# Patient Record
Sex: FEMALE | Race: WHITE | NOT HISPANIC OR LATINO | Employment: PART TIME
[De-identification: names, ages, dates, MRNs, and addresses within clinical notes are randomized per-mention and may not be internally consistent; named-entity substitution may affect disease eponyms.]

---

## 2016-10-21 LAB — PAP SMEAR - HIM PATIENT REPORTED: NORMAL

## 2017-01-30 ENCOUNTER — RECORDS - HEALTHEAST (OUTPATIENT)
Dept: ADMINISTRATIVE | Facility: OTHER | Age: 52
End: 2017-01-30

## 2017-06-02 ENCOUNTER — RECORDS - HEALTHEAST (OUTPATIENT)
Dept: ADMINISTRATIVE | Facility: OTHER | Age: 52
End: 2017-06-02

## 2017-06-07 ENCOUNTER — RECORDS - HEALTHEAST (OUTPATIENT)
Dept: ADMINISTRATIVE | Facility: OTHER | Age: 52
End: 2017-06-07

## 2017-06-08 ENCOUNTER — RECORDS - HEALTHEAST (OUTPATIENT)
Dept: ADMINISTRATIVE | Facility: OTHER | Age: 52
End: 2017-06-08

## 2017-06-16 ENCOUNTER — RECORDS - HEALTHEAST (OUTPATIENT)
Dept: ADMINISTRATIVE | Facility: OTHER | Age: 52
End: 2017-06-16

## 2017-06-21 ENCOUNTER — RECORDS - HEALTHEAST (OUTPATIENT)
Dept: ADMINISTRATIVE | Facility: OTHER | Age: 52
End: 2017-06-21

## 2017-07-12 ENCOUNTER — RECORDS - HEALTHEAST (OUTPATIENT)
Dept: ADMINISTRATIVE | Facility: OTHER | Age: 52
End: 2017-07-12

## 2017-07-28 ENCOUNTER — RECORDS - HEALTHEAST (OUTPATIENT)
Dept: ADMINISTRATIVE | Facility: OTHER | Age: 52
End: 2017-07-28

## 2017-08-08 ENCOUNTER — RECORDS - HEALTHEAST (OUTPATIENT)
Dept: ADMINISTRATIVE | Facility: OTHER | Age: 52
End: 2017-08-08

## 2017-11-17 ENCOUNTER — RECORDS - HEALTHEAST (OUTPATIENT)
Dept: ADMINISTRATIVE | Facility: OTHER | Age: 52
End: 2017-11-17

## 2017-11-22 ENCOUNTER — RECORDS - HEALTHEAST (OUTPATIENT)
Dept: ADMINISTRATIVE | Facility: OTHER | Age: 52
End: 2017-11-22

## 2017-12-01 ENCOUNTER — RECORDS - HEALTHEAST (OUTPATIENT)
Dept: ADMINISTRATIVE | Facility: OTHER | Age: 52
End: 2017-12-01

## 2017-12-13 ENCOUNTER — RECORDS - HEALTHEAST (OUTPATIENT)
Dept: ADMINISTRATIVE | Facility: OTHER | Age: 52
End: 2017-12-13

## 2017-12-19 ENCOUNTER — RECORDS - HEALTHEAST (OUTPATIENT)
Dept: ADMINISTRATIVE | Facility: OTHER | Age: 52
End: 2017-12-19

## 2017-12-28 ENCOUNTER — RECORDS - HEALTHEAST (OUTPATIENT)
Dept: ADMINISTRATIVE | Facility: OTHER | Age: 52
End: 2017-12-28

## 2018-01-17 ENCOUNTER — RECORDS - HEALTHEAST (OUTPATIENT)
Dept: ADMINISTRATIVE | Facility: OTHER | Age: 53
End: 2018-01-17

## 2018-02-14 ENCOUNTER — RECORDS - HEALTHEAST (OUTPATIENT)
Dept: ADMINISTRATIVE | Facility: OTHER | Age: 53
End: 2018-02-14

## 2018-04-11 ENCOUNTER — OFFICE VISIT - HEALTHEAST (OUTPATIENT)
Dept: FAMILY MEDICINE | Facility: CLINIC | Age: 53
End: 2018-04-11

## 2018-04-11 ENCOUNTER — COMMUNICATION - HEALTHEAST (OUTPATIENT)
Dept: ANTICOAGULATION | Facility: CLINIC | Age: 53
End: 2018-04-11

## 2018-04-11 ENCOUNTER — COMMUNICATION - HEALTHEAST (OUTPATIENT)
Dept: FAMILY MEDICINE | Facility: CLINIC | Age: 53
End: 2018-04-11

## 2018-04-11 DIAGNOSIS — Z12.31 SCREENING MAMMOGRAM, ENCOUNTER FOR: ICD-10-CM

## 2018-04-11 DIAGNOSIS — I73.9 PAD (PERIPHERAL ARTERY DISEASE) (H): ICD-10-CM

## 2018-04-11 DIAGNOSIS — E78.5 DYSLIPIDEMIA: ICD-10-CM

## 2018-04-11 DIAGNOSIS — R63.4 WEIGHT LOSS: ICD-10-CM

## 2018-04-11 DIAGNOSIS — F17.200 TOBACCO USE DISORDER: ICD-10-CM

## 2018-04-11 DIAGNOSIS — R59.0 LEFT CERVICAL LYMPHADENOPATHY: ICD-10-CM

## 2018-04-11 DIAGNOSIS — I77.9 BILATERAL CAROTID ARTERY DISEASE (H): ICD-10-CM

## 2018-04-11 DIAGNOSIS — Z79.01 ANTICOAGULATION MONITORING, INR RANGE 2-3: ICD-10-CM

## 2018-04-11 DIAGNOSIS — I10 HTN (HYPERTENSION): ICD-10-CM

## 2018-04-11 LAB
ALBUMIN SERPL-MCNC: 3.8 G/DL (ref 3.5–5)
ALP SERPL-CCNC: 113 U/L (ref 45–120)
ALT SERPL W P-5'-P-CCNC: 17 U/L (ref 0–45)
ANION GAP SERPL CALCULATED.3IONS-SCNC: 7 MMOL/L (ref 5–18)
AST SERPL W P-5'-P-CCNC: 18 U/L (ref 0–40)
BASOPHILS # BLD AUTO: 0.1 THOU/UL (ref 0–0.2)
BASOPHILS NFR BLD AUTO: 1 % (ref 0–2)
BILIRUB SERPL-MCNC: 0.3 MG/DL (ref 0–1)
BUN SERPL-MCNC: 11 MG/DL (ref 8–22)
CALCIUM SERPL-MCNC: 9.3 MG/DL (ref 8.5–10.5)
CHLORIDE BLD-SCNC: 109 MMOL/L (ref 98–107)
CO2 SERPL-SCNC: 24 MMOL/L (ref 22–31)
CREAT SERPL-MCNC: 0.74 MG/DL (ref 0.6–1.1)
EOSINOPHIL # BLD AUTO: 0.3 THOU/UL (ref 0–0.4)
EOSINOPHIL NFR BLD AUTO: 3 % (ref 0–6)
ERYTHROCYTE [DISTWIDTH] IN BLOOD BY AUTOMATED COUNT: 12.9 % (ref 11–14.5)
GFR SERPL CREATININE-BSD FRML MDRD: >60 ML/MIN/1.73M2
GLUCOSE BLD-MCNC: 88 MG/DL (ref 70–125)
HCT VFR BLD AUTO: 34.5 % (ref 35–47)
HGB BLD-MCNC: 11.9 G/DL (ref 12–16)
INR PPP: 1.8 (ref 0.9–1.1)
LYMPHOCYTES # BLD AUTO: 5.1 THOU/UL (ref 0.8–4.4)
LYMPHOCYTES NFR BLD AUTO: 46 % (ref 20–40)
MCH RBC QN AUTO: 32 PG (ref 27–34)
MCHC RBC AUTO-ENTMCNC: 34.4 G/DL (ref 32–36)
MCV RBC AUTO: 93 FL (ref 80–100)
MONOCYTES # BLD AUTO: 0.9 THOU/UL (ref 0–0.9)
MONOCYTES NFR BLD AUTO: 8 % (ref 2–10)
NEUTROPHILS # BLD AUTO: 4.7 THOU/UL (ref 2–7.7)
NEUTROPHILS NFR BLD AUTO: 42 % (ref 50–70)
PLATELET # BLD AUTO: 228 THOU/UL (ref 140–440)
PMV BLD AUTO: 7.4 FL (ref 7–10)
POTASSIUM BLD-SCNC: 4.1 MMOL/L (ref 3.5–5)
PROT SERPL-MCNC: 6.9 G/DL (ref 6–8)
RBC # BLD AUTO: 3.71 MILL/UL (ref 3.8–5.4)
SODIUM SERPL-SCNC: 140 MMOL/L (ref 136–145)
WBC: 11.1 THOU/UL (ref 4–11)

## 2018-04-11 ASSESSMENT — MIFFLIN-ST. JEOR: SCORE: 1193.95

## 2018-04-16 ENCOUNTER — HOSPITAL ENCOUNTER (OUTPATIENT)
Dept: CT IMAGING | Facility: HOSPITAL | Age: 53
Discharge: HOME | End: 2018-04-16
Attending: FAMILY MEDICINE

## 2018-04-16 DIAGNOSIS — R63.4 WEIGHT LOSS: ICD-10-CM

## 2018-04-16 DIAGNOSIS — R59.0 LEFT CERVICAL LYMPHADENOPATHY: ICD-10-CM

## 2018-04-16 DIAGNOSIS — F17.200 TOBACCO USE DISORDER: ICD-10-CM

## 2018-04-17 ENCOUNTER — COMMUNICATION - HEALTHEAST (OUTPATIENT)
Dept: FAMILY MEDICINE | Facility: CLINIC | Age: 53
End: 2018-04-17

## 2018-04-17 DIAGNOSIS — S16.1XXA CERVICAL STRAIN: ICD-10-CM

## 2018-05-02 ENCOUNTER — AMBULATORY - HEALTHEAST (OUTPATIENT)
Dept: LAB | Facility: CLINIC | Age: 53
End: 2018-05-02

## 2018-05-02 ENCOUNTER — COMMUNICATION - HEALTHEAST (OUTPATIENT)
Dept: ANTICOAGULATION | Facility: CLINIC | Age: 53
End: 2018-05-02

## 2018-05-02 DIAGNOSIS — I73.9 PAD (PERIPHERAL ARTERY DISEASE) (H): ICD-10-CM

## 2018-05-02 DIAGNOSIS — Z79.01 ANTICOAGULATION MONITORING, INR RANGE 2-3: ICD-10-CM

## 2018-05-02 LAB — INR PPP: 1.5 (ref 0.9–1.1)

## 2018-05-11 ENCOUNTER — AMBULATORY - HEALTHEAST (OUTPATIENT)
Dept: LAB | Facility: CLINIC | Age: 53
End: 2018-05-11

## 2018-05-11 ENCOUNTER — COMMUNICATION - HEALTHEAST (OUTPATIENT)
Dept: ANTICOAGULATION | Facility: CLINIC | Age: 53
End: 2018-05-11

## 2018-05-11 DIAGNOSIS — I73.9 PAD (PERIPHERAL ARTERY DISEASE) (H): ICD-10-CM

## 2018-05-11 DIAGNOSIS — Z79.01 ANTICOAGULATION MONITORING, INR RANGE 2-3: ICD-10-CM

## 2018-05-11 LAB — INR PPP: 3.4 (ref 0.9–1.1)

## 2018-05-14 ENCOUNTER — COMMUNICATION - HEALTHEAST (OUTPATIENT)
Dept: FAMILY MEDICINE | Facility: CLINIC | Age: 53
End: 2018-05-14

## 2018-05-14 DIAGNOSIS — E78.5 DYSLIPIDEMIA: ICD-10-CM

## 2018-05-18 ENCOUNTER — COMMUNICATION - HEALTHEAST (OUTPATIENT)
Dept: ANTICOAGULATION | Facility: CLINIC | Age: 53
End: 2018-05-18

## 2018-05-18 ENCOUNTER — AMBULATORY - HEALTHEAST (OUTPATIENT)
Dept: LAB | Facility: CLINIC | Age: 53
End: 2018-05-18

## 2018-05-18 ENCOUNTER — COMMUNICATION - HEALTHEAST (OUTPATIENT)
Dept: FAMILY MEDICINE | Facility: CLINIC | Age: 53
End: 2018-05-18

## 2018-05-18 DIAGNOSIS — Z79.01 ANTICOAGULATION MONITORING, INR RANGE 2-3: ICD-10-CM

## 2018-05-18 DIAGNOSIS — I73.9 PAD (PERIPHERAL ARTERY DISEASE) (H): ICD-10-CM

## 2018-05-18 LAB — INR PPP: 4.7 (ref 0.9–1.1)

## 2018-05-30 ENCOUNTER — COMMUNICATION - HEALTHEAST (OUTPATIENT)
Dept: ANTICOAGULATION | Facility: CLINIC | Age: 53
End: 2018-05-30

## 2018-05-30 ENCOUNTER — AMBULATORY - HEALTHEAST (OUTPATIENT)
Dept: LAB | Facility: CLINIC | Age: 53
End: 2018-05-30

## 2018-05-30 DIAGNOSIS — I73.9 PAD (PERIPHERAL ARTERY DISEASE) (H): ICD-10-CM

## 2018-05-30 DIAGNOSIS — Z79.01 ANTICOAGULATION MONITORING, INR RANGE 2-3: ICD-10-CM

## 2018-05-30 LAB — INR PPP: 2 (ref 0.9–1.1)

## 2018-06-13 ENCOUNTER — AMBULATORY - HEALTHEAST (OUTPATIENT)
Dept: LAB | Facility: CLINIC | Age: 53
End: 2018-06-13

## 2018-06-13 ENCOUNTER — COMMUNICATION - HEALTHEAST (OUTPATIENT)
Dept: ANTICOAGULATION | Facility: CLINIC | Age: 53
End: 2018-06-13

## 2018-06-13 DIAGNOSIS — Z79.01 ANTICOAGULATION MONITORING, INR RANGE 2-3: ICD-10-CM

## 2018-06-13 DIAGNOSIS — I73.9 PAD (PERIPHERAL ARTERY DISEASE) (H): ICD-10-CM

## 2018-06-13 LAB — INR PPP: 2.2 (ref 0.9–1.1)

## 2018-06-15 ENCOUNTER — HOSPITAL ENCOUNTER (OUTPATIENT)
Dept: MAMMOGRAPHY | Facility: CLINIC | Age: 53
Discharge: HOME | End: 2018-06-15
Attending: FAMILY MEDICINE

## 2018-06-15 DIAGNOSIS — Z12.31 SCREENING MAMMOGRAM, ENCOUNTER FOR: ICD-10-CM

## 2018-07-17 ENCOUNTER — COMMUNICATION - HEALTHEAST (OUTPATIENT)
Dept: FAMILY MEDICINE | Facility: CLINIC | Age: 53
End: 2018-07-17

## 2018-07-17 DIAGNOSIS — I73.9 PAD (PERIPHERAL ARTERY DISEASE) (H): ICD-10-CM

## 2018-07-17 DIAGNOSIS — Z79.01 ANTICOAGULATION MONITORING, INR RANGE 2-3: ICD-10-CM

## 2018-07-18 ENCOUNTER — COMMUNICATION - HEALTHEAST (OUTPATIENT)
Dept: ANTICOAGULATION | Facility: CLINIC | Age: 53
End: 2018-07-18

## 2018-07-18 ENCOUNTER — AMBULATORY - HEALTHEAST (OUTPATIENT)
Dept: LAB | Facility: CLINIC | Age: 53
End: 2018-07-18

## 2018-07-18 DIAGNOSIS — Z79.01 ANTICOAGULATION MONITORING, INR RANGE 2-3: ICD-10-CM

## 2018-07-18 DIAGNOSIS — I73.9 PAD (PERIPHERAL ARTERY DISEASE) (H): ICD-10-CM

## 2018-07-18 LAB — INR PPP: 1.2 (ref 0.9–1.1)

## 2018-07-25 ENCOUNTER — COMMUNICATION - HEALTHEAST (OUTPATIENT)
Dept: ANTICOAGULATION | Facility: CLINIC | Age: 53
End: 2018-07-25

## 2018-07-25 ENCOUNTER — AMBULATORY - HEALTHEAST (OUTPATIENT)
Dept: LAB | Facility: CLINIC | Age: 53
End: 2018-07-25

## 2018-07-25 DIAGNOSIS — I73.9 PAD (PERIPHERAL ARTERY DISEASE) (H): ICD-10-CM

## 2018-07-25 DIAGNOSIS — Z79.01 ANTICOAGULATION MONITORING, INR RANGE 2-3: ICD-10-CM

## 2018-07-25 LAB — INR PPP: 1.6 (ref 0.9–1.1)

## 2018-08-08 ENCOUNTER — AMBULATORY - HEALTHEAST (OUTPATIENT)
Dept: LAB | Facility: CLINIC | Age: 53
End: 2018-08-08

## 2018-08-08 ENCOUNTER — COMMUNICATION - HEALTHEAST (OUTPATIENT)
Dept: ANTICOAGULATION | Facility: CLINIC | Age: 53
End: 2018-08-08

## 2018-08-08 DIAGNOSIS — Z79.01 ANTICOAGULATION MONITORING, INR RANGE 2-3: ICD-10-CM

## 2018-08-08 DIAGNOSIS — I73.9 PAD (PERIPHERAL ARTERY DISEASE) (H): ICD-10-CM

## 2018-08-08 LAB — INR PPP: 2.3 (ref 0.9–1.1)

## 2018-08-29 ENCOUNTER — COMMUNICATION - HEALTHEAST (OUTPATIENT)
Dept: ANTICOAGULATION | Facility: CLINIC | Age: 53
End: 2018-08-29

## 2018-08-29 ENCOUNTER — AMBULATORY - HEALTHEAST (OUTPATIENT)
Dept: LAB | Facility: CLINIC | Age: 53
End: 2018-08-29

## 2018-08-29 DIAGNOSIS — I73.9 PAD (PERIPHERAL ARTERY DISEASE) (H): ICD-10-CM

## 2018-08-29 DIAGNOSIS — Z79.01 ANTICOAGULATION MONITORING, INR RANGE 2-3: ICD-10-CM

## 2018-08-29 LAB — INR PPP: 2.3 (ref 0.9–1.1)

## 2018-09-22 ENCOUNTER — OFFICE VISIT - HEALTHEAST (OUTPATIENT)
Dept: FAMILY MEDICINE | Facility: CLINIC | Age: 53
End: 2018-09-22

## 2018-09-22 DIAGNOSIS — B07.8 COMMON WART: ICD-10-CM

## 2018-09-22 DIAGNOSIS — S90.859A FOREIGN BODY IN FOOT, INITIAL ENCOUNTER: ICD-10-CM

## 2018-09-24 ENCOUNTER — OFFICE VISIT - HEALTHEAST (OUTPATIENT)
Dept: FAMILY MEDICINE | Facility: CLINIC | Age: 53
End: 2018-09-24

## 2018-09-24 DIAGNOSIS — M79.672 LEFT FOOT PAIN: ICD-10-CM

## 2018-09-26 ENCOUNTER — AMBULATORY - HEALTHEAST (OUTPATIENT)
Dept: LAB | Facility: CLINIC | Age: 53
End: 2018-09-26

## 2018-09-26 ENCOUNTER — COMMUNICATION - HEALTHEAST (OUTPATIENT)
Dept: FAMILY MEDICINE | Facility: CLINIC | Age: 53
End: 2018-09-26

## 2018-09-26 ENCOUNTER — COMMUNICATION - HEALTHEAST (OUTPATIENT)
Dept: ANTICOAGULATION | Facility: CLINIC | Age: 53
End: 2018-09-26

## 2018-09-26 ENCOUNTER — AMBULATORY - HEALTHEAST (OUTPATIENT)
Dept: NURSING | Facility: CLINIC | Age: 53
End: 2018-09-26

## 2018-09-26 DIAGNOSIS — Z79.01 ANTICOAGULATION MONITORING, INR RANGE 2-3: ICD-10-CM

## 2018-09-26 DIAGNOSIS — I73.9 PAD (PERIPHERAL ARTERY DISEASE) (H): ICD-10-CM

## 2018-09-26 LAB — INR PPP: 2.2 (ref 0.9–1.1)

## 2018-10-11 ENCOUNTER — AMBULATORY - HEALTHEAST (OUTPATIENT)
Dept: VASCULAR SURGERY | Facility: CLINIC | Age: 53
End: 2018-10-11

## 2018-10-11 ENCOUNTER — OFFICE VISIT - HEALTHEAST (OUTPATIENT)
Dept: PODIATRY | Facility: CLINIC | Age: 53
End: 2018-10-11

## 2018-10-11 DIAGNOSIS — S90.852A FOREIGN BODY IN LEFT FOOT, INITIAL ENCOUNTER: ICD-10-CM

## 2018-10-12 ENCOUNTER — COMMUNICATION - HEALTHEAST (OUTPATIENT)
Dept: FAMILY MEDICINE | Facility: CLINIC | Age: 53
End: 2018-10-12

## 2018-10-16 ENCOUNTER — COMMUNICATION - HEALTHEAST (OUTPATIENT)
Dept: VASCULAR SURGERY | Facility: CLINIC | Age: 53
End: 2018-10-16

## 2018-10-17 ENCOUNTER — OFFICE VISIT - HEALTHEAST (OUTPATIENT)
Dept: FAMILY MEDICINE | Facility: CLINIC | Age: 53
End: 2018-10-17

## 2018-10-17 ENCOUNTER — COMMUNICATION - HEALTHEAST (OUTPATIENT)
Dept: ANTICOAGULATION | Facility: CLINIC | Age: 53
End: 2018-10-17

## 2018-10-17 DIAGNOSIS — Z79.01 ANTICOAGULATION MONITORING, INR RANGE 2-3: ICD-10-CM

## 2018-10-17 DIAGNOSIS — I10 HTN (HYPERTENSION): ICD-10-CM

## 2018-10-17 DIAGNOSIS — I73.9 PAD (PERIPHERAL ARTERY DISEASE) (H): ICD-10-CM

## 2018-10-17 DIAGNOSIS — S90.852A FOREIGN BODY IN LEFT FOOT, INITIAL ENCOUNTER: ICD-10-CM

## 2018-10-17 DIAGNOSIS — R01.1 NEWLY RECOGNIZED MURMUR: ICD-10-CM

## 2018-10-17 DIAGNOSIS — Z01.818 PRE-OPERATIVE EXAMINATION: ICD-10-CM

## 2018-10-17 LAB
ANION GAP SERPL CALCULATED.3IONS-SCNC: 10 MMOL/L (ref 5–18)
ATRIAL RATE - MUSE: 50 BPM
BUN SERPL-MCNC: 9 MG/DL (ref 8–22)
CALCIUM SERPL-MCNC: 9.1 MG/DL (ref 8.5–10.5)
CHLORIDE BLD-SCNC: 106 MMOL/L (ref 98–107)
CO2 SERPL-SCNC: 24 MMOL/L (ref 22–31)
CREAT SERPL-MCNC: 0.76 MG/DL (ref 0.6–1.1)
DIASTOLIC BLOOD PRESSURE - MUSE: NORMAL MMHG
GFR SERPL CREATININE-BSD FRML MDRD: >60 ML/MIN/1.73M2
GLUCOSE BLD-MCNC: 88 MG/DL (ref 70–125)
INR PPP: 2.1 (ref 0.9–1.1)
INTERPRETATION ECG - MUSE: NORMAL
P AXIS - MUSE: 68 DEGREES
POTASSIUM BLD-SCNC: 3.9 MMOL/L (ref 3.5–5)
PR INTERVAL - MUSE: 128 MS
QRS DURATION - MUSE: 106 MS
QT - MUSE: 470 MS
QTC - MUSE: 428 MS
R AXIS - MUSE: 72 DEGREES
SODIUM SERPL-SCNC: 140 MMOL/L (ref 136–145)
SYSTOLIC BLOOD PRESSURE - MUSE: NORMAL MMHG
T AXIS - MUSE: 3 DEGREES
VENTRICULAR RATE- MUSE: 50 BPM

## 2018-10-17 ASSESSMENT — MIFFLIN-ST. JEOR: SCORE: 1189.41

## 2018-10-18 ENCOUNTER — COMMUNICATION - HEALTHEAST (OUTPATIENT)
Dept: FAMILY MEDICINE | Facility: CLINIC | Age: 53
End: 2018-10-18

## 2018-10-18 LAB
BASOPHILS # BLD AUTO: 0.1 THOU/UL (ref 0–0.2)
BASOPHILS NFR BLD AUTO: 1 % (ref 0–2)
EOSINOPHIL # BLD AUTO: 0.3 THOU/UL (ref 0–0.4)
EOSINOPHIL NFR BLD AUTO: 3 % (ref 0–6)
ERYTHROCYTE [DISTWIDTH] IN BLOOD BY AUTOMATED COUNT: 12.7 % (ref 11–14.5)
HCT VFR BLD AUTO: 33.3 % (ref 35–47)
HGB BLD-MCNC: 11.2 G/DL (ref 12–16)
LYMPHOCYTES # BLD AUTO: 4.3 THOU/UL (ref 0.8–4.4)
LYMPHOCYTES NFR BLD AUTO: 48 % (ref 20–40)
MCH RBC QN AUTO: 30.5 PG (ref 27–34)
MCHC RBC AUTO-ENTMCNC: 33.7 G/DL (ref 32–36)
MCV RBC AUTO: 91 FL (ref 80–100)
MONOCYTES # BLD AUTO: 0.7 THOU/UL (ref 0–0.9)
MONOCYTES NFR BLD AUTO: 8 % (ref 2–10)
NEUTROPHILS # BLD AUTO: 3.6 THOU/UL (ref 2–7.7)
NEUTROPHILS NFR BLD AUTO: 40 % (ref 50–70)
PLATELET # BLD AUTO: 230 THOU/UL (ref 140–440)
PMV BLD AUTO: 7.4 FL (ref 7–10)
RBC # BLD AUTO: 3.67 MILL/UL (ref 3.8–5.4)
WBC: 8.9 THOU/UL (ref 4–11)

## 2018-10-22 ENCOUNTER — ANESTHESIA - HEALTHEAST (OUTPATIENT)
Dept: SURGERY | Facility: HOSPITAL | Age: 53
End: 2018-10-22

## 2018-10-22 ASSESSMENT — MIFFLIN-ST. JEOR: SCORE: 1189.41

## 2018-10-23 ENCOUNTER — COMMUNICATION - HEALTHEAST (OUTPATIENT)
Dept: ANTICOAGULATION | Facility: CLINIC | Age: 53
End: 2018-10-23

## 2018-10-23 ENCOUNTER — SURGERY - HEALTHEAST (OUTPATIENT)
Dept: SURGERY | Facility: HOSPITAL | Age: 53
End: 2018-10-23

## 2018-11-01 ENCOUNTER — COMMUNICATION - HEALTHEAST (OUTPATIENT)
Dept: ANTICOAGULATION | Facility: CLINIC | Age: 53
End: 2018-11-01

## 2018-11-01 ENCOUNTER — OFFICE VISIT - HEALTHEAST (OUTPATIENT)
Dept: PODIATRY | Facility: CLINIC | Age: 53
End: 2018-11-01

## 2018-11-01 ENCOUNTER — AMBULATORY - HEALTHEAST (OUTPATIENT)
Dept: LAB | Facility: CLINIC | Age: 53
End: 2018-11-01

## 2018-11-01 DIAGNOSIS — I73.9 PAD (PERIPHERAL ARTERY DISEASE) (H): ICD-10-CM

## 2018-11-01 DIAGNOSIS — Z79.01 ANTICOAGULATION MONITORING, INR RANGE 2-3: ICD-10-CM

## 2018-11-01 DIAGNOSIS — B07.0 VERRUCA PLANTARIS: ICD-10-CM

## 2018-11-01 LAB — INR PPP: 1.8 (ref 0.9–1.1)

## 2018-11-02 ENCOUNTER — OFFICE VISIT - HEALTHEAST (OUTPATIENT)
Dept: FAMILY MEDICINE | Facility: CLINIC | Age: 53
End: 2018-11-02

## 2018-11-02 ENCOUNTER — CARE COORDINATION (OUTPATIENT)
Dept: CARDIOLOGY | Facility: CLINIC | Age: 53
End: 2018-11-02

## 2018-11-02 DIAGNOSIS — N39.41 URGE INCONTINENCE OF URINE: ICD-10-CM

## 2018-11-02 DIAGNOSIS — D64.9 NORMOCYTIC ANEMIA: ICD-10-CM

## 2018-11-02 LAB
FERRITIN SERPL-MCNC: 91 NG/ML (ref 10–130)
FOLATE SERPL-MCNC: 15.7 NG/ML
IRON SATN MFR SERPL: 17 % (ref 20–50)
IRON SERPL-MCNC: 46 UG/DL (ref 42–175)
LDH SERPL L TO P-CCNC: 220 U/L (ref 125–220)
RETICS # AUTO: 0.07 MILL/UL (ref 0.01–0.11)
TIBC SERPL-MCNC: 266 UG/DL (ref 313–563)
TRANSFERRIN SERPL-MCNC: 213 MG/DL (ref 212–360)
TSH SERPL DL<=0.005 MIU/L-ACNC: 2.48 UIU/ML (ref 0.3–5)
VIT B12 SERPL-MCNC: 396 PG/ML (ref 213–816)

## 2018-11-02 NOTE — PROGRESS NOTES
Patient will receive a new patient packet.  She will fill out and sign the Release of Information and consent for treatment forms.  Once these are returned we will access [facility] records from [facility].  We can establish imaging needs at that time.  Patient states understanding and agrees to call with any further questions or concerns.

## 2018-11-03 ENCOUNTER — HOSPITAL ENCOUNTER (OUTPATIENT)
Dept: ULTRASOUND IMAGING | Facility: HOSPITAL | Age: 53
Discharge: HOME | End: 2018-11-03
Attending: FAMILY MEDICINE

## 2018-11-03 DIAGNOSIS — N39.41 URGE INCONTINENCE OF URINE: ICD-10-CM

## 2018-11-03 LAB
BASOPHILS # BLD AUTO: 0.1 THOU/UL (ref 0–0.2)
BASOPHILS NFR BLD AUTO: 1 % (ref 0–2)
EOSINOPHIL # BLD AUTO: 0.3 THOU/UL (ref 0–0.4)
EOSINOPHIL NFR BLD AUTO: 3 % (ref 0–6)
ERYTHROCYTE [DISTWIDTH] IN BLOOD BY AUTOMATED COUNT: 13.8 % (ref 11–14.5)
HCT VFR BLD AUTO: 35.6 % (ref 35–47)
HGB BLD-MCNC: 11.7 G/DL (ref 12–16)
LYMPHOCYTES # BLD AUTO: 4.6 THOU/UL (ref 0.8–4.4)
LYMPHOCYTES NFR BLD AUTO: 42 % (ref 20–40)
MCH RBC QN AUTO: 31.1 PG (ref 27–34)
MCHC RBC AUTO-ENTMCNC: 32.9 G/DL (ref 32–36)
MCV RBC AUTO: 95 FL (ref 80–100)
MONOCYTES # BLD AUTO: 1.1 THOU/UL (ref 0–0.9)
MONOCYTES NFR BLD AUTO: 9 % (ref 2–10)
NEUTROPHILS # BLD AUTO: 5.1 THOU/UL (ref 2–7.7)
NEUTROPHILS NFR BLD AUTO: 46 % (ref 50–70)
PATH REPORT.MICROSCOPIC SPEC OTHER STN: ABNORMAL
PLATELET # BLD AUTO: 230 THOU/UL (ref 140–440)
PMV BLD AUTO: 10.7 FL (ref 8.5–12.5)
RBC # BLD AUTO: 3.76 MILL/UL (ref 3.8–5.4)
WBC: 11.1 THOU/UL (ref 4–11)

## 2018-11-05 LAB
LAB AP CHARGES (HE HISTORICAL CONVERSION): ABNORMAL
PATH REPORT.COMMENTS IMP SPEC: ABNORMAL
PATH REPORT.COMMENTS IMP SPEC: ABNORMAL
PATH REPORT.FINAL DX SPEC: ABNORMAL
PATH REPORT.MICROSCOPIC SPEC OTHER STN: ABNORMAL
PATH REPORT.RELEVANT HX SPEC: ABNORMAL
RESULT FLAG (HE HISTORICAL CONVERSION): ABNORMAL

## 2018-11-07 ENCOUNTER — COMMUNICATION - HEALTHEAST (OUTPATIENT)
Dept: PODIATRY | Facility: CLINIC | Age: 53
End: 2018-11-07

## 2018-11-09 LAB
HEMOCCULT SP1 STL QL: NEGATIVE
HEMOCCULT SP2 STL QL: NEGATIVE
HEMOCCULT SP3 STL QL: NEGATIVE

## 2018-11-12 ENCOUNTER — OFFICE VISIT - HEALTHEAST (OUTPATIENT)
Dept: PODIATRY | Facility: CLINIC | Age: 53
End: 2018-11-12

## 2018-11-12 ENCOUNTER — COMMUNICATION - HEALTHEAST (OUTPATIENT)
Dept: ANTICOAGULATION | Facility: CLINIC | Age: 53
End: 2018-11-12

## 2018-11-12 ENCOUNTER — AMBULATORY - HEALTHEAST (OUTPATIENT)
Dept: LAB | Facility: CLINIC | Age: 53
End: 2018-11-12

## 2018-11-12 DIAGNOSIS — Z79.01 ANTICOAGULATION MONITORING, INR RANGE 2-3: ICD-10-CM

## 2018-11-12 DIAGNOSIS — I73.9 PAD (PERIPHERAL ARTERY DISEASE) (H): ICD-10-CM

## 2018-11-12 DIAGNOSIS — B07.0 VERRUCA PLANTARIS: ICD-10-CM

## 2018-11-12 LAB — INR PPP: 1.6 (ref 0.9–1.1)

## 2018-11-15 ENCOUNTER — COMMUNICATION - HEALTHEAST (OUTPATIENT)
Dept: ANTICOAGULATION | Facility: CLINIC | Age: 53
End: 2018-11-15

## 2018-11-16 ENCOUNTER — TELEPHONE (OUTPATIENT)
Dept: OTHER | Facility: CLINIC | Age: 53
End: 2018-11-16

## 2018-11-16 ENCOUNTER — AMBULATORY - HEALTHEAST (OUTPATIENT)
Dept: FAMILY MEDICINE | Facility: CLINIC | Age: 53
End: 2018-11-16

## 2018-11-16 DIAGNOSIS — D72.820 ATYPICAL LYMPHOCYTOSIS: ICD-10-CM

## 2018-11-16 NOTE — TELEPHONE ENCOUNTER
November 16, 2018    Patient successfully onboarded    [facility]  Outreach

## 2018-11-20 ENCOUNTER — COMMUNICATION - HEALTHEAST (OUTPATIENT)
Dept: ONCOLOGY | Facility: CLINIC | Age: 53
End: 2018-11-20

## 2018-11-21 ENCOUNTER — COMMUNICATION - HEALTHEAST (OUTPATIENT)
Dept: FAMILY MEDICINE | Facility: CLINIC | Age: 53
End: 2018-11-21

## 2018-11-21 ENCOUNTER — COMMUNICATION - HEALTHEAST (OUTPATIENT)
Dept: ANTICOAGULATION | Facility: CLINIC | Age: 53
End: 2018-11-21

## 2018-11-21 ENCOUNTER — COMMUNICATION - HEALTHEAST (OUTPATIENT)
Dept: ONCOLOGY | Facility: HOSPITAL | Age: 53
End: 2018-11-21

## 2018-11-21 ENCOUNTER — AMBULATORY - HEALTHEAST (OUTPATIENT)
Dept: LAB | Facility: CLINIC | Age: 53
End: 2018-11-21

## 2018-11-21 DIAGNOSIS — Z79.01 ANTICOAGULATION MONITORING, INR RANGE 2-3: ICD-10-CM

## 2018-11-21 DIAGNOSIS — I73.9 PAD (PERIPHERAL ARTERY DISEASE) (H): ICD-10-CM

## 2018-11-21 DIAGNOSIS — D72.820 ATYPICAL LYMPHOCYTOSIS: ICD-10-CM

## 2018-11-21 LAB
BASOPHILS # BLD AUTO: 0.1 THOU/UL (ref 0–0.2)
BASOPHILS NFR BLD AUTO: 1 % (ref 0–2)
EOSINOPHIL # BLD AUTO: 0.4 THOU/UL (ref 0–0.4)
EOSINOPHIL NFR BLD AUTO: 3 % (ref 0–6)
ERYTHROCYTE [DISTWIDTH] IN BLOOD BY AUTOMATED COUNT: 12.2 % (ref 11–14.5)
HCT VFR BLD AUTO: 31.5 % (ref 35–47)
HGB BLD-MCNC: 10.7 G/DL (ref 12–16)
INR PPP: 3.4 (ref 0.9–1.1)
LYMPHOCYTES # BLD AUTO: 3.3 THOU/UL (ref 0.8–4.4)
LYMPHOCYTES NFR BLD AUTO: 28 % (ref 20–40)
MCH RBC QN AUTO: 30.9 PG (ref 27–34)
MCHC RBC AUTO-ENTMCNC: 34.1 G/DL (ref 32–36)
MCV RBC AUTO: 91 FL (ref 80–100)
MONOCYTES # BLD AUTO: 1 THOU/UL (ref 0–0.9)
MONOCYTES NFR BLD AUTO: 9 % (ref 2–10)
NEUTROPHILS # BLD AUTO: 7.1 THOU/UL (ref 2–7.7)
NEUTROPHILS NFR BLD AUTO: 60 % (ref 50–70)
PLATELET # BLD AUTO: 229 THOU/UL (ref 140–440)
PMV BLD AUTO: 7.9 FL (ref 7–10)
RBC # BLD AUTO: 3.47 MILL/UL (ref 3.8–5.4)
WBC: 11.8 THOU/UL (ref 4–11)

## 2018-11-26 LAB
LAB AP CHARGES (HE HISTORICAL CONVERSION): NORMAL
PATH REPORT.COMMENTS IMP SPEC: NORMAL
PATH REPORT.COMMENTS IMP SPEC: NORMAL
PATH REPORT.FINAL DX SPEC: NORMAL
PATH REPORT.MICROSCOPIC SPEC OTHER STN: NORMAL
RESULT FLAG (HE HISTORICAL CONVERSION): NORMAL

## 2018-11-29 PROBLEM — R10.13 ABDOMINAL PAIN, EPIGASTRIC: Status: ACTIVE | Noted: 2017-12-02

## 2018-11-29 PROBLEM — R94.39 ABNORMAL CARDIOVASCULAR STRESS TEST: Status: ACTIVE | Noted: 2017-12-13

## 2018-11-29 PROBLEM — R22.9 MULTIPLE SKIN NODULES: Status: ACTIVE | Noted: 2017-12-02

## 2018-11-29 PROBLEM — R01.1 NEWLY RECOGNIZED MURMUR: Status: ACTIVE | Noted: 2018-10-18

## 2018-11-29 PROBLEM — F41.9 ANXIETY DISORDER: Status: ACTIVE | Noted: 2018-04-11

## 2018-11-29 PROBLEM — I73.9 PAD (PERIPHERAL ARTERY DISEASE) (H): Status: ACTIVE | Noted: 2018-11-29

## 2018-11-29 PROBLEM — R00.2 INTERMITTENT PALPITATIONS: Status: ACTIVE | Noted: 2017-12-02

## 2018-11-29 PROBLEM — K57.30 DIVERTICULOSIS OF LARGE INTESTINE WITHOUT HEMORRHAGE: Status: ACTIVE | Noted: 2017-12-02

## 2018-11-29 PROBLEM — M79.2 THORACIC NEURALGIA: Status: ACTIVE | Noted: 2017-08-08

## 2018-11-29 PROBLEM — E78.41 ELEVATED LIPOPROTEIN(A): Status: ACTIVE | Noted: 2017-12-02

## 2018-11-29 PROBLEM — F17.200 TOBACCO USE DISORDER: Status: ACTIVE | Noted: 2018-11-29

## 2018-11-29 PROBLEM — B07.0 VERRUCA PLANTARIS: Status: ACTIVE | Noted: 2018-11-01

## 2018-11-29 PROBLEM — N39.44 NOCTURNAL ENURESIS: Status: ACTIVE | Noted: 2017-12-02

## 2018-11-29 RX ORDER — CITALOPRAM HYDROBROMIDE 20 MG/1
20 TABLET ORAL
COMMUNITY
Start: 2017-07-14 | End: 2022-02-25

## 2018-11-29 RX ORDER — WARFARIN SODIUM 5 MG/1
5 TABLET ORAL
COMMUNITY
Start: 2018-07-17 | End: 2021-07-22

## 2018-11-29 RX ORDER — TOLTERODINE 2 MG/1
2 CAPSULE, EXTENDED RELEASE ORAL
COMMUNITY
Start: 2018-11-02 | End: 2021-09-02

## 2018-11-29 RX ORDER — ATORVASTATIN CALCIUM 40 MG/1
40 TABLET, FILM COATED ORAL
COMMUNITY
Start: 2017-11-17 | End: 2018-12-04 | Stop reason: DRUGHIGH

## 2018-11-29 RX ORDER — ASPIRIN 81 MG/1
81 TABLET ORAL DAILY
COMMUNITY
Start: 2016-11-04

## 2018-11-29 RX ORDER — AMLODIPINE BESYLATE 5 MG/1
5 TABLET ORAL
COMMUNITY
Start: 2017-12-02 | End: 2022-04-05

## 2018-11-29 RX ORDER — GABAPENTIN 100 MG/1
100 CAPSULE ORAL
COMMUNITY
Start: 2018-03-31 | End: 2021-07-20

## 2018-11-29 RX ORDER — ALBUTEROL SULFATE 90 UG/1
2 AEROSOL, METERED RESPIRATORY (INHALATION)
COMMUNITY
Start: 2017-08-08 | End: 2021-07-16

## 2018-11-29 RX ORDER — LISINOPRIL 20 MG/1
20 TABLET ORAL
COMMUNITY
Start: 2017-11-17 | End: 2022-04-05

## 2018-11-29 RX ORDER — FOLIC ACID 1 MG/1
1 TABLET ORAL
COMMUNITY
Start: 2018-11-20 | End: 2022-07-07

## 2018-12-02 ASSESSMENT — ENCOUNTER SYMPTOMS
NAIL CHANGES: 1
SLEEP DISTURBANCES DUE TO BREATHING: 0
SMELL DISTURBANCE: 0
BACK PAIN: 1
SHORTNESS OF BREATH: 1
SPUTUM PRODUCTION: 1
MUSCLE WEAKNESS: 1
SWOLLEN GLANDS: 1
STIFFNESS: 0
BLOOD IN STOOL: 0
COUGH DISTURBING SLEEP: 0
WEIGHT GAIN: 0
CONSTIPATION: 0
BOWEL INCONTINENCE: 0
ALTERED TEMPERATURE REGULATION: 1
BLOATING: 1
TINGLING: 1
CHILLS: 1
SORE THROAT: 0
RECTAL PAIN: 0
DIFFICULTY URINATING: 0
NECK MASS: 1
HALLUCINATIONS: 0
POLYDIPSIA: 1
WEIGHT LOSS: 0
VOMITING: 1
POOR WOUND HEALING: 0
MYALGIAS: 1
HEMOPTYSIS: 0
MEMORY LOSS: 0
FATIGUE: 1
DECREASED APPETITE: 1
EXERCISE INTOLERANCE: 1
TREMORS: 0
FEVER: 0
HEADACHES: 1
BRUISES/BLEEDS EASILY: 0
SNORES LOUDLY: 1
DIARRHEA: 0
SEIZURES: 0
HEARTBURN: 1
NUMBNESS: 1
DIZZINESS: 0
DYSPNEA ON EXERTION: 1
ORTHOPNEA: 0
POSTURAL DYSPNEA: 0
LIGHT-HEADEDNESS: 1
COUGH: 1
NECK PAIN: 0
ARTHRALGIAS: 1
SKIN CHANGES: 0
LEG PAIN: 0
HEMATURIA: 0
DYSURIA: 0
WEAKNESS: 1
HYPOTENSION: 0
HYPERTENSION: 1
LOSS OF CONSCIOUSNESS: 0
PALPITATIONS: 0
INCREASED ENERGY: 0
DISTURBANCES IN COORDINATION: 0
SYNCOPE: 0
POLYPHAGIA: 0
SINUS CONGESTION: 1
SINUS PAIN: 0
FLANK PAIN: 0
NAUSEA: 1
ABDOMINAL PAIN: 1
TASTE DISTURBANCE: 0
HOARSE VOICE: 1
NIGHT SWEATS: 1
MUSCLE CRAMPS: 0
TROUBLE SWALLOWING: 0
JAUNDICE: 0
PARALYSIS: 0
JOINT SWELLING: 0
SPEECH CHANGE: 0
WHEEZING: 0

## 2018-12-03 ENCOUNTER — HEALTH MAINTENANCE LETTER (OUTPATIENT)
Age: 53
End: 2018-12-03

## 2018-12-03 ENCOUNTER — PATIENT OUTREACH (OUTPATIENT)
Dept: CARE COORDINATION | Facility: CLINIC | Age: 53
End: 2018-12-03

## 2018-12-03 NOTE — TELEPHONE ENCOUNTER
FUTURE VISIT INFORMATION:    Date: 12/4/18    Time: 1630    Location:  Cardiology  REFERRAL INFORMATION:    Referring provider:      Referring providers clinic:      Reason for visit/diagnosis:  PAD  Requested images from [facility].

## 2018-12-04 ENCOUNTER — OFFICE VISIT (OUTPATIENT)
Dept: CARDIOLOGY | Facility: CLINIC | Age: 53
End: 2018-12-04
Attending: INTERNAL MEDICINE
Payer: COMMERCIAL

## 2018-12-04 ENCOUNTER — RADIANT APPOINTMENT (OUTPATIENT)
Dept: ULTRASOUND IMAGING | Facility: CLINIC | Age: 53
End: 2018-12-04
Attending: INTERNAL MEDICINE
Payer: COMMERCIAL

## 2018-12-04 ENCOUNTER — RECORDS - HEALTHEAST (OUTPATIENT)
Dept: ADMINISTRATIVE | Facility: OTHER | Age: 53
End: 2018-12-04

## 2018-12-04 ENCOUNTER — PRE VISIT (OUTPATIENT)
Dept: CARDIOLOGY | Facility: CLINIC | Age: 53
End: 2018-12-04

## 2018-12-04 VITALS
WEIGHT: 125 LBS | DIASTOLIC BLOOD PRESSURE: 77 MMHG | SYSTOLIC BLOOD PRESSURE: 179 MMHG | OXYGEN SATURATION: 99 % | HEART RATE: 60 BPM

## 2018-12-04 DIAGNOSIS — Z95.828 H/O AORTA-ILIAC-FEMORAL BYPASS: ICD-10-CM

## 2018-12-04 DIAGNOSIS — I73.9 PERIPHERAL ARTERY DISEASE (H): Primary | ICD-10-CM

## 2018-12-04 DIAGNOSIS — I73.9 PERIPHERAL ARTERY DISEASE (H): ICD-10-CM

## 2018-12-04 DIAGNOSIS — Z78.9 STATIN INTOLERANCE: ICD-10-CM

## 2018-12-04 DIAGNOSIS — R01.1 HEART MURMUR: ICD-10-CM

## 2018-12-04 DIAGNOSIS — E78.5 HYPERLIPIDEMIA LDL GOAL <70: ICD-10-CM

## 2018-12-04 PROCEDURE — G0463 HOSPITAL OUTPT CLINIC VISIT: HCPCS | Mod: 25,ZF

## 2018-12-04 PROCEDURE — 99204 OFFICE O/P NEW MOD 45 MIN: CPT | Mod: ZP | Performed by: INTERNAL MEDICINE

## 2018-12-04 RX ORDER — ATORVASTATIN CALCIUM 10 MG/1
10 TABLET, FILM COATED ORAL DAILY
Qty: 30 TABLET | Refills: 1 | Status: SHIPPED | OUTPATIENT
Start: 2018-12-04 | End: 2022-02-25

## 2018-12-04 ASSESSMENT — PAIN SCALES - GENERAL: PAINLEVEL: NO PAIN (0)

## 2018-12-04 NOTE — LETTER
12/4/2018      RE: Nery Whitaker  [id number]  [facility] [id number]       Dear Colleague,    Thank you for the opportunity to participate in the care of your patient, Nery Whitaker, at the [facility] at [facility]. Please see a copy of my visit note below.    CARDIOLOGY CONSULTATION    Referring Provider:  Danny Nieves  Primary Care Provider:   Loraine Rees  Indication for Consultation:  To establish care for management of peripheral artery disease    HPI: Nery Whitaker is a 53 year old female being seen today for evaluation of peripheral artery disease. She was previously followed in the [facility] system but had a change in insurance so is now establishing care at [facility]. History was obtained from the patient and via chart review.     The patient's risk factor profile is: (+) HTN, (-) DM, (+) hypercholesterolemia, (+) 30 pack-year tobacco use, (-) fam Hx premature CAD, but father was diagnosed with PAD in his 30s and had an AAA.    The patient has a small calcified plaque in the left main noted on coronary CTA but no significant stenoses. The patient has a history of PAD with bilateral aortobifemoral bypasses in 2006.    She was followed by Dr. Nieves in Vascular Medicine who outlines a history of bilateral iliac disease treated with iliac stent. These rapidly thrombosed requiring bilateral bypass surgery. She was tested and positive for antiphospholipid syndrome which was confirmed on testing 4 months later. She was placed on lifelong warfarin. Repeat testing was done in 12/2013 for APS and was negative.      Nery denies any lower extremity claudication or atypical pain. She is not very active but does have a long history of dyspnea when climbing a flight of stairs. These symptoms are >1 year old and have been evaluated with a stress test and coronary CTA which was negative. She does have a long smoking history with no PFTs done in the past.     She  currently denies CP, palpitations, orthopnea, PND, LE edema, lightheadedness, vision changes, difficultly swallowing. No fevers or chills. No bleeding issues.     Nery has a history of leg discomfort and constipation with simvastatin and lovastatin. She reports leg and back pain with atorvastatin but these symptoms did not improve when she stopped the medication. Her last LDL was 123 and her LPa was 54.    Today she is most anxious about an upcoming appointment with heme/onc to discuss some lymphadenopathy and weight loss.    She continues to smoke 1/2 ppd and is not open to considering cessation at this time. She was prescribed chantix in the past but is scared of the potential side effects.    PAST MEDICAL HISTORY:  Patient Active Problem List   Diagnosis     Abdominal pain, epigastric     Abnormal cardiovascular stress test     Adenomatous colon polyp     Anticoagulation monitoring, INR range 2-3     Anxiety disorder     Bilateral carotid artery disease (H)     Diverticulosis of large intestine without hemorrhage     Dyslipidemia     Elevated lipoprotein(a)     Foreign body in left foot, subsequent encounter     HTN (hypertension)     Hypercoagulable state (H)     Intermittent palpitations     Migraine     Multiple skin nodules     Newly recognized murmur     Nocturnal enuresis     Normocytic anemia     PAD (peripheral artery disease) (H)     Thoracic neuralgia     Tobacco use disorder     Urge incontinence of urine     Verruca plantaris       CURRENT MEDICATIONS:  Current Outpatient Prescriptions   Medication Sig Dispense Refill     amLODIPine (NORVASC) 5 MG tablet Take 5 mg by mouth       aspirin 81 MG EC tablet Take 81 mg by mouth       citalopram (CELEXA) 20 MG tablet Take 20 mg by mouth       folic acid (FOLVITE) 1 MG tablet Take 1 mg by mouth       gabapentin (NEURONTIN) 100 MG capsule Take 100 mg by mouth       lisinopril (PRINIVIL/ZESTRIL) 20 MG tablet Take 20 mg by mouth       ranitidine (ZANTAC) 150 MG  tablet Take 150 mg by mouth       tolterodine ER (DETROL LA) 2 MG 24 hr capsule Take 2 mg by mouth       warfarin (COUMADIN) 5 MG tablet Take 5 mg by mouth       albuterol (PROAIR HFA/PROVENTIL HFA/VENTOLIN HFA) 108 (90 Base) MCG/ACT inhaler Inhale 2 puffs into the lungs       atorvastatin (LIPITOR) 40 MG tablet Take 40 mg by mouth       varenicline (CHANTIX STARTING MONTH PAK) 0.5 MG X 11 & 1 MG X 42 tablet TAKE ONE 0.5MG TABLET BY MOUTH DAILY FOR 3 DAYS, THEN ONE 0.5MG TABLET TWICE DAILY FOR 4 DAYS, THEN ONE 1MG TABLET TWICE DAILY         PAST SURGICAL HISTORY:  Past Surgical History:   Procedure Laterality Date     BYPASS GRAFT AORTOFEMORAL Bilateral        ALLERGIES  Ezetimibe; Lovastatin; and Simvastatin    FAMILY HX:  Family History   Problem Relation Age of Onset     Peripheral Vascular Disease Father        SOCIAL HX:  Social History     Social History     Marital status:      Spouse name: N/A     Number of children: N/A     Years of education: N/A     Social History Main Topics     Smoking status: Not on file     Smokeless tobacco: Not on file     Alcohol use Not on file     Drug use: Not on file     Sexual activity: Not on file     Other Topics Concern     Not on file     Social History Narrative       ROS:  A 10 point ROS is negative except as noted above.     VITAL SIGNS:  /77 (BP Location: Left arm, Patient Position: Chair, Cuff Size: Adult Regular)  Pulse 60  Wt 56.7 kg (125 lb)  SpO2 99%  There is no height or weight on file to calculate BMI.  Wt Readings from Last 2 Encounters:   12/04/18 56.7 kg (125 lb)       PHYSICAL EXAM  Gen: patient is well appearing; sitting comfortably in chair in NAD  Head: nc/at  Eyes: EOMI, PERRL, Sclerae white, not injected.   ENT: no OP lesions or erythema  Neck: supple, cervical LAD, carotids are +2/2 with bruit on the left  CV: nml s1, s2; 2/6 systolic murmur at R upper sternal border; no JVD  Chest: lungs are clear without wheezing or rhonchi  Abd:  Soft, nontender, nondistended.  Ext: No clubbing, cyanosis, or edema.  The pulses are +2/2 at the radial, brachial, femoral, popliteal, DP, and PT sites bilaterally.  No bruits are noted.  Skin: no erythema or rash on limited exam  Neuro: alert, oriented; normal speech, gait and affect    LABS  No results for input(s): WBC, HGB, HCT, PLT in the last 93600 hours.  No results for input(s): NA, POTASSIUM, CHLORIDE, CO2, GLC, BUN, CR, BEVERLY in the last 50418 hours.  No results for input(s): CHOL, HDL, LDL, TRIG, CHOLHDLRATIO, NHDL in the last 99496 hours.     EKG: not available for review    ECHO    STRESS TEST:    12/13/17 Stress Echo  Final Impressions:   1. Post stress, normal left ventricular size, increased global systolic function with an estimated EF of >75%.   2. Non-diagnostic stress test with 69.4% of age predicted maximum heart rate achieved.   3. Negative stress echo for ischemia.   4. There were ischemic changes by EKG during stress.   5. Poor exercise duration and workload.   6. During stress exam the patient developed no significant symptoms.   7. Despite the described EKG changes, there were no corresponding echcardiographic signs of ischemia.   8. Ischemia at a higher heart rate cannot be excluded.   9. The ST-T wave abnormalities resolved 2 minutes into recovery.    CT SCAN:  12/28/17 Coronary CTA  FINDINGS:   CORONARY ANATOMY:  (Left Dominant)  LEFT MAIN:  Left main has a punctate calcific lesion, nonobstructive.  LEFT ANTERIOR DESCENDING:  No stenosis and no plaque.   FIRST DIAGONAL:  No stenosis and no plaque.   SECOND DIAGONAL:  No stenosis and no plaque.   INTERMEDIATE:  Small vessel.  No stenosis and no plaque.  CIRCUMFLEX:  Large, dominant.  No stenosis and no plaque.   FIRST OBTUSE MARGINAL:  No stenosis and no plaque.   SECOND OBTUSE MARGINAL:  No stenosis and no plaque.  RIGHT CORONARY ARTERY:  Small, nondominant.  No stenosis and no plaque.   AORTA:  Proximal ascending aorta, mid-through  distal descending thoracic   aorta is normal.  PERICARDIUM:  Normal thickness and without an effusion.    CARDIAC CATH: None    ULTRASOUND  11/22/17: Carotid us  1.  Patent bilateral vertebral artery.   2.  Patent bilateral subclavian artery.   3.  Less than 50% stenosis of the left internal carotid artery with some   irregular shadowing plaque at the bifurcation of the proximal internal   carotid artery.    4.  Smooth plaque at bifurcation of the right internal carotid artery with   about 50-69% stenosis.    5.  Internal carotid artery to common carotid artery ratio on the right   side of 1.5 and 1.1 on the left side.      12/4/18 FREIDA and LE arterial US  IMPRESSION:  1. Right superficial femoral artery proximal elevated velocity to 254  cm/s would be associated with 50-70% diameter stenosis by sonographic  velocity criteria, however, there is no corresponding finding by  grayscale or color Doppler imaging to suggest a significant stenosis.  Inferior velocities and waveforms are normal in appearance.     2. Negative left leg duplex arterial ultrasound.     Right leg: Resting FREIDA is 1.16Normal.   Left leg: Resting FREIDA is 1.18Normal.    12/4/18 Aortic/Iiac arterial US  FINDINGS: Aortobifemoral bypass graft filled [id number] in color  Doppler imaging.     SUPRACELIAC AORTA: 66/15 cm/s, triphasic     SUPRARENAL AORTA: 58/0 cm/s, triphasic     INFRARENAL AORTA: 71/0 cm/s, biaphasic     AORTIC ANASTOMOSIS: 88/0 cm/s, biphasic     AORTIC GRAFT: 145/0 cm/s, biphasic     GRAFT RIGHT LIMB, proximal: 122/0 cm/s, biphasic  GRAFT RIGHT LIMB, mid: 162/0 cm/s, biphasic  GRAFT RIGHT LIMB, distal: 90/0 cm/s, biphasic  GRAFT RIGHT LIMB, femoral anastomosis: 134/0 cm/s, triphasic     RIGHT COMMON FEMORAL ARTERY: 96/0 cm/s, triphasic     GRAFT LEFT LIMB, proximal: 83/0 cm/s, triphasic  GRAFT LEFT LIMB, mid: 173/0 cm/s, triphasic  GRAFT LEFT LIMB, distal: 79/0 cm/s, triphasic  GRAFT LEFT LIMB, femoral anastomosis: 115/0 cm/s,  triphasic     LEFT COMMON FEMORAL ARTERY: 93/0 cm/s, triphasic         IMPRESSION: Patent aortobifemoral bypass graft.      12/4/18 Lower Extremity Arterial US  FINDINGS:    RIGHT:         COMMON FEMORAL ARTERY: 132/0 cm/s, triphasic       PROFUNDUS FEMORAL ARTERY: 82/0 cm/s, biphasic         SUPERFICIAL FEMORAL ARTERY, proximal: 254/0 cm/s, triphasic       SUPERFICIAL FEMORAL ARTERY, mid: 107/0 cm/s triphasic       SUPERFICIAL FEMORAL ARTERY, distal: 91/0 cm/s, triphasic         POPLITEAL: 99/0 cm/s, triphasic          POSTERIOR TIBIAL, ankle: 77/0 cm/s, triphasic         ANTERIOR TIBIAL, ankle: 111/0 cm/s, triphasic    LEFT:         COMMON FEMORAL ARTERY: 146/0 cm/s, triphasic       PROFUNDUS FEMORAL ARTERY: 102/0 cm/s, biphasic         SUPERFICIAL FEMORAL ARTERY, proximal: 171/0 cm/s, triphasic       SUPERFICIAL FEMORAL ARTERY, mid: 133/0 cm/s triphasic       SUPERFICIAL FEMORAL ARTERY, distal: 134/0 cm/s, triphasic         POPLITEAL: 127/0 cm/s, triphasic          POSTERIOR TIBIAL, ankle: 87/0 cm/s, triphasic         ANTERIOR TIBIAL, ankle: 111/8 cm/s, triphasic    IMPRESSION:  1. Right superficial femoral artery proximal elevated velocity to 254  cm/s would be associated with 50-70% diameter stenosis by sonographic  velocity criteria, however, there is no corresponding finding by  grayscale or color Doppler imaging to suggest a significant stenosis.  Inferior velocities and waveforms are normal in appearance.    2. Negative left leg duplex arterial ultrasound.    ASSESSMENT AND PLAN:   Ms. Whitaker is a 52 yo female with non-obstructive CAD noted on coronary CTA with calcium in the left main; severe PAD s/p bilateral aortofemoral bypass surgery. She has mild stenosis in her carotid arteries. Her CV risk factors include HTN, HLD and smoking.     She has no leg symptoms at this time although she does not exert herself heavily. We reviewed the results of her aortic and LE ultrasound evaluation today which showed  normal ABIs and patent bypass grafts.     She is not currently on a statin due to concern for side effects but the pain was not temporally related to starting and stopping atorvastatin. Her blood pressure is not well controlled but this may be partially due to her anxiety about meeting with the oncologist.    We discussed the risk of continued smoking and options for assistance in cessation. She is currently in a precontemplative state.    Recommendations:  - restart atorvastatin 10mg daily and uptitrate slowly to 40 or 80mg daily   - refer to Dr Bullock for uncontrolled hyperlipidemia and elevated Lpa with history of statin intolerance.  - restart ASA, unclear why she stopped this; no bleeding issues  - cont ACEI  - urged smoking cessation  - Echo to evaluate murmur  - repeat carotid us in 1-2 years or earlier if symptoms develop  - check daily blood pressures for 1-2 weeks and follow up with PCP for medication titration  - consider PFTs but will defer to PCP for timing    FOLLOW UP:  1 year with Dr. Weaver    ATTENDING NOTE:  Patient has been seen and evaluated by me on 12/04/2018. I have reviewed the cardiology consultation.  Pleave refer to it for additonal details.  I have reviewed today's vital signs, medications, labs, and imaging results.  I have reviewed and edited, as necessary, the history, review of systems, physical examination, and assessment and plan.  I have discussed my assessment and plan with the cardiology fellow.  Nery Whitaker is a 53 year old female with risk factor profile (+) HTN, (-) DM, (+) hypercholesterolemia, (+) 30 pack-year tobacco use, (-) fam Hx premature CAD, family Hx premature PAD [paternal], Hx PAD s/p iliac stents with emergent closure necessitating aortobifemoral bypass surgery, and APL maintained on coumadin, presents to [facility] to establish PAD care.  Patient denies claudication.  US shows occluded stents bilaterally and patent aorto-femoral bypass / limbs.  ABIs at rest 1.16  (RT), 1.18 (LF).  Start ASA 81 mg every day.  Hx statin intolerance to Zocor, Mevacor, and possibly Lipitor.  With Lipitor there was no difference in pain.  Retry Lipitor low dose.  Exercise.      Curly Weaver MD     Cardiovascular Division        CC  Patient Care Team:  Loraine Rees as PCP - General (Family Practice)  FARTUN SILVERMAN      Please do not hesitate to contact me if you have any questions/concerns.     Sincerely,     Curly Weaver MD

## 2018-12-04 NOTE — PROGRESS NOTES
CARDIOLOGY CONSULTATION    Referring Provider:  Danny Nieves  Primary Care Provider:   Loraine Rees  Indication for Consultation:  To establish care for management of peripheral artery disease    HPI: Nery Whitaker is a 53 year old female being seen today for evaluation of peripheral artery disease. She was previously followed in the [facility] system but had a change in insurance so is now establishing care at [facility]. History was obtained from the patient and via chart review.     The patient's risk factor profile is: (+) HTN, (-) DM, (+) hypercholesterolemia, (+) 30 pack-year tobacco use, (-) fam Hx premature CAD, but father was diagnosed with PAD in his 30s and had an AAA.    The patient has a small calcified plaque in the left main noted on coronary CTA but no significant stenoses. The patient has a history of PAD with bilateral aortobifemoral bypasses in 2006.    She was followed by Dr. Nieves in Vascular Medicine who outlines a history of bilateral iliac disease treated with iliac stent. These rapidly thrombosed requiring bilateral bypass surgery. She was tested and positive for antiphospholipid syndrome which was confirmed on testing 4 months later. She was placed on lifelong warfarin. Repeat testing was done in 12/2013 for APS and was negative.      Nery denies any lower extremity claudication or atypical pain. She is not very active but does have a long history of dyspnea when climbing a flight of stairs. These symptoms are >1 year old and have been evaluated with a stress test and coronary CTA which was negative. She does have a long smoking history with no PFTs done in the past.     She currently denies CP, palpitations, orthopnea, PND, LE edema, lightheadedness, vision changes, difficultly swallowing. No fevers or chills. No bleeding issues.     Nery has a history of leg discomfort and constipation with simvastatin and lovastatin. She reports leg and back pain with atorvastatin but these symptoms did  not improve when she stopped the medication. Her last LDL was 123 and her LPa was 54.    Today she is most anxious about an upcoming appointment with heme/onc to discuss some lymphadenopathy and weight loss.    She continues to smoke 1/2 ppd and is not open to considering cessation at this time. She was prescribed chantix in the past but is scared of the potential side effects.    PAST MEDICAL HISTORY:  Patient Active Problem List   Diagnosis     Abdominal pain, epigastric     Abnormal cardiovascular stress test     Adenomatous colon polyp     Anticoagulation monitoring, INR range 2-3     Anxiety disorder     Bilateral carotid artery disease (H)     Diverticulosis of large intestine without hemorrhage     Dyslipidemia     Elevated lipoprotein(a)     Foreign body in left foot, subsequent encounter     HTN (hypertension)     Hypercoagulable state (H)     Intermittent palpitations     Migraine     Multiple skin nodules     Newly recognized murmur     Nocturnal enuresis     Normocytic anemia     PAD (peripheral artery disease) (H)     Thoracic neuralgia     Tobacco use disorder     Urge incontinence of urine     Verruca plantaris       CURRENT MEDICATIONS:  Current Outpatient Prescriptions   Medication Sig Dispense Refill     amLODIPine (NORVASC) 5 MG tablet Take 5 mg by mouth       aspirin 81 MG EC tablet Take 81 mg by mouth       citalopram (CELEXA) 20 MG tablet Take 20 mg by mouth       folic acid (FOLVITE) 1 MG tablet Take 1 mg by mouth       gabapentin (NEURONTIN) 100 MG capsule Take 100 mg by mouth       lisinopril (PRINIVIL/ZESTRIL) 20 MG tablet Take 20 mg by mouth       ranitidine (ZANTAC) 150 MG tablet Take 150 mg by mouth       tolterodine ER (DETROL LA) 2 MG 24 hr capsule Take 2 mg by mouth       warfarin (COUMADIN) 5 MG tablet Take 5 mg by mouth       albuterol (PROAIR HFA/PROVENTIL HFA/VENTOLIN HFA) 108 (90 Base) MCG/ACT inhaler Inhale 2 puffs into the lungs       atorvastatin (LIPITOR) 40 MG tablet Take 40  mg by mouth       varenicline (CHANTIX STARTING MONTH PAK) 0.5 MG X 11 & 1 MG X 42 tablet TAKE ONE 0.5MG TABLET BY MOUTH DAILY FOR 3 DAYS, THEN ONE 0.5MG TABLET TWICE DAILY FOR 4 DAYS, THEN ONE 1MG TABLET TWICE DAILY         PAST SURGICAL HISTORY:  Past Surgical History:   Procedure Laterality Date     BYPASS GRAFT AORTOFEMORAL Bilateral        ALLERGIES  Ezetimibe; Lovastatin; and Simvastatin    FAMILY HX:  Family History   Problem Relation Age of Onset     Peripheral Vascular Disease Father        SOCIAL HX:  Social History     Social History     Marital status:      Spouse name: N/A     Number of children: N/A     Years of education: N/A     Social History Main Topics     Smoking status: Not on file     Smokeless tobacco: Not on file     Alcohol use Not on file     Drug use: Not on file     Sexual activity: Not on file     Other Topics Concern     Not on file     Social History Narrative       ROS:  A 10 point ROS is negative except as noted above.     VITAL SIGNS:  /77 (BP Location: Left arm, Patient Position: Chair, Cuff Size: Adult Regular)  Pulse 60  Wt 56.7 kg (125 lb)  SpO2 99%  There is no height or weight on file to calculate BMI.  Wt Readings from Last 2 Encounters:   12/04/18 56.7 kg (125 lb)       PHYSICAL EXAM  Gen: patient is well appearing; sitting comfortably in chair in NAD  Head: nc/at  Eyes: EOMI, PERRL, Sclerae white, not injected.   ENT: no OP lesions or erythema  Neck: supple, cervical LAD, carotids are +2/2 with bruit on the left  CV: nml s1, s2; 2/6 systolic murmur at R upper sternal border; no JVD  Chest: lungs are clear without wheezing or rhonchi  Abd: Soft, nontender, nondistended.  Ext: No clubbing, cyanosis, or edema.  The pulses are +2/2 at the radial, brachial, femoral, popliteal, DP, and PT sites bilaterally.  No bruits are noted.  Skin: no erythema or rash on limited exam  Neuro: alert, oriented; normal speech, gait and affect    LABS  No results for input(s): WBC,  HGB, HCT, PLT in the last 36395 hours.  No results for input(s): NA, POTASSIUM, CHLORIDE, CO2, GLC, BUN, CR, BEVERLY in the last 87586 hours.  No results for input(s): CHOL, HDL, LDL, TRIG, CHOLHDLRATIO, NHDL in the last 21781 hours.     EKG: not available for review    ECHO    STRESS TEST:    12/13/17 Stress Echo  Final Impressions:   1. Post stress, normal left ventricular size, increased global systolic function with an estimated EF of >75%.   2. Non-diagnostic stress test with 69.4% of age predicted maximum heart rate achieved.   3. Negative stress echo for ischemia.   4. There were ischemic changes by EKG during stress.   5. Poor exercise duration and workload.   6. During stress exam the patient developed no significant symptoms.   7. Despite the described EKG changes, there were no corresponding echcardiographic signs of ischemia.   8. Ischemia at a higher heart rate cannot be excluded.   9. The ST-T wave abnormalities resolved 2 minutes into recovery.    CT SCAN:  12/28/17 Coronary CTA  FINDINGS:   CORONARY ANATOMY:  (Left Dominant)  LEFT MAIN:  Left main has a punctate calcific lesion, nonobstructive.  LEFT ANTERIOR DESCENDING:  No stenosis and no plaque.   FIRST DIAGONAL:  No stenosis and no plaque.   SECOND DIAGONAL:  No stenosis and no plaque.   INTERMEDIATE:  Small vessel.  No stenosis and no plaque.  CIRCUMFLEX:  Large, dominant.  No stenosis and no plaque.   FIRST OBTUSE MARGINAL:  No stenosis and no plaque.   SECOND OBTUSE MARGINAL:  No stenosis and no plaque.  RIGHT CORONARY ARTERY:  Small, nondominant.  No stenosis and no plaque.   AORTA:  Proximal ascending aorta, mid-through distal descending thoracic   aorta is normal.  PERICARDIUM:  Normal thickness and without an effusion.    CARDIAC CATH: None    ULTRASOUND  11/22/17: Carotid us  1.  Patent bilateral vertebral artery.   2.  Patent bilateral subclavian artery.   3.  Less than 50% stenosis of the left internal carotid artery with some   irregular  shadowing plaque at the bifurcation of the proximal internal   carotid artery.    4.  Smooth plaque at bifurcation of the right internal carotid artery with   about 50-69% stenosis.    5.  Internal carotid artery to common carotid artery ratio on the right   side of 1.5 and 1.1 on the left side.      12/4/18 FREIDA and LE arterial US  IMPRESSION:  1. Right superficial femoral artery proximal elevated velocity to 254  cm/s would be associated with 50-70% diameter stenosis by sonographic  velocity criteria, however, there is no corresponding finding by  grayscale or color Doppler imaging to suggest a significant stenosis.  Inferior velocities and waveforms are normal in appearance.     2. Negative left leg duplex arterial ultrasound.     Right leg: Resting FREIDA is 1.16Normal.   Left leg: Resting FREIDA is 1.18Normal.    12/4/18 Aortic/Iiac arterial US  FINDINGS: Aortobifemoral bypass graft filled [id number] in color  Doppler imaging.     SUPRACELIAC AORTA: 66/15 cm/s, triphasic     SUPRARENAL AORTA: 58/0 cm/s, triphasic     INFRARENAL AORTA: 71/0 cm/s, biaphasic     AORTIC ANASTOMOSIS: 88/0 cm/s, biphasic     AORTIC GRAFT: 145/0 cm/s, biphasic     GRAFT RIGHT LIMB, proximal: 122/0 cm/s, biphasic  GRAFT RIGHT LIMB, mid: 162/0 cm/s, biphasic  GRAFT RIGHT LIMB, distal: 90/0 cm/s, biphasic  GRAFT RIGHT LIMB, femoral anastomosis: 134/0 cm/s, triphasic     RIGHT COMMON FEMORAL ARTERY: 96/0 cm/s, triphasic     GRAFT LEFT LIMB, proximal: 83/0 cm/s, triphasic  GRAFT LEFT LIMB, mid: 173/0 cm/s, triphasic  GRAFT LEFT LIMB, distal: 79/0 cm/s, triphasic  GRAFT LEFT LIMB, femoral anastomosis: 115/0 cm/s, triphasic     LEFT COMMON FEMORAL ARTERY: 93/0 cm/s, triphasic         IMPRESSION: Patent aortobifemoral bypass graft.      12/4/18 Lower Extremity Arterial US  FINDINGS:    RIGHT:         COMMON FEMORAL ARTERY: 132/0 cm/s, triphasic       PROFUNDUS FEMORAL ARTERY: 82/0 cm/s, biphasic         SUPERFICIAL FEMORAL ARTERY, proximal: 254/0  cm/s, triphasic       SUPERFICIAL FEMORAL ARTERY, mid: 107/0 cm/s triphasic       SUPERFICIAL FEMORAL ARTERY, distal: 91/0 cm/s, triphasic         POPLITEAL: 99/0 cm/s, triphasic          POSTERIOR TIBIAL, ankle: 77/0 cm/s, triphasic         ANTERIOR TIBIAL, ankle: 111/0 cm/s, triphasic    LEFT:         COMMON FEMORAL ARTERY: 146/0 cm/s, triphasic       PROFUNDUS FEMORAL ARTERY: 102/0 cm/s, biphasic         SUPERFICIAL FEMORAL ARTERY, proximal: 171/0 cm/s, triphasic       SUPERFICIAL FEMORAL ARTERY, mid: 133/0 cm/s triphasic       SUPERFICIAL FEMORAL ARTERY, distal: 134/0 cm/s, triphasic         POPLITEAL: 127/0 cm/s, triphasic          POSTERIOR TIBIAL, ankle: 87/0 cm/s, triphasic         ANTERIOR TIBIAL, ankle: 111/8 cm/s, triphasic    IMPRESSION:  1. Right superficial femoral artery proximal elevated velocity to 254  cm/s would be associated with 50-70% diameter stenosis by sonographic  velocity criteria, however, there is no corresponding finding by  grayscale or color Doppler imaging to suggest a significant stenosis.  Inferior velocities and waveforms are normal in appearance.    2. Negative left leg duplex arterial ultrasound.    ASSESSMENT AND PLAN:   Ms. Whitaker is a 54 yo female with non-obstructive CAD noted on coronary CTA with calcium in the left main; severe PAD s/p bilateral aortofemoral bypass surgery. She has mild stenosis in her carotid arteries. Her CV risk factors include HTN, HLD and smoking.     She has no leg symptoms at this time although she does not exert herself heavily. We reviewed the results of her aortic and LE ultrasound evaluation today which showed normal ABIs and patent bypass grafts.     She is not currently on a statin due to concern for side effects but the pain was not temporally related to starting and stopping atorvastatin. Her blood pressure is not well controlled but this may be partially due to her anxiety about meeting with the oncologist.    We discussed the risk of  continued smoking and options for assistance in cessation. She is currently in a precontemplative state.    Recommendations:  - restart atorvastatin 10mg daily and uptitrate slowly to 40 or 80mg daily   - refer to Dr Bullock for uncontrolled hyperlipidemia and elevated Lpa with history of statin intolerance.  - restart ASA, unclear why she stopped this; no bleeding issues  - cont ACEI  - urged smoking cessation  - Echo to evaluate murmur  - repeat carotid us in 1-2 years or earlier if symptoms develop  - check daily blood pressures for 1-2 weeks and follow up with PCP for medication titration  - consider PFTs but will defer to PCP for timing    FOLLOW UP:  1 year with Dr. Weaver    ATTENDING NOTE:  Patient has been seen and evaluated by me on 12/04/2018. I have reviewed the cardiology consultation.  Pleave refer to it for additonal details.  I have reviewed today's vital signs, medications, labs, and imaging results.  I have reviewed and edited, as necessary, the history, review of systems, physical examination, and assessment and plan.  I have discussed my assessment and plan with the cardiology fellow.  Nery Whitaker is a 53 year old female with risk factor profile (+) HTN, (-) DM, (+) hypercholesterolemia, (+) 30 pack-year tobacco use, (-) fam Hx premature CAD, family Hx premature PAD [paternal], Hx PAD s/p iliac stents with emergent closure necessitating aortobifemoral bypass surgery, and APL maintained on coumadin, presents to [facility] to establish PAD care.  Patient denies claudication.  US shows occluded stents bilaterally and patent aorto-femoral bypass / limbs.  ABIs at rest 1.16 (RT), 1.18 (LF).  Start ASA 81 mg every day.  Hx statin intolerance to Zocor, Mevacor, and possibly Lipitor.  With Lipitor there was no difference in pain.  Retry Lipitor low dose.  Exercise.      Curly Weaver MD     Cardiovascular Division        CC  Patient Care Team:  Loraine Rees as PCP - General (Family  Practice)  FARTUN SILVERMAN

## 2018-12-04 NOTE — MR AVS SNAPSHOT
After Visit Summary   12/4/2018    Nery Whitaker    MRN: [id number]           Patient Information     Date Of Birth          1965        Visit Information        Provider Department      12/4/2018 4:30 PM Curly Weaver MD [facility]        Today's Diagnoses     Peripheral artery disease (H)    -  1    H/O aorta-iliac-femoral bypass        Hyperlipidemia LDL goal <70        Heart murmur        Statin intolerance          Care Instructions    You were seen today in the Cardiovascular Clinic at the [facility].      Cardiology Providers you saw during your visit:   Dr. Weaver    Diagnosis:     (I73.9) Peripheral artery disease (H)  (primary encounter diagnosis)   (Z95.828) H/O aorta-iliac-femoral bypass    Results:  None today    Recommendations:    Atorvastatin 10 mg once per day  Aspirin 81 mg once per day  Echo at your convenience.  Can be done at [facility] if that is easier  Continue to try to achieve smoking cessation  Check blood pressures once daily for 1-2 weeks.  If your blood pressure is greater than 130/90 then follow up with your primary MD.    Follow-up:  1 year with Dr. Weaver                     1 month with Dr. Bullock      For emergencies call 911.    For any scheduling needs, please call [phone].     Thank you for your visit today!     Please call if you have any questions or concerns.  Yuniel Munguia RN              Follow-ups after your visit        Additional Services     Follow-Up with Lipid Clinic           Follow-Up with Vascular Cardiologist       Schedule echo at patient convenience                  Your next 10 appointments already scheduled     Jan 21, 2019  6:00 PM CST   (Arrive by 5:45 PM)   NEW LIPID VISIT with Harry Bullock MD   [facility] ([facility])    [address] [place] [address]  [address] [place] [id number] [phone]              Future tests that were ordered for you today     Open  Future Orders        Priority Expected Expires Ordered    Follow-Up with Lipid Clinic Routine 1/3/2019 3/4/2019 12/4/2018    Follow-Up with Vascular Cardiologist Routine 12/4/2019 3/3/2020 12/4/2018    Echocardiogram Routine 12/4/2018 2/2/2019 12/4/2018            Who to contact     If you have questions or need follow up information about today's clinic visit or your schedule please contact [facility] directly at [phone].  Normal or non-critical lab and imaging results will be communicated to you by [employer], letter or phone within 4 business days after the clinic has received the results. If you do not hear from us within 7 days, please contact the clinic through [employer] or phone. If you have a critical or abnormal lab result, we will notify you by phone as soon as possible.  Submit refill requests through [employer] or call your pharmacy and they will forward the refill request to us. Please allow 3 business days for your refill to be completed.          Additional Information About Your Visit        [employer] Information     [employer] gives you secure access to your electronic health record. If you see a primary care provider, you can also send messages to your care team and make appointments. If you have questions, please call your primary care clinic.  If you do not have a primary care provider, please call [phone] and they will assist you.        Care EveryWhere ID     This is your Care EveryWhere ID. This could be used by other organizations to access your [facility] medical records  [id number]        Your Vitals Were     Pulse Pulse Oximetry                60 99%           Blood Pressure from Last 3 Encounters:   12/04/18 179/77    Weight from Last 3 Encounters:   12/04/18 56.7 kg (125 lb)                 Today's Medication Changes          These changes are accurate as of 12/4/18  6:46 PM.  If you have any questions, ask your nurse or doctor.               These medicines have changed or have  updated prescriptions.        Dose/Directions    atorvastatin 10 MG tablet   Commonly known as:  LIPITOR   This may have changed:    - medication strength  - how much to take  - when to take this   Used for:  Hyperlipidemia LDL goal <70   Changed by:  Curly Weaver MD        Dose:  10 mg   Take 1 tablet (10 mg) by mouth daily   Quantity:  30 tablet   Refills:  1            Where to get your medicines      These medications were sent to [facility] [address] [place] [address] [address]  [address] [facility], [facility] [address]     Phone:  [phone]     atorvastatin 10 MG tablet                Primary Care Provider Office Phone # Fax #    Loraine Rees [phone]       [facility] [address] [facility] [address] [facility] [address]        Equal Access to Services     JULIO CESAR GILLIS : Hadii aj caro hadasho Soomaali, waaxda luqadaha, qaybta kaalmada adeegyada, darlyn jerome haythompson rodriguez . So [facility] [phone].    ATENCIÓN: Si habla español, tiene a mcgee disposición servicios gratuitos de asistencia lingüística. [facility] [phone].    We comply with applicable federal civil rights laws and [place] laws. We do not discriminate on the basis of race, color, national origin, age, disability, sex, sexual orientation, or gender identity.            Thank you!     Thank you for choosing [facility]  for your care. Our goal is always to provide you with excellent care. Hearing back from our patients is one way we can continue to improve our services. Please take a few minutes to complete the written survey that you may receive in the mail after your visit with us. Thank you!             Your Updated Medication List - Protect others around you: Learn how to safely use, store and throw away your medicines at www.disposemymeds.org.          This list is accurate as of 12/4/18  6:46 PM.  Always use your most recent med list.                   Brand Name Dispense  Instructions for use Diagnosis    albuterol 108 (90 Base) MCG/ACT inhaler    PROAIR HFA/PROVENTIL HFA/VENTOLIN HFA     Inhale 2 puffs into the lungs        amLODIPine 5 MG tablet    NORVASC     Take 5 mg by mouth        aspirin 81 MG EC tablet      Take 81 mg by mouth        atorvastatin 10 MG tablet    LIPITOR    30 tablet    Take 1 tablet (10 mg) by mouth daily    Hyperlipidemia LDL goal <70       CHANTIX STARTING MONTH BECKIE 0.5 MG X 11 & 1 MG X 42 tablet   Generic drug:  varenicline      TAKE ONE 0.5MG TABLET BY MOUTH DAILY FOR 3 DAYS, THEN ONE 0.5MG TABLET TWICE DAILY FOR 4 DAYS, THEN ONE 1MG TABLET TWICE DAILY        citalopram 20 MG tablet    celeXA     Take 20 mg by mouth        DETROL LA 2 MG 24 hr capsule   Generic drug:  tolterodine ER      Take 2 mg by mouth        folic acid 1 MG tablet    FOLVITE     Take 1 mg by mouth        gabapentin 100 MG capsule    NEURONTIN     Take 100 mg by mouth        lisinopril 20 MG tablet    PRINIVIL/ZESTRIL     Take 20 mg by mouth        ranitidine 150 MG tablet    ZANTAC     Take 150 mg by mouth        warfarin 5 MG tablet    COUMADIN     Take 5 mg by mouth

## 2018-12-04 NOTE — NURSING NOTE
Chief Complaint   Patient presents with     New Patient      52 y/o to establish care for known PAD. Hx of aorta bifemoral bypass 2006     Medications reviewed and vitals performed.  Rosemarie Ivy CMA

## 2018-12-05 ENCOUNTER — COMMUNICATION - HEALTHEAST (OUTPATIENT)
Dept: FAMILY MEDICINE | Facility: CLINIC | Age: 53
End: 2018-12-05

## 2018-12-05 ENCOUNTER — AMBULATORY - HEALTHEAST (OUTPATIENT)
Dept: INFUSION THERAPY | Facility: HOSPITAL | Age: 53
End: 2018-12-05

## 2018-12-05 ENCOUNTER — OFFICE VISIT - HEALTHEAST (OUTPATIENT)
Dept: ONCOLOGY | Facility: HOSPITAL | Age: 53
End: 2018-12-05

## 2018-12-05 DIAGNOSIS — D64.9 NORMOCYTIC ANEMIA: ICD-10-CM

## 2018-12-05 DIAGNOSIS — D72.820 ATYPICAL LYMPHOCYTOSIS: ICD-10-CM

## 2018-12-05 DIAGNOSIS — R06.09 DOE (DYSPNEA ON EXERTION): ICD-10-CM

## 2018-12-05 LAB
ALBUMIN SERPL-MCNC: 4.2 G/DL (ref 3.5–5)
ALP SERPL-CCNC: 94 U/L (ref 45–120)
ALT SERPL W P-5'-P-CCNC: 9 U/L (ref 0–45)
ANION GAP SERPL CALCULATED.3IONS-SCNC: 8 MMOL/L (ref 5–18)
AST SERPL W P-5'-P-CCNC: 13 U/L (ref 0–40)
BASOPHILS # BLD AUTO: 0.1 THOU/UL (ref 0–0.2)
BASOPHILS NFR BLD AUTO: 1 % (ref 0–2)
BILIRUB SERPL-MCNC: 0.3 MG/DL (ref 0–1)
BUN SERPL-MCNC: 9 MG/DL (ref 8–22)
CALCIUM SERPL-MCNC: 9.6 MG/DL (ref 8.5–10.5)
CHLORIDE BLD-SCNC: 108 MMOL/L (ref 98–107)
CO2 SERPL-SCNC: 26 MMOL/L (ref 22–31)
CREAT SERPL-MCNC: 0.79 MG/DL (ref 0.6–1.1)
EOSINOPHIL # BLD AUTO: 0.2 THOU/UL (ref 0–0.4)
EOSINOPHIL NFR BLD AUTO: 2 % (ref 0–6)
ERYTHROCYTE [DISTWIDTH] IN BLOOD BY AUTOMATED COUNT: 14.3 % (ref 11–14.5)
GFR SERPL CREATININE-BSD FRML MDRD: >60 ML/MIN/1.73M2
GLUCOSE BLD-MCNC: 85 MG/DL (ref 70–125)
HCT VFR BLD AUTO: 36 % (ref 35–47)
HGB BLD-MCNC: 12 G/DL (ref 12–16)
INR PPP: 2.1 (ref 0.9–1.1)
LYMPHOCYTES # BLD AUTO: 3.8 THOU/UL (ref 0.8–4.4)
LYMPHOCYTES NFR BLD AUTO: 37 % (ref 20–40)
MCH RBC QN AUTO: 31.3 PG (ref 27–34)
MCHC RBC AUTO-ENTMCNC: 33.3 G/DL (ref 32–36)
MCV RBC AUTO: 94 FL (ref 80–100)
MONOCYTES # BLD AUTO: 0.9 THOU/UL (ref 0–0.9)
MONOCYTES NFR BLD AUTO: 9 % (ref 2–10)
NEUTROPHILS # BLD AUTO: 5.1 THOU/UL (ref 2–7.7)
NEUTROPHILS NFR BLD AUTO: 50 % (ref 50–70)
PLATELET # BLD AUTO: 289 THOU/UL (ref 140–440)
PMV BLD AUTO: 9.7 FL (ref 8.5–12.5)
POTASSIUM BLD-SCNC: 3.5 MMOL/L (ref 3.5–5)
PROT SERPL-MCNC: 7.5 G/DL (ref 6–8)
RBC # BLD AUTO: 3.83 MILL/UL (ref 3.8–5.4)
SODIUM SERPL-SCNC: 142 MMOL/L (ref 136–145)
T4 FREE SERPL-MCNC: 0.9 NG/DL (ref 0.7–1.8)
WBC: 10.1 THOU/UL (ref 4–11)

## 2018-12-05 ASSESSMENT — MIFFLIN-ST. JEOR: SCORE: 1181.94

## 2018-12-05 NOTE — NURSING NOTE
Home Blood Pressure Monitoring: Patient was instructed regarding the indication, timing, documentation and goals of home blood pressure monitoring.  Patient demonstrated understanding of this information and agreed to call with further questions or concerns.  Cardiac Testing: Patient given instructions regarding  echocardiogram . Discussed purpose, preparation, procedure and when to expect results reported back to the patient. Patient demonstrated understanding of this information and agreed to call with further questions or concerns.  Med Reconcile: Reviewed and verified all current medications with the patient. The updated medication list was printed and given to the patient.  New Medication: atorvastatin 10 mg po every day.  ASA 81 mg po every day.  Patient was educated regarding newly prescribed medication, including discussion of  the indication, administration, side effects, and when to report to MD or RN. Patient demonstrated understanding of this information and agreed to call with further questions or concerns.  Return Appointment: 1 year with Dr. Weaver.  1 month with Dr. Bullock.  Patient given instructions regarding scheduling next clinic visit. Patient demonstrated understanding of this information and agreed to call with further questions or concerns.  Smoking Cessation: Patient instructed regarding options for smoking cessation and given written information regarding smoking cessation assistance programs. Patient demonstrated understanding of this information and agreed to call with further questions or concerns.  Patient stated she understood all health information given and agreed to call with further questions or concerns.

## 2018-12-05 NOTE — PATIENT INSTRUCTIONS
You were seen today in the Cardiovascular Clinic at the [facility].      Cardiology Providers you saw during your visit:   Dr. Weaver    Diagnosis:     (I73.9) Peripheral artery disease (H)  (primary encounter diagnosis)   (Z95.828) H/O aorta-iliac-femoral bypass    Results:  None today    Recommendations:    Atorvastatin 10 mg once per day  Aspirin 81 mg once per day  Echo at your convenience.  Can be done at [facility] if that is easier  Continue to try to achieve smoking cessation  Check blood pressures once daily for 1-2 weeks.  If your blood pressure is greater than 130/90 then follow up with your primary MD.    Follow-up:  1 year with Dr. Weaver                     1 month with Dr. Bullock      For emergencies call [phone].    For any scheduling needs, please call [phone].     Thank you for your visit today!     Please call if you have any questions or concerns.  Yuniel Munguia RN

## 2018-12-06 ENCOUNTER — COMMUNICATION - HEALTHEAST (OUTPATIENT)
Dept: INFUSION THERAPY | Facility: HOSPITAL | Age: 53
End: 2018-12-06

## 2018-12-07 LAB — COPPER SERPL-MCNC: 114 UG/DL (ref 80–155)

## 2018-12-12 ENCOUNTER — COMMUNICATION - HEALTHEAST (OUTPATIENT)
Dept: ANTICOAGULATION | Facility: CLINIC | Age: 53
End: 2018-12-12

## 2018-12-12 DIAGNOSIS — I73.9 PAD (PERIPHERAL ARTERY DISEASE) (H): ICD-10-CM

## 2018-12-12 DIAGNOSIS — Z79.01 ANTICOAGULATION MONITORING, INR RANGE 2-3: ICD-10-CM

## 2018-12-19 ENCOUNTER — COMMUNICATION - HEALTHEAST (OUTPATIENT)
Dept: ANTICOAGULATION | Facility: CLINIC | Age: 53
End: 2018-12-19

## 2018-12-19 ENCOUNTER — COMMUNICATION - HEALTHEAST (OUTPATIENT)
Dept: FAMILY MEDICINE | Facility: CLINIC | Age: 53
End: 2018-12-19

## 2018-12-19 ENCOUNTER — AMBULATORY - HEALTHEAST (OUTPATIENT)
Dept: LAB | Facility: CLINIC | Age: 53
End: 2018-12-19

## 2018-12-19 DIAGNOSIS — I73.9 PAD (PERIPHERAL ARTERY DISEASE) (H): ICD-10-CM

## 2018-12-19 DIAGNOSIS — Z79.01 ANTICOAGULATION MONITORING, INR RANGE 2-3: ICD-10-CM

## 2018-12-19 DIAGNOSIS — R06.02 SHORTNESS OF BREATH: ICD-10-CM

## 2018-12-19 LAB — INR PPP: 2.9 (ref 0.9–1.1)

## 2019-01-03 ENCOUNTER — COMMUNICATION - HEALTHEAST (OUTPATIENT)
Dept: FAMILY MEDICINE | Facility: CLINIC | Age: 54
End: 2019-01-03

## 2019-01-04 ENCOUNTER — HOSPITAL ENCOUNTER (OUTPATIENT)
Dept: RESPIRATORY THERAPY | Facility: HOSPITAL | Age: 54
Discharge: HOME | End: 2019-01-04
Attending: FAMILY MEDICINE

## 2019-01-04 DIAGNOSIS — R06.02 SHORTNESS OF BREATH: ICD-10-CM

## 2019-01-11 ENCOUNTER — COMMUNICATION - HEALTHEAST (OUTPATIENT)
Dept: ANTICOAGULATION | Facility: CLINIC | Age: 54
End: 2019-01-11

## 2019-01-11 ENCOUNTER — AMBULATORY - HEALTHEAST (OUTPATIENT)
Dept: LAB | Facility: CLINIC | Age: 54
End: 2019-01-11

## 2019-01-11 DIAGNOSIS — I73.9 PAD (PERIPHERAL ARTERY DISEASE) (H): ICD-10-CM

## 2019-01-11 DIAGNOSIS — Z79.01 ANTICOAGULATION MONITORING, INR RANGE 2-3: ICD-10-CM

## 2019-01-11 LAB — INR PPP: 1.7 (ref 0.9–1.1)

## 2019-01-12 ENCOUNTER — AMBULATORY - HEALTHEAST (OUTPATIENT)
Dept: FAMILY MEDICINE | Facility: CLINIC | Age: 54
End: 2019-01-12

## 2019-01-14 ENCOUNTER — COMMUNICATION - HEALTHEAST (OUTPATIENT)
Dept: FAMILY MEDICINE | Facility: CLINIC | Age: 54
End: 2019-01-14

## 2019-02-01 ENCOUNTER — COMMUNICATION - HEALTHEAST (OUTPATIENT)
Dept: ANTICOAGULATION | Facility: CLINIC | Age: 54
End: 2019-02-01

## 2019-03-04 ENCOUNTER — COMMUNICATION - HEALTHEAST (OUTPATIENT)
Dept: FAMILY MEDICINE | Facility: CLINIC | Age: 54
End: 2019-03-04

## 2019-03-11 ENCOUNTER — COMMUNICATION - HEALTHEAST (OUTPATIENT)
Dept: ANTICOAGULATION | Facility: CLINIC | Age: 54
End: 2019-03-11

## 2019-03-19 ENCOUNTER — COMMUNICATION - HEALTHEAST (OUTPATIENT)
Dept: FAMILY MEDICINE | Facility: CLINIC | Age: 54
End: 2019-03-19

## 2019-03-26 ENCOUNTER — COMMUNICATION - HEALTHEAST (OUTPATIENT)
Dept: FAMILY MEDICINE | Facility: CLINIC | Age: 54
End: 2019-03-26

## 2019-03-27 ENCOUNTER — COMMUNICATION - HEALTHEAST (OUTPATIENT)
Dept: ANTICOAGULATION | Facility: CLINIC | Age: 54
End: 2019-03-27

## 2019-03-27 ENCOUNTER — AMBULATORY - HEALTHEAST (OUTPATIENT)
Dept: LAB | Facility: CLINIC | Age: 54
End: 2019-03-27

## 2019-03-27 DIAGNOSIS — Z79.01 ANTICOAGULATION MONITORING, INR RANGE 2-3: ICD-10-CM

## 2019-03-27 DIAGNOSIS — I73.9 PAD (PERIPHERAL ARTERY DISEASE) (H): ICD-10-CM

## 2019-03-27 LAB — INR PPP: 2.4 (ref 0.9–1.1)

## 2019-03-28 ENCOUNTER — COMMUNICATION - HEALTHEAST (OUTPATIENT)
Dept: ANTICOAGULATION | Facility: CLINIC | Age: 54
End: 2019-03-28

## 2019-03-28 DIAGNOSIS — Z79.01 ANTICOAGULATION MONITORING, INR RANGE 2-3: ICD-10-CM

## 2019-03-28 DIAGNOSIS — I73.9 PAD (PERIPHERAL ARTERY DISEASE) (H): ICD-10-CM

## 2019-04-24 ENCOUNTER — AMBULATORY - HEALTHEAST (OUTPATIENT)
Dept: LAB | Facility: CLINIC | Age: 54
End: 2019-04-24

## 2019-04-24 ENCOUNTER — COMMUNICATION - HEALTHEAST (OUTPATIENT)
Dept: ANTICOAGULATION | Facility: CLINIC | Age: 54
End: 2019-04-24

## 2019-04-24 DIAGNOSIS — I73.9 PAD (PERIPHERAL ARTERY DISEASE) (H): ICD-10-CM

## 2019-04-24 DIAGNOSIS — Z79.01 ANTICOAGULATION MONITORING, INR RANGE 2-3: ICD-10-CM

## 2019-04-24 LAB — INR PPP: 1.7 (ref 0.9–1.1)

## 2019-05-08 ENCOUNTER — COMMUNICATION - HEALTHEAST (OUTPATIENT)
Dept: ANTICOAGULATION | Facility: CLINIC | Age: 54
End: 2019-05-08

## 2019-05-08 ENCOUNTER — AMBULATORY - HEALTHEAST (OUTPATIENT)
Dept: LAB | Facility: CLINIC | Age: 54
End: 2019-05-08

## 2019-05-08 DIAGNOSIS — Z79.01 ANTICOAGULATION MONITORING, INR RANGE 2-3: ICD-10-CM

## 2019-05-08 DIAGNOSIS — I73.9 PAD (PERIPHERAL ARTERY DISEASE) (H): ICD-10-CM

## 2019-05-08 LAB — INR PPP: 3 (ref 0.9–1.1)

## 2019-05-16 ENCOUNTER — RECORDS - HEALTHEAST (OUTPATIENT)
Dept: HEALTH INFORMATION MANAGEMENT | Facility: CLINIC | Age: 54
End: 2019-05-16

## 2019-05-29 ENCOUNTER — AMBULATORY - HEALTHEAST (OUTPATIENT)
Dept: LAB | Facility: CLINIC | Age: 54
End: 2019-05-29

## 2019-05-29 ENCOUNTER — COMMUNICATION - HEALTHEAST (OUTPATIENT)
Dept: ANTICOAGULATION | Facility: CLINIC | Age: 54
End: 2019-05-29

## 2019-05-29 DIAGNOSIS — I73.9 PAD (PERIPHERAL ARTERY DISEASE) (H): ICD-10-CM

## 2019-05-29 DIAGNOSIS — Z79.01 ANTICOAGULATION MONITORING, INR RANGE 2-3: ICD-10-CM

## 2019-05-29 LAB — INR PPP: 2.9 (ref 0.9–1.1)

## 2019-06-26 ENCOUNTER — AMBULATORY - HEALTHEAST (OUTPATIENT)
Dept: LAB | Facility: CLINIC | Age: 54
End: 2019-06-26

## 2019-06-26 ENCOUNTER — COMMUNICATION - HEALTHEAST (OUTPATIENT)
Dept: ANTICOAGULATION | Facility: CLINIC | Age: 54
End: 2019-06-26

## 2019-06-26 DIAGNOSIS — Z79.01 ANTICOAGULATION MONITORING, INR RANGE 2-3: ICD-10-CM

## 2019-06-26 DIAGNOSIS — I73.9 PAD (PERIPHERAL ARTERY DISEASE) (H): ICD-10-CM

## 2019-06-26 LAB — INR PPP: 1.7 (ref 0.9–1.1)

## 2019-07-12 ENCOUNTER — AMBULATORY - HEALTHEAST (OUTPATIENT)
Dept: LAB | Facility: CLINIC | Age: 54
End: 2019-07-12

## 2019-07-12 ENCOUNTER — COMMUNICATION - HEALTHEAST (OUTPATIENT)
Dept: ANTICOAGULATION | Facility: CLINIC | Age: 54
End: 2019-07-12

## 2019-07-12 DIAGNOSIS — Z79.01 ANTICOAGULATION MONITORING, INR RANGE 2-3: ICD-10-CM

## 2019-07-12 DIAGNOSIS — I73.9 PAD (PERIPHERAL ARTERY DISEASE) (H): ICD-10-CM

## 2019-07-12 LAB — INR PPP: 2.5 (ref 0.9–1.1)

## 2019-07-22 ENCOUNTER — COMMUNICATION - HEALTHEAST (OUTPATIENT)
Dept: FAMILY MEDICINE | Facility: CLINIC | Age: 54
End: 2019-07-22

## 2019-07-22 DIAGNOSIS — I10 HTN (HYPERTENSION): ICD-10-CM

## 2019-07-22 DIAGNOSIS — F41.9 ANXIETY DISORDER: ICD-10-CM

## 2019-07-26 ENCOUNTER — AMBULATORY - HEALTHEAST (OUTPATIENT)
Dept: LAB | Facility: CLINIC | Age: 54
End: 2019-07-26

## 2019-07-26 ENCOUNTER — COMMUNICATION - HEALTHEAST (OUTPATIENT)
Dept: INTERNAL MEDICINE | Facility: CLINIC | Age: 54
End: 2019-07-26

## 2019-07-26 DIAGNOSIS — I73.9 PAD (PERIPHERAL ARTERY DISEASE) (H): ICD-10-CM

## 2019-07-26 DIAGNOSIS — Z79.01 ANTICOAGULATION MONITORING, INR RANGE 2-3: ICD-10-CM

## 2019-07-26 LAB — INR PPP: 2 (ref 0.9–1.1)

## 2019-08-15 DIAGNOSIS — I77.9 BILATERAL CAROTID ARTERY DISEASE (H): Primary | ICD-10-CM

## 2019-08-28 ENCOUNTER — AMBULATORY - HEALTHEAST (OUTPATIENT)
Dept: LAB | Facility: CLINIC | Age: 54
End: 2019-08-28

## 2019-08-28 ENCOUNTER — COMMUNICATION - HEALTHEAST (OUTPATIENT)
Dept: ANTICOAGULATION | Facility: CLINIC | Age: 54
End: 2019-08-28

## 2019-08-28 DIAGNOSIS — Z79.01 ANTICOAGULATION MONITORING, INR RANGE 2-3: ICD-10-CM

## 2019-08-28 DIAGNOSIS — I73.9 PAD (PERIPHERAL ARTERY DISEASE) (H): ICD-10-CM

## 2019-08-28 LAB — INR PPP: 2.1 (ref 0.9–1.1)

## 2019-09-02 ENCOUNTER — COMMUNICATION - HEALTHEAST (OUTPATIENT)
Dept: FAMILY MEDICINE | Facility: CLINIC | Age: 54
End: 2019-09-02

## 2019-09-02 DIAGNOSIS — I73.9 PAD (PERIPHERAL ARTERY DISEASE) (H): ICD-10-CM

## 2019-09-02 DIAGNOSIS — I10 HTN (HYPERTENSION): ICD-10-CM

## 2019-09-02 DIAGNOSIS — Z79.01 ANTICOAGULATION MONITORING, INR RANGE 2-3: ICD-10-CM

## 2019-10-02 ENCOUNTER — COMMUNICATION - HEALTHEAST (OUTPATIENT)
Dept: FAMILY MEDICINE | Facility: CLINIC | Age: 54
End: 2019-10-02

## 2019-10-04 ENCOUNTER — AMBULATORY - HEALTHEAST (OUTPATIENT)
Dept: LAB | Facility: CLINIC | Age: 54
End: 2019-10-04

## 2019-10-04 ENCOUNTER — AMBULATORY - HEALTHEAST (OUTPATIENT)
Dept: NURSING | Facility: CLINIC | Age: 54
End: 2019-10-04

## 2019-10-04 ENCOUNTER — COMMUNICATION - HEALTHEAST (OUTPATIENT)
Dept: ANTICOAGULATION | Facility: CLINIC | Age: 54
End: 2019-10-04

## 2019-10-04 DIAGNOSIS — I73.9 PAD (PERIPHERAL ARTERY DISEASE) (H): ICD-10-CM

## 2019-10-04 DIAGNOSIS — Z79.01 ANTICOAGULATION MONITORING, INR RANGE 2-3: ICD-10-CM

## 2019-10-04 LAB — INR PPP: 3.2 (ref 0.9–1.1)

## 2019-10-16 ENCOUNTER — HOSPITAL ENCOUNTER (OUTPATIENT)
Dept: MAMMOGRAPHY | Facility: CLINIC | Age: 54
Discharge: HOME | End: 2019-10-16
Attending: FAMILY MEDICINE

## 2019-10-16 DIAGNOSIS — Z12.31 VISIT FOR SCREENING MAMMOGRAM: ICD-10-CM

## 2019-10-18 ENCOUNTER — AMBULATORY - HEALTHEAST (OUTPATIENT)
Dept: LAB | Facility: CLINIC | Age: 54
End: 2019-10-18

## 2019-10-18 ENCOUNTER — COMMUNICATION - HEALTHEAST (OUTPATIENT)
Dept: ANTICOAGULATION | Facility: CLINIC | Age: 54
End: 2019-10-18

## 2019-10-18 DIAGNOSIS — Z79.01 ANTICOAGULATION MONITORING, INR RANGE 2-3: ICD-10-CM

## 2019-10-18 DIAGNOSIS — I73.9 PAD (PERIPHERAL ARTERY DISEASE) (H): ICD-10-CM

## 2019-10-18 LAB — INR PPP: 2.9 (ref 0.9–1.1)

## 2019-10-24 ENCOUNTER — COMMUNICATION - HEALTHEAST (OUTPATIENT)
Dept: FAMILY MEDICINE | Facility: CLINIC | Age: 54
End: 2019-10-24

## 2019-11-01 ENCOUNTER — COMMUNICATION - HEALTHEAST (OUTPATIENT)
Dept: ANTICOAGULATION | Facility: CLINIC | Age: 54
End: 2019-11-01

## 2019-11-01 ENCOUNTER — AMBULATORY - HEALTHEAST (OUTPATIENT)
Dept: LAB | Facility: CLINIC | Age: 54
End: 2019-11-01

## 2019-11-01 DIAGNOSIS — I73.9 PAD (PERIPHERAL ARTERY DISEASE) (H): ICD-10-CM

## 2019-11-01 DIAGNOSIS — Z79.01 ANTICOAGULATION MONITORING, INR RANGE 2-3: ICD-10-CM

## 2019-11-01 LAB — INR PPP: 3.2 (ref 0.9–1.1)

## 2019-11-16 ENCOUNTER — COMMUNICATION - HEALTHEAST (OUTPATIENT)
Dept: FAMILY MEDICINE | Facility: CLINIC | Age: 54
End: 2019-11-16

## 2019-11-16 DIAGNOSIS — I10 HTN (HYPERTENSION): ICD-10-CM

## 2019-11-16 DIAGNOSIS — K21.9 GASTROESOPHAGEAL REFLUX DISEASE WITHOUT ESOPHAGITIS: ICD-10-CM

## 2019-11-22 ENCOUNTER — COMMUNICATION - HEALTHEAST (OUTPATIENT)
Dept: ANTICOAGULATION | Facility: CLINIC | Age: 54
End: 2019-11-22

## 2019-11-22 ENCOUNTER — AMBULATORY - HEALTHEAST (OUTPATIENT)
Dept: LAB | Facility: CLINIC | Age: 54
End: 2019-11-22

## 2019-11-22 DIAGNOSIS — I73.9 PAD (PERIPHERAL ARTERY DISEASE) (H): ICD-10-CM

## 2019-11-22 DIAGNOSIS — Z79.01 ANTICOAGULATION MONITORING, INR RANGE 2-3: ICD-10-CM

## 2019-11-22 LAB — INR PPP: 1.8 (ref 0.9–1.1)

## 2019-12-04 ENCOUNTER — COMMUNICATION - HEALTHEAST (OUTPATIENT)
Dept: ANTICOAGULATION | Facility: CLINIC | Age: 54
End: 2019-12-04

## 2019-12-04 ENCOUNTER — AMBULATORY - HEALTHEAST (OUTPATIENT)
Dept: LAB | Facility: CLINIC | Age: 54
End: 2019-12-04

## 2019-12-04 DIAGNOSIS — I73.9 PAD (PERIPHERAL ARTERY DISEASE) (H): ICD-10-CM

## 2019-12-04 DIAGNOSIS — Z79.01 ANTICOAGULATION MONITORING, INR RANGE 2-3: ICD-10-CM

## 2019-12-04 LAB — INR PPP: 3.7 (ref 0.9–1.1)

## 2019-12-08 ENCOUNTER — COMMUNICATION - HEALTHEAST (OUTPATIENT)
Dept: FAMILY MEDICINE | Facility: CLINIC | Age: 54
End: 2019-12-08

## 2019-12-08 DIAGNOSIS — F41.9 ANXIETY DISORDER: ICD-10-CM

## 2019-12-08 DIAGNOSIS — I73.9 PAD (PERIPHERAL ARTERY DISEASE) (H): ICD-10-CM

## 2019-12-08 DIAGNOSIS — I10 HTN (HYPERTENSION): ICD-10-CM

## 2019-12-08 DIAGNOSIS — N39.41 URGE INCONTINENCE OF URINE: ICD-10-CM

## 2019-12-10 ENCOUNTER — COMMUNICATION - HEALTHEAST (OUTPATIENT)
Dept: FAMILY MEDICINE | Facility: CLINIC | Age: 54
End: 2019-12-10

## 2019-12-14 ENCOUNTER — COMMUNICATION - HEALTHEAST (OUTPATIENT)
Dept: FAMILY MEDICINE | Facility: CLINIC | Age: 54
End: 2019-12-14

## 2019-12-14 DIAGNOSIS — I73.9 PAD (PERIPHERAL ARTERY DISEASE) (H): ICD-10-CM

## 2019-12-20 ENCOUNTER — OFFICE VISIT - HEALTHEAST (OUTPATIENT)
Dept: FAMILY MEDICINE | Facility: CLINIC | Age: 54
End: 2019-12-20

## 2019-12-20 ENCOUNTER — COMMUNICATION - HEALTHEAST (OUTPATIENT)
Dept: ANTICOAGULATION | Facility: CLINIC | Age: 54
End: 2019-12-20

## 2019-12-20 DIAGNOSIS — Z00.00 ROUTINE GENERAL MEDICAL EXAMINATION AT A HEALTH CARE FACILITY: ICD-10-CM

## 2019-12-20 DIAGNOSIS — Z79.01 ANTICOAGULATION MONITORING, INR RANGE 2-3: ICD-10-CM

## 2019-12-20 DIAGNOSIS — K21.9 GASTROESOPHAGEAL REFLUX DISEASE WITHOUT ESOPHAGITIS: ICD-10-CM

## 2019-12-20 DIAGNOSIS — I77.9 BILATERAL CAROTID ARTERY DISEASE, UNSPECIFIED TYPE (H): ICD-10-CM

## 2019-12-20 DIAGNOSIS — I73.9 PAD (PERIPHERAL ARTERY DISEASE) (H): ICD-10-CM

## 2019-12-20 DIAGNOSIS — I10 ESSENTIAL HYPERTENSION: ICD-10-CM

## 2019-12-20 DIAGNOSIS — F41.1 GENERALIZED ANXIETY DISORDER: ICD-10-CM

## 2019-12-20 DIAGNOSIS — D12.6 ADENOMATOUS POLYP OF COLON, UNSPECIFIED PART OF COLON: ICD-10-CM

## 2019-12-20 DIAGNOSIS — E78.5 DYSLIPIDEMIA: ICD-10-CM

## 2019-12-20 DIAGNOSIS — F17.200 TOBACCO USE DISORDER: ICD-10-CM

## 2019-12-20 DIAGNOSIS — L03.011 PARONYCHIA OF FINGER OF RIGHT HAND: ICD-10-CM

## 2019-12-20 DIAGNOSIS — N39.41 URGE INCONTINENCE OF URINE: ICD-10-CM

## 2019-12-20 DIAGNOSIS — Z12.4 PAP SMEAR FOR CERVICAL CANCER SCREENING: ICD-10-CM

## 2019-12-20 DIAGNOSIS — D72.820 ATYPICAL LYMPHOCYTOSIS: ICD-10-CM

## 2019-12-20 LAB
ALBUMIN SERPL-MCNC: 4 G/DL (ref 3.5–5)
ALP SERPL-CCNC: 124 U/L (ref 45–120)
ALT SERPL W P-5'-P-CCNC: <9 U/L (ref 0–45)
ANION GAP SERPL CALCULATED.3IONS-SCNC: 9 MMOL/L (ref 5–18)
AST SERPL W P-5'-P-CCNC: 12 U/L (ref 0–40)
BASOPHILS # BLD AUTO: 0.1 THOU/UL (ref 0–0.2)
BASOPHILS NFR BLD AUTO: 1 % (ref 0–2)
BILIRUB SERPL-MCNC: 0.3 MG/DL (ref 0–1)
BUN SERPL-MCNC: 9 MG/DL (ref 8–22)
CALCIUM SERPL-MCNC: 9.2 MG/DL (ref 8.5–10.5)
CHLORIDE BLD-SCNC: 107 MMOL/L (ref 98–107)
CHOLEST SERPL-MCNC: 229 MG/DL
CO2 SERPL-SCNC: 24 MMOL/L (ref 22–31)
CREAT SERPL-MCNC: 0.78 MG/DL (ref 0.6–1.1)
EOSINOPHIL # BLD AUTO: 0.3 THOU/UL (ref 0–0.4)
EOSINOPHIL NFR BLD AUTO: 3 % (ref 0–6)
ERYTHROCYTE [DISTWIDTH] IN BLOOD BY AUTOMATED COUNT: 12.9 % (ref 11–14.5)
FASTING STATUS PATIENT QL REPORTED: YES
GFR SERPL CREATININE-BSD FRML MDRD: >60 ML/MIN/1.73M2
GLUCOSE BLD-MCNC: 83 MG/DL (ref 70–125)
HCT VFR BLD AUTO: 34.8 % (ref 35–47)
HDLC SERPL-MCNC: 37 MG/DL
HGB BLD-MCNC: 11.7 G/DL (ref 12–16)
HIV 1+2 AB+HIV1 P24 AG SERPL QL IA: NEGATIVE
INR PPP: 3.9 (ref 0.9–1.1)
LDLC SERPL CALC-MCNC: 162 MG/DL
LYMPHOCYTES # BLD AUTO: 3.4 THOU/UL (ref 0.8–4.4)
LYMPHOCYTES NFR BLD AUTO: 38 % (ref 20–40)
MCH RBC QN AUTO: 30.4 PG (ref 27–34)
MCHC RBC AUTO-ENTMCNC: 33.6 G/DL (ref 32–36)
MCV RBC AUTO: 90 FL (ref 80–100)
MONOCYTES # BLD AUTO: 0.8 THOU/UL (ref 0–0.9)
MONOCYTES NFR BLD AUTO: 9 % (ref 2–10)
NEUTROPHILS # BLD AUTO: 4.6 THOU/UL (ref 2–7.7)
NEUTROPHILS NFR BLD AUTO: 50 % (ref 50–70)
PLATELET # BLD AUTO: 324 THOU/UL (ref 140–440)
PMV BLD AUTO: 7.4 FL (ref 7–10)
POTASSIUM BLD-SCNC: 4.1 MMOL/L (ref 3.5–5)
PROT SERPL-MCNC: 6.7 G/DL (ref 6–8)
RBC # BLD AUTO: 3.85 MILL/UL (ref 3.8–5.4)
SODIUM SERPL-SCNC: 140 MMOL/L (ref 136–145)
TRIGL SERPL-MCNC: 149 MG/DL
WBC: 9.1 THOU/UL (ref 4–11)

## 2019-12-20 ASSESSMENT — MIFFLIN-ST. JEOR: SCORE: 1214.25

## 2019-12-23 LAB
HPV SOURCE: NORMAL
HUMAN PAPILLOMA VIRUS 16 DNA: NEGATIVE
HUMAN PAPILLOMA VIRUS 18 DNA: NEGATIVE
HUMAN PAPILLOMA VIRUS FINAL DIAGNOSIS: NORMAL
HUMAN PAPILLOMA VIRUS OTHER HR: NEGATIVE
SPECIMEN DESCRIPTION: NORMAL

## 2019-12-24 ENCOUNTER — AMBULATORY - HEALTHEAST (OUTPATIENT)
Dept: FAMILY MEDICINE | Facility: CLINIC | Age: 54
End: 2019-12-24

## 2019-12-24 DIAGNOSIS — E78.5 DYSLIPIDEMIA: ICD-10-CM

## 2019-12-27 ENCOUNTER — COMMUNICATION - HEALTHEAST (OUTPATIENT)
Dept: FAMILY MEDICINE | Facility: CLINIC | Age: 54
End: 2019-12-27

## 2019-12-28 ENCOUNTER — COMMUNICATION - HEALTHEAST (OUTPATIENT)
Dept: FAMILY MEDICINE | Facility: CLINIC | Age: 54
End: 2019-12-28

## 2019-12-28 DIAGNOSIS — Z23 NEED FOR VACCINATION: ICD-10-CM

## 2019-12-31 LAB
BKR LAB AP ABNORMAL BLEEDING: NO
BKR LAB AP BIRTH CONTROL/HORMONES: NORMAL
BKR LAB AP CERVICAL APPEARANCE: NORMAL
BKR LAB AP GYN ADEQUACY: NORMAL
BKR LAB AP GYN INTERPRETATION: NORMAL
BKR LAB AP HPV REFLEX: NORMAL
BKR LAB AP LMP: NORMAL
BKR LAB AP PATIENT STATUS: NORMAL
BKR LAB AP PREVIOUS ABNORMAL: NORMAL
BKR LAB AP PREVIOUS NORMAL: 2016
HIGH RISK?: NO
PATH REPORT.COMMENTS IMP SPEC: NORMAL
RESULT FLAG (HE HISTORICAL CONVERSION): NORMAL

## 2020-01-08 ENCOUNTER — AMBULATORY - HEALTHEAST (OUTPATIENT)
Dept: LAB | Facility: CLINIC | Age: 55
End: 2020-01-08

## 2020-01-08 ENCOUNTER — COMMUNICATION - HEALTHEAST (OUTPATIENT)
Dept: ANTICOAGULATION | Facility: CLINIC | Age: 55
End: 2020-01-08

## 2020-01-08 DIAGNOSIS — I73.9 PAD (PERIPHERAL ARTERY DISEASE) (H): ICD-10-CM

## 2020-01-08 DIAGNOSIS — Z79.01 ANTICOAGULATION MONITORING, INR RANGE 2-3: ICD-10-CM

## 2020-01-08 LAB — INR PPP: 2.4 (ref 0.9–1.1)

## 2020-01-27 ENCOUNTER — COMMUNICATION - HEALTHEAST (OUTPATIENT)
Dept: ANTICOAGULATION | Facility: CLINIC | Age: 55
End: 2020-01-27

## 2020-01-27 DIAGNOSIS — I73.9 PAD (PERIPHERAL ARTERY DISEASE) (H): ICD-10-CM

## 2020-01-31 ENCOUNTER — RECORDS - HEALTHEAST (OUTPATIENT)
Dept: ADMINISTRATIVE | Facility: OTHER | Age: 55
End: 2020-01-31

## 2020-02-05 ENCOUNTER — COMMUNICATION - HEALTHEAST (OUTPATIENT)
Dept: ANTICOAGULATION | Facility: CLINIC | Age: 55
End: 2020-02-05

## 2020-02-12 ENCOUNTER — COMMUNICATION - HEALTHEAST (OUTPATIENT)
Dept: ANTICOAGULATION | Facility: CLINIC | Age: 55
End: 2020-02-12

## 2020-02-12 ENCOUNTER — COMMUNICATION - HEALTHEAST (OUTPATIENT)
Dept: FAMILY MEDICINE | Facility: CLINIC | Age: 55
End: 2020-02-12

## 2020-02-12 ENCOUNTER — AMBULATORY - HEALTHEAST (OUTPATIENT)
Dept: LAB | Facility: CLINIC | Age: 55
End: 2020-02-12

## 2020-02-12 DIAGNOSIS — I73.9 PAD (PERIPHERAL ARTERY DISEASE) (H): ICD-10-CM

## 2020-02-12 DIAGNOSIS — Z79.01 ANTICOAGULATION MONITORING, INR RANGE 2-3: ICD-10-CM

## 2020-02-12 LAB — INR PPP: 2.3 (ref 0.9–1.1)

## 2020-03-01 ENCOUNTER — HEALTH MAINTENANCE LETTER (OUTPATIENT)
Age: 55
End: 2020-03-01

## 2020-03-02 ENCOUNTER — COMMUNICATION - HEALTHEAST (OUTPATIENT)
Dept: FAMILY MEDICINE | Facility: CLINIC | Age: 55
End: 2020-03-02

## 2020-03-02 DIAGNOSIS — Z23 NEED FOR VACCINATION: ICD-10-CM

## 2020-03-08 ENCOUNTER — COMMUNICATION - HEALTHEAST (OUTPATIENT)
Dept: FAMILY MEDICINE | Facility: CLINIC | Age: 55
End: 2020-03-08

## 2020-03-08 DIAGNOSIS — M79.2 THORACIC NEURALGIA: ICD-10-CM

## 2020-03-13 ENCOUNTER — AMBULATORY - HEALTHEAST (OUTPATIENT)
Dept: LAB | Facility: CLINIC | Age: 55
End: 2020-03-13

## 2020-03-13 ENCOUNTER — COMMUNICATION - HEALTHEAST (OUTPATIENT)
Dept: ANTICOAGULATION | Facility: CLINIC | Age: 55
End: 2020-03-13

## 2020-03-13 DIAGNOSIS — Z79.01 ANTICOAGULATION MONITORING, INR RANGE 2-3: ICD-10-CM

## 2020-03-13 DIAGNOSIS — I73.9 PAD (PERIPHERAL ARTERY DISEASE) (H): ICD-10-CM

## 2020-03-13 LAB — INR PPP: 2.1 (ref 0.9–1.1)

## 2020-04-01 ENCOUNTER — AMBULATORY - HEALTHEAST (OUTPATIENT)
Dept: LAB | Facility: CLINIC | Age: 55
End: 2020-04-01

## 2020-04-01 ENCOUNTER — COMMUNICATION - HEALTHEAST (OUTPATIENT)
Dept: ANTICOAGULATION | Facility: CLINIC | Age: 55
End: 2020-04-01

## 2020-04-01 DIAGNOSIS — I73.9 PAD (PERIPHERAL ARTERY DISEASE) (H): ICD-10-CM

## 2020-04-01 DIAGNOSIS — Z79.01 ANTICOAGULATION MONITORING, INR RANGE 2-3: ICD-10-CM

## 2020-04-01 LAB — INR PPP: 2.6 (ref 0.9–1.1)

## 2020-05-06 ENCOUNTER — COMMUNICATION - HEALTHEAST (OUTPATIENT)
Dept: ANTICOAGULATION | Facility: CLINIC | Age: 55
End: 2020-05-06

## 2020-05-06 ENCOUNTER — COMMUNICATION - HEALTHEAST (OUTPATIENT)
Dept: FAMILY MEDICINE | Facility: CLINIC | Age: 55
End: 2020-05-06

## 2020-05-08 ENCOUNTER — AMBULATORY - HEALTHEAST (OUTPATIENT)
Dept: LAB | Facility: CLINIC | Age: 55
End: 2020-05-08

## 2020-05-08 ENCOUNTER — COMMUNICATION - HEALTHEAST (OUTPATIENT)
Dept: ANTICOAGULATION | Facility: CLINIC | Age: 55
End: 2020-05-08

## 2020-05-08 DIAGNOSIS — I73.9 PAD (PERIPHERAL ARTERY DISEASE) (H): ICD-10-CM

## 2020-05-08 DIAGNOSIS — Z79.01 ANTICOAGULATION MONITORING, INR RANGE 2-3: ICD-10-CM

## 2020-05-08 LAB — INR PPP: 1.4 (ref 0.9–1.1)

## 2020-05-15 ENCOUNTER — COMMUNICATION - HEALTHEAST (OUTPATIENT)
Dept: ANTICOAGULATION | Facility: CLINIC | Age: 55
End: 2020-05-15

## 2020-05-15 ENCOUNTER — AMBULATORY - HEALTHEAST (OUTPATIENT)
Dept: LAB | Facility: CLINIC | Age: 55
End: 2020-05-15

## 2020-05-15 DIAGNOSIS — I73.9 PAD (PERIPHERAL ARTERY DISEASE) (H): ICD-10-CM

## 2020-05-15 DIAGNOSIS — Z79.01 ANTICOAGULATION MONITORING, INR RANGE 2-3: ICD-10-CM

## 2020-05-15 LAB — INR PPP: 1.8 (ref 0.9–1.1)

## 2020-05-26 ENCOUNTER — VIRTUAL VISIT (OUTPATIENT)
Dept: FAMILY MEDICINE | Facility: OTHER | Age: 55
End: 2020-05-26

## 2020-05-26 ENCOUNTER — NURSE TRIAGE (OUTPATIENT)
Dept: NURSING | Facility: CLINIC | Age: 55
End: 2020-05-26

## 2020-05-26 ENCOUNTER — AMBULATORY - HEALTHEAST (OUTPATIENT)
Dept: FAMILY MEDICINE | Facility: CLINIC | Age: 55
End: 2020-05-26

## 2020-05-26 ENCOUNTER — AMBULATORY - HEALTHEAST (OUTPATIENT)
Dept: INTERNAL MEDICINE | Facility: CLINIC | Age: 55
End: 2020-05-26

## 2020-05-26 ENCOUNTER — COMMUNICATION - HEALTHEAST (OUTPATIENT)
Dept: FAMILY MEDICINE | Facility: CLINIC | Age: 55
End: 2020-05-26

## 2020-05-26 DIAGNOSIS — Z20.822 SUSPECTED COVID-19 VIRUS INFECTION: ICD-10-CM

## 2020-05-26 NOTE — PROGRESS NOTES
"Date: 2020 06:14:53  Clinician: Reyna Mak  Clinician NPI: [id number]  Patient: marshall neives  Patient : 1965  [address] [id number]  Patient Phone: [phone]  Visit Protocol: URI  Patient Summary:  marshall is a 54 year old ( : 1965 ) female who initiated a Visit for COVID-19 (Coronavirus) evaluation and screening. When asked the question \"Please sign me up to receive news, health information and promotions. \", marshall responded \"No\".    marshall states her symptoms started 1-2 days ago.   Her symptoms consist of a sore throat, a cough, rhinitis, malaise, and myalgia.   Symptom details     Nasal secretions: The color of her mucus is clear.    Cough: marshall coughs a few times an hour and her cough is not more bothersome at night. Phlegm comes into her throat when she coughs. She does not believe her cough is caused by post-nasal drip. The color of the phlegm is yellow.     Sore throat: marshall reports having mild throat pain (1-3 on a 10 point pain scale), does not have exudate on her tonsils, and can swallow liquids. She is not sure if the lymph nodes in her neck are enlarged. A rash has not appeared on the skin since the sore throat started.      marshall denies having nausea, teeth pain, ageusia, diarrhea, facial pain or pressure, chills, wheezing, fever, nasal congestion, vomiting, ear pain, headache, and anosmia. She also denies having recent facial or sinus surgery in the past 60 days and taking antibiotic medication for the symptoms. She is not experiencing dyspnea.   Precipitating events  marshall is not sure if she has been exposed to someone with strep throat. She has not recently been exposed to someone with influenza. marshall has been in close contact with the following high risk individuals: people with asthma, heart disease or diabetes, adults 65 or older, and immunocompromised people.   Pertinent COVID-19 (Coronavirus) information  In the past 14 days, " marshall has not worked in a congregate living setting.   She either works or volunteers as a healthcare worker or a , or works or volunteers in a healthcare facility. 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 She lives with a healthcare worker.   marshall has not had a close contact with a laboratory-confirmed COVID-19 patient within 14 days of symptom onset.   Pertinent medical history  marshall does not get yeast infections when she takes antibiotics.   marshall does not need a return to work/school note.   Weight: 130 lbs   marshall smokes or uses smokeless tobacco.   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 have artery bypass   Weight: 130 lbs    MEDICATIONS: lisinopril oral, amlodipine besylate (bulk), famotidine oral, citalopram oral, Aspir-81 oral, gabapentin oral, folic acid oral, simvastatin oral, Coumadin oral, ALLERGIES: NKDA  Clinician Response:  Dear marshall,      Dear marshall  Your symptoms show that you may have coronavirus (COVID-19). This illness can cause fever, cough and trouble breathing. Many people get a mild case and get better on their own. Some people can get very sick.  What should I do?  We would like to test you for this virus. This will be a curbside test done outside the clinic.  Please call [phone] to schedule your visit. Explain that you were referred by [facility] to have a COVID-19 test. Be ready to share your [facility] visit ID number.  Starting now:  Stay at least 6 feet away from others. (If someone will drive you to your test, stay in the backseat, as far away from the  as you can.)   Don't go to work, school or anywhere else. When it's time for your test, go straight to the testing site. Don't make any stops on the way there or back.   Wash your hands and  "face often. Use soap and water.   Cover your mouth and nose with a mask, tissue or washcloth.   Don't touch anyone. No hugging, kissing or handshakes.  While at home   Stay home and away from others (self-isolate) until:  You've had no fever---and no medicine that reduces fever---for 3 full days (72 hours). And...  Your other symptoms have gotten better. For example, your cough or breathing has improved. And...  At least 10 days have passed since your symptoms started.  During this time:  Stay in your own room (and use your own bathroom), if you can.  Don't go to work, school or anywhere else.  Stay away from others in your home. No hugging, kissing or shaking hands.  Don't let anyone visit.  Cover your mouth and nose with a mask, tissue or washcloth to avoid spreading germs.  Clean \"high touch\" surfaces often (doorknobs, counters, handles, etc.). Use a household cleaning spray or wipes.  Wash your hands and face often. Use soap and water.  How can I take care of myself?  1. Get lots of rest. Drink extra fluids (unless your doctor has told you not to).  2. Take Tylenol (acetaminophen) for fever or pain. If you have liver or kidney problems, ask your family doctor if it's okay to take Tylenol.  Adults can take either:   650 mg (two 325 mg pills) every 4 to 6 hours, or...  1,000 mg (two 500 mg pills) every 8 hours as needed.   Note: Don't take more than 3,000 mg in one day.   Acetaminophen is found in many medicines (both prescribed and over-the-counter medicines). Read all labels to be sure you don't take too much.   For children, check the Tylenol bottle for the right dose. The dose is based on the child's age or weight.  3. If you have other health problems (like cancer, heart failure, an organ transplant or severe kidney disease): Call your specialty clinic if you don't feel better in the next 2 days.  4. Know when to call 911: If your breathing is so bad that it keeps you from doing normal activities, call 911 or " go to the emergency room. Tell them that you've been staying home and may have COVID-19.  5. Sign up for [employer]. We know it's scary to hear that you might have COVID-19. We want to track your symptoms to make sure you're okay over the next 2 weeks. Please look for an email from [employer]---this is a free, online program that we'll use to keep in touch. To sign up, follow the link in the email. Learn more at [email or web address].  6. The following will serve as your written order for this Covid Test ordered by me for the indication of suspected Covid [Z20.828]: The test will be ordered in [employer], our electronic health record after you are scheduled and will show as ordered and authorized by Jamil Nicholson MD   Order: Covid-19 (Coronavirus) PCR for SYMPTOMATIC testing from [facility]  Where can I get more information?  To learn more about COVID-19 and how to care for yourself at home, please visit the CDC website at https://www.cdc.gov/coronavirus/2019-ncov/about/steps-when-sick.html.  For more about your care at [facility], please visit [email or web address]  If you'd like to be part of a COVID-19 clinical trial (research study) at the [facility], go to [email or web address] for details.    Diagnosis: Cough  Diagnosis ICD: R05

## 2020-05-26 NOTE — TELEPHONE ENCOUNTER
Nery had a sore throat that started yesterday and today is coughing up some yellow phelgm.  Fever is present 99.5 am and denies shortness of breath.  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  COVID 19 Nurse Triage Plan/Patient Instructions    Please be aware that novel coronavirus (COVID-19) may be circulating in the community. If you develop symptoms such as fever, cough, or SOB or if you have concerns about the presence of another infection including coronavirus (COVID-19), please contact your health care provider or visit [email or web address].     Disposition/Instructions    Patient to have an [facility] Visit with a provider (Preferred option). Follow System Ambulatory Workflow for COVID 19.     To do this follow these instructions:    1. Go to the website [email or web address]  2. Create an account (you will need your insurance information)  3. Start a new visit  4. Choose your diagnosis (e.g. COVID19)  5. Fill out the information about your symptoms  6. A provider will reach out to you by text, phone call or video visit based on your request    Call Back If: Your symptoms worsen before you are able to complete your [facility] Visit with a provider.    Thank you for limiting contact with others, wearing a simple mask to cover your cough, practice good hand hygiene habits and accessing our virtual services where possible to limit the spread of this virus.    For more information about COVID19 and options for caring for yourself at home, please visit the CDC website at https://www.cdc.gov/coronavirus/2019-ncov/about/steps-when-sick.html  For more options for care at [facility], please visit our website at [email or web address]    For more information, please use the [facility] of Health COVID-19 Website: https://www.health.state.[place]/diseases/coronavirus/index.html  [facility] of Health ([facility]) COVID-19 Hotlines (Interpreters available):      Health questions: Phone Number:  [phone] or [phone] and Hours: 7 a.m. to 7 p.m.    Schools and  questions: Phone Number: [phone] or [phone] and Hours 7 a.m. to 7 p.m.               19    Reason for Disposition    [1] COVID-19 infection diagnosed or suspected AND [2] mild symptoms (fever, cough) AND [3] no trouble breathing or other complications    Additional Information    Negative: SEVERE difficulty breathing (e.g., struggling for each breath, speaks in single words)    Negative: Difficult to awaken or acting confused (e.g., disoriented, slurred speech)    Negative: Bluish (or gray) lips or face now    Negative: Shock suspected (e.g., cold/pale/clammy skin, too weak to stand, low BP, rapid pulse)    Negative: Sounds like a life-threatening emergency to the triager    Negative: SEVERE or constant chest pain (Exception: mild central chest pain, present only when coughing)    Negative: MODERATE difficulty breathing (e.g., speaks in phrases, SOB even at rest, pulse 100-120)    Negative: Patient sounds very sick or weak to the triager    Negative: MILD difficulty breathing (e.g., minimal/no SOB at rest, SOB with walking, pulse <100)    Negative: Chest pain    Negative: Fever > 103 F (39.4 C)    Negative: [1] Fever > 101 F (38.3 C) AND [2] age > 60    Negative: [1] Fever > 100.0 F (37.8 C) AND [2] bedridden (e.g., nursing home patient, CVA, chronic illness, recovering from surgery)    Negative: HIGH RISK patient (e.g., age > 64 years, diabetes, heart or lung disease, weak immune system)    Negative: Fever present > 3 days (72 hours)    Negative: [1] Fever returns after gone for over 24 hours AND [2] symptoms worse or not improved    Negative: [1] Continuous (nonstop) coughing interferes with work or school AND [2] no improvement using cough treatment per protocol    Negative: Cough present > 3 weeks    Protocols used: CORONAVIRUS (COVID-19) DIAGNOSED OR [id number] 4.22.20

## 2020-05-27 ENCOUNTER — COMMUNICATION - HEALTHEAST (OUTPATIENT)
Dept: FAMILY MEDICINE | Facility: CLINIC | Age: 55
End: 2020-05-27

## 2020-05-29 ENCOUNTER — COMMUNICATION - HEALTHEAST (OUTPATIENT)
Dept: INTERNAL MEDICINE | Facility: CLINIC | Age: 55
End: 2020-05-29

## 2020-06-02 ENCOUNTER — COMMUNICATION - HEALTHEAST (OUTPATIENT)
Dept: FAMILY MEDICINE | Facility: CLINIC | Age: 55
End: 2020-06-02

## 2020-06-03 ENCOUNTER — AMBULATORY - HEALTHEAST (OUTPATIENT)
Dept: LAB | Facility: CLINIC | Age: 55
End: 2020-06-03

## 2020-06-03 ENCOUNTER — COMMUNICATION - HEALTHEAST (OUTPATIENT)
Dept: FAMILY MEDICINE | Facility: CLINIC | Age: 55
End: 2020-06-03

## 2020-06-03 DIAGNOSIS — Z79.01 ANTICOAGULATION MONITORING, INR RANGE 2-3: ICD-10-CM

## 2020-06-03 DIAGNOSIS — I73.9 PAD (PERIPHERAL ARTERY DISEASE) (H): ICD-10-CM

## 2020-06-03 LAB — INR PPP: 3.9 (ref 0.9–1.1)

## 2020-06-17 ENCOUNTER — OFFICE VISIT - HEALTHEAST (OUTPATIENT)
Dept: FAMILY MEDICINE | Facility: CLINIC | Age: 55
End: 2020-06-17

## 2020-06-17 ENCOUNTER — AMBULATORY - HEALTHEAST (OUTPATIENT)
Dept: LAB | Facility: CLINIC | Age: 55
End: 2020-06-17

## 2020-06-17 ENCOUNTER — COMMUNICATION - HEALTHEAST (OUTPATIENT)
Dept: SCHEDULING | Facility: CLINIC | Age: 55
End: 2020-06-17

## 2020-06-17 ENCOUNTER — COMMUNICATION - HEALTHEAST (OUTPATIENT)
Dept: ANTICOAGULATION | Facility: CLINIC | Age: 55
End: 2020-06-17

## 2020-06-17 DIAGNOSIS — Z79.01 ANTICOAGULATION MONITORING, INR RANGE 2-3: ICD-10-CM

## 2020-06-17 DIAGNOSIS — I73.9 PAD (PERIPHERAL ARTERY DISEASE) (H): ICD-10-CM

## 2020-06-17 DIAGNOSIS — R22.41 LOCALIZED SWELLING, MASS AND LUMP, LOWER LIMB, RIGHT: ICD-10-CM

## 2020-06-17 LAB — INR PPP: 1.9 (ref 0.9–1.1)

## 2020-06-21 ENCOUNTER — COMMUNICATION - HEALTHEAST (OUTPATIENT)
Dept: FAMILY MEDICINE | Facility: CLINIC | Age: 55
End: 2020-06-21

## 2020-06-21 DIAGNOSIS — M79.2 THORACIC NEURALGIA: ICD-10-CM

## 2020-07-01 ENCOUNTER — AMBULATORY - HEALTHEAST (OUTPATIENT)
Dept: LAB | Facility: CLINIC | Age: 55
End: 2020-07-01

## 2020-07-01 ENCOUNTER — COMMUNICATION - HEALTHEAST (OUTPATIENT)
Dept: ANTICOAGULATION | Facility: CLINIC | Age: 55
End: 2020-07-01

## 2020-07-01 DIAGNOSIS — Z79.01 ANTICOAGULATION MONITORING, INR RANGE 2-3: ICD-10-CM

## 2020-07-01 DIAGNOSIS — I73.9 PAD (PERIPHERAL ARTERY DISEASE) (H): ICD-10-CM

## 2020-07-01 LAB — INR PPP: 2.5 (ref 0.9–1.1)

## 2020-07-15 ENCOUNTER — AMBULATORY - HEALTHEAST (OUTPATIENT)
Dept: LAB | Facility: CLINIC | Age: 55
End: 2020-07-15

## 2020-07-15 ENCOUNTER — COMMUNICATION - HEALTHEAST (OUTPATIENT)
Dept: ANTICOAGULATION | Facility: CLINIC | Age: 55
End: 2020-07-15

## 2020-07-15 DIAGNOSIS — I73.9 PAD (PERIPHERAL ARTERY DISEASE) (H): ICD-10-CM

## 2020-07-15 DIAGNOSIS — Z79.01 ANTICOAGULATION MONITORING, INR RANGE 2-3: ICD-10-CM

## 2020-07-15 LAB — INR PPP: 3.3 (ref 0.9–1.1)

## 2020-07-29 ENCOUNTER — COMMUNICATION - HEALTHEAST (OUTPATIENT)
Dept: ANTICOAGULATION | Facility: CLINIC | Age: 55
End: 2020-07-29

## 2020-07-29 ENCOUNTER — AMBULATORY - HEALTHEAST (OUTPATIENT)
Dept: LAB | Facility: CLINIC | Age: 55
End: 2020-07-29

## 2020-07-29 DIAGNOSIS — I73.9 PAD (PERIPHERAL ARTERY DISEASE) (H): ICD-10-CM

## 2020-07-29 DIAGNOSIS — Z79.01 ANTICOAGULATION MONITORING, INR RANGE 2-3: ICD-10-CM

## 2020-07-29 LAB — INR PPP: 3 (ref 0.9–1.1)

## 2020-07-30 ENCOUNTER — OFFICE VISIT - HEALTHEAST (OUTPATIENT)
Dept: FAMILY MEDICINE | Facility: CLINIC | Age: 55
End: 2020-07-30

## 2020-07-30 DIAGNOSIS — M54.50 ACUTE BILATERAL LOW BACK PAIN WITHOUT SCIATICA: ICD-10-CM

## 2020-07-30 LAB
ALBUMIN UR-MCNC: NEGATIVE MG/DL
APPEARANCE UR: CLEAR
BACTERIA #/AREA URNS HPF: ABNORMAL HPF
BILIRUB UR QL STRIP: NEGATIVE
COLOR UR AUTO: YELLOW
GLUCOSE UR STRIP-MCNC: NEGATIVE MG/DL
HGB UR QL STRIP: ABNORMAL
KETONES UR STRIP-MCNC: NEGATIVE MG/DL
LEUKOCYTE ESTERASE UR QL STRIP: NEGATIVE
NITRATE UR QL: NEGATIVE
PH UR STRIP: 5.5 [PH] (ref 5–8)
RBC #/AREA URNS AUTO: ABNORMAL HPF
SP GR UR STRIP: >=1.03 (ref 1–1.03)
SQUAMOUS #/AREA URNS AUTO: ABNORMAL LPF
UROBILINOGEN UR STRIP-ACNC: ABNORMAL
WBC #/AREA URNS AUTO: ABNORMAL HPF

## 2020-07-30 ASSESSMENT — MIFFLIN-ST. JEOR: SCORE: 1197.81

## 2020-08-12 ENCOUNTER — COMMUNICATION - HEALTHEAST (OUTPATIENT)
Dept: ANTICOAGULATION | Facility: CLINIC | Age: 55
End: 2020-08-12

## 2020-08-12 ENCOUNTER — AMBULATORY - HEALTHEAST (OUTPATIENT)
Dept: LAB | Facility: CLINIC | Age: 55
End: 2020-08-12

## 2020-08-12 DIAGNOSIS — I73.9 PAD (PERIPHERAL ARTERY DISEASE) (H): ICD-10-CM

## 2020-08-12 DIAGNOSIS — Z79.01 ANTICOAGULATION MONITORING, INR RANGE 2-3: ICD-10-CM

## 2020-08-12 LAB — INR PPP: 2.7 (ref 0.9–1.1)

## 2020-09-16 ENCOUNTER — COMMUNICATION - HEALTHEAST (OUTPATIENT)
Dept: ANTICOAGULATION | Facility: CLINIC | Age: 55
End: 2020-09-16

## 2020-09-16 ENCOUNTER — AMBULATORY - HEALTHEAST (OUTPATIENT)
Dept: LAB | Facility: CLINIC | Age: 55
End: 2020-09-16

## 2020-09-16 DIAGNOSIS — I73.9 PAD (PERIPHERAL ARTERY DISEASE) (H): ICD-10-CM

## 2020-09-16 DIAGNOSIS — Z79.01 ANTICOAGULATION MONITORING, INR RANGE 2-3: ICD-10-CM

## 2020-09-16 LAB — INR PPP: 2.9 (ref 0.9–1.1)

## 2020-10-02 ENCOUNTER — COMMUNICATION - HEALTHEAST (OUTPATIENT)
Dept: FAMILY MEDICINE | Facility: CLINIC | Age: 55
End: 2020-10-02

## 2020-10-07 ENCOUNTER — COMMUNICATION - HEALTHEAST (OUTPATIENT)
Dept: FAMILY MEDICINE | Facility: CLINIC | Age: 55
End: 2020-10-07

## 2020-10-20 ENCOUNTER — COMMUNICATION - HEALTHEAST (OUTPATIENT)
Dept: FAMILY MEDICINE | Facility: CLINIC | Age: 55
End: 2020-10-20

## 2020-10-20 DIAGNOSIS — Z79.01 ANTICOAGULATION MONITORING, INR RANGE 2-3: ICD-10-CM

## 2020-10-20 DIAGNOSIS — I73.9 PAD (PERIPHERAL ARTERY DISEASE) (H): ICD-10-CM

## 2020-10-23 ENCOUNTER — AMBULATORY - HEALTHEAST (OUTPATIENT)
Dept: LAB | Facility: CLINIC | Age: 55
End: 2020-10-23

## 2020-10-23 ENCOUNTER — AMBULATORY - HEALTHEAST (OUTPATIENT)
Dept: NURSING | Facility: CLINIC | Age: 55
End: 2020-10-23

## 2020-10-23 ENCOUNTER — COMMUNICATION - HEALTHEAST (OUTPATIENT)
Dept: ANTICOAGULATION | Facility: CLINIC | Age: 55
End: 2020-10-23

## 2020-10-23 DIAGNOSIS — I73.9 PAD (PERIPHERAL ARTERY DISEASE) (H): ICD-10-CM

## 2020-10-23 DIAGNOSIS — Z79.01 ANTICOAGULATION MONITORING, INR RANGE 2-3: ICD-10-CM

## 2020-10-23 LAB — INR PPP: 3.2 (ref 0.9–1.1)

## 2020-11-06 ENCOUNTER — COMMUNICATION - HEALTHEAST (OUTPATIENT)
Dept: ANTICOAGULATION | Facility: CLINIC | Age: 55
End: 2020-11-06

## 2020-11-06 ENCOUNTER — HOSPITAL ENCOUNTER (OUTPATIENT)
Dept: MAMMOGRAPHY | Facility: CLINIC | Age: 55
Discharge: HOME | End: 2020-11-06
Attending: FAMILY MEDICINE

## 2020-11-06 ENCOUNTER — AMBULATORY - HEALTHEAST (OUTPATIENT)
Dept: LAB | Facility: CLINIC | Age: 55
End: 2020-11-06

## 2020-11-06 DIAGNOSIS — I73.9 PAD (PERIPHERAL ARTERY DISEASE) (H): ICD-10-CM

## 2020-11-06 DIAGNOSIS — Z12.31 VISIT FOR SCREENING MAMMOGRAM: ICD-10-CM

## 2020-11-06 DIAGNOSIS — Z79.01 ANTICOAGULATION MONITORING, INR RANGE 2-3: ICD-10-CM

## 2020-11-06 LAB — INR PPP: 3.2 (ref 0.9–1.1)

## 2020-11-20 ENCOUNTER — AMBULATORY - HEALTHEAST (OUTPATIENT)
Dept: LAB | Facility: CLINIC | Age: 55
End: 2020-11-20

## 2020-11-20 ENCOUNTER — COMMUNICATION - HEALTHEAST (OUTPATIENT)
Dept: ANTICOAGULATION | Facility: CLINIC | Age: 55
End: 2020-11-20

## 2020-11-20 DIAGNOSIS — Z79.01 ANTICOAGULATION MONITORING, INR RANGE 2-3: ICD-10-CM

## 2020-11-20 DIAGNOSIS — I73.9 PAD (PERIPHERAL ARTERY DISEASE) (H): ICD-10-CM

## 2020-11-20 LAB — INR PPP: 2.7 (ref 0.9–1.1)

## 2020-12-09 ENCOUNTER — NURSE TRIAGE (OUTPATIENT)
Dept: NURSING | Facility: CLINIC | Age: 55
End: 2020-12-09

## 2020-12-09 NOTE — TELEPHONE ENCOUNTER
6pm last night got really dizzy, went to bed and she sweat all night   Chills this morning    Has not checked to see if she has a fever    Slight cough    No chest pain   No difficulty breathing    Refuses further triage, just wants to be scheduled for a COVID19 test.     Advised patient that it has to be ordered by a provider. Advised [email or web address]. Patient is agreeable.     COVID 19 Nurse Triage Plan/Patient Instructions    Please be aware that novel coronavirus (COVID-19) may be circulating in the community. If you develop symptoms such as fever, cough, or SOB or if you have concerns about the presence of another infection including coronavirus (COVID-19), please contact your health care provider or visit [email or web address].     Disposition/Instructions    Virtual Visit with provider recommended. Reference Visit Selection Guide.    Thank you for taking steps to prevent the spread of this virus.  o Limit your contact with others.  o Wear a simple mask to cover your cough.  o Wash your hands well and often.    Resources    [facility]: About COVID-19: [email or web address]/    CDC: What to Do If You're Sick: www.cdc.gov/coronavirus/2019-ncov/about/steps-when-sick.html    CDC: Ending Home Isolation: www.cdc.gov/coronavirus/2019-ncov/hcp/disposition-in-home-patients.html     CDC: Caring for Someone: www.cdc.gov/coronavirus/2019-ncov/if-you-are-sick/care-for-someone.html     [facility]: Interim Guidance for Hospital Discharge to Home: www.health.[facility].[place]/diseases/coronavirus/hcp/hospdischarge.pdf    [facility] clinical trials (COVID-19 research studies): clinicalaffairs.[facility]/n-clinical-trials     Below are the COVID-19 hotlines at the [facility] of Health ([facility]). Interpreters are available.   o For health questions: Call 103-072-4511 or 1-581.964.1291 (7 a.m. to 7 p.m.)  o For questions about schools and childcare: Call [phone] or 1-785.492.6873 (7 a.m. to 7 p.m.)   o   Reason for  Disposition    [1] COVID-19 infection suspected by caller or triager AND [2] mild symptoms (cough, fever, or others) AND [3] no complications or SOB    Protocols used: CORONAVIRUS (COVID-19) DIAGNOSED OR [id number] 8.4.20    Kassi Vazquez RN on 12/9/2020 at 6:59 AM

## 2020-12-10 ENCOUNTER — AMBULATORY - HEALTHEAST (OUTPATIENT)
Dept: ANTICOAGULATION | Facility: CLINIC | Age: 55
End: 2020-12-10

## 2020-12-10 DIAGNOSIS — Z79.01 ANTICOAGULATION MONITORING, INR RANGE 2-3: ICD-10-CM

## 2020-12-10 DIAGNOSIS — I73.9 PAD (PERIPHERAL ARTERY DISEASE) (H): ICD-10-CM

## 2020-12-14 ENCOUNTER — HEALTH MAINTENANCE LETTER (OUTPATIENT)
Age: 55
End: 2020-12-14

## 2020-12-18 ENCOUNTER — AMBULATORY - HEALTHEAST (OUTPATIENT)
Dept: LAB | Facility: CLINIC | Age: 55
End: 2020-12-18

## 2020-12-18 ENCOUNTER — COMMUNICATION - HEALTHEAST (OUTPATIENT)
Dept: ANTICOAGULATION | Facility: CLINIC | Age: 55
End: 2020-12-18

## 2020-12-18 DIAGNOSIS — Z79.01 ANTICOAGULATION MONITORING, INR RANGE 2-3: ICD-10-CM

## 2020-12-18 DIAGNOSIS — I73.9 PAD (PERIPHERAL ARTERY DISEASE) (H): ICD-10-CM

## 2020-12-18 LAB — INR PPP: 2.4 (ref 0.9–1.1)

## 2020-12-29 ENCOUNTER — COMMUNICATION - HEALTHEAST (OUTPATIENT)
Dept: FAMILY MEDICINE | Facility: CLINIC | Age: 55
End: 2020-12-29

## 2020-12-29 DIAGNOSIS — I73.9 PAD (PERIPHERAL ARTERY DISEASE) (H): ICD-10-CM

## 2020-12-29 DIAGNOSIS — Z79.01 ANTICOAGULATION MONITORING, INR RANGE 2-3: ICD-10-CM

## 2021-01-03 ENCOUNTER — COMMUNICATION - HEALTHEAST (OUTPATIENT)
Dept: FAMILY MEDICINE | Facility: CLINIC | Age: 56
End: 2021-01-03

## 2021-01-03 DIAGNOSIS — F41.1 GENERALIZED ANXIETY DISORDER: ICD-10-CM

## 2021-01-03 DIAGNOSIS — I10 ESSENTIAL HYPERTENSION: ICD-10-CM

## 2021-01-03 DIAGNOSIS — N39.41 URGE INCONTINENCE OF URINE: ICD-10-CM

## 2021-01-03 DIAGNOSIS — I73.9 PAD (PERIPHERAL ARTERY DISEASE) (H): ICD-10-CM

## 2021-01-03 DIAGNOSIS — K21.9 GASTROESOPHAGEAL REFLUX DISEASE WITHOUT ESOPHAGITIS: ICD-10-CM

## 2021-01-22 ENCOUNTER — OFFICE VISIT - HEALTHEAST (OUTPATIENT)
Dept: FAMILY MEDICINE | Facility: CLINIC | Age: 56
End: 2021-01-22

## 2021-01-22 ENCOUNTER — COMMUNICATION - HEALTHEAST (OUTPATIENT)
Dept: ANTICOAGULATION | Facility: CLINIC | Age: 56
End: 2021-01-22

## 2021-01-22 DIAGNOSIS — G43.909 MIGRAINE WITHOUT STATUS MIGRAINOSUS, NOT INTRACTABLE, UNSPECIFIED MIGRAINE TYPE: ICD-10-CM

## 2021-01-22 DIAGNOSIS — I73.9 PAD (PERIPHERAL ARTERY DISEASE) (H): ICD-10-CM

## 2021-01-22 DIAGNOSIS — M54.6 CHRONIC LEFT-SIDED THORACIC BACK PAIN: ICD-10-CM

## 2021-01-22 DIAGNOSIS — N39.41 URGE INCONTINENCE OF URINE: ICD-10-CM

## 2021-01-22 DIAGNOSIS — Z79.01 ANTICOAGULATION MONITORING, INR RANGE 2-3: ICD-10-CM

## 2021-01-22 DIAGNOSIS — K21.9 GASTROESOPHAGEAL REFLUX DISEASE WITHOUT ESOPHAGITIS: ICD-10-CM

## 2021-01-22 DIAGNOSIS — D12.6 ADENOMATOUS POLYP OF COLON, UNSPECIFIED PART OF COLON: ICD-10-CM

## 2021-01-22 DIAGNOSIS — D64.9 NORMOCYTIC ANEMIA: ICD-10-CM

## 2021-01-22 DIAGNOSIS — I77.9 BILATERAL CAROTID ARTERY DISEASE, UNSPECIFIED TYPE (H): ICD-10-CM

## 2021-01-22 DIAGNOSIS — F17.200 TOBACCO USE DISORDER: ICD-10-CM

## 2021-01-22 DIAGNOSIS — E78.5 DYSLIPIDEMIA: ICD-10-CM

## 2021-01-22 DIAGNOSIS — I10 ESSENTIAL HYPERTENSION: ICD-10-CM

## 2021-01-22 DIAGNOSIS — G89.29 CHRONIC LEFT-SIDED THORACIC BACK PAIN: ICD-10-CM

## 2021-01-22 DIAGNOSIS — Z00.00 ROUTINE GENERAL MEDICAL EXAMINATION AT A HEALTH CARE FACILITY: ICD-10-CM

## 2021-01-22 DIAGNOSIS — D72.820 ATYPICAL LYMPHOCYTOSIS: ICD-10-CM

## 2021-01-22 DIAGNOSIS — F41.1 GENERALIZED ANXIETY DISORDER: ICD-10-CM

## 2021-01-22 LAB
ALBUMIN SERPL-MCNC: 4.2 G/DL (ref 3.5–5)
ALP SERPL-CCNC: 121 U/L (ref 45–120)
ALT SERPL W P-5'-P-CCNC: 10 U/L (ref 0–45)
ANION GAP SERPL CALCULATED.3IONS-SCNC: 12 MMOL/L (ref 5–18)
AST SERPL W P-5'-P-CCNC: 15 U/L (ref 0–40)
BASOPHILS # BLD AUTO: 0.1 THOU/UL (ref 0–0.2)
BASOPHILS NFR BLD AUTO: 1 % (ref 0–2)
BILIRUB SERPL-MCNC: 0.6 MG/DL (ref 0–1)
BUN SERPL-MCNC: 9 MG/DL (ref 8–22)
CALCIUM SERPL-MCNC: 8.9 MG/DL (ref 8.5–10.5)
CHLORIDE BLD-SCNC: 108 MMOL/L (ref 98–107)
CHOLEST SERPL-MCNC: 260 MG/DL
CO2 SERPL-SCNC: 21 MMOL/L (ref 22–31)
CREAT SERPL-MCNC: 0.84 MG/DL (ref 0.6–1.1)
EOSINOPHIL # BLD AUTO: 0.2 THOU/UL (ref 0–0.4)
EOSINOPHIL NFR BLD AUTO: 2 % (ref 0–6)
ERYTHROCYTE [DISTWIDTH] IN BLOOD BY AUTOMATED COUNT: 14.2 % (ref 11–14.5)
FASTING STATUS PATIENT QL REPORTED: YES
GFR SERPL CREATININE-BSD FRML MDRD: >60 ML/MIN/1.73M2
GLUCOSE BLD-MCNC: 86 MG/DL (ref 70–125)
HCT VFR BLD AUTO: 36.5 % (ref 35–47)
HDLC SERPL-MCNC: 38 MG/DL
HGB BLD-MCNC: 12 G/DL (ref 12–16)
IMM GRANULOCYTES # BLD: 0 THOU/UL
IMM GRANULOCYTES NFR BLD: 0 %
INR PPP: 2.7 (ref 0.9–1.1)
LDLC SERPL CALC-MCNC: 199 MG/DL
LYMPHOCYTES # BLD AUTO: 2.9 THOU/UL (ref 0.8–4.4)
LYMPHOCYTES NFR BLD AUTO: 30 % (ref 20–40)
MCH RBC QN AUTO: 30.4 PG (ref 27–34)
MCHC RBC AUTO-ENTMCNC: 32.9 G/DL (ref 32–36)
MCV RBC AUTO: 92 FL (ref 80–100)
MONOCYTES # BLD AUTO: 1 THOU/UL (ref 0–0.9)
MONOCYTES NFR BLD AUTO: 10 % (ref 2–10)
NEUTROPHILS # BLD AUTO: 5.5 THOU/UL (ref 2–7.7)
NEUTROPHILS NFR BLD AUTO: 57 % (ref 50–70)
PLATELET # BLD AUTO: 270 THOU/UL (ref 140–440)
PMV BLD AUTO: 10.5 FL (ref 8.5–12.5)
POTASSIUM BLD-SCNC: 4.5 MMOL/L (ref 3.5–5)
PROT SERPL-MCNC: 7 G/DL (ref 6–8)
RBC # BLD AUTO: 3.95 MILL/UL (ref 3.8–5.4)
SODIUM SERPL-SCNC: 141 MMOL/L (ref 136–145)
TRIGL SERPL-MCNC: 114 MG/DL
WBC: 9.7 THOU/UL (ref 4–11)

## 2021-01-22 ASSESSMENT — ANXIETY QUESTIONNAIRES
4. TROUBLE RELAXING: NOT AT ALL
2. NOT BEING ABLE TO STOP OR CONTROL WORRYING: NOT AT ALL
6. BECOMING EASILY ANNOYED OR IRRITABLE: NOT AT ALL
1. FEELING NERVOUS, ANXIOUS, OR ON EDGE: NOT AT ALL
7. FEELING AFRAID AS IF SOMETHING AWFUL MIGHT HAPPEN: NOT AT ALL
3. WORRYING TOO MUCH ABOUT DIFFERENT THINGS: NOT AT ALL
GAD7 TOTAL SCORE: 0
5. BEING SO RESTLESS THAT IT IS HARD TO SIT STILL: NOT AT ALL

## 2021-01-22 ASSESSMENT — MIFFLIN-ST. JEOR: SCORE: 1179.22

## 2021-01-25 LAB — HCV AB SERPL QL IA: NEGATIVE

## 2021-01-26 ENCOUNTER — COMMUNICATION - HEALTHEAST (OUTPATIENT)
Dept: FAMILY MEDICINE | Facility: CLINIC | Age: 56
End: 2021-01-26

## 2021-02-04 ENCOUNTER — HOSPITAL ENCOUNTER (OUTPATIENT)
Dept: MRI IMAGING | Facility: HOSPITAL | Age: 56
Discharge: HOME | End: 2021-02-04
Attending: FAMILY MEDICINE

## 2021-02-04 DIAGNOSIS — M54.6 CHRONIC LEFT-SIDED THORACIC BACK PAIN: ICD-10-CM

## 2021-02-04 DIAGNOSIS — G89.29 CHRONIC LEFT-SIDED THORACIC BACK PAIN: ICD-10-CM

## 2021-02-12 ENCOUNTER — HOSPITAL ENCOUNTER (OUTPATIENT)
Dept: PHYSICAL MEDICINE AND REHAB | Facility: CLINIC | Age: 56
Discharge: HOME | End: 2021-02-12
Attending: FAMILY MEDICINE

## 2021-02-12 DIAGNOSIS — M54.6 THORACIC SPINE PAIN: ICD-10-CM

## 2021-02-12 DIAGNOSIS — I73.9 PAD (PERIPHERAL ARTERY DISEASE) (H): ICD-10-CM

## 2021-02-12 DIAGNOSIS — R32 BLADDER LEAK: ICD-10-CM

## 2021-02-12 DIAGNOSIS — M51.34 DDD (DEGENERATIVE DISC DISEASE), THORACIC: ICD-10-CM

## 2021-02-12 DIAGNOSIS — M79.18 MYOFASCIAL PAIN: ICD-10-CM

## 2021-02-15 ENCOUNTER — AMBULATORY - HEALTHEAST (OUTPATIENT)
Dept: NURSING | Facility: CLINIC | Age: 56
End: 2021-02-15

## 2021-02-17 ENCOUNTER — AMBULATORY - HEALTHEAST (OUTPATIENT)
Dept: LAB | Facility: CLINIC | Age: 56
End: 2021-02-17

## 2021-02-17 ENCOUNTER — COMMUNICATION - HEALTHEAST (OUTPATIENT)
Dept: ANTICOAGULATION | Facility: CLINIC | Age: 56
End: 2021-02-17

## 2021-02-17 DIAGNOSIS — I73.9 PAD (PERIPHERAL ARTERY DISEASE) (H): ICD-10-CM

## 2021-02-17 DIAGNOSIS — Z79.01 ANTICOAGULATION MONITORING, INR RANGE 2-3: ICD-10-CM

## 2021-02-17 LAB — INR PPP: 2.6 (ref 0.9–1.1)

## 2021-03-08 ENCOUNTER — AMBULATORY - HEALTHEAST (OUTPATIENT)
Dept: NURSING | Facility: CLINIC | Age: 56
End: 2021-03-08

## 2021-03-31 ENCOUNTER — COMMUNICATION - HEALTHEAST (OUTPATIENT)
Dept: ANTICOAGULATION | Facility: CLINIC | Age: 56
End: 2021-03-31

## 2021-03-31 ENCOUNTER — AMBULATORY - HEALTHEAST (OUTPATIENT)
Dept: LAB | Facility: CLINIC | Age: 56
End: 2021-03-31

## 2021-03-31 DIAGNOSIS — I73.9 PAD (PERIPHERAL ARTERY DISEASE) (H): ICD-10-CM

## 2021-03-31 DIAGNOSIS — Z79.01 ANTICOAGULATION MONITORING, INR RANGE 2-3: ICD-10-CM

## 2021-03-31 LAB — INR PPP: 1.9 (ref 0.9–1.1)

## 2021-04-06 ENCOUNTER — COMMUNICATION - HEALTHEAST (OUTPATIENT)
Dept: FAMILY MEDICINE | Facility: CLINIC | Age: 56
End: 2021-04-06

## 2021-04-06 DIAGNOSIS — N39.41 URGE INCONTINENCE OF URINE: ICD-10-CM

## 2021-04-06 DIAGNOSIS — K21.9 GASTROESOPHAGEAL REFLUX DISEASE WITHOUT ESOPHAGITIS: ICD-10-CM

## 2021-04-06 DIAGNOSIS — I10 ESSENTIAL HYPERTENSION: ICD-10-CM

## 2021-04-06 DIAGNOSIS — F41.1 GENERALIZED ANXIETY DISORDER: ICD-10-CM

## 2021-04-06 DIAGNOSIS — M79.18 MYOFASCIAL PAIN: ICD-10-CM

## 2021-04-06 DIAGNOSIS — M54.6 THORACIC SPINE PAIN: ICD-10-CM

## 2021-04-07 ENCOUNTER — RECORDS - HEALTHEAST (OUTPATIENT)
Dept: ADMINISTRATIVE | Facility: OTHER | Age: 56
End: 2021-04-07

## 2021-04-08 ENCOUNTER — COMMUNICATION - HEALTHEAST (OUTPATIENT)
Dept: FAMILY MEDICINE | Facility: CLINIC | Age: 56
End: 2021-04-08

## 2021-04-16 ENCOUNTER — AMBULATORY - HEALTHEAST (OUTPATIENT)
Dept: LAB | Facility: CLINIC | Age: 56
End: 2021-04-16

## 2021-04-16 ENCOUNTER — COMMUNICATION - HEALTHEAST (OUTPATIENT)
Dept: ANTICOAGULATION | Facility: CLINIC | Age: 56
End: 2021-04-16

## 2021-04-16 DIAGNOSIS — I73.9 PAD (PERIPHERAL ARTERY DISEASE) (H): ICD-10-CM

## 2021-04-16 DIAGNOSIS — Z79.01 ANTICOAGULATION MONITORING, INR RANGE 2-3: ICD-10-CM

## 2021-04-16 LAB — INR PPP: 2.4 (ref 0.9–1.1)

## 2021-04-17 ENCOUNTER — HEALTH MAINTENANCE LETTER (OUTPATIENT)
Age: 56
End: 2021-04-17

## 2021-05-14 ENCOUNTER — AMBULATORY - HEALTHEAST (OUTPATIENT)
Dept: LAB | Facility: CLINIC | Age: 56
End: 2021-05-14

## 2021-05-14 ENCOUNTER — COMMUNICATION - HEALTHEAST (OUTPATIENT)
Dept: ANTICOAGULATION | Facility: CLINIC | Age: 56
End: 2021-05-14

## 2021-05-14 DIAGNOSIS — I73.9 PAD (PERIPHERAL ARTERY DISEASE) (H): ICD-10-CM

## 2021-05-14 DIAGNOSIS — Z79.01 ANTICOAGULATION MONITORING, INR RANGE 2-3: ICD-10-CM

## 2021-05-14 LAB — INR PPP: 2.8 (ref 0.9–1.1)

## 2021-05-17 ENCOUNTER — COMMUNICATION - HEALTHEAST (OUTPATIENT)
Dept: PHYSICAL MEDICINE AND REHAB | Facility: CLINIC | Age: 56
End: 2021-05-17

## 2021-05-17 DIAGNOSIS — M79.18 MYOFASCIAL PAIN: ICD-10-CM

## 2021-05-25 ENCOUNTER — RECORDS - HEALTHEAST (OUTPATIENT)
Dept: ADMINISTRATIVE | Facility: CLINIC | Age: 56
End: 2021-05-25

## 2021-05-26 ENCOUNTER — RECORDS - HEALTHEAST (OUTPATIENT)
Dept: ADMINISTRATIVE | Facility: CLINIC | Age: 56
End: 2021-05-26

## 2021-05-27 ENCOUNTER — RECORDS - HEALTHEAST (OUTPATIENT)
Dept: ADMINISTRATIVE | Facility: CLINIC | Age: 56
End: 2021-05-27

## 2021-05-27 NOTE — TELEPHONE ENCOUNTER
ANTICOAGULATION  MANAGEMENT    Assessment     Today's INR result of 2.4 is Therapeutic (goal INR of 2.0-3.0)        Less warfarin taken than instructed which may be affecting INR    No new diet changes affecting INR    No new medication/supplements affecting INR    Continues to tolerate warfarin with no reported s/s of bleeding or thromboembolism     Previous INR was Subtherapeutic    Plan:     Spoke with Nery regarding INR result and instructed:     Warfarin Dosing Instructions:  Continue current warfarin dose 5 mg daily   (0 % change)    Instructed patient to follow up no later than: 2-4 weeks    Education provided: target INR goal and significance of current INR result, importance of following up for INR monitoring at instructed interval, importance of taking warfarin as instructed and importance of notifying clinic for changes in medications    Nery verbalizes understanding and agrees to warfarin dosing plan.    Instructed to call the [facility] for any changes, questions or concerns. (#[phone])   ?   Loraine Luis RN    Subjective/Objective:      Nery Whitaker, a 53 y.o. female is on warfarin.     Nery reports:     Home warfarin dose: verbally confirmed home dose with Nery and updated on anticoagulation calendar     Missed doses: No     Medication changes:  No     S/S of bleeding or thromboembolism:  No     New Injury or illness:  No     Changes in diet or alcohol consumption:  No     Upcoming surgery, procedure or cardioversion:  No    Anticoagulation Episode Summary     Current INR goal:   2.0-3.0   TTR:   57.2 % (11.3 mo)   Next INR check:   4/24/2019   INR from last check:   2.40 (3/27/2019)   Weekly max warfarin dose:      Target end date:      INR check location:      Preferred lab:      Send INR reminders to:   ANTICOAGULATION POOL B (MPW,RAMOSG,STW,RVL,OAK,RLN)    Indications    Anticoagulation monitoring  INR range 2-3 [Z79.01]  PAD (peripheral artery disease) (H) [I73.9]           Comments:             Anticoagulation Care Providers     Provider Role Specialty Phone number    Loraine Rees MD Referring Family Medicine [phone]

## 2021-05-27 NOTE — TELEPHONE ENCOUNTER
"Anticoagulation Annual Referral Renewal Review    Nery Whitaker's chart reviewed for annual renewal of referral to anticoagulation monitoring.        Criteria for anticoagulation nurse and/or pharmacist renewal met   Warfarin indication: Peripheral artery disease No, provider assessment if extended anticoagulation required   Current with INR monitoring/compliant Yes Yes   Date of last office visit 11/2/2018 Yes, had office visit within last year   Time in Therapeutic Range (TTR) 62.52 % Yes, TTR > 60%       Nery Whitaker did NOT meet all criteria for anticoagulation management program initiated renewal and requires provider review. Using dot phrase, \".acmrenewalprovider\", please advise if Nery's anticoagulation management referral should be renewed or if patient should be seen in office to review anticoagulation therapy      Loraine Luis RN  3:53 PM      "

## 2021-05-27 NOTE — TELEPHONE ENCOUNTER
Provider Review: Anticoagulation Annual Referral Renewal    ACM Renewal Decision:  Renew ACM warfarin management      INR Range:   Continue management at current INR goal   Anticipated Duration of Therapy (from today):  Long-term anticoagulation      Loraine Rees MD  5:45 PM

## 2021-05-28 ENCOUNTER — RECORDS - HEALTHEAST (OUTPATIENT)
Dept: ADMINISTRATIVE | Facility: CLINIC | Age: 56
End: 2021-05-28

## 2021-05-28 ENCOUNTER — COMMUNICATION - HEALTHEAST (OUTPATIENT)
Dept: FAMILY MEDICINE | Facility: CLINIC | Age: 56
End: 2021-05-28

## 2021-05-28 DIAGNOSIS — M79.2 THORACIC NEURALGIA: ICD-10-CM

## 2021-05-28 DIAGNOSIS — I10 ESSENTIAL HYPERTENSION: ICD-10-CM

## 2021-05-28 ASSESSMENT — ANXIETY QUESTIONNAIRES: GAD7 TOTAL SCORE: 0

## 2021-05-28 NOTE — TELEPHONE ENCOUNTER
ANTICOAGULATION  MANAGEMENT    Assessment     Today's INR result of 1.7 is Subtherapeutic (goal INR of 2.0-3.0)        Warfarin taken as previously instructed    Head cold may be affecting diet and INR    No new medication/supplements affecting INR    Continues to tolerate warfarin with no reported s/s of thromboembolism     Reports occasional but slight nose bleeds, attributes in relation to her head cold    Previous INR was Therapeutic    Plan:     Spoke with Nery regarding INR result and instructed:     Warfarin Dosing Instructions:  Change warfarin dose to 7.5 mg daily on Wed; and 5 mg daily rest of week  (7.1 % change)    Instructed patient to follow up no later than: 2 weeks    Education provided: target INR goal and significance of current INR result, importance of following up for INR monitoring at instructed interval, importance of taking warfarin as instructed, importance of notifying clinic for changes in medications, monitoring for bleeding signs and symptoms and when to seek medical attention/emergency care    Nery verbalizes understanding and agrees to warfarin dosing plan.    Instructed to call the [facility] for any changes, questions or concerns. (#[phone])   ?   Loraine Luis RN    Subjective/Objective:      Nery Whitaker, a 53 y.o. female is on warfarin.     Nery reports:     Home warfarin dose: verbally confirmed home dose with Nery and updated on anticoagulation calendar     Missed doses: No     Medication changes:  No     S/S of bleeding or thromboembolism:  Yes: occasional nosebleed, very slight     New Injury or illness:  Yes: head cold for past 2-3 weeks     Changes in diet or alcohol consumption:  No     Upcoming surgery, procedure or cardioversion:  No    Anticoagulation Episode Summary     Current INR goal:   2.0-3.0   TTR:   57.2 % (1 y)   Next INR check:   5/8/2019   INR from last check:   1.70! (4/24/2019)   Weekly max warfarin dose:      Target end date:      INR check  location:      Preferred lab:      Send INR reminders to:   ANTICOAGULATION POOL B (MPW,HUG,STW,RVL,OAK,RLN)    Indications    Anticoagulation monitoring  INR range 2-3 [Z79.01]  PAD (peripheral artery disease) (H) [I73.9]           Comments:            Anticoagulation Care Providers     Provider Role Specialty Phone number    Loraine Rees MD Referring Family Medicine [phone]

## 2021-05-28 NOTE — TELEPHONE ENCOUNTER
ANTICOAGULATION  MANAGEMENT    Assessment     Today's INR result of 3.0 is Therapeutic (goal INR of 2.0-3.0)        Warfarin taken as previously instructed    No new diet changes affecting INR    No new medication/supplements affecting INR    Continues to tolerate warfarin with no reported s/s of bleeding or thromboembolism     Previous INR was Subtherapeutic    Plan:     Spoke with Nery regarding INR result and instructed:     Warfarin Dosing Instructions:  Continue current warfarin dose 7.5 mg daily on Wed; and 5 mg daily rest of week  (0 % change)    Instructed patient to follow up no later than: 2 weeks    Education provided: target INR goal and significance of current INR result, importance of following up for INR monitoring at instructed interval, importance of taking warfarin as instructed and importance of notifying clinic for changes in medications    Nery verbalizes understanding and agrees to warfarin dosing plan.    Instructed to call the [facility] for any changes, questions or concerns. (#[phone])   ?   Loraine Luis RN    Subjective/Objective:      Nery BISMARK Sanchezgiancarlosang, a 53 y.o. female is on warfarin.     Nery reports:     Home warfarin dose: verbally confirmed home dose with Nery and updated on anticoagulation calendar     Missed doses: No     Medication changes:  No     S/S of bleeding or thromboembolism:  No     New Injury or illness:  No     Changes in diet or alcohol consumption:  No     Upcoming surgery, procedure or cardioversion:  No    Anticoagulation Episode Summary     Current INR goal:   2.0-3.0   TTR:   57.9 % (1 y)   Next INR check:   5/22/2019   INR from last check:   3.00 (5/8/2019)   Weekly max warfarin dose:      Target end date:      INR check location:      Preferred lab:      Send INR reminders to:   ANTICOAGULATION POOL B (MPW,HUG,STW,RVL,OAK,RLN)    Indications    Anticoagulation monitoring  INR range 2-3 [Z79.01]  PAD (peripheral artery disease) (H) [I73.9]            Comments:            Anticoagulation Care Providers     Provider Role Specialty Phone number    Loraine Rees MD Referring Family Medicine [phone]

## 2021-05-29 ENCOUNTER — RECORDS - HEALTHEAST (OUTPATIENT)
Dept: ADMINISTRATIVE | Facility: CLINIC | Age: 56
End: 2021-05-29

## 2021-05-29 NOTE — TELEPHONE ENCOUNTER
ANTICOAGULATION  MANAGEMENT    Assessment     Today's INR result of 2.9 is Therapeutic (goal INR of 2.0-3.0)        Warfarin taken as previously instructed    No new diet changes affecting INR    No new medication/supplements affecting INR    Continues to tolerate warfarin with no reported s/s of bleeding or thromboembolism     Previous INR was Therapeutic    Plan:     Spoke with Nery regarding INR result and instructed:     Warfarin Dosing Instructions:  Continue current warfarin dose 7.5 mg daily on Wed; and 5 mg daily rest of week  (0 % change)    Instructed patient to follow up no later than: 4 weeks    Education provided: target INR goal and significance of current INR result, importance of following up for INR monitoring at instructed interval, importance of taking warfarin as instructed and importance of notifying clinic for changes in medications    Nery verbalizes understanding and agrees to warfarin dosing plan.    Instructed to call the [facility] for any changes, questions or concerns. (#[phone])   ?   Loraine Luis RN    Subjective/Objective:      Nery Whitkaer, a 53 y.o. female is on warfarin.     Nery reports:     Home warfarin dose: verbally confirmed home dose with Nery and updated on anticoagulation calendar     Missed doses: No     Medication changes:  No     S/S of bleeding or thromboembolism:  No     New Injury or illness:  No     Changes in diet or alcohol consumption:  No     Upcoming surgery, procedure or cardioversion:  No    Anticoagulation Episode Summary     Current INR goal:   2.0-3.0   TTR:   60.1 % (1.1 y)   Next INR check:   6/26/2019   INR from last check:   2.90 (5/29/2019)   Weekly max warfarin dose:      Target end date:      INR check location:      Preferred lab:      Send INR reminders to:   BERN[facility] MARTINI    Indications    Anticoagulation monitoring  INR range 2-3 [Z79.01]  PAD (peripheral artery disease) (H) [I73.9]           Comments:             Anticoagulation Care Providers     Provider Role Specialty Phone number    Loraine Rees MD Referring Family Medicine [phone]

## 2021-05-30 NOTE — TELEPHONE ENCOUNTER
ANTICOAGULATION  MANAGEMENT    Assessment     Today's INR result of 2.50 is Therapeutic (goal INR of 2.0-3.0)        Warfarin taken as previously instructed    No new diet changes affecting INR    No new medication/supplements affecting INR    Continues to tolerate warfarin with no reported s/s of bleeding or thromboembolism     Previous INR was Subtherapeutic    Plan:     Spoke with Nery regarding INR result and instructed:     Warfarin Dosing Instructions:  Continue current warfarin dose 7.5 mg daily on FRI; and 5 mg daily rest of week  (0 % change)    Instructed patient to follow up no later than: 2 weeks    Education provided: target INR goal and significance of current INR result and importance of following up for INR monitoring at instructed interval    Nery verbalizes understanding and agrees to warfarin dosing plan.    Instructed to call the [facility] for any changes, questions or concerns. (#[phone])   ?   Marybel Barahona RN    Subjective/Objective:      Nery Whitaker, a 54 y.o. female is on warfarin.     Nery reports:     Home warfarin dose: as updated on anticoagulation calendar per template     Missed doses: No     Medication changes:  No     S/S of bleeding or thromboembolism:  No     New Injury or illness:  No     Changes in diet or alcohol consumption:  No     Upcoming surgery, procedure or cardioversion:  No    Anticoagulation Episode Summary     Current INR goal:   2.0-3.0   TTR:   61.1 % (1.2 y)   Next INR check:   7/26/2019   INR from last check:   2.50 (7/12/2019)   Weekly max warfarin dose:      Target end date:      INR check location:      Preferred lab:      Send INR reminders to:   BERNICE MARTINI    Indications    Anticoagulation monitoring  INR range 2-3 [Z79.01]  PAD (peripheral artery disease) (H) [I73.9]           Comments:            Anticoagulation Care Providers     Provider Role Specialty Phone number    Loraine Rees MD Referring Family Medicine [phone]

## 2021-05-30 NOTE — TELEPHONE ENCOUNTER
ANTICOAGULATION  MANAGEMENT    Assessment     Today's INR result of 1.70 is Subtherapeutic (goal INR of 2.0-3.0)        Warfarin taken differently than instructed, but no impact to total weekly dose    No new diet changes affecting INR    No new medication/supplements affecting INR    Continues to tolerate warfarin with no reported s/s of bleeding or thromboembolism     Previous two INR results have been Therapeutic    Plan:     Spoke with Nery regarding INR result and instructed:     Warfarin Dosing Instructions:  Take a boost dose of 7.5 mg today;  then continue current warfarin dose 7.5 mg daily on FRI; and 5 mg daily rest of week  (0 % change)    Instructed patient to follow up no later than: 1-2 weeks    Education provided: importance of consistent vitamin K intake, impact of vitamin K foods on INR, vitamin K content of foods, target INR goal and significance of current INR result and importance of notifying clinic for changes in medications    Nery verbalizes understanding and agrees to warfarin dosing plan.    Instructed to call the [facility] for any changes, questions or concerns. (#[phone])   ?   Marybel Barahona RN    Subjective/Objective:      Nery BISMARK Whitaker, a 53 y.o. female is on warfarin.     Nery reports:     Home warfarin dose: as updated on anticoagulation calendar per template     Missed doses: No     Medication changes:  No     S/S of bleeding or thromboembolism:  No     New Injury or illness:  No     Changes in diet or alcohol consumption:  No     Upcoming surgery, procedure or cardioversion:  No    Anticoagulation Episode Summary     Current INR goal:   2.0-3.0   TTR:   61.1 % (1.2 y)   Next INR check:   7/10/2019   INR from last check:   1.70! (6/26/2019)   Weekly max warfarin dose:      Target end date:      INR check location:      Preferred lab:      Send INR reminders to:   [facility]    Indications    Anticoagulation monitoring  INR range 2-3 [Z79.01]  PAD (peripheral  artery disease) (H) [I73.9]           Comments:            Anticoagulation Care Providers     Provider Role Specialty Phone number    Loraine Rees MD Referring Family Medicine [phone]

## 2021-05-30 NOTE — TELEPHONE ENCOUNTER
Refill Approved    Rx renewed per Medication Renewal Policy. Medication was last renewed on 4/11/18.    Alicia Tran, [facility] Triage/Med Refill 7/23/2019     Requested Prescriptions   Pending Prescriptions Disp Refills     citalopram (CELEXA) 20 MG tablet [Pharmacy Med Name: CITALOPRAM 20MG     TAB] 90 tablet 3     Sig: TAKE 1 TABLET BY MOUTH ONCE DAILY       [facility] Refill Protocol  Passed - 7/22/2019  6:00 PM        Passed - PCP or prescribing provider visit in last year     Last office visit with prescriber/PCP: 11/2/2018 Loraine Rees MD OR same dept: 11/2/2018 Loraine Rees MD OR same specialty: 11/2/2018 Loraine Rees MD  Last physical: Visit date not found Last MTM visit: Visit date not found   Next visit within 3 mo: Visit date not found  Next physical within 3 mo: Visit date not found  Prescriber OR PCP: Loraine Rees MD  Last diagnosis associated with med order: 1. HTN (hypertension)  - amLODIPine (NORVASC) 5 MG tablet [Pharmacy Med Name: AMLODIPINE 5MG TAB]; TAKE 1 TABLET BY MOUTH ONCE DAILY  Dispense: 90 tablet; Refill: 3    If protocol passes may refill for 12 months if within 3 months of last provider visit (or a total of 15 months).             amLODIPine (NORVASC) 5 MG tablet [Pharmacy Med Name: AMLODIPINE 5MG TAB] 90 tablet 3     Sig: TAKE 1 TABLET BY MOUTH ONCE DAILY       Calcium-Channel Blockers Protocol Passed - 7/22/2019  6:00 PM        Passed - PCP or prescribing provider visit in past 12 months or next 3 months     Last office visit with prescriber/PCP: 11/2/2018 Loraine Rees MD OR same dept: 11/2/2018 Loraine Rees MD OR same specialty: 11/2/2018 Loraine Rees MD  Last physical: Visit date not found Last MTM visit: Visit date not found   Next visit within 3 mo: Visit date not found  Next physical within 3 mo: Visit date not found  Prescriber OR PCP: Loraine Rees MD  Last diagnosis associated with med order: 1. HTN (hypertension)  - amLODIPine  (NORVASC) 5 MG tablet [Pharmacy Med Name: AMLODIPINE 5MG TAB]; TAKE 1 TABLET BY MOUTH ONCE DAILY  Dispense: 90 tablet; Refill: 3    If protocol passes may refill for 12 months if within 3 months of last provider visit (or a total of 15 months).             Passed - Blood pressure filed in past 12 months     BP Readings from Last 1 Encounters:   12/05/18 162/72

## 2021-05-31 NOTE — TELEPHONE ENCOUNTER
ANTICOAGULATION  MANAGEMENT    Assessment     Today's INR result of 2.1 is Therapeutic (goal INR of 2.0-3.0)        Warfarin taken as previously instructed    No new diet changes affecting INR    No new medication/supplements affecting INR    Continues to tolerate warfarin with no reported s/s of bleeding or thromboembolism     Previous INR was Therapeutic    Plan:     Spoke with Nery regarding INR result and instructed:     Warfarin Dosing Instructions:  Continue current warfarin dose 7.5 mg daily on Fridays; and 5 mg daily rest of week  (0 % change)    Instructed patient to follow up no later than: one month.    Education provided: importance of therapeutic range, importance of following up for INR monitoring at instructed interval and importance of taking warfarin as instructed    Nery verbalizes understanding and agrees to warfarin dosing plan.    Instructed to call the [facility] for any changes, questions or concerns. (#[phone])   ?   Marti Sheridan RN    Subjective/Objective:      Nery Whitaker, a 54 y.o. female is on warfarin.     Nery reports:     Home warfarin dose: verbally confirmed home dose with Nery and updated on anticoagulation calendar     Missed doses: No     Medication changes:  No     S/S of bleeding or thromboembolism:  No     New Injury or illness:  No     Changes in diet or alcohol consumption:  No     Upcoming surgery, procedure or cardioversion:  No    Anticoagulation Episode Summary     Current INR goal:   2.0-3.0   TTR:   64.8 % (1.4 y)   Next INR check:   9/25/2019   INR from last check:   2.10 (8/28/2019)   Weekly max warfarin dose:      Target end date:      INR check location:      Preferred lab:      Send INR reminders to:   BERNICE MARTINI    Indications    Anticoagulation monitoring  INR range 2-3 [Z79.01]  PAD (peripheral artery disease) (H) [I73.9]           Comments:            Anticoagulation Care Providers     Provider Role Specialty Phone number     Loraine Rees MD [facility] [phone]

## 2021-05-31 NOTE — TELEPHONE ENCOUNTER
Refill Approved    Rx renewed per Medication Renewal Policy. Medication was last renewed on 4/11/18.    Alicia Tran, [facility] Triage/Med Refill 9/3/2019     Requested Prescriptions   Pending Prescriptions Disp Refills     warfarin (COUMADIN/JANTOVEN) 5 MG tablet [Pharmacy Med Name: WARFARIN 5MG        TAB] 90 tablet 4     Sig: TAKE 1 TABLET BY MOUTH ONCE DAILY       Warfarin Refill Protocol  Failed - 9/2/2019 11:59 AM        Failed -  Route to appropriate pool/provider     Last Anticoagulation Summary:   Anticoagulation Episode Summary     Current INR goal:   2.0-3.0   TTR:   76.0 %   Next INR check:   9/25/2019   INR from last check:   2.10 (8/28/2019)   Weekly max warfarin dose:      Target end date:      INR check location:      Preferred lab:      Send INR reminders to:   BERNICE MARTINI    Indications    Anticoagulation monitoring  INR range 2-3 [Z79.01]  PAD (peripheral artery disease) (H) [I73.9]           Comments:            Anticoagulation Care Providers     Provider Role Specialty Phone number    Loraine Rees MD Referring Family Medicine [phone]                Passed - Provider visit in last year     Last office visit with prescriber/PCP: 11/2/2018 Loraine Rees MD OR same dept: 11/2/2018 Loraine Rees MD OR same specialty: 11/2/2018 Loraine Rees MD  Last physical: Visit date not found Last MTM visit: Visit date not found    Next appt within 3 mo: Visit date not found Next physical within 3 mo: Visit date not found  Prescriber OR PCP: Loraine Rees MD  Last diagnosis associated with med order: 1. Anticoagulation monitoring, INR range 2-3  - warfarin (COUMADIN/JANTOVEN) 5 MG tablet [Pharmacy Med Name: WARFARIN 5MG        TAB]; TAKE 1 TABLET BY MOUTH ONCE DAILY  Dispense: 90 tablet; Refill: 4    2. PAD (peripheral artery disease) (H)  - warfarin (COUMADIN/JANTOVEN) 5 MG tablet [Pharmacy Med Name: WARFARIN 5MG        TAB]; TAKE 1 TABLET BY MOUTH ONCE DAILY  Dispense:  90 tablet; Refill: 4    3. HTN (hypertension)  - ranitidine (ZANTAC) 150 MG tablet [Pharmacy Med Name: RANITIDINE 150MG    TAB]; TAKE 1 TABLET BY MOUTH TWICE DAILY  Dispense: 180 tablet; Refill: 3    If protocol passes may refill for 6 months if within 3 months of last provider visit (or a total of 9 months).          gabapentin (NEURONTIN) 100 MG capsule [Pharmacy Med Name: GABAPENTIN 100MG    CAP] 270 capsule 3     Sig: TAKE 1 CAPSULE BY MOUTH THREE TIMES DAILY       Gabapentin/Levetiracetam/Tiagabine Refill Protocol  Passed - 9/2/2019 11:59 AM        Passed - PCP or prescribing provider visit in past 12 months or next 3 months     Last office visit with prescriber/PCP: 11/2/2018 Loraine Rees MD OR same dept: 11/2/2018 Loraine Rees MD OR same specialty: 11/2/2018 Loraine Rees MD  Last physical: Visit date not found Last MTM visit: Visit date not found   Next visit within 3 mo: Visit date not found  Next physical within 3 mo: Visit date not found  Prescriber OR PCP: Loraine Rees MD  Last diagnosis associated with med order: 1. Anticoagulation monitoring, INR range 2-3  - warfarin (COUMADIN/JANTOVEN) 5 MG tablet [Pharmacy Med Name: WARFARIN 5MG        TAB]; TAKE 1 TABLET BY MOUTH ONCE DAILY  Dispense: 90 tablet; Refill: 4    2. PAD (peripheral artery disease) (H)  - warfarin (COUMADIN/JANTOVEN) 5 MG tablet [Pharmacy Med Name: WARFARIN 5MG        TAB]; TAKE 1 TABLET BY MOUTH ONCE DAILY  Dispense: 90 tablet; Refill: 4    3. HTN (hypertension)  - ranitidine (ZANTAC) 150 MG tablet [Pharmacy Med Name: RANITIDINE 150MG    TAB]; TAKE 1 TABLET BY MOUTH TWICE DAILY  Dispense: 180 tablet; Refill: 3    If protocol passes may refill for 12 months if within 3 months of last provider visit (or a total of 15 months).             ranitidine (ZANTAC) 150 MG tablet [Pharmacy Med Name: RANITIDINE 150MG    TAB] 180 tablet 3     Sig: TAKE 1 TABLET BY MOUTH TWICE DAILY       GI Medications Refill Protocol Passed -  9/2/2019 11:59 AM        Passed - PCP or prescribing provider visit in last 12 or next 3 months.     Last office visit with prescriber/PCP: 11/2/2018 Loraine Rees MD OR same dept: 11/2/2018 Loraine Rees MD OR same specialty: 11/2/2018 Loraine Rees MD  Last physical: Visit date not found Last MTM visit: Visit date not found   Next visit within 3 mo: Visit date not found  Next physical within 3 mo: Visit date not found  Prescriber OR PCP: Loraine Rees MD  Last diagnosis associated with med order: 1. Anticoagulation monitoring, INR range 2-3  - warfarin (COUMADIN/JANTOVEN) 5 MG tablet [Pharmacy Med Name: WARFARIN 5MG        TAB]; TAKE 1 TABLET BY MOUTH ONCE DAILY  Dispense: 90 tablet; Refill: 4    2. PAD (peripheral artery disease) (H)  - warfarin (COUMADIN/JANTOVEN) 5 MG tablet [Pharmacy Med Name: WARFARIN 5MG        TAB]; TAKE 1 TABLET BY MOUTH ONCE DAILY  Dispense: 90 tablet; Refill: 4    3. HTN (hypertension)  - ranitidine (ZANTAC) 150 MG tablet [Pharmacy Med Name: RANITIDINE 150MG    TAB]; TAKE 1 TABLET BY MOUTH TWICE DAILY  Dispense: 180 tablet; Refill: 3    If protocol passes may refill for 12 months if within 3 months of last provider visit (or a total of 15 months).

## 2021-06-01 VITALS — HEIGHT: 66 IN | WEIGHT: 129 LBS | BODY MASS INDEX: 20.73 KG/M2

## 2021-06-02 ENCOUNTER — RECORDS - HEALTHEAST (OUTPATIENT)
Dept: ADMINISTRATIVE | Facility: CLINIC | Age: 56
End: 2021-06-02

## 2021-06-02 ENCOUNTER — COMMUNICATION - HEALTHEAST (OUTPATIENT)
Dept: FAMILY MEDICINE | Facility: CLINIC | Age: 56
End: 2021-06-02

## 2021-06-02 VITALS — WEIGHT: 128 LBS | HEIGHT: 66 IN | BODY MASS INDEX: 20.57 KG/M2

## 2021-06-02 VITALS — BODY MASS INDEX: 20.93 KG/M2 | WEIGHT: 127.7 LBS

## 2021-06-02 VITALS — BODY MASS INDEX: 20.57 KG/M2 | HEIGHT: 66 IN | WEIGHT: 128 LBS

## 2021-06-02 VITALS — WEIGHT: 127 LBS | BODY MASS INDEX: 20.81 KG/M2

## 2021-06-02 VITALS — HEIGHT: 65 IN | BODY MASS INDEX: 21.34 KG/M2 | WEIGHT: 128.1 LBS

## 2021-06-02 DIAGNOSIS — F17.200 TOBACCO USE DISORDER: ICD-10-CM

## 2021-06-02 NOTE — TELEPHONE ENCOUNTER
ANTICOAGULATION  MANAGEMENT    Assessment     Today's INR result of 3.2 is Supratherapeutic (goal INR of 2.0-3.0)        Warfarin taken as previously instructed    Decreased greens/vitamin K intake may be affecting INR    No new medication/supplements affecting INR    Continues to tolerate warfarin with no reported s/s of bleeding or thromboembolism     Previous INR was Therapeutic    Plan:     Spoke with Nery regarding INR result and instructed:     Warfarin Dosing Instructions:  Continue current warfarin dose 7.5 mg daily on Fridays; and 5 mg daily rest of week  (0 % change) She preferred to continue dose and see how it does.     Instructed patient to follow up no later than: 1-2 weeks.    Education provided: importance of consistent vitamin K intake, impact of vitamin K foods on INR, importance of therapeutic range, importance of following up for INR monitoring at instructed interval and importance of taking warfarin as instructed    Nery verbalizes understanding and agrees to warfarin dosing plan.    Instructed to call the [facility] for any changes, questions or concerns. (#[phone])   ?   Marti Sheridan RN    Subjective/Objective:      Nery Whitaker, a 54 y.o. female is on warfarin.     Nery reports:     Home warfarin dose: verbally confirmed home dose with Nery and updated on anticoagulation calendar     Missed doses: No     Medication changes:  No     S/S of bleeding or thromboembolism:  No     New Injury or illness:  No     Changes in diet or alcohol consumption:  Yes: has been eating less greens     Upcoming surgery, procedure or cardioversion:  No    Anticoagulation Episode Summary     Current INR goal:   2.0-3.0   TTR:   72.5 %   Next INR check:   11/15/2019   INR from last check:   3.20! (11/1/2019)   Weekly max warfarin dose:      Target end date:      INR check location:      Preferred lab:      Send INR reminders to:   BERNICE MARTINI    Indications    Anticoagulation  monitoring  INR range 2-3 [Z79.01]  PAD (peripheral artery disease) (H) [I73.9]           Comments:            Anticoagulation Care Providers     Provider Role Specialty Phone number    Loraine Rees MD Referring Family Medicine [phone]

## 2021-06-02 NOTE — TELEPHONE ENCOUNTER
ANTICOAGULATION  MANAGEMENT    Assessment     Today's INR result of 2.90 is Therapeutic (goal INR of 2.0-3.0)        Warfarin taken as previously instructed    No new diet changes affecting INR    No new medication/supplements affecting INR    Continues to tolerate warfarin with no reported s/s of bleeding or thromboembolism     Previous INR was Supratherapeutic    Plan:     Spoke with Nery regarding INR result and instructed:     Warfarin Dosing Instructions:  Continue current warfarin dose 7.5 mg daily on FRI; and 5 mg daily rest of week  (0 % change)    Instructed patient to follow up no later than: 2 weeks    Education provided: importance of therapeutic range and target INR goal and significance of current INR result    Nery verbalizes understanding and agrees to warfarin dosing plan.    Instructed to call the [facility] for any changes, questions or concerns. (#[phone])   ?   Marybel Barahona RN    Subjective/Objective:      Nery Whitaker, a 54 y.o. female is on warfarin.     Nery reports:     Home warfarin dose: as updated on anticoagulation calendar per template     Missed doses: No     Medication changes:  No     S/S of bleeding or thromboembolism:  No     New Injury or illness:  No     Changes in diet or alcohol consumption:  No     Upcoming surgery, procedure or cardioversion:  No    Anticoagulation Episode Summary     Current INR goal:   2.0-3.0   TTR:   71.0 %   Next INR check:   11/1/2019   INR from last check:   2.90 (10/18/2019)   Weekly max warfarin dose:      Target end date:      INR check location:      Preferred lab:      Send INR reminders to:   BERNICE MARTINI    Indications    Anticoagulation monitoring  INR range 2-3 [Z79.01]  PAD (peripheral artery disease) (H) [I73.9]           Comments:            Anticoagulation Care Providers     Provider Role Specialty Phone number    Loraine Rees MD Referring Family Medicine [phone]

## 2021-06-04 VITALS
RESPIRATION RATE: 14 BRPM | WEIGHT: 135.4 LBS | TEMPERATURE: 98.5 F | OXYGEN SATURATION: 98 % | HEART RATE: 70 BPM | DIASTOLIC BLOOD PRESSURE: 68 MMHG | SYSTOLIC BLOOD PRESSURE: 152 MMHG | BODY MASS INDEX: 22.02 KG/M2

## 2021-06-04 VITALS
HEART RATE: 68 BPM | RESPIRATION RATE: 18 BRPM | WEIGHT: 131.6 LBS | SYSTOLIC BLOOD PRESSURE: 168 MMHG | OXYGEN SATURATION: 98 % | BODY MASS INDEX: 21.92 KG/M2 | TEMPERATURE: 98.3 F | HEIGHT: 65 IN | DIASTOLIC BLOOD PRESSURE: 75 MMHG

## 2021-06-04 VITALS
HEART RATE: 59 BPM | BODY MASS INDEX: 21.31 KG/M2 | OXYGEN SATURATION: 98 % | DIASTOLIC BLOOD PRESSURE: 84 MMHG | SYSTOLIC BLOOD PRESSURE: 134 MMHG | WEIGHT: 132.6 LBS | HEIGHT: 66 IN

## 2021-06-04 NOTE — TELEPHONE ENCOUNTER
ANTICOAGULATION  MANAGEMENT    Assessment     Today's INR result of 3.9 is Supratherapeutic (goal INR of 2.0-3.0)        Warfarin taken as previously instructed    No new diet changes affecting INR    No new medication/supplements affecting INR    Continues to tolerate warfarin with no reported s/s of bleeding or thromboembolism     Previous INR was Supratherapeutic    Plan:     Left a detailed message for Nery regarding INR result and instructed:     Warfarin Dosing Instructions:  hold today then change warfarin dose to 2.5 mg daily on wed; and 5 mg daily rest of week  (13 % change)    Instructed patient to follow up no later than: 1-2 weeks        Instructed to call the [facility] for any changes, questions or concerns. (#[phone])   ?   Celina Hoang RN    Subjective/Objective:      Nery Whitaker, a 54 y.o. female is on warfarin.     Nery reports:     Home warfarin dose: as updated on anticoagulation calendar per template     Missed doses: No     Medication changes:  No     S/S of bleeding or thromboembolism:  No     New Injury or illness:  No     Changes in diet or alcohol consumption:  No     Upcoming surgery, procedure or cardioversion:  No    Anticoagulation Episode Summary     Current INR goal:   2.0-3.0   TTR:   67.4 % (1 y)   Next INR check:   1/3/2020   INR from last check:   3.90! (12/20/2019)   Weekly max warfarin dose:      Target end date:      INR check location:      Preferred lab:      Send INR reminders to:   BERNICE MARTINI    Indications    Anticoagulation monitoring  INR range 2-3 [Z79.01]  PAD (peripheral artery disease) (H) [I73.9]           Comments:            Anticoagulation Care Providers     Provider Role Specialty Phone number    Loraine Rees MD Referring Family Medicine [phone]

## 2021-06-04 NOTE — TELEPHONE ENCOUNTER
Patient is due for a physical/office visit. Refills sent for one month. Please contact her to schedule.

## 2021-06-04 NOTE — TELEPHONE ENCOUNTER
RN cannot approve Refill Request    RN can NOT refill this medication PCP messaged that patient is overdue for Labs, Office Visit and Blood Pressure check. . Last office visit: 11/2/2018 Loraine Rees MD Last Physical: Visit date not found Last MTM visit: Visit date not found Last visit same specialty: 11/2/2018 Loraine Rees MD.  Next visit within 3 mo: Visit date not found  Next physical within 3 mo: Visit date not found      Liliam KUO Chavez, [facility] Triage/Med Refill 12/8/2019    Requested Prescriptions   Pending Prescriptions Disp Refills     tolterodine (DETROL LA) 2 MG ER capsule [Pharmacy Med Name: TOLTERODINE ER 2MG CAP] 90 capsule 3     Sig: TAKE 1 CAPSULE BY MOUTH ONCE DAILY       Urinary Incontinence Medications Refill Protocol Failed - 12/8/2019 10:20 AM        Failed - PCP or prescribing provider visit in past 12 months       Last office visit with prescriber/PCP: 11/2/2018 Loraine Rees MD OR same dept: Visit date not found OR same specialty: 11/2/2018 Loraine Rees MD  Last physical: Visit date not found Last MTM visit: Visit date not found   Next visit within 3 mo: Visit date not found  Next physical within 3 mo: Visit date not found  Prescriber OR PCP: Loraine Rees MD  Last diagnosis associated with med order: 1. Urge incontinence of urine  - tolterodine (DETROL LA) 2 MG ER capsule [Pharmacy Med Name: TOLTERODINE ER 2MG CAP]; TAKE 1 CAPSULE BY MOUTH ONCE DAILY  Dispense: 90 capsule; Refill: 3    2. PAD (peripheral artery disease) (H)  - gabapentin (NEURONTIN) 100 MG capsule [Pharmacy Med Name: GABAPENTIN 100MG    CAP]; TAKE 1 CAPSULE BY MOUTH THREE TIMES DAILY  Dispense: 270 capsule; Refill: 0    3. HTN (hypertension)  - amLODIPine (NORVASC) 5 MG tablet [Pharmacy Med Name: AMLODIPINE 5MG TAB]; TAKE 1 TABLET BY MOUTH ONCE DAILY  Dispense: 90 tablet; Refill: 0  - lisinopril (PRINIVIL,ZESTRIL) 20 MG tablet [Pharmacy Med Name: LISINOPRIL 20MG     TAB]; TAKE 1 TABLET BY  MOUTH ONCE DAILY  Dispense: 90 tablet; Refill: 3    4. Anxiety disorder  - citalopram (CELEXA) 20 MG tablet [Pharmacy Med Name: CITALOPRAM 20MG     TAB]; TAKE 1 TABLET BY MOUTH ONCE DAILY  Dispense: 90 tablet; Refill: 0    If protocol passes may refill for 12 months if within 3 months of last provider visit (or a total of 15 months).             gabapentin (NEURONTIN) 100 MG capsule [Pharmacy Med Name: GABAPENTIN 100MG    CAP] 270 capsule 0     Sig: TAKE 1 CAPSULE BY MOUTH THREE TIMES DAILY       Gabapentin/Levetiracetam/Tiagabine Refill Protocol  Failed - 12/8/2019 10:20 AM        Failed - PCP or prescribing provider visit in past 12 months or next 3 months     Last office visit with prescriber/PCP: 11/2/2018 Loraine Rees MD OR same dept: Visit date not found OR same specialty: 11/2/2018 Loraine Rees MD  Last physical: Visit date not found Last MTM visit: Visit date not found   Next visit within 3 mo: Visit date not found  Next physical within 3 mo: Visit date not found  Prescriber OR PCP: Loraine Rees MD  Last diagnosis associated with med order: 1. Urge incontinence of urine  - tolterodine (DETROL LA) 2 MG ER capsule [Pharmacy Med Name: TOLTERODINE ER 2MG CAP]; TAKE 1 CAPSULE BY MOUTH ONCE DAILY  Dispense: 90 capsule; Refill: 3    2. PAD (peripheral artery disease) (H)  - gabapentin (NEURONTIN) 100 MG capsule [Pharmacy Med Name: GABAPENTIN 100MG    CAP]; TAKE 1 CAPSULE BY MOUTH THREE TIMES DAILY  Dispense: 270 capsule; Refill: 0    3. HTN (hypertension)  - amLODIPine (NORVASC) 5 MG tablet [Pharmacy Med Name: AMLODIPINE 5MG TAB]; TAKE 1 TABLET BY MOUTH ONCE DAILY  Dispense: 90 tablet; Refill: 0  - lisinopril (PRINIVIL,ZESTRIL) 20 MG tablet [Pharmacy Med Name: LISINOPRIL 20MG     TAB]; TAKE 1 TABLET BY MOUTH ONCE DAILY  Dispense: 90 tablet; Refill: 3    4. Anxiety disorder  - citalopram (CELEXA) 20 MG tablet [Pharmacy Med Name: CITALOPRAM 20MG     TAB]; TAKE 1 TABLET BY MOUTH ONCE DAILY  Dispense: 90  tablet; Refill: 0    If protocol passes may refill for 12 months if within 3 months of last provider visit (or a total of 15 months).             amLODIPine (NORVASC) 5 MG tablet [Pharmacy Med Name: AMLODIPINE 5MG TAB] 90 tablet 0     Sig: TAKE 1 TABLET BY MOUTH ONCE DAILY       Calcium-Channel Blockers Protocol Failed - 12/8/2019 10:20 AM        Failed - PCP or prescribing provider visit in past 12 months or next 3 months     Last office visit with prescriber/PCP: 11/2/2018 Loraine Rees MD OR same dept: Visit date not found OR same specialty: 11/2/2018 Loraine Rees MD  Last physical: Visit date not found Last MTM visit: Visit date not found   Next visit within 3 mo: Visit date not found  Next physical within 3 mo: Visit date not found  Prescriber OR PCP: Loraine Rees MD  Last diagnosis associated with med order: 1. Urge incontinence of urine  - tolterodine (DETROL LA) 2 MG ER capsule [Pharmacy Med Name: TOLTERODINE ER 2MG CAP]; TAKE 1 CAPSULE BY MOUTH ONCE DAILY  Dispense: 90 capsule; Refill: 3    2. PAD (peripheral artery disease) (H)  - gabapentin (NEURONTIN) 100 MG capsule [Pharmacy Med Name: GABAPENTIN 100MG    CAP]; TAKE 1 CAPSULE BY MOUTH THREE TIMES DAILY  Dispense: 270 capsule; Refill: 0    3. HTN (hypertension)  - amLODIPine (NORVASC) 5 MG tablet [Pharmacy Med Name: AMLODIPINE 5MG TAB]; TAKE 1 TABLET BY MOUTH ONCE DAILY  Dispense: 90 tablet; Refill: 0  - lisinopril (PRINIVIL,ZESTRIL) 20 MG tablet [Pharmacy Med Name: LISINOPRIL 20MG     TAB]; TAKE 1 TABLET BY MOUTH ONCE DAILY  Dispense: 90 tablet; Refill: 3    4. Anxiety disorder  - citalopram (CELEXA) 20 MG tablet [Pharmacy Med Name: CITALOPRAM 20MG     TAB]; TAKE 1 TABLET BY MOUTH ONCE DAILY  Dispense: 90 tablet; Refill: 0    If protocol passes may refill for 12 months if within 3 months of last provider visit (or a total of 15 months).             Failed - Blood pressure filed in past 12 months     BP Readings from Last 1 Encounters:    12/05/18 162/72             citalopram (CELEXA) 20 MG tablet [Pharmacy Med Name: CITALOPRAM 20MG     TAB] 90 tablet 0     Sig: TAKE 1 TABLET BY MOUTH ONCE DAILY       [facility] Refill Protocol  Failed - 12/8/2019 10:20 AM        Failed - PCP or prescribing provider visit in last year     Last office visit with prescriber/PCP: 11/2/2018 Loraine Rees MD OR same dept: Visit date not found OR same specialty: 11/2/2018 Loraine Rees MD  Last physical: Visit date not found Last MTM visit: Visit date not found   Next visit within 3 mo: Visit date not found  Next physical within 3 mo: Visit date not found  Prescriber OR PCP: Loraine Rees MD  Last diagnosis associated with med order: 1. Urge incontinence of urine  - tolterodine (DETROL LA) 2 MG ER capsule [Pharmacy Med Name: TOLTERODINE ER 2MG CAP]; TAKE 1 CAPSULE BY MOUTH ONCE DAILY  Dispense: 90 capsule; Refill: 3    2. PAD (peripheral artery disease) (H)  - gabapentin (NEURONTIN) 100 MG capsule [Pharmacy Med Name: GABAPENTIN 100MG    CAP]; TAKE 1 CAPSULE BY MOUTH THREE TIMES DAILY  Dispense: 270 capsule; Refill: 0    3. HTN (hypertension)  - amLODIPine (NORVASC) 5 MG tablet [Pharmacy Med Name: AMLODIPINE 5MG TAB]; TAKE 1 TABLET BY MOUTH ONCE DAILY  Dispense: 90 tablet; Refill: 0  - lisinopril (PRINIVIL,ZESTRIL) 20 MG tablet [Pharmacy Med Name: LISINOPRIL 20MG     TAB]; TAKE 1 TABLET BY MOUTH ONCE DAILY  Dispense: 90 tablet; Refill: 3    4. Anxiety disorder  - citalopram (CELEXA) 20 MG tablet [Pharmacy Med Name: CITALOPRAM 20MG     TAB]; TAKE 1 TABLET BY MOUTH ONCE DAILY  Dispense: 90 tablet; Refill: 0    If protocol passes may refill for 12 months if within 3 months of last provider visit (or a total of 15 months).             lisinopril (PRINIVIL,ZESTRIL) 20 MG tablet [Pharmacy Med Name: LISINOPRIL 20MG     TAB] 90 tablet 3     Sig: TAKE 1 TABLET BY MOUTH ONCE DAILY       Ace Inhibitors Refill Protocol Failed - 12/8/2019 10:20 AM        Failed - PCP or  prescribing provider visit in past 12 months       Last office visit with prescriber/PCP: 11/2/2018 Loraine Rees MD OR same dept: Visit date not found OR same specialty: 11/2/2018 Loraine Rees MD  Last physical: Visit date not found Last MTM visit: Visit date not found   Next visit within 3 mo: Visit date not found  Next physical within 3 mo: Visit date not found  Prescriber OR PCP: Loraine Rees MD  Last diagnosis associated with med order: 1. Urge incontinence of urine  - tolterodine (DETROL LA) 2 MG ER capsule [Pharmacy Med Name: TOLTERODINE ER 2MG CAP]; TAKE 1 CAPSULE BY MOUTH ONCE DAILY  Dispense: 90 capsule; Refill: 3    2. PAD (peripheral artery disease) (H)  - gabapentin (NEURONTIN) 100 MG capsule [Pharmacy Med Name: GABAPENTIN 100MG    CAP]; TAKE 1 CAPSULE BY MOUTH THREE TIMES DAILY  Dispense: 270 capsule; Refill: 0    3. HTN (hypertension)  - amLODIPine (NORVASC) 5 MG tablet [Pharmacy Med Name: AMLODIPINE 5MG TAB]; TAKE 1 TABLET BY MOUTH ONCE DAILY  Dispense: 90 tablet; Refill: 0  - lisinopril (PRINIVIL,ZESTRIL) 20 MG tablet [Pharmacy Med Name: LISINOPRIL 20MG     TAB]; TAKE 1 TABLET BY MOUTH ONCE DAILY  Dispense: 90 tablet; Refill: 3    4. Anxiety disorder  - citalopram (CELEXA) 20 MG tablet [Pharmacy Med Name: CITALOPRAM 20MG     TAB]; TAKE 1 TABLET BY MOUTH ONCE DAILY  Dispense: 90 tablet; Refill: 0    If protocol passes may refill for 12 months if within 3 months of last provider visit (or a total of 15 months).             Failed - Serum Potassium in past 12 months     No results found for: LN-POTASSIUM          Failed - Blood pressure filed in past 12 months     BP Readings from Last 1 Encounters:   12/05/18 162/72             Failed - Serum Creatinine in past 12 months     Creatinine   Date Value Ref Range Status   12/05/2018 0.79 0.60 - 1.10 mg/dL Final

## 2021-06-04 NOTE — TELEPHONE ENCOUNTER
ANTICOAGULATION  MANAGEMENT    Assessment     Today's INR result of 3.7 is Supratherapeutic (goal INR of 2.0-3.0)        Warfarin taken as previously instructed    No new diet changes affecting INR    No new medication/supplements affecting INR    Continues to tolerate warfarin with no reported s/s of bleeding or thromboembolism     Previous INR was Subtherapeutic    Plan:     Left a detailed message for Nery regarding INR result and instructed:     Warfarin Dosing Instructions:  Take 2.5 mg today then continue current warfarin dose 7.5 mg daily on Fridays; and 5 mg daily rest of week  (0 % change)    Instructed patient to follow up no later than: 1-2 weeks.    Education provided:     Instructed to call the [facility] for any changes, questions or concerns. (#[phone])   ?   Marti Sheridan RN    Subjective/Objective:      Nery Whitaker, a 54 y.o. female is on warfarin.     Nery reports:     Home warfarin dose: as updated on anticoagulation calendar per template     Missed doses: No     Medication changes:  No     S/S of bleeding or thromboembolism:  No     New Injury or illness:  No     Changes in diet or alcohol consumption:  No     Upcoming surgery, procedure or cardioversion:  No    Anticoagulation Episode Summary     Current INR goal:   2.0-3.0   TTR:   71.8 % (1 y)   Next INR check:   12/18/2019   INR from last check:   3.70! (12/4/2019)   Weekly max warfarin dose:      Target end date:      INR check location:      Preferred lab:      Send INR reminders to:   BERNICE MARTINI    Indications    Anticoagulation monitoring  INR range 2-3 [Z79.01]  PAD (peripheral artery disease) (H) [I73.9]           Comments:            Anticoagulation Care Providers     Provider Role Specialty Phone number    Loraine Rees MD Referring Family Medicine [phone]

## 2021-06-04 NOTE — PATIENT INSTRUCTIONS - HE
Keflex 250 mg three times daily for 10 days.  Pravastatin 5 mg daily.  We will notify you of lab results.  You will receive calls to schedule with vascular and for lung cancer screening.  I recommend smoking cessation.

## 2021-06-04 NOTE — PROGRESS NOTES
FEMALE PREVENTATIVE EXAM    Assessment and Plan:     Problem List Items Addressed This Visit     Adenomatous colon polyp     Colonoscopy due 2022.         Anticoagulation monitoring, INR range 2-3    Relevant Medications    warfarin ANTICOAGULANT (COUMADIN/JANTOVEN) 5 MG tablet    Atypical lymphocytosis     Patient previously recommended CBC every 6 months with Heme Onc follow up if worsening. CBC with diff today.         Relevant Orders    HM1(CBC and Differential) (Completed)    HM1 (CBC with Diff) (Completed)    Bilateral carotid artery disease (H)     Continue to follow with vascular. Patient would like to establish in [place], order placed.         Relevant Orders    Ambulatory referral to Vascular Medicine    Dyslipidemia     CMP and lipid panel today. Patient has not been tolerant to multiple statins. Trial of pravastatin 5 mg daily.         Relevant Medications    pravastatin (PRAVACHOL) 10 MG tablet    Other Relevant Orders    Lipid Profile    Comprehensive Metabolic Panel    Essential hypertension     Well controlled. Continue amlodipine 5 mg daily and lisinopril 20 mg daily. CMP today.         Relevant Medications    amLODIPine (NORVASC) 5 MG tablet    lisinopril (PRINIVIL,ZESTRIL) 20 MG tablet    Generalized anxiety disorder     Well controlled on citalopram.         Relevant Medications    citalopram (CELEXA) 20 MG tablet    PAD (peripheral artery disease) (H)     Continue to follow with vascular. Patient would like to establish in [place], order placed. Smoking cessation recommended.         Relevant Medications    aspirin 81 MG EC tablet    gabapentin (NEURONTIN) 100 MG capsule    warfarin ANTICOAGULANT (COUMADIN/JANTOVEN) 5 MG tablet    Other Relevant Orders    Ambulatory referral to Vascular Medicine    Tobacco use disorder     Emphasized the importance of tobacco cessation given her vascular disease. She is not interested in quitting at this time.         Relevant Orders    Ambulatory Referral  for Lung Cancer Screening    Urge incontinence of urine    Relevant Medications    tolterodine (DETROL LA) 2 MG ER capsule      Other Visit Diagnoses     Routine general medical examination at a health care facility    -  Primary    Relevant Orders    HIV Antigen/Antibody Screening [facility]    Paronychia of finger of right hand        Mild cellulitis present. Soak in warm soapy water twice daily. Keflex 250 mg tid for 10 days.    Relevant Medications    cephalexin (KEFLEX) 500 MG capsule    Gastroesophageal reflux disease without esophagitis        Relevant Medications    famotidine (PEPCID) 20 MG tablet    Pap smear for cervical cancer screening        Relevant Orders    Gynecologic Cytology (PAP Smear)        Patient Instructions   Keflex 250 mg three times daily for 10 days.  Pravastatin 5 mg daily.  We will notify you of lab results.  You will receive calls to schedule with vascular and for lung cancer screening.  I recommend smoking cessation.     Subjective:   HPI: Nery Whitaker is an 54 y.o. female here for a preventative health visit. The patient usually follows with a vascular specialist at the [facility] but would like to be seen somewhere closer.    She is due for follow up on her anxiety which is well controlled on Citalopram.    She continues to smoke and is not interested in discussing cessation.    She is complaining of a recent splinter on her right thumb near the thumb bed. She removed the splinter but noticed purulent drainage yesterday.    She is checking her blood pressure outside the clinic and reports it is well controlled.    Do you feel you are safe where you are living?: Yes (12/20/2019  9:23 AM)  Do you feel you are safe in your relationship(s)?: Yes (12/20/2019  9:23 AM)     Patient Active Problem List   Diagnosis     Anticoagulation monitoring, INR range 2-3     Generalized anxiety disorder     Essential hypertension     Migraine     Bilateral carotid artery disease (H)     Dyslipidemia      PAD (peripheral artery disease) (H)     Thoracic neuralgia     Tobacco use disorder     Foreign body in left foot, subsequent encounter     Newly recognized murmur     Normocytic anemia     Verruca plantaris     Adenomatous colon polyp     Urge incontinence of urine     Atypical lymphocytosis      Past Medical History:   Diagnosis Date     Anemia      Anxiety      Bilateral carotid artery disease (H)      Coronary artery disease      Dyslipidemia      Foreign body in left foot      Heart murmur      Hypertension      Migraines      PAD (peripheral artery disease) (H)       Past Surgical History:   Procedure Laterality Date     APPENDECTOMY       ARTERIAL BYPASS SURGERY  2006     BREAST BIOPSY Left     benign     CHOLECYSTECTOMY       FEMORAL ARTERY STENT  2006     FOOT MASS EXCISION Left 2018    Soft tissue mass - benign     TONSILLECTOMY       TYMPANOSTOMY TUBE PLACEMENT        Family History   Problem Relation Age of Onset     Breast cancer Mother      Other Father         vasular problems      No Medical Problems Sister      No Medical Problems Brother      No Medical Problems Son      No Medical Problems Maternal Grandmother      Heart attack Maternal Grandfather      Cancer Paternal Grandmother         lung      Cancer Paternal Grandfather         kidney      Colon cancer Neg Hx      Prostate cancer Neg Hx       Social History     Tobacco Use     Smoking status: Current Every Day Smoker     Packs/day: 0.75     Years: 34.00     Pack years: 25.50     Smokeless tobacco: Never Used     Tobacco comment: 15 cigs daily   Substance Use Topics     Alcohol use: No      Review of Systems   Constitutional: Negative for activity change, appetite change, fever and unexpected weight change.   HENT: Negative for congestion, hearing loss and sore throat.    Eyes: Negative for discharge and visual disturbance.   Respiratory: Negative for cough, shortness of breath and wheezing.    Cardiovascular: Negative for chest pain,  "palpitations and leg swelling.   Gastrointestinal: Negative for abdominal pain, blood in stool, constipation, diarrhea and vomiting.   Endocrine: Negative for polydipsia and polyuria.   Genitourinary: Negative for decreased urine volume, difficulty urinating, dysuria, frequency, hematuria and urgency.   Musculoskeletal: Negative for arthralgias, gait problem and myalgias.   Skin: Negative for rash.   Allergic/Immunologic: Negative for immunocompromised state.   Neurological: Negative for weakness.   Hematological: Does not bruise/bleed easily.   Psychiatric/Behavioral: Negative for dysphoric mood and sleep disturbance. The patient is not nervous/anxious.        Objective:   Vital Signs:   /84 (Patient Site: Left Arm, Patient Position: Sitting, Cuff Size: Adult Regular)   Pulse (!) 59   Ht 5' 5.75\" (1.67 m)   Wt 132 lb 9.6 oz (60.1 kg)   SpO2 98% Comment: RA  Breastfeeding No   BMI 21.57 kg/m       PHYSICAL EXAM  Physical Exam  Constitutional:       General: She is not in acute distress.     Appearance: She is well-developed.   HENT:      Right Ear: Tympanic membrane and ear canal normal.      Left Ear: Tympanic membrane and ear canal normal.   Eyes:      Conjunctiva/sclera: Conjunctivae normal.      Pupils: Pupils are equal, round, and reactive to light.   Neck:      Musculoskeletal: Normal range of motion and neck supple.      Thyroid: No thyromegaly.   Cardiovascular:      Rate and Rhythm: Normal rate and regular rhythm.      Pulses:           Dorsalis pedis pulses are 1+ on the right side and 1+ on the left side.      Heart sounds: No murmur.   Pulmonary:      Effort: Pulmonary effort is normal. No respiratory distress.      Breath sounds: Normal breath sounds. No wheezing or rhonchi.   Chest:      Breasts:         Right: No inverted nipple, mass or skin change.         Left: No inverted nipple, mass or skin change.   Abdominal:      General: Bowel sounds are normal. There is no distension.      " Palpations: Abdomen is soft. There is no mass.      Tenderness: There is no abdominal tenderness.      Hernia: There is no hernia in the ventral area.   Genitourinary:     Vagina: Normal.      Cervix: No discharge or friability.      Adnexa:         Right: No mass.          Left: No mass.     Musculoskeletal:      Right lower leg: No edema.      Left lower leg: No edema.   Lymphadenopathy:      Cervical: No cervical adenopathy.   Skin:     General: Skin is warm.      Findings: No rash.      Comments: Right thumb: purulence to the side of the nail. Mild erythema and swelling.   Neurological:      Mental Status: She is alert and oriented to person, place, and time.      Cranial Nerves: No cranial nerve deficit.      Gait: Gait normal.      Deep Tendon Reflexes:      Reflex Scores:       Patellar reflexes are 2+ on the right side and 2+ on the left side.  Psychiatric:         Behavior: Behavior normal.         Thought Content: Thought content normal.         Judgment: Judgment normal.

## 2021-06-05 VITALS
HEART RATE: 60 BPM | DIASTOLIC BLOOD PRESSURE: 60 MMHG | HEIGHT: 65 IN | RESPIRATION RATE: 16 BRPM | SYSTOLIC BLOOD PRESSURE: 136 MMHG | BODY MASS INDEX: 21.24 KG/M2 | TEMPERATURE: 98.2 F | WEIGHT: 127.5 LBS

## 2021-06-05 NOTE — TELEPHONE ENCOUNTER
Anticoagulation Annual Referral Renewal Review    Nery Whitaker's chart reviewed for annual renewal of referral to anticoagulation monitoring.        Criteria for anticoagulation nurse and/or pharmacist renewal met   Warfarin indication: peripheral artery disease provider documention for long term anticoagulation use in encounter on 3/28/19   Current with INR monitoring/compliant Yes Yes   Date of last office visit 12/20/19 Yes, had office visit within last year   Time in Therapeutic Range (TTR) 64.9 % Yes, TTR > 60%       Nery Whitaker met all criteria for anticoagulation management program initiated renewal.  New INR standing orders and anticoagulation referral renewal placed.      Clare Adams, RN  4:04 PM

## 2021-06-05 NOTE — TELEPHONE ENCOUNTER
ANTICOAGULATION  MANAGEMENT PROGRAM    Nery Whitaker is overdue for INR check.     Left message to call and schedule INR appointment as soon as possible.      Mickie Paredes RN

## 2021-06-05 NOTE — TELEPHONE ENCOUNTER
ANTICOAGULATION  MANAGEMENT    Assessment     Today's INR result of 2.4 is Therapeutic (goal INR of 2.0-3.0)        Warfarin taken as previously instructed    No new diet changes affecting INR    No new medication/supplements affecting INR    Continues to tolerate warfarin with no reported s/s of bleeding or thromboembolism     Previous INR was Supratherapeutic    Plan:     Spoke with Nery regarding INR result and instructed:     Warfarin Dosing Instructions:  Continue current warfarin dose 2.5 mg daily on Wednesdays; and 5 mg daily rest of week  (0 % change)    Instructed patient to follow up no later than: 2-3 weeks.    Education provided: importance of therapeutic range, importance of following up for INR monitoring at instructed interval and importance of taking warfarin as instructed    Nery verbalizes understanding and agrees to warfarin dosing plan.    Instructed to call the [facility] for any changes, questions or concerns. (#[phone])   ?   Marti Sheridan RN    Subjective/Objective:      Nery SOFIA Lagiancarlosang, a 54 y.o. female is on warfarin.     Nery reports:     Home warfarin dose: verbally confirmed home dose with Nery and updated on anticoagulation calendar     Missed doses: No     Medication changes:  No     S/S of bleeding or thromboembolism:  No     New Injury or illness:  No     Changes in diet or alcohol consumption:  No     Upcoming surgery, procedure or cardioversion:  No    Anticoagulation Episode Summary     Current INR goal:   2.0-3.0   TTR:   64.9 % (1 y)   Next INR check:   1/29/2020   INR from last check:   2.40 (1/8/2020)   Weekly max warfarin dose:      Target end date:      INR check location:      Preferred lab:      Send INR reminders to:   BERNICE MARTINI    Indications    Anticoagulation monitoring  INR range 2-3 [Z79.01]  PAD (peripheral artery disease) (H) [I73.9]           Comments:            Anticoagulation Care Providers     Provider Role Specialty Phone number     Loraine Rees MD [facility] [phone]

## 2021-06-06 NOTE — TELEPHONE ENCOUNTER
Can you order 2 more Hep B's to give on a future date.    Thanks   Chloe Meredith, Clinic Assistant

## 2021-06-06 NOTE — TELEPHONE ENCOUNTER
RN cannot approve Refill Request    RN can NOT refill this medication med is not covered by policy/route to provider. Last office visit: 11/2/2018 Loraine Rees MD Last Physical: 12/20/2019 Last MTM visit: Visit date not found Last visit same specialty: 11/2/2018 Loraine Rees MD.  Next visit within 3 mo: Visit date not found  Next physical within 3 mo: Visit date not found      Liliam Byrne, [facility] Triage/Med Refill 3/8/2020    Requested Prescriptions   Pending Prescriptions Disp Refills     folic acid (FOLVITE) 1 MG tablet [Pharmacy Med Name: Folic Acid 1 MG Oral Tablet] 30 tablet 0     Sig: TAKE 1 TABLET BY MOUTH ONCE DAILY IN THE EVENING       There is no refill protocol information for this order

## 2021-06-06 NOTE — TELEPHONE ENCOUNTER
Patient will be starting Repatha, no potential interactions between Repatha and warfarin per micromedex. Plan will stay the same, follow up in 4 weeks for INR recheck, patient understood, appointment scheduled.

## 2021-06-06 NOTE — TELEPHONE ENCOUNTER
Who is calling:  Patient  Reason for Call:  Patient called stating she will be starting Repatha 140mg, up to 2 doses per 28 days. She is to receive the medication on 2/14/2020 & will be starting it this weekend or early next week.  Date of last appointment with primary care: 12/20/19  Okay to leave a detailed message: Yes

## 2021-06-06 NOTE — TELEPHONE ENCOUNTER
ANTICOAGULATION  MANAGEMENT    Assessment     Today's INR result of 2.3 is Therapeutic (goal INR of 2.0-3.0)        More warfarin taken than instructed which may be affecting INR    No new diet changes affecting INR    No new medication/supplements affecting INR    Continues to tolerate warfarin with no reported s/s of bleeding or thromboembolism     Previous INR was Therapeutic    Plan:     Spoke with Nery regarding INR result and instructed:     Warfarin Dosing Instructions:  Continue current warfarin dose 7.5 mg daily on Wednesdays; and 5 mg daily rest of week  (0 % change) per patient report (was to be taking 2.5 mg Wednesdays)     Instructed patient to follow up no later than: 4 weeks or sooner based on new medication prescribed by cardiology at [facility]     Education provided: importance of therapeutic range, target INR goal and significance of current INR result and importance of taking warfarin as instructed, sent message via aBI[employer] with instructions     Nery verbalizes understanding and agrees to warfarin dosing plan.    Instructed to call the [facility] for any changes, questions or concerns. (#[phone])   ?   Mickie Paredes RN    Subjective/Objective:      Nery Whitaker, a 54 y.o. female is on warfarin.     Nery reports:     Home warfarin dose: verbally confirmed home dose with Nery  and updated on anticoagulation calendar     Missed doses: No, took 7.5 mg instead of 2.5 mg      Medication changes:  No: new cholesterol medication per cardiology (injectable) she will  today      S/S of bleeding or thromboembolism:  No     New Injury or illness:  No     Changes in diet or alcohol consumption:  No     Upcoming surgery, procedure or cardioversion:  No    Anticoagulation Episode Summary     Current INR goal:   2.0-3.0   TTR:   74.5 % (1 y)   Next INR check:   3/11/2020   INR from last check:   2.30 (2/12/2020)   Weekly max warfarin dose:      Target end date:      INR check location:       Preferred lab:      Send INR reminders to:   BERNICE MARTINI    Indications    Anticoagulation monitoring  INR range 2-3 [Z79.01]  PAD (peripheral artery disease) (H) [I73.9]           Comments:            Anticoagulation Care Providers     Provider Role Specialty Phone number    Loraine Rees MD Referring Family Medicine [phone]

## 2021-06-08 NOTE — TELEPHONE ENCOUNTER
Who is calling:  Nery  Reason for Call:  Patient called requesting to speak with Mickie  Date of last appointment with primary care: 12/20/19  Okay to leave a detailed message: Yes

## 2021-06-08 NOTE — TELEPHONE ENCOUNTER
Spoke with patient, plan is to await for results. Amoxicillin has a milder interaction with warfarin, denies any bleeding or bruising at this time. Patient instructed to call/follow up with [facility] with results to develop an ongoing plan. No further questions or concerns at this time.     Mickie Paredes RN

## 2021-06-08 NOTE — TELEPHONE ENCOUNTER
ANTICOAGULATION  MANAGEMENT    Assessment     Today's INR result of 1.9 is Subtherapeutic (goal INR of 2.0-3.0)        Warfarin taken as previously instructed    No new diet changes affecting INR    No new medication/supplements affecting INR    Continues to tolerate warfarin with no reported s/s of bleeding or thromboembolism     Previous INR was Supratherapeutic     Plan:     Spoke with Nery regarding INR result and instructed:     Warfarin Dosing Instructions:  Take one time booster dose 10 mg tonight,  then continue current warfarin dose 7.5 mg daily on Wednesday; and 5 mg daily rest of week  (0 % change)    Suggesting full hold may have caused low INR today, previous INR's vary continuously     Instructed patient to follow up no later than: 2 weeks     Education provided: importance of taking warfarin as instructed    Nery verbalizes understanding and agrees to warfarin dosing plan.    Instructed to call the [facility] for any changes, questions or concerns. (#[phone])   ?   Mickie Paredes RN    Subjective/Objective:      Nery Whitaker, a 54 y.o. female is on warfarin.     Nery reports:     Home warfarin dose: verbally confirmed home dose with Nery  and updated on anticoagulation calendar     Missed doses: No     Medication changes:  No     S/S of bleeding or thromboembolism:  No     New Injury or illness:  No     Changes in diet or alcohol consumption:  No     Upcoming surgery, procedure or cardioversion:  No    Anticoagulation Episode Summary     Current INR goal:   2.0-3.0   TTR:   67.2 % (1 y)   Next INR check:   6/10/2020   INR from last check:   1.90! (6/17/2020)   Weekly max warfarin dose:      Target end date:      INR check location:      Preferred lab:      Send INR reminders to:   BERNICE MARTINI    Indications    Anticoagulation monitoring  INR range 2-3 [Z79.01]  PAD (peripheral artery disease) (H) [I73.9]           Comments:            Anticoagulation Care Providers     Provider  Role Specialty Phone number    Loraine Rees MD Referring Family Medicine [phone]

## 2021-06-08 NOTE — TELEPHONE ENCOUNTER
Lump on inside  right leg above ankle.  On blood thinners and no bruise there.    Not painful. Not red.    She prefers to do a video visit on her phone instead of come in to clinic.    Transferred to scheduling for an appointment.    Unique Mcarthur RN [facility] Triage Nurse Advisor        Reason for Disposition    [1] Very swollen joint AND [2] no fever    Additional Information    Negative: Chest pain    Negative: Followed an ankle injury    Negative: Ankle pain is main symptom    Negative: Swelling of both ankles (i.e., pedal edema)    Negative: Swelling of calf or leg    Negative: Difficulty breathing    Negative: Entire foot is cool or blue in comparison to other side    Negative: Fever    Negative: Patient sounds very sick or weak to the triager    Negative: [1] SEVERE pain (e.g., excruciating, unable to walk) AND [2] not improved after 2 hours of pain medicine    Negative: [1] Can't move swollen joint at all AND [2] no fever    Negative: [1] Redness AND [2] painful when touched AND [3] no fever    Negative: [1] Red area or streak [2] large (> 2 in. or 5 cm)    Negative: [1] Thigh or calf pain AND [2] only 1 side AND [3] present > 1 hour    Negative: [1] Thigh, calf, or ankle swelling AND [2] only 1 side    Negative: [1] Thigh, calf, or ankle swelling AND [2] bilateral AND [3] 1 side is more swollen    Protocols used: ANKLE SWELLING-A-AH

## 2021-06-08 NOTE — TELEPHONE ENCOUNTER
Who is calling:  Patient  Reason for Call:  The patient is returning a call to [facility] regarding INR.    Okay to leave a detailed message: Yes

## 2021-06-08 NOTE — TELEPHONE ENCOUNTER
FYI - Status Update  Who is Calling: Patient  Update: Patient called in and canceled INR scheduled for this week - developed COVID-symptoms, (fever, cough, sore throat x 2 days); was tested yesterday, but does not yet have results.    Also - requests a call back from [facility] as she was started on Amoxicillin for an abcess tooth, and wants to discuss if that will affect dosing.    Okay to leave a detailed message?:  Yes

## 2021-06-08 NOTE — TELEPHONE ENCOUNTER
Spoke with patient, reports no fever today.  Patient is having tooth pain, facial swelling and was prescribed amoxicillin, 1st dose today for 10 days. COVID testing still pending. She cannot recall if amoxicillin has caused a rise in her INR before, no active bleeding at this time. Informed to continue same warfarin dosing,writer will follow up 5/28/2020 with a safe dosing plan for patient.     Mickie Paredes RN

## 2021-06-08 NOTE — TELEPHONE ENCOUNTER
Patient COVID10 testing is negative. She is scheduled for INR recheck today. Patient reports she is having one tooth extraction, INR needs to be less than 4 in order for procedure to be preformed. INR results needs to be faxed to [phone]; [facility] in [place]. Amoxicillin to be complete 6/5/2020.     Mickie Paredes RN

## 2021-06-08 NOTE — TELEPHONE ENCOUNTER
ANTICOAGULATION  MANAGEMENT PROGRAM    Nery Whitaker is overdue for INR check.     Spoke with Nery  and scheduled INR appointment on 5/8/2020.      Mickie Paredes RN

## 2021-06-08 NOTE — TELEPHONE ENCOUNTER
"ANTICOAGULATION  MANAGEMENT    Assessment     Today's INR result of 1.4 is Subtherapeutic (goal INR of 2.0-3.0)        Missed dose(s) may be affecting INR    Increased greens/vitamin K intake may be affecting INR - does not plan to be ongoing     No new medication/supplements affecting INR    Continues to tolerate warfarin with no reported s/s of bleeding or thromboembolism     Previous INR was Therapeutic    Plan:     Spoke with Nery regarding INR result and instructed:     Warfarin Dosing Instructions:  Take 10 mg tonight,  then continue current warfarin dose 7.5 mg daily on Wednesday; and 5 mg daily rest of week  (0 % change)    Instructed patient to follow up no later than: 5-7 days (5/13/2020)    Education provided: importance of consistent vitamin K intake, importance of therapeutic range, importance of following up for INR monitoring at instructed interval, importance of taking warfarin as instructed and monitoring for clotting signs and symptoms    Nery verbalizes understanding and agrees to warfarin dosing plan.    Instructed to call the [facility] for any changes, questions or concerns. (#[phone])   ?   Mickie Paredes RN    Subjective/Objective:      Nery Whitaker, a 54 y.o. female is on warfarin.     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 (5/8/2020)   Weekly " max warfarin dose:      Target end date:      INR check location:      Preferred lab:      Send INR reminders to:   BERNICE MARTINI    Indications    Anticoagulation monitoring  INR range 2-3 [Z79.01]  PAD (peripheral artery disease) (H) [I73.9]           Comments:            Anticoagulation Care Providers     Provider Role Specialty Phone number    Loraine Rees MD Referring Family Medicine [phone]

## 2021-06-08 NOTE — PROGRESS NOTES
"Assessment:     1. Localized swelling, mass and lump, left lower limb            Plan:     Unclear as to the etiology of the lump.  Possibly a lipoma.  She does have some appearance of atrophy just distal to the lump however so it is unclear whether it truly represents a mass under the skin versus some atrophy that is exacerbating the appearance of the tissue above it.  There is no evidence of infection however and is otherwise not tender and does not appear to be concerning or otherwise bothering her.  And that she keep an eye on it and to follow-up if developing an enlarging lump, tenderness, redness, difficulty walking, or any other concerning symptoms.  Patient is agreeable with this plan.    Subjective:       54 y.o. female presents for evaluation of a localized lump/soft tissue swelling on her right medial lower leg.  She just noticed it this morning.  It is not at all tender and there is no overlying redness at all.  The rest of her leg is not swollen.  Just distal to the lump she feels like her leg is \"skinnier\".  There is no itchiness or discomfort.  She denies any trauma to her leg.  It does not bother her when she walks.  She does not know how long it is been there for.    Patient Active Problem List   Diagnosis     Anticoagulation monitoring, INR range 2-3     Generalized anxiety disorder     Essential hypertension     Migraine     Bilateral carotid artery disease (H)     Dyslipidemia     PAD (peripheral artery disease) (H)     Thoracic neuralgia     Tobacco use disorder     Foreign body in left foot, subsequent encounter     Newly recognized murmur     Normocytic anemia     Verruca plantaris     Adenomatous colon polyp     Urge incontinence of urine     Atypical lymphocytosis       Past Medical History:   Diagnosis Date     Anemia      Anxiety      Bilateral carotid artery disease (H)      Coronary artery disease      Dyslipidemia      Foreign body in left foot      Heart murmur      Hypertension      " Migraines      PAD (peripheral artery disease) (H)        Past Surgical History:   Procedure Laterality Date     APPENDECTOMY       ARTERIAL BYPASS SURGERY  2006     BREAST BIOPSY Left     benign     CHOLECYSTECTOMY       FEMORAL ARTERY STENT  2006     FOOT MASS EXCISION Left 2018    Soft tissue mass - benign     TONSILLECTOMY       TYMPANOSTOMY TUBE PLACEMENT         Current Outpatient Medications on File Prior to Visit   Medication Sig Dispense Refill     amLODIPine (NORVASC) 5 MG tablet Take 1 tablet (5 mg total) by mouth daily. 90 tablet 3     aspirin 81 MG EC tablet Take 1 tablet (81 mg total) by mouth daily. 90 tablet 3     citalopram (CELEXA) 20 MG tablet Take 1 tablet (20 mg total) by mouth daily. 90 tablet 3     famotidine (PEPCID) 20 MG tablet Take 1 tablet (20 mg total) by mouth 2 (two) times a day. 180 tablet 3     folic acid (FOLVITE) 1 MG tablet TAKE 1 TABLET BY MOUTH ONCE DAILY IN THE EVENING 30 tablet 0     gabapentin (NEURONTIN) 100 MG capsule Take 100 mg by mouth 3 (three) times a day. 270 capsule 3     lisinopril (PRINIVIL,ZESTRIL) 20 MG tablet Take 1 tablet (20 mg total) by mouth daily. 90 tablet 3     tolterodine (DETROL LA) 2 MG ER capsule Take 1 capsule (2 mg total) by mouth daily. 90 capsule 3     warfarin ANTICOAGULANT (COUMADIN/JANTOVEN) 5 MG tablet Take 1 1/2 tablets (7.5 mg) on Fridays and 1 tablet (5 mg) all other days. Adjust dose per INR results. 100 tablet 3     [DISCONTINUED] pravastatin (PRAVACHOL) 10 MG tablet Take 0.5 tablets (5 mg total) by mouth at bedtime. 45 tablet 3     No current facility-administered medications on file prior to visit.        Allergies   Allergen Reactions     Ezetimibe Unknown     constipation     Lovastatin Unknown     constipation     Simvastatin Other (See Comments)     Intolerance, but tolerated with re-challenge in Feb to April 2009       Family History   Problem Relation Age of Onset     Breast cancer Mother      Other Father         vasular problems       No Medical Problems Sister      No Medical Problems Brother      No Medical Problems Son      No Medical Problems Maternal Grandmother      Heart attack Maternal Grandfather      Cancer Paternal Grandmother         lung      Cancer Paternal Grandfather         kidney      Colon cancer Neg Hx      Prostate cancer Neg Hx        Social History     Socioeconomic History     Marital status:      Spouse name: None     Number of children: 1     Years of education: None     Highest education level: None   Occupational History     None   Social Needs     Financial resource strain: None     Food insecurity     Worry: None     Inability: None     Transportation needs     Medical: None     Non-medical: None   Tobacco Use     Smoking status: Current Every Day Smoker     Packs/day: 0.75     Years: 34.00     Pack years: 25.50     Smokeless tobacco: Never Used     Tobacco comment: 15 cigs daily   Substance and Sexual Activity     Alcohol use: No     Drug use: No     Sexual activity: Yes     Partners: Male     Birth control/protection: Post-menopausal   Lifestyle     Physical activity     Days per week: None     Minutes per session: None     Stress: None   Relationships     Social connections     Talks on phone: None     Gets together: None     Attends Holiness service: None     Active member of club or organization: None     Attends meetings of clubs or organizations: None     Relationship status: None     Intimate partner violence     Fear of current or ex partner: None     Emotionally abused: None     Physically abused: None     Forced sexual activity: None   Other Topics Concern     None   Social History Narrative     None         Review of Systems  A 12 point comprehensive review of systems was negative except as noted.      Objective:     Vitals:    06/17/20 1440   BP: 152/68   Pulse: 70   Resp: 14   Temp: 98.5  F (36.9  C)   SpO2: 98%      General appearance: alert, appears stated age and  cooperative  Extremities: Area of concern on her right lower leg noted.  She has an area of fleshy, soft tissue swelling approximately 5 cm  x 5 cm on her right medial lower leg with an area distal to this that appears somewhat atrophic compared to the left leg.  The area of swelling is not at all tender, erythematous, and there is no overlying skin changes.  The rest of her leg is not at all tender or swollen and Homans sign is negative.  2+ dorsalis pedis and posterior tibial pulses.     Recent Results (from the past 24 hour(s))   INR   Result Value Ref Range    INR 1.90 (H) 0.90 - 1.10           This note has been dictated using voice recognition software. Any grammatical or context distortions are unintentional and inherent to the software

## 2021-06-08 NOTE — TELEPHONE ENCOUNTER
ANTICOAGULATION  MANAGEMENT    Assessment     Today's INR result of 3.9 is Supratherapeutic (goal INR of 2.0-3.0)        Warfarin taken as previously instructed    No new diet changes affecting INR    Interaction between amoxicillin  and warfarin may be affecting INR    Continues to tolerate warfarin with no reported s/s of bleeding or thromboembolism     Previous INR was Subtherapeutic     Patient is having a tooth extraction 6/5/2020, [facility], she reports INR must be under 3.9, her facial swelling has gotten better 6/5/2020 completes her antibiotic therapy - faxed to [phone]    Plan:     Spoke with Nery regarding INR result and instructed:     Warfarin Dosing Instructions:  Hold tonight,  then continue current warfarin dose 7.5 mg daily on Wednesday; and 5 mg daily rest of week  (0 % change)    Recommended a serving of greens/vitamin K to lower INR prior to procedure     Instructed patient to follow up no later than: 6/10/2020 to ensure INR is normalizing     Education provided: importance of therapeutic range, importance of following up for INR monitoring at instructed interval, importance of taking warfarin as instructed and monitoring for bleeding signs and symptoms    Nery verbalizes understanding and agrees to warfarin dosing plan.    Instructed to call the [facility] for any changes, questions or concerns. (#[phone])   ?   Mickie Paredes RN    Subjective/Objective:      Nery Sanchezgiancarlosang, a 54 y.o. female is on warfarin.     Nery reports:     Home warfarin dose: verbally confirmed home dose with Nery  and updated on anticoagulation calendar     Missed doses: No      Medication changes:  Yes: continues amoxicillin to be complete 6/5/2020     S/S of bleeding or thromboembolism:  No     New Injury or illness:  No, ongoing tooth pain, tooth extraction to remove this tooth      Changes in diet or alcohol consumption:  No     Upcoming surgery, procedure or cardioversion:  Yes, tooth  extraction 6/5/2020    Anticoagulation Episode Summary     Current INR goal:   2.0-3.0   TTR:   69.1 % (1 y)   Next INR check:   5/29/2020   INR from last check:   3.90! (6/3/2020)   Weekly max warfarin dose:      Target end date:      INR check location:      Preferred lab:      Send INR reminders to:   BERNICE MARTINI    Indications    Anticoagulation monitoring  INR range 2-3 [Z79.01]  PAD (peripheral artery disease) (H) [I73.9]           Comments:            Anticoagulation Care Providers     Provider Role Specialty Phone number    Loraine Rees MD Referring Family Medicine [phone]

## 2021-06-08 NOTE — TELEPHONE ENCOUNTER
ANTICOAGULATION  MANAGEMENT    Assessment     Today's INR result of 1.8 is Subtherapeutic (goal INR of 2.0-3.0)        Warfarin taken as previously instructed    Increased greens/vitamin K intake may be affecting INR - reports having 3 servings within the past week, normally 0-1 servings of vitamin K, will not plan to be ongoing     No new medication/supplements affecting INR    Continues to tolerate warfarin with no reported s/s of bleeding or thromboembolism     Previous INR was Subtherapeutic    Plan:     Spoke with Nery regarding INR result and instructed:     Warfarin Dosing Instructions:  Take 7.5 mg tonight only,  then continue current warfarin dose 7.5mg daily on Wednesdays; and 5 mg daily rest of week  (0 % change)    Instructed patient to follow up no later than: 2 weeks     Education provided: importance of consistent vitamin K intake, importance of therapeutic range, importance of following up for INR monitoring at instructed interval and importance of taking warfarin as instructed    Nery verbalizes understanding and agrees to warfarin dosing plan.    Instructed to call the [facility] for any changes, questions or concerns. (#[phone])   ?   Mickie Paredes RN    Subjective/Objective:      Nery Whitaker, a 54 y.o. female is on warfarin.     Nery reports:     Home warfarin dose: verbally confirmed home dose with Nery  and updated on anticoagulation calendar     Missed doses: No     Medication changes:  No     S/S of bleeding or thromboembolism:  No     New Injury or illness:  No     Changes in diet or alcohol consumption:  Yes: increase greens, does not plan for ongoing just while at home     Upcoming surgery, procedure or cardioversion:  No    Anticoagulation Episode Summary     Current INR goal:   2.0-3.0   TTR:   71.8 % (1 y)   Next INR check:   5/29/2020   INR from last check:   1.80! (5/15/2020)   Weekly max warfarin dose:      Target end date:      INR check location:      Preferred lab:       Send INR reminders to:   BERNICE MARTINI    Indications    Anticoagulation monitoring  INR range 2-3 [Z79.01]  PAD (peripheral artery disease) (H) [I73.9]           Comments:            Anticoagulation Care Providers     Provider Role Specialty Phone number    Loraine Rees MD Referring Family Medicine [phone]

## 2021-06-09 NOTE — TELEPHONE ENCOUNTER
ANTICOAGULATION  MANAGEMENT    Assessment     Today's INR result of 3.3 is Supratherapeutic (goal INR of 2.0-3.0)        Warfarin taken as previously instructed    No new diet changes affecting INR    No new medication/supplements affecting INR    Continues to tolerate warfarin with no reported s/s of bleeding or thromboembolism     Previous INR was Therapeutic    Plan:     Spoke with Nery regarding INR result and instructed:     Warfarin Dosing Instructions:  Continue current warfarin dose 7.5 mg daily on Wednesdays; and 5 mg daily rest of week  (0 % change)    Recent INRs various, will continue current dose, if remains out of range consider dose change     Instructed patient to follow up no later than: 2 weeks     Education provided: importance of therapeutic range, importance of following up for INR monitoring at instructed interval and importance of taking warfarin as instructed    Nery verbalizes understanding and agrees to warfarin dosing plan.    Instructed to call the [facility] for any changes, questions or concerns. (#[phone])   ?   Mickie Paredes RN    Subjective/Objective:      Nery Whitaker, a 55 y.o. female is on warfarin.     Nery reports:     Home warfarin dose: verbally confirmed home dose with Nery  and updated on anticoagulation calendar     Missed doses: No     Medication changes:  No     S/S of bleeding or thromboembolism:  No     New Injury or illness:  No     Changes in diet or alcohol consumption:  No     Upcoming surgery, procedure or cardioversion:  No    Anticoagulation Episode Summary     Current INR goal:   2.0-3.0   TTR:   68.7 % (1 y)   Next INR check:   7/29/2020   INR from last check:   3.30! (7/15/2020)   Weekly max warfarin dose:      Target end date:      INR check location:      Preferred lab:      Send INR reminders to:   BERNICE MARTINI    Indications    Anticoagulation monitoring  INR range 2-3 [Z79.01]  PAD (peripheral artery disease) (H) [I73.9]            Comments:            Anticoagulation Care Providers     Provider Role Specialty Phone number    Loraine Rees MD Referring Family Medicine [phone]

## 2021-06-09 NOTE — TELEPHONE ENCOUNTER
ANTICOAGULATION  MANAGEMENT    Assessment     Today's INR result of 2.5 is Therapeutic (goal INR of 2.0-3.0)        Warfarin taken as previously instructed    No new diet changes affecting INR    No new medication/supplements affecting INR    Continues to tolerate warfarin with no reported s/s of bleeding or thromboembolism     Previous INR was Subtherapeutic    Plan:     Spoke with Nery regarding INR result and instructed:     Warfarin Dosing Instructions:  Continue current warfarin dose 7.5 mg daily on Wednedays; and 5 mg daily rest of week  (0 % change)    Instructed patient to follow up no later than: 2 weeks     Education provided: importance of therapeutic range, importance of following up for INR monitoring at instructed interval and importance of taking warfarin as instructed    Nery verbalizes understanding and agrees to warfarin dosing plan.    Instructed to call the [facility] for any changes, questions or concerns. (#[phone])   ?   Mickie Paredes RN    Subjective/Objective:      Nery Whitaker, a 55 y.o. female is on warfarin.     Nery reports:     Home warfarin dose: verbally confirmed home dose with Nery  and updated on anticoagulation calendar     Missed doses: No     Medication changes:  No     S/S of bleeding or thromboembolism:  No     New Injury or illness:  No     Changes in diet or alcohol consumption:  No     Upcoming surgery, procedure or cardioversion:  No    Anticoagulation Episode Summary     Current INR goal:   2.0-3.0   TTR:   70.0 % (1 y)   Next INR check:   7/15/2020   INR from last check:   2.50 (7/1/2020)   Weekly max warfarin dose:      Target end date:      INR check location:      Preferred lab:      Send INR reminders to:   BERNICE MARTINI    Indications    Anticoagulation monitoring  INR range 2-3 [Z79.01]  PAD (peripheral artery disease) (H) [I73.9]           Comments:            Anticoagulation Care Providers     Provider Role Specialty Phone number    Negrita  Loraine SOFIA MD [facility] [phone]

## 2021-06-10 NOTE — PROGRESS NOTES
URGENT CARE VISIT:    SUBJECTIVE:   Nery Whitaker is a 55 y.o. female who presents for low back pain for 3 days. Pain is constant and sharp. Worse with movement. Better wit nothing. Tried Tylenol with no relief. Denies radiation of pain or n/t in legs. Also having lower abdominal pressure for 3 days but no urinary symptoms or nausea/vomiting. No known injury. Hx of intermittent back pain    PMH:   Past Medical History:   Diagnosis Date     Anemia      Anxiety      Bilateral carotid artery disease (H)      Coronary artery disease      Dyslipidemia      Foreign body in left foot      Heart murmur      Hypertension      Migraines      PAD (peripheral artery disease) (H)      Allergies: Ezetimibe; Lovastatin; and Simvastatin  Medications:   Current Outpatient Medications   Medication Sig Dispense Refill     amLODIPine (NORVASC) 5 MG tablet Take 1 tablet (5 mg total) by mouth daily. 90 tablet 3     aspirin 81 MG EC tablet Take 1 tablet (81 mg total) by mouth daily. 90 tablet 3     citalopram (CELEXA) 20 MG tablet Take 1 tablet (20 mg total) by mouth daily. 90 tablet 3     famotidine (PEPCID) 20 MG tablet Take 1 tablet (20 mg total) by mouth 2 (two) times a day. 180 tablet 3     folic acid (FOLVITE) 1 MG tablet TAKE 1 TABLET BY MOUTH ONCE DAILY IN THE EVENING 90 tablet 3     gabapentin (NEURONTIN) 100 MG capsule Take 100 mg by mouth 3 (three) times a day. 270 capsule 3     lisinopril (PRINIVIL,ZESTRIL) 20 MG tablet Take 1 tablet (20 mg total) by mouth daily. 90 tablet 3     tolterodine (DETROL LA) 2 MG ER capsule Take 1 capsule (2 mg total) by mouth daily. 90 capsule 3     warfarin ANTICOAGULANT (COUMADIN/JANTOVEN) 5 MG tablet Take 1 1/2 tablets (7.5 mg) on Fridays and 1 tablet (5 mg) all other days. Adjust dose per INR results. 100 tablet 3     cyclobenzaprine (FLEXERIL) 5 MG tablet Take 1 tablet (5 mg total) by mouth 3 (three) times a day as needed for muscle spasms. 21 tablet 0     No current facility-administered  "medications for this visit.      Social History:   Social History     Tobacco Use     Smoking status: Current Every Day Smoker     Packs/day: 0.75     Years: 34.00     Pack years: 25.50     Smokeless tobacco: Never Used     Tobacco comment: 15 cigs daily   Substance Use Topics     Alcohol use: No       ROS: ROS otherwise found to be negative except as noted above.     OBJECTIVE:  /75 (Patient Site: Right Arm, Patient Position: Sitting, Cuff Size: Adult Regular)   Pulse 68   Temp 98.3  F (36.8  C) (Oral)   Resp 18   Ht 5' 5\" (1.651 m)   Wt 131 lb 9.6 oz (59.7 kg)   SpO2 98%   BMI 21.90 kg/m    General: WDWN in NAD  Cardiac: RRR without murmurs, rubs, or gallops.  Respiratory: LCTAB without adventitious sounds. Non-labored breathing.  Abdominal: Active bowel sounds in all four quadrants. Soft, non-tender.   Extremities: No peripheral edema or tenderness, peripheral pulses normal  Musculoskeletal: FROM back. Pain while going from laying to sitting. Mild/moderate TTP over lower thoracic spine. No CVA tenderness. Normal heel and tip toe walk.  Neuro: Normal strength and tone, sensory exam grossly normal,  normal speech and mentation  Integumentary: No suspicious rashes or lesions.     Lab:  Results for orders placed or performed in visit on 07/30/20   Urinalysis-UC if Indicated   Result Value Ref Range    Color, UA Yellow Colorless, Yellow, Straw, Light Yellow    Clarity, UA Clear Clear    Glucose, UA Negative Negative    Bilirubin, UA Negative Negative    Ketones, UA Negative Negative    Specific Gravity, UA >=1.030 1.005 - 1.030    Blood, UA Small (!) Negative    pH, UA 5.5 5.0 - 8.0    Protein, UA Negative Negative mg/dL    Urobilinogen, UA 0.2 E.U./dL 0.2 E.U./dL, 1.0 E.U./dL    Nitrite, UA Negative Negative    Leukocytes, UA Negative Negative    Bacteria, UA None Seen None Seen hpf    RBC, UA 3-5 (!) None Seen, 0-2 hpf    WBC, UA None Seen None Seen, 0-5 hpf    Squam Epithel, UA 0-5 None Seen, 0-5 lpf "     ASSESSMENT:   1. Acute bilateral low back pain without sciatica  Urinalysis-UC if Indicated    cyclobenzaprine (FLEXERIL) 5 MG tablet        PLAN:  Patient was educated on the natural course of injury.  UA is not indicative of kidney infection. 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 Conservative measures discussed including rest, ice, compression, elevation, and over-the-counter analgesics (Tylenol) as needed. Use medication as prescribed. May cause drowsiness. See your primary care provider if symptoms worsen or do not improve in 7 days. Seek emergency care if you develop severe pain/swelling, inability to move extremity, skin paleness, or weakness. Patient verbalized understanding and is agreeable to plan. The patient was discharged ambulatory and in stable condition.    Grace Ojedaro ....................  7/30/2020   5:26 PM

## 2021-06-10 NOTE — TELEPHONE ENCOUNTER
ANTICOAGULATION  MANAGEMENT    Assessment     Today's INR result of 3.0 is Therapeutic (goal INR of 2.0-3.0)        Warfarin taken as previously instructed    No new diet changes affecting INR    No new medication/supplements affecting INR    Continues to tolerate warfarin with no reported s/s of bleeding or thromboembolism     Previous INR was Supratherapeutic    Plan:     Spoke with Nery regarding INR result and instructed:     Warfarin Dosing Instructions:  Continue current warfarin dose 7.5 mg daily on Wednesdays; and 5 mg daily rest of week  (0 % change)    Instructed patient to follow up no later than: 2 weeks     Education provided: importance of therapeutic range, importance of following up for INR monitoring at instructed interval, importance of taking warfarin as instructed and when to seek medical attention/emergency care    Nery  verbalizes understanding and agrees to warfarin dosing plan.    Instructed to call the [facility] for any changes, questions or concerns. (#[phone])   ?   Mickie Paredes RN    Subjective/Objective:      Nery Whitaker, a 55 y.o. female is on warfarin.     Nery reports:     Home warfarin dose: verbally confirmed home dose with Nery  and updated on anticoagulation calendar     Missed doses: No     Medication changes:  No     S/S of bleeding or thromboembolism:  No     New Injury or illness:  No     Changes in diet or alcohol consumption:  No     Upcoming surgery, procedure or cardioversion:  No    Anticoagulation Episode Summary     Current INR goal:   2.0-3.0   TTR:   64.8 % (1 y)   Next INR check:   8/12/2020   INR from last check:   3.00 (7/29/2020)   Weekly max warfarin dose:      Target end date:      INR check location:      Preferred lab:      Send INR reminders to:   BERNICE MARTINI    Indications    Anticoagulation monitoring  INR range 2-3 [Z79.01]  PAD (peripheral artery disease) (H) [I73.9]           Comments:            Anticoagulation Care Providers      Provider Role Specialty Phone number    Loraine Rees MD Referring Family Medicine [phone]

## 2021-06-10 NOTE — TELEPHONE ENCOUNTER
ANTICOAGULATION  MANAGEMENT    Assessment     Today's INR result of 2.7 is Therapeutic (goal INR of 2.0-3.0)        Warfarin taken as previously instructed    No new diet changes affecting INR    No new medication/supplements affecting INR    Continues to tolerate warfarin with no reported s/s of bleeding or thromboembolism     Previous INR was Therapeutic    Plan:     Spoke on phone with Nery regarding INR result and instructed:     Warfarin Dosing Instructions:  Continue current warfarin dose 7.5 mg daily on Wednesdays; and 5 mg daily rest of week  (0 % change)    Instructed patient to follow up no later than: 4 weeks     Education provided: importance of therapeutic range, importance of following up for INR monitoring at instructed interval and importance of taking warfarin as instructed    Nery verbalizes understanding and agrees to warfarin dosing plan.    Instructed to call the [facility] for any changes, questions or concerns. (#[phone])   ?   Mickie Paredes RN    Subjective/Objective:      Nery BISMARK Lagiancarlosang, a 55 y.o. female is on warfarin.     Nery reports:     Home warfarin dose: verbally confirmed home dose with Nery and updated on anticoagulation calendar     Missed doses: No     Medication changes:  No     S/S of bleeding or thromboembolism:  No     New Injury or illness:  No     Changes in diet or alcohol consumption:  No     Upcoming surgery, procedure or cardioversion:  No    Anticoagulation Episode Summary     Current INR goal:   2.0-3.0   TTR:   64.8 % (1 y)   Next INR check:   9/9/2020   INR from last check:   2.70 (8/12/2020)   Weekly max warfarin dose:      Target end date:      INR check location:      Preferred lab:      Send INR reminders to:   BERNICE MARTINI    Indications    Anticoagulation monitoring  INR range 2-3 [Z79.01]  PAD (peripheral artery disease) (H) [I73.9]           Comments:            Anticoagulation Care Providers     Provider Role Specialty Phone number     Loraine Rees MD [facility] [phone]

## 2021-06-11 NOTE — TELEPHONE ENCOUNTER
ANTICOAGULATION  MANAGEMENT    Assessment     Today's INR result of 2.9 is Therapeutic (goal INR of 2.0-3.0)        Warfarin taken as previously instructed    No new diet changes affecting INR    No new medication/supplements affecting INR    Continues to tolerate warfarin with no reported s/s of bleeding or thromboembolism     Previous INR was Therapeutic     [employer] message sent     Plan:     Spoke on phone with Nery regarding INR result and instructed:     Warfarin Dosing Instructions:  Continue current warfarin dose 7.5 mg daily on Wednesdays; and 5 mg daily rest of week  (0 % change)    Instructed patient to follow up no later than: 4 weeks     Education provided: importance of therapeutic range, importance of following up for INR monitoring at instructed interval, importance of taking warfarin as instructed, importance of notifying clinic for changes in medications, when to seek medical attention/emergency care and importance of notifying clinic for diarrhea, nausea/vomiting, reduced intake and/or illness    Nery  verbalizes understanding and agrees to warfarin dosing plan.    Instructed to call the [facility] for any changes, questions or concerns. (#[phone])   ?   Mickie Paredes RN    Subjective/Objective:      Nery Whitaker, a 55 y.o. female is on warfarin.     Nery reports:     Home warfarin dose: verbally confirmed home dose with Nery  and updated on anticoagulation calendar     Missed doses: No     Medication changes:  No     S/S of bleeding or thromboembolism:  No     New Injury or illness:  No     Changes in diet or alcohol consumption:  No     Upcoming surgery, procedure or cardioversion:  No    Anticoagulation Episode Summary     Current INR goal:   2.0-3.0   TTR:   64.8 % (1 y)   Next INR check:   10/14/2020   INR from last check:   2.90 (9/16/2020)   Weekly max warfarin dose:      Target end date:      INR check location:      Preferred lab:      Send INR reminders to:   BERNICE  VINI    Indications    Anticoagulation monitoring  INR range 2-3 [Z79.01]  PAD (peripheral artery disease) (H) [I73.9]           Comments:            Anticoagulation Care Providers     Provider Role Specialty Phone number    Loraine Rees MD Referring [facility] Medicine [phone]

## 2021-06-12 NOTE — TELEPHONE ENCOUNTER
RN cannot approve Refill Request    RN can NOT refill this medication med is not covered by policy/route to provider. Last office visit: 11/2/2018 Loraine Rees MD Last Physical: 12/20/2019 Last MTM visit: Visit date not found Last visit same specialty: 11/2/2018 Loraine Rees MD.  Next visit within 3 mo: Visit date not found  Next physical within 3 mo: Visit date not found      Shilpa Cash, [facility] Triage/Med Refill 10/20/2020    Requested Prescriptions   Pending Prescriptions Disp Refills     warfarin ANTICOAGULANT (COUMADIN/JANTOVEN) 5 MG tablet [Pharmacy Med Name: Warfarin Sodium 5 MG Oral Tablet] 8 tablet 0     Sig: TAKE 1 & 1/2 (ONE & ONE-HALF) TABLETS BY MOUTH ONCE DAILY EVERY FRIDAY AND 1  ONCE DAILY ALL  OTHER  DAYS  ADJUST  DOSE  PER  INR  RESULTS       Warfarin Refill Protocol  Failed - 10/20/2020  3:49 PM        Failed -  Route to appropriate pool/provider     Last Anticoagulation Summary:   Anticoagulation Episode Summary     Current INR goal:  2.0-3.0   TTR:  66.1 % (11 mo)   Next INR check:  10/14/2020   INR from last check:  2.90 (9/16/2020)   Weekly max warfarin dose:     Target end date:     INR check location:     Preferred lab:     Send INR reminders to:  BERNICE MARTINI    Indications    Anticoagulation monitoring  INR range 2-3 [Z79.01]  PAD (peripheral artery disease) (H) [I73.9]           Comments:           Anticoagulation Care Providers     Provider Role Specialty Phone number    Loraine Rees MD Referring Family Medicine [phone]                Passed - Provider visit in last year     Last office visit with prescriber/PCP: 11/2/2018 Loraine Rees MD OR same dept: Visit date not found OR same specialty: 11/2/2018 Loraine Rees MD  Last physical: 12/20/2019 Last MTM visit: Visit date not found    Next appt within 3 mo: Visit date not found Next physical within 3 mo: Visit date not found  Prescriber OR PCP: Loraine Rees MD  Last diagnosis  associated with med order: 1. Anticoagulation monitoring, INR range 2-3  - warfarin ANTICOAGULANT (COUMADIN/JANTOVEN) 5 MG tablet [Pharmacy Med Name: Warfarin Sodium 5 MG Oral Tablet]; TAKE 1 & 1/2 (ONE & ONE-HALF) TABLETS BY MOUTH ONCE DAILY EVERY FRIDAY AND 1  ONCE DAILY ALL  OTHER  DAYS  ADJUST  DOSE  PER  INR  RESULTS  Dispense: 8 tablet; Refill: 0    2. PAD (peripheral artery disease) (H)  - warfarin ANTICOAGULANT (COUMADIN/JANTOVEN) 5 MG tablet [Pharmacy Med Name: Warfarin Sodium 5 MG Oral Tablet]; TAKE 1 & 1/2 (ONE & ONE-HALF) TABLETS BY MOUTH ONCE DAILY EVERY FRIDAY AND 1  ONCE DAILY ALL  OTHER  DAYS  ADJUST  DOSE  PER  INR  RESULTS  Dispense: 8 tablet; Refill: 0    If protocol passes may refill for 6 months if within 3 months of last provider visit (or a total of 9 months).

## 2021-06-13 NOTE — TELEPHONE ENCOUNTER
ANTICOAGULATION  MANAGEMENT    Assessment     Today's INR result of 2.7 is Therapeutic (goal INR of 2.0-3.0)        Warfarin taken as previously instructed    No new diet changes affecting INR    No new medication/supplements affecting INR    Continues to tolerate warfarin with no reported s/s of bleeding or thromboembolism     Previous INR was Supratherapeutic    Plan:     Left detailed message for Nery regarding INR result and instructed:     Warfarin Dosing Instructions:  Continue current warfarin dose 7.5 mg daily on Wednesdays; and 5 mg daily rest of week  (0 % change)    Instructed patient to follow up no later than: 2-3 weeks.    Education provided:     Instructed to call the [facility] for any changes, questions or concerns. (#[phone])   ?   Marti Sheridan RN    Subjective/Objective:      Nery Whitaker, a 55 y.o. female is on warfarin.     Nery reports:     Home warfarin dose: as updated on anticoagulation calendar per template     Missed doses: No     Medication changes:  No     S/S of bleeding or thromboembolism:  No     New Injury or illness:  No     Changes in diet or alcohol consumption:  No     Upcoming surgery, procedure or cardioversion:  No    Anticoagulation Episode Summary     Current INR goal:  2.0-3.0   TTR:  60.9 % (1 y)   Next INR check:  12/11/2020   INR from last check:  2.70 (11/20/2020)   Weekly max warfarin dose:     Target end date:     INR check location:     Preferred lab:     Send INR reminders to:  BERNICE MARTINI    Indications    Anticoagulation monitoring  INR range 2-3 [Z79.01]  PAD (peripheral artery disease) (H) [I73.9]           Comments:           Anticoagulation Care Providers     Provider Role Specialty Phone number    Loraine Rees MD Referring Family Medicine [phone]

## 2021-06-13 NOTE — TELEPHONE ENCOUNTER
ANTICOAGULATION  MANAGEMENT    Assessment     Today's INR result of 2.4 is Therapeutic (goal INR of 2.0-3.0)        Warfarin taken as previously instructed    No new diet changes affecting INR    No new medication/supplements affecting INR    Continues to tolerate warfarin with no reported s/s of bleeding or thromboembolism     Previous INR was Therapeutic    Plan:     Left detailed message for Nery regarding INR result and instructed:     Warfarin Dosing Instructions:  Continue current warfarin dose 7.5 mg daily on Wednesdays; and 5 mg daily rest of week  (0 % change)    Instructed patient to follow up no later than: 4 weeks.    Education provided:     Instructed to call the [facility] for any changes, questions or concerns. (#[phone])   ?   Marti Sheridan RN    Subjective/Objective:      Nery Whitaker, a 55 y.o. female is on warfarin.     Nery reports:     Home warfarin dose: as updated on anticoagulation calendar per template     Missed doses: No     Medication changes:  No     S/S of bleeding or thromboembolism:  No     New Injury or illness:  No     Changes in diet or alcohol consumption:  No     Upcoming surgery, procedure or cardioversion:  No    Anticoagulation Episode Summary     Current INR goal:  2.0-3.0   TTR:  66.8 % (1 y)   Next INR check:  1/15/2021   INR from last check:  2.40 (12/18/2020)   Weekly max warfarin dose:     Target end date:     INR check location:     Preferred lab:     Send INR reminders to:  BERNICE MARTINI    Indications    Anticoagulation monitoring  INR range 2-3 [Z79.01]  PAD (peripheral artery disease) (H) [I73.9]           Comments:           Anticoagulation Care Providers     Provider Role Specialty Phone number    Loraine Rees MD Referring Family Medicine [phone]

## 2021-06-14 NOTE — PROGRESS NOTES
FEMALE PREVENTATIVE EXAM    Assessment and Plan:     Problem List Items Addressed This Visit     Anticoagulation monitoring, INR range 2-3    Generalized anxiety disorder     Well controlled. Continue citalopram 20 mg daily.         Relevant Medications    citalopram (CELEXA) 20 MG tablet    Essential hypertension     Well controlled. Continue amlodipine 5 mg daily, lisinopril 20 mg daily. Check CMP today.         Relevant Medications    amLODIPine (NORVASC) 5 MG tablet    lisinopriL (PRINIVIL,ZESTRIL) 20 MG tablet    Migraine    Relevant Medications    amLODIPine (NORVASC) 5 MG tablet    gabapentin (NEURONTIN) 100 MG capsule    Bilateral carotid artery disease (H)     Patient will continue to follow with vascular specialist.         Dyslipidemia     The patient has not tolerated statins in the past. Her vascular specialist has recommended Repatha; she will consider and follow up with them.         Relevant Orders    Lipid [place] FASTING (Completed)    Comprehensive Metabolic Panel (Completed)    PAD (peripheral artery disease) (H)     Anticoagulated. Continued follow up with vascular specialist.         Relevant Medications    gabapentin (NEURONTIN) 100 MG capsule    Tobacco use disorder     Cessation encouraged. Patient not interested.         Normocytic anemia    Relevant Orders    HM1(CBC and Differential) (Completed)    Adenomatous colon polyp     Colonoscopy up to date.         Urge incontinence of urine     Detrol LA has been helpful but dries out her mouth. We will try a lower, short acting dose.         Relevant Medications    tolterodine (DETROL) 1 MG tablet    Atypical lymphocytosis     CBC today. Previous hematology work up unremarkable. Patient to repeat CBC every 6 months and return to Heme Onc if worsening anemia or lymphocytosis.           Other Visit Diagnoses     Routine general medical examination at a health care facility    -  Primary    Relevant Orders    Hepatitis C Antibody (Anti-HCV)     Gastroesophageal reflux disease without esophagitis        Relevant Medications    famotidine (PEPCID) 20 MG tablet    Chronic left-sided thoracic back pain        Relevant Orders    Ambulatory referral to Spine Care    MR Thoracic Spine Without Contrast        Patient Instructions   1. Change Detrol to 1 mg twice daily. OK to skip doses at your discretion.  2. OK to decrease gabapentin slowly if not helping with back pain. Resume if symptoms worsen.  3. You will get calls to schedule MRI and spine specialist.  4. We will notify you of lab results.         Subjective:   HPI: Nery Whitaker is an 55 y.o. female here for a preventative health visit. She is also complaining continued back and rib pain. This has been ongoing for years. I recommended physical therapy but she is refusing that. She reports that the pain is in the lower thoracic spine and is wraps around to the front. She is requesting MRI. She does have some urine leakage but it is not progressive. Detrol is helpful but is giving her dry mouth.    Do you feel you are safe where you are living?: Yes (1/22/2021  8:13 AM)  Do you feel you are safe in your relationship(s)?: Yes (1/22/2021  8:13 AM)     Patient Active Problem List   Diagnosis     Anticoagulation monitoring, INR range 2-3     Generalized anxiety disorder     Essential hypertension     Migraine     Bilateral carotid artery disease (H)     Dyslipidemia     PAD (peripheral artery disease) (H)     Thoracic neuralgia     Tobacco use disorder     Newly recognized murmur     Normocytic anemia     Verruca plantaris     Adenomatous colon polyp     Urge incontinence of urine     Atypical lymphocytosis      Past Medical History:   Diagnosis Date     Anemia      Anxiety      Bilateral carotid artery disease (H)      Coronary artery disease      Dyslipidemia      Foreign body in left foot      Heart murmur      Hypertension      Migraines      PAD (peripheral artery disease) (H)       Past Surgical History:  "  Procedure Laterality Date     APPENDECTOMY       ARTERIAL BYPASS SURGERY  2006     BREAST BIOPSY Left     benign     CHOLECYSTECTOMY       FEMORAL ARTERY STENT  2006     FOOT MASS EXCISION Left 2018    Soft tissue mass - benign     TONSILLECTOMY       TYMPANOSTOMY TUBE PLACEMENT        Family History   Problem Relation Age of Onset     Breast cancer Mother      Other Father         vasular problems      No Medical Problems Sister      No Medical Problems Brother      No Medical Problems Son      No Medical Problems Maternal Grandmother      Heart attack Maternal Grandfather      Cancer Paternal Grandmother         lung      Cancer Paternal Grandfather         kidney      Colon cancer Neg Hx      Prostate cancer Neg Hx       Social History     Tobacco Use     Smoking status: Current Every Day Smoker     Packs/day: 0.75     Years: 34.00     Pack years: 25.50     Smokeless tobacco: Never Used     Tobacco comment: 15 cigs daily   Substance Use Topics     Alcohol use: No      Review of System: Relevant items noted in HPI. ROS otherwise negative.     Objective:   Vital Signs:   /60 (Patient Site: Left Arm, Patient Position: Sitting, Cuff Size: Adult Regular)   Pulse 60   Temp 98.2  F (36.8  C) (Oral)   Resp 16   Ht 5' 5\" (1.651 m)   Wt 127 lb 8 oz (57.8 kg)   BMI 21.22 kg/m       PHYSICAL EXAM  Physical Exam  Constitutional:       General: She is not in acute distress.  HENT:      Right Ear: Tympanic membrane and ear canal normal.      Left Ear: Tympanic membrane and ear canal normal.   Eyes:      Conjunctiva/sclera: Conjunctivae normal.      Pupils: Pupils are equal, round, and reactive to light.   Neck:      Musculoskeletal: Normal range of motion and neck supple.      Thyroid: No thyromegaly.      Vascular: No carotid bruit.   Cardiovascular:      Rate and Rhythm: Normal rate and regular rhythm.      Heart sounds: Normal heart sounds. No murmur.   Pulmonary:      Effort: Pulmonary effort is normal.      " Breath sounds: Normal breath sounds. No wheezing or rales.   Chest:      Breasts:         Right: No inverted nipple, mass or skin change.         Left: No inverted nipple, mass or skin change.   Abdominal:      General: Bowel sounds are normal. There is no distension.      Palpations: Abdomen is soft. There is no mass.      Tenderness: There is no abdominal tenderness. There is no guarding or rebound.      Hernia: There is no hernia in the ventral area.   Musculoskeletal:         General: No deformity.      Cervical back: She exhibits normal range of motion, no tenderness and no bony tenderness.      Thoracic back: She exhibits no tenderness and no bony tenderness.      Lumbar back: She exhibits no tenderness and no bony tenderness.   Lymphadenopathy:      Cervical: No cervical adenopathy.   Skin:     Findings: No rash.   Neurological:      Mental Status: She is alert and oriented to person, place, and time.      Cranial Nerves: No cranial nerve deficit.      Motor: No tremor or abnormal muscle tone.      Gait: Gait normal.      Deep Tendon Reflexes:      Reflex Scores:       Patellar reflexes are 2+ on the right side and 2+ on the left side.  Psychiatric:         Behavior: Behavior normal.         Thought Content: Thought content normal.         Judgment: Judgment normal.

## 2021-06-14 NOTE — TELEPHONE ENCOUNTER
ANTICOAGULATION  MANAGEMENT    Assessment     Today's INR result of 2.7 is Therapeutic (goal INR of 2.0-3.0)        Warfarin taken as previously instructed    No new diet changes affecting INR    Potential interaction between Detrol dose increase and warfarin which may affect subsequent INRs if patient does increase dosing    Continues to tolerate warfarin with no reported s/s of bleeding or thromboembolism     Previous INR was Therapeutic    Plan:     Spoke on phone with Nery regarding INR result and instructed:     Warfarin Dosing Instructions:  Continue current warfarin dose 7.5 mg daily on Wed; and 5 mg daily rest of week  (0 % change)    Instructed patient to follow up no later than: 2/19    Education provided: target INR goal and significance of current INR result, importance of notifying clinic for changes in medications, monitoring for bleeding signs and symptoms and monitoring for clotting signs and symptoms    Nery verbalizes understanding and agrees to warfarin dosing plan.    Instructed to call the [facility] for any changes, questions or concerns. (#[phone])   ?   Marjorie Ball RN    Subjective/Objective:      Nery Sanchezgiancarlosang, a 55 y.o. female is on warfarin.     Nery reports:     Home warfarin dose: as updated on anticoagulation calendar per template     Missed doses: No     Medication changes:  Yes: per chart detrol increased dosing, but patient states per provider it is ok to continue taking 1mg daily     S/S of bleeding or thromboembolism:  No     New Injury or illness:  No     Changes in diet or alcohol consumption:  No     Upcoming surgery, procedure or cardioversion:  No    Anticoagulation Episode Summary     Current INR goal:  2.0-3.0   TTR:  70.4 % (1 y)   Next INR check:  2/5/2021   INR from last check:  2.70 (1/22/2021)   Weekly max warfarin dose:     Target end date:     INR check location:     Preferred lab:     Send INR reminders to:  BERNICE MARTINI    Indications     Anticoagulation monitoring  INR range 2-3 [Z79.01]  PAD (peripheral artery disease) (H) [I73.9]           Comments:           Anticoagulation Care Providers     Provider Role Specialty Phone number    Loraine Rees MD Referring Family Medicine [phone]

## 2021-06-14 NOTE — TELEPHONE ENCOUNTER
RN cannot approve Refill Request    RN can NOT refill this medication Protocol failed and NO refill given. Last office visit: 11/2/2018 Loraine Rees MD Last Physical: 12/20/2019 Last MTM visit: Visit date not found Last visit same specialty: 11/2/2018 Loraine Rees MD.  Next visit within 3 mo: Visit date not found  Next physical within 3 mo: Visit date not found      Alicia Tran, [facility] Triage/Med Refill 1/4/2021    Requested Prescriptions   Pending Prescriptions Disp Refills     lisinopriL (PRINIVIL,ZESTRIL) 20 MG tablet [Pharmacy Med Name: Lisinopril 20 MG Oral Tablet] 30 tablet 0     Sig: Take 1 tablet by mouth once daily       Ace Inhibitors Refill Protocol Failed - 1/3/2021 10:12 AM        Failed - Serum Potassium in past 12 months     No results found for: LN-POTASSIUM          Failed - Serum Creatinine in past 12 months     Creatinine   Date Value Ref Range Status   12/20/2019 0.78 0.60 - 1.10 mg/dL Final             Passed - PCP or prescribing provider visit in past 12 months       Last office visit with prescriber/PCP: 11/2/2018 Loraine Rees MD OR same dept: Visit date not found OR same specialty: 11/2/2018 Loraine Rees MD  Last physical: 12/20/2019 Last MTM visit: Visit date not found   Next visit within 3 mo: Visit date not found  Next physical within 3 mo: Visit date not found  Prescriber OR PCP: Loraine Rees MD  Last diagnosis associated with med order: 1. Essential hypertension  - lisinopriL (PRINIVIL,ZESTRIL) 20 MG tablet [Pharmacy Med Name: Lisinopril 20 MG Oral Tablet]; Take 1 tablet by mouth once daily  Dispense: 30 tablet; Refill: 0  - amLODIPine (NORVASC) 5 MG tablet [Pharmacy Med Name: amLODIPine Besylate 5 MG Oral Tablet]; Take 1 tablet by mouth once daily  Dispense: 30 tablet; Refill: 0    2. PAD (peripheral artery disease) (H)  - gabapentin (NEURONTIN) 100 MG capsule [Pharmacy Med Name: Gabapentin 100 MG Oral Capsule]; TAKE 1 CAPSULE BY MOUTH THREE TIMES  DAILY  Dispense: 90 capsule; Refill: 0    3. Generalized anxiety disorder  - citalopram (CELEXA) 20 MG tablet [Pharmacy Med Name: Citalopram Hydrobromide 20 MG Oral Tablet]; Take 1 tablet by mouth once daily  Dispense: 30 tablet; Refill: 0    4. Urge incontinence of urine  - tolterodine (DETROL LA) 2 MG ER capsule [Pharmacy Med Name: Tolterodine Tartrate ER 2 MG Oral Capsule Extended Release 24 Hour]; Take 1 capsule by mouth once daily  Dispense: 30 capsule; Refill: 0    5. Gastroesophageal reflux disease without esophagitis  - famotidine (PEPCID) 20 MG tablet [Pharmacy Med Name: Famotidine 20 MG Oral Tablet]; Take 1 tablet by mouth twice daily  Dispense: 180 tablet; Refill: 0    If protocol passes may refill for 12 months if within 3 months of last provider visit (or a total of 15 months).             Passed - Blood pressure filed in past 12 months     BP Readings from Last 1 Encounters:   07/30/20 168/75                gabapentin (NEURONTIN) 100 MG capsule [Pharmacy Med Name: Gabapentin 100 MG Oral Capsule] 90 capsule 0     Sig: TAKE 1 CAPSULE BY MOUTH THREE TIMES DAILY       Gabapentin/Levetiracetam/Tiagabine Refill Protocol  Passed - 1/3/2021 10:12 AM        Passed - PCP or prescribing provider visit in past 12 months or next 3 months     Last office visit with prescriber/PCP: 11/2/2018 Loraine Rees MD OR same dept: Visit date not found OR same specialty: 11/2/2018 Loraine Rees MD  Last physical: 12/20/2019 Last MTM visit: Visit date not found   Next visit within 3 mo: Visit date not found  Next physical within 3 mo: Visit date not found  Prescriber OR PCP: Loraine Rees MD  Last diagnosis associated with med order: 1. Essential hypertension  - lisinopriL (PRINIVIL,ZESTRIL) 20 MG tablet [Pharmacy Med Name: Lisinopril 20 MG Oral Tablet]; Take 1 tablet by mouth once daily  Dispense: 30 tablet; Refill: 0  - amLODIPine (NORVASC) 5 MG tablet [Pharmacy Med Name: amLODIPine Besylate 5 MG Oral Tablet];  Take 1 tablet by mouth once daily  Dispense: 30 tablet; Refill: 0    2. PAD (peripheral artery disease) (H)  - gabapentin (NEURONTIN) 100 MG capsule [Pharmacy Med Name: Gabapentin 100 MG Oral Capsule]; TAKE 1 CAPSULE BY MOUTH THREE TIMES DAILY  Dispense: 90 capsule; Refill: 0    3. Generalized anxiety disorder  - citalopram (CELEXA) 20 MG tablet [Pharmacy Med Name: Citalopram Hydrobromide 20 MG Oral Tablet]; Take 1 tablet by mouth once daily  Dispense: 30 tablet; Refill: 0    4. Urge incontinence of urine  - tolterodine (DETROL LA) 2 MG ER capsule [Pharmacy Med Name: Tolterodine Tartrate ER 2 MG Oral Capsule Extended Release 24 Hour]; Take 1 capsule by mouth once daily  Dispense: 30 capsule; Refill: 0    5. Gastroesophageal reflux disease without esophagitis  - famotidine (PEPCID) 20 MG tablet [Pharmacy Med Name: Famotidine 20 MG Oral Tablet]; Take 1 tablet by mouth twice daily  Dispense: 180 tablet; Refill: 0    If protocol passes may refill for 12 months if within 3 months of last provider visit (or a total of 15 months).                citalopram (CELEXA) 20 MG tablet [Pharmacy Med Name: Citalopram Hydrobromide 20 MG Oral Tablet] 30 tablet 0     Sig: Take 1 tablet by mouth once daily       SSRI Refill Protocol  Passed - 1/3/2021 10:12 AM        Passed - PCP or prescribing provider visit in last year     Last office visit with prescriber/PCP: 11/2/2018 Loraine Rees MD OR same dept: Visit date not found OR same specialty: 11/2/2018 Loraine Rees MD  Last physical: 12/20/2019 Last MTM visit: Visit date not found   Next visit within 3 mo: Visit date not found  Next physical within 3 mo: Visit date not found  Prescriber OR PCP: Loraine Rees MD  Last diagnosis associated with med order: 1. Essential hypertension  - lisinopriL (PRINIVIL,ZESTRIL) 20 MG tablet [Pharmacy Med Name: Lisinopril 20 MG Oral Tablet]; Take 1 tablet by mouth once daily  Dispense: 30 tablet; Refill: 0  - amLODIPine (NORVASC) 5 MG  tablet [Pharmacy Med Name: amLODIPine Besylate 5 MG Oral Tablet]; Take 1 tablet by mouth once daily  Dispense: 30 tablet; Refill: 0    2. PAD (peripheral artery disease) (H)  - gabapentin (NEURONTIN) 100 MG capsule [Pharmacy Med Name: Gabapentin 100 MG Oral Capsule]; TAKE 1 CAPSULE BY MOUTH THREE TIMES DAILY  Dispense: 90 capsule; Refill: 0    3. Generalized anxiety disorder  - citalopram (CELEXA) 20 MG tablet [Pharmacy Med Name: Citalopram Hydrobromide 20 MG Oral Tablet]; Take 1 tablet by mouth once daily  Dispense: 30 tablet; Refill: 0    4. Urge incontinence of urine  - tolterodine (DETROL LA) 2 MG ER capsule [Pharmacy Med Name: Tolterodine Tartrate ER 2 MG Oral Capsule Extended Release 24 Hour]; Take 1 capsule by mouth once daily  Dispense: 30 capsule; Refill: 0    5. Gastroesophageal reflux disease without esophagitis  - famotidine (PEPCID) 20 MG tablet [Pharmacy Med Name: Famotidine 20 MG Oral Tablet]; Take 1 tablet by mouth twice daily  Dispense: 180 tablet; Refill: 0    If protocol passes may refill for 12 months if within 3 months of last provider visit (or a total of 15 months).                amLODIPine (NORVASC) 5 MG tablet [Pharmacy Med Name: amLODIPine Besylate 5 MG Oral Tablet] 30 tablet 0     Sig: Take 1 tablet by mouth once daily       Calcium-Channel Blockers Protocol Passed - 1/3/2021 10:12 AM        Passed - PCP or prescribing provider visit in past 12 months or next 3 months     Last office visit with prescriber/PCP: 11/2/2018 Loraine Rees MD OR same dept: Visit date not found OR same specialty: 11/2/2018 Loraine Rees MD  Last physical: 12/20/2019 Last MTM visit: Visit date not found   Next visit within 3 mo: Visit date not found  Next physical within 3 mo: Visit date not found  Prescriber OR PCP: Loraine Rees MD  Last diagnosis associated with med order: 1. Essential hypertension  - lisinopriL (PRINIVIL,ZESTRIL) 20 MG tablet [Pharmacy Med Name: Lisinopril 20 MG Oral Tablet]; Take  1 tablet by mouth once daily  Dispense: 30 tablet; Refill: 0  - amLODIPine (NORVASC) 5 MG tablet [Pharmacy Med Name: amLODIPine Besylate 5 MG Oral Tablet]; Take 1 tablet by mouth once daily  Dispense: 30 tablet; Refill: 0    2. PAD (peripheral artery disease) (H)  - gabapentin (NEURONTIN) 100 MG capsule [Pharmacy Med Name: Gabapentin 100 MG Oral Capsule]; TAKE 1 CAPSULE BY MOUTH THREE TIMES DAILY  Dispense: 90 capsule; Refill: 0    3. Generalized anxiety disorder  - citalopram (CELEXA) 20 MG tablet [Pharmacy Med Name: Citalopram Hydrobromide 20 MG Oral Tablet]; Take 1 tablet by mouth once daily  Dispense: 30 tablet; Refill: 0    4. Urge incontinence of urine  - tolterodine (DETROL LA) 2 MG ER capsule [Pharmacy Med Name: Tolterodine Tartrate ER 2 MG Oral Capsule Extended Release 24 Hour]; Take 1 capsule by mouth once daily  Dispense: 30 capsule; Refill: 0    5. Gastroesophageal reflux disease without esophagitis  - famotidine (PEPCID) 20 MG tablet [Pharmacy Med Name: Famotidine 20 MG Oral Tablet]; Take 1 tablet by mouth twice daily  Dispense: 180 tablet; Refill: 0    If protocol passes may refill for 12 months if within 3 months of last provider visit (or a total of 15 months).             Passed - Blood pressure filed in past 12 months     BP Readings from Last 1 Encounters:   07/30/20 168/75                tolterodine (DETROL LA) 2 MG ER capsule [Pharmacy Med Name: Tolterodine Tartrate ER 2 MG Oral Capsule Extended Release 24 Hour] 30 capsule 0     Sig: Take 1 capsule by mouth once daily       Urinary Incontinence Medications Refill Protocol Passed - 1/3/2021 10:12 AM        Passed - PCP or prescribing provider visit in past 12 months       Last office visit with prescriber/PCP: 11/2/2018 Loraine Rees MD OR same dept: Visit date not found OR same specialty: 11/2/2018 Loraine Rees MD  Last physical: 12/20/2019 Last MTM visit: Visit date not found   Next visit within 3 mo: Visit date not found  Next  physical within 3 mo: Visit date not found  Prescriber OR PCP: Loraine Rees MD  Last diagnosis associated with med order: 1. Essential hypertension  - lisinopriL (PRINIVIL,ZESTRIL) 20 MG tablet [Pharmacy Med Name: Lisinopril 20 MG Oral Tablet]; Take 1 tablet by mouth once daily  Dispense: 30 tablet; Refill: 0  - amLODIPine (NORVASC) 5 MG tablet [Pharmacy Med Name: amLODIPine Besylate 5 MG Oral Tablet]; Take 1 tablet by mouth once daily  Dispense: 30 tablet; Refill: 0    2. PAD (peripheral artery disease) (H)  - gabapentin (NEURONTIN) 100 MG capsule [Pharmacy Med Name: Gabapentin 100 MG Oral Capsule]; TAKE 1 CAPSULE BY MOUTH THREE TIMES DAILY  Dispense: 90 capsule; Refill: 0    3. Generalized anxiety disorder  - citalopram (CELEXA) 20 MG tablet [Pharmacy Med Name: Citalopram Hydrobromide 20 MG Oral Tablet]; Take 1 tablet by mouth once daily  Dispense: 30 tablet; Refill: 0    4. Urge incontinence of urine  - tolterodine (DETROL LA) 2 MG ER capsule [Pharmacy Med Name: Tolterodine Tartrate ER 2 MG Oral Capsule Extended Release 24 Hour]; Take 1 capsule by mouth once daily  Dispense: 30 capsule; Refill: 0    5. Gastroesophageal reflux disease without esophagitis  - famotidine (PEPCID) 20 MG tablet [Pharmacy Med Name: Famotidine 20 MG Oral Tablet]; Take 1 tablet by mouth twice daily  Dispense: 180 tablet; Refill: 0    If protocol passes may refill for 12 months if within 3 months of last provider visit (or a total of 15 months).                famotidine (PEPCID) 20 MG tablet [Pharmacy Med Name: Famotidine 20 MG Oral Tablet] 180 tablet 0     Sig: Take 1 tablet by mouth twice daily       GI Medications Refill Protocol Passed - 1/3/2021 10:12 AM        Passed - PCP or prescribing provider visit in last 12 or next 3 months.     Last office visit with prescriber/PCP: 11/2/2018 Loraine Rees MD OR same dept: Visit date not found OR same specialty: 11/2/2018 Loraine Rees MD  Last physical: 12/20/2019 Last MTM  visit: Visit date not found   Next visit within 3 mo: Visit date not found  Next physical within 3 mo: Visit date not found  Prescriber OR PCP: Loraine Rees MD  Last diagnosis associated with med order: 1. Essential hypertension  - lisinopriL (PRINIVIL,ZESTRIL) 20 MG tablet [Pharmacy Med Name: Lisinopril 20 MG Oral Tablet]; Take 1 tablet by mouth once daily  Dispense: 30 tablet; Refill: 0  - amLODIPine (NORVASC) 5 MG tablet [Pharmacy Med Name: amLODIPine Besylate 5 MG Oral Tablet]; Take 1 tablet by mouth once daily  Dispense: 30 tablet; Refill: 0    2. PAD (peripheral artery disease) (H)  - gabapentin (NEURONTIN) 100 MG capsule [Pharmacy Med Name: Gabapentin 100 MG Oral Capsule]; TAKE 1 CAPSULE BY MOUTH THREE TIMES DAILY  Dispense: 90 capsule; Refill: 0    3. Generalized anxiety disorder  - citalopram (CELEXA) 20 MG tablet [Pharmacy Med Name: Citalopram Hydrobromide 20 MG Oral Tablet]; Take 1 tablet by mouth once daily  Dispense: 30 tablet; Refill: 0    4. Urge incontinence of urine  - tolterodine (DETROL LA) 2 MG ER capsule [Pharmacy Med Name: Tolterodine Tartrate ER 2 MG Oral Capsule Extended Release 24 Hour]; Take 1 capsule by mouth once daily  Dispense: 30 capsule; Refill: 0    5. Gastroesophageal reflux disease without esophagitis  - famotidine (PEPCID) 20 MG tablet [Pharmacy Med Name: Famotidine 20 MG Oral Tablet]; Take 1 tablet by mouth twice daily  Dispense: 180 tablet; Refill: 0    If protocol passes may refill for 12 months if within 3 months of last provider visit (or a total of 15 months).

## 2021-06-14 NOTE — PATIENT INSTRUCTIONS - HE
1. Change Detrol to 1 mg twice daily. OK to skip doses at your discretion.  2. OK to decrease gabapentin slowly if not helping with back pain. Resume if symptoms worsen.  3. You will get calls to schedule MRI and spine specialist.  4. We will notify you of lab results.

## 2021-06-15 NOTE — PROGRESS NOTES
Assessment/Plan:      Diagnoses and all orders for this visit:    Thoracic spine pain  -     Ambulatory referral to Physical Therapy  -     gabapentin (NEURONTIN) 100 MG capsule; Take 300 mg by mouth 3 (three) times a day. Increase as directed to max 300mg TID  Dispense: 270 capsule; Refill: 3    Myofascial pain  -     Ambulatory referral to Physical Therapy  -     baclofen (LIORESAL) 10 MG tablet; Take 0.5-1 tablets (5-10 mg total) by mouth 3 (three) times a day as needed (for muscle spasms).  Dispense: 30 tablet; Refill: 1  -     gabapentin (NEURONTIN) 100 MG capsule; Take 300 mg by mouth 3 (three) times a day. Increase as directed to max 300mg TID  Dispense: 270 capsule; Refill: 3    DDD (degenerative disc disease), thoracic  -     Ambulatory referral to Physical Therapy    Bladder leak  -     Ambulatory referral to Physical Therapy    PAD (peripheral artery disease) (H)        Assessment: Pleasant 55 y.o. female with a history of peripheral arterial disease, questionable blood clotting disorder,Tobacco use, hypertension, hyperlipidemia with:    1.  Chronic at least 5-year history of left-sided thoracic spine and rib pain anteriorly.  This is mostly in the T5-9 distribution and left flank.  She has thoracic degenerative disc disease throughout the thoracic spine with no high-grade central stenosis or foraminal stenosis.  She also has myofascial pain.    2.  Chronic bladder leakage          Discussion:    1.  I discussed the diagnosis and treatment options.  I discussed that this could be primary spine disorder related to muscle imbalance myofascial pain and thoracic degenerative changes.  This could also represent viscerosomatic reflex and she is going to discuss this with her vascular provider to see if there is any significant renal artery stenosis or other arterial cause for her symptoms.  We discussed the options of physical therapy along with medications.    2.  I highly recommend physical therapy for  thoracic and parascapular strengthening stabilization to treat any primary myofascial and thoracic mechanical disorder.  They can also work on the pelvic floor for her urinary leakage.  I will have her see Michelle Bonilla    3.  Trial baclofen as needed for myofascial pain    4.  She may increase the gabapentin slowly to 300 mg 3 times daily.  100 mg capsules provided    5.  Follow-up with me in 2 months in person.      It was our pleasure caring for your patient today, if there any questions or concerns please do not hesitate to contact us.      Subjective:   Patient ID: Nery Whitaker is a 55 y.o. female.    History of Present Illness: Patient presents at the request of Dr. Loraine Rees for evaluation of left-sided thoracic spine and rib pain.  She does have a history of vascular disease with aortofemoral bypass after prior stents were occluded. She reports having pain for at least 5 years with no trauma.  Has been worsening with time.  Pain is a constant pain worse at the end of the day and with activity.  Better with laying down and with Tylenol.  She takes occasional ibuprofen although she is not supposed to take this given warfarin use.  She has been taking gabapentin 100 mg 3 times daily with equivocal improvement.  She rates her pain a 10/10 at worst 7/10 today 5/10 at best.  It does radiate along the lower ribs anterior sternum.  She notes some numbness and tingling along the thoracic paraspinals on the left and pain over the spine itself.  Has not had any physical therapy.  Did have an MRI.    She has no bowel incontinence.  She does report chronic bladder leakage/overactive bladder.  This is not out right incontinence but does leak.  Is on Detrol.    Imaging: MRI report and images were personally reviewed and discussed with the patient.  A plastic model was utilized during the discussion.  MRI of the thoracic spine personally reviewed.  This shows some degenerative disc disease throughout the thoracic  spine with a shallow disc bulge/herniation at T8-9 without any spinal cord compression or foraminal stenosis.  No fractures.      Review of Systems: Complains of some weight loss several years ago nothing recent.  She has some bladder leakage but no bladder incontinence or bowel incontinence.  She has joint pain muscle pain muscle fatigue, she is on warfarin and bruises easily and she has some anxiety.  Denies fevers, headache, change in vision, chest pain, shortness of breath, abdominal pain, nausea, vomiting, skin issues or balance issues.  Remainder of 12 point review systems negative unless listed above.    Past Medical History:   Diagnosis Date     Anemia      Anxiety      Bilateral carotid artery disease (H)      Coronary artery disease      Dyslipidemia      Foreign body in left foot      Heart murmur      Hypertension      Migraines      PAD (peripheral artery disease) (H)        Family History   Problem Relation Age of Onset     Breast cancer Mother      Other Father         vasular problems      No Medical Problems Sister      No Medical Problems Brother      No Medical Problems Son      No Medical Problems Maternal Grandmother      Heart attack Maternal Grandfather      Cancer Paternal Grandmother         lung      Cancer Paternal Grandfather         kidney      Colon cancer Neg Hx      Prostate cancer Neg Hx          Social History     Socioeconomic History     Marital status:      Spouse name: None     Number of children: 1     Years of education: None     Highest education level: None   Occupational History     None   Social Needs     Financial resource strain: None     Food insecurity     Worry: None     Inability: None     Transportation needs     Medical: None     Non-medical: None   Tobacco Use     Smoking status: Current Every Day Smoker     Packs/day: 0.75     Years: 34.00     Pack years: 25.50     Smokeless tobacco: Never Used     Tobacco comment: 15 cigs daily   Substance and Sexual  Activity     Alcohol use: No     Drug use: No     Sexual activity: Yes     Partners: Male     Birth control/protection: Post-menopausal   Lifestyle     Physical activity     Days per week: None     Minutes per session: None     Stress: None   Relationships     Social connections     Talks on phone: None     Gets together: None     Attends Zoroastrian service: None     Active member of club or organization: None     Attends meetings of clubs or organizations: None     Relationship status: None     Intimate partner violence     Fear of current or ex partner: None     Emotionally abused: None     Physically abused: None     Forced sexual activity: None   Other Topics Concern     None   Social History Narrative     None   Social history: She is .  Works as a dental assistant.  She does smoke cigarettes and encourage smoking cessation.  No alcohol.      The following portions of the patient's history were reviewed and updated as appropriate: allergies, current medications, past family history, past medical history, past social history, past surgical history and problem list.      WHO 5: 20    GIACOMO Score: 18      Objective:   Physical Exam:    Vitals:    02/12/21 0758   BP: 138/65   Pulse: (!) 58     There is no height or weight on file to calculate BMI.    General:  Well-appearing female in no acute distress.  Pleasant,   cooperative, and interactive throughout the examination and interview.  CV: No lower extremity edema.  Lymphatics: No cervical lymphadenopathy palpated.  Eyes: sclera clear.  Skin: No rashes or lesions seen.  Respirations unlabored.  MSK: Gait is normal.  Able to heel-toe walk without difficulty.    Negative Romberg.  Spine: normal AP curves of the C, T, and L spine.  Full range of motion in the lumbar spine in flexion and extension..  Palpation: Tenderness to palpation over spinous process T5-9 left paraspinals no tenderness along the right.  Tenderness along the rib cage on the left.  Extremities:  Full range of motion of the shoulders in abduction, elbows, and wrists with no effusions or tenderness to palpation.      Full range of motion of the hips, knees, and ankles from a seated position with no effusions or tenderness to palpation.    Neurologic exam: Mental status: Patient is alert and oriented with normal affect.  Attention, knowledge, memory, and language are intact.  Normal coordination throughout the examination.  Reflexes are 2+ and symmetric biceps, triceps, brachioradialis, patellar, and Achilles with down-going toes and Negative Ligia's.  Sensation is intact to light touch throughout the upper and lower extremities bilaterally and thoracic spine.  Manual muscle testing reveals 5 out of 5 strength in the shoulder abductors, elbow   flexors/extensors, wrist extensors, interosseous, and finger flexors; 5 out of 5   in the h  ankle plantar flexors, ankle   dorsiflexors, and EHL.  Normal muscle bulk and tone.     Negative seated  straight leg raise bilaterally.

## 2021-06-15 NOTE — PATIENT INSTRUCTIONS - HE
1. A physical therapy order was provided for you today.  You can schedule physical therapy before you leave today or will be contacted by physical therapy.  If nobody contacts you within 3 to 5 days, please contact the clinic at [phone].  It will be very important for you to do your physical therapy exercises on a regular basis to decrease your pain and prevent future pain flares.    2. balcofen(muscle relaxant medication) has been prescribed today. Please take 5-10mg three times daily as needed. This medication may cause drowsiness. Please do not work or drive while taking this medication until you know how it effects you. If it does make you drowsy, you should only take it before bedtime or at times that you do not have to work/drive.    3. You may slowly increase gabapentin as directed to 300mg three time daily. Prescribed Gabapentin today, 100 mg tablets, to be titrated up to 3 tablets 3 times a day as tolerated for your nerve pain. Please follow Gabapentin dosing chart below.    Gabapentin 100mg Dosing Chart    DATE  MORNING AFTERNOON BEDTIME    Day 1 0 0 1    Day 2 0 0 1    Day 3 0 0 1    Day 4 1 0 1    Day 5 1 0 1    Day 6 1 0 1    Day 7 1 1 1    Day 8 1 1 1    Day 9 1 1 1    Day 10 1 1 2    Day 11 1 1 2    Day 12 1 1 2    Day 13 2 1 2    Day 14 2 1 2    Day 15 2 1 2    Day 16 2 2 2    Day 17 2 2 2    Day 18 2 2 2    Day 19 2 2 3    Day 20 2 2 3    Day 21 2 2 3    Day 22 3 2 3    Day 23 3 2 3    Day 24 3 2 3    Day 25 3 3 3    Day 26 3 3 3    Day 27 3 3 3     Continue medication, taking 3 capsules three times daily    Please call if you have any questions regarding how to take your medication  Clinic Phone # [phone]

## 2021-06-15 NOTE — TELEPHONE ENCOUNTER
ANTICOAGULATION  MANAGEMENT    Assessment     Today's INR result of 2.6 is Therapeutic (goal INR of 2.0-3.0)        Warfarin taken as previously instructed    No new diet changes affecting INR    No new medication/supplements affecting INR    Continues to tolerate warfarin with no reported s/s of bleeding or thromboembolism     Previous INR was Therapeutic    Plan:     Spoke on phone with Nery regarding INR result and instructed:      Warfarin Dosing Instructions:  Continue current warfarin dose 7.5 mg daily on Wed; and 5 mg daily rest of week  (0 % change)    Instructed patient to follow up no later than: 6 weeks    Education provided: importance of therapeutic range, target INR goal and significance of current INR result, importance of following up for INR monitoring at instructed interval and importance of taking warfarin as instructed    Nery verbalizes understanding and agrees to warfarin dosing plan.    Instructed to call the [facility] for any changes, questions or concerns. (#[phone])   ?   Marybel Montanez RN    Subjective/Objective:      Nery Whitaker, a 55 y.o. female is on warfarin. Nery Gabriel reports:     Home warfarin dose: verbally confirmed home dose with Nery and updated on anticoagulation calendar     Missed doses: No     Medication changes:  No     S/S of bleeding or thromboembolism:  No     New Injury or illness:  No     Changes in diet or alcohol consumption:  No     Upcoming surgery, procedure or cardioversion:  No    Anticoagulation Episode Summary     Current INR goal:  2.0-3.0   TTR:  70.4 % (1 y)   Next INR check:  3/31/2021   INR from last check:  2.60 (2/17/2021)   Weekly max warfarin dose:     Target end date:     INR check location:     Preferred lab:     Send INR reminders to:  BERNICE MARTINI    Indications    Anticoagulation monitoring  INR range 2-3 [Z79.01]  PAD (peripheral artery disease) (H) [I73.9]           Comments:           Anticoagulation Care  Providers     Provider Role Specialty Phone number    Loraine Rees MD Referring Family Medicine [phone]        Marybel Doll RN, BSN  Anticoagulation Clinic

## 2021-06-16 ENCOUNTER — COMMUNICATION - HEALTHEAST (OUTPATIENT)
Dept: ANTICOAGULATION | Facility: CLINIC | Age: 56
End: 2021-06-16

## 2021-06-16 ENCOUNTER — AMBULATORY - HEALTHEAST (OUTPATIENT)
Dept: LAB | Facility: CLINIC | Age: 56
End: 2021-06-16

## 2021-06-16 DIAGNOSIS — I73.9 PAD (PERIPHERAL ARTERY DISEASE) (H): ICD-10-CM

## 2021-06-16 DIAGNOSIS — Z79.01 ANTICOAGULATION MONITORING, INR RANGE 2-3: ICD-10-CM

## 2021-06-16 LAB — INR PPP: 2.5 (ref 0.9–1.1)

## 2021-06-16 NOTE — TELEPHONE ENCOUNTER
Reason for Call: Patient states all of  her RX's have been denied refills per [facility].      Detailed comments:   amLODIPine (NORVASC) 5 MG tablet 90 tablet     lisinopriL (PRINIVIL,ZESTRIL) 20 MG tablet 90 tablet     tolterodine (DETROL) 1 MG tablet 180 tablet     citalopram (CELEXA) 20 MG tablet 90 tablet     famotidine (PEPCID) 20 MG tablet 180 tablet     gabapentin (NEURONTIN) 100 MG capsule 270 capsule     Date of last PC 1/2021      Phone Number Patient can be reached at: Home number on file [phone] (home)    Best Time: any    Can we leave a detailed message on this number?: Yes    Call taken on 4/8/2021 at 1:37 PM by Heather Sanchez

## 2021-06-16 NOTE — TELEPHONE ENCOUNTER
Telephone Encounter by Radha Castillo RN at 11/22/2019 10:51 AM     Author: Radha Castillo RN Service: -- Author Type: Registered Nurse    Filed: 11/22/2019 10:58 AM Encounter Date: 11/22/2019 Status: Attested    : Radha Castillo RN (Registered Nurse) Cosigner: Loraine Rees MD at 11/22/2019 12:22 PM    Attestation signed by Loraine Rees MD at 11/22/2019 12:22 PM    Agree                ANTICOAGULATION  MANAGEMENT    Assessment     Today's INR result of 1.8 is Subtherapeutic (goal INR of 2.0-3.0)        Warfarin taken as previously instructed    No new diet changes affecting INR    No new medication/supplements affecting INR    Continues to tolerate warfarin with no reported s/s of bleeding or thromboembolism     Previous INR was Supratherapeutic at this same dose    Patient does say she has had increased stress lately    Plan:     Spoke with Nery regarding INR result and instructed:     Warfarin Dosing Instructions:  Continue current warfarin dose 7.5 mg daily on Fri; and 5 mg daily rest of week      Instructed patient to follow up no later than: 2 weeks    Education provided: importance of consistent vitamin K intake, importance of therapeutic range and target INR goal and significance of current INR result    Nery verbalizes understanding and agrees to warfarin dosing plan.    Instructed to call the [facility] for any changes, questions or concerns. (#[phone])   ?   Radha Castillo RN    Subjective/Objective:      Nery Whitaker, a 54 y.o. female is on warfarin.     Nery reports:     Home warfarin dose: as updated on anticoagulation calendar per template     Missed doses: No     Medication changes:  No     S/S of bleeding or thromboembolism:  No     New Injury or illness:  No     Changes in diet or alcohol consumption:  No. With last INR patient had talked to [facility] about increasing her Vit K foods a bit. Nery says she did NOT do this, her Vit K intake has been about  the same. She says she does not eat a lot of vegetables but when she does they are consistently the same kind and same amounts     Upcoming surgery, procedure or cardioversion:  No    Anticoagulation Episode Summary     Current INR goal:   2.0-3.0   TTR:   72.3 % (1 y)   Next INR check:   12/6/2019   INR from last check:   1.80! (11/22/2019)   Weekly max warfarin dose:      Target end date:      INR check location:      Preferred lab:      Send INR reminders to:   BERNICE MARTINI    Indications    Anticoagulation monitoring  INR range 2-3 [Z79.01]  PAD (peripheral artery disease) (H) [I73.9]           Comments:            Anticoagulation Care Providers     Provider Role Specialty Phone number    Loraine Rees MD Referring Family Medicine [phone]

## 2021-06-16 NOTE — TELEPHONE ENCOUNTER
Telephone Encounter by Maribell Marion RN at 3/13/2020 11:38 AM     Author: Maribell Marion RN Service: -- Author Type: Registered Nurse    Filed: 3/13/2020 11:53 AM Encounter Date: 3/13/2020 Status: Attested Addendum    : Maribell Marion RN (Registered Nurse)    Related Notes: Original Note by Maribell Marion RN (Registered Nurse) filed at 3/13/2020 11:50 AM    Cosigner: Marybel Kramer MD at 3/13/2020 12:30 PM    Attestation signed by Marybel Kramer MD at 3/13/2020 12:30 PM    Agree with advice given                ANTICOAGULATION  MANAGEMENT    Assessment     Today's INR result of 2.1 is Therapeutic (goal INR of 2.0-3.0)        Warfarin taken as previously instructed    No new diet changes affecting INR     Pain from tooth abscess and taking tylenol may be affecting INR    Day 5/10 Amoxicillin doses for tooth abscess    Interaction between amoxicillin, tylenol and warfarin may be affecting INR    Continues to tolerate warfarin with no reported s/s of bleeding or thromboembolism     Previous INR was Therapeutic    Plan:     Spoke with Nery regarding INR result and instructed:     Warfarin Dosing Instructions:  Continue current warfarin dose    7.5 mg every Wed; 5 mg all other days      (0 % change)    Instructed patient to follow up no later than: 2 weeks after completing antibiotic doses. Appointment made.  Instructed to have INR check sooner if signs and symptoms of bleeding is noted.    Education provided: importance of therapeutic range, target INR goal and significance of current INR result and potential interaction between warfarin and amoxicillin    Nery verbalizes understanding and agrees to warfarin dosing plan.    Instructed to call the [facility] for any changes, questions or concerns. (#[phone])   ?   Maribell Marion RN    Subjective/Objective:      Nery Whitaker, a 54 y.o. female is on warfarin.     Nery reports:     Home warfarin dose: as updated on  anticoagulation calendar per template     Missed doses: No     Medication changes:  Yes: tooth abscess - amoxicillin 875 mg 3 times a day started on 3/9 for total of 10 days  Taking tylenol for pain.     S/S of bleeding or thromboembolism:  No     New Injury or illness:  Yes: still having pain from tooth abscess     Changes in diet or alcohol consumption:  No     Upcoming surgery, procedure or cardioversion:  No    Anticoagulation Episode Summary     Current INR goal:   2.0-3.0   TTR:   74.6 % (1 y)   Next INR check:   4/3/2020   INR from last check:   2.10 (3/13/2020)   Weekly max warfarin dose:      Target end date:      INR check location:      Preferred lab:      Send INR reminders to:   BERNICE MARTINI    Indications    Anticoagulation monitoring  INR range 2-3 [Z79.01]  PAD (peripheral artery disease) (H) [I73.9]           Comments:            Anticoagulation Care Providers     Provider Role Specialty Phone number    Loraine Rees MD Referring Family Medicine [phone]

## 2021-06-16 NOTE — TELEPHONE ENCOUNTER
Telephone Encounter by Marybel Barahona RN at 10/4/2019 11:51 AM     Author: Marybel Barahona RN Service: -- Author Type: Registered Nurse    Filed: 10/4/2019 12:33 PM Encounter Date: 10/4/2019 Status: Attested    : Marybel Barahona RN (Registered Nurse) Cosigner: Jeff Rosado DO at 10/7/2019  4:19 PM    Attestation signed by Jeff Rosado DO at 10/7/2019  4:19 PM    Agree. Thank you.                ANTICOAGULATION  MANAGEMENT    Assessment     Today's INR result of 3.20 is Supratherapeutic (goal INR of 2.0-3.0)        Warfarin taken as previously instructed    No new diet changes affecting INR    No new medication/supplements affecting INR    Continues to tolerate warfarin with no reported s/s of bleeding or thromboembolism     Previous INR was Therapeutic    Plan:     Left a detailed message for Nery regarding INR result and instructed:     Warfarin Dosing Instructions:  Continue current warfarin dose 7.5 mg daily on FRI; and 5 mg daily rest of week  (0 % change)    Instructed patient to follow up no later than: 2 weeks    Education provided: target INR goal and significance of current INR result    Instructed to call the [facility] for any changes, questions or concerns. (#[phone])   ?   Marybel Barahona RN    Subjective/Objective:      Nery Whitaker, a 54 y.o. female is on warfarin.     Nery reports:     Home warfarin dose: as updated on anticoagulation calendar per template     Missed doses: No     Medication changes:  No     S/S of bleeding or thromboembolism:  No     New Injury or illness:  No     Changes in diet or alcohol consumption:  No     Upcoming surgery, procedure or cardioversion:  No    Anticoagulation Episode Summary     Current INR goal:   2.0-3.0   TTR:   74.2 %   Next INR check:   10/18/2019   INR from last check:   3.20! (10/4/2019)   Weekly max warfarin dose:      Target end date:      INR check location:      Preferred lab:      [facility]    Indications    Anticoagulation monitoring  INR range 2-3 [Z79.01]  PAD (peripheral artery disease) (H) [I73.9]           Comments:            Anticoagulation Care Providers     Provider Role Specialty Phone number    Loraine Rees MD Referring Family Medicine [phone]

## 2021-06-16 NOTE — TELEPHONE ENCOUNTER
ANTICOAGULATION  MANAGEMENT    Assessment     Today's INR result of 2.40 is Therapeutic (goal INR of 2.0-3.0)        Warfarin taken as previously instructed    No new diet changes affecting INR    No new medication/supplements affecting INR    Continues to tolerate warfarin with no reported s/s of bleeding or thromboembolism     Previous INR was Subtherapeutic at 1.90 on 3/31/21.    Plan:     Left detailed message 3x for Nery regarding INR result and instructed:      Warfarin Dosing Instructions:    - Continue current warfarin dose 7.5 mg daily on Wed/Sat; and 5 mg daily rest of week.    Instructed patient to follow up no later than:  1-2 wks.    Education provided: importance of consistent vitamin K intake and target INR goal and significance of current INR result    Instructed to call the [facility] for any changes, questions or concerns. (#[phone])   ?   Nicki Rosa RN    Subjective/Objective:      Nery Whitaker, a 55 y.o. female is on warfarin. Nery      Nery reports:     Home warfarin dose: as updated on anticoagulation calendar per template     Missed doses: No     Medication changes:  No     S/S of bleeding or thromboembolism:  No     New Injury or illness:  No     Changes in diet or alcohol consumption:  No     Upcoming surgery, procedure or cardioversion:  No    Anticoagulation Episode Summary     Current INR goal:  2.0-3.0   TTR:  67.8 % (1 y)   Next INR check:  4/30/2021   INR from last check:  2.40 (4/16/2021)   Weekly max warfarin dose:     Target end date:     INR check location:     Preferred lab:     Send INR reminders to:  BERNICE MARTINI    Indications    Anticoagulation monitoring  INR range 2-3 [Z79.01]  PAD (peripheral artery disease) (H) [I73.9]           Comments:           Anticoagulation Care Providers     Provider Role Specialty Phone number    Loraine Rees MD Referring Family Medicine [phone]

## 2021-06-16 NOTE — TELEPHONE ENCOUNTER
Telephone Encounter by Marti Sheridan RN at 7/26/2019  9:26 AM     Author: Marti Sheridan RN Service: -- Author Type: Registered Nurse    Filed: 7/26/2019  9:31 AM Encounter Date: 7/26/2019 Status: Attested    : Marti Sheridan RN (Registered Nurse) Cosigner: Jeff Rosado DO at 7/26/2019 12:48 PM    Attestation signed by Jeff Rosado DO at 7/26/2019 12:48 PM    Noted. Thank you.                ANTICOAGULATION  MANAGEMENT    Assessment     Today's INR result of 2.0 is Therapeutic (goal INR of 2.0-3.0)        Warfarin taken as previously instructed    Increased greens/vitamin K intake may be affecting INR    No new medication/supplements affecting INR    Continues to tolerate warfarin with no reported s/s of bleeding or thromboembolism     Previous INR was Therapeutic    Plan:     Spoke with Nery regarding INR result and instructed:     Warfarin Dosing Instructions:  Continue current warfarin dose 7.5 mg daily on Fridays; and 5 mg daily rest of week  (0 % change)    Instructed patient to follow up no later than: one month.    Education provided: importance of therapeutic range, importance of following up for INR monitoring at instructed interval and importance of taking warfarin as instructed    Nery verbalizes understanding and agrees to warfarin dosing plan.    In[facility]ed to call [facility] for any changes, questions or concerns. (#[phone])   ?   Marti Sheridan RN    Subjective/Objective:      Nery Whitaker, a 54 y.o. female is on warfarin.     Nery reports:     Home warfarin dose: verbally confirmed home dose with Nery and updated on anticoagulation calendar     Missed doses: No     Medication changes:  No     S/S of bleeding or thromboembolism:  No     New Injury or illness:  No     Changes in diet or alcohol consumption:  Yes: she did eat more greens     Upcoming surgery, procedure or cardioversion:  No    Anticoagulation Episode  Summary     Current INR goal:   2.0-3.0   TTR:   62.3 % (1.3 y)   Next INR check:   8/23/2019   INR from last check:   2.00 (7/26/2019)   Weekly max warfarin dose:      Target end date:      INR check location:      Preferred lab:      Send INR reminders to:   BERNICE MARTINI    Indications    Anticoagulation monitoring  INR range 2-3 [Z79.01]  PAD (peripheral artery disease) (H) [I73.9]           Comments:            Anticoagulation Care Providers     Provider Role Specialty Phone number    Loraine Rees MD Referring Family Medicine [phone]

## 2021-06-17 NOTE — TELEPHONE ENCOUNTER
Telephone Encounter by Celina Hoang RN at 3/31/2021  4:56 PM     Author: Celina Hoang RN Service: -- Author Type: Registered Nurse    Filed: 3/31/2021  5:54 PM Encounter Date: 3/31/2021 Status: Signed    : Celina Hoang RN (Registered Nurse)       ANTICOAGULATION  MANAGEMENT    Assessment     Today's INR result of 1.9 is Subtherapeutic (goal INR of 2.0-3.0)        Warfarin taken as previously instructed    No new diet changes affecting INR    No new medication/supplements affecting INR    Continues to tolerate warfarin with no reported s/s of bleeding or thromboembolism     Previous INR was Therapeutic    Plan:     Spoke on phone with Nery regarding INR result and instructed:      Warfarin Dosing Instructions:  Change warfarin dose to 7.5 mg daily on wed/sat; and 5 mg daily rest of week  (6.7 % change)    Instructed patient to follow up no later than: two weeks      Nery verbalizes understanding and agrees to warfarin dosing plan.    Instructed to call the [facility] for any changes, questions or concerns. (#[phone])   ?   Celina Hoang RN    Subjective/Objective:      Nery Whitaker, a 55 y.o. female is on warfarin. Nery Gabriel reports:     Home warfarin dose: verbally confirmed home dose with Nery and updated on anticoagulation calendar     Missed doses: No     Medication changes:  No     S/S of bleeding or thromboembolism:  No     New Injury or illness:  No     Changes in diet or alcohol consumption:  No     Upcoming surgery, procedure or cardioversion:  No    Anticoagulation Episode Summary     Current INR goal:  2.0-3.0   TTR:  68.7 % (1 y)   Next INR check:  4/14/2021   INR from last check:  1.90 (3/31/2021)   Weekly max warfarin dose:     Target end date:     INR check location:     Preferred lab:     Send INR reminders to:  BERNICE MARTINI    Indications    Anticoagulation monitoring  INR range 2-3 [Z79.01]  PAD (peripheral artery disease) (H) [I73.9]           Comments:            Anticoagulation Care Providers     Provider Role Specialty Phone number    Loraine Rees MD Referring Family Medicine [phone]

## 2021-06-17 NOTE — PROGRESS NOTES
Assessment / Plan:     Problem List Items Addressed This Visit     Anticoagulation monitoring, INR range 2-3     Referral placed to [facility] clinic for management.  Continue goal of 2-3.         Relevant Medications    warfarin (COUMADIN) 5 MG tablet    Other Relevant Orders    INR (Completed)    Ambulatory referral to Anticoagulation Monitoring    Bilateral carotid artery disease     Continue aggressive risk factor management.         Dyslipidemia     Increase atorvastatin to 80 mg daily.  Recheck labs in 3 months.         Relevant Medications    atorvastatin (LIPITOR) 40 MG tablet    HTN (hypertension) - Primary     Well controlled.  Continue current medications.         Relevant Medications    lisinopril (PRINIVIL,ZESTRIL) 20 MG tablet    amLODIPine (NORVASC) 5 MG tablet    PAD (peripheral artery disease)    Relevant Medications    warfarin (COUMADIN) 5 MG tablet    Other Relevant Orders    Ambulatory referral to Anticoagulation Monitoring    Tobacco use disorder     Encouraged smoking cessation.  Patient is not interested in this at this time.           Other Visit Diagnoses     Left cervical lymphadenopathy / Weight Loss / Tobacco Use      Relevant Orders    XR Chest 2 Views (Completed)    Comprehensive Metabolic Panel (Completed)    HM1(CBC and Differential) (Completed)    HM1 (CBC with Diff) (Completed)    CT Chest Abdomen Pelvis With Oral With IV Cont    CT Soft Tissue Neck With and Without Contrast    Screening mammogram, encounter for        Relevant Orders    Mammo Screening Bilateral        Given her weight loss, persistent lymphadenopathy, and tobacco use I am going to order a CT scan of the chest abdomen and pelvis as well as the soft tissue of her neck.  Other chronic medical problems are stable.  Continue management as above.  Encourage smoking cessation and the patient is not interested that at this time.      Subjective:      Nery Whitaker is a 52 y.o. female who presents for an annual exam. The  patient is sexually active. The patient participates in regular exercise: yes. The patient reports that there is not domestic violence in her life.     She is complaining of pain in the left shoulder and upper arm.  She is also having tingling into the hand.  She reports the pain has been present for over a year.  She has been seen outside of [facility] for these issues.  She had spine evaluation which showed arthritis in the back.  She was seen by a spine specialist.  She hasn't had any rash.  She was seen by another provider who started her on gabapentin when has helped with the pain on her left side but the shoulder and arm pain has been worse for about two months.    She is also complaining of a lump on the left side of her neck for about 3 months.  She was seen at [facility] about a week ago and strep was negative.  She does get pain into her ear at times with it as well.  She did have an ultrasound of her carotid arteries which she reports showed some blockage but no treatment was recommended.      She also needs her INR checked today.  She reports her goal is 2-3.  She is currently taking 5 mg daily except for 7.5 mg on Fridays.    Healthy Habits:   Regular Exercise: Yes  Sunscreen Use: Yes  Healthy Diet: Yes  Dental Visits Regularly: Yes  Seat Belt: Yes  Sexually active: Yes  Self Breast Exam Monthly:Yes  Colonoscopy: Yes  Prevention of Osteoporosis: No  Last Dexa: No  Guns at Home:  No      Immunization History   Administered Date(s) Administered     Hep B, Adult 10/13/2008, 10/13/2008     Influenza, Seasonal, Inj PF IIV3 10/07/2011, 11/09/2015     Influenza, inj, historic,unspecified 10/28/2009, 10/06/2010, 10/07/2011, 11/05/2012, 12/01/2013, 09/23/2014, 10/21/2016, 08/01/2017     Influenza,seasonal quad, PF, 36+MOS 10/21/2016     Influenza,seasonal, Inj IIV3 10/06/2010, 11/05/2012, 11/30/2013, 09/23/2014     Pneumo Polysac 23-V 04/19/2011     Td, adult adsorbed, PF 01/15/1999, 10/21/2016      Td,adult,historic,unspecified 10/21/2016     Tdap 01/23/2007     Current Outpatient Prescriptions   Medication Sig Dispense Refill     amLODIPine (NORVASC) 5 MG tablet        aspirin 81 MG EC tablet Take 81 mg by mouth.       atorvastatin (LIPITOR) 40 MG tablet Take 40 mg by mouth.       folic acid (FOLVITE) 1 MG tablet Take 1 mg by mouth.       gabapentin (NEURONTIN) 100 MG capsule        lisinopril (PRINIVIL,ZESTRIL) 20 MG tablet        ranitidine (ZANTAC) 150 MG tablet        warfarin (COUMADIN) 5 MG tablet 5 mg (5 mg x 1) on Fri; 7.5 mg (5 mg x 1.5) all other days or as directed by INR clinic       omeprazole (PRILOSEC) 20 MG capsule Take 1 capsule (20 mg total) by mouth Daily before breakfast. 14 capsule 0       Past Medical History:   Diagnosis Date     PAD (peripheral artery disease)      Past Surgical History:   Procedure Laterality Date     APPENDECTOMY       ARTERIAL BYPASS SURGERY  2006     CHOLECYSTECTOMY       FEMORAL ARTERY STENT  2006     TONSILLECTOMY       TYMPANOSTOMY TUBE PLACEMENT       Ezetimibe; Lovastatin; and Simvastatin  History reviewed. No pertinent family history.  Social History     Social History     Marital status:      Spouse name: N/A     Number of children: N/A     Years of education: N/A     Occupational History     Not on file.     Social History Main Topics     Smoking status: Current Every Day Smoker     Smokeless tobacco: Never Used     Alcohol use No     Drug use: No     Sexual activity: Yes     Partners: Male     Other Topics Concern     Not on file     Social History Narrative       Review of Systems  Review of Systems   Constitutional: Positive for unexpected weight change (see has lost 20 lb in 6 months without effort). Negative for activity change, appetite change and fever.   HENT: Negative for congestion, hearing loss and sore throat.    Eyes: Negative for discharge and visual disturbance.   Respiratory: Negative for cough, shortness of breath and wheezing.   "  Cardiovascular: Negative for chest pain, palpitations and leg swelling.   Gastrointestinal: Negative for abdominal pain, blood in stool, constipation, diarrhea and vomiting.   Endocrine: Negative for polydipsia and polyuria.   Genitourinary: Negative for decreased urine volume, difficulty urinating, dysuria, frequency, hematuria and urgency.   Musculoskeletal: Negative for gait problem.   Skin: Negative for rash.   Allergic/Immunologic: Negative for immunocompromised state.   Neurological: Negative for weakness.   Hematological: Does not bruise/bleed easily.   Psychiatric/Behavioral: Negative for dysphoric mood and sleep disturbance. The patient is not nervous/anxious.              Objective:         Vitals:    04/11/18 1525   BP: 118/66   Pulse: (!) 53   Resp: 12   Temp: 98.2  F (36.8  C)   TempSrc: Oral   SpO2: 97%   Weight: 129 lb (58.5 kg)   Height: 5' 5.5\" (1.664 m)     Body mass index is 21.14 kg/(m^2).    Physical  Physical Exam   Constitutional: She is oriented to person, place, and time. She appears well-nourished. No distress.   HENT:   Right Ear: Tympanic membrane and ear canal normal.   Left Ear: Tympanic membrane and ear canal normal.   Mouth/Throat: Oropharynx is clear and moist.   Neck: Normal range of motion. Neck supple. No thyromegaly present.   Cardiovascular: Normal rate and regular rhythm.    No murmur heard.  Pulmonary/Chest: Effort normal and breath sounds normal. No respiratory distress. She has no wheezes. She has no rales.   Abdominal: Soft. Bowel sounds are normal. There is no hepatosplenomegaly. There is no tenderness.   Lymphadenopathy:     She has cervical adenopathy.        Left cervical: Deep cervical adenopathy present.        Left: Supraclavicular adenopathy present.   Cervical lymph node is firm in nature.   Neurological: She is alert and oriented to person, place, and time. No cranial nerve deficit.   Skin: No rash noted.   Psychiatric: She has a normal mood and affect.      50 " minutes spent with the patient, over 1/2 in counseling.

## 2021-06-17 NOTE — TELEPHONE ENCOUNTER
ANTICOAGULATION  MANAGEMENT    Assessment     Today's INR result of 2.8 is Therapeutic (goal INR of 2.0-3.0)        Warfarin taken as previously instructed    No new diet changes affecting INR    No new medication/supplements affecting INR    Continues to tolerate warfarin with no reported s/s of bleeding or thromboembolism     Previous INR was Therapeutic    Plan:     Left detailed message for Nery regarding INR result and instructed:      Warfarin Dosing Instructions:  Continue current warfarin dose    7.5 mg every Wed, Sat; 5 mg all other days         Instructed patient to follow up no later than: 1 month    Education provided: target INR goal and significance of current INR result        Instructed to call the [facility] for any changes, questions or concerns. (#[phone])   ?   Radha Castillo RN    Subjective/Objective:      Nery Sanchezchas, a 55 y.o. female is on warfarin. Nery Gabriel reports:     Home warfarin dose: as updated on anticoagulation calendar per template     Missed doses: No     Medication changes:  No     S/S of bleeding or thromboembolism:  No     New Injury or illness:  No     Changes in diet or alcohol consumption:  No     Upcoming surgery, procedure or cardioversion:  No    Anticoagulation Episode Summary     Current INR goal:  2.0-3.0   TTR:  74.4 % (1 y)   Next INR check:  6/11/2021   INR from last check:  2.80 (5/14/2021)   Weekly max warfarin dose:     Target end date:     INR check location:     Preferred lab:     Send INR reminders to:  BERNICE MARTINI    Indications    Anticoagulation monitoring  INR range 2-3 [Z79.01]  PAD (peripheral artery disease) (H) [I73.9]           Comments:           Anticoagulation Care Providers     Provider Role Specialty Phone number    Loraine Rees MD Referring Family Medicine [phone]

## 2021-06-17 NOTE — TELEPHONE ENCOUNTER
Telephone Encounter by Debo Paredes RN at 11/6/2020  2:13 PM     Author: Debo Paredes RN Service: -- Author Type: Registered Nurse    Filed: 11/6/2020  2:27 PM Encounter Date: 11/6/2020 Status: Signed    : Debo Paredes RN (Registered Nurse)       ANTICOAGULATION  MANAGEMENT    Assessment     Today's INR result of 3.2 is Supratherapeutic (goal INR of 2.0-3.0)        Warfarin taken as previously instructed    Decreased greens/vitamin K intake may be affecting INR - reports would like to resume     No new medication/supplements affecting INR    Continues to tolerate warfarin with no reported s/s of bleeding or thromboembolism     Previous INR was Supratherapeutic    Plan:     Spoke on phone with Nery regarding INR result and instructed:     Warfarin Dosing Instructions:  Continue current warfarin dose 7.5 mg daily on Wednesdays; and 5 mg daily rest of week  (0 % change)    Instructed patient to follow up no later than: 2 weeks     Education provided: importance of consistent vitamin K intake, importance of following up for INR monitoring at instructed interval, importance of taking warfarin as instructed, importance of notifying clinic for changes in medications, when to seek medical attention/emergency care and importance of notifying clinic for diarrhea, nausea/vomiting, reduced intake and/or illness    Nery verbalizes understanding and agrees to warfarin dosing plan.    Instructed to call the [facility] for any changes, questions or concerns. (#[phone])   ?   Debo Paredes RN    Subjective/Objective:      Nery Whitaker, a 55 y.o. female is on warfarin.     Nery reports:     Home warfarin dose: verbally confirmed home dose with Nery  and updated on anticoagulation calendar     Missed doses: No     Medication changes:  No     S/S of bleeding or thromboembolism:  No     New Injury or illness:  No     Changes in diet or alcohol consumption:  Yes, less greens      Upcoming  surgery, procedure or cardioversion:  No    Anticoagulation Episode Summary     Current INR goal:  2.0-3.0   TTR:  62.0 % (1 y)   Next INR check:  11/20/2020   INR from last check:  3.20 (11/6/2020)   Weekly max warfarin dose:     Target end date:     INR check location:     Preferred lab:     Send INR reminders to:  BERNICE MARTINI    Indications    Anticoagulation monitoring  INR range 2-3 [Z79.01]  PAD (peripheral artery disease) (H) [I73.9]           Comments:           Anticoagulation Care Providers     Provider Role Specialty Phone number    Loraine Rees MD Referring Family Medicine [phone]

## 2021-06-17 NOTE — TELEPHONE ENCOUNTER
Telephone Encounter by Debo Paredes RN at 10/23/2020 11:13 AM     Author: Debo Paredes RN Service: -- Author Type: Registered Nurse    Filed: 10/23/2020 12:26 PM Encounter Date: 10/23/2020 Status: Signed    : Debo Paredes RN (Registered Nurse)       ANTICOAGULATION  MANAGEMENT    Assessment     Today's INR result of 3.2 is Supratherapeutic (goal INR of 2.0-3.0)        Warfarin taken as previously instructed    No new diet changes affecting INR    No new medication/supplements affecting INR    Continues to tolerate warfarin with no reported s/s of bleeding or thromboembolism     Previous INR was Therapeutic     Patient requesting early fill on medications, reports she is unable to fill until 10/29 (day of travel) writer suggested to call pharmacy to see if fill early due to travel.     Plan:     Spoke on phone with Nery regarding INR result and instructed:     Warfarin Dosing Instructions:  Continue current warfarin dose 7.5 mg daily on Wednesdays; and 5 mg daily rest of week  (0 % change)    Instructed patient to follow up no later than: 2 weeks     Education provided: importance of therapeutic range, target INR goal and significance of current INR result, importance of following up for INR monitoring at instructed interval, importance of taking warfarin as instructed, importance of notifying clinic for changes in medications, when to seek medical attention/emergency care and importance of notifying clinic for diarrhea, nausea/vomiting, reduced intake and/or illness    Nery verbalizes understanding and agrees to warfarin dosing plan.    Instructed to call the [facility] for any changes, questions or concerns. (#[phone])   ?   Debo Paredes RN    Subjective/Objective:      Nery Whitaker, a 55 y.o. female is on warfarin.     Nery reports:     Home warfarin dose: verbally confirmed home dose with Nery and updated on anticoagulation calendar     Missed doses: No     Medication  changes:  No     S/S of bleeding or thromboembolism:  No     New Injury or illness:  No     Changes in diet or alcohol consumption:  No     Upcoming surgery, procedure or cardioversion:  No    Anticoagulation Episode Summary     Current INR goal:  2.0-3.0   TTR:  62.7 % (1 y)   Next INR check:  11/6/2020   INR from last check:  3.20 (10/23/2020)   Weekly max warfarin dose:     Target end date:     INR check location:     Preferred lab:     Send INR reminders to:  BERNICE MARTINI    Indications    Anticoagulation monitoring  INR range 2-3 [Z79.01]  PAD (peripheral artery disease) (H) [I73.9]           Comments:           Anticoagulation Care Providers     Provider Role Specialty Phone number    Loraine Rees MD Referring Family Medicine [phone]

## 2021-06-17 NOTE — TELEPHONE ENCOUNTER
Telephone Encounter by Mickie Paredes RN at 4/1/2020 11:37 AM     Author: Mickie Paredes RN Service: -- Author Type: Registered Nurse    Filed: 4/1/2020 11:47 AM Encounter Date: 4/1/2020 Status: Attested    : Mickie Paredes RN (Registered Nurse) Cosigner: Loraine Rees MD at 4/2/2020  8:27 AM    Attestation signed by Loraine Rees MD at 4/2/2020  8:27 AM    Agree with plan                ANTICOAGULATION  MANAGEMENT    Assessment     Today's INR result of 2.6 is Therapeutic (goal INR of 2.0-3.0)        Warfarin taken as previously instructed    No new diet changes affecting INR    No new medication/supplements affecting INR    Continues to tolerate warfarin with no reported s/s of bleeding or thromboembolism     Previous INR was Therapeutic     Edtripst message sent     Plan:     Spoke with Nery regarding INR result and instructed:     Warfarin Dosing Instructions:  Continue current warfarin dose 7.5 mg daily on Wednesdays; and 5 mg daily rest of week  (0 % change)    Instructed patient to follow up no later than: 4 weeks     Education provided: importance of therapeutic range, importance of following up for INR monitoring at instructed interval and importance of taking warfarin as instructed    Nery verbalizes understanding and agrees to warfarin dosing plan.    Instructed to call the [facility] for any changes, questions or concerns. (#[phone])   ?   Mickie Paredes RN    Subjective/Objective:      Nery SOFIA Sherman, a 54 y.o. female is on warfarin.     Nery reports:     Home warfarin dose: verbally confirmed home dose with Nery  and updated on anticoagulation calendar     Missed doses: No     Medication changes:  No     S/S of bleeding or thromboembolism:  No     New Injury or illness:  No     Changes in diet or alcohol consumption:  No     Upcoming surgery, procedure or cardioversion:  No    Anticoagulation Episode Summary     Current INR goal:   2.0-3.0   TTR:   74.6 % (1 y)   Next INR  check:   4/29/2020   INR from last check:   2.60 (4/1/2020)   Weekly max warfarin dose:      Target end date:      INR check location:      Preferred lab:      Send INR reminders to:   BERNICE MARTINI    Indications    Anticoagulation monitoring  INR range 2-3 [Z79.01]  PAD (peripheral artery disease) (H) [I73.9]           Comments:            Anticoagulation Care Providers     Provider Role Specialty Phone number    Loraine Rees MD Referring Family Medicine [phone]

## 2021-06-18 NOTE — PATIENT INSTRUCTIONS - HE
Patient Instructions by Grace Savage PA-C at 7/30/2020  4:40 PM     Author: Grace Savage PA-C Service: -- Author Type: Physician Assistant    Filed: 7/30/2020  5:54 PM Encounter Date: 7/30/2020 Status: Signed    : Grace Savage PA-C (Physician Assistant)       Patient Education     Causes of Lumbar (Low Back) Pain  Low back pain can be caused by problems with any part of the lumbar spine. A disk can herniate (push out) and press on a nerve. Vertebrae can rub against each other or slip out of place. This can irritate facet joints and nerves. It can also lead to stenosis, a narrowing of the spinal canal or foramen.  Pressure from a disk  Constant wear and tear on a disk can cause it to weaken and push outward. Part of the disk may then press on nearby nerves. There are two common types of herniated disks:  Contained means the soft nucleus is protruding outward.   Extruded means the firm annulus has torn, letting the soft center squeeze through.     Pressure from bone  An unstable spine   With age, a disk may thin and wear out. Vertebrae above and below the disk may begin to touch. This can put pressure on nerves. It can also cause bone spurs (growths) to form where the bones rub together.    Stenosis results when bone spurs narrow the foramen or spinal canal. This also puts pressure on nerves. Slipping vertebrae can irritate nerves and joints. They can also worsen stenosis.    In some cases, vertebrae become unstable and slip forward. This is called spondylolisthesis.     Date Last Reviewed: 10/12/2015    [id number] [employer]. [address] [place] [address], [place] [address]. All rights reserved. This information is not intended as a substitute for professional medical care. Always follow your healthcare professional's instructions.

## 2021-06-18 NOTE — LETTER
Letter by Loraine Luis RN at      Author: Loraine Luis RN Service: -- Author Type: --    Filed:  Encounter Date: 2/1/2019 Status: (Other)       Nery Whitaker  [address] [place] [address]      March 4, 2019      Dear Ms. Whitaker,    You are currently under the care of [facility] for your warfarin (Coumadin ) therapy. Our records show that your last INR was on 1/11/2019 and you were due to come back on 1/25/2019. We have attempted to reach you by phone to schedule an INR appointment, but have not heard back from you.    The correct dose of warfarin for you may change from time to time based on your INR result. We would like to ensure that your warfarin dose is correct and that you are not having any side effects. It is not safe for you to be on Warfarin if your INR is not checked regularly. If your INR is too high, serious bleeding can occur. If your INR is too low, blood clots can form and lead to heart attack, stroke or a blood clot in the lung.      Getting your INR checked as instructed is required in order for the [facility] to continue managing and refilling your warfarin. We realize that it might be difficult for you to have frequent blood testing, but it is for your own safety.    Please contact us at [phone] as soon as possible to schedule an INR appointment. Our clinic staff is available Monday through Friday 8:00 am to 5:00 pm.  If you have been told to stop taking warfarin or your warfarin is being managed by another healthcare provider, please call and inform our staff.       Sincerely,     [facility]

## 2021-06-19 NOTE — LETTER
Letter by Loraine Luis RN at      Author: Loraine Luis RN Service: -- Author Type: --    Filed:  Encounter Date: 2/1/2019 Status: (Other)         Nery Whitaker  [address] [place] [address]      March 18, 2019      Dear Ms. Sanchezgiancarlosang,    You are currently under the care of [facility] for your warfarin therapy.  We have attempted to contact you several times regarding your warfarin therapy but have not received a return call from you.  Our records show that your last INR was on 1/11/2019 and you were due to come back on: 1/25/2019.     The correct dose of warfarin for you may change from time to time based on your INR result. We would like to ensure that your warfarin dose is correct and that you are not having any side effects. It is not safe for you to be on Warfarin if your INR is not checked regularly. If your INR is too high, serious bleeding can occur. If your INR is too low, blood clots can form and lead to heart attack, stroke or a blood clot in the lung.  Getting your INR checked as instructed is required in order for the [facility] to continue managing and refilling your warfarin.    If you have been told to stop taking warfarin or your warfarin is being managed by another healthcare provider, please call and inform our staff so we can update your records.  If this is not the case, please contact us at [phone] to schedule an INR appointment as soon as possible. Our clinic staff is available Monday through Friday 8:00 am to 5:00 pm.     If we do not hear from you, it may lead to being discharged from the Anticoagulation Management Program.  If this occurs, your [facility] primary care provider would then take over managing your warfarin and you would need to have regular office visits with your provider for warfarin management.    Sincerely,       [facility]

## 2021-06-19 NOTE — LETTER
Letter by Loraine Rees MD at      Author: Loraine Rees MD Service: -- Author Type: --    Filed:  Encounter Date: 10/24/2019 Status: Signed         Nery BISMARK Whitaker   [address]  [facility] [address]      10/24/2019       Dear Nery,       In reviewing your records, we have determined a gap in your preventive services. Based on your age and health history, we recommend the follow service.     ? General Physical  ? Physical with a Pap Smear  ? Colon cancer screening  ? Mammogram  ? Immunization  ? Diabetic check  ? Blood pressure/cardiovascular check  ? Asthma check  ? Cholesterol test  ? Lab work  ? Med check      If you have had the service elsewhere, please contact us so we can update our records. Please let us know if you have transferred your care to another clinic.    Please call [phone] to schedule this appointment.    We believe that a strong preventive care program, including regular physicals and follow-up care is an important part of a healthy lifestyle and we are committed to helping you maintain your health.    Thank you for choosing us as your health care provider.    Sincerely,     [facility]

## 2021-06-20 NOTE — LETTER
Letter by Reyna Quinn RN at      Author: Reyna Quinn RN Service: -- Author Type: --    Filed:  Encounter Date: 5/29/2020 Status: (Other)       5/29/2020        Nery Whitaker  [address] [place] [address]    This letter provides a written record that you were tested for COVID-19 on 5/26/2020.     Your result was negative.    This means that we didnt find the virus that causes COVID-19 in your sample. A test may show negative when you do actually have the virus. This can happen when the virus is in the early stages of infection, before you feel illness symptoms.    Even if you dont have symptoms, they may still appear. For safety, its very important to follow these rules.    Keep yourself away from others (self-isolation):      Stay home. Dont go to work, school or anywhere else.     Stay in your own room (and use your own bathroom), if you can.    Stay away from others in your home. No hugging, kissing or shaking hands. No visitors.    Clean high touch surfaces often (doorknobs, counters, handles, etc.). Use a household cleaning spray or wipes.    Cover your mouth and nose with a mask, tissue or washcloth to avoid spreading germs.    Wash your hands and face often with soap and water.    Stay in self-isolation until you meet ALL of the guidelines below:    1. You have had no fever for at least 72 hours (that is 3 full days of no fever without the use of medicine that reduces fevers), AND  2. other symptoms (such as cough, shortness of breath) have gotten better, AND  3. at least 10 days have passed since your symptoms first appeared.    Going back to work  Check with your employer for any guidelines to follow for going back to work.    Employers: This document serves as formal notice that your employee tested negative for COVID-19, as of the testing date shown above.    For questions regarding this letter or your Negative COVID-19 result, call [phone] between 8A to 6:30P (M-F) and 10A to  6:30P (weekends).

## 2021-06-20 NOTE — LETTER
Letter by Loraine Rees MD at      Author: Loraine Rees MD Service: -- Author Type: --    Filed:  Encounter Date: 5/6/2020 Status: (Other)         May 6, 2020     Patient: Nery Whtiaker   YOB: 1965   Date of Visit: 5/6/2020       Due to underlying health concerns as well as her age,Nery Whitaker is at increased risk of severe disease if exposed to COVID-19. I recommend that Nery  isolate at home throughout the current crisis. This isolation should start now and continue until the risk of community transmission returns to low according to the CDC and/or [place] Department of Health. Given the rapidly changing public health situation, I am not able to predict how long this isolation will need to continue.    Please refer to the CDC website for most up to date information on the COVID-19 pandemic and workplace recommendations:    https://www.cdc.gov/coronavirus/2019-ncov/community/guidance-business-response.html    Sincerely,        Electronically signed by Loraine Rees MD

## 2021-06-20 NOTE — PROGRESS NOTES
Subjective:      Patient ID: Nery Whitaker is a 53 y.o. female.    Chief Complaint:    HPI  53-year-old female who stepped on a rock 1-1/2 months ago.  At that time she did not believe that there was any break in the skin nor any possibility of an intradermal foreign body.  However over the past 6 weeks the area has become more and more painful.  At the present time she is barely able to even put any weight on this area.  The skin has become thickened over the area to the point of being a significant callus.    Past Medical History:   Diagnosis Date     PAD (peripheral artery disease) (H)        Past Surgical History:   Procedure Laterality Date     APPENDECTOMY       ARTERIAL BYPASS SURGERY  2006     BREAST BIOPSY Left     benign     CHOLECYSTECTOMY       FEMORAL ARTERY STENT  2006     TONSILLECTOMY       TYMPANOSTOMY TUBE PLACEMENT         Family History   Problem Relation Age of Onset     Breast cancer Mother        Social History   Substance Use Topics     Smoking status: Current Every Day Smoker     Smokeless tobacco: Never Used     Alcohol use No       Review of Systems   Musculoskeletal:        Left foot pain over the metatarsal ridge   All other systems reviewed and are negative.      Objective:     /58 (Patient Site: Right Arm, Patient Position: Sitting, Cuff Size: Adult Regular)  Pulse 66  Temp 98.2  F (36.8  C) (Oral)   Wt 127 lb 11.2 oz (57.9 kg)  SpO2 97%  BMI 20.93 kg/m2    Physical Exam   Constitutional: She is oriented to person, place, and time. She appears well-developed and well-nourished. No distress.   Cardiovascular: Intact distal pulses.    Neurological: She is alert and oriented to person, place, and time. She has normal strength. No sensory deficit.   Skin: Skin is warm and dry.   There is a thick callus over the metatarsal ridge on the plantar surface of the left foot approximately 1 cm in diameter.  The callus has a punctate center presumably where the palpable impacted the  skin.  The area is tender to palpation.  There is no significant erythema of the skin.     X-ray 3 views of the left foot: Negative for any intradermal foreign body  Assessment:     Procedures    Diagnosis: Posttraumatic plantar wart    Plan:   1.  The callused area was debrided with a #10 scalpel after which the pain was significantly decreased with weightbearing  2.  The patient will treat the area with Compound W.  If it does not improve she will follow-up with her primary provider

## 2021-06-20 NOTE — PROGRESS NOTES
Assessment:     1. Left foot pain  Ambulatory referral to Podiatry       Plan:     Nery is a 53-year-old female presenting today with left foot pain.  She does limp due to the discomfort.  This felt better after I removed some of the callus.  Because this came about due to an injury I am a bit concerned about a deeper foreign body-like reaction as a cause of the callus.  Certainly a wart is a possibility here to but I would like a second opinion from a podiatrist to rule out foreign body and come up with a more expedited treatment plan for her potentially because of the amount of discomfort.    Subjective:       53 y.o. female presents for evaluation of an area of callus and pain on the bottom of her left foot.  The patient reports that she injured the foot a little over 2 months ago when she was walking on some rocks.  She reports that she had persistent pain and that it started increasing to the point where she sought medical care recently.  The patient was evaluated in walk-in care where she had a callus on the bottom of her foot pared down with a scalpel and was told that it looks like this may represent a wart.  The patient bought some over-the-counter wart treatment and applied it but the pain is back to as bad as it was and she is limping.  She is wondering what to do next.      Reviewed: The following portions of the patient's history were reviewed and updated as appropriate: allergies, current medications, past family history, past medical history, past social history, past surgical history and problem list.    Review of Systems  Pertinent items are noted in HPI.       Objective:     /78 (Patient Site: Left Arm, Patient Position: Sitting, Cuff Size: Adult Regular)  Pulse (!) 58  Wt 127 lb (57.6 kg)  BMI 20.81 kg/m2  General appearance: alert, appears stated age and cooperative  Extremities: callus formation .5cm in size just proximal to metatarsal head 3rd digit; pared down with scalpel. Punctate  area in middle of deeper callus but I do not see definitive evidence of a wart.      This note has been dictated using voice recognition software. Any grammatical or context distortions are unintentional and inherent to the software

## 2021-06-20 NOTE — PROGRESS NOTES
Surgery/Procedure: removal foreign body left foot     Special Equipment: to be determined     Location: [facility]    Date: 10/23/18    Time: 1300    Surgeon: Dr. Marie    Assist: no    Length of Surgery: 70 min     OR Confirmed/ :  Yes with jarret  on 10/11/2018    Orders In:  Yes    Provider Team Notified:  Yes    Entered on [employer] Calendar:  Yes    Post Op: 10/31/18    Pre-op Instructions Reviewed with Nurse:  Ingrid

## 2021-06-20 NOTE — PROGRESS NOTES
FOOT AND ANKLE SURGERY/PODIATRY Progress Note        ASSESSMENT:   Foreign Body Plantar 3rd MPJ left foot      TREATMENT:  -After removal of callus tissue, there is a skin lesion along the plantar 3rd MPJ left foot with discoloration that appears to be deep to the dermis. This is concerning for a foreign body. Previous x-rays are negative for foreign body. I discussed with the patient I recommend surgical exploration to remove possible foreign body from this location. Reviewed post-op course to include non-weight bearing on the left foot x3-4 weeks. Risks include infection and on-going discomfort.   -All questions invited and answered. Patient consents to surgery. I will ask my office to coordinate surgery at [facility]. She will follow-up with Dr. Rees's office for a pre-op physical.     [facility]       HPI: Patient states she stepped on a peddle 2 months ago on the left foot causing pain. The patient states the initial injury did not break the skin and she has not noticed drainage. She has been having mild pain with walking which has increased recently. Admits to smoking.     Past Medical History:   Diagnosis Date     PAD (peripheral artery disease) (H)        Allergies   Allergen Reactions     Ezetimibe Unknown     constipation     Lovastatin Unknown     constipation     Simvastatin Other (See Comments)     Intolerance, but tolerated with re-challenge in Feb to April 2009         Current Outpatient Prescriptions:      amLODIPine (NORVASC) 5 MG tablet, Take 1 tablet (5 mg total) by mouth daily., Disp: 90 tablet, Rfl: 3     aspirin 81 MG EC tablet, Take 81 mg by mouth., Disp: , Rfl:      citalopram (CELEXA) 20 MG tablet, Take 1 tablet (20 mg total) by mouth daily., Disp: 90 tablet, Rfl: 3     folic acid (FOLVITE) 1 MG tablet, Take 1 mg by mouth., Disp: , Rfl:      gabapentin (NEURONTIN) 100 MG capsule, Take 100 mg by mouth 3 (three) times a day., Disp: 270 capsule,  Rfl: 3     lisinopril (PRINIVIL,ZESTRIL) 20 MG tablet, Take 1 tablet (20 mg total) by mouth daily., Disp: 90 tablet, Rfl: 3     ranitidine (ZANTAC) 150 MG tablet, Take 1 tablet (150 mg total) by mouth 2 (two) times a day., Disp: 180 tablet, Rfl: 3     warfarin (COUMADIN) 5 MG tablet, Take 1 tablet (5 mg total) by mouth daily., Disp: 100 tablet, Rfl: 3    Review of Systems - Negative       OBJECTIVE:  Appearance: alert, well appearing, and in no distress.    Vitals:    10/11/18 0942   BP: 138/76   Pulse: 60   Resp: 16   Temp: 98.8  F (37.1  C)       Vascular: Dorsalis pedis and posterior tibial pulses are palpable. There is pedal hair growth.  CFT < 3 sec from anterior tibial surface to distal digits left. There is no appreciable edema noted.  Dermatologic: After removal of callus tissue along plantar left forefoot at level of plantar 3rd MPJ, there appears to be discoloration deep to the dermis. No erythema, open lesions or drainage noted.   Neurologic: All epicritic and proprioceptive sensations are grossly intact left. There is no Babinski, parasthesias, Tinel's, or dysthesias.  Musculoskeletal: Pain to palpation at skin location as noted plantar 3rd MPJ left.     Imaging: [facility]  X-RAY FOOT LEFT, MINIMUM 3 VIEWS  9/22/2018 11:45 AM     INDICATION: Superficial foreign body, unspecified foot, initial encounter  COMPARISON: None.     FINDINGS: Negative foot. No fracture or dislocation. No radiopaque foreign  bodies.

## 2021-06-21 NOTE — PROGRESS NOTES
Assessment/Plan:      Problem List Items Addressed This Visit     Normocytic anemia - Primary     Colonoscopy 1/2017 with polyps; repeat due 2022. Kidney function is normal. Labs as below. Further evaluation will be based on results of these labs.          Relevant Orders    Lactate Dehydrogenase (LDH) (Completed)    Reticulocytes (Completed)    Morphology,Smear Review (MORP) (Completed)    Folate, Serum (Completed)    Vitamin B12 (Completed)    Occult Blood(ICT)    Iron and Transferrin Iron Binding Capacity (Completed)    Ferritin (Completed)    Thyroid Stimulating Hormone (TSH) (Completed)    Peripheral Blood Smear, Path Review    Urge incontinence of urine     Symptoms most consistent with urge incontinence. Ultrasound ordered to evaluate for any issues with incomplete emptying. Trial of Detrol LA 2 mg daily.         Relevant Medications    tolterodine (DETROL LA) 2 MG ER capsule    Other Relevant Orders    US Bladder With Pre and Post Void Residual (Completed)          Patient Instructions   1.  Detrol LA 1 tab daily.    2.  You will receive a call to schedule for ultrasound.  3.  I will notify you of lab results.  4.  You may see a increase in your INR due to the Detrol, please have your INR checked next week as scheduled and let the INR nurse know about the new medication.  5.  Kegel exercises as per handout.  If you decide you would like to proceed with physical therapy, please let me know and I would order.       Subjective:   Nery Whitaker is a 53 y.o. female who presents today for evaluation of anemia. The patient had a mild normocytic anemia identified at the time of a recent pre-op physical. She denies any unusual bleeding or bruising. Her last menses was 12 years ago. No blood in her stools or dark tarry stools. Last colonoscopy was 1/2017. She had adenomatous polyps and is due for repeat 2022.     Her other complaint today is urge incontinence. She has had an issue when she wakes up during the night  for several months. She will wake up needing to urinate and can't always make it to the bathroom in time. Last month she had two episodes during the day as well. No leakage with coughing, sneezing, or laughing. No dysuria or hematuria. No polydipsia or polyuria. No lower extremity weakness or numbness. No trouble with stool incontinence. She does feel like she is emptying her bladder completely when voiding.    Patient Active Problem List   Diagnosis     Anticoagulation monitoring, INR range 2-3     Anxiety disorder     HTN (hypertension)     Migraine     Bilateral carotid artery disease (H)     Dyslipidemia     PAD (peripheral artery disease) (H)     Thoracic neuralgia     Tobacco use disorder     Foreign body in left foot, subsequent encounter     Newly recognized murmur     Normocytic anemia     Verruca plantaris     Adenomatous colon polyp     Urge incontinence of urine      Past Medical History:   Diagnosis Date     Anxiety      Bilateral carotid artery disease (H)      Dyslipidemia      Foreign body in left foot      Heart murmur      Hypertension      Migraines      PAD (peripheral artery disease) (H)      Past Surgical History:   Procedure Laterality Date     APPENDECTOMY       ARTERIAL BYPASS SURGERY  2006     BREAST BIOPSY Left     benign     CHOLECYSTECTOMY       FEMORAL ARTERY STENT  2006     FOOT MASS EXCISION Left 2018    Soft tissue mass - benign     TONSILLECTOMY       TYMPANOSTOMY TUBE PLACEMENT             Review of Systems  Pertinent items are noted in HPI.  ROS otherwise negative.    Objective:     Vitals:    11/02/18 1109   BP: 122/70   Pulse: 64   Resp: 16       Physical Exam   Constitutional: She is oriented to person, place, and time. She appears well-nourished. No distress.   Cardiovascular: Normal rate and regular rhythm.    No murmur heard.  Pulmonary/Chest: Effort normal and breath sounds normal. No respiratory distress.   Abdominal: Soft. Bowel sounds are normal. She exhibits no  distension and no mass. There is no tenderness.   Neurological: She is alert and oriented to person, place, and time. She has normal strength. No cranial nerve deficit. Coordination and gait normal.   Reflex Scores:       Patellar reflexes are 2+ on the right side.       Achilles reflexes are 2+ on the right side and 2+ on the left side.  Skin: No bruising, no ecchymosis, no petechiae and no rash noted.          Medication List          These changes are accurate as of 11/2/18 11:59 PM.  If you have any questions, ask your nurse or doctor.               START taking these medications          tolterodine 2 MG ER capsule   Also known as:  DETROL LA   INSTRUCTIONS:  Take 1 capsule (2 mg total) by mouth daily.   Started by:  Loraine Rees MD             CHANGE how you take these medications          amLODIPine 5 MG tablet   Also known as:  NORVASC   INSTRUCTIONS:  Take 1 tablet (5 mg total) by mouth daily.   What changed:  when to take this           citalopram 20 MG tablet   Also known as:  celeXA   INSTRUCTIONS:  Take 1 tablet (20 mg total) by mouth daily.   What changed:  when to take this           lisinopril 20 MG tablet   Also known as:  PRINIVIL,ZESTRIL   INSTRUCTIONS:  Take 1 tablet (20 mg total) by mouth daily.   What changed:  when to take this             CONTINUE taking these medications          aspirin 81 MG EC tablet   INSTRUCTIONS:  Take 81 mg by mouth every evening.           folic acid 1 MG tablet   Also known as:  FOLVITE   INSTRUCTIONS:  Take 1 mg by mouth every evening.           gabapentin 100 MG capsule   Also known as:  NEURONTIN   INSTRUCTIONS:  Take 100 mg by mouth 3 (three) times a day.           ranitidine 150 MG tablet   Also known as:  ZANTAC   INSTRUCTIONS:  Take 1 tablet (150 mg total) by mouth 2 (two) times a day.           warfarin 5 MG tablet   Also known as:  COUMADIN/JANTOVEN   INSTRUCTIONS:  Take 1 tablet (5 mg total) by mouth daily.             STOP taking these medications           HYDROcodone-acetaminophen 5-325 mg per tablet   Also known as:  NORCO   Stopped by:  Loraine Rees MD            Where to Get Your Medications      These medications were sent to [facility] [address] [place] [address] - [address]  [address], [facility] [address]     Phone:  [phone]      tolterodine 2 MG ER capsule

## 2021-06-21 NOTE — ANESTHESIA POSTPROCEDURE EVALUATION
Patient: Nery SOFIA Lauseng  REMOVAL, FOREIGN BODY left foot   Anesthesia type: regional    Patient location: Phase II Recovery  Last vitals:   Vitals:    10/23/18 1355   BP: 125/62   Pulse: 64   Resp: 16   Temp: 36.5  C (97.7  F)   SpO2: 98%     Post vital signs: stable  Level of consciousness: awake and responds to simple questions  Post-anesthesia pain: pain controlled  Post-anesthesia nausea and vomiting: no  Pulmonary: unassisted, return to baseline  Cardiovascular: stable and blood pressure at baseline  Hydration: adequate  Anesthetic events: no    QCDR Measures:  ASA# 11 - Zoë-op Cardiac Arrest: ASA11B - Patient did NOT experience unanticipated cardiac arrest  ASA# 12 - Zoë-op Mortality Rate: ASA12B - Patient did NOT die  ASA# 13 - PACU Re-Intubation Rate: NA - No ETT / LMA used for case  ASA# 10 - Composite Anes Safety: ASA10A - No serious adverse event    Additional Notes:

## 2021-06-21 NOTE — PROGRESS NOTES
FOOT AND ANKLE SURGERY/PODIATRY Progress Note        ASSESSMENT:   S/p Excision soft tissue mass left foot       TREATMENT:  -Surgical site left foot is intact. Sutures removed today. I cautioned the patient to remain limited to non-weight bearing with the surgical shoe x2 weeks to allow the skin to remodel. Ok to shower, avoid soaking.   -The pathology report indicates the presence of verruca. I have asked her to return to see me if the skin fails to resume the texture and color of the surround skin. If any abnormalities are noted, she will return for treatment of verruca.   -She will monitor for concerns and follow-up with me as symptoms dictate.     Markus Marie, [facility]      HPI: Nery Whitaker was seen again today s/p excision soft tissue mass left foot. She is doing well today and has tried to remain non-weight bearing on the left foot, but admits walking more than directed in the surgical shoe.    Past Medical History:   Diagnosis Date     Anxiety      Bilateral carotid artery disease (H)      Dyslipidemia      Foreign body in left foot      Heart murmur      Hypertension      Migraines      PAD (peripheral artery disease) (H)        Allergies   Allergen Reactions     Ezetimibe Unknown     constipation     Lovastatin Unknown     constipation     Simvastatin Other (See Comments)     Intolerance, but tolerated with re-challenge in Feb to April 2009         Current Outpatient Medications:      amLODIPine (NORVASC) 5 MG tablet, Take 1 tablet (5 mg total) by mouth daily. (Patient taking differently: Take 5 mg by mouth every evening. ), Disp: 90 tablet, Rfl: 3     aspirin 81 MG EC tablet, Take 81 mg by mouth every evening. , Disp: , Rfl:      citalopram (CELEXA) 20 MG tablet, Take 1 tablet (20 mg total) by mouth daily. (Patient taking differently: Take 20 mg by mouth every evening. ), Disp: 90 tablet, Rfl: 3     folic acid (FOLVITE) 1 MG tablet, Take 1 mg by mouth every evening. , Disp: ,  Rfl:      gabapentin (NEURONTIN) 100 MG capsule, Take 100 mg by mouth 3 (three) times a day., Disp: 270 capsule, Rfl: 3     lisinopril (PRINIVIL,ZESTRIL) 20 MG tablet, Take 1 tablet (20 mg total) by mouth daily. (Patient taking differently: Take 20 mg by mouth every evening. ), Disp: 90 tablet, Rfl: 3     ranitidine (ZANTAC) 150 MG tablet, Take 1 tablet (150 mg total) by mouth 2 (two) times a day., Disp: 180 tablet, Rfl: 3     warfarin (COUMADIN) 5 MG tablet, Take 1 tablet (5 mg total) by mouth daily., Disp: 100 tablet, Rfl: 3     tolterodine (DETROL LA) 2 MG ER capsule, Take 1 capsule (2 mg total) by mouth daily., Disp: 90 capsule, Rfl: 3    Review of Systems - Negative       OBJECTIVE:  Appearance: alert, well appearing, and in no distress.    Vitals:    11/12/18 1100   BP: 120/76   Pulse: 68   Temp: 98.1  F (36.7  C)       Vascular: Dorsalis pedis palpableLeft.  Dermatologic: Sutures loose plantar left forefoot with skin edges well coapted. No gapping or erythema noted.   Neurologic: Intact to light touch Left.  Musculoskeletal: Contracted digits noted Left.    Imaging: None

## 2021-06-21 NOTE — PROGRESS NOTES
Follow-up: We were able to communicate with the patient's vascular clinic.  They are okay with her stopping anticoagulation without bridging.  The patient was told to stop her warfarin starting today.  She should also hold her aspirin.    -Hold warfarin starting today.    Additionally, I spoke with Dr. Leyva, one of the [facility] cardiologist who reviewed her diagnostic testing over the past 12 months.  Given that she is asymptomatic and the nature of the surgery he did not think that additional testing is indicated.    No contra indication of surgery.  Recommend INR on the morning of surgery to ensure coagulation status.

## 2021-06-21 NOTE — PROGRESS NOTES
Preoperative Exam    Scheduled Procedure: left foot foreign body removal   Surgery Date:  10/23/2018  Surgery Location: [facility], fax [phone]    Surgeon:  Dr. Marie     Assessment/Plan:     Problem List Items Addressed This Visit        ENT/CARD/PULM/ENDO Problems    HTN (hypertension)    Relevant Orders    Basic Metabolic Panel (Completed)    PAD (peripheral artery disease) (H)    Newly recognized murmur    Relevant Orders    Ambulatory referral to [facility]       Other    Anticoagulation monitoring, INR range 2-3    Foreign body in left foot, initial encounter     This patient is scheduled for surgery in 6 days time and the surgery was scheduled yesterday.  This is an elective surgery.  There is several gaps in this patient's plan before proceeding with surgery that would need to be resolved:  -New murmur on exam: This patient has subtle changes on EKG relative to previous study.  Additionally on physical exam she is a new murmur best heard at the right upper sternal border that was not documented in her previous encounters including a cardiologist in 2017.  She has known vascular abnormalities.  Referral to rapid access cardiology clinic to review the available information and make recommendations for this patient.  -Anticoagulation: The patient is on chronic anticoagulation for an aorto-iliac bypass graft.  We have reached out to her vascular clinic for recommendations regarding the risks of discontinuing temporarily anticoagulation.  Because of the tight timetable with her scheduling of surgery, she actually would have needed to stop her anticoagulation prior to the day which they scheduled the surgery.  We will work today with her vascular team to help determine anticoagulation recommendation and possibly schedule the patient's surgery accordingly.           Other Visit Diagnoses     Pre-operative examination    -  Primary    Relevant Orders    Electrocardiogram Perform and Read  (Completed)    HM1(CBC and Differential)    HM1 (CBC with Diff)        Surgical Procedure Risk: Low (reported cardiac risk generally < 1%)  Have you had prior anesthesia?: Yes  Have you or any family members had a previous anesthesia reaction:  No  Do you or any family members have a history of a clotting or bleeding disorder?: No  Cardiac Risk Assessment: increased risk for major cardiac complications based on  cerebrovascular disease    Patient is not approved for surgery given new murmur today on exam and unclear anticoagulation plan.      Functional Status: Independent  Patient plans to recover at home with family.     Subjective:      Neyr Whitaker is a 53 y.o. female who presents for a preoperative consultation.  The patient is going to have exploration to remove presumed foreign body.  The patient tells me that sometime in the past 2-3 months she was walking and began to develop pain in her foot.  She is been seen multiple times with multiple x-rays without identification of a specific foreign body.  However on exam some type of palpable abnormality was identified.  Given the severity of her discomfort, exploratory surgery is planned.  Planning general anesthesia.    -This patient is a vascular patient through the [facility] system.  She has a history of peripheral artery disease and is status post aortoiliac bypass in February 2006.  Additionally, she has known blockages in her carotid arteries which are being followed with annual carotid artery ultrasounds.  In late 2017 she had an abnormal stress test and a calcium score of 52.1 which placed her in the 96 percentile for women of her age.  Calcium deposit was in the left anterior descending artery.  On the CT angiogram which was performed she had a punctate calcific lesion just thought to be non-obstructive of the left main artery.  These records are available for review in care everywhere.    All other systems reviewed and are negative, other than those  listed in the HPI.    Pertinent History  Do you have difficulty breathing or chest pain after walking up a flight of stairs: No  History of obstructive sleep apnea: No  Steroid use in the last 6 months: No  Frequent Aspirin/NSAID use: Yes: pt currently taking  Prior Blood Transfusion: No  Prior Blood Transfusion Reaction: No  If for some reason prior to, during or after the procedure, if it is medically indicated, would you be willing to have a blood transfusion?:  There is no transfusion refusal.    Current Outpatient Prescriptions   Medication Sig Dispense Refill     amLODIPine (NORVASC) 5 MG tablet Take 1 tablet (5 mg total) by mouth daily. 90 tablet 3     aspirin 81 MG EC tablet Take 81 mg by mouth.       citalopram (CELEXA) 20 MG tablet Take 1 tablet (20 mg total) by mouth daily. 90 tablet 3     folic acid (FOLVITE) 1 MG tablet Take 1 mg by mouth.       gabapentin (NEURONTIN) 100 MG capsule Take 100 mg by mouth 3 (three) times a day. 270 capsule 3     lisinopril (PRINIVIL,ZESTRIL) 20 MG tablet Take 1 tablet (20 mg total) by mouth daily. 90 tablet 3     ranitidine (ZANTAC) 150 MG tablet Take 1 tablet (150 mg total) by mouth 2 (two) times a day. 180 tablet 3     warfarin (COUMADIN) 5 MG tablet Take 1 tablet (5 mg total) by mouth daily. 100 tablet 3     No current facility-administered medications for this visit.         Allergies   Allergen Reactions     Ezetimibe Unknown     constipation     Lovastatin Unknown     constipation     Simvastatin Other (See Comments)     Intolerance, but tolerated with re-challenge in Feb to April 2009       Patient Active Problem List   Diagnosis     Anticoagulation monitoring, INR range 2-3     Anxiety disorder     HTN (hypertension)     Migraine     Bilateral carotid artery disease (H)     Dyslipidemia     PAD (peripheral artery disease) (H)     Thoracic neuralgia     Tobacco use disorder     Foreign body in left foot, initial encounter     Newly recognized murmur       Past  "Medical History:   Diagnosis Date     PAD (peripheral artery disease) (H)        Past Surgical History:   Procedure Laterality Date     APPENDECTOMY       ARTERIAL BYPASS SURGERY  2006     BREAST BIOPSY Left     benign     CHOLECYSTECTOMY       FEMORAL ARTERY STENT  2006     TONSILLECTOMY       TYMPANOSTOMY TUBE PLACEMENT         Social History     Social History     Marital status:      Spouse name: N/A     Number of children: N/A     Years of education: N/A     Occupational History     Not on file.     Social History Main Topics     Smoking status: Current Every Day Smoker     Smokeless tobacco: Never Used     Alcohol use No     Drug use: No     Sexual activity: Yes     Partners: Male     Other Topics Concern     Not on file     Social History Narrative     Patient Care Team:  Loraine Rees MD as PCP - General (Family Medicine)    Objective:     Vitals:    10/17/18 1358   BP: 122/80   Pulse: (!) 52   Resp: 18   Temp: 98.4  F (36.9  C)   TempSrc: Oral   SpO2: 98%   Weight: 128 lb (58.1 kg)   Height: 5' 5.5\" (1.664 m)     Physical Exam:  Physical Exam   Constitutional: She is oriented to person, place, and time. She appears well-developed and well-nourished. No distress.   HENT:   Head: Normocephalic and atraumatic.   Right Ear: External ear normal.   Left Ear: External ear normal.   Mouth/Throat: Oropharynx is clear and moist. No oropharyngeal exudate.   Eyes: Conjunctivae and EOM are normal. Pupils are equal, round, and reactive to light. Left eye exhibits no discharge. No scleral icterus.   Neck: Normal range of motion. Neck supple. No thyromegaly present.   Cardiovascular: Normal rate and regular rhythm.  Exam reveals no gallop and no friction rub.    Murmur heard.  Pulmonary/Chest: Effort normal and breath sounds normal. No respiratory distress. She has no wheezes.   Abdominal: Soft. She exhibits no distension and no mass. There is no tenderness. No hernia.   Musculoskeletal: Normal range of motion. " She exhibits no edema.   Walks with antalgic gait   Lymphadenopathy:     She has no cervical adenopathy.   Neurological: She is alert and oriented to person, place, and time. She has normal reflexes. She displays normal reflexes. No cranial nerve deficit.   Skin: Skin is warm. No rash noted.   Psychiatric: She has a normal mood and affect. Her behavior is normal. Judgment and thought content normal.       There are no Patient Instructions on file for this visit.    EKG:   Flattening T waves in the anterior distribution.  Sinus bradycardia.    CTA 12/2017 at [facility]:  1.  No significant stenosis noted.  2.  The left main has a punctate calcific plaque, nonobstructive.  3.  Suggest aggressive risk factor modification.    Recent Results (from the past 24 hour(s))   Electrocardiogram Perform and Read   Result Value Ref Range    SYSTOLIC BLOOD PRESSURE  mmHg    DIASTOLIC BLOOD PRESSURE  mmHg    VENTRICULAR RATE 50 BPM    ATRIAL RATE 50 BPM    P-R INTERVAL 128 ms    QRS DURATION 106 ms    Q-T INTERVAL 470 ms    QTC CALCULATION (BEZET) 428 ms    P Axis 68 degrees    R AXIS 72 degrees    T AXIS 3 degrees    MUSE DIAGNOSIS       Sinus bradycardia  Nonspecific ST and T wave abnormality  Abnormal ECG  When compared with ECG of 19-JAN-2017 15:55,  Nonspecific T wave abnormality now evident in Anterior leads  Confirmed by CAROLYNN MEJIAS MD LOC:[facility] ([id number]) on 10/17/2018 5:02:45 PM     Basic Metabolic Panel   Result Value Ref Range    Sodium 140 136 - 145 mmol/L    Potassium 3.9 3.5 - 5.0 mmol/L    Chloride 106 98 - 107 mmol/L    CO2 24 22 - 31 mmol/L    Anion Gap, Calculation 10 5 - 18 mmol/L    Glucose 88 70 - 125 mg/dL    Calcium 9.1 8.5 - 10.5 mg/dL    BUN 9 8 - 22 mg/dL    Creatinine 0.76 0.60 - 1.10 mg/dL    GFR MDRD Af Amer >60 >60 mL/min/1.73m2    GFR MDRD Non Af Amer >60 >60 mL/min/1.73m2   INR   Result Value Ref Range    INR 2.10 (H) 0.90 - 1.10     Immunization History   Administered Date(s) Administered     Hep B,  Adult 10/13/2008, 10/13/2008     INFLUENZA,RECOMBINANT,INJ,PF QUADRIVALENT 18+YRS 09/26/2018     Influenza, Seasonal, Inj PF IIV3 10/07/2011, 11/09/2015     Influenza, inj, historic,unspecified 10/28/2009, 10/06/2010, 10/07/2011, 11/05/2012, 12/01/2013, 09/23/2014, 10/21/2016, 08/01/2017     Influenza,seasonal quad, PF, 36+MOS 10/21/2016     Influenza,seasonal, Inj IIV3 10/06/2010, 11/05/2012, 11/30/2013, 09/23/2014     Pneumo Polysac 23-V 04/19/2011     Td, adult adsorbed, PF 01/15/1999, 10/21/2016     Td,adult,historic,unspecified 10/21/2016     Tdap 01/23/2007       Electronically signed by Vu Winters MD 10/18/18 2:04 PM

## 2021-06-21 NOTE — ANESTHESIA CARE TRANSFER NOTE
Last vitals:   Vitals:    10/23/18 1355   BP: 125/62   Pulse: 64   Resp: 16   Temp: 36.5  C (97.7  F)   SpO2: 98%     Patient's level of consciousness is awake, alert, and oriented. deneis pain. Denies nausea.   Spontaneous respirations: yes  Maintains airway independently: yes  Dentition unchanged: yes  Oropharynx: oropharynx clear of all foreign objects    QCDR Measures:  ASA# 20 - Surgical Safety Checklist: WHO surgical safety checklist completed prior to induction  PQRS# 430 - Adult PONV Prevention: 4558F - Pt received => 2 anti-emetic agents (different classes) preop & intraop  ASA# 8 - Peds PONV Prevention: NA - Not pediatric patient, not GA or 2 or more risk factors NOT present  PQRS# 424 - Zoë-op Temp Management: 4559F - At least one body temp DOCUMENTED => 35.5C or 95.9F within required timeframe  PQRS# 426 - PACU Transfer Protocol: - Transfer of care checklist used  ASA# 14 - Acute Post-op Pain: ASA14B - Patient did NOT experience pain >= 7 out of 10

## 2021-06-21 NOTE — ANESTHESIA PREPROCEDURE EVALUATION
Anesthesia Evaluation      Patient summary reviewed   No history of anesthetic complications     Airway   Mallampati: I  Neck ROM: full   Pulmonary - normal exam   (+) a smoker                         Cardiovascular - normal exam  (+) hypertension, , hypercholesterolemia, PVD    Rhythm: regular  Rate: normal,    murmur      Neuro/Psych    (+) anxiety/panic attacks,     Endo/Other       GI/Hepatic/Renal - negative ROS           Dental                         Anesthesia Plan  Planned anesthetic: peripheral nerve block    ASA 2   Induction: intravenous   Anesthetic plan and risks discussed with: patient and spouse    Post-op plan: routine recovery

## 2021-06-22 NOTE — TELEPHONE ENCOUNTER
Who is calling:  RT Maryam with [facility]   Reason for Call:  The p[atient is scheduled for a PFT tomorrow morning at 7:30 AM. The patient also has an order in for the following:    PFT spirometry before & after exercise    This is a test they are trying to remove from Epic. She is asking for clarification on if this is to be completed. If so, please give more specifics.   Date of last appointment with primary care: 11/02/2018  Has the patient been recently seen:  No  Okay to leave a detailed message: No

## 2021-06-22 NOTE — CONSULTS
"[facility] Hematology and Oncology Consult Note    Patient: Nery Whitaker  MRN: [id number]  Date of Service: 12/05/2018      Reason for Visit:    1.  Normocytic anemia    Assessment/Plan:    1.  Normocytic anemia: She has a chronic anemia which is mild.  Today her hemoglobin is at the lower limit of normal.  She has a normal white count and platelet count.  Normal white cell differential.  Recent flow cytometry on the peripheral blood is normal.  Additionally, recent ferritin, B12, folate, and reticulocyte were normal.  Peripheral blood smear showed a \"mild atypical lymphocytosis\".  Her absolute lymphocyte count is normal.  At this point I think observation is appropriate.  She has no other signs or symptoms of malignant or benign lymphoproliferative disorder.  She recently had a CT of the abdomen and pelvis which showed a normal spleen and no adenopathy.  Would recommend that she have her blood counts checked at her primary care provider's office every 4-6 months.  She can return to our clinic if she develops any sustained elevated or low blood counts.  Questions were answered.    ECOG Performance   ECOG Performance Status: 1    Distress Assessment  Distress Assessment Score: No distress    Problem List:    1. Normocytic anemia     2. Atypical lymphocytosis  HM1(CBC and Differential)    Comprehensive Metabolic Panel    Copper, Serum    T4, Free    INR     Staging History:    Cancer Staging  No matching staging information was found for the patient.    History:    Nery comes in today for an evaluation of \"atypical lymphocytosis\" seen on blood smear.  She was being evaluated for anemia and had a peripheral blood morphology done.  This showed normochromic normocytic anemia.  The patient has generally been feeling okay.  She has many chronic symptoms and complaints including bony pain, abdominal pain, itching, fatigue, headaches, urinary incontinence and dysmenorrhea exertion.  She has constipation.  No fevers.  No " acute issues today to address.    Past History:    Past Medical History:   Diagnosis Date     Anemia      Anxiety      Bilateral carotid artery disease (H)      Coronary artery disease      Dyslipidemia      Foreign body in left foot      Heart murmur      Hypertension      Migraines      PAD (peripheral artery disease) (H)     Family History   Problem Relation Age of Onset     Breast cancer Mother      Other Father         vasular problems      No Medical Problems Sister      No Medical Problems Brother      No Medical Problems Son      No Medical Problems Maternal Grandmother      Heart attack Maternal Grandfather      Cancer Paternal Grandmother         lung      Cancer Paternal Grandfather         kidney      Colon cancer Neg Hx      Prostate cancer Neg Hx       [unfilled] Social History     Socioeconomic History     Marital status:      Spouse name: Not on file     Number of children: 1     Years of education: Not on file     Highest education level: Not on file   Social Needs     Financial resource strain: Not on file     Food insecurity - worry: Not on file     Food insecurity - inability: Not on file     Transportation needs - medical: Not on file     Transportation needs - non-medical: Not on file   Occupational History     Not on file   Tobacco Use     Smoking status: Current Every Day Smoker     Packs/day: 0.75     Years: 34.00     Pack years: 25.50     Smokeless tobacco: Never Used     Tobacco comment: 15 cigs daily   Substance and Sexual Activity     Alcohol use: No     Drug use: No     Sexual activity: No     Partners: Male   Other Topics Concern     Not on file   Social History Narrative     Not on file        Allergies:    Allergies   Allergen Reactions     Ezetimibe Unknown     constipation     Lovastatin Unknown     constipation     Simvastatin Other (See Comments)     Intolerance, but tolerated with re-challenge in Feb to April 2009     Review of Systems:    As above in the history.     Review  "of Systems otherwise Negative for:  General: chills, fever  Psychological: anxiety or depression  Ophthalmic: blurry vision, double vision or loss of vision, vision change  ENT: epistaxis, oral lesions, hearing changes  Hematological and Lymphatic: bleeding, bruising, jaundice  Endocrine: hot flashes  Respiratory: cough, hemoptysis, orthopnea  Cardiovascular: chest pain, edema, palpitations or PND  Gastrointestinal: blood in stools, change in bowel habits  Genito-Urinary: change in urinary stream, incontinence, frequency/urgency  Musculoskeletal: joint pain, stiffness, swelling  Neurological: dizziness, headaches, numbness/tingling  Dermatological: lumps and rash    ECOG performance status is 1    Pain  Currently in Pain: Yes  Pain Score (Initial OR Reassessment): 7  Pain Frequency: Constant/continuous  Location: Lt ribcage area  Pain Characteristics : Aching  Pain Intervention(s): Home medication  Response to Interventions: Not much relief    Physical Exam:    Recent Vitals 12/5/2018   Height 5' 5\"   Weight 128 lbs 2 oz   BSA (m2) 1.63 m2   /72   Pulse 74   Temp 98.3   Temp src 1   SpO2 99   Some recent data might be hidden     General: patient appears stated age of 53 y.o.. Nontoxic and in no distress.   HEENT: Head: atraumatic, normocephalic. Sclerae anicteric.  Chest:  Normal respiratory effort  Cardiac:  No edema.   Abdomen: abdomen is non-distended  Extremities: normal tone and muscle bulk.   Skin: no lesions or rash. Warm and dry.   CNS: alert and oriented Grossly non-focal.   Psychiatric: normal mood and affect.     Lab Results:    Results for JENNIFER RAMIREZ (MRN [id number]) as of 12/31/2018 18:25   Ref. Range 12/5/2018 14:28   Sodium Latest Ref Range: 136 - 145 mmol/L 142   Potassium Latest Ref Range: 3.5 - 5.0 mmol/L 3.5   Chloride Latest Ref Range: 98 - 107 mmol/L 108 (H)   CO2 Latest Ref Range: 22 - 31 mmol/L 26   Anion Gap, Calculation Latest Ref Range: 5 - 18 mmol/L 8   BUN Latest Ref Range: 8 - " 22 mg/dL 9   Creatinine Latest Ref Range: 0.60 - 1.10 mg/dL 0.79   GFR MDRD Af Amer Latest Ref Range: >60 mL/min/1.73m2 >60   GFR MDRD Non Af Amer Latest Ref Range: >60 mL/min/1.73m2 >60   Calcium Latest Ref Range: 8.5 - 10.5 mg/dL 9.6   AST Latest Ref Range: 0 - 40 U/L 13   ALT Latest Ref Range: 0 - 45 U/L 9   ALBUMIN Latest Ref Range: 3.5 - 5.0 g/dL 4.2   Protein, Total Latest Ref Range: 6.0 - 8.0 g/dL 7.5   Alkaline Phosphatase Latest Ref Range: 45 - 120 U/L 94   Bilirubin, Total Latest Ref Range: 0.0 - 1.0 mg/dL 0.3   Free T4 Latest Ref Range: 0.7 - 1.8 ng/dL 0.9   Glucose Latest Ref Range: 70 - 125 mg/dL 85   INR Latest Ref Range: 0.90 - 1.10  2.10 (H)   WBC Latest Ref Range: 4.0 - 11.0 thou/uL 10.1   RBC Latest Ref Range: 3.80 - 5.40 mill/uL 3.83   Hemoglobin Latest Ref Range: 12.0 - 16.0 g/dL 12.0   Hematocrit Latest Ref Range: 35.0 - 47.0 % 36.0   MCV Latest Ref Range: 80 - 100 fL 94   MCH Latest Ref Range: 27.0 - 34.0 pg 31.3   MCHC Latest Ref Range: 32.0 - 36.0 g/dL 33.3   RDW Latest Ref Range: 11.0 - 14.5 % 14.3   Platelets Latest Ref Range: 140 - 440 thou/uL 289   MPV Latest Ref Range: 8.5 - 12.5 fL 9.7   Neutrophils % Latest Ref Range: 50 - 70 % 50   Lymphocytes % Latest Ref Range: 20 - 40 % 37   Monocytes % Latest Ref Range: 2 - 10 % 9   Eosinophils % Latest Ref Range: 0 - 6 % 2   Basophils % Latest Ref Range: 0 - 2 % 1   Neutrophils Absolute Latest Ref Range: 2.0 - 7.7 thou/uL 5.1   Lymphocytes Absolute Latest Ref Range: 0.8 - 4.4 thou/uL 3.8   Monocytes Absolute Latest Ref Range: 0.0 - 0.9 thou/uL 0.9   Eosinophils Absolute Latest Ref Range: 0.0 - 0.4 thou/uL 0.2   Basophils Absolute Latest Ref Range: 0.0 - 0.2 thou/uL 0.1   Results for JENNIFER RAMIREZ (MRN [id number]) as of 12/31/2018 18:25   Ref. Range 11/2/2018 11:56   Iron Latest Ref Range: 42 - 175 ug/dL 46   Ferritin Latest Ref Range: 10 - 130 ng/mL 91   Folate Latest Ref Range: >=3.5 ng/mL 15.7   LD (LDH) Latest Ref Range: 125 - 220 U/L 220    Vitamin B-12 Latest Ref Range: 213 - 816 pg/mL 396   Transferrin Latest Ref Range: 212 - 360 mg/dL 213   Transferrin IBC, Calculated Latest Ref Range: 313 - 563 ug/dL 266 (L)   Transferrin Saturation, Calculated Latest Ref Range: 20 - 50 % 17 (L)   TSH Latest Ref Range: 0.30 - 5.00 uIU/mL 2.48     Imaging Results:    CT ABDOMEN PELVIS WO ORAL W IV CONTRAST  11/14/2018 6:42 PM  COMPARISON: 04/16/2018     FINDINGS:  LUNG BASES: Fibroatelectasis.     ABDOMEN: Cholecystectomy. Liver, spleen, adrenal glands, kidneys, and pancreas unremarkable. Atherosclerotic plaque. Patent aortobifem bypass graft. No adenopathy.     PELVIS: Diverticulosis without diverticulitis. No bowel obstruction.     MUSCULOSKELETAL: Degenerative disease.    CONCLUSION:  1.  Diverticulosis without diverticulitis.  2.  Remainder stable with no finding to account for patient's symptoms    Pathology:    Final Diagnosis   PERIPHERAL BLOOD:     -   PERSISTENT MILD ATYPICAL LYMPHOCYTOSIS     -   MILD MONOCYTOSIS     -   MILD NORMOCHROMIC NORMOCYTIC ANEMIA   Electronically signed by Harry Bernabe MD on 11/5/2018 at 1221   Comment  The clinical history has been reviewed.  Flow cytometry can be performed to further characterize the lymphocytes if requested.     Clinicopathologic correlation is required. If an acute illness is present, a repeat follow-up CBC is suggested after resolution. If lymphocytosis persists, or if there is no history of acute illness or benign condition associated with lymphocytosis, immunophenotyping on peripheral blood is suggested for evaluation of a malignant process (e.g., CLL).       Normocytic anemia can be caused by multiple etiologies including intrinsic bone marrow disease, renal disease, hepatic disease, endocrine disease, anemia of chronic disease, post-hemorrhagic anemia, and bone marrow infiltration by tumors. Recommend clinical correlation and follow-up.     Reactive monocytosis can be seen in chronic infections,  collagen vascular diseases, immune-mediated gastrointestinal disorders, sarcoidosis, hemolytic anemia, and recovery phase of agranulocytosis, among others.          Signed by: Stewart Villarreal MD

## 2021-06-22 NOTE — PROGRESS NOTES
RESPIRATORY CARE NOTE     Patient Name: Nery Whitaker  Today's Date: 1/4/2019     Complete PFT done. Pt performed tests with good effort. Test results meet ATS standards for acceptability and repeatability. Results scanned into epic. Pt left in no distress.       Chapis Root

## 2021-06-23 NOTE — TELEPHONE ENCOUNTER
ANTICOAGULATION  MANAGEMENT    Assessment     Today's INR result of 1.7 is Subtherapeutic (goal INR of 2.0-3.0)        Warfarin taken as previously instructed    No new diet changes affecting INR    No new medication/supplements affecting INR    Continues to tolerate warfarin with no reported s/s of bleeding or thromboembolism     Previous INR was Therapeutic    Plan:     Spoke with Nery regarding INR result and instructed:     Warfarin Dosing Instructions:  Change warfarin dose to 7.5 mg daily on Fri; and 5 mg daily rest of week  (7.1 % change)    Instructed patient to follow up no later than: 2 weeks    Education provided: target INR goal and significance of current INR result, importance of following up for INR monitoring at instructed interval, importance of taking warfarin as instructed and importance of notifying clinic for changes in medications    Nery verbalizes understanding and agrees to warfarin dosing plan.    Instructed to call the [facility] for any changes, questions or concerns. (#[phone])   ?   Loraine Luis RN    Subjective/Objective:      Nery Whitaker, a 53 y.o. female is on warfarin.     Nery reports:     Home warfarin dose: verbally confirmed home dose with Nery and updated on anticoagulation calendar     Missed doses: No     Medication changes:  No     S/S of bleeding or thromboembolism:  No     New Injury or illness:  No     Changes in diet or alcohol consumption:  No     Upcoming surgery, procedure or cardioversion:  No    Anticoagulation Episode Summary     Current INR goal:   2.0-3.0   TTR:   57.2 % (8.8 mo)   Next INR check:   1/25/2019   INR from last check:   1.70! (1/11/2019)   Weekly max warfarin dose:      Target end date:      INR check location:      Preferred lab:      Send INR reminders to:   ANTICOAGULATION POOL B (MPW,HUG,STW,RVL,OAK,RLN)    Indications    Anticoagulation monitoring  INR range 2-3 [Z79.01]  PAD (peripheral artery disease) (H) [I73.9]            Comments:            Anticoagulation Care Providers     Provider Role Specialty Phone number    Loraine Rees MD Referring Family Medicine [phone]

## 2021-06-24 NOTE — TELEPHONE ENCOUNTER
Requested Prescriptions     Pending Prescriptions Disp Refills     folic acid (FOLVITE) 1 MG tablet  0     Sig: Take 1 tablet (1 mg total) by mouth every evening.

## 2021-06-25 NOTE — TELEPHONE ENCOUNTER
I called and left a message for Nery again today.  We need to connect with her on being consistent in getting her INR checked.   I gave her my direct line to call me back.

## 2021-06-25 NOTE — TELEPHONE ENCOUNTER
ANTICOAGULATION  MANAGEMENT PROGRAM    Nery Whitaker is overdue for INR check.  Reminder call made.    Left message for Nery. If returning call, please schedule INR check as soon as possible.    Kassi Sin    
ANTICOAGULATION  MANAGEMENT PROGRAM    Nery Whitaker is overdue for INR check.  Reminder call made.    Left message for Nery. If returning call, please schedule INR check as soon as possible.    Loraine Luis, RN    
ANTICOAGULATION MANAGEMENT PROGRAM--FINAL REMINDER NOTIFICATION     Dr. Rees,    Your patient, Nery Whitaker, has been under the care of the [facility] Anticoagulation Management Program for warfarin management. Our records indicate that Nery is either past due for an INR check or has not followed clinic staff recommendations. The patient s last INR was 1.7 on 1/11/2019.      Nery received two phone calls and one letter over the last several weeks in attempt to arrange a follow up appointment.  Nery is being sent a final reminder letter today to attempt to arrange a follow up appointment.      The [facility] Anticoagulation Management Program is unable to manage anticoagulation therapy for non-compliant patients. If Nery does not schedule follow up within 3 weeks from the date of her letter it may result in transition of Nery from our service back to you for warfarin management.    Please don t hesitate to contact the Anticoagulation Management Program at [phone] if you have any questions or concerns.     Sincerely,     [facility]  
ANTICOAGULATION MANAGEMENT PROGRAM--Non-Compliance Chart Review    Nery Whitaker is overdue for INR follow up.      INR Results:   Lab Results   Component Value Date    INR 1.70 (H) 01/11/2019    INR 2.90 (H) 12/19/2018    INR 2.10 (H) 12/05/2018       Patient appears to use her [employer] account at times, sent her a message to remind that she is overdue.     Chart reviewed, continue non-compliance protocol       Radha Castillo RN  
I agree that patient's lack of appropriate follow up on recommendations by the anticoag team makes management on coumadin dangerous. Our clinic manager will call the patient and make it clear that she will need to follow all recommendations given by the anti-coag team in a timely manner or I will no longer be prescribing her anticoagulation.  
Nery Whitaker is overdue for INR follow up.      Nery was due to come back on: 1/25/2019    Anticoagulation Management Program (ACM) has attempted to reach Nery by phone and mail to schedule an INR appointment but Nery has not responded.     ACN and/or pharmacist chart review requested.   
Reminder letter sent  
Spoke with staff at [facility]. Patient made an INR appointment for 3/27/19.   
no

## 2021-06-25 NOTE — TELEPHONE ENCOUNTER
RN cannot approve Refill Request    RN can NOT refill this medication Protocol failed and NO refill given. Last office visit: 11/2/2018 Loraine Rees MD Last Physical: 1/22/2021 Last MTM visit: Visit date not found Last visit same specialty: 11/2/2018 Loraine Rees MD.  Next visit within 3 mo: Visit date not found  Next physical within 3 mo: Visit date not found      Anila Christie, [facility] Triage/Med Refill 5/28/2021    Requested Prescriptions   Pending Prescriptions Disp Refills     folic acid (FOLVITE) 1 MG tablet [Pharmacy Med Name: Folic Acid 1 MG Oral Tablet] 90 tablet 0     Sig: TAKE 1 TABLET BY MOUTH ONCE DAILY IN THE EVENING       There is no refill protocol information for this order        amLODIPine (NORVASC) 5 MG tablet [Pharmacy Med Name: amLODIPine Besylate 5 MG Oral Tablet] 90 tablet 0     Sig: Take 1 tablet by mouth once daily       Calcium-Channel Blockers Protocol Passed - 5/28/2021 11:53 AM        Passed - PCP or prescribing provider visit in past 12 months or next 3 months     Last office visit with prescriber/PCP: 11/2/2018 Loraine Rees MD OR same dept: Visit date not found OR same specialty: 11/2/2018 Loraine Rees MD  Last physical: 1/22/2021 Last MTM visit: Visit date not found   Next visit within 3 mo: Visit date not found  Next physical within 3 mo: Visit date not found  Prescriber OR PCP: Loraine Rees MD  Last diagnosis associated with med order: 1. Thoracic neuralgia  - folic acid (FOLVITE) 1 MG tablet [Pharmacy Med Name: Folic Acid 1 MG Oral Tablet]; TAKE 1 TABLET BY MOUTH ONCE DAILY IN THE EVENING  Dispense: 90 tablet; Refill: 0    2. Essential hypertension  - amLODIPine (NORVASC) 5 MG tablet [Pharmacy Med Name: amLODIPine Besylate 5 MG Oral Tablet]; Take 1 tablet by mouth once daily  Dispense: 90 tablet; Refill: 0    If protocol passes may refill for 12 months if within 3 months of last provider visit (or a total of 15 months).             Passed -  Blood pressure filed in past 12 months     BP Readings from Last 1 Encounters:   02/12/21 138/65

## 2021-06-25 NOTE — TELEPHONE ENCOUNTER
Left message to call back for: Nery  Information to relay to patient:  I called and left a message for Nery today at the request of Dr. Rees.  If she calls back, please transfer there to my desk extention [phone].  We need to discuss her  Coumadin management.

## 2021-06-25 NOTE — TELEPHONE ENCOUNTER
Nery called me back today.  We talked about how important it is that she follow the directions and follow up from the ACM team.  I told her that if she stayed non compliant, that Dr. Rees would no longer prescribe her the Coumadin and she would have to find another provider.  Nery says she understands the importance of being seen regularly. She made an appt for next Wednesday.  I call the ACM team, and they will follow up with Nery after she is seen.

## 2021-06-30 NOTE — PROGRESS NOTES
Progress Notes by Debo Paredes RN at 12/10/2020 11:29 AM     Author: Debo Paredes RN Service: -- Author Type: Registered Nurse    Filed: 12/10/2020 11:33 AM Encounter Date: 12/10/2020 Status: Attested    : Debo Paredes RN (Registered Nurse) Cosigner: Loraine Rees MD at 12/10/2020  1:46 PM    Attestation signed by Loraine Rees MD at 12/10/2020  1:46 PM    Agree with plan of care.                Anticoagulation Annual Referral Renewal Review    Nery Whitaker's chart reviewed for annual renewal of referral to anticoagulation monitoring.        Criteria for anticoagulation nurse and/or pharmacist renewal met   Warfarin indication: Peripheral Artery Disease  Yes , withprevious provider documentation patient to be on extended anticoagulation  3/28/2019 per Dr. Rees    Current with INR monitoring/compliant Yes Yes   Date of last office visit 12/20/2019 Yes, had office visit within last year   Time in Therapeutic Range (TTR) 60.9 % Yes, TTR > 60%       Nery Whitaker met all criteria for anticoagulation management program initiated renewal.  New INR standing orders and anticoagulation referral renewal placed.      Debo Paredes RN  11:29 AM

## 2021-07-03 NOTE — ADDENDUM NOTE
Addendum Note by Markus Marie DPM at 10/11/2018  4:35 PM     Author: Markus Marie DPM Service: -- Author Type: Physician    Filed: 10/11/2018  4:35 PM Encounter Date: 10/11/2018 Status: Signed    : Markus Marie DPM (Physician)    Addended by: MARKUS MARIE on: 10/11/2018 04:35 PM        Modules accepted: Orders, SmartSet

## 2021-07-03 NOTE — ADDENDUM NOTE
Addendum Note by Loraine Gillette MD at 11/16/2018  1:02 PM     Author: Loraine Gillette MD Service: -- Author Type: Physician    Filed: 11/16/2018  4:30 PM Encounter Date: 11/16/2018 Status: Signed    : Loraine Gillette MD (Physician)    Addended by: LORAINE GILLETTE on: 11/16/2018 04:30 PM        Modules accepted: Orders

## 2021-07-03 NOTE — ADDENDUM NOTE
Addendum Note by Loraine Gillette MD at 4/20/2018  7:50 AM     Author: Loraine Gillette MD Service: -- Author Type: Physician    Filed: 4/20/2018  7:50 AM Encounter Date: 4/17/2018 Status: Signed    : Loranie Gillette MD (Physician)    Addended by: LORAINE GILLETTE on: 4/20/2018 07:50 AM        Modules accepted: Orders

## 2021-07-13 ENCOUNTER — RECORDS - HEALTHEAST (OUTPATIENT)
Dept: ADMINISTRATIVE | Facility: CLINIC | Age: 56
End: 2021-07-13

## 2021-07-14 DIAGNOSIS — I73.9 PAD (PERIPHERAL ARTERY DISEASE) (H): Primary | ICD-10-CM

## 2021-07-14 DIAGNOSIS — Z79.01 ANTICOAGULATION MONITORING, INR RANGE 2-3: ICD-10-CM

## 2021-07-16 ENCOUNTER — OFFICE VISIT (OUTPATIENT)
Dept: PULMONOLOGY | Facility: OTHER | Age: 56
End: 2021-07-16
Payer: COMMERCIAL

## 2021-07-16 VITALS
OXYGEN SATURATION: 97 % | HEIGHT: 65 IN | SYSTOLIC BLOOD PRESSURE: 90 MMHG | BODY MASS INDEX: 21.39 KG/M2 | DIASTOLIC BLOOD PRESSURE: 58 MMHG | WEIGHT: 128.4 LBS | HEART RATE: 65 BPM

## 2021-07-16 DIAGNOSIS — F17.210 CIGARETTE NICOTINE DEPENDENCE, UNCOMPLICATED: Primary | ICD-10-CM

## 2021-07-16 PROCEDURE — 99203 OFFICE O/P NEW LOW 30 MIN: CPT | Performed by: INTERNAL MEDICINE

## 2021-07-16 RX ORDER — FAMOTIDINE 20 MG/1
20 TABLET, FILM COATED ORAL 2 TIMES DAILY
COMMUNITY
Start: 2021-04-08 | End: 2022-04-05

## 2021-07-16 RX ORDER — EVOLOCUMAB 140 MG/ML
INJECTION, SOLUTION SUBCUTANEOUS
COMMUNITY
Start: 2021-06-22

## 2021-07-16 RX ORDER — CLINDAMYCIN PHOSPHATE 10 UG/ML
LOTION TOPICAL
COMMUNITY
Start: 2021-06-22 | End: 2024-09-20

## 2021-07-16 ASSESSMENT — MIFFLIN-ST. JEOR: SCORE: 1178.3

## 2021-07-16 NOTE — PROGRESS NOTES
"Pulmonary Clinic Consult Note  Date of Service: 7/16/2021    Reason for Consultation: here for lung screening    History:     HPI: Patient is a pleasant 56-year-old female, smoker, was short here for evaluation of lung screening.    She is a smoker. She smokes approximately a pack per day. She started smoking at the age of 17. She is not on any inhalers. She has never been diagnosed with COPD or emphysema. She has never been diagnosed with asthma. She denies any night sweats or weight loss. She has never had PFTs done.    PMHx/PSHx:  CAD  Hypertension  Peripheral artery disease  Migraine headaches  Bilateral carotid artery disease  Anxiety  Anemia  History of appendectomy  History of femoral artery stent  Cholecystectomy  Tympanostomy tube placement    Social history:  Smoker. Smokes 1 pack/day. Has been smoking since age of 17.  Dental assistant    Review of Systems - 10 point review of system negative except for what is mentioned in the HPI.    Exam/Data:   BP 90/58   Pulse 65   Ht 1.651 m (5' 5\")   Wt 58.2 kg (128 lb 6.4 oz)   SpO2 97%   BMI 21.37 kg/m      EXAM:  GEN: comfortable, NAD  HEENT: NCAT, EMOI  CVS: S1S2, RRR  Lung: CTA  Abd: soft, nt, + BS. No masses  Ext: no c/c/e  Neuro: nonfocal  Skin: no visible rash  Musculoskeletal: FROM all extremities  Psych: appropriate    DATA    Labs: reviewed in EMR and outside records where pertinent.       CT chest 2018:   No acute process detected in the chest, abdomen or pelvis.    Assessment/Plan:   Nery Whitaker is a 56 year old female, smoker, who is here for lung screening.  No PFTs in the system.  Patient is not on any inhalers.    Recommendations:  Screening low-dose CT at baseline and yearly low-dose CT afterwards  Complete PFTs    Brooklynn Navarro MD  Pulmonary and Critical Care Medicine  7/16/2021    Lung Cancer Screening pre-scan counseling Visit    The patient fits the risk profile of patients who benefit from this screening:  -The patient is >55 years " old and <80 years old  -The patient has 38 pack year history (over 30)  -The patient has smoked within the past 15 years  -The patient has no medical comorbidity severe enough that it would cause mortality prior to mortality due to the lung cancer attempting to be detected.    Discussion with patient regarding the harms associated with LDCT screening include false-negative and false-positive results, incidental findings, overdiagnosis, and radiation exposure were reviewed at length.   The patient understands that pursuing this screening test may result in a biopsy that was not necessary. It may also produced added stress over a nodule that is likely not cancer.    Of 100 patients who get screening, 25 will have a positive scan. Of those 25, only 1 will have cancer.  Overdiagnosis is estimated at 10% of patients-- they would not have been detected in the patient's lifetime without screening. Less than 1% of patients likely had death related to radiation exposure increase.   Average low-dose CT associated with 0.61 to 1.5 mSv. Annual background radiation exposure in the [place] averages 2.4 mS; mammogram is 0.7mSv.    The benefits are reduction in risk of death from lung cancer. The number needed to treat is 320 (for every 320 patients who undergo screening, 1 patient will have a benefit in mortality from early detection from the screening).    Undergoing this screening implies willingness to pursue further potentially invasive testing to discover potential cancer.    All questions were answered.    The patient was counseled regarding smoking cessation and its risk for lung cancer.      Family History   Problem Relation Age of Onset     Peripheral Vascular Disease Father      Breast Cancer Mother      Other - See Comments Father         vasular problems      No Known Problems Sister      No Known Problems Brother      No Known Problems Son      No Known Problems Maternal Grandmother      Coronary Artery Disease  Maternal Grandfather      Cancer Paternal Grandmother         lung      Cancer Paternal Grandfather         kidney      Colon Cancer No family hx of      Prostate Cancer No family hx of      Allergies   Allergen Reactions     Ezetimibe      constipation     Lovastatin Unknown     constipation     Simvastatin      Other reaction(s): Constipation  Intolerance, but tolerated with re-challenge in Feb to April 2009       Medications:     Current Outpatient Medications   Medication Sig Dispense Refill     amLODIPine (NORVASC) 5 MG tablet Take 5 mg by mouth       aspirin 81 MG EC tablet Take 81 mg by mouth       atorvastatin (LIPITOR) 10 MG tablet Take 1 tablet (10 mg) by mouth daily 30 tablet 1     citalopram (CELEXA) 20 MG tablet Take 20 mg by mouth       clindamycin (CLEOCIN T) 1 % external lotion APPLY LOTION TOPICALLY DAILY TO UPPER BACK       famotidine (PEPCID) 20 MG tablet Take 20 mg by mouth 2 times daily       folic acid (FOLVITE) 1 MG tablet Take 1 mg by mouth       gabapentin (NEURONTIN) 100 MG capsule Take 100 mg by mouth       lisinopril (PRINIVIL/ZESTRIL) 20 MG tablet Take 20 mg by mouth       ranitidine (ZANTAC) 150 MG tablet Take 150 mg by mouth       REPATHA SURECLICK 140 MG/ML prefilled autoinjector INJECT 1ML (140MG) SUBCUTANEOUS EVERY 2 WEEKS. INJECT INTO ABDOMEN THIGH OR UPPER ARM. ROTATE INJECTION SITES.       tolterodine ER (DETROL LA) 2 MG 24 hr capsule Take 2 mg by mouth       warfarin (COUMADIN) 5 MG tablet Take 5 mg by mouth         Much or all of the text in this note was generated through the use of the Dragon Dictate voice-to-text software. Errors in spelling or words which seem out of context are unintentional. Sound alike errors, in particular, may have escaped editing.

## 2021-07-17 ENCOUNTER — MYC MEDICAL ADVICE (OUTPATIENT)
Dept: FAMILY MEDICINE | Facility: CLINIC | Age: 56
End: 2021-07-17

## 2021-07-17 DIAGNOSIS — M79.2 THORACIC NEURALGIA: Primary | ICD-10-CM

## 2021-07-17 DIAGNOSIS — I73.9 PAD (PERIPHERAL ARTERY DISEASE) (H): ICD-10-CM

## 2021-07-17 DIAGNOSIS — Z79.01 ANTICOAGULATION MONITORING, INR RANGE 2-3: Primary | ICD-10-CM

## 2021-07-20 RX ORDER — GABAPENTIN 100 MG/1
CAPSULE ORAL
Qty: 450 CAPSULE | Refills: 4 | Status: SHIPPED | OUTPATIENT
Start: 2021-07-20 | End: 2022-08-05

## 2021-07-21 ENCOUNTER — DOCUMENTATION ONLY (OUTPATIENT)
Dept: FAMILY MEDICINE | Facility: CLINIC | Age: 56
End: 2021-07-21

## 2021-07-21 ENCOUNTER — MYC MEDICAL ADVICE (OUTPATIENT)
Dept: FAMILY MEDICINE | Facility: CLINIC | Age: 56
End: 2021-07-21

## 2021-07-21 DIAGNOSIS — M79.2 THORACIC NEURALGIA: ICD-10-CM

## 2021-07-21 NOTE — TELEPHONE ENCOUNTER
"  Disp Refills Start End CHANTAL    warfarin ANTICOAGULANT (COUMADIN/JANTOVEN) 5 MG tablet 100 tablet 2 12/29/2020  No   Sig: Take 1 to 1 & 1/2 (5mg-7.5 mg) tablets by mouth once daily. Adjust dose per INR results.   Sent to pharmacy as: warfarin 5 mg tablet (COUMADIN/JANTOVEN)   Notes to Pharmacy: Current dose: Take warfarin 7.5 mg on Wednesdays, take warfarin 5 mg all other days of the week.   E-Prescribing Status: Receipt confirmed by pharmacy (12/29/2020 12:32 PM CST)     Routing refill request to provider for review/approval because:  Labs out of range:  INR  Medication is reported/historical    Last Written Prescription Date:  12/29/20  Last Fill Quantity: 100,  # refills: 2   Last office visit provider:  1/22/21     Requested Prescriptions   Pending Prescriptions Disp Refills     warfarin ANTICOAGULANT (COUMADIN) 5 MG tablet [Pharmacy Med Name: Warfarin Sodium 5 MG Oral Tablet] 100 tablet 0     Sig: TAKE 1 TO 1 & 1/2 (5MG-7.5 MG) TABLETS BY MOUTH ONCE DAILY. ADJUST DOSE PER INR RESULTS       Vitamin K Antagonists Failed - 7/17/2021  8:23 AM        Failed - INR is within goal in the past 6 weeks     Confirm INR is within goal in the past 6 weeks.     Recent Labs   Lab Test 06/16/21  1257   INR 2.50*                       Passed - Recent (12 mo) or future (30 days) visit within the authorizing provider's specialty     Patient has had an office visit with the authorizing provider or a provider within the authorizing providers department within the previous 12 mos or has a future within next 30 days. See \"Patient Info\" tab in inbasket, or \"Choose Columns\" in Meds & Orders section of the refill encounter.              Passed - Medication is active on med list        Passed - Patient is 18 years of age or older        Passed - Patient is not pregnant        Passed - No positive pregnancy on file in past 12 months             Mayra Dang RN 07/21/21 2:04 PM  "

## 2021-07-21 NOTE — PROGRESS NOTES
Patient is overdue for INR.   Updating check date to today so patient shows on our reminder list.      Patient was due for INR on 7/14/21-7/28/20 (4-6 weeks from last INR check)    Overdue INR reminder updated

## 2021-07-22 RX ORDER — GABAPENTIN 100 MG/1
CAPSULE ORAL
Qty: 450 CAPSULE | Refills: 4 | Status: CANCELLED | OUTPATIENT
Start: 2021-07-22

## 2021-07-22 RX ORDER — WARFARIN SODIUM 5 MG/1
TABLET ORAL
Qty: 100 TABLET | Refills: 0 | Status: SHIPPED | OUTPATIENT
Start: 2021-07-22 | End: 2021-09-10

## 2021-07-28 ENCOUNTER — HOSPITAL ENCOUNTER (OUTPATIENT)
Dept: CT IMAGING | Facility: HOSPITAL | Age: 56
Discharge: HOME | End: 2021-07-28
Attending: INTERNAL MEDICINE | Admitting: INTERNAL MEDICINE
Payer: COMMERCIAL

## 2021-07-28 DIAGNOSIS — F17.210 CIGARETTE NICOTINE DEPENDENCE, UNCOMPLICATED: ICD-10-CM

## 2021-07-28 PROCEDURE — 71271 CT THORAX LUNG CANCER SCR C-: CPT

## 2021-07-30 ENCOUNTER — ANTICOAGULATION THERAPY VISIT (OUTPATIENT)
Dept: ANTICOAGULATION | Facility: CLINIC | Age: 56
End: 2021-07-30

## 2021-07-30 ENCOUNTER — LAB (OUTPATIENT)
Dept: LAB | Facility: CLINIC | Age: 56
End: 2021-07-30
Payer: COMMERCIAL

## 2021-07-30 DIAGNOSIS — Z79.01 ANTICOAGULATION MONITORING, INR RANGE 2-3: ICD-10-CM

## 2021-07-30 DIAGNOSIS — Z79.01 ANTICOAGULATION MONITORING, INR RANGE 2-3: Primary | ICD-10-CM

## 2021-07-30 DIAGNOSIS — I73.9 PAD (PERIPHERAL ARTERY DISEASE) (H): ICD-10-CM

## 2021-07-30 LAB — INR BLD: 1.8 (ref 0.9–1.1)

## 2021-07-30 PROCEDURE — 85610 PROTHROMBIN TIME: CPT

## 2021-07-30 PROCEDURE — 36416 COLLJ CAPILLARY BLOOD SPEC: CPT

## 2021-07-30 NOTE — PROGRESS NOTES
ANTICOAGULATION MANAGEMENT     Nery Whitaker 56 year old female is on warfarin with subtherapeutic INR result. (Goal INR 2.0-3.0)    Recent labs: (last 7 days)     07/30/21  0758   INR 1.8*       ASSESSMENT     Source(s): Chart Review and Template       Warfarin doses taken: Warfarin taken as instructed    Diet: No new diet changes identified    New illness, injury, or hospitalization: No    Medication/supplement changes: None noted    Signs or symptoms of bleeding or clotting: No    Previous INR: Therapeutic last 2(+) visits    Additional findings: None     PLAN     Recommended plan for no diet, medication or health factor changes affecting INR     Dosing Instructions: Booster dose then continue your current warfarin dose with next INR in 2 weeks       Summary  As of 7/30/2021    Full warfarin instructions:  7/30: 10 mg; Otherwise 7.5 mg every Wed, Sat; 5 mg all other days   Next INR check:  8/13/2021             Detailed voice message left for Nery with dosing instructions and follow up date.     Contact [phone] to schedule and with any changes, questions or concerns.     Education provided: None required    Plan made per [facility] anticoagulation protocol    Celina Hoang RN  Anticoagulation Clinic  7/30/2021    _______________________________________________________________________     Anticoagulation Episode Summary     Current INR goal:  2.0-3.0   TTR:  82.0 % (1 y)   Target end date:     Send INR reminders to:  BERNICE MARTINI    Indications    Anticoagulation monitoring  INR range 2-3 [Z79.01]  PAD (peripheral artery disease) (H) [I73.9]           Comments:           Anticoagulation Care Providers     Provider Role Specialty Phone number    Loraine Rees MD Referring Family Medicine [phone]

## 2021-08-02 ENCOUNTER — MYC MEDICAL ADVICE (OUTPATIENT)
Dept: FAMILY MEDICINE | Facility: CLINIC | Age: 56
End: 2021-08-02

## 2021-08-02 DIAGNOSIS — M54.6 THORACIC SPINE PAIN: Primary | ICD-10-CM

## 2021-08-20 ENCOUNTER — ANTICOAGULATION THERAPY VISIT (OUTPATIENT)
Dept: ANTICOAGULATION | Facility: CLINIC | Age: 56
End: 2021-08-20

## 2021-08-20 ENCOUNTER — LAB (OUTPATIENT)
Dept: LAB | Facility: CLINIC | Age: 56
End: 2021-08-20
Payer: COMMERCIAL

## 2021-08-20 DIAGNOSIS — Z79.01 ANTICOAGULATION MONITORING, INR RANGE 2-3: Primary | ICD-10-CM

## 2021-08-20 DIAGNOSIS — Z79.01 ANTICOAGULATION MONITORING, INR RANGE 2-3: ICD-10-CM

## 2021-08-20 DIAGNOSIS — I73.9 PAD (PERIPHERAL ARTERY DISEASE) (H): ICD-10-CM

## 2021-08-20 LAB — INR BLD: 3.6 (ref 0.9–1.1)

## 2021-08-20 PROCEDURE — 36415 COLL VENOUS BLD VENIPUNCTURE: CPT

## 2021-08-20 PROCEDURE — 85610 PROTHROMBIN TIME: CPT

## 2021-08-20 NOTE — PROGRESS NOTES
ANTICOAGULATION MANAGEMENT     Nery Whitaker 56 year old female is on warfarin with supratherapeutic INR result. (Goal INR 2.0-3.0)    Recent labs: (last 7 days)     08/20/21  1300   INR 3.6*       ASSESSMENT     Source(s): Chart Review, Patient/Caregiver Call and Template       Warfarin doses taken: Warfarin taken as instructed    Diet: Decreased greens/vitamin K in diet; plans to resume previous intake    New illness, injury, or hospitalization: No    Medication/supplement changes: None noted    Signs or symptoms of bleeding or clotting: No    Previous INR: Subtherapeutic    Additional findings: None     PLAN     Recommended plan for temporary change(s) affecting INR     Dosing Instructions: Hold dose then continue your current warfarin dose with next INR in 2 weeks       Summary  As of 8/20/2021    Full warfarin instructions:  8/20: Hold; Otherwise 7.5 mg every Wed, Sat; 5 mg all other days   Next INR check:  9/3/2021             Telephone call with Nery who verbalizes understanding and agrees to plan    Lab visit scheduled    Education provided: None required    Plan made per [facility] anticoagulation protocol    Celina Hoang RN  Anticoagulation Clinic  8/20/2021    _______________________________________________________________________     Anticoagulation Episode Summary     Current INR goal:  2.0-3.0   TTR:  79.4 % (1 y)   Target end date:     Send INR reminders to:  BERNICE MARTINI    Indications    Anticoagulation monitoring  INR range 2-3 [Z79.01]  PAD (peripheral artery disease) (H) [I73.9]           Comments:           Anticoagulation Care Providers     Provider Role Specialty Phone number    Loraine Rees MD Referring Family Medicine [phone]

## 2021-09-01 ENCOUNTER — MYC MEDICAL ADVICE (OUTPATIENT)
Dept: FAMILY MEDICINE | Facility: CLINIC | Age: 56
End: 2021-09-01

## 2021-09-01 ENCOUNTER — TELEPHONE (OUTPATIENT)
Dept: FAMILY MEDICINE | Facility: CLINIC | Age: 56
End: 2021-09-01

## 2021-09-01 NOTE — TELEPHONE ENCOUNTER
Reason for Call:  Other call back    Detailed comments: Patient is going to be starting an antibiotic in a couple days and would like to know what to do.     Phone Number Patient can be reached at: Home number on file [phone] (home)    Best Time: any    Can we leave a detailed message on this number? YES    Call taken on 9/1/2021 at 12:39 PM by Tootie Bauer

## 2021-09-02 PROBLEM — E78.1 PURE HYPERGLYCERIDEMIA: Status: ACTIVE | Noted: 2021-09-02

## 2021-09-02 PROBLEM — I73.9 PAD (PERIPHERAL ARTERY DISEASE) (H): Status: ACTIVE | Noted: 2018-04-11

## 2021-09-02 PROBLEM — N39.41 URGE INCONTINENCE OF URINE: Status: ACTIVE | Noted: 2018-11-04

## 2021-09-02 RX ORDER — TOLTERODINE TARTRATE 1 MG/1
1 TABLET, EXTENDED RELEASE ORAL 2 TIMES DAILY
COMMUNITY
Start: 2021-07-17 | End: 2022-02-25

## 2021-09-02 NOTE — TELEPHONE ENCOUNTER
DIAGNOSIS: thoracic spine pain/Dr. Rees/[facility]/MRI   APPOINTMENT DATE: 9.17.21   NOTES STATUS DETAILS   OFFICE NOTE from referring provider Internal 8.2.21 [facility]   OFFICE NOTE from other specialist Internal 2.12.21 Dr Loraine Rees, [facility]   DISCHARGE SUMMARY from hospital N/A    DISCHARGE REPORT from the ER N/A    OPERATIVE REPORT N/A    MEDICATION LIST Internal    EMG (for Spine) N/A    IMPLANT RECORD/STICKER N/A    LABS     CBC/DIFF N/A    CULTURES N/A    INJECTIONS DONE IN RADIOLOGY N/A    MRI Internal 2.5.21 T spine (NIL)   CT SCAN N/A    XRAYS (IMAGES & REPORTS) N/A    TUMOR     PATHOLOGY  Slides & report N/A

## 2021-09-03 ENCOUNTER — LAB (OUTPATIENT)
Dept: LAB | Facility: CLINIC | Age: 56
End: 2021-09-03
Payer: COMMERCIAL

## 2021-09-03 ENCOUNTER — ANTICOAGULATION THERAPY VISIT (OUTPATIENT)
Dept: ANTICOAGULATION | Facility: CLINIC | Age: 56
End: 2021-09-03

## 2021-09-03 DIAGNOSIS — Z79.01 ANTICOAGULATION MONITORING, INR RANGE 2-3: ICD-10-CM

## 2021-09-03 DIAGNOSIS — Z79.01 ANTICOAGULATION MONITORING, INR RANGE 2-3: Primary | ICD-10-CM

## 2021-09-03 DIAGNOSIS — I73.9 PAD (PERIPHERAL ARTERY DISEASE) (H): ICD-10-CM

## 2021-09-03 LAB — INR BLD: 2.3 (ref 0.9–1.1)

## 2021-09-03 PROCEDURE — 36416 COLLJ CAPILLARY BLOOD SPEC: CPT

## 2021-09-03 PROCEDURE — 85610 PROTHROMBIN TIME: CPT

## 2021-09-03 NOTE — PROGRESS NOTES
ANTICOAGULATION MANAGEMENT     Nery Whitaker 56 year old female is on warfarin with therapeutic INR result. (Goal INR 2.0-3.0)    Recent labs: (last 7 days)     09/03/21  0855   INR 2.3*       ASSESSMENT     Source(s): Chart Review and Patient/Caregiver Call       Warfarin doses taken: Warfarin taken as instructed    Diet: No new diet changes identified    New illness, injury, or hospitalization: No    Medication/supplement changes: started 10 day course of Amox yesterday for tooth, ends 9/12    Signs or symptoms of bleeding or clotting: No    Previous INR: Supratherapeutic    Additional findings: None     PLAN     Recommended plan for temporary change(s) affecting INR     Dosing Instructions: Continue your current warfarin dose with next INR in 1 week       Summary  As of 9/3/2021    Full warfarin instructions:  7.5 mg every Wed, Sat; 5 mg all other days   Next INR check:  9/10/2021             Telephone call with Nery who verbalizes understanding and agrees to plan    Lab visit scheduled    Education provided: None required    Plan made per [facility] anticoagulation protocol    Celina Hoang RN  Anticoagulation Clinic  9/3/2021    _______________________________________________________________________     Anticoagulation Episode Summary     Current INR goal:  2.0-3.0   TTR:  77.6 % (1 y)   Target end date:     Send INR reminders to:  BERNICE MARTINI    Indications    Anticoagulation monitoring  INR range 2-3 [Z79.01]  PAD (peripheral artery disease) (H) [I73.9]           Comments:           Anticoagulation Care Providers     Provider Role Specialty Phone number    Loraine Rees MD Referring Family Medicine [phone]

## 2021-09-09 DIAGNOSIS — M54.6 PAIN IN THORACIC SPINE: Primary | ICD-10-CM

## 2021-09-10 ENCOUNTER — LAB (OUTPATIENT)
Dept: LAB | Facility: CLINIC | Age: 56
End: 2021-09-10
Payer: COMMERCIAL

## 2021-09-10 ENCOUNTER — ANTICOAGULATION THERAPY VISIT (OUTPATIENT)
Dept: ANTICOAGULATION | Facility: CLINIC | Age: 56
End: 2021-09-10

## 2021-09-10 DIAGNOSIS — I73.9 PAD (PERIPHERAL ARTERY DISEASE) (H): ICD-10-CM

## 2021-09-10 DIAGNOSIS — Z79.01 ANTICOAGULATION MONITORING, INR RANGE 2-3: ICD-10-CM

## 2021-09-10 DIAGNOSIS — Z79.01 ANTICOAGULATION MONITORING, INR RANGE 2-3: Primary | ICD-10-CM

## 2021-09-10 LAB — INR BLD: 1.7 (ref 0.9–1.1)

## 2021-09-10 PROCEDURE — 36416 COLLJ CAPILLARY BLOOD SPEC: CPT

## 2021-09-10 PROCEDURE — 85610 PROTHROMBIN TIME: CPT

## 2021-09-10 RX ORDER — WARFARIN SODIUM 5 MG/1
TABLET ORAL
Qty: 100 TABLET | Refills: 3 | Status: SHIPPED | OUTPATIENT
Start: 2021-09-10 | End: 2022-04-04

## 2021-09-10 NOTE — TELEPHONE ENCOUNTER
Pending Prescriptions:                       Disp   Refills    warfarin ANTICOAGULANT (COUMADIN) 5 MG ta*100 ta*0            Sig: TAKE 1 TO 1 & 1/2 (5MG-7.5 MG) TABLETS BY MOUTH           ONCE DAILY. ADJUST DOSE PER INR RESULTS    Last Phy 01/22/21

## 2021-09-10 NOTE — PROGRESS NOTES
ANTICOAGULATION MANAGEMENT     Nery Whitaker 56 year old female is on warfarin with subtherapeutic INR result. (Goal INR 2.0-3.0)    Recent labs: (last 7 days)     09/10/21  1008   INR 1.7*       ASSESSMENT     Source(s): Chart Review and Template       Warfarin doses taken: Warfarin taken as instructed    Diet: No new diet changes identified    New illness, injury, or hospitalization: No    Medication/supplement changes: None noted    Signs or symptoms of bleeding or clotting: No    Previous INR: Therapeutic last visit; previously outside of goal range    Additional findings: finishing 10 day amox course 9/12 for tooth     PLAN     Recommended plan for temporary change(s) affecting INR     Dosing Instructions: Booster dose then continue your current warfarin dose with next INR in 1-2 weeks       Summary  As of 9/10/2021    Full warfarin instructions:  9/10: 10 mg; Otherwise 7.5 mg every Wed, Sat; 5 mg all other days   Next INR check:  9/17/2021             Telephone call with Nery who verbalizes understanding and agrees to plan    Lab visit scheduled    Education provided: None required    Plan made per [facility] anticoagulation protocol    Celina Hoang RN  Anticoagulation Clinic  9/10/2021    _______________________________________________________________________     Anticoagulation Episode Summary     Current INR goal:  2.0-3.0   TTR:  76.7 % (1 y)   Target end date:     Send INR reminders to:  BERNICE MARTINI    Indications    Anticoagulation monitoring  INR range 2-3 [Z79.01]  PAD (peripheral artery disease) (H) [I73.9]           Comments:           Anticoagulation Care Providers     Provider Role Specialty Phone number    Loraine Rees MD Referring Family Medicine [phone]
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
## 2021-09-17 ENCOUNTER — ANCILLARY PROCEDURE (OUTPATIENT)
Dept: GENERAL RADIOLOGY | Facility: CLINIC | Age: 56
End: 2021-09-17
Attending: ORTHOPAEDIC SURGERY
Payer: COMMERCIAL

## 2021-09-17 ENCOUNTER — PRE VISIT (OUTPATIENT)
Dept: ORTHOPEDICS | Facility: CLINIC | Age: 56
End: 2021-09-17

## 2021-09-17 ENCOUNTER — OFFICE VISIT (OUTPATIENT)
Dept: ORTHOPEDICS | Facility: CLINIC | Age: 56
End: 2021-09-17
Payer: COMMERCIAL

## 2021-09-17 VITALS — WEIGHT: 128 LBS | HEIGHT: 65 IN | BODY MASS INDEX: 21.33 KG/M2

## 2021-09-17 DIAGNOSIS — M54.6 THORACIC SPINE PAIN: ICD-10-CM

## 2021-09-17 PROCEDURE — 99204 OFFICE O/P NEW MOD 45 MIN: CPT | Performed by: ORTHOPAEDIC SURGERY

## 2021-09-17 PROCEDURE — 72070 X-RAY EXAM THORAC SPINE 2VWS: CPT | Performed by: STUDENT IN AN ORGANIZED HEALTH CARE EDUCATION/TRAINING PROGRAM

## 2021-09-17 ASSESSMENT — MIFFLIN-ST. JEOR: SCORE: 1176.48

## 2021-09-17 NOTE — NURSING NOTE
"Reason For Visit:   Chief Complaint   Patient presents with     Consult     thoracic spine pain, has been having sharp back pain in her mid thoracic spine and had some radiating pain to her left flank. has tried gabapentin and PT has not tried any injections.        Primary MD: Loraine Rees  Ref. MD: Negrita     ?  No  Occupation dental asisstant .  Currently working? Yes.  Work status?  Full time.  Date of injury: patient stated awhile   Type of injury: chronic .  Date of surgery: none   Type of surgery: none .  Smoker: Yes  Request smoking cessation information: No    Ht 1.651 m (5' 5\")   Wt 58.1 kg (128 lb)   BMI 21.30 kg/m           Oswestry (GIACOMO) Questionnaire    No flowsheet data found.         Neck Disability Index (NDI) Questionnaire    No flowsheet data found.                Promis 10 Assessment    No flowsheet data found.             Akash Lance, ATC  "
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
## 2021-09-29 ENCOUNTER — LAB (OUTPATIENT)
Dept: LAB | Facility: CLINIC | Age: 56
End: 2021-09-29
Payer: COMMERCIAL

## 2021-09-29 ENCOUNTER — ANTICOAGULATION THERAPY VISIT (OUTPATIENT)
Dept: ANTICOAGULATION | Facility: CLINIC | Age: 56
End: 2021-09-29

## 2021-09-29 DIAGNOSIS — Z79.01 ANTICOAGULATION MONITORING, INR RANGE 2-3: Primary | ICD-10-CM

## 2021-09-29 DIAGNOSIS — Z79.01 ANTICOAGULATION MONITORING, INR RANGE 2-3: ICD-10-CM

## 2021-09-29 DIAGNOSIS — I73.9 PAD (PERIPHERAL ARTERY DISEASE) (H): ICD-10-CM

## 2021-09-29 LAB — INR BLD: 2.6 (ref 0.9–1.1)

## 2021-09-29 PROCEDURE — 85610 PROTHROMBIN TIME: CPT

## 2021-09-29 PROCEDURE — 36416 COLLJ CAPILLARY BLOOD SPEC: CPT

## 2021-09-29 NOTE — PROGRESS NOTES
ANTICOAGULATION MANAGEMENT     Nery Whitaker 56 year old female is on warfarin with therapeutic INR result. (Goal INR 2.0-3.0)    Recent labs: (last 7 days)     09/29/21  1013   INR 2.6*       ASSESSMENT     Source(s): Chart Review and Patient/Caregiver Call       Warfarin doses taken: Warfarin taken as instructed    Diet: No new diet changes identified    New illness, injury, or hospitalization: No    Medication/supplement changes: None noted    Signs or symptoms of bleeding or clotting: No    Previous INR: Subtherapeutic    Additional findings: None     PLAN     Recommended plan for no diet, medication or health factor changes affecting INR     Dosing Instructions: Continue your current warfarin dose with next INR in 4 weeks       Summary  As of 9/29/2021    Full warfarin instructions:  7.5 mg every Wed, Sat; 5 mg all other days   Next INR check:  10/27/2021             Telephone call with Nery who verbalizes understanding and agrees to plan    Lab visit scheduled    Education provided: None required    Plan made per [facility] anticoagulation protocol    Celina Hoang RN  Anticoagulation Clinic  9/29/2021    _______________________________________________________________________     Anticoagulation Episode Summary     Current INR goal:  2.0-3.0   TTR:  75.0 % (1 y)   Target end date:     Send INR reminders to:  BERNICE MARTINI    Indications    Anticoagulation monitoring  INR range 2-3 [Z79.01]  PAD (peripheral artery disease) (H) [I73.9]           Comments:           Anticoagulation Care Providers     Provider Role Specialty Phone number    Loraine Rees MD Referring Family Medicine [phone]

## 2021-10-02 ENCOUNTER — HEALTH MAINTENANCE LETTER (OUTPATIENT)
Age: 56
End: 2021-10-02

## 2021-10-06 ENCOUNTER — TELEPHONE (OUTPATIENT)
Dept: FAMILY MEDICINE | Facility: CLINIC | Age: 56
End: 2021-10-06

## 2021-10-06 ENCOUNTER — OFFICE VISIT (OUTPATIENT)
Dept: PULMONOLOGY | Facility: OTHER | Age: 56
End: 2021-10-06
Payer: COMMERCIAL

## 2021-10-06 ENCOUNTER — ALLIED HEALTH/NURSE VISIT (OUTPATIENT)
Dept: PULMONOLOGY | Facility: OTHER | Age: 56
End: 2021-10-06
Attending: INTERNAL MEDICINE
Payer: COMMERCIAL

## 2021-10-06 VITALS
DIASTOLIC BLOOD PRESSURE: 60 MMHG | WEIGHT: 128 LBS | HEART RATE: 61 BPM | BODY MASS INDEX: 21.33 KG/M2 | RESPIRATION RATE: 16 BRPM | SYSTOLIC BLOOD PRESSURE: 136 MMHG | OXYGEN SATURATION: 98 % | HEIGHT: 65 IN

## 2021-10-06 DIAGNOSIS — F17.210 CIGARETTE NICOTINE DEPENDENCE, UNCOMPLICATED: Primary | ICD-10-CM

## 2021-10-06 DIAGNOSIS — F17.200 NICOTINE DEPENDENCE WITH CURRENT USE: ICD-10-CM

## 2021-10-06 DIAGNOSIS — F17.210 CIGARETTE NICOTINE DEPENDENCE, UNCOMPLICATED: ICD-10-CM

## 2021-10-06 LAB
DLCOCOR-%PRED-PRE: 61 %
DLCOCOR-PRE: 13.17 ML/MIN/MMHG
DLCOUNC-%PRED-PRE: 59 %
DLCOUNC-PRE: 12.71 ML/MIN/MMHG
DLCOUNC-PRED: 21.34 ML/MIN/MMHG
ERV-%PRED-PRE: 115 %
ERV-PRE: 1.26 L
ERV-PRED: 1.09 L
EXPTIME-PRE: 6.91 SEC
FEF2575-%PRED-POST: 94 %
FEF2575-%PRED-PRE: 89 %
FEF2575-POST: 2.4 L/SEC
FEF2575-PRE: 2.3 L/SEC
FEF2575-PRED: 2.55 L/SEC
FEFMAX-%PRED-PRE: 92 %
FEFMAX-PRE: 6.21 L/SEC
FEFMAX-PRED: 6.7 L/SEC
FEV1-%PRED-PRE: 99 %
FEV1-PRE: 2.71 L
FEV1FEV6-PRE: 78 %
FEV1FEV6-PRED: 82 %
FEV1FVC-PRE: 78 %
FEV1FVC-PRED: 80 %
FEV1SVC-PRE: 78 %
FEV1SVC-PRED: 78 %
FIFMAX-PRE: 2.98 L/SEC
FRCPLETH-%PRED-PRE: 114 %
FRCPLETH-PRE: 3.14 L
FRCPLETH-PRED: 2.75 L
FVC-%PRED-PRE: 101 %
FVC-PRE: 3.49 L
FVC-PRED: 3.44 L
HGB BLD-MCNC: 12.3 G/DL
IC-%PRED-PRE: 91 %
IC-PRE: 2.2 L
IC-PRED: 2.41 L
RVPLETH-%PRED-PRE: 100 %
RVPLETH-PRE: 1.88 L
RVPLETH-PRED: 1.87 L
TLCPLETH-%PRED-PRE: 104 %
TLCPLETH-PRE: 5.34 L
TLCPLETH-PRED: 5.11 L
VA-%PRED-PRE: 105 %
VA-PRE: 5.31 L
VC-%PRED-PRE: 98 %
VC-PRE: 3.46 L
VC-PRED: 3.5 L

## 2021-10-06 PROCEDURE — 94060 EVALUATION OF WHEEZING: CPT | Performed by: INTERNAL MEDICINE

## 2021-10-06 PROCEDURE — 85018 HEMOGLOBIN: CPT

## 2021-10-06 PROCEDURE — 94729 DIFFUSING CAPACITY: CPT | Performed by: INTERNAL MEDICINE

## 2021-10-06 PROCEDURE — 99213 OFFICE O/P EST LOW 20 MIN: CPT | Mod: 25 | Performed by: INTERNAL MEDICINE

## 2021-10-06 PROCEDURE — 94726 PLETHYSMOGRAPHY LUNG VOLUMES: CPT | Performed by: INTERNAL MEDICINE

## 2021-10-06 RX ORDER — ALBUTEROL SULFATE 90 UG/1
2 AEROSOL, METERED RESPIRATORY (INHALATION) EVERY 6 HOURS PRN
Qty: 18 G | Refills: 4 | Status: SHIPPED | OUTPATIENT
Start: 2021-10-06 | End: 2023-04-11

## 2021-10-06 ASSESSMENT — MIFFLIN-ST. JEOR: SCORE: 1176.48

## 2021-10-06 NOTE — TELEPHONE ENCOUNTER
Reason contacted:  Upcoming Appt  Information relayed:  LVM that pt has [facility] appt on 10/8/21 for a Flu Vaccine and Pfizer Booster.  Advised that only the Flu Vaccine would be given on that date as we are not giving the Pfizer vaccine at this clinic until 10/11/21.  Pt advised to either keep appt for Flu Vaccine or canx/reschedule for 10/11/21 or later to receive both.  Additional questions:  No  Further follow-up needed:  No  Okay to leave a detailed message:  Yes

## 2021-10-06 NOTE — PROGRESS NOTES
PULMONARY CLINIC FOLLOW UP NOTE    History:     HPI: Nery Whitaker is a 56 year old female, smoker, who is here for follow-up.  Patient was initially referred to us because of long history of tobacco use and need for screening.      Interval History: Patient describes shortness of breath with exertion.  She has a cough that is mostly dry.  She denies any night sweats or weight loss.  She is a smoker and currently smokes approximately three quarters of a pack daily.  She has been smoking since her teens.  No hospitalizations, antibiotics for respiratory related illnesses recently.     PMHx/PSHx:  CAD  Hypertension  Peripheral artery disease  Migraine headaches  Bilateral carotid artery disease  Anxiety  Anemia  History of appendectomy  History of femoral artery stent  Cholecystectomy  Tympanostomy tube placement     Social history:  Smoker. Smokes 1 pack/day. Has been smoking since age of 17.  Dental assistant    ROS: 10 point review of system done. Pertinent findings are noted in the HPI.    Exam/Data:   There were no vitals taken for this visit., There is no height or weight on file to calculate BMI.  EXAM:  GEN: comfortable, NAD  HEENT: NCAT, EMOI  CVS: S1S2, RRR  Lung: CTA  Abd: soft, nt, + BS. No masses  Ext: no c/c/e  Neuro: nonfocal  Skin: no visible rash  Musculoskeletal: FROM all extremities  Psych: appropriate    Data:     Labs personally reviewed.      IMAGING: personally reviewed images. Formal radiology interpretation noted below.      XR Thoracic Spine 2 Views    Result Date: 9/17/2021  2 views thoracic spine radiographs 9/17/2021 9:57 AM History: Pain in thoracic spine Comparison: Thoracic spine MRI to 4/20/2021, chest CT 7/28/2012 Findings: Standing  AP and lateral  views of the thoracic spine were obtained. 12 rib bearing vertebral bodies are identified. Osteopenic appearance of the thoracic spine. There is no acute osseous abnormality.  Upper thoracic and lower cervical spine partially obscured  soft tissues and shoulders. Diffuse degenerative changes of the thoracic spine. Calcification of the thoracic aorta, better delineated on prior chest CT and MR. The visualized lungs are clear. Cardiomediastinal silhouette is within normal limits.     Impression: 1.  No acute osseous abnormality. Subtle nondisplaced fractures could be obscured in the setting of osteopenic appearing bone. 2.  Diffuse degenerative changes of the thoracic spine, better delineated on prior CT and MR. TING NORWOOD MD   SYSTEM ID:  [id number]      CT chest 2018:   No acute process detected in the chest, abdomen or pelvis.    CT scan on 7/2021:  IMPRESSION:  Mild emphysema  Negative for lung cancer screening purposes.    Assessment/Plan:       Nery Whitaker is a 56 year old female, smoker, who is here for lung screening.  No PFTs in the system.  Patient is not on any inhalers. PFT's are normal with exception of mildly decrease DLCO.  CT scan of the chest showed mild paraseptal emphysema.    Recommendations:  Start albuterol inhaler  Consider adding Spiriva  Yearly low-dose CT chest  Vaccinated for COVID-19    FOLLOW UP: 6 months    Lung Cancer Screening pre-scan counseling Visit    The patient fits the risk profile of patients who benefit from this screening:  -The patient is >55 years old and <80 years old  -The patient has 40 pack year history (over 30)  -The patient has smoked within the past 15 years  -The patient has no medical comorbidity severe enough that it would cause mortality prior to mortality due to the lung cancer attempting to be detected.    Discussion with patient regarding the harms associated with LDCT screening include false-negative and false-positive results, incidental findings, overdiagnosis, and radiation exposure were reviewed at length.   The patient understands that pursuing this screening test may result in a biopsy that was not necessary. It may also produced added stress over a nodule that is likely  not cancer.    Of 100 patients who get screening, 25 will have a positive scan. Of those 25, only 1 will have cancer.  Overdiagnosis is estimated at 10% of patients-- they would not have been detected in the patient's lifetime without screening. Less than 1% of patients likely had death related to radiation exposure increase.   Average low-dose CT associated with 0.61 to 1.5 mSv. Annual background radiation exposure in the [place] averages 2.4 mS; mammogram is 0.7mSv.    The benefits are reduction in risk of death from lung cancer. The number needed to treat is 320 (for every 320 patients who undergo screening, 1 patient will have a benefit in mortality from early detection from the screening).    Undergoing this screening implies willingness to pursue further potentially invasive testing to discover potential cancer.    All questions were answered.    The patient was counseled regarding smoking cessation and its risk for lung cancer.      Brooklynn Navarro MD  Pulmonary and Critical Care Medicine  Electronically Signed on 10/06/2021    Current Outpatient Medications   Medication Sig Dispense Refill     amLODIPine (NORVASC) 5 MG tablet Take 5 mg by mouth       aspirin 81 MG EC tablet Take 81 mg by mouth       atorvastatin (LIPITOR) 10 MG tablet Take 1 tablet (10 mg) by mouth daily 30 tablet 1     citalopram (CELEXA) 20 MG tablet Take 20 mg by mouth       clindamycin (CLEOCIN T) 1 % external lotion APPLY LOTION TOPICALLY DAILY TO UPPER BACK       famotidine (PEPCID) 20 MG tablet Take 20 mg by mouth 2 times daily       folic acid (FOLVITE) 1 MG tablet Take 1 mg by mouth       gabapentin (NEURONTIN) 100 MG capsule Take 1 tab in the morning, 1 tab at noon, and 3 tabs at bedtime daily. 450 capsule 4     lisinopril (PRINIVIL/ZESTRIL) 20 MG tablet Take 20 mg by mouth       ranitidine (ZANTAC) 150 MG tablet Take 150 mg by mouth       REPATHA SURECLICK 140 MG/ML prefilled autoinjector INJECT 1ML (140MG) SUBCUTANEOUS EVERY  2 WEEKS. INJECT INTO ABDOMEN THIGH OR UPPER ARM. ROTATE INJECTION SITES.       tolterodine (DETROL) 1 MG tablet Take 1 mg by mouth 2 times daily       warfarin ANTICOAGULANT (COUMADIN) 5 MG tablet TAKE 1 TO 1 & 1/2 (5MG-7.5 MG) TABLETS BY MOUTH ONCE DAILY. ADJUST DOSE PER INR RESULTS 100 tablet 3     Allergies   Allergen Reactions     Ezetimibe      constipation     Lovastatin Unknown     constipation     Simvastatin      Other reaction(s): Constipation  Intolerance, but tolerated with re-challenge in Feb to April 2009       Meds and Allergies: See EHR for the updated medication list and Allergies. These were reviewed.     Much or all of the text in this note was generated through the use of the Dragon Dictate voice-to-text software. Errors in spelling or words which seem out of context are unintentional. Sound alike errors, in particular, may have escaped editing.

## 2021-10-15 ENCOUNTER — ALLIED HEALTH/NURSE VISIT (OUTPATIENT)
Dept: NURSING | Facility: CLINIC | Age: 56
End: 2021-10-15
Payer: COMMERCIAL

## 2021-10-15 ENCOUNTER — LAB (OUTPATIENT)
Dept: LAB | Facility: CLINIC | Age: 56
End: 2021-10-15

## 2021-10-15 DIAGNOSIS — Z23 NEED FOR VACCINATION: Primary | ICD-10-CM

## 2021-10-15 DIAGNOSIS — E78.5 DYSLIPIDEMIA: ICD-10-CM

## 2021-10-15 LAB
ALT SERPL W P-5'-P-CCNC: <9 U/L (ref 0–45)
AST SERPL W P-5'-P-CCNC: 14 U/L (ref 0–40)
CHOLEST SERPL-MCNC: 132 MG/DL
FASTING STATUS PATIENT QL REPORTED: YES
HDLC SERPL-MCNC: 38 MG/DL
LDLC SERPL CALC-MCNC: 71 MG/DL
TRIGL SERPL-MCNC: 117 MG/DL

## 2021-10-15 PROCEDURE — 90471 IMMUNIZATION ADMIN: CPT

## 2021-10-15 PROCEDURE — 84450 TRANSFERASE (AST) (SGOT): CPT

## 2021-10-15 PROCEDURE — 90682 RIV4 VACC RECOMBINANT DNA IM: CPT

## 2021-10-15 PROCEDURE — 91300 PR COVID VAC PFIZER DIL RECON 30 MCG/0.3 ML IM: CPT

## 2021-10-15 PROCEDURE — 0004A PR COVID VAC PFIZER DIL RECON 30 MCG/0.3 ML IM: CPT

## 2021-10-15 PROCEDURE — 84460 ALANINE AMINO (ALT) (SGPT): CPT

## 2021-10-15 PROCEDURE — 99207 PR NO CHARGE NURSE ONLY: CPT

## 2021-10-15 PROCEDURE — 36415 COLL VENOUS BLD VENIPUNCTURE: CPT

## 2021-10-15 PROCEDURE — 80061 LIPID PANEL: CPT

## 2021-10-27 ENCOUNTER — DOCUMENTATION ONLY (OUTPATIENT)
Dept: ANTICOAGULATION | Facility: CLINIC | Age: 56
End: 2021-10-27

## 2021-10-27 ENCOUNTER — ANTICOAGULATION THERAPY VISIT (OUTPATIENT)
Dept: ANTICOAGULATION | Facility: CLINIC | Age: 56
End: 2021-10-27

## 2021-10-27 ENCOUNTER — LAB (OUTPATIENT)
Dept: LAB | Facility: CLINIC | Age: 56
End: 2021-10-27
Payer: COMMERCIAL

## 2021-10-27 DIAGNOSIS — I73.9 PAD (PERIPHERAL ARTERY DISEASE) (H): Primary | ICD-10-CM

## 2021-10-27 DIAGNOSIS — Z79.01 ANTICOAGULATION MONITORING, INR RANGE 2-3: ICD-10-CM

## 2021-10-27 DIAGNOSIS — I73.9 PAD (PERIPHERAL ARTERY DISEASE) (H): ICD-10-CM

## 2021-10-27 DIAGNOSIS — Z79.01 ANTICOAGULATION MONITORING, INR RANGE 2-3: Primary | ICD-10-CM

## 2021-10-27 LAB — INR BLD: 2.3 (ref 0.9–1.1)

## 2021-10-27 PROCEDURE — 85610 PROTHROMBIN TIME: CPT

## 2021-10-27 PROCEDURE — 36416 COLLJ CAPILLARY BLOOD SPEC: CPT

## 2021-10-27 NOTE — PROGRESS NOTES
ANTICOAGULATION MANAGEMENT     Nery Whitaker 56 year old female is on warfarin with therapeutic INR result. (Goal INR 2.0-3.0)    Recent labs: (last 7 days)     10/27/21  1311   INR 2.3*       ASSESSMENT     Source(s): Chart Review and Patient/Caregiver Call       Warfarin doses taken: Warfarin taken as instructed    Diet: No new diet changes identified    New illness, injury, or hospitalization: No    Medication/supplement changes: None noted    Signs or symptoms of bleeding or clotting: No    Previous INR: Therapeutic last visit, previously out of range    Additional findings: None     PLAN     Recommended plan for no diet, medication or health factor changes affecting INR     Dosing Instructions: Continue your current warfarin dose with next INR in 4 weeks       Summary  As of 10/27/2021    Full warfarin instructions:  7.5 mg every Wed, Sat; 5 mg all other days   Next INR check:  11/24/2021             Telephone call with Nery who verbalizes understanding and agrees to plan    Lab visit scheduled    Education provided: None required    Plan made per [facility] anticoagulation protocol    Celina Hoang RN  Anticoagulation Clinic  10/27/2021    _______________________________________________________________________     Anticoagulation Episode Summary     Current INR goal:  2.0-3.0   TTR:  82.6 % (1 y)   Target end date:     Send INR reminders to:  BERNICE MARTINI    Indications    Anticoagulation monitoring  INR range 2-3 [Z79.01]  PAD (peripheral artery disease) (H) [I73.9]           Comments:           Anticoagulation Care Providers     Provider Role Specialty Phone number    Loraine Rees MD Referring Family Medicine [phone]

## 2021-10-27 NOTE — PROGRESS NOTES
ANTICOAGULATION MANAGEMENT      Nery Whitaker due for annual renewal of referral to anticoagulation monitoring. Order pended for your review and signature.      ANTICOAGULATION SUMMARY      Warfarin indication(s)     PAD    Heart valve present?  NO       Current goal range   INR: 2.0-3.0     Goal appropriate for indication? Yes, INR 2-3 appropriate for hx of DVT, PE, hypercoagulable state, Afib, LVAD, or bileaflet AVR without risk factors     Current duration of therapy Indefinite/long term therapy   Time in Therapeutic Range (TTR)  (Goal > 60%) 82.6%       Office visit with referring provider's group within last year yes on 1/22/21       Celina Hoang RN

## 2021-10-29 ENCOUNTER — OFFICE VISIT (OUTPATIENT)
Dept: ANESTHESIOLOGY | Facility: CLINIC | Age: 56
End: 2021-10-29
Attending: ORTHOPAEDIC SURGERY
Payer: COMMERCIAL

## 2021-10-29 ENCOUNTER — TELEPHONE (OUTPATIENT)
Dept: ANESTHESIOLOGY | Facility: CLINIC | Age: 56
End: 2021-10-29

## 2021-10-29 ENCOUNTER — TELEPHONE (OUTPATIENT)
Dept: FAMILY MEDICINE | Facility: CLINIC | Age: 56
End: 2021-10-29

## 2021-10-29 VITALS — DIASTOLIC BLOOD PRESSURE: 69 MMHG | HEART RATE: 53 BPM | SYSTOLIC BLOOD PRESSURE: 112 MMHG | OXYGEN SATURATION: 97 %

## 2021-10-29 DIAGNOSIS — M54.6 THORACIC SPINE PAIN: ICD-10-CM

## 2021-10-29 DIAGNOSIS — D68.59 HYPERCOAGULABLE STATE (H): ICD-10-CM

## 2021-10-29 PROCEDURE — 99204 OFFICE O/P NEW MOD 45 MIN: CPT | Performed by: ANESTHESIOLOGY

## 2021-10-29 ASSESSMENT — PAIN SCALES - GENERAL: PAINLEVEL: MODERATE PAIN (5)

## 2021-10-29 NOTE — PROGRESS NOTES
"                      [facility] Consultation    Date of visit: 10/28/2021    Reason for consultation:    Nery Whitaker is a 56 year old female who is seen in consultation today at the request of her provider, Jeff Greene MD.    Primary Care Provider is Loraine Rees.  Pain medications are being prescribed by PCP.    Please see the [facility] health questionnaire which the patient completed and reviewed with me in detail.    Chief Complaint:    No chief complaint on file.      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 The pain is in the back right above the bra-line. The pain used to radiate around to the left side of the ribs but that has since subsided. Now the pain is more focal in the \"vertebra\" area.  She has been taking Gabapentin 100 mg tablets, 1 in AM, 1 midday, and 3 at bedtime.     Pain rating: intensity Averages 5/10 on a 0-10 scale.  Aggravating factors include: certain posture  Relieving factors include: rest  Any bowel or bladder incontinence: n/a    Current treatments include:  Gabapentin    Previous medication treatments included:  none    Other treatments have included:  Nery Whitaker has not been seen at a pain clinic in the past.    PT: no  Acupuncture: no  TENs Unit: no  Injections: no    Past Medical History:  Past Medical History:   Diagnosis Date     Anemia      Anxiety      Bilateral carotid artery disease (H)      Coronary artery disease      Dyslipidemia      Foreign body in left foot      Heart murmur      Hypertension      Migraines      PAD (peripheral artery disease) (H)      Patient Active Problem List    Diagnosis Date Noted     Pure hyperglyceridemia 09/02/2021     Priority: Medium     Formatting of this note might be different from the original.  Has been intoleratant to several " medications.    .       Atypical lymphocytosis 01/12/2019     Priority: Medium     Formatting of this note might be different from the original.  12/2018: Mild. Hematology recommendations: repeat CBC every 6 months with return to Heme Onc if worsening anemia or lymphocytosis.    Last Assessment & Plan:   Formatting of this note might be different from the original.  CBC today. Previous hematology work up unremarkable. Patient to repeat CBC every 6 months and return to Heme Onc if worsening anemia or lymphocytosis.       Adenomatous colon polyp 11/04/2018     Priority: Medium     Last Assessment & Plan:   Formatting of this note might be different from the original.  Colonoscopy up to date.       Urge incontinence of urine 11/04/2018     Priority: Medium     Last Assessment & Plan:   Symptoms most consistent with urge incontinence. Ultrasound ordered to evaluate for any issues with incomplete emptying. Trial of Detrol LA 2 mg daily.    Last Assessment & Plan:   Formatting of this note might be different from the original.  Detrol LA has been helpful but dries out her mouth. We will try a lower, short acting dose.       Verruca plantaris 11/01/2018     Priority: Medium     Normocytic anemia 10/22/2018     Priority: Medium     Last Assessment & Plan:   Colonoscopy 1/2017 with polyps; repeat due 2022. Kidney function is normal. Labs as below. Further evaluation will be based on results of these labs.     Formatting of this note might be different from the original.  Work up revealed no specific underlying cause. Mild atypical lymphocytosis identified. Patient saw hematology 12/31/2018. Monitoring of CBC every 6 months recommended with return if change in hemoglobin or lymphocytosis.     Last Assessment & Plan:   Formatting of this note might be different from the original.  Colonoscopy 1/2017 with polyps; repeat due 2022. Kidney function is normal. Labs as below. Further evaluation will be based on results of these  labs.       Newly recognized murmur 10/18/2018     Priority: Medium     Foreign body in left foot, subsequent encounter 10/11/2018     Priority: Medium     Last Assessment & Plan:   This patient is scheduled for surgery in 6 days time and the surgery was scheduled yesterday.  This is an elective surgery.  There is several gaps in this patient's plan before proceeding with surgery that would need to be resolved:  -New murmur on exam: This patient has subtle changes on EKG relative to previous study.  Additionally on physical exam she is a new murmur best heard at the right upper sternal border that was not documented in her previous encounters including a cardiologist in 2017.  She has known vascular abnormalities.  Referral to rapid access cardiology clinic to review the available information and make recommendations for this patient.  -Anticoagulation: The patient is on chronic anticoagulation for an aorto-iliac bypass graft.  We have reached out to her vascular clinic for recommendations regarding the risks of discontinuing temporarily anticoagulation.  Because of the tight timetable with her scheduling of surgery, she actually would have needed to stop her anticoagulation prior to the day which they scheduled the surgery.  We will work today with her vascular team to help determine anticoagulation recommendation and possibly schedule the patient's surgery accordingly.       Anxiety state 04/11/2018     Priority: Medium     Last Assessment & Plan:   Formatting of this note might be different from the original.  Well controlled. Continue citalopram 20 mg daily.    Formatting of this note might be different from the original.  Epic       Migraine 04/11/2018     Priority: Medium     Overview:   Overview:   Epic     Formatting of this note might be different from the original.  Overview:   Epic  Formatting of this note might be different from the original.  Epic       PAD (peripheral artery disease) (H) 04/11/2018      Priority: Medium     Overview:   Premature aortoiliac occlusive disease with claudication, s/p bilateral iliac stents 1/17/2006 complicated by acute thrombosis and ao-bifem bypass 2/15/06    Formatting of this note might be different from the original.  Premature aortoiliac occlusive disease with claudication, s/p bilateral iliac stents 1/17/2006 complicated by acute thrombosis and ao-bifem bypass 2/15/06  Formatting of this note might be different from the original.  Overview:   Premature aortoiliac occlusive disease with claudication, s/p bilateral iliac stents 1/17/2006 complicated by acute thrombosis and ao-bifem bypass 2/15/06  Overview:   Premature aortoiliac occlusive disease with claudication  S/P bilateral iliac   stents 1/17/2006 complicated by acute thrombosis resulting in rest pain    Last Assessment & Plan:   Formatting of this note might be different from the original.  Anticoagulated. Continued follow up with vascular specialist.  Formatting of this note might be different from the original.  Premature aortoiliac occlusive disease with claudication  S/P bilateral iliac   stents 1/17/2006 complicated by acute thrombosis resulting in rest pain       Tobacco use disorder 04/11/2018     Priority: Medium     Last Assessment & Plan:   Formatting of this note might be different from the original.  Cessation encouraged. Patient not interested.       Abnormal cardiovascular stress test 12/13/2017     Priority: Medium     Abdominal pain, epigastric 12/02/2017     Priority: Medium     Bilateral carotid artery disease (H) 12/02/2017     Priority: Medium     Last Assessment & Plan:   Formatting of this note might be different from the original.  Patient will continue to follow with vascular specialist.       Diverticulosis of large intestine without hemorrhage 12/02/2017     Priority: Medium     Dyslipidemia 12/02/2017     Priority: Medium     Last Assessment & Plan:   Formatting of this note might be different  from the original.  The patient has not tolerated statins in the past. Her vascular specialist has recommended Repatha; she will consider and follow up with them.       Elevated lipoprotein(a) 12/02/2017     Priority: Medium     Essential hypertension 12/02/2017     Priority: Medium     Last Assessment & Plan:   Formatting of this note might be different from the original.  Well controlled. Continue amlodipine 5 mg daily, lisinopril 20 mg daily. Check CMP today.       Hypercoagulable state (H) 12/02/2017     Priority: Medium     Overview:   History of positive lupus anticoagulant twice, subsequently negative    Formatting of this note might be different from the original.  ? significance of positive lupus anticoagulant    Formatting of this note might be different from the original.  History of positive lupus anticoagulant twice, subsequently negative       Intermittent palpitations 12/02/2017     Priority: Medium     Multiple skin nodules 12/02/2017     Priority: Medium     Nocturnal enuresis 12/02/2017     Priority: Medium     Thoracic neuralgia 08/08/2017     Priority: Medium     Anticoagulation monitoring, INR range 2-3 06/16/2017     Priority: Medium     Last Assessment & Plan:   Referral placed to anticoag clinic for management.  Continue goal of 2-3.    Last Assessment & Plan:   Formatting of this note might be different from the original.  Referral placed to anticoag clinic for management.  Continue goal of 2-3.         Past Surgical History:  Past Surgical History:   Procedure Laterality Date     APPENDECTOMY       ARTERIAL BYPASS SURGERY  2006     BIOPSY BREAST Left     benign     BYPASS GRAFT AORTOFEMORAL Bilateral      CHOLECYSTECTOMY       FEMORAL ARTERY STENT  2006     FOOT MASS EXCISION Left 2018    Soft tissue mass - benign     IR MISCELLANEOUS PROCEDURE  1/17/2006     IR MISCELLANEOUS PROCEDURE  1/17/2006     IR MISCELLANEOUS PROCEDURE  1/17/2006     IR MISCELLANEOUS PROCEDURE  1/17/2006     IR  MISCELLANEOUS PROCEDURE  1/18/2006     TONSILLECTOMY       TYMPANOSTOMY TUBE PLACEMENT       Medications:  Current Outpatient Medications   Medication Sig Dispense Refill     albuterol (PROAIR HFA/PROVENTIL HFA/VENTOLIN HFA) 108 (90 Base) MCG/ACT inhaler Inhale 2 puffs into the lungs every 6 hours as needed for shortness of breath / dyspnea or wheezing 18 g 4     amLODIPine (NORVASC) 5 MG tablet Take 5 mg by mouth       aspirin 81 MG EC tablet Take 81 mg by mouth       atorvastatin (LIPITOR) 10 MG tablet Take 1 tablet (10 mg) by mouth daily 30 tablet 1     citalopram (CELEXA) 20 MG tablet Take 20 mg by mouth       clindamycin (CLEOCIN T) 1 % external lotion APPLY LOTION TOPICALLY DAILY TO UPPER BACK       famotidine (PEPCID) 20 MG tablet Take 20 mg by mouth 2 times daily       folic acid (FOLVITE) 1 MG tablet Take 1 mg by mouth       gabapentin (NEURONTIN) 100 MG capsule Take 1 tab in the morning, 1 tab at noon, and 3 tabs at bedtime daily. 450 capsule 4     lisinopril (PRINIVIL/ZESTRIL) 20 MG tablet Take 20 mg by mouth       ranitidine (ZANTAC) 150 MG tablet Take 150 mg by mouth       REPATHA SURECLICK 140 MG/ML prefilled autoinjector INJECT 1ML (140MG) SUBCUTANEOUS EVERY 2 WEEKS. INJECT INTO ABDOMEN THIGH OR UPPER ARM. ROTATE INJECTION SITES.       tolterodine (DETROL) 1 MG tablet Take 1 mg by mouth 2 times daily       warfarin ANTICOAGULANT (COUMADIN) 5 MG tablet TAKE 1 TO 1 & 1/2 (5MG-7.5 MG) TABLETS BY MOUTH ONCE DAILY. ADJUST DOSE PER INR RESULTS 100 tablet 3     Allergies:     Allergies   Allergen Reactions     Ezetimibe      constipation     Lovastatin Unknown     constipation     Simvastatin      Other reaction(s): Constipation  Intolerance, but tolerated with re-challenge in Feb to April 2009     Social History:  Occupation/Schooling: works as a dental assistant  Tobacco use: Current smoker  Alcohol use: denies  Drug use: denies  History of chemical dependency treatment: denies    Family history:  Family  History   Problem Relation Age of Onset     Peripheral Vascular Disease Father      Breast Cancer Mother      Other - See Comments Father         vasular problems      No Known Problems Sister      No Known Problems Brother      No Known Problems Son      No Known Problems Maternal Grandmother      Coronary Artery Disease Maternal Grandfather      Cancer Paternal Grandmother         lung      Cancer Paternal Grandfather         kidney      Colon Cancer No family hx of      Prostate Cancer No family hx of        Review of Systems:    POSTIVE IN BOLD  GENERAL: fever/chills, fatigue, general unwell feeling, weight gain/loss.  HEAD/EYES:  headache, dizziness, or vision changes.    EARS/NOSE/THROAT:  Nosebleeds, hearing loss, sinus infection, earache, tinnitus.  IMMUNE:  Allergies, cancer, immune deficiency, or infections.  SKIN:  Urticaria, rash, hives  HEME/Lymphatic:   anemia, easy bruising, easy bleeding.  RESPIRATORY:  cough, wheezing, or shortness of breath  CARDIOVASCULAR/Circulation:  Extremity edema, syncope, hypertension, tachycardia, or angina.  CAD, PAD  GASTROINTESTINAL:  abdominal pain, nausea/emesis, diarrhea, constipation,  hematochezia, or melena.  ENDOCRINE:  Diabetes, steroid use,  thyroid disease or osteoporosis.  MUSCULOSKELETAL: neck pain, back pain, arthralgia, arthritis, or gout.  GENITOURINARY:  frequency, urgency, dysuria, difficulty voiding, hematuria or incontinence.  NEUROLOGIC:  weakness, numbness, paresthesias, seizure, tremor, stroke or memory loss.  PSYCHIATRIC:  depression, anxiety, stress, suicidal thoughts or mood swings.     Physical Exam:  Vitals:    10/29/21 1013   BP: 112/69   BP Location: Right arm   Patient Position: Sitting   Cuff Size: Adult Regular   Pulse: 53   SpO2: 97%     Exam:  Constitutional: healthy, alert and no distress  Head: normocephalic. Atraumatic.   Eyes: no redness or jaundice noted   ENT: oropharnx normal.  MMM.  Neck supple.    Cardiovascular: RRR no m/g/r  "  Respiratory: clear   Skin: no suspicious lesions or rashes  Psychiatric: mentation appears normal and affect normal/bright    Musculoskeletal exam:  Gait/Station/Posture: normal, non-antalgic    Thoracic spine:  TTP midline, no decrease in ROM    Neurologic exam:  CN:  Cranial nerves 2-12 are normal  Motor:  5/5 UE and LE strength    Diagnostic tests:  MRI of thoracic spine was completed on 2/4/2021 showing:  \"EXAM: MR THORACIC SPINE WO CONTRAST  LOCATION: [facility]  DATE/TIME: 2/4/2021 6:49 PM     INDICATION: Mid-back pain.  COMPARISON: Thoracic spine MRI dated 06/27/2017.  TECHNIQUE: Routine Thoracic Spine MRI without IV contrast.     FINDINGS:   Accentuation of the normal thoracic spine kyphosis. Mild left apex curvature of the lumbar spine. Vertebral body heights are preserved. There are mild Modic type I degenerative endplate changes at T5-T6. Multilevel degenerative disc height loss, with   small Schmorl's nodes particularly in the mid thoracic spine. There are multilevel shallow posterior disc osteophyte complexes at multiple levels similar to the previous exam. The shallow superiorly directed disc extrusion at T8-T9 is unchanged. There   is   mild multilevel facet arthropathy. No high-grade spinal canal or neural foraminal narrowing. No abnormal cord signal.      No extraspinal abnormality.     IMPRESSION:  1.  Redemonstrated degenerative changes of the thoracic spine, without high-grade spinal canal or neural foraminal narrowing. Findings are not significantly changed from the prior exam in 2017.\"    Personally reviewed imaging prior to visit and again with patient during appointment.    Other testing (labs, diagnostics) reviewed:  Labs  Last Comprehensive Metabolic Panel:  Sodium   Date Value Ref Range Status   01/22/2021 141 136 - 145 mmol/L Final     Potassium   Date Value Ref Range Status   01/22/2021 4.5 3.5 - 5.0 mmol/L Final     Chloride   Date Value Ref Range Status "   01/22/2021 108 (H) 98 - 107 mmol/L Final     Carbon Dioxide (CO2)   Date Value Ref Range Status   01/22/2021 21 (L) 22 - 31 mmol/L Final     Anion Gap   Date Value Ref Range Status   01/22/2021 12 5 - 18 mmol/L Final     Glucose   Date Value Ref Range Status   01/22/2021 86 70 - 125 mg/dL Final     Urea Nitrogen   Date Value Ref Range Status   01/22/2021 9 8 - 22 mg/dL Final     Creatinine   Date Value Ref Range Status   01/22/2021 0.84 0.60 - 1.10 mg/dL Final     GFR Estimate   Date Value Ref Range Status   01/22/2021 >60 >60 mL/min/1.73m2 Final     Calcium   Date Value Ref Range Status   01/22/2021 8.9 8.5 - 10.5 mg/dL Final     [place] Prescription Monitoring Program reviewed    Outside records reviewed      Assessment:  1. Thoracic Stenosis    Nery Whitaker is a 56 year old female who presents with the complaints of chronic pain in the thoracic region. Her history, physical, and MRI findings suggest some thoracic space degenerative changes and this is likely contributing to her pain. There is also likely a myofascial component to her pain.     Plan:  Diagnosis reviewed, treatment option addressed, and risk/benefits discussed.  Self-care instructions given.  I am recommending a multidisciplinary treatment plan to help this patient better manage her pain.      1. Physical Therapy: Referral placed  2. Pain Psychologist to address issues of relaxation, behavioral change, coping style, and other factors important to improvement: not indicated  3. Diagnostic Studies: none  4. Medication Management: none  5. Further procedures recommended: Thoracic Epidural Steroid Injection  6. Follow up: 6 to 8 weeks after procedure    Total time spent was 60 minutes, and more than 50% of face to face time was spent in counseling and/or coordination of care regarding principles of multidisciplinary care, medication management, and therapeutic options.     Brandee Weber MD    Pain Medicine  Department of  Anesthesiology  [facility]

## 2021-10-29 NOTE — LETTER
"10/29/2021       RE: Nery Whitaker  [address]  [facility] [address]     Dear Colleague,    Thank you for referring your patient, Nery Whitaker, to the [facility] [place] at [facility]. Please see a copy of my visit note below.                          [facility] Consultation    Date of visit: 10/28/2021    Reason for consultation:    Nery Whitaker is a 56 year old female who is seen in consultation today at the request of her provider, Jeff Greene MD.    Primary Care Provider is Loraine Rees.  Pain medications are being prescribed by PCP.    Please see the [facility] health questionnaire which the patient completed and reviewed with me in detail.    Chief Complaint:    No chief complaint on file.      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 The pain is in the back right above the bra-line. The pain used to radiate around to the left side of the ribs but that has since subsided. Now the pain is more focal in the \"vertebra\" area.  She has been taking Gabapentin 100 mg tablets, 1 in AM, 1 midday, and 3 at bedtime.     Pain rating: intensity Averages 5/10 on a 0-10 scale.  Aggravating factors include: certain posture  Relieving factors include: rest  Any bowel or bladder incontinence: n/a    Current treatments include:  Gabapentin    Previous medication treatments included:  none    Other treatments have included:  Nery Whitaker has not been seen at a pain clinic in the past.    PT: no  Acupuncture: no  TENs Unit: no  Injections: no    Past Medical History:  Past Medical History:   Diagnosis Date     Anemia      Anxiety      Bilateral carotid artery disease (H)      Coronary artery disease      Dyslipidemia      " Foreign body in left foot      Heart murmur      Hypertension      Migraines      PAD (peripheral artery disease) (H)      Patient Active Problem List    Diagnosis Date Noted     Pure hyperglyceridemia 09/02/2021     Priority: Medium     Formatting of this note might be different from the original.  Has been intoleratant to several medications.    .       Atypical lymphocytosis 01/12/2019     Priority: Medium     Formatting of this note might be different from the original.  12/2018: Mild. Hematology recommendations: repeat CBC every 6 months with return to Heme Onc if worsening anemia or lymphocytosis.    Last Assessment & Plan:   Formatting of this note might be different from the original.  CBC today. Previous hematology work up unremarkable. Patient to repeat CBC every 6 months and return to Heme Onc if worsening anemia or lymphocytosis.       Adenomatous colon polyp 11/04/2018     Priority: Medium     Last Assessment & Plan:   Formatting of this note might be different from the original.  Colonoscopy up to date.       Urge incontinence of urine 11/04/2018     Priority: Medium     Last Assessment & Plan:   Symptoms most consistent with urge incontinence. Ultrasound ordered to evaluate for any issues with incomplete emptying. Trial of Detrol LA 2 mg daily.    Last Assessment & Plan:   Formatting of this note might be different from the original.  Detrol LA has been helpful but dries out her mouth. We will try a lower, short acting dose.       Verruca plantaris 11/01/2018     Priority: Medium     Normocytic anemia 10/22/2018     Priority: Medium     Last Assessment & Plan:   Colonoscopy 1/2017 with polyps; repeat due 2022. Kidney function is normal. Labs as below. Further evaluation will be based on results of these labs.     Formatting of this note might be different from the original.  Work up revealed no specific underlying cause. Mild atypical lymphocytosis identified. Patient saw hematology 12/31/2018.  Monitoring of CBC every 6 months recommended with return if change in hemoglobin or lymphocytosis.     Last Assessment & Plan:   Formatting of this note might be different from the original.  Colonoscopy 1/2017 with polyps; repeat due 2022. Kidney function is normal. Labs as below. Further evaluation will be based on results of these labs.       Newly recognized murmur 10/18/2018     Priority: Medium     Foreign body in left foot, subsequent encounter 10/11/2018     Priority: Medium     Last Assessment & Plan:   This patient is scheduled for surgery in 6 days time and the surgery was scheduled yesterday.  This is an elective surgery.  There is several gaps in this patient's plan before proceeding with surgery that would need to be resolved:  -New murmur on exam: This patient has subtle changes on EKG relative to previous study.  Additionally on physical exam she is a new murmur best heard at the right upper sternal border that was not documented in her previous encounters including a cardiologist in 2017.  She has known vascular abnormalities.  Referral to rapid access cardiology clinic to review the available information and make recommendations for this patient.  -Anticoagulation: The patient is on chronic anticoagulation for an aorto-iliac bypass graft.  We have reached out to her vascular clinic for recommendations regarding the risks of discontinuing temporarily anticoagulation.  Because of the tight timetable with her scheduling of surgery, she actually would have needed to stop her anticoagulation prior to the day which they scheduled the surgery.  We will work today with her vascular team to help determine anticoagulation recommendation and possibly schedule the patient's surgery accordingly.       Anxiety state 04/11/2018     Priority: Medium     Last Assessment & Plan:   Formatting of this note might be different from the original.  Well controlled. Continue citalopram 20 mg daily.    Formatting of this  note might be different from the original.  Epic       Migraine 04/11/2018     Priority: Medium     Overview:   Overview:   Epic     Formatting of this note might be different from the original.  Overview:   Epic  Formatting of this note might be different from the original.  Epic       PAD (peripheral artery disease) (H) 04/11/2018     Priority: Medium     Overview:   Premature aortoiliac occlusive disease with claudication, s/p bilateral iliac stents 1/17/2006 complicated by acute thrombosis and ao-bifem bypass 2/15/06    Formatting of this note might be different from the original.  Premature aortoiliac occlusive disease with claudication, s/p bilateral iliac stents 1/17/2006 complicated by acute thrombosis and ao-bifem bypass 2/15/06  Formatting of this note might be different from the original.  Overview:   Premature aortoiliac occlusive disease with claudication, s/p bilateral iliac stents 1/17/2006 complicated by acute thrombosis and ao-bifem bypass 2/15/06  Overview:   Premature aortoiliac occlusive disease with claudication  S/P bilateral iliac   stents 1/17/2006 complicated by acute thrombosis resulting in rest pain    Last Assessment & Plan:   Formatting of this note might be different from the original.  Anticoagulated. Continued follow up with vascular specialist.  Formatting of this note might be different from the original.  Premature aortoiliac occlusive disease with claudication  S/P bilateral iliac   stents 1/17/2006 complicated by acute thrombosis resulting in rest pain       Tobacco use disorder 04/11/2018     Priority: Medium     Last Assessment & Plan:   Formatting of this note might be different from the original.  Cessation encouraged. Patient not interested.       Abnormal cardiovascular stress test 12/13/2017     Priority: Medium     Abdominal pain, epigastric 12/02/2017     Priority: Medium     Bilateral carotid artery disease (H) 12/02/2017     Priority: Medium     Last Assessment & Plan:    Formatting of this note might be different from the original.  Patient will continue to follow with vascular specialist.       Diverticulosis of large intestine without hemorrhage 12/02/2017     Priority: Medium     Dyslipidemia 12/02/2017     Priority: Medium     Last Assessment & Plan:   Formatting of this note might be different from the original.  The patient has not tolerated statins in the past. Her vascular specialist has recommended Repatha; she will consider and follow up with them.       Elevated lipoprotein(a) 12/02/2017     Priority: Medium     Essential hypertension 12/02/2017     Priority: Medium     Last Assessment & Plan:   Formatting of this note might be different from the original.  Well controlled. Continue amlodipine 5 mg daily, lisinopril 20 mg daily. Check CMP today.       Hypercoagulable state (H) 12/02/2017     Priority: Medium     Overview:   History of positive lupus anticoagulant twice, subsequently negative    Formatting of this note might be different from the original.  ? significance of positive lupus anticoagulant    Formatting of this note might be different from the original.  History of positive lupus anticoagulant twice, subsequently negative       Intermittent palpitations 12/02/2017     Priority: Medium     Multiple skin nodules 12/02/2017     Priority: Medium     Nocturnal enuresis 12/02/2017     Priority: Medium     Thoracic neuralgia 08/08/2017     Priority: Medium     Anticoagulation monitoring, INR range 2-3 06/16/2017     Priority: Medium     Last Assessment & Plan:   Referral placed to anticoag clinic for management.  Continue goal of 2-3.    Last Assessment & Plan:   Formatting of this note might be different from the original.  Referral placed to anticoag clinic for management.  Continue goal of 2-3.         Past Surgical History:  Past Surgical History:   Procedure Laterality Date     APPENDECTOMY       ARTERIAL BYPASS SURGERY  2006     BIOPSY BREAST Left     benign      BYPASS GRAFT AORTOFEMORAL Bilateral      CHOLECYSTECTOMY       FEMORAL ARTERY STENT  2006     FOOT MASS EXCISION Left 2018    Soft tissue mass - benign     IR MISCELLANEOUS PROCEDURE  1/17/2006     IR MISCELLANEOUS PROCEDURE  1/17/2006     IR MISCELLANEOUS PROCEDURE  1/17/2006     IR MISCELLANEOUS PROCEDURE  1/17/2006     IR MISCELLANEOUS PROCEDURE  1/18/2006     TONSILLECTOMY       TYMPANOSTOMY TUBE PLACEMENT       Medications:  Current Outpatient Medications   Medication Sig Dispense Refill     albuterol (PROAIR HFA/PROVENTIL HFA/VENTOLIN HFA) 108 (90 Base) MCG/ACT inhaler Inhale 2 puffs into the lungs every 6 hours as needed for shortness of breath / dyspnea or wheezing 18 g 4     amLODIPine (NORVASC) 5 MG tablet Take 5 mg by mouth       aspirin 81 MG EC tablet Take 81 mg by mouth       atorvastatin (LIPITOR) 10 MG tablet Take 1 tablet (10 mg) by mouth daily 30 tablet 1     citalopram (CELEXA) 20 MG tablet Take 20 mg by mouth       clindamycin (CLEOCIN T) 1 % external lotion APPLY LOTION TOPICALLY DAILY TO UPPER BACK       famotidine (PEPCID) 20 MG tablet Take 20 mg by mouth 2 times daily       folic acid (FOLVITE) 1 MG tablet Take 1 mg by mouth       gabapentin (NEURONTIN) 100 MG capsule Take 1 tab in the morning, 1 tab at noon, and 3 tabs at bedtime daily. 450 capsule 4     lisinopril (PRINIVIL/ZESTRIL) 20 MG tablet Take 20 mg by mouth       ranitidine (ZANTAC) 150 MG tablet Take 150 mg by mouth       REPATHA SURECLICK 140 MG/ML prefilled autoinjector INJECT 1ML (140MG) SUBCUTANEOUS EVERY 2 WEEKS. INJECT INTO ABDOMEN THIGH OR UPPER ARM. ROTATE INJECTION SITES.       tolterodine (DETROL) 1 MG tablet Take 1 mg by mouth 2 times daily       warfarin ANTICOAGULANT (COUMADIN) 5 MG tablet TAKE 1 TO 1 & 1/2 (5MG-7.5 MG) TABLETS BY MOUTH ONCE DAILY. ADJUST DOSE PER INR RESULTS 100 tablet 3     Allergies:     Allergies   Allergen Reactions     Ezetimibe      constipation     Lovastatin Unknown     constipation      Simvastatin      Other reaction(s): Constipation  Intolerance, but tolerated with re-challenge in Feb to April 2009     Social History:  Occupation/Schooling: works as a dental assistant  Tobacco use: Current smoker  Alcohol use: denies  Drug use: denies  History of chemical dependency treatment: denies    Family history:  Family History   Problem Relation Age of Onset     Peripheral Vascular Disease Father      Breast Cancer Mother      Other - See Comments Father         vasular problems      No Known Problems Sister      No Known Problems Brother      No Known Problems Son      No Known Problems Maternal Grandmother      Coronary Artery Disease Maternal Grandfather      Cancer Paternal Grandmother         lung      Cancer Paternal Grandfather         kidney      Colon Cancer No family hx of      Prostate Cancer No family hx of        Review of Systems:    POSTIVE IN BOLD  GENERAL: fever/chills, fatigue, general unwell feeling, weight gain/loss.  HEAD/EYES:  headache, dizziness, or vision changes.    EARS/NOSE/THROAT:  Nosebleeds, hearing loss, sinus infection, earache, tinnitus.  IMMUNE:  Allergies, cancer, immune deficiency, or infections.  SKIN:  Urticaria, rash, hives  HEME/Lymphatic:   anemia, easy bruising, easy bleeding.  RESPIRATORY:  cough, wheezing, or shortness of breath  CARDIOVASCULAR/Circulation:  Extremity edema, syncope, hypertension, tachycardia, or angina.  CAD, PAD  GASTROINTESTINAL:  abdominal pain, nausea/emesis, diarrhea, constipation,  hematochezia, or melena.  ENDOCRINE:  Diabetes, steroid use,  thyroid disease or osteoporosis.  MUSCULOSKELETAL: neck pain, back pain, arthralgia, arthritis, or gout.  GENITOURINARY:  frequency, urgency, dysuria, difficulty voiding, hematuria or incontinence.  NEUROLOGIC:  weakness, numbness, paresthesias, seizure, tremor, stroke or memory loss.  PSYCHIATRIC:  depression, anxiety, stress, suicidal thoughts or mood swings.     Physical Exam:  Vitals:     "10/29/21 1013   BP: 112/69   BP Location: Right arm   Patient Position: Sitting   Cuff Size: Adult Regular   Pulse: 53   SpO2: 97%     Exam:  Constitutional: healthy, alert and no distress  Head: normocephalic. Atraumatic.   Eyes: no redness or jaundice noted   ENT: oropharnx normal.  MMM.  Neck supple.    Cardiovascular: RRR no m/g/r   Respiratory: clear   Skin: no suspicious lesions or rashes  Psychiatric: mentation appears normal and affect normal/bright    Musculoskeletal exam:  Gait/Station/Posture: normal, non-antalgic    Thoracic spine:  TTP midline, no decrease in ROM    Neurologic exam:  CN:  Cranial nerves 2-12 are normal  Motor:  5/5 UE and LE strength    Diagnostic tests:  MRI of thoracic spine was completed on 2/4/2021 showing:  \"EXAM: MR THORACIC SPINE WO CONTRAST  LOCATION: [facility]  DATE/TIME: 2/4/2021 6:49 PM     INDICATION: Mid-back pain.  COMPARISON: Thoracic spine MRI dated 06/27/2017.  TECHNIQUE: Routine Thoracic Spine MRI without IV contrast.     FINDINGS:   Accentuation of the normal thoracic spine kyphosis. Mild left apex curvature of the lumbar spine. Vertebral body heights are preserved. There are mild Modic type I degenerative endplate changes at T5-T6. Multilevel degenerative disc height loss, with   small Schmorl's nodes particularly in the mid thoracic spine. There are multilevel shallow posterior disc osteophyte complexes at multiple levels similar to the previous exam. The shallow superiorly directed disc extrusion at T8-T9 is unchanged. There   is   mild multilevel facet arthropathy. No high-grade spinal canal or neural foraminal narrowing. No abnormal cord signal.      No extraspinal abnormality.     IMPRESSION:  1.  Redemonstrated degenerative changes of the thoracic spine, without high-grade spinal canal or neural foraminal narrowing. Findings are not significantly changed from the prior exam in 2017.\"    Personally reviewed imaging prior to visit and " again with patient during appointment.    Other testing (labs, diagnostics) reviewed:  Labs  Last Comprehensive Metabolic Panel:  Sodium   Date Value Ref Range Status   01/22/2021 141 136 - 145 mmol/L Final     Potassium   Date Value Ref Range Status   01/22/2021 4.5 3.5 - 5.0 mmol/L Final     Chloride   Date Value Ref Range Status   01/22/2021 108 (H) 98 - 107 mmol/L Final     Carbon Dioxide (CO2)   Date Value Ref Range Status   01/22/2021 21 (L) 22 - 31 mmol/L Final     Anion Gap   Date Value Ref Range Status   01/22/2021 12 5 - 18 mmol/L Final     Glucose   Date Value Ref Range Status   01/22/2021 86 70 - 125 mg/dL Final     Urea Nitrogen   Date Value Ref Range Status   01/22/2021 9 8 - 22 mg/dL Final     Creatinine   Date Value Ref Range Status   01/22/2021 0.84 0.60 - 1.10 mg/dL Final     GFR Estimate   Date Value Ref Range Status   01/22/2021 >60 >60 mL/min/1.73m2 Final     Calcium   Date Value Ref Range Status   01/22/2021 8.9 8.5 - 10.5 mg/dL Final     [place] Prescription Monitoring Program reviewed    Outside records reviewed      Assessment:  1. Thoracic Stenosis    Nery Whitaker is a 56 year old female who presents with the complaints of chronic pain in the thoracic region. Her history, physical, and MRI findings suggest some thoracic space degenerative changes and this is likely contributing to her pain. There is also likely a myofascial component to her pain.     Plan:  Diagnosis reviewed, treatment option addressed, and risk/benefits discussed.  Self-care instructions given.  I am recommending a multidisciplinary treatment plan to help this patient better manage her pain.      1. Physical Therapy: Referral placed  2. Pain Psychologist to address issues of relaxation, behavioral change, coping style, and other factors important to improvement: not indicated  3. Diagnostic Studies: none  4. Medication Management: none  5. Further procedures recommended: Thoracic Epidural Steroid Injection  6. Follow up: 6  to 8 weeks after procedure    Total time spent was 60 minutes, and more than 50% of face to face time was spent in counseling and/or coordination of care regarding principles of multidisciplinary care, medication management, and therapeutic options.     Brandee Weber MD    Pain Medicine  Department of Anesthesiology  [facility]

## 2021-10-29 NOTE — TELEPHONE ENCOUNTER
RN called [facility] and spoke with Akiko to obtain VO for patient to hold Warfarin for 7 days prior to Thoracic Epidural. Akiko stated will route to provider (Dr. Loraine Hennessy) for review. Writer gave pain clinic number for follow up.    Maddie Martínez RN

## 2021-10-29 NOTE — PATIENT INSTRUCTIONS
Referrals:    Physical Therapy Referral placed- Pool therapy  If you have not heard from the scheduling office within 2 business days, please call [phone] for all locations      Procedures:    Call to schedule your procedure: [phone], option #2    Thoracic Epidural Steroid Injection.    Your pre-procedure instructions are below, please call our clinic if you have any questions.        Recommended Follow up:      Follow up 6-8 weeks after procedure.          Please call [phone], option #1 to schedule your clinic appointment if you don't already have an appointment scheduled.    Procedure Information related to COVID-19    Please call [phone] option #2 to schedule, reschedule, or cancel your procedure appointment.   Phones are answered Monday - Friday from 08:00 - 4:30pm.  Leave a voicemail with your name, birth date, and phone number if no one is available to take your call.     You will need to be tested for COVID-19 within 4 days (96 hours) of your procedure.  You will be called to schedule your COVID test by a central scheduling team. If you have not been contacted to schedule your test within 4 days of the scheduled procedure, call [phone]    Please be aware that the turn around time for the test is approximately 24-72 hours.   If your results are still pending the day of your procedure, you will be notified as soon as possible as the procedure may be cancelled.    Please note: You will only be contacted for positive and pending results.     The procedure center staff will call you several days before the procedure to review important information that you will need to know for the day of the procedure.   Please contact the clinic if you have further questions about this information.       Information related to Scheduling and Pre-Procedure Instructions:    Please Hold Warfarin for 7 days before your procedure.      After calling to schedule your procedure appointment, please contact the  clinic to make your follow up clinic appointment 6-8 weeks after the procedure.  Clinic phone- [phone], option #1      If you are having a Diagnostic procedure, you will be given a Pain Diary to document your pain relief.   Please fax the completed form to our office.   fax number is [phone]    If you must reschedule your procedure more than two times, you must follow up in clinic before rescheduling again.        Preparing for your procedure    CAUTION - FAILURE TO FOLLOW THESE PRE-PROCEDURE INSTRUCTIONS WILL RESULT IN YOUR PROCEDURE BEING RESCHEDULED.      Your procedure: Thoracic Epidural Steroid Injection            You must have a  take you home after your procedure. Transportation by taxi or para-transit is okay as long as you have a responsible adult accompany you. You must provide your 's full name and contact number at time of check in.     Fasting Protocol Please have nothing to eat and drink for 2 hours before the procedure.      Medications If you take any medications, DO NOT STOP. Take your medications as usual the day of your procedure with a sip of water AT LEAST 2 HOURS PRIOR TO ARRIVAL.    Antibiotics If you are currently taking antibiotics, you must complete the entire dose 7 days prior to your scheduled procedure. You must be clear of any signs or symptoms of infection. If you begin antibiotics, please contact our clinic for instructions.     Fever, Chills, or Rash If you experience a fever of higher than 100 degrees, chills, rash, or open wounds during the one week before your procedure, please call the clinic to see if you may proceed with your procedure.      Medication Hold List  **Patients under Cardiology/Neurology care should consult their provider prior to the pain procedure to verify pre-procedure medication instructions. The information below contains general guidelines.**    Please Hold Warfarin for 7 days before your procedure.    Blood Thinners If you are  taking daily ASPIRIN, PLAVIX, OR OTHER BLOOD THINNERS SUCH AS COUMADIN/WARFARIN, we will need your prescribing doctor to sign a release permitting you to stop these medications. Once approved by your prescribing doctor - STOP ALL BLOOD THINNERS BASED ON THE TIME TABLE BELOW PRIOR TO YOUR PROCEDURE. If you have been instructed to stop WARFARIN(COUMADIN), you must have an INR lab drawn the day before your procedure. . Your INR must be within normal limits before we can perform your injection. MEDICATIONS CAN BE RESTARTED AFTER YOUR PROCEDURE.    14 DAY HOLD  Ticlid (ticlopidine)    10 DAY HOLD  Effient (Prasugel)    3 DAY HOLD  Xarelto (rivaroxaban) 7 DAY HOLD  Anacin, Bufferin, Ecotrin, Excedrin, Aggrenox (Aspirin)  Brilinta (ticagrelor)  Coumadin (Warfarin)  Pradexa (Dabigatran)  Elmiron (Pentosan)  Plavix (Clopidogrel Bisulfate)  Pletal (Cilostazol)    24 HOUR HOLD  Lovenox (enoxaparin)  Agrylin (Anagrelide)        Non-steroidal Anti-inflammatories (NSAIDs) DO NOT TAKE any non-steroidal anti-inflammatory medications (NSAIDs) listed on the table below. MEDICATIONS CAN BE RESTARTED AFTER YOUR PROCEDURE. Celebrex is OK to take and does not need to be discontinued.     Medications to stop:  3 DAY HOLD  Advil, Motrin (Ibuprofen)  Arthrotec (diciofenac sodium/misoprostol)  Clinoril (Sulindac)  Indocin (Indomethacin)  Lodine (Etodolac)  Toradol (Ketorolac)  Vicoprofen (Hydrocodone and Ibuprofen)  Voltaren (Diclotenac)    14 DAY HOLD  Daypro (Oxaprozin)  Feldene (Piroxicam)   7 DAY HOLD  Aleve (Naproxen sodium)  Darvon compound (contains aspirin)  Naprosyn (Naproxen)  Norgesic Forte (contains aspirin)  Mobic (Meloxicam)  Oruvall (Ketoprofen)  Percodan (contains aspirin)  Relafen (Nabumetone)  Salsalate  Trilisate  Vitamin E (more than 400 mg per day)  Any medication containing aspirin                To speak with a nurse, schedule/reschedule/cancel a clinic appointment, or request a medication refill call: [phone]      You can also reach us by MyChart: [email or web address]

## 2021-10-29 NOTE — TELEPHONE ENCOUNTER
Pt was seen today by pain clinic they have ordered a   thoracic epidural for her they need verbal orders for  7 day warfarin hold Are you agreeable to this hold, is bridge needed if so please order.  Once order is placed procedure will be schedule

## 2021-10-29 NOTE — TELEPHONE ENCOUNTER
The patient follows with vascular for her history of blood clots and hypercoagulable state. She most recently saw her specialist on 10/22/2021. This question will need to be directed to her vascular specialist.

## 2021-10-29 NOTE — TELEPHONE ENCOUNTER
Akiko returning a call to Maddie regarding hold orders, Akiko states Dr Hennessy did not place orders for medication and that patient saw [facility] specialists Dr. Danny Nieves at [phone]. Please call with questions thank you.

## 2021-11-01 ENCOUNTER — TELEPHONE (OUTPATIENT)
Dept: ANESTHESIOLOGY | Facility: CLINIC | Age: 56
End: 2021-11-01

## 2021-11-01 NOTE — TELEPHONE ENCOUNTER
LPN received call from Billy, a RN working with the pt's Cardiologist with [facility].     VO were given from Dr. Paul- Pt is able to hold their Warfarin for 5 days prior to the procedure, No bridging needed. Pt should resume after the procedure, if able to do so.     LPN called pt and left VM, updating of Dr. Nieves's instructions.   Pt was asked to call the clinic back with questions, and to verify that they received the message.     Marybel Lassiter LPN

## 2021-11-01 NOTE — TELEPHONE ENCOUNTER
1st contact attempt to schedule procedure with Dr. Weber. No answer, left voicemail instructing patient to return call to [phone].

## 2021-11-01 NOTE — TELEPHONE ENCOUNTER
RN called [facility] and left voicemail on nurse triage line, requesting VO for patient to hold Warfarin for 7 days prior to ordered procedure TESI. Left call back number to [facility].    Maddie Martínez RN

## 2021-11-17 ENCOUNTER — ANCILLARY PROCEDURE (OUTPATIENT)
Dept: MAMMOGRAPHY | Facility: CLINIC | Age: 56
End: 2021-11-17
Attending: FAMILY MEDICINE
Payer: COMMERCIAL

## 2021-11-17 DIAGNOSIS — Z12.31 SCREENING MAMMOGRAM, ENCOUNTER FOR: ICD-10-CM

## 2021-11-17 PROCEDURE — 77067 SCR MAMMO BI INCL CAD: CPT | Mod: TC | Performed by: RADIOLOGY

## 2021-11-24 ENCOUNTER — ANTICOAGULATION THERAPY VISIT (OUTPATIENT)
Dept: ANTICOAGULATION | Facility: CLINIC | Age: 56
End: 2021-11-24

## 2021-11-24 ENCOUNTER — LAB (OUTPATIENT)
Dept: LAB | Facility: CLINIC | Age: 56
End: 2021-11-24
Payer: COMMERCIAL

## 2021-11-24 DIAGNOSIS — Z79.01 ANTICOAGULATION MONITORING, INR RANGE 2-3: Primary | ICD-10-CM

## 2021-11-24 DIAGNOSIS — I73.9 PAD (PERIPHERAL ARTERY DISEASE) (H): ICD-10-CM

## 2021-11-24 LAB — INR BLD: 2.4 (ref 0.9–1.1)

## 2021-11-24 PROCEDURE — 36416 COLLJ CAPILLARY BLOOD SPEC: CPT

## 2021-11-24 PROCEDURE — 85610 PROTHROMBIN TIME: CPT

## 2021-11-24 NOTE — PROGRESS NOTES
ANTICOAGULATION MANAGEMENT     Nery Whitaker 56 year old female is on warfarin with therapeutic INR result. (Goal INR 2.0-3.0)    Recent labs: (last 7 days)     11/24/21  1305   INR 2.4*       ASSESSMENT     Source(s): Chart Review and Patient/Caregiver Call       Warfarin doses taken: Warfarin taken as instructed    Diet: No new diet changes identified    New illness, injury, or hospitalization: No    Medication/supplement changes: None noted    Signs or symptoms of bleeding or clotting: No    Previous INR: Therapeutic last 2(+) visits    Additional findings: None     PLAN     Recommended plan for no diet, medication or health factor changes affecting INR     Dosing Instructions: Continue your current warfarin dose with next INR in 4 weeks       Summary  As of 11/24/2021    Full warfarin instructions:  7.5 mg every Wed, Sat; 5 mg all other days   Next INR check:  12/22/2021             Telephone call with Nery who verbalizes understanding and agrees to plan    Lab visit scheduled    Education provided: None required    Plan made per [facility] anticoagulation protocol    Celina Hoang RN  Anticoagulation Clinic  11/24/2021    _______________________________________________________________________     Anticoagulation Episode Summary     Current INR goal:  2.0-3.0   TTR:  87.0 % (1 y)   Target end date:  Indefinite   Send INR reminders to:  BERNICE MARTINI    Indications    Anticoagulation monitoring  INR range 2-3 [Z79.01]  PAD (peripheral artery disease) (H) [I73.9]           Comments:           Anticoagulation Care Providers     Provider Role Specialty Phone number    Loraine Rees MD Referring Family Medicine [phone]

## 2021-12-24 ENCOUNTER — ANTICOAGULATION THERAPY VISIT (OUTPATIENT)
Dept: ANTICOAGULATION | Facility: CLINIC | Age: 56
End: 2021-12-24

## 2021-12-24 ENCOUNTER — LAB (OUTPATIENT)
Dept: LAB | Facility: CLINIC | Age: 56
End: 2021-12-24
Payer: COMMERCIAL

## 2021-12-24 DIAGNOSIS — I73.9 PAD (PERIPHERAL ARTERY DISEASE) (H): ICD-10-CM

## 2021-12-24 DIAGNOSIS — Z79.01 ANTICOAGULATION MONITORING, INR RANGE 2-3: Primary | ICD-10-CM

## 2021-12-24 LAB — INR BLD: 2.3 (ref 0.9–1.1)

## 2021-12-24 PROCEDURE — 36416 COLLJ CAPILLARY BLOOD SPEC: CPT

## 2021-12-24 PROCEDURE — 85610 PROTHROMBIN TIME: CPT

## 2021-12-24 NOTE — PROGRESS NOTES
ANTICOAGULATION MANAGEMENT     Nery Whitaker 56 year old female is on warfarin with therapeutic INR result. (Goal INR 2.0-3.0)    Recent labs: (last 7 days)     12/24/21  1023   INR 2.3*       ASSESSMENT     Source(s): Chart Review and Patient/Caregiver Call       Warfarin doses taken: Warfarin taken as instructed    Diet: No new diet changes identified    New illness, injury, or hospitalization: No    Medication/supplement changes: None noted    Signs or symptoms of bleeding or clotting: No    Previous INR: Therapeutic last 2(+) visits    Additional findings: None     PLAN     Recommended plan for no diet, medication or health factor changes affecting INR     Dosing Instructions: Continue your current warfarin dose with next INR in 4 weeks       Summary  As of 12/24/2021    Full warfarin instructions:  7.5 mg every Wed, Sat; 5 mg all other days   Next INR check:  1/21/2022             Detailed voice message left for Nery with dosing instructions and follow up date.     Contact [phone] to schedule and with any changes, questions or concerns.     Education provided: None required    Plan made per [facility] anticoagulation protocol    Celina Hoang RN  Anticoagulation Clinic  12/24/2021    _______________________________________________________________________     Anticoagulation Episode Summary     Current INR goal:  2.0-3.0   TTR:  87.0 % (1 y)   Target end date:  Indefinite   Send INR reminders to:  BERNICE MARTINI    Indications    Anticoagulation monitoring  INR range 2-3 [Z79.01]  PAD (peripheral artery disease) (H) [I73.9]           Comments:           Anticoagulation Care Providers     Provider Role Specialty Phone number    Loraine Rees MD Referring Family Medicine [phone]

## 2022-01-05 ENCOUNTER — MYC MEDICAL ADVICE (OUTPATIENT)
Dept: FAMILY MEDICINE | Facility: CLINIC | Age: 57
End: 2022-01-05
Payer: COMMERCIAL

## 2022-01-05 DIAGNOSIS — D12.6 ADENOMATOUS POLYP OF COLON, UNSPECIFIED PART OF COLON: ICD-10-CM

## 2022-01-05 DIAGNOSIS — Z12.11 SCREEN FOR COLON CANCER: Primary | ICD-10-CM

## 2022-01-26 ENCOUNTER — ANTICOAGULATION THERAPY VISIT (OUTPATIENT)
Dept: ANTICOAGULATION | Facility: CLINIC | Age: 57
End: 2022-01-26

## 2022-01-26 ENCOUNTER — LAB (OUTPATIENT)
Dept: LAB | Facility: CLINIC | Age: 57
End: 2022-01-26
Payer: COMMERCIAL

## 2022-01-26 DIAGNOSIS — I73.9 PAD (PERIPHERAL ARTERY DISEASE) (H): ICD-10-CM

## 2022-01-26 DIAGNOSIS — Z79.01 ANTICOAGULATION MONITORING, INR RANGE 2-3: Primary | ICD-10-CM

## 2022-01-26 LAB — INR BLD: 2.3 (ref 0.9–1.1)

## 2022-01-26 PROCEDURE — 36416 COLLJ CAPILLARY BLOOD SPEC: CPT

## 2022-01-26 PROCEDURE — 85610 PROTHROMBIN TIME: CPT

## 2022-01-26 NOTE — PROGRESS NOTES
ANTICOAGULATION MANAGEMENT     Nery Whitaker 56 year old female is on warfarin with therapeutic INR result. (Goal INR 2.0-3.0)    Recent labs: (last 7 days)     01/26/22  1311   INR 2.3*       ASSESSMENT     Source(s): Chart Review, Patient/Caregiver Call and Template       Warfarin doses taken: Warfarin taken as instructed    Diet: No new diet changes identified    New illness, injury, or hospitalization: No    Medication/supplement changes: None noted    Signs or symptoms of bleeding or clotting: No    Previous INR: Therapeutic last 4 INR resuls.    Additional findings:  Yes.  reported scheduled for dental oral surgery on 1/28/22 for extraction and prepare for 2 implants.     - Will be starting ABX on 1/27/22 with Pen-VK 500mg - instructed to take 4 tabs, the morning of surgery, then one tablet QID till all gone.   - she was instructed by dentist - no interruption with warfarin, and OK to continue warfarin and needs INR to be less than 4.        PLAN     Recommended plan for temporary change(s) affecting INR     Dosing Instructions:   (5mg tabs)    Continue your current warfarin dose with next INR in 4th day of ABX    Summary  As of 1/26/2022    Full warfarin instructions:  7.5 mg every Wed, Sat; 5 mg all other days   Next INR check:               Telephone call with  Nery ([phone]) who verbalizes understanding and agrees to plan    Lab visit scheduled - INR on 2/2/22 @ [facility]   - not able to come in till 2/2.    Education provided: Importance of consistent vitamin K intake, Goal range and significance of current result, Potential interaction between warfarin and Pen-VK, Monitoring for bleeding signs and symptoms and When to seek medical attention/emergency care    Plan made per [facility] anticoagulation protocol    Nicki Rosa, RN  Anticoagulation Clinic  1/26/2022    _______________________________________________________________________     Anticoagulation Episode Summary     Current INR goal:  2.0-3.0    TTR:  87.0 % (1 y)   Target end date:  Indefinite   Send INR reminders to:  BERNICE MARTINI    Indications    Anticoagulation monitoring  INR range 2-3 [Z79.01]  PAD (peripheral artery disease) (H) [I73.9]           Comments:           Anticoagulation Care Providers     Provider Role Specialty Phone number    Loraine Rees MD Referring Family Medicine [phone]

## 2022-02-02 ENCOUNTER — LAB (OUTPATIENT)
Dept: LAB | Facility: CLINIC | Age: 57
End: 2022-02-02
Payer: COMMERCIAL

## 2022-02-02 ENCOUNTER — ANTICOAGULATION THERAPY VISIT (OUTPATIENT)
Dept: ANTICOAGULATION | Facility: CLINIC | Age: 57
End: 2022-02-02

## 2022-02-02 DIAGNOSIS — Z79.01 ANTICOAGULATION MONITORING, INR RANGE 2-3: Primary | ICD-10-CM

## 2022-02-02 DIAGNOSIS — I73.9 PAD (PERIPHERAL ARTERY DISEASE) (H): ICD-10-CM

## 2022-02-02 LAB — INR BLD: 3.7 (ref 0.9–1.1)

## 2022-02-02 PROCEDURE — 36416 COLLJ CAPILLARY BLOOD SPEC: CPT

## 2022-02-02 PROCEDURE — 85610 PROTHROMBIN TIME: CPT

## 2022-02-02 NOTE — PROGRESS NOTES
ANTICOAGULATION MANAGEMENT     Nery Whitaker 56 year old female is on warfarin with supratherapeutic INR result. (Goal INR 2.0-3.0)    Recent labs: (last 7 days)     02/02/22  1312   INR 3.7*       ASSESSMENT     Source(s): Chart Review, Patient/Caregiver Call and Template       Warfarin doses taken: Warfarin taken as instructed    Diet:  Yes. Soft diet may be affecting diet and INR.   Really has not been eating much, but just soft foods like jello, applesauce, soup, no meat.    New illness, injury, or hospitalization: Yes:   Still in a lot of pain from oral surgery.   Post dental oral surgery on 1/28/22 - 1 tooth extraction and 2 implants).    Medication/supplement changes: Yes.   Reported this regiment will be ongoing for now - taking Advil 200mg 1 tablet q6 hrs and along with one ED-Tylenol 500mg one tab.  (does not want to take narcotics.)   Pen-VK QID till all gone and will complete on this Fri. 2/4    Signs or symptoms of bleeding or clotting: No    Previous INR: Therapeutic last 5 INR visits.    Additional findings:  Yes. Scheduled for colonoscopy on 2/18/22.   - her Cardiologist Lizbeth Euceda with [facility] already gave clearance and OK'd 4 day warfarin hold to begin on 2/14 with no bridge     PLAN     Recommended plan for ongoing change(s) affecting INR     Dosing Instructions:   (bedtime. 5mg tabs)    Hold dose then Decrease your warfarin dose (12.5% change) with next INR in 1-2 weeks       Summary  As of 2/2/2022    Full warfarin instructions:  2/2: Hold; 2/14: Hold; 2/15: Hold; 2/16: Hold; 2/17: Hold; Otherwise 5 mg every day   Next INR check:  2/11/2022             Telephone call with  Nery ([phone]) who verbalizes understanding and agrees to plan    Lab visit scheduled - INR on 2/11/22 @ [facility].    Education provided: Impact of vitamin K foods on INR, Goal range and significance of current result, Potential interaction between warfarin and ADVIL, Monitoring for bleeding signs and symptoms and  When to seek medical attention/emergency care    Plan made per [facility] anticoagulation protocol    Nicki Rosa, RN  Anticoagulation Clinic  2/2/2022    _______________________________________________________________________     Anticoagulation Episode Summary     Current INR goal:  2.0-3.0   TTR:  86.1 % (1 y)   Target end date:  Indefinite   Send INR reminders to:  BERNICE MARTINI    Indications    Anticoagulation monitoring  INR range 2-3 [Z79.01]  PAD (peripheral artery disease) (H) [I73.9]           Comments:           Anticoagulation Care Providers     Provider Role Specialty Phone number    Loraine Rees MD Referring Family Medicine [phone]

## 2022-02-09 PROBLEM — M54.6 THORACIC SPINE PAIN: Status: ACTIVE | Noted: 2022-02-09

## 2022-02-14 ENCOUNTER — DOCUMENTATION ONLY (OUTPATIENT)
Dept: LAB | Facility: CLINIC | Age: 57
End: 2022-02-14
Payer: COMMERCIAL

## 2022-02-14 NOTE — PROGRESS NOTES
This patient has an upcoming lab only appointment and needs a Pre-procedure Covid PCR test. Please review and place future orders. Thank you.

## 2022-02-16 DIAGNOSIS — Z11.59 ENCOUNTER FOR SCREENING FOR OTHER VIRAL DISEASES: Primary | ICD-10-CM

## 2022-02-25 ENCOUNTER — OFFICE VISIT (OUTPATIENT)
Dept: FAMILY MEDICINE | Facility: CLINIC | Age: 57
End: 2022-02-25
Payer: COMMERCIAL

## 2022-02-25 ENCOUNTER — ANTICOAGULATION THERAPY VISIT (OUTPATIENT)
Dept: ANTICOAGULATION | Facility: CLINIC | Age: 57
End: 2022-02-25

## 2022-02-25 VITALS
HEIGHT: 65 IN | DIASTOLIC BLOOD PRESSURE: 74 MMHG | TEMPERATURE: 97.9 F | BODY MASS INDEX: 21.9 KG/M2 | WEIGHT: 131.44 LBS | SYSTOLIC BLOOD PRESSURE: 120 MMHG | HEART RATE: 60 BPM | RESPIRATION RATE: 16 BRPM

## 2022-02-25 DIAGNOSIS — K08.89 PAIN, DENTAL: ICD-10-CM

## 2022-02-25 DIAGNOSIS — I77.9 BILATERAL CAROTID ARTERY DISEASE, UNSPECIFIED TYPE (H): ICD-10-CM

## 2022-02-25 DIAGNOSIS — I73.9 PAD (PERIPHERAL ARTERY DISEASE) (H): ICD-10-CM

## 2022-02-25 DIAGNOSIS — D12.6 ADENOMATOUS POLYP OF COLON, UNSPECIFIED PART OF COLON: ICD-10-CM

## 2022-02-25 DIAGNOSIS — Z79.01 ANTICOAGULATION MONITORING, INR RANGE 2-3: Primary | ICD-10-CM

## 2022-02-25 DIAGNOSIS — D64.9 NORMOCYTIC ANEMIA: ICD-10-CM

## 2022-02-25 DIAGNOSIS — E78.5 DYSLIPIDEMIA: ICD-10-CM

## 2022-02-25 DIAGNOSIS — Z13.820 SCREENING FOR OSTEOPOROSIS: ICD-10-CM

## 2022-02-25 DIAGNOSIS — D68.59 HYPERCOAGULABLE STATE (H): ICD-10-CM

## 2022-02-25 DIAGNOSIS — Z79.01 ANTICOAGULATION MONITORING, INR RANGE 2-3: ICD-10-CM

## 2022-02-25 DIAGNOSIS — F17.200 TOBACCO USE DISORDER: ICD-10-CM

## 2022-02-25 DIAGNOSIS — F41.1 ANXIETY STATE: ICD-10-CM

## 2022-02-25 DIAGNOSIS — D72.820 ATYPICAL LYMPHOCYTOSIS: ICD-10-CM

## 2022-02-25 DIAGNOSIS — I10 ESSENTIAL HYPERTENSION: ICD-10-CM

## 2022-02-25 DIAGNOSIS — N39.44 NOCTURNAL ENURESIS: ICD-10-CM

## 2022-02-25 DIAGNOSIS — Z00.00 ENCOUNTER FOR ROUTINE ADULT HEALTH EXAMINATION WITHOUT ABNORMAL FINDINGS: Primary | ICD-10-CM

## 2022-02-25 PROBLEM — G43.909 MIGRAINE: Status: ACTIVE | Noted: 2018-04-11

## 2022-02-25 PROBLEM — R01.1 NEWLY RECOGNIZED MURMUR: Status: RESOLVED | Noted: 2018-10-18 | Resolved: 2022-02-25

## 2022-02-25 PROBLEM — E78.1 PURE HYPERGLYCERIDEMIA: Status: RESOLVED | Noted: 2021-09-02 | Resolved: 2022-02-25

## 2022-02-25 PROBLEM — B07.0 VERRUCA PLANTARIS: Status: RESOLVED | Noted: 2018-11-01 | Resolved: 2022-02-25

## 2022-02-25 PROBLEM — R00.2 INTERMITTENT PALPITATIONS: Status: RESOLVED | Noted: 2017-12-02 | Resolved: 2022-02-25

## 2022-02-25 PROBLEM — S90.852D FOREIGN BODY IN LEFT FOOT, SUBSEQUENT ENCOUNTER: Status: ACTIVE | Noted: 2018-10-11

## 2022-02-25 PROBLEM — S90.852D FOREIGN BODY IN LEFT FOOT, SUBSEQUENT ENCOUNTER: Status: RESOLVED | Noted: 2018-10-11 | Resolved: 2022-02-25

## 2022-02-25 PROBLEM — R94.39 ABNORMAL CARDIOVASCULAR STRESS TEST: Status: RESOLVED | Noted: 2017-12-13 | Resolved: 2022-02-25

## 2022-02-25 PROBLEM — R10.13 ABDOMINAL PAIN, EPIGASTRIC: Status: RESOLVED | Noted: 2017-12-02 | Resolved: 2022-02-25

## 2022-02-25 PROBLEM — N39.41 URGE INCONTINENCE OF URINE: Status: RESOLVED | Noted: 2018-11-04 | Resolved: 2022-02-25

## 2022-02-25 LAB
ALT SERPL W P-5'-P-CCNC: 9 U/L (ref 0–45)
ANION GAP SERPL CALCULATED.3IONS-SCNC: 9 MMOL/L (ref 5–18)
BUN SERPL-MCNC: 15 MG/DL (ref 8–22)
CALCIUM SERPL-MCNC: 9 MG/DL (ref 8.5–10.5)
CHLORIDE BLD-SCNC: 108 MMOL/L (ref 98–107)
CHOLEST SERPL-MCNC: 139 MG/DL
CO2 SERPL-SCNC: 21 MMOL/L (ref 22–31)
CREAT SERPL-MCNC: 0.83 MG/DL (ref 0.6–1.1)
ERYTHROCYTE [DISTWIDTH] IN BLOOD BY AUTOMATED COUNT: 14.4 % (ref 10–15)
FASTING STATUS PATIENT QL REPORTED: YES
GFR SERPL CREATININE-BSD FRML MDRD: 83 ML/MIN/1.73M2
GLUCOSE BLD-MCNC: 88 MG/DL (ref 70–125)
HCT VFR BLD AUTO: 34.1 % (ref 35–47)
HDLC SERPL-MCNC: 37 MG/DL
HGB BLD-MCNC: 11.3 G/DL (ref 11.7–15.7)
INR BLD: 2.2 (ref 0.9–1.1)
LDLC SERPL CALC-MCNC: 79 MG/DL
MCH RBC QN AUTO: 31 PG (ref 26.5–33)
MCHC RBC AUTO-ENTMCNC: 33.1 G/DL (ref 31.5–36.5)
MCV RBC AUTO: 94 FL (ref 78–100)
PLATELET # BLD AUTO: 244 10E3/UL (ref 150–450)
POTASSIUM BLD-SCNC: 4.7 MMOL/L (ref 3.5–5)
RBC # BLD AUTO: 3.64 10E6/UL (ref 3.8–5.2)
SODIUM SERPL-SCNC: 138 MMOL/L (ref 136–145)
TRIGL SERPL-MCNC: 116 MG/DL
WBC # BLD AUTO: 9.4 10E3/UL (ref 4–11)

## 2022-02-25 PROCEDURE — 80061 LIPID PANEL: CPT | Performed by: FAMILY MEDICINE

## 2022-02-25 PROCEDURE — 36416 COLLJ CAPILLARY BLOOD SPEC: CPT | Performed by: FAMILY MEDICINE

## 2022-02-25 PROCEDURE — 99396 PREV VISIT EST AGE 40-64: CPT | Performed by: FAMILY MEDICINE

## 2022-02-25 PROCEDURE — 85027 COMPLETE CBC AUTOMATED: CPT | Performed by: FAMILY MEDICINE

## 2022-02-25 PROCEDURE — 36415 COLL VENOUS BLD VENIPUNCTURE: CPT | Performed by: FAMILY MEDICINE

## 2022-02-25 PROCEDURE — 85610 PROTHROMBIN TIME: CPT | Performed by: FAMILY MEDICINE

## 2022-02-25 PROCEDURE — 80048 BASIC METABOLIC PNL TOTAL CA: CPT | Performed by: FAMILY MEDICINE

## 2022-02-25 PROCEDURE — 84460 ALANINE AMINO (ALT) (SGPT): CPT | Performed by: FAMILY MEDICINE

## 2022-02-25 RX ORDER — TOLTERODINE TARTRATE 1 MG/1
1 TABLET, EXTENDED RELEASE ORAL AT BEDTIME
Qty: 90 TABLET | Refills: 3 | Status: SHIPPED | OUTPATIENT
Start: 2022-02-25 | End: 2023-07-17

## 2022-02-25 RX ORDER — CITALOPRAM HYDROBROMIDE 20 MG/1
20 TABLET ORAL DAILY
Qty: 90 TABLET | Refills: 3 | Status: SHIPPED | OUTPATIENT
Start: 2022-02-25 | End: 2023-04-11

## 2022-02-25 ASSESSMENT — ENCOUNTER SYMPTOMS
PALPITATIONS: 0
ARTHRALGIAS: 0
WEAKNESS: 0
BREAST MASS: 0
HEMATOCHEZIA: 0
FEVER: 0
FREQUENCY: 0
DYSURIA: 0
NAUSEA: 0
SHORTNESS OF BREATH: 0
HEARTBURN: 0
JOINT SWELLING: 0
EYE PAIN: 0
HEADACHES: 0
ABDOMINAL PAIN: 0
HEMATURIA: 0
CHILLS: 0
NERVOUS/ANXIOUS: 0
DIZZINESS: 0
MYALGIAS: 0
COUGH: 0
DIARRHEA: 0
CONSTIPATION: 0
SORE THROAT: 0
PARESTHESIAS: 0

## 2022-02-25 ASSESSMENT — ANXIETY QUESTIONNAIRES
5. BEING SO RESTLESS THAT IT IS HARD TO SIT STILL: NOT AT ALL
GAD7 TOTAL SCORE: 0
7. FEELING AFRAID AS IF SOMETHING AWFUL MIGHT HAPPEN: NOT AT ALL
4. TROUBLE RELAXING: NOT AT ALL
3. WORRYING TOO MUCH ABOUT DIFFERENT THINGS: NOT AT ALL
2. NOT BEING ABLE TO STOP OR CONTROL WORRYING: NOT AT ALL
1. FEELING NERVOUS, ANXIOUS, OR ON EDGE: NOT AT ALL
6. BECOMING EASILY ANNOYED OR IRRITABLE: NOT AT ALL
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
## 2022-02-25 NOTE — PROGRESS NOTES
"ANTICOAGULATION MANAGEMENT     Nery Whitaker 56 year old female is on warfarin with therapeutic INR result. (Goal INR 2.0-3.0)    Recent labs: (last 7 days)     02/25/22  0822   INR 2.2*       ASSESSMENT     Source(s): Chart Review, Patient/Caregiver Call and Template       Warfarin doses taken: Warfarin taken as instructed    Diet: No new diet changes identified    Now eating solid foods.   Was on soft foods d/t dental issues.    New illness, injury, or hospitalization: Yes:   She is getting frustrated due to ongoing dental issues.   Having dental issues with \"bone fragments coming out\"    Medication/supplement changes:  Yes.    ES-Tylenol 500mg 3 tabs per day, PRN   Was taking Advil before, but has stopped taking and switched to Tylenol.    Signs or symptoms of bleeding or clotting: No    Previous INR: Supratherapeutic at 3.7 on 2/2/22.    Additional findings:  Yes. Dr. Rivera ordered DEXA scan.     PLAN     Recommended plan for ongoing change(s) affecting INR     Dosing Instructions:   (bedtime. 5mg tabs)    Continue your current warfarin dose with next INR in 2-3 weeks       Summary  As of 2/25/2022    Full warfarin instructions:  5 mg every day   Next INR check:  3/18/2022             Telephone call with  Nery ([phone]) who verbalizes understanding and agrees to plan    Lab visit scheduled - INR on 3/16/22 @ [facility].   - also scheduled for COVID19 PCR  (preprocedure)    Education provided: Importance of consistent vitamin K intake and Goal range and significance of current result    Plan made per [facility] anticoagulation protocol    Nicki Rosa, RN  Anticoagulation Clinic  2/25/2022    _______________________________________________________________________     Anticoagulation Episode Summary     Current INR goal:  2.0-3.0   TTR:  83.2 % (1 y)   Target end date:  Indefinite   Send INR reminders to:  BERNICE MARTINI    Indications    Anticoagulation monitoring  INR range 2-3 [Z79.01]  PAD (peripheral " artery disease) (H) [I73.9]           Comments:           Anticoagulation Care Providers     Provider Role Specialty Phone number    Loraine Rees MD Referring Family Medicine [phone]

## 2022-02-25 NOTE — ASSESSMENT & PLAN NOTE
Annual exam.  Concern about ongoing discharge of bone fragments following a dental procedure.  Advised to go back to her oral surgeon.  Question of bone health.  No personal history of osteoporotic fractures she has had a number of orthopedic issues.  We will accelerate bone density screening.  DEXA ordered.  Colon cancer screening planned for later this year (already scheduled).  Discussed shingles vaccination.  She is otherwise up-to-date with preventative health recommendations.  We reviewed her history.  She is doing well on Repatha.  Check lipids today.

## 2022-02-26 ASSESSMENT — ANXIETY QUESTIONNAIRES: GAD7 TOTAL SCORE: 0

## 2022-03-16 ENCOUNTER — ANTICOAGULATION THERAPY VISIT (OUTPATIENT)
Dept: ANTICOAGULATION | Facility: CLINIC | Age: 57
End: 2022-03-16

## 2022-03-16 ENCOUNTER — LAB (OUTPATIENT)
Dept: LAB | Facility: CLINIC | Age: 57
End: 2022-03-16
Payer: COMMERCIAL

## 2022-03-16 DIAGNOSIS — I73.9 PAD (PERIPHERAL ARTERY DISEASE) (H): ICD-10-CM

## 2022-03-16 DIAGNOSIS — Z11.59 ENCOUNTER FOR SCREENING FOR OTHER VIRAL DISEASES: ICD-10-CM

## 2022-03-16 DIAGNOSIS — Z79.01 ANTICOAGULATION MONITORING, INR RANGE 2-3: Primary | ICD-10-CM

## 2022-03-16 LAB — INR BLD: 1.2 (ref 0.9–1.1)

## 2022-03-16 PROCEDURE — 85610 PROTHROMBIN TIME: CPT

## 2022-03-16 PROCEDURE — U0003 INFECTIOUS AGENT DETECTION BY NUCLEIC ACID (DNA OR RNA); SEVERE ACUTE RESPIRATORY SYNDROME CORONAVIRUS 2 (SARS-COV-2) (CORONAVIRUS DISEASE [COVID-19]), AMPLIFIED PROBE TECHNIQUE, MAKING USE OF HIGH THROUGHPUT TECHNOLOGIES AS DESCRIBED BY CMS-2020-01-R: HCPCS

## 2022-03-16 PROCEDURE — 36416 COLLJ CAPILLARY BLOOD SPEC: CPT

## 2022-03-16 PROCEDURE — U0005 INFEC AGEN DETEC AMPLI PROBE: HCPCS

## 2022-03-16 NOTE — PROGRESS NOTES
ANTICOAGULATION MANAGEMENT     Nery Whitaker 56 year old female is on warfarin with subtherapeutic INR result. (Goal INR 2.0-3.0)    Recent labs: (last 7 days)     03/16/22  1252   INR 1.2*       ASSESSMENT       Source(s): Chart Review, Patient/Caregiver Call and Template       Warfarin doses taken: Warfarin recently being held which may be affecting INR   Reported has been holding warfarin since 3/15,    Diet: No new diet changes identified    New illness, injury, or hospitalization: Yes:   Scheduled on 3/18/22 for Thoracic FINA injection for thoracic pains.    Medication/supplement changes:  Yes.    She was advised to HOLD Aspirin since 3/15 as well prior to procedure.   Reported not taking Advil or Tylenol anymore.    Signs or symptoms of bleeding or clotting: No    Previous INR: Therapeutic last visit at 2.2; previously outside of goal range at 3.7    Additional findings:  Yes.    - scheduled for colonoscopy on 4/1/22 with [facility] and advised 4 days hold of warfarin.  - also scheduled for tooth surgery again.  (bone fragments coming off after previous dental extraction).  Getting very frustrated since dental issues have been ongoing.       PLAN     Recommended plan for temporary change(s) and ongoing change(s) affecting INR     Dosing Instructions:   (bedtime. 5mg tabs)    once she is given clearance to resume warfarin with 5mg daily. with next INR in 5-7 days       Summary  As of 3/16/2022    Full warfarin instructions:  3/16: Hold; 3/17: Hold; Otherwise 5 mg every day   Next INR check:  3/23/2022             Telephone call with  Nery ([phone]) who verbalizes understanding and agrees to plan    Lab visit scheduled - INR on 3/23/22 @ [facility].   - also scheduled for DEXA scan.    Education provided: Importance of consistent vitamin K intake, Potential interaction between warfarin and alcohol and Potential interaction between warfarin and Aspirin and steroid injection    Plan made per [facility] anticoagulation  protocol    Nicki Rosa RN  Anticoagulation Clinic  3/16/2022    _______________________________________________________________________     Anticoagulation Episode Summary     Current INR goal:  2.0-3.0   TTR:  79.0 % (1 y)   Target end date:  Indefinite   Send INR reminders to:  BERNICE MARTINI    Indications    Anticoagulation monitoring  INR range 2-3 [Z79.01]  PAD (peripheral artery disease) (H) [I73.9]           Comments:           Anticoagulation Care Providers     Provider Role Specialty Phone number    Loraine Rees MD Referring Family Medicine [phone]

## 2022-03-17 LAB — SARS-COV-2 RNA RESP QL NAA+PROBE: NEGATIVE

## 2022-03-17 NOTE — PROCEDURES
Blood thinner was held per patient for 3 days before procedure and should have been held for 5 days instead. MD was contacted and advised to reschedule pain procedure due to this. Pt was given the pain clinic number and will reschedule. Pt verbalized understanding and is aware of the hold time for coumadin.

## 2022-03-18 ENCOUNTER — HOSPITAL ENCOUNTER (OUTPATIENT)
Facility: AMBULATORY SURGERY CENTER | Age: 57
Discharge: HOME | End: 2022-03-18
Attending: ANESTHESIOLOGY
Payer: COMMERCIAL

## 2022-03-18 DIAGNOSIS — M54.6 THORACIC SPINE PAIN: ICD-10-CM

## 2022-03-23 ENCOUNTER — ANCILLARY PROCEDURE (OUTPATIENT)
Dept: BONE DENSITY | Facility: CLINIC | Age: 57
End: 2022-03-23
Attending: FAMILY MEDICINE
Payer: COMMERCIAL

## 2022-03-23 ENCOUNTER — ANTICOAGULATION THERAPY VISIT (OUTPATIENT)
Dept: ANTICOAGULATION | Facility: CLINIC | Age: 57
End: 2022-03-23

## 2022-03-23 ENCOUNTER — LAB (OUTPATIENT)
Dept: LAB | Facility: CLINIC | Age: 57
End: 2022-03-23

## 2022-03-23 DIAGNOSIS — K08.89 PAIN, DENTAL: ICD-10-CM

## 2022-03-23 DIAGNOSIS — I73.9 PAD (PERIPHERAL ARTERY DISEASE) (H): ICD-10-CM

## 2022-03-23 DIAGNOSIS — Z79.01 ANTICOAGULATION MONITORING, INR RANGE 2-3: Primary | ICD-10-CM

## 2022-03-23 DIAGNOSIS — Z13.820 SCREENING FOR OSTEOPOROSIS: ICD-10-CM

## 2022-03-23 LAB — INR BLD: 1.9 (ref 0.9–1.1)

## 2022-03-23 PROCEDURE — 36416 COLLJ CAPILLARY BLOOD SPEC: CPT

## 2022-03-23 PROCEDURE — 77080 DXA BONE DENSITY AXIAL: CPT | Mod: TC | Performed by: RADIOLOGY

## 2022-03-23 PROCEDURE — 85610 PROTHROMBIN TIME: CPT

## 2022-03-23 NOTE — PROGRESS NOTES
ANTICOAGULATION MANAGEMENT     Nery Whitaker 56 year old female is on warfarin with subtherapeutic INR result. (Goal INR 2.0-3.0)    Recent labs: (last 7 days)     03/23/22  1330   INR 1.9*       ASSESSMENT       Source(s): Chart Review and Patient/Caregiver Call       Warfarin doses taken: Warfarin recently held for  3 days which may be affecting INR   Resumed warfarin on 3/16.    Reported warfarin dose was only held for 3 days, from 3/15-16, instead of 5 days.   Was advised to reschedule thoracic injection.  (Nery reported, she was called on 3/15 to start holding warfarin, then got another call on 3/17 that she needs to hold warfarin for a total of 5 days, even when INR check was 1.2)    Diet: No new diet changes identified    New illness, injury, or hospitalization: Yes:    Was s/p Thoracic FINA injection d/t thoracic spine pains.    Medication/supplement changes: None noted    Signs or symptoms of bleeding or clotting: No    Previous INR: Subtherapeutic at 1.2 on 3/16/22.    Additional findings:  Yes.    - scheduled for colonoscopy on 4/1/22 with [facility] with request to hold warfarin for 4 days.   - (per Dr. Danny Nieves OK'jyoti warfarin hold with no bridge, on 3/9/22)       PLAN     Recommended plan for temporary change(s) affecting INR     Dosing Instructions:   (bedtime. 5mg tabs)    Booster dose then continue your current warfarin dose with next INR in 1 week after resuming warfarin.    Summary  As of 3/23/2022    Full warfarin instructions:  3/23: 7.5 mg; 3/28: Hold; 3/29: Hold; 3/30: Hold; 3/31: Hold; Otherwise 5 mg every day   Next INR check:  4/6/2022             Telephone call with  Nery ([phone]) who verbalizes understanding and agrees to plan    - Nery will inform when she is scheduled for Thoracic spine injection.  She will need to HOLD warfarin for a total of 5 days.    Lab visit scheduled - INR on 4/13/22 @ [facility].    Education provided: Importance of consistent vitamin K intake, Goal range and  significance of current result and Importance of notifying clinic of upcoming surgeries and procedures 2 weeks in advance    Plan made per [facility] anticoagulation protocol    Nicki Rosa RN  Anticoagulation Clinic  3/23/2022    _______________________________________________________________________     Anticoagulation Episode Summary     Current INR goal:  2.0-3.0   TTR:  77.1 % (1 y)   Target end date:  Indefinite   Send INR reminders to:  BERNICE MARTINI    Indications    Anticoagulation monitoring  INR range 2-3 [Z79.01]  PAD (peripheral artery disease) (H) [I73.9]           Comments:           Anticoagulation Care Providers     Provider Role Specialty Phone number    Loraine Rees MD Referring Family Medicine [phone]

## 2022-03-25 ENCOUNTER — MYC MEDICAL ADVICE (OUTPATIENT)
Dept: FAMILY MEDICINE | Facility: CLINIC | Age: 57
End: 2022-03-25
Payer: COMMERCIAL

## 2022-03-25 DIAGNOSIS — M81.0 OSTEOPOROSIS WITHOUT CURRENT PATHOLOGICAL FRACTURE, UNSPECIFIED OSTEOPOROSIS TYPE: Primary | ICD-10-CM

## 2022-04-01 ENCOUNTER — TRANSFERRED RECORDS (OUTPATIENT)
Dept: HEALTH INFORMATION MANAGEMENT | Facility: CLINIC | Age: 57
End: 2022-04-01
Payer: COMMERCIAL

## 2022-04-04 ENCOUNTER — TRANSFERRED RECORDS (OUTPATIENT)
Dept: HEALTH INFORMATION MANAGEMENT | Facility: CLINIC | Age: 57
End: 2022-04-04

## 2022-04-11 ENCOUNTER — OFFICE VISIT (OUTPATIENT)
Dept: PULMONOLOGY | Facility: OTHER | Age: 57
End: 2022-04-11
Payer: COMMERCIAL

## 2022-04-11 VITALS
SYSTOLIC BLOOD PRESSURE: 96 MMHG | WEIGHT: 131 LBS | OXYGEN SATURATION: 97 % | BODY MASS INDEX: 22 KG/M2 | DIASTOLIC BLOOD PRESSURE: 60 MMHG | HEART RATE: 59 BPM

## 2022-04-11 DIAGNOSIS — Z87.891 PERSONAL HISTORY OF TOBACCO USE: ICD-10-CM

## 2022-04-11 DIAGNOSIS — J40 BRONCHITIS: Primary | ICD-10-CM

## 2022-04-11 PROCEDURE — G0296 VISIT TO DETERM LDCT ELIG: HCPCS | Performed by: INTERNAL MEDICINE

## 2022-04-11 PROCEDURE — 99213 OFFICE O/P EST LOW 20 MIN: CPT | Mod: 25 | Performed by: INTERNAL MEDICINE

## 2022-04-11 PROCEDURE — 99406 BEHAV CHNG SMOKING 3-10 MIN: CPT | Performed by: INTERNAL MEDICINE

## 2022-04-11 RX ORDER — DOXYCYCLINE 100 MG/1
100 CAPSULE ORAL 2 TIMES DAILY
Qty: 14 CAPSULE | Refills: 0 | Status: SHIPPED | OUTPATIENT
Start: 2022-04-11 | End: 2022-09-22

## 2022-04-11 NOTE — PROGRESS NOTES
PULMONARY CLINIC FOLLOW UP NOTE    History:     HPI: Nery Whitaker is a 56 year old female, smoker, who is here for follow-up.  Patient was initially referred to us because of long history of tobacco use and need for screening.      Interval History: Patient is here for her scheduled follow-up visit.  She endorses shortness of breath and cough for 2 to 3 weeks.  She was she coughs up phlegm that is yellow color.  No hemoptysis.  No fevers or chills.  She feels that overall, she may be getting better.  She is on albuterol inhaler.  She continues to smoke approximately three quarters of a pack daily.  No recent exacerbations requiring antibiotics or steroids for at least several years.     PMHx/PSHx:  CAD  Hypertension  Peripheral artery disease  Migraine headaches  Bilateral carotid artery disease  Anxiety  Anemia  History of appendectomy  History of femoral artery stent  Cholecystectomy  Tympanostomy tube placement     Social history:  Smoker. Smokes 1 pack/day. Has been smoking since age of 17.  Dental assistant    ROS: 10 point review of system done. Pertinent findings are noted in the HPI.    Exam/Data:   BP 96/60   Pulse 59   Wt 59.4 kg (131 lb)   SpO2 97%   BMI 22.00 kg/m  , Body mass index is 22 kg/m .  EXAM:  GEN: comfortable, NAD  HEENT: NCAT, EMOI  CVS: S1S2, RRR  Lung: CTA  Abd: soft, nt, + BS. No masses  Ext: no c/c/e  Neuro: nonfocal  Skin: no visible rash  Musculoskeletal: FROM all extremities  Psych: appropriate    Data:     Labs personally reviewed.      IMAGING: personally reviewed images. Formal radiology interpretation noted below.      XR Thoracic Spine 2 Views    Result Date: 9/17/2021  2 views thoracic spine radiographs 9/17/2021 9:57 AM History: Pain in thoracic spine Comparison: Thoracic spine MRI to 4/20/2021, chest CT 7/28/2012 Findings: Standing  AP and lateral  views of the thoracic spine were obtained. 12 rib bearing vertebral bodies are identified. Osteopenic appearance of the  thoracic spine. There is no acute osseous abnormality.  Upper thoracic and lower cervical spine partially obscured soft tissues and shoulders. Diffuse degenerative changes of the thoracic spine. Calcification of the thoracic aorta, better delineated on prior chest CT and MR. The visualized lungs are clear. Cardiomediastinal silhouette is within normal limits.     Impression: 1.  No acute osseous abnormality. Subtle nondisplaced fractures could be obscured in the setting of osteopenic appearing bone. 2.  Diffuse degenerative changes of the thoracic spine, better delineated on prior CT and MR. TING NORWOOD MD   SYSTEM ID:  [id number]      CT chest 2018:   No acute process detected in the chest, abdomen or pelvis.    CT scan on 7/2021:  IMPRESSION:  Mild emphysema  Negative for lung cancer screening purposes.    Assessment/Plan:       Nery Whitaker is a 56 year old female, smoker, who is here for lung screening. PFT's are normal with exception of mildly decrease DLCO.  CT scan of the chest showed mild paraseptal emphysema.  Low-dose CT scan done last year is negative.    Recommendations:  On albuterol inhaler  We will give doxycycline for acute bronchitis  Spiriva down the road  Yearly low-dose CT chest -ordered for this year  Vaccinated for COVID-19    Patient instructed to call us if she does not improve within the next 3 days.    FOLLOW UP: 1 year      Brooklynn Navarro MD  Pulmonary and Critical Care Medicine  Electronically Signed on 04/11/2022    Current Outpatient Medications   Medication Sig Dispense Refill     albuterol (PROAIR HFA/PROVENTIL HFA/VENTOLIN HFA) 108 (90 Base) MCG/ACT inhaler Inhale 2 puffs into the lungs every 6 hours as needed for shortness of breath / dyspnea or wheezing 18 g 4     amLODIPine (NORVASC) 5 MG tablet Take 1 tablet by mouth once daily 90 tablet 3     aspirin 81 MG EC tablet Take 81 mg by mouth       citalopram (CELEXA) 20 MG tablet Take 1 tablet (20 mg) by mouth daily 90 tablet  3     clindamycin (CLEOCIN T) 1 % external lotion APPLY LOTION TOPICALLY DAILY TO UPPER BACK       famotidine (PEPCID) 20 MG tablet Take 1 tablet by mouth twice daily 180 tablet 3     folic acid (FOLVITE) 1 MG tablet Take 1 mg by mouth       gabapentin (NEURONTIN) 100 MG capsule Take 1 tab in the morning, 1 tab at noon, and 3 tabs at bedtime daily. 450 capsule 4     lisinopril (ZESTRIL) 20 MG tablet Take 1 tablet by mouth once daily 90 tablet 3     REPATHA SURECLICK 140 MG/ML prefilled autoinjector INJECT 1ML (140MG) SUBCUTANEOUS EVERY 2 WEEKS. INJECT INTO ABDOMEN THIGH OR UPPER ARM. ROTATE INJECTION SITES.       tolterodine (DETROL) 1 MG tablet Take 1 tablet (1 mg) by mouth At Bedtime 90 tablet 3     warfarin ANTICOAGULANT (COUMADIN) 5 MG tablet Take 1 tablet (5 mg) by mouth daily Adjust dose per INR as directed 90 tablet 1     Allergies   Allergen Reactions     Ezetimibe      constipation     Lovastatin Unknown     constipation     Simvastatin      Other reaction(s): Constipation  Intolerance, but tolerated with re-challenge in Feb to April 2009       Meds and Allergies: See EHR for the updated medication list and Allergies. These were reviewed.     Much or all of the text in this note was generated through the use of the Dragon Dictate voice-to-text software. Errors in spelling or words which seem out of context are unintentional. Sound alike errors, in particular, may have escaped editing.      Lung Cancer Screening Shared Decision Making Visit     Nery Whitaker is eligible for lung cancer screening on the basis of the information provided in my signed lung cancer screening order.     I have discussed with patient the risks and benefits of screening for lung cancer with low-dose CT.     The risks include:  radiation exposure: one low dose chest CT has as much ionizing radiation as about 15 chest x-rays or 6 months of background radiation living in [place]    false positives: 96% of positive findings/nodules  are NOT cancer, but some might still require additional diagnostic evaluation, including biopsy  over-diagnosis: some slow growing cancers that might never have been clinically significant will be detected and treated unnecessarily     The benefit of early detection of lung cancer is contingent upon adherence to annual screening or more frequent follow up if indicated.     Furthermore, reaping the benefits of screening requires Nery Whitaker to be willing and physically able to undergo diagnostic procedures, if indicated. Although no specific guide is available for determining severity of comorbidities, it is reasonable to withhold screening in patients who have greater mortality risk from other diseases.     We did discuss that the only way to prevent lung cancer is to not smoke. Smoking cessation counseling was given, duration < 3 minutes.      I did offer risk estimation using a calculator such as this one:    ShouldIScreen

## 2022-04-11 NOTE — PATIENT INSTRUCTIONS
Lung Cancer Screening   Frequently Asked Questions  If you are at high-risk for lung cancer, getting screened with low-dose computed tomography (LDCT) every year can help save your life. This handout offers answers to some of the most common questions about lung cancer screening. If you have other questions, please call [phone] ([phone]).     What is it?  Lung cancer screening uses special X-ray technology to create an image of your lung tissue. The exam is quick and easy and takes less than 10 seconds. We don t give you any medicine or use any needles. You can eat before and after the exam. You don t need to change your clothes as long as the clothing on your chest doesn t contain metal. But, you do need to be able to hold your breath for at least 6 seconds during the exam.    What is the goal of lung cancer screening?  The goal of lung cancer screening is to save lives. Many times, lung cancer is not found until a person starts having physical symptoms. Lung cancer screening can help detect lung cancer in the earliest stages when it may be easier to treat.    Who should be screened for lung cancer?  We suggest lung cancer screening for anyone who is at high-risk for lung cancer. You are in the high-risk group if you:      are between the ages of 55 and 79, and    have smoked at least 1 pack of cigarettes a day for 20 or more years, and    still smoke or have quit within the past 15 years.    However, if you have a new cough or shortness of breath, you should talk to your doctor before being screened.    Why does it matter if I have symptoms?  Certain symptoms can be a sign that you have a condition in your lungs that should be checked and treated by your doctor. These symptoms include fever, chest pain, a new or changing cough, shortness of breath that you have never felt before, coughing up blood or unexplained weight loss. Having any of these symptoms can greatly affect the results of lung  cancer screening.       Should all smokers get an LDCT lung cancer screening exam?  It depends. Lung cancer screening is for a very specific group of men and women who have a history of heavy smoking over a long period of time (see  Who should be screened for lung cancer  above).  I am in the high-risk group, but have been diagnosed with cancer in the past. Is LDCT lung cancer screening right for me?  In some cases, you should not have LDCT lung screening, such as when your doctor is already following your cancer with CT scan studies. Your doctor will help you decide if LDCT lung screening is right for you.  Do I need to have a screening exam every year?  Yes. If you are in the high-risk group described earlier, you should get an LDCT lung cancer screening exam every year until you are 79, or are no longer willing or able to undergo screening and possible procedures to diagnose and treat lung cancer.  How effective is LDCT at preventing death from lung cancer?  Studies have shown that LDCT lung cancer screening can lower the risk of death from lung cancer by 20 percent in people who are at high-risk.  What are the risks?  There are some risks and limitations of LDCT lung cancer screening. We want to make sure you understand the risks and benefits, so please let us know if you have any questions. Your doctor may want to talk with you more about these risks.    Radiation exposure: As with any exam that uses radiation, there is a very small increased risk of cancer. The amount of radiation in LDCT is small--about the same amount a person would get from a mammogram. Your doctor orders the exam when he or she feels the potential benefits outweigh the risks.    False negatives: No test is perfect, including LDCT. It is possible that you may have a medical condition, including lung cancer, that is not found during your exam. This is called a false negative result.    False positives and more testing: LDCT very often finds  something in the lung that could be cancer, but in fact is not. This is called a false positive result. False positive tests often cause anxiety. To make sure these findings are not cancer, you may need to have more tests. These tests will be done only if you give us permission. Sometimes patients need a treatment that can have side effects, such as a biopsy. For more information on false positives, see  What can I expect from the results?     Findings not related to lung cancer: Your LDCT exam also takes pictures of areas of your body next to your lungs. In a very small number of cases, the CT scan will show an abnormal finding in one of these areas, such as your kidneys, adrenal glands, liver or thyroid. This finding may not be serious, but you may need more tests. Your doctor can help you decide what other tests you may need, if any.  What can I expect from the results?  About 1 out of 4 LDCT exams will find something that may need more tests. Most of the time, these findings are lung nodules. Lung nodules are very small collections of tissue in the lung. These nodules are very common, and the vast majority--more than 97 percent--are not cancer (benign). Most are normal lymph nodes or small areas of scarring from past infections.  But, if a small lung nodule is found to be cancer, the cancer can be cured more than 90 percent of the time. To know if the nodule is cancer, we may need to get more images before your next yearly screening exam. If the nodule has suspicious features (for example, it is large, has an odd shape or grows over time), we will refer you to a specialist for further testing.  Will my doctor also get the results?  Yes. Your doctor will get a copy of your results.  Is it okay to keep smoking now that there s a cancer screening exam?  No. Tobacco is one of the strongest cancer-causing agents. It causes not only lung cancer, but other cancers and cardiovascular (heart) diseases as well. The damage  caused by smoking builds over time. This means that the longer you smoke, the higher your risk of disease. While it is never too late to quit, the sooner you quit, the better.  Where can I find help to quit smoking?  The best way to prevent lung cancer is to stop smoking. If you have already quit smoking, congratulations and keep it up! For help on quitting smoking, please call [employer] at 2-765-QUITNOW (1-883.621.7741) or the American [employer] at 1-587.497.5912 to find local resources near you.  One-on-one health coaching:  If you d prefer to work individually with a health care provider on tobacco cessation, we offer:      Medication Therapy Management:  Our specially trained pharmacists work closely with you and your doctor to help you quit smoking.  Call [phone] or [phone] (toll free).

## 2022-04-27 ENCOUNTER — ANTICOAGULATION THERAPY VISIT (OUTPATIENT)
Dept: ANTICOAGULATION | Facility: CLINIC | Age: 57
End: 2022-04-27

## 2022-04-27 ENCOUNTER — OFFICE VISIT (OUTPATIENT)
Dept: FAMILY MEDICINE | Facility: CLINIC | Age: 57
End: 2022-04-27
Payer: COMMERCIAL

## 2022-04-27 VITALS
SYSTOLIC BLOOD PRESSURE: 150 MMHG | DIASTOLIC BLOOD PRESSURE: 76 MMHG | HEART RATE: 71 BPM | BODY MASS INDEX: 21.79 KG/M2 | HEIGHT: 65 IN | WEIGHT: 130.8 LBS | OXYGEN SATURATION: 99 %

## 2022-04-27 DIAGNOSIS — M81.0 AGE-RELATED OSTEOPOROSIS WITHOUT CURRENT PATHOLOGICAL FRACTURE: ICD-10-CM

## 2022-04-27 DIAGNOSIS — I73.9 PAD (PERIPHERAL ARTERY DISEASE) (H): ICD-10-CM

## 2022-04-27 DIAGNOSIS — Z79.01 ANTICOAGULATION MONITORING, INR RANGE 2-3: Primary | ICD-10-CM

## 2022-04-27 DIAGNOSIS — H81.11 BENIGN PAROXYSMAL POSITIONAL VERTIGO OF RIGHT EAR: Primary | ICD-10-CM

## 2022-04-27 LAB — INR BLD: 3.8 (ref 0.9–1.1)

## 2022-04-27 PROCEDURE — 91305 COVID-19,PF,PFIZER (12+ YRS): CPT | Performed by: FAMILY MEDICINE

## 2022-04-27 PROCEDURE — 0054A COVID-19,PF,PFIZER (12+ YRS): CPT | Performed by: FAMILY MEDICINE

## 2022-04-27 PROCEDURE — 99214 OFFICE O/P EST MOD 30 MIN: CPT | Performed by: FAMILY MEDICINE

## 2022-04-27 PROCEDURE — 36416 COLLJ CAPILLARY BLOOD SPEC: CPT | Performed by: FAMILY MEDICINE

## 2022-04-27 PROCEDURE — 85610 PROTHROMBIN TIME: CPT | Performed by: FAMILY MEDICINE

## 2022-04-27 RX ORDER — ALENDRONATE SODIUM 70 MG/1
70 TABLET ORAL
Qty: 13 TABLET | Refills: 1 | Status: SHIPPED | OUTPATIENT
Start: 2022-04-27 | End: 2022-10-04

## 2022-04-27 ASSESSMENT — ENCOUNTER SYMPTOMS: CONSTITUTIONAL NEGATIVE: 1

## 2022-04-27 NOTE — PROGRESS NOTES
ANTICOAGULATION MANAGEMENT     Nery Whitaker 56 year old female is on warfarin with supratherapeutic INR result. (Goal INR 2.0-3.0)    Recent labs: (last 7 days)     04/27/22  1241   INR 3.8*       ASSESSMENT       Source(s): Chart Review, Patient/Caregiver Call and Template       Warfarin doses taken: More warfarin taken than planned which may be affecting INR    Nery verified taking more warfarin than instructed.    Diet: No new diet changes identified    New illness, injury, or hospitalization: Yes:    Reported she has recovered from acute bronchitis.    Medication/supplement changes:  Yes.   Was on ABX Doxycycline from 4/11-18.    Signs or symptoms of bleeding or clotting: No    Previous INR: Subtherapeutic last 2 INR results.    Additional findings: None       PLAN     Recommended plan for temporary change(s) affecting INR     Dosing Instructions:   (bedtime. 5mg tabs)    partial hold then continue your current warfarin dose with next INR in 1-2 weeks       Summary  As of 4/27/2022    Full warfarin instructions:  4/27: 2.5 mg; Otherwise 5 mg every day   Next INR check:  5/11/2022             Telephone call with  Nery ([phone]) who verbalizes understanding and agrees to plan    - ensure to take correct warfarin dose.    Lab visit scheduled - INR on 5/11/22 @ [facility].    Education provided: Importance of consistent vitamin K intake, Goal range and significance of current result, Importance of taking warfarin as instructed and Monitoring for bleeding signs and symptoms    Plan made per [facility] anticoagulation protocol    Nciki Rosa, RN  Anticoagulation Clinic  4/27/2022    _______________________________________________________________________     Anticoagulation Episode Summary     Current INR goal:  2.0-3.0   TTR:  75.0 % (1 y)   Target end date:  Indefinite   Send INR reminders to:  BERNICE MARTINI    Indications    Anticoagulation monitoring  INR range 2-3 [Z79.01]  PAD (peripheral artery  disease) (H) [I73.9]           Comments:           Anticoagulation Care Providers     Provider Role Specialty Phone number    Loraine Rees MD Referring Family Medicine [phone]

## 2022-04-27 NOTE — ASSESSMENT & PLAN NOTE
DEXA consistent with osteoporosis.  History of smoking.  Trial of Fosamax.  She has an appointment with a bone health specialist later this year.  Discussed potential side effects including abdominal pain.

## 2022-04-27 NOTE — ASSESSMENT & PLAN NOTE
New diagnosis.  Mildly positive Kiran-Hallpike on the right side.  Discussed differential.  Given that the episodes are limited and vertiginous in nature I think benign positional vertigo does fit.  She has these episodes infrequently.  Consider treatment.  Consider additional evaluation if she develops additional symptoms.  I do not think she is describing symptoms of hypotension nor hypoglycemia.  Consider physical therapy.

## 2022-04-27 NOTE — PROGRESS NOTES
"Assessment/Plan:    Benign paroxysmal positional vertigo of right ear  New diagnosis.  Mildly positive Kiran-Hallpike on the right side.  Discussed differential.  Given that the episodes are limited and vertiginous in nature I think benign positional vertigo does fit.  She has these episodes infrequently.  Consider treatment.  Consider additional evaluation if she develops additional symptoms.  I do not think she is describing symptoms of hypotension nor hypoglycemia.  Consider physical therapy.    Age-related osteoporosis without current pathological fracture  DEXA consistent with osteoporosis.  History of smoking.  Trial of Fosamax.  She has an appointment with a bone health specialist later this year.  Discussed potential side effects including abdominal pain.    Return in about 4 weeks (around 5/25/2022) for Recheck if not improving.    Vu Winters MD  _______________________________    Chief Complaint   Patient presents with     Osteoporosis     Dizziness     Dizziness for a couple of months     Subjective: Nery Whitaker is a 56 year old year old female who returns to clinic for the following chronic complaints/concerns:     Dizziness:   - \"out of no where.\"  Associated with movement.     - she experiences room spinning.  \"Not tunnel vision.\"   - onset: a couple of months ago,   - she has not been taking her BP medications.  No change in symptoms.   - she had a spell this morning     - duration: minute or less.  She had emesis.     - no head trauma    - frequency is ~1x/week.     - she worries about carotid stenosis.      History of Present Illness       Reason for visit:  Osteoporosis and dizzy spells.Covid booster. INR.  Symptoms include:  Dizzy spells  Symptom intensity:  Moderate  Symptom progression:  Staying the same  Had these symptoms before:  No  What makes it worse:  No  What makes it better:  No    She eats 0-1 servings of fruits and vegetables daily.She consumes 3 sweetened beverage(s) " "daily.She exercises with enough effort to increase her heart rate 9 or less minutes per day.  She exercises with enough effort to increase her heart rate 3 or less days per week.   She is taking medications regularly.      Review of Systems   Constitutional: Negative.    All other systems reviewed and are negative.       Reviewed history:    Meds                Objective:    height is 1.643 m (5' 4.7\") and weight is 59.3 kg (130 lb 12.8 oz). Her blood pressure is 150/76 (abnormal) and her pulse is 71. Her oxygen saturation is 99%.   Physical Exam  Nursing note reviewed.   Constitutional:       General: She is not in acute distress.     Appearance: Normal appearance. She is not ill-appearing.   HENT:      Head: Normocephalic and atraumatic.      Right Ear: External ear normal.      Left Ear: External ear normal.   Eyes:      General: No scleral icterus.     Extraocular Movements: Extraocular movements intact.      Conjunctiva/sclera: Conjunctivae normal.   Cardiovascular:      Rate and Rhythm: Normal rate and regular rhythm.      Heart sounds: Normal heart sounds. No murmur heard.    No friction rub. No gallop.   Pulmonary:      Effort: Pulmonary effort is normal. No respiratory distress.      Breath sounds: Normal breath sounds. No wheezing or rales.   Musculoskeletal:         General: No swelling. Normal range of motion.   Skin:     General: Skin is warm.      Coloration: Skin is not jaundiced.      Findings: No rash.   Neurological:      General: No focal deficit present.      Mental Status: She is alert and oriented to person, place, and time. Mental status is at baseline.      Cranial Nerves: No cranial nerve deficit.      Gait: Gait normal.      Deep Tendon Reflexes: Reflexes normal.      Comments: Positive aubrie-hallpike on the right side.     Psychiatric:         Attention and Perception: Attention normal.         Mood and Affect: Mood normal.         Speech: Speech normal.         Thought Content: Thought " content normal.         No flowsheet data found.  JENNIFER-7 SCORE 2/25/2022   Total Score 0     No flowsheet data found.  No flowsheet data found.  Recent Results (from the past 48 hour(s))   INR point of care    Collection Time: 04/27/22 12:41 PM   Result Value Ref Range    INR 3.8 (H) 0.9 - 1.1     No results found for this visit on 04/27/22.    This note has been dictated using voice recognition software. Any grammatical or context distortions are unintentional and inherent to the software

## 2022-05-06 ENCOUNTER — TELEPHONE (OUTPATIENT)
Dept: ANESTHESIOLOGY | Facility: CLINIC | Age: 57
End: 2022-05-06
Payer: COMMERCIAL

## 2022-05-06 NOTE — TELEPHONE ENCOUNTER
Pt contacted ragini-op coordinator. Requests new referral to PT, is unable to schedule another appointment without new referral. Also, pt reported she was recently dx with osteoporosis. Regarding procedure, she is looking for guidance on anticoagulants.     Clinic RN assisted pt, documentation in separate encounter.     Ernestine Kevin, ANDRY, RN

## 2022-05-11 ENCOUNTER — ANTICOAGULATION THERAPY VISIT (OUTPATIENT)
Dept: ANTICOAGULATION | Facility: CLINIC | Age: 57
End: 2022-05-11

## 2022-05-11 ENCOUNTER — LAB (OUTPATIENT)
Dept: LAB | Facility: CLINIC | Age: 57
End: 2022-05-11
Payer: COMMERCIAL

## 2022-05-11 DIAGNOSIS — I73.9 PAD (PERIPHERAL ARTERY DISEASE) (H): ICD-10-CM

## 2022-05-11 DIAGNOSIS — Z79.01 ANTICOAGULATION MONITORING, INR RANGE 2-3: Primary | ICD-10-CM

## 2022-05-11 LAB — INR BLD: 3.6 (ref 0.9–1.1)

## 2022-05-11 PROCEDURE — 85610 PROTHROMBIN TIME: CPT

## 2022-05-11 PROCEDURE — 36416 COLLJ CAPILLARY BLOOD SPEC: CPT

## 2022-05-11 NOTE — PROGRESS NOTES
ANTICOAGULATION MANAGEMENT     Nery Whitaker 56 year old female is on warfarin with supratherapeutic INR result. (Goal INR 2.0-3.0)    Recent labs: (last 7 days)     05/11/22  1301   INR 3.6*       ASSESSMENT       Source(s): Chart Review, Patient/Caregiver Call and Template       Warfarin doses taken: Warfarin recently held which may be affecting INR    Partial warfarin dose held on 4/27/22.    Diet: No new diet changes identified    New illness, injury, or hospitalization: No.   She has completely recovered from acute bronchitis.    Medication/supplement changes: None noted    Signs or symptoms of bleeding or clotting: No    Previous INR: Supratherapeutic at 3.8 on 4/27/22.    Additional findings: None       PLAN     Recommended plan for no diet, medication or health factor changes affecting INR     Dosing Instructions:  (bedtime. 5mg tabs)    partial hold then decrease your warfarin dose (7.1% change) with next INR in 1-2 weeks       Summary  As of 5/11/2022    Full warfarin instructions:  5/11: 2.5 mg; Otherwise 2.5 mg every Sat; 5 mg all other days   Next INR check:  5/25/2022             Telephone call with  Nrey ([phone]) who verbalizes understanding and agrees to plan    Lab visit scheduled - INR on 5/25/22 @ [facility].    Education provided: Importance of consistent vitamin K intake, Goal range and significance of current result and Monitoring for bleeding signs and symptoms    Plan made per [facility] anticoagulation protocol    Nicki Rosa RN  Anticoagulation Clinic  5/11/2022    _______________________________________________________________________     Anticoagulation Episode Summary     Current INR goal:  2.0-3.0   TTR:  71.2 % (1 y)   Target end date:  Indefinite   Send INR reminders to:  BERNICE MARTINI    Indications    Anticoagulation monitoring  INR range 2-3 [Z79.01]  PAD (peripheral artery disease) (H) [I73.9]           Comments:           Anticoagulation Care Providers     Provider Role  Specialty Phone number    Loraine Rees MD Referring Family Medicine [phone]

## 2022-05-17 ENCOUNTER — TELEPHONE (OUTPATIENT)
Dept: ANESTHESIOLOGY | Facility: CLINIC | Age: 57
End: 2022-05-17

## 2022-05-17 ENCOUNTER — OFFICE VISIT (OUTPATIENT)
Dept: FAMILY MEDICINE | Facility: CLINIC | Age: 57
End: 2022-05-17
Payer: COMMERCIAL

## 2022-05-17 ENCOUNTER — HOSPITAL ENCOUNTER (OUTPATIENT)
Dept: GENERAL RADIOLOGY | Facility: HOSPITAL | Age: 57
Discharge: HOME | End: 2022-05-17
Attending: PHYSICIAN ASSISTANT | Admitting: PHYSICIAN ASSISTANT
Payer: COMMERCIAL

## 2022-05-17 VITALS
WEIGHT: 128.6 LBS | OXYGEN SATURATION: 99 % | BODY MASS INDEX: 21.6 KG/M2 | RESPIRATION RATE: 16 BRPM | DIASTOLIC BLOOD PRESSURE: 56 MMHG | TEMPERATURE: 98.1 F | HEART RATE: 53 BPM | SYSTOLIC BLOOD PRESSURE: 138 MMHG

## 2022-05-17 DIAGNOSIS — M54.6 THORACIC SPINE PAIN: Primary | ICD-10-CM

## 2022-05-17 DIAGNOSIS — S69.91XA WRIST INJURY, RIGHT, INITIAL ENCOUNTER: ICD-10-CM

## 2022-05-17 DIAGNOSIS — S69.91XA WRIST INJURY, RIGHT, INITIAL ENCOUNTER: Primary | ICD-10-CM

## 2022-05-17 DIAGNOSIS — Z79.01 ANTICOAGULATED: ICD-10-CM

## 2022-05-17 PROCEDURE — 73110 X-RAY EXAM OF WRIST: CPT | Mod: RT,FY

## 2022-05-17 PROCEDURE — 99214 OFFICE O/P EST MOD 30 MIN: CPT | Performed by: PHYSICIAN ASSISTANT

## 2022-05-17 RX ORDER — ACETAMINOPHEN 325 MG/1
650 TABLET ORAL ONCE
Status: COMPLETED | OUTPATIENT
Start: 2022-05-17 | End: 2022-05-17

## 2022-05-17 RX ORDER — PENICILLIN V POTASSIUM 500 MG/1
TABLET, FILM COATED ORAL
COMMUNITY
Start: 2022-01-23 | End: 2023-07-28

## 2022-05-17 RX ADMIN — ACETAMINOPHEN 650 MG: 325 TABLET ORAL at 10:42

## 2022-05-17 ASSESSMENT — ENCOUNTER SYMPTOMS
NUMBNESS: 0
ARTHRALGIAS: 1
WEAKNESS: 0
WOUND: 0

## 2022-05-17 NOTE — PROGRESS NOTES
Patient presents with:  Fall: Last night, bilateral wrist pain (RT wrist possible broken, Lt wrist sprain), Rt wrist limited ROM, swollen      Clinical Decision Making: FOOSH injury. Left wrist has FROM, no wound, bruising, or swelling. Suspect mild sprain. Recommend ACE wrap for it. Right right has some swelling. Xray is neg. Patient placed in a non thumb spica splint for support. She will follow up if sxs fail to improve. She is on blood thinners, so I recommend oral Tylenol for pain control.       ICD-10-CM    1. Wrist injury, right, initial encounter  S69.91XA acetaminophen (TYLENOL) tablet 650 mg     XR Wrist Right G/E 3 Views     Wrist/Arm/Hand Supplies Order for DME - ONLY FOR DME   2. Anticoagulated  Z79.01        Patient Instructions   1) Use wrist brace day and night. You may remove it to ice and to wash yourself. This is an as needed brace. Once pain and swelling have gone down you can discontinue use.   2) I would consider supporting your left wrist with an ACE wrap.   3) Continue icing on and off for 15 min at a time at least 3 times per day.   4) When you are at home elevate the wrist on pillows when you lounge. Try to make it so your wrist is above the level of your heart.  5) Take Tylenol as needed for pain relief. Don't exceed 3000 mg of Tylenol in a 24 hour period.  6) Follow up if no improvement in 10 days.                 HPI:  Nery Whitaker is a 56 year old female who presents today complaining of FOOSH injury that occurred last night. Patients most intense pain is in the right wrist near the base of her thumb. She also has left wrist pain, but she denies any swelling, or decreased ROM of the wrist.     History obtained from the patient.    Problem List:  2022-04: Age-related osteoporosis without current pathological   fracture  2022-04: Benign paroxysmal positional vertigo of right ear  2022-02: Encounter for routine adult health examination without   abnormal findings  2022-02: Thoracic  spine pain  2021-09: Pure hyperglyceridemia  2019-01: Atypical lymphocytosis  2018-11: Adenomatous colon polyp  2018-11: Urge incontinence of urine  2018-11: Verruca plantaris  2018-10: Normocytic anemia  2018-10: Newly recognized murmur  2018-10: Foreign body in left foot, subsequent encounter  2018-04: Anxiety state  2018-04: Migraine  2018-04: PAD (peripheral artery disease) (H)  2018-04: Tobacco use disorder  2017-12: Abnormal cardiovascular stress test  2017-12: Abdominal pain, epigastric  2017-12: Bilateral carotid artery disease (H)  2017-12: Diverticulosis of large intestine without hemorrhage  2017-12: Dyslipidemia  2017-12: Elevated lipoprotein(a)  2017-12: Essential hypertension  2017-12: Hypercoagulable state (H)  2017-12: Intermittent palpitations  2017-12: Multiple skin nodules  2017-12: Nocturnal enuresis  2017-08: Thoracic neuralgia  2017-06: Anticoagulation monitoring, INR range 2-3      Past Medical History:   Diagnosis Date     Abdominal pain, epigastric 12/2/2017     Abnormal cardiovascular stress test 12/13/2017     Anemia      Anxiety      Bilateral carotid artery disease (H)      Coronary artery disease      Dyslipidemia      Foreign body in left foot      Foreign body in left foot, subsequent encounter 10/11/2018     Heart murmur      Hypertension      Intermittent palpitations 12/2/2017     Migraines      Newly recognized murmur 10/18/2018     PAD (peripheral artery disease) (H)      Pure hyperglyceridemia 9/2/2021    Formatting of this note might be different from the original. Has been intoleratant to several medications.    .     Urge incontinence of urine 11/4/2018    Last Assessment & Plan:  Symptoms most consistent with urge incontinence. Ultrasound ordered to evaluate for any issues with incomplete emptying. Trial of Detrol LA 2 mg daily.  Last Assessment & Plan:  Formatting of this note might be different from the original. Detrol LA has been helpful but dries out her mouth. We will  try a lower, short acting dose.     Nora couch 11/1/2018       Social History     Tobacco Use     Smoking status: Current Every Day Smoker     Packs/day: 1.00     Years: 38.00     Pack years: 38.00     Types: Cigarettes     Smokeless tobacco: Never Used     Tobacco comment: 1 ppd since 17 years old   Substance Use Topics     Alcohol use: No       Review of Systems   Musculoskeletal: Positive for arthralgias.   Skin: Negative for wound.   Neurological: Negative for weakness and numbness.   All other systems reviewed and are negative.      Vitals:    05/17/22 1034   BP: 138/56   Pulse: 53   Resp: 16   Temp: 98.1  F (36.7  C)   TempSrc: Oral   SpO2: 99%   Weight: 58.3 kg (128 lb 9.6 oz)       Physical Exam  Vitals and nursing note reviewed.   Constitutional:       General: She is not in acute distress.     Appearance: She is not toxic-appearing or diaphoretic.   HENT:      Head: Normocephalic and atraumatic.      Right Ear: External ear normal.      Left Ear: External ear normal.   Eyes:      Conjunctiva/sclera: Conjunctivae normal.   Pulmonary:      Effort: Pulmonary effort is normal. No respiratory distress.   Musculoskeletal:      Right wrist: Swelling, tenderness and bony tenderness (radial surface of wrist) present. No deformity, effusion, lacerations, snuff box tenderness or crepitus. Decreased range of motion. Normal pulse.      Left wrist: Tenderness (generalized) present. No swelling, deformity, effusion, lacerations, bony tenderness, snuff box tenderness or crepitus. Normal range of motion. Normal pulse.   Neurological:      Mental Status: She is alert.   Psychiatric:         Mood and Affect: Mood normal.         Behavior: Behavior normal.         Thought Content: Thought content normal.         Judgment: Judgment normal.         Results:  Results for orders placed or performed during the hospital encounter of 05/17/22   XR Wrist Right G/E 3 Views     Status: None    Narrative    EXAM: XR WRIST RIGHT  G/E 3 VIEWS  LOCATION: [facility]  DATE/TIME: 5/17/2022 11:00 AM    INDICATION: FOOSH injury last night. Pain on the radial aspect of wrist. Mild swelling and bruising  COMPARISON: None.      Impression    IMPRESSION: Normal joint spaces and alignment. No fracture.         At the end of the encounter, I discussed results, diagnosis, medications. Discussed red flags for immediate return to clinic/ER, as well as indications for follow up if no improvement. Patient understood and agreed to plan. Patient was stable for discharge.

## 2022-05-17 NOTE — LETTER
May 17, 2022      Nery Whitaker  [address]  [facility] [address]        To Whom It May Concern:    Nery Whitaker  was seen on 5/17/22.  Please excuse her until 5/23/22 due to injury.       Sincerely,        Nancy Nicholson PA-C

## 2022-05-17 NOTE — TELEPHONE ENCOUNTER
[facility] Call Center    Phone Message    May a detailed message be left on voicemail: yes     Reason for Call: Other: Pt requesting her PT referral be sent to [facility] in [place] for her pool therapy     Action Taken: Message routed to:  [facility] ([facility] pain

## 2022-05-17 NOTE — PATIENT INSTRUCTIONS
1) Use wrist brace day and night. You may remove it to ice and to wash yourself. This is an as needed brace. Once pain and swelling have gone down you can discontinue use.   2) I would consider supporting your left wrist with an ACE wrap.   3) Continue icing on and off for 15 min at a time at least 3 times per day.   4) When you are at home elevate the wrist on pillows when you lounge. Try to make it so your wrist is above the level of your heart.  5) Take Tylenol as needed for pain relief. Don't exceed 3000 mg of Tylenol in a 24 hour period.  6) Follow up if no improvement in 10 days.

## 2022-05-18 NOTE — CONFIDENTIAL NOTE
PT Pool Therapy Referral faxed to patient's requested location. RN called patient to update. Patient verbalized understanding. Patient is scheduled for procedure on 6/10 with Dr. Weber. Patient takes Warfarin and is aware of 5 day hold prior to procedure.       Maddie Martínez RN

## 2022-05-25 ENCOUNTER — ANTICOAGULATION THERAPY VISIT (OUTPATIENT)
Dept: ANTICOAGULATION | Facility: CLINIC | Age: 57
End: 2022-05-25

## 2022-05-25 ENCOUNTER — LAB (OUTPATIENT)
Dept: LAB | Facility: CLINIC | Age: 57
End: 2022-05-25
Payer: COMMERCIAL

## 2022-05-25 ENCOUNTER — TELEPHONE (OUTPATIENT)
Dept: ANESTHESIOLOGY | Facility: CLINIC | Age: 57
End: 2022-05-25

## 2022-05-25 DIAGNOSIS — Z11.59 ENCOUNTER FOR SCREENING FOR OTHER VIRAL DISEASES: Primary | ICD-10-CM

## 2022-05-25 DIAGNOSIS — I73.9 PAD (PERIPHERAL ARTERY DISEASE) (H): ICD-10-CM

## 2022-05-25 DIAGNOSIS — Z79.01 ANTICOAGULATION MONITORING, INR RANGE 2-3: Primary | ICD-10-CM

## 2022-05-25 LAB — INR BLD: 4.1 (ref 0.9–1.1)

## 2022-05-25 PROCEDURE — 36416 COLLJ CAPILLARY BLOOD SPEC: CPT

## 2022-05-25 PROCEDURE — 85610 PROTHROMBIN TIME: CPT

## 2022-05-25 NOTE — CONFIDENTIAL NOTE
RN called and left voicemail on nurse line at [facility]agulation Clinic. Requesting a 5 day hold of Warfarin prior to patient's procedure on 6/10 with Dr. Weber. Left call back number [phone] for verbal orders.       Maddie Martínez RN

## 2022-05-25 NOTE — TELEPHONE ENCOUNTER
"[facility]-PROCEDURAL ANTICOAGULATION  MANAGEMENT    ASSESSMENT     Warfarin interruption plan for epidural injection on 06/10/2022.    Indication for Anticoagulation: PAD      per 2022 TE vascular provider noted no need for bridge with colonoscopy planned     Aorto bifemoral tibial bypass 2013    Most recent LAC testing 2013 was negative      [facility]-Procedure Risk stratification for thromboembolism: low (2018 American Society of Hematology guideline)    VTE: 2018 American Society of Hematology recommends against bridging in low and moderate risk VTE patients holding warfarin     RECOMMENDATION       Pre-Procedure:  o Hold warfarin for 5 days, until after procedure startin2022   o No Bridge      Post-Procedure:  o Resume warfarin dose if okay with provider doing procedure on night of procedure, 06/10/2022 PM: 5mg  o Recheck INR ~ 7 days after resuming warfarin       Plan routed to referring provider for approval  ?   Chapis Leyva [facility]    SUBJECTIVE/OBJECTIVE     Nery Whitaker, a 56 year old female    Goal INR Range: 2.0-3.0     Patient bridged in past: No    Pertinent History: N/A    Wt Readings from Last 3 Encounters:   22 58.3 kg (128 lb 9.6 oz)   22 59.3 kg (130 lb 12.8 oz)   22 59.4 kg (131 lb)      Ideal body weight: 56.3 kg (124 lb 2.3 oz)  Adjusted ideal body weight: 57.1 kg (125 lb 14.8 oz)     Estimated body mass index is 21.6 kg/m  as calculated from the following:    Height as of 22: 1.643 m (5' 4.7\").    Weight as of 22: 58.3 kg (128 lb 9.6 oz).    Lab Results   Component Value Date    INR 4.1 (H) 2022    INR 3.6 (H) 2022    INR 3.8 (H) 2022     Lab Results   Component Value Date    HGB 11.3 2022    HCT 34.1 2022     2022     Lab Results   Component Value Date    CR 0.83 2022    CR 0.84 2021    CR 0.78 2019     CrCl cannot be calculated (Patient's most recent lab result is older than the maximum " 30 days allowed.).

## 2022-05-25 NOTE — CONFIDENTIAL NOTE
Received voicemail. Will send to [facility] for review and advisement.    Pt scheduled on 6/10/22 for a thoracic epidural steroid injection. 5 day hold requested

## 2022-05-25 NOTE — PROGRESS NOTES
ANTICOAGULATION MANAGEMENT     Nery Whitaker 56 year old female is on warfarin with supratherapeutic INR result. (Goal INR 2.0-3.0)    Recent labs: (last 7 days)     05/25/22  1258   INR 4.1*       ASSESSMENT       Source(s): Chart Review and Patient/Caregiver Call       Warfarin doses taken: Warfarin taken as instructed    Diet: No new diet changes identified    New illness, injury, or hospitalization: wrist, see below    Medication/supplement changes: None noted    Signs or symptoms of bleeding or clotting: No    Previous INR: Supratherapeutic    Additional findings: wrist injury 5/17 was taking tylenol rtc,  now only am    FINA 6/10, has instruction to hold warfarin 5 days prior       PLAN     Recommended plan for no diet, medication or health factor changes affecting INR     Dosing Instructions: hold dose then decrease your warfarin dose (7.7% change) with next INR in 1 week       Summary  As of 5/25/2022    Full warfarin instructions:  5/25: Hold; 6/5: Hold; 6/6: Hold; 6/7: Hold; 6/8: Hold; 6/9: Hold; Otherwise 2.5 mg every Tue, Sat; 5 mg all other days   Next INR check:  6/1/2022             Telephone call with Nery who verbalizes understanding and agrees to plan    Lab visit scheduled    Education provided: None required    Plan made per [facility] anticoagulation protocol    Celina Hoang RN  Anticoagulation Clinic  5/25/2022    _______________________________________________________________________     Anticoagulation Episode Summary     Current INR goal:  2.0-3.0   TTR:  67.4 % (1 y)   Target end date:  Indefinite   Send INR reminders to:  BERNICE MARTINI    Indications    Anticoagulation monitoring  INR range 2-3 [Z79.01]  PAD (peripheral artery disease) (H) [I73.9]           Comments:           Anticoagulation Care Providers     Provider Role Specialty Phone number    Loraine Rees MD Referring Family Medicine [phone]

## 2022-05-27 NOTE — TELEPHONE ENCOUNTER
LEFT VOICE MESSAGE for patient to call back [phone] to discuss warfarin hold or we would call her back when we are back in clinic on 5/31/22.  Shannen Mcclendon RN

## 2022-05-31 NOTE — TELEPHONE ENCOUNTER
Nery is scheduled for inr tomorrow. As per last visit, she is planning to hold warfarin 5 days prior to procedure as approved by Dr Winters, pending result tomorrow

## 2022-06-01 ENCOUNTER — LAB (OUTPATIENT)
Dept: LAB | Facility: CLINIC | Age: 57
End: 2022-06-01
Payer: COMMERCIAL

## 2022-06-01 ENCOUNTER — ANTICOAGULATION THERAPY VISIT (OUTPATIENT)
Dept: ANTICOAGULATION | Facility: CLINIC | Age: 57
End: 2022-06-01

## 2022-06-01 DIAGNOSIS — Z79.01 ANTICOAGULATION MONITORING, INR RANGE 2-3: Primary | ICD-10-CM

## 2022-06-01 DIAGNOSIS — I73.9 PAD (PERIPHERAL ARTERY DISEASE) (H): ICD-10-CM

## 2022-06-01 LAB — INR BLD: 3 (ref 0.9–1.1)

## 2022-06-01 PROCEDURE — 36416 COLLJ CAPILLARY BLOOD SPEC: CPT

## 2022-06-01 PROCEDURE — 85610 PROTHROMBIN TIME: CPT

## 2022-06-01 NOTE — PROGRESS NOTES
ANTICOAGULATION MANAGEMENT     Nery Whitaker 56 year old female is on warfarin with therapeutic INR result. (Goal INR 2.0-3.0)    Recent labs: (last 7 days)     06/01/22  1457   INR 3.0*       ASSESSMENT       Source(s): Chart Review and Patient/Caregiver Call       Warfarin doses taken: Warfarin taken as instructed    Diet: No new diet changes identified    New illness, injury, or hospitalization: No    Medication/supplement changes: None noted    Signs or symptoms of bleeding or clotting: No    Previous INR: Supratherapeutic    Additional findings: Upcoming surgery/procedure FINA 6/10 as previously instructed will hold warfarin 5 days, starting sunday 5/6.  Will resume usual dose after procedure with surgeon's ok and will check inr 1 week after resumption (5/25 note)       PLAN     Recommended plan for no diet, medication or health factor changes affecting INR     Dosing Instructions: continue your current warfarin dose until hold 6/5 (as above) with next INR 6/17     Summary  As of 6/1/2022    Full warfarin instructions:  6/5: Hold; 6/6: Hold; 6/7: Hold; 6/8: Hold; 6/9: Hold; Otherwise 2.5 mg every Tue, Sat; 5 mg all other days   Next INR check:  6/17/2022             Telephone call with Nery who verbalizes understanding and agrees to plan    Lab visit scheduled 6/29    Education provided: None required    Plan made per [facility] anticoagulation protocol    Celina Hoang RN  Anticoagulation Clinic  6/1/2022    _______________________________________________________________________     Anticoagulation Episode Summary     Current INR goal:  2.0-3.0   TTR:  65.4 % (1 y)   Target end date:  Indefinite   Send INR reminders to:  BERNICE MARTINI    Indications    Anticoagulation monitoring  INR range 2-3 [Z79.01]  PAD (peripheral artery disease) (H) [I73.9]           Comments:           Anticoagulation Care Providers     Provider Role Specialty Phone number    Loraine Rees MD Referring Family Medicine  [phone]

## 2022-06-08 ENCOUNTER — LAB (OUTPATIENT)
Dept: LAB | Facility: CLINIC | Age: 57
End: 2022-06-08
Payer: COMMERCIAL

## 2022-06-08 DIAGNOSIS — Z11.59 ENCOUNTER FOR SCREENING FOR OTHER VIRAL DISEASES: ICD-10-CM

## 2022-06-08 PROCEDURE — U0005 INFEC AGEN DETEC AMPLI PROBE: HCPCS

## 2022-06-08 PROCEDURE — U0003 INFECTIOUS AGENT DETECTION BY NUCLEIC ACID (DNA OR RNA); SEVERE ACUTE RESPIRATORY SYNDROME CORONAVIRUS 2 (SARS-COV-2) (CORONAVIRUS DISEASE [COVID-19]), AMPLIFIED PROBE TECHNIQUE, MAKING USE OF HIGH THROUGHPUT TECHNOLOGIES AS DESCRIBED BY CMS-2020-01-R: HCPCS

## 2022-06-09 LAB — SARS-COV-2 RNA RESP QL NAA+PROBE: NEGATIVE

## 2022-06-10 ENCOUNTER — HOSPITAL ENCOUNTER (OUTPATIENT)
Facility: AMBULATORY SURGERY CENTER | Age: 57
Discharge: HOME | End: 2022-06-10
Attending: ANESTHESIOLOGY
Payer: COMMERCIAL

## 2022-06-10 ENCOUNTER — TELEPHONE (OUTPATIENT)
Dept: FAMILY MEDICINE | Facility: CLINIC | Age: 57
End: 2022-06-10
Payer: COMMERCIAL

## 2022-06-10 DIAGNOSIS — Z79.01 ANTICOAGULATION MONITORING, INR RANGE 2-3: Primary | ICD-10-CM

## 2022-06-10 DIAGNOSIS — I73.9 PAD (PERIPHERAL ARTERY DISEASE) (H): ICD-10-CM

## 2022-06-10 NOTE — TELEPHONE ENCOUNTER
Reason for Call:  Other anti-coag    Detailed comments: Patient was holding her Warfarin for a back injection today. Patient didn't receive the injection & needs instructions to resume her warfarin. Please advise.    Phone Number Patient can be reached at: Home number on file [phone] (home)    Best Time: any    Can we leave a detailed message on this number? YES    Call taken on 6/10/2022 at 12:17 PM by Ariana Dewey

## 2022-06-10 NOTE — TELEPHONE ENCOUNTER
- procedure was not performed.        NOTE:  Nery was not very happy, this is the 2nd time they made a mistake with her appt.  Her brother gave her a ride from [place] to [facility].  She arrived for her appt at 1030a, but was told her appt was not till 1230p.  So she left.  Last time, they told her to hold warfarin for 3 days, and when she showed up for her appt, was told 3 days was not enough to hold warfarin.  She does not want to return or go back at [facility] again.    - so, she will just go ahead and do her PT.      - Advised Nery, if her pain persists, to inform Dr. Rivera, and he can do another referral to another ortho - [facility].    - was scheduled for surgery/procedure FINA 6/10   (was instructed to hold warfarin 5 days prior to procedure, starting sunday 5/6.  Will resume usual dose after procedure with surgeon's ok and will check inr 1 week after resumption (5/25 note).    - she will take 5mg daily and recheck INR on 6/17/22.

## 2022-06-17 ENCOUNTER — LAB (OUTPATIENT)
Dept: LAB | Facility: CLINIC | Age: 57
End: 2022-06-17
Payer: COMMERCIAL

## 2022-06-17 ENCOUNTER — ANTICOAGULATION THERAPY VISIT (OUTPATIENT)
Dept: ANTICOAGULATION | Facility: CLINIC | Age: 57
End: 2022-06-17

## 2022-06-17 DIAGNOSIS — I73.9 PAD (PERIPHERAL ARTERY DISEASE) (H): ICD-10-CM

## 2022-06-17 DIAGNOSIS — Z79.01 ANTICOAGULATION MONITORING, INR RANGE 2-3: Primary | ICD-10-CM

## 2022-06-17 LAB — INR BLD: 2.5 (ref 0.9–1.1)

## 2022-06-17 PROCEDURE — 36416 COLLJ CAPILLARY BLOOD SPEC: CPT

## 2022-06-17 PROCEDURE — 85610 PROTHROMBIN TIME: CPT

## 2022-06-17 NOTE — PROGRESS NOTES
ANTICOAGULATION MANAGEMENT     Nery Whitaker 56 year old female is on warfarin with therapeutic INR result. (Goal INR 2.0-3.0)    Recent labs: (last 7 days)     06/17/22  1136   INR 2.5*       ASSESSMENT       Source(s): Chart Review, Patient/Caregiver Call and Template       Warfarin doses taken: Warfarin taken as instructed    Verified continues with 5mg warfarin daily.   Held warfarin doses from 6/5-9    Diet: No new diet changes identified    New illness, injury, or hospitalization: Sole   Was scheduled for FINA on 6/10/22 however, this was not performed. (see previous note on 6/10/22 for reason).   She will just do PT at this time.   Thoracic spine pain.    Medication/supplement changes: None noted    Takes Gabapentin which is currently controlling her pain.    Signs or symptoms of bleeding or clotting: No    Previous INR: Therapeutic last visit at 3.0; previously outside of goal range at 4.1    Additional findings: None       PLAN     Recommended plan for no diet, medication or health factor changes affecting INR     Dosing Instructions:    continue your current warfarin dose with next INR in 1 week    Summary  As of 6/17/2022    Full warfarin instructions:  5 mg every day   Next INR check:  6/24/2022             Telephone call with  Nery ([phone]) who verbalizes understanding and agrees to plan    - Rationale for continuing 5mg daily, d/t previous 5 day warfarin hold for a procedure that was not performed on 6/9/22.     - Last INR was 3.0 on 6/1/22 and usual wkly dose was 30mg/wk.   - will recheck INR in one wk, and adjust dose based on INR results.    Lab visit scheduled - INR on 6/24/22 @ [facility].    Education provided: Importance of consistent vitamin K intake, Goal range and significance of current result, Monitoring for bleeding signs and symptoms and When to seek medical attention/emergency care    Plan made per [facility] anticoagulation protocol    Nicki Rosa RN  Anticoagulation  Clinic  6/17/2022    _______________________________________________________________________     Anticoagulation Episode Summary     Current INR goal:  2.0-3.0   TTR:  65.4 % (1 y)   Target end date:  Indefinite   Send INR reminders to:  BERNICE MARTINI    Indications    Anticoagulation monitoring  INR range 2-3 [Z79.01]  PAD (peripheral artery disease) (H) [I73.9]           Comments:           Anticoagulation Care Providers     Provider Role Specialty Phone number    Loraine Rees MD Referring Family Medicine [phone]

## 2022-06-29 ENCOUNTER — ANTICOAGULATION THERAPY VISIT (OUTPATIENT)
Dept: ANTICOAGULATION | Facility: CLINIC | Age: 57
End: 2022-06-29

## 2022-06-29 ENCOUNTER — LAB (OUTPATIENT)
Dept: LAB | Facility: CLINIC | Age: 57
End: 2022-06-29
Payer: COMMERCIAL

## 2022-06-29 DIAGNOSIS — I73.9 PAD (PERIPHERAL ARTERY DISEASE) (H): ICD-10-CM

## 2022-06-29 DIAGNOSIS — Z79.01 ANTICOAGULATION MONITORING, INR RANGE 2-3: Primary | ICD-10-CM

## 2022-06-29 LAB — INR BLD: 3 (ref 0.9–1.1)

## 2022-06-29 PROCEDURE — 85610 PROTHROMBIN TIME: CPT

## 2022-06-29 PROCEDURE — 36416 COLLJ CAPILLARY BLOOD SPEC: CPT

## 2022-06-29 NOTE — PROGRESS NOTES
ANTICOAGULATION MANAGEMENT     Nery Whitaker 57 year old female is on warfarin with therapeutic INR result. (Goal INR 2.0-3.0)    Recent labs: (last 7 days)     06/29/22  1323   INR 3.0*       ASSESSMENT       Source(s): Chart Review, Patient/Caregiver Call and Template       Warfarin doses taken: Warfarin taken as instructed    Diet: No new diet changes identified    New illness, injury, or hospitalization: No   Currently doing PT rehab for her thoracic spine pain.    Medication/supplement changes: None noted    Gabapentin still controlling her pains.    Signs or symptoms of bleeding or clotting: No    Previous INR: Therapeutic last 2 visits    Additional findings:  [facility]A appt was cancelled 2x @ [facility].       PLAN     Recommended plan for no diet, medication or health factor changes affecting INR     Dosing Instructions:   (bedtime. 5mg tabs)    continue your current warfarin dose with next INR in 3-4 weeks       Summary  As of 6/29/2022    Full warfarin instructions:  5 mg every day   Next INR check:  7/27/2022             Telephone call with  Nery ([phone]) who verbalizes understanding and agrees to plan   - advised extra serving of Vitamin-K with her meal tonight.    Lab visit scheduled - INR on 7/20/22 @[facility].    Education provided: Importance of consistent vitamin K intake, Impact of vitamin K foods on INR, Goal range and significance of current result and Monitoring for bleeding signs and symptoms    Plan made per [facility] anticoagulation protocol    Nicki Rosa RN  Anticoagulation Clinic  6/29/2022    _______________________________________________________________________     Anticoagulation Episode Summary     Current INR goal:  2.0-3.0   TTR:  65.4 % (1 y)   Target end date:  Indefinite   Send INR reminders to:  BERNICE MARTINI    Indications    Anticoagulation monitoring  INR range 2-3 [Z79.01]  PAD (peripheral artery disease) (H) [I73.9]           Comments:           Anticoagulation Care  Providers     Provider Role Specialty Phone number    Loraine Rees MD Referring Family Medicine [phone]

## 2022-07-07 DIAGNOSIS — D68.59 HYPERCOAGULABLE STATE (H): Primary | ICD-10-CM

## 2022-07-07 RX ORDER — FOLIC ACID 1 MG/1
1 TABLET ORAL DAILY
Qty: 90 TABLET | Refills: 1 | Status: SHIPPED | OUTPATIENT
Start: 2022-07-07 | End: 2023-01-10

## 2022-07-07 NOTE — TELEPHONE ENCOUNTER
Medication Request  Medication name: folic acid (FOLVITE) 1 MG tablet  Requested Pharmacy: [facility]  When was patient last seen for this?:  4/27/2022  Patient offered appointment:  N/A pharmacy sent request  Okay to leave a detailed message: no

## 2022-07-19 ENCOUNTER — LAB (OUTPATIENT)
Dept: LAB | Facility: CLINIC | Age: 57
End: 2022-07-19
Payer: COMMERCIAL

## 2022-07-19 ENCOUNTER — ANTICOAGULATION THERAPY VISIT (OUTPATIENT)
Dept: ANTICOAGULATION | Facility: CLINIC | Age: 57
End: 2022-07-19

## 2022-07-19 DIAGNOSIS — Z79.01 ANTICOAGULATION MONITORING, INR RANGE 2-3: Primary | ICD-10-CM

## 2022-07-19 DIAGNOSIS — I73.9 PAD (PERIPHERAL ARTERY DISEASE) (H): ICD-10-CM

## 2022-07-19 LAB — INR BLD: 2.6 (ref 0.9–1.1)

## 2022-07-19 PROCEDURE — 85610 PROTHROMBIN TIME: CPT

## 2022-07-19 PROCEDURE — 36416 COLLJ CAPILLARY BLOOD SPEC: CPT

## 2022-07-19 NOTE — PROGRESS NOTES
ANTICOAGULATION MANAGEMENT     Nery Whitaker 57 year old female is on warfarin with therapeutic INR result. (Goal INR 2.0-3.0)    Recent labs: (last 7 days)     07/19/22  1304   INR 2.6*       ASSESSMENT       Source(s): Chart Review, Patient/Caregiver Call and Template       Warfarin doses taken: Warfarin taken as instructed    Diet: No new diet changes identified    New illness, injury, or hospitalization: No    Medication/supplement changes:  Yes.    prescribed Hydrocodone PRN for pain. (she doubts she will take any of this med).    Signs or symptoms of bleeding or clotting: No    Previous INR: Therapeutic last 3 visits    Additional findings:  Yes,  had tooth extraction today. @ 130p.   - no bleeding complications.   - Leaving for vacation.       PLAN     Recommended plan for temporary change(s) affecting INR     Dosing Instructions:    continue your current warfarin dose with next INR in 4 weeks       Summary  As of 7/19/2022    Full warfarin instructions:  5 mg every day   Next INR check:  8/16/2022             Telephone call with  Nery ([phone]) who verbalizes understanding and agrees to plan    Lab visit scheduled - INR on 8/17/22 @ [facility]    Education provided: Importance of consistent vitamin K intake, Goal range and significance of current result, Potential interaction between warfarin and Hydrocodone+Acetaminophn, Monitoring for bleeding signs and symptoms and When to seek medical attention/emergency care    Plan made per [facility] anticoagulation protocol    Nicki Rosa RN  Anticoagulation Clinic  7/19/2022    _______________________________________________________________________     Anticoagulation Episode Summary     Current INR goal:  2.0-3.0   TTR:  65.8 % (1 y)   Target end date:  Indefinite   Send INR reminders to:  BERNICE MARTINI    Indications    Anticoagulation monitoring  INR range 2-3 [Z79.01]  PAD (peripheral artery disease) (H) [I73.9]           Comments:            Anticoagulation Care Providers     Provider Role Specialty Phone number    Loraine Rees MD Referring Family Medicine [phone]

## 2022-07-29 ENCOUNTER — HOSPITAL ENCOUNTER (OUTPATIENT)
Dept: CT IMAGING | Facility: HOSPITAL | Age: 57
Discharge: HOME | End: 2022-07-29
Attending: INTERNAL MEDICINE | Admitting: INTERNAL MEDICINE
Payer: COMMERCIAL

## 2022-07-29 DIAGNOSIS — Z87.891 PERSONAL HISTORY OF TOBACCO USE: ICD-10-CM

## 2022-07-29 PROCEDURE — 71271 CT THORAX LUNG CANCER SCR C-: CPT

## 2022-08-01 DIAGNOSIS — M79.2 THORACIC NEURALGIA: ICD-10-CM

## 2022-08-01 RX ORDER — GABAPENTIN 100 MG/1
CAPSULE ORAL
Qty: 450 CAPSULE | Refills: 4 | OUTPATIENT
Start: 2022-08-01

## 2022-08-01 NOTE — TELEPHONE ENCOUNTER
Medication Request  Medication name: gabapentin (NEURONTIN) 100 MG capsule  Requested Pharmacy: [facility]  When was patient last seen for this?:  4/27/2022  Patient offered appointment: N/A pharmacy sent request  Okay to leave a detailed message: no

## 2022-08-23 ENCOUNTER — TELEPHONE (OUTPATIENT)
Dept: ANTICOAGULATION | Facility: CLINIC | Age: 57
End: 2022-08-23

## 2022-08-23 NOTE — TELEPHONE ENCOUNTER
ANTICOAGULATION     Nery Whitaker is overdue for INR check.      Left message for patient to call and schedule lab appointment as soon as possible. If returning call, please schedule.     Nicki Rosa RN

## 2022-08-26 ENCOUNTER — ANTICOAGULATION THERAPY VISIT (OUTPATIENT)
Dept: ANTICOAGULATION | Facility: CLINIC | Age: 57
End: 2022-08-26

## 2022-08-26 ENCOUNTER — LAB (OUTPATIENT)
Dept: LAB | Facility: CLINIC | Age: 57
End: 2022-08-26
Payer: COMMERCIAL

## 2022-08-26 DIAGNOSIS — Z79.01 ANTICOAGULATION MONITORING, INR RANGE 2-3: Primary | ICD-10-CM

## 2022-08-26 DIAGNOSIS — I73.9 PAD (PERIPHERAL ARTERY DISEASE) (H): ICD-10-CM

## 2022-08-26 LAB — INR BLD: 2 (ref 0.9–1.1)

## 2022-08-26 PROCEDURE — 85610 PROTHROMBIN TIME: CPT

## 2022-08-26 PROCEDURE — 36416 COLLJ CAPILLARY BLOOD SPEC: CPT

## 2022-08-26 NOTE — PROGRESS NOTES
ANTICOAGULATION MANAGEMENT     Nery Whitaker 57 year old female is on warfarin with therapeutic INR result. (Goal INR 2.0-3.0)    Recent labs: (last 7 days)     08/26/22  1438   INR 2.0*       ASSESSMENT       Source(s): Chart Review, Patient/Caregiver Call and Template       Warfarin doses taken: Warfarin taken as instructed    Diet: No new diet changes identified    New illness, injury, or hospitalization: No    Medication/supplement changes: None noted    Signs or symptoms of bleeding or clotting: No    Previous INR: Therapeutic last 4 visits    Additional findings: None       PLAN     Recommended plan for no diet, medication or health factor changes affecting INR     Dosing Instructions:    Continue your current warfarin dose with next INR in 5 weeks       Summary  As of 8/26/2022    Full warfarin instructions:  5 mg every day   Next INR check:  9/30/2022             Telephone call with  Nery ([phone]) who verbalizes understanding and agrees to plan    Lab visit scheduled - INR on 9/30/22 @ [facility]    Education provided: Importance of consistent vitamin K intake and Goal range and significance of current result    Plan made per [facility] anticoagulation protocol    Nicki Rosa RN  Anticoagulation Clinic  8/26/2022    _______________________________________________________________________     Anticoagulation Episode Summary     Current INR goal:  2.0-3.0   TTR:  72.9 % (1 y)   Target end date:  Indefinite   Send INR reminders to:  BERNICE MARTINI    Indications    Anticoagulation monitoring  INR range 2-3 [Z79.01]  PAD (peripheral artery disease) (H) [I73.9]           Comments:           Anticoagulation Care Providers     Provider Role Specialty Phone number    Loraine Rees MD Referring Family Medicine [phone]

## 2022-09-03 ENCOUNTER — HEALTH MAINTENANCE LETTER (OUTPATIENT)
Age: 57
End: 2022-09-03

## 2022-09-09 ENCOUNTER — VIRTUAL VISIT (OUTPATIENT)
Dept: FAMILY MEDICINE | Facility: CLINIC | Age: 57
End: 2022-09-09
Payer: COMMERCIAL

## 2022-09-09 DIAGNOSIS — R52 BODY ACHES: ICD-10-CM

## 2022-09-09 DIAGNOSIS — U07.1 INFECTION DUE TO 2019 NOVEL CORONAVIRUS: Primary | ICD-10-CM

## 2022-09-09 DIAGNOSIS — R50.81 FEVER IN OTHER DISEASES: ICD-10-CM

## 2022-09-09 DIAGNOSIS — R51.9 ACUTE NONINTRACTABLE HEADACHE, UNSPECIFIED HEADACHE TYPE: ICD-10-CM

## 2022-09-09 DIAGNOSIS — J02.9 SORE THROAT: ICD-10-CM

## 2022-09-09 PROCEDURE — 99213 OFFICE O/P EST LOW 20 MIN: CPT | Mod: CS | Performed by: INTERNAL MEDICINE

## 2022-09-09 RX ORDER — BENZONATATE 100 MG/1
100 CAPSULE ORAL 3 TIMES DAILY PRN
Qty: 30 CAPSULE | Refills: 0 | Status: SHIPPED | OUTPATIENT
Start: 2022-09-09 | End: 2023-04-02

## 2022-09-09 NOTE — PROGRESS NOTES
"Nery is a 57 year old who is being evaluated via a billable telephone visit.      What phone number would you like to be contacted at? [phone]  How would you like to obtain your AVS? [facility]    Assessment & Plan   Problem List Items Addressed This Visit    None     Visit Diagnoses     Infection due to 2019 novel coronavirus    -  Primary    Relevant Medications    nirmatrelvir and ritonavir (PAXLOVID) therapy pack    benzonatate (TESSALON) 100 MG capsule    Acute nonintractable headache, unspecified headache type        Fever in other diseases        Body aches        Sore throat             Started on Paxlovid Tx, drug-drug interactions and drug side effects  Check INR soon  Anti-tussive meds, supportive Tx  Monitor BP , reduce dose if low blood pressure or lightheaded.  Keep well hydration  Will discuss with MTM meds and interactions  ADVISED FOLLOW UP IF WORSENING SYMPTOMS OR SHORTNESS OF BREATH OR DIFFICULTY BREATHING SEEK IMMEDIATE MEDICAL ATTENTION      As per MTM:  \"1) Amlodipine - monitor for hypotension and reduce dose if needed   2) Tolterodine - there is a drug interaction, but ok with her current dose   3) Warfarin - would recommend re-checking INR within 3 days     Everything else is ok! \"              See Patient Instructions    No follow-ups on file.    Catracho Kenny MD  [facility]    Arsenio Gabriel is a 57 year old, presenting for the following health issues:  Covid Treatment      HPI       COVID-19 Symptom Review  How many days ago did these symptoms start? Yesterday tested positive today    Are any of the following symptoms significant for you?    New or worsening difficulty breathing? No    Worsening cough? Yes, I am coughing up mucus.    Fever or chills? Yes, I felt feverish or had chills.    Headache: YES    Sore throat: YES    Chest pain: No    Diarrhea: No    Body aches? YES    What treatments has patient tried? none   Does patient live in a nursing home, group " home, or shelter? No  Does patient have a way to get food/medications during quarantined? Yes, I have a friend or family member who can help me.    TESTED POSITIVE THIS MORNING    YESTERDAY STARTED SYMPTOMS    ABOUT TO TAKE TYLENOL, CAN NOT TAKE ADVIL ON BLOOD THINNERS    Has slight COPD, has an inhaler    Not wheezing, just coughing up phlegm,  Mostly clear     Fever... has not taken it yet..    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      Objective           Vitals:  No vitals were obtained today due to virtual visit.    Physical Exam   healthy, alert and no distress  PSYCH: Alert and oriented times 3; coherent speech, normal   rate and volume, able to articulate logical thoughts, able   to abstract reason, no tangential thoughts, no hallucinations   or delusions  Her affect is normal  RESP: No cough, no audible wheezing, able to talk in full sentences  Remainder of exam unable to be completed due to telephone visits    Lab on 08/26/2022   Component Date Value Ref Range Status     INR 08/26/2022 2.0 (A) 0.9 - 1.1 Final               Phone call duration: 12 minutes. Total time spent was 21 minutes, review of records and coordination with MTM.

## 2022-09-10 ENCOUNTER — TELEPHONE (OUTPATIENT)
Dept: FAMILY MEDICINE | Facility: CLINIC | Age: 57
End: 2022-09-10

## 2022-09-22 ENCOUNTER — VIRTUAL VISIT (OUTPATIENT)
Dept: FAMILY MEDICINE | Facility: CLINIC | Age: 57
End: 2022-09-22
Payer: COMMERCIAL

## 2022-09-22 ENCOUNTER — NURSE TRIAGE (OUTPATIENT)
Dept: NURSING | Facility: CLINIC | Age: 57
End: 2022-09-22

## 2022-09-22 ENCOUNTER — TELEPHONE (OUTPATIENT)
Dept: FAMILY MEDICINE | Facility: CLINIC | Age: 57
End: 2022-09-22

## 2022-09-22 DIAGNOSIS — U07.1 INFECTION DUE TO 2019 NOVEL CORONAVIRUS: Primary | ICD-10-CM

## 2022-09-22 PROCEDURE — 99212 OFFICE O/P EST SF 10 MIN: CPT | Mod: CS | Performed by: NURSE PRACTITIONER

## 2022-09-22 NOTE — PATIENT INSTRUCTIONS
Can try something like DayQuil for a few days as long as you're monitoring your blood pressure.  Flonase can be helpful as can nasal saline rinses.   If not improving in a week, you should be seen in clinic.

## 2022-09-22 NOTE — TELEPHONE ENCOUNTER
Reason for Call:  Other call back    Detailed comments: Pt tested positive for covid on 9/9 and is still having symptoms. Pt is wondering if its okay to wait to get INR lab done on 9/30 or if pt should come in earlier. Please call pt back to discuss.    Phone Number Patient can be reached at: Cell number on file:    Telephone Information:   Mobile [phone]       Best Time: na    Can we leave a detailed message on this number? Not Applicable    Call taken on 9/22/2022 at 1:58 PM by Amarilis Bennett

## 2022-09-22 NOTE — TELEPHONE ENCOUNTER
Called and spoke with Nery,     - scheduled INR on 9/23/22.    (advised to wear her mask and follow self distancing with people)     - still not feeling well and continues to test positive for covid.     - last INR on 8/26/22 was 2.0.     - testing + for covid - she would be at high risk for forming blood clots.

## 2022-09-22 NOTE — TELEPHONE ENCOUNTER
"Triage Call:     Pt is calling to report that she has \"rebound covid sx\" that started on 9/19/2022    Tested positive on 9/9/2022  Started paxlovid    9/15/2022 she tested negative    9/19/2022 positive again and developed the following sx;   Sinus pressure  Mild headache  Coughs up mucous in the AM    Pt is taking:   Tylenol  Ibuprofen (Took 1 tablet).   Tylenol PM  Mucinex    Disposition: See PCP within 3 days. Pt was given the care advice for her sx and wanted to make a virtual appt with a provider. Writer transferred patient to scheduling to make an appt.       Yajaira Osman RN  [facility] Nurse Advisor 2:02 PM 9/22/2022    Reason for Disposition    [1] PERSISTING SYMPTOMS OF COVID-19 AND [2] NO medical visit for COVID-19 in past 2 weeks    Additional Information    Negative: SEVERE difficulty breathing (e.g., struggling for each breath, speaks in single words)    Negative: [1] SEVERE weakness (e.g., can't stand or can barely walk) AND [2] new-onset or WORSE    Negative: Difficult to awaken or acting confused (e.g., disoriented, slurred speech)    Negative: Bluish (or gray) lips or face now    Negative: Sounds like a life-threatening emergency to the triager    Negative: [1] Typical COVID-19 symptoms AND [2] lasting less than 3 weeks    Negative: [1] Chest pain, pressure, or tightness AND [2] new-onset or worsening    Negative: [1] Fever AND [2] new-onset or worsening    Negative: [1] MODERATE difficulty breathing (e.g., speaks in phrases, SOB even at rest, pulse 100-120) AND [2] new-onset or WORSE    Negative: [1] MODERATE difficulty breathing AND [2] oxygen level (e.g., pulse oximetry) 91 to 94 percent    Negative: Oxygen level (e.g., pulse oximetry) 90 percent or lower    Negative: MODERATE difficulty breathing (e.g., speaks in phrases, SOB even at rest, pulse 100-120)    Negative: [1] Drinking very little AND [2] dehydration suspected (e.g., no urine > 12 hours, very dry mouth, very lightheaded)    " Negative: Patient sounds very sick or weak to the triager    Negative: [1] MILD difficulty breathing (e.g., minimal/no SOB at rest, SOB with walking, pulse <100) AND [2] new-onset    Negative: Oxygen level (e.g., pulse oximetry) 91 to 94 percent    Negative: [1] PERSISTING SYMPTOMS OF COVID-19 AND [2] NEW symptom AND [3] could be serious    Negative: [1] Caller has URGENT question AND [2] triager unable to answer question    Negative: [1] PERSISTING SYMPTOMS OF COVID-19 AND [2] symptoms WORSE    Negative: [1] Caller has NON-URGENT question AND [2] triager unable to answer    Protocols used: CORONAVIRUS (COVID-19) PERSISTING SYMPTOMS FOLLOW-UP CALL-A-OH

## 2022-09-22 NOTE — PROGRESS NOTES
Nery is a 57 year old who is being evaluated via a billable telephone visit.      What phone number would you like to be contacted at? [phone]  How would you like to obtain your AVS? MyChart    Assessment & Plan     Infection due to 2019 novel coronavirus  Symptoms likely representative of rebound COVID after Paxlovid. We discussed treating again with antivirals is not currently indicated. Discussed symptomatic care, reasons to be seen in person.         Patient Instructions   Can try something like DayQuil for a few days as long as you're monitoring your blood pressure.  Flonase can be helpful as can nasal saline rinses.   If not improving in a week, you should be seen in clinic.      Return in about 1 week (around 9/29/2022) for worsening or continued symptoms.    GERALD Gama CNP  [facility]   Nery is a 57 year old, presenting for the following health issues:  Covid Concern      HPI     Concern - rebound covid  Onset: Re-started symptoms this Monday the 19th  Description: Congestion, ears are plugged, headache  Intensity: mild, moderate  Progression of Symptoms:  constant  Accompanying Signs & Symptoms: same as above  Previous history of similar problem: just had covid 9/9, took paxlovid and was feeling better but is now back to where she started.   Precipitating factors:        Worsened by: none  Alleviating factors:        Improved by: none  Therapies tried and outcome: Paxlovid, tylenol PM, mucinex        Review of Systems   Constitutional, HEENT, cardiovascular, pulmonary, gi and gu systems are negative, except as otherwise noted.      Objective           Vitals:  No vitals were obtained today due to virtual visit.    Physical Exam   healthy, alert and no distress  PSYCH: Alert and oriented times 3; coherent speech, normal   rate and volume, able to articulate logical thoughts, able   to abstract reason, no tangential thoughts, no hallucinations   or  delusions  Her affect is normal  RESP: No cough, no audible wheezing, able to talk in full sentences  Remainder of exam unable to be completed due to telephone visits                Phone call duration: 8 minutes

## 2022-09-23 ENCOUNTER — DOCUMENTATION ONLY (OUTPATIENT)
Dept: FAMILY MEDICINE | Facility: CLINIC | Age: 57
End: 2022-09-23

## 2022-09-23 ENCOUNTER — ANTICOAGULATION THERAPY VISIT (OUTPATIENT)
Dept: ANTICOAGULATION | Facility: CLINIC | Age: 57
End: 2022-09-23

## 2022-09-23 ENCOUNTER — LAB (OUTPATIENT)
Dept: LAB | Facility: CLINIC | Age: 57
End: 2022-09-23
Payer: COMMERCIAL

## 2022-09-23 DIAGNOSIS — I73.9 PAD (PERIPHERAL ARTERY DISEASE) (H): ICD-10-CM

## 2022-09-23 DIAGNOSIS — I77.9 BILATERAL CAROTID ARTERY DISEASE, UNSPECIFIED TYPE (H): ICD-10-CM

## 2022-09-23 DIAGNOSIS — D68.59 HYPERCOAGULABLE STATE (H): Primary | ICD-10-CM

## 2022-09-23 DIAGNOSIS — Z79.01 ANTICOAGULATION MONITORING, INR RANGE 2-3: ICD-10-CM

## 2022-09-23 DIAGNOSIS — D68.59 HYPERCOAGULABLE STATE (H): ICD-10-CM

## 2022-09-23 DIAGNOSIS — Z79.01 ANTICOAGULATION MONITORING, INR RANGE 2-3: Primary | ICD-10-CM

## 2022-09-23 DIAGNOSIS — Z79.01 LONG TERM (CURRENT) USE OF ANTICOAGULANTS: ICD-10-CM

## 2022-09-23 LAB — INR BLD: 1.5 (ref 0.9–1.1)

## 2022-09-23 PROCEDURE — 85610 PROTHROMBIN TIME: CPT

## 2022-09-23 PROCEDURE — 36416 COLLJ CAPILLARY BLOOD SPEC: CPT

## 2022-09-23 RX ORDER — WARFARIN SODIUM 5 MG/1
TABLET ORAL
Qty: 100 TABLET | Refills: 1 | Status: SHIPPED | OUTPATIENT
Start: 2022-09-23 | End: 2023-04-11

## 2022-09-23 NOTE — TELEPHONE ENCOUNTER
Routed to the anticoagulation pool    Marybel Falk RN  [place] Nurse Advisor  11:05 AM  9/23/2022

## 2022-09-23 NOTE — TELEPHONE ENCOUNTER
Medication Request  Medication name: Warfarin Sodium 5 MG Oral Tablet (COUMADIN)  Requested Pharmacy: [facility] #[id number]  When was patient last seen for this?:  04/27/22  Patient offered appointment: No  Okay to leave a detailed message: no

## 2022-09-23 NOTE — PROGRESS NOTES
ANTICOAGULATION CLINIC REFERRAL RENEWAL REQUEST       An annual renewal order is required for all patients referred to [facility].?  Please review and sign the pended referral order for Nery Whitaker.       ANTICOAGULATION SUMMARY      Warfarin indication(s)   peripheral arterial disease and bilateral carotid arterial disease    Mechanical heart valve present?  NO       Current goal range   INR: 2.0-3.0     Goal appropriate for indication? Goal INR 2-3, standard for indication(s) above     Time in Therapeutic Range (TTR)  (Goal > 60%) 68.2 %       Office visit with referring provider's group within last year Yes on 5/27/2022       Nicki Rosa, RN  [facility]

## 2022-09-23 NOTE — PROGRESS NOTES
ANTICOAGULATION MANAGEMENT     Nery Whitaker 57 year old female is on warfarin with subtherapeutic INR result. (Goal INR 2.0-3.0)    Recent labs: (last 7 days)     09/23/22  1110   INR 1.5*       ASSESSMENT       Source(s): Chart Review, Patient/Caregiver Call and Template       Warfarin doses taken: Warfarin taken as instructed    Diet: Increased greens/vitamin K in diet; plans to resume previous intake    New illness, injury, or hospitalization: Yes:    Stated starting to feel better since testing positive for Covid-19 on 9/9/22.  She did complete Paxlovid.    Medication/supplement changes: Dayquil cold / flu started on 9/23/22 which may be increasing INR.   Dayquil daytime cold / flu started this morning.    Signs or symptoms of bleeding or clotting: No    Previous INR: Therapeutic last 5 visits    Additional findings: None       PLAN     Recommended plan for temporary change(s) affecting INR     Dosing Instructions: booster dose then continue your current warfarin dose with next INR in 1 week       Summary  As of 9/23/2022    Full warfarin instructions:  9/23: 10 mg; Otherwise 5 mg every day   Next INR check:  10/7/2022             Telephone call with  Nery who verbalizes understanding and agrees to plan    Lab visit scheduled - INR on 9/30/22 @ [facility]    Education provided: Importance of consistent vitamin K intake, Impact of vitamin K foods on INR, Goal range and significance of current result, Potential interaction between warfarin and Dayquil cold / flu, Monitoring for clotting signs and symptoms and When to seek medical attention/emergency care    Plan made per [facility] anticoagulation protocol    Nicki Rosa RN  Anticoagulation Clinic  9/23/2022    _______________________________________________________________________     Anticoagulation Episode Summary     Current INR goal:  2.0-3.0   TTR:  68.2 % (1 y)   Target end date:  Indefinite   Send INR reminders to:  BERNICE MARTINI    Indications     Anticoagulation monitoring  INR range 2-3 [Z79.01]  PAD (peripheral artery disease) (H) [I73.9]           Comments:           Anticoagulation Care Providers     Provider Role Specialty Phone number    Loraine Rees MD [facility] Family Medicine [phone]

## 2022-09-26 PROBLEM — I77.9 BILATERAL CAROTID ARTERY DISEASE, UNSPECIFIED TYPE (H): Status: ACTIVE | Noted: 2022-09-26

## 2022-09-26 PROBLEM — Z79.01 LONG TERM (CURRENT) USE OF ANTICOAGULANTS: Status: ACTIVE | Noted: 2022-09-26

## 2022-09-30 ENCOUNTER — LAB (OUTPATIENT)
Dept: LAB | Facility: CLINIC | Age: 57
End: 2022-09-30

## 2022-09-30 ENCOUNTER — ANTICOAGULATION THERAPY VISIT (OUTPATIENT)
Dept: ANTICOAGULATION | Facility: CLINIC | Age: 57
End: 2022-09-30

## 2022-09-30 DIAGNOSIS — I77.9 BILATERAL CAROTID ARTERY DISEASE, UNSPECIFIED TYPE (H): ICD-10-CM

## 2022-09-30 DIAGNOSIS — I73.9 PAD (PERIPHERAL ARTERY DISEASE) (H): ICD-10-CM

## 2022-09-30 DIAGNOSIS — D68.59 HYPERCOAGULABLE STATE (H): ICD-10-CM

## 2022-09-30 DIAGNOSIS — Z79.01 LONG TERM (CURRENT) USE OF ANTICOAGULANTS: ICD-10-CM

## 2022-09-30 DIAGNOSIS — Z79.01 ANTICOAGULATION MONITORING, INR RANGE 2-3: Primary | ICD-10-CM

## 2022-09-30 LAB — INR BLD: 2.2 (ref 0.9–1.1)

## 2022-09-30 PROCEDURE — 85610 PROTHROMBIN TIME: CPT

## 2022-09-30 PROCEDURE — 36416 COLLJ CAPILLARY BLOOD SPEC: CPT

## 2022-09-30 NOTE — PROGRESS NOTES
ANTICOAGULATION MANAGEMENT     Nery Whitaker 57 year old female is on warfarin with therapeutic INR result. (Goal INR 2.0-3.0)    Recent labs: (last 7 days)     09/30/22  1057   INR 2.2*       ASSESSMENT       Source(s): Chart Review, Patient/Caregiver Call and Template       Warfarin doses taken: Warfarin taken as instructed    Diet: No new diet changes identified    New illness, injury, or hospitalization: Yes:    finally tested negative for covid    Medication/supplement changes: None noted    Signs or symptoms of bleeding or clotting: No    Previous INR: Subtherapeutic at 1.5 on 9/23/22.    Additional findings: None       PLAN     Recommended plan for no diet, medication or health factor changes affecting INR     Dosing Instructions: Continue your current warfarin dose with next INR in 2 weeks       Summary  As of 9/30/2022    Full warfarin instructions:  5 mg every day   Next INR check:  10/14/2022             Telephone call with  Nery ([phone]) who verbalizes understanding and agrees to plan    Lab visit scheduled - INR on 10/14/22 @ [facility].    Education provided: Importance of consistent vitamin K intake and Goal range and significance of current result    Plan made per [facility] anticoagulation protocol    Nicki Rosa RN  Anticoagulation Clinic  9/30/2022    _______________________________________________________________________     Anticoagulation Episode Summary     Current INR goal:  2.0-3.0   TTR:  66.9 % (1 y)   Target end date:  Indefinite   Send INR reminders to:  BERNICE MARTINI    Indications    Anticoagulation monitoring  INR range 2-3 [Z79.01]  PAD (peripheral artery disease) (H) [I73.9]  Long term (current) use of anticoagulants [Z79.01]  Hypercoagulable state (H) [D68.59]  Bilateral carotid artery disease  unspecified type (H) [I77.9]           Comments:           Anticoagulation Care Providers     Provider Role Specialty Phone number    Loraine Rees MD Referring Family  Medicine [phone]    Vu Winters MD Referring Family Medicine [phone]

## 2022-10-03 DIAGNOSIS — K21.9 GASTROESOPHAGEAL REFLUX DISEASE WITHOUT ESOPHAGITIS: ICD-10-CM

## 2022-10-03 NOTE — TELEPHONE ENCOUNTER
Medication Request  Medication name: famotidine (PEPCID) 20 MG tablet  Requested Pharmacy: [facility] #[id number]  When was patient last seen for this?:  04/27/22  Patient offered appointment: No  Okay to leave a detailed message: no

## 2022-10-04 DIAGNOSIS — M81.0 AGE-RELATED OSTEOPOROSIS WITHOUT CURRENT PATHOLOGICAL FRACTURE: ICD-10-CM

## 2022-10-04 RX ORDER — ALENDRONATE SODIUM 70 MG/1
70 TABLET ORAL
Qty: 12 TABLET | Refills: 1 | Status: SHIPPED | OUTPATIENT
Start: 2022-10-04 | End: 2023-02-09

## 2022-10-04 RX ORDER — FAMOTIDINE 20 MG/1
20 TABLET, FILM COATED ORAL 2 TIMES DAILY
Qty: 180 TABLET | Refills: 3 | OUTPATIENT
Start: 2022-10-04

## 2022-10-04 NOTE — TELEPHONE ENCOUNTER
"Routing refill request to provider for review/approval because:  Early refill requested.    Last Written Prescription Date:  4/27/22  Last Fill Quantity: 13,  # refills: 1   Last office visit provider:  4/27/22     Requested Prescriptions   Pending Prescriptions Disp Refills     alendronate (FOSAMAX) 70 MG tablet 13 tablet 1     Sig: Take 1 tablet (70 mg) by mouth every 7 days       Bisphosphonates Passed - 10/4/2022  2:16 PM        Passed - Recent (12 mo) or future (30 days) visit within the authorizing provider's specialty     Patient has had an office visit with the authorizing provider or a provider within the authorizing providers department within the previous 12 mos or has a future within next 30 days. See \"Patient Info\" tab in inbasket, or \"Choose Columns\" in Meds & Orders section of the refill encounter.              Passed - Dexa on file within past 2 years     Please review last Dexa result.           Passed - Medication is active on med list        Passed - Patient is age 18 or older        Passed - Normal serum creatinine on file within past 12 months     Recent Labs   Lab Test 02/25/22  0815   CR 0.83       Ok to refill medication if creatinine is low               Yuniel Kelly, RN 10/04/22 2:16 PM  "

## 2022-10-11 ENCOUNTER — TELEPHONE (OUTPATIENT)
Dept: ANESTHESIOLOGY | Facility: CLINIC | Age: 57
End: 2022-10-11

## 2022-10-11 NOTE — TELEPHONE ENCOUNTER
Received forms from [facility] Physical Therapy outpatient [place] waiting for sign from provider    Will scan into pt chart     [phone]  Fax [phone]

## 2022-10-19 ENCOUNTER — LAB (OUTPATIENT)
Dept: LAB | Facility: CLINIC | Age: 57
End: 2022-10-19
Payer: COMMERCIAL

## 2022-10-19 ENCOUNTER — ANTICOAGULATION THERAPY VISIT (OUTPATIENT)
Dept: ANTICOAGULATION | Facility: CLINIC | Age: 57
End: 2022-10-19

## 2022-10-19 DIAGNOSIS — I77.9 BILATERAL CAROTID ARTERY DISEASE, UNSPECIFIED TYPE (H): ICD-10-CM

## 2022-10-19 DIAGNOSIS — Z79.01 ANTICOAGULATION MONITORING, INR RANGE 2-3: Primary | ICD-10-CM

## 2022-10-19 DIAGNOSIS — Z79.01 LONG TERM (CURRENT) USE OF ANTICOAGULANTS: ICD-10-CM

## 2022-10-19 DIAGNOSIS — D68.59 HYPERCOAGULABLE STATE (H): ICD-10-CM

## 2022-10-19 DIAGNOSIS — I73.9 PAD (PERIPHERAL ARTERY DISEASE) (H): ICD-10-CM

## 2022-10-19 LAB — INR BLD: 4.2 (ref 0.9–1.1)

## 2022-10-19 PROCEDURE — 85610 PROTHROMBIN TIME: CPT

## 2022-10-19 PROCEDURE — 36416 COLLJ CAPILLARY BLOOD SPEC: CPT

## 2022-10-19 NOTE — PROGRESS NOTES
ANTICOAGULATION MANAGEMENT     Nery Whitaker 57 year old female is on warfarin with supratherapeutic INR result. (Goal INR 2.0-3.0)    Recent labs: (last 7 days)     10/19/22  1305   INR 4.2*       ASSESSMENT       Source(s): Chart Review and Template       Warfarin doses taken: Warfarin taken as instructed    Diet: No new diet changes identified    New illness, injury, or hospitalization: No    Medication/supplement changes: None noted    Signs or symptoms of bleeding or clotting: No    Previous INR: Therapeutic last 2(+) visits    Additional findings: None       PLAN     Recommended plan for no diet, medication or health factor changes affecting INR     Dosing Instructions: hold dose then continue your current warfarin dose with next INR in 10 days       Summary  As of 10/19/2022    Full warfarin instructions:  10/19: Hold; Otherwise 5 mg every day   Next INR check:  10/28/2022             Detailed voice message left for Nery with dosing instructions and follow up date.     Contact [phone] to schedule and with any changes, questions or concerns.     Education provided: None required    Plan made per [facility] anticoagulation protocol    Celina Hoang RN  Anticoagulation Clinic  10/19/2022    _______________________________________________________________________     Anticoagulation Episode Summary     Current INR goal:  2.0-3.0   TTR:  63.7 % (1 y)   Target end date:  Indefinite   Send INR reminders to:  BERNICE MARTINI    Indications    Anticoagulation monitoring  INR range 2-3 [Z79.01]  PAD (peripheral artery disease) (H) [I73.9]  Long term (current) use of anticoagulants [Z79.01]  Hypercoagulable state (H) [D68.59]  Bilateral carotid artery disease  unspecified type (H) [I77.9]           Comments:           Anticoagulation Care Providers     Provider Role Specialty Phone number    Loraine Rees MD Referring Family Medicine [phone]    Vu Winters MD Referring Family Medicine  [phone]

## 2022-10-26 ENCOUNTER — TELEPHONE (OUTPATIENT)
Dept: ANESTHESIOLOGY | Facility: CLINIC | Age: 57
End: 2022-10-26

## 2022-10-26 NOTE — TELEPHONE ENCOUNTER
Faxed signed referral to [facility]  [address] [place] [address]  Phone: [phone]  Fax: [phone]

## 2022-11-04 ENCOUNTER — ANTICOAGULATION THERAPY VISIT (OUTPATIENT)
Dept: ANTICOAGULATION | Facility: CLINIC | Age: 57
End: 2022-11-04

## 2022-11-04 ENCOUNTER — LAB (OUTPATIENT)
Dept: LAB | Facility: CLINIC | Age: 57
End: 2022-11-04
Payer: COMMERCIAL

## 2022-11-04 DIAGNOSIS — I77.9 BILATERAL CAROTID ARTERY DISEASE, UNSPECIFIED TYPE (H): ICD-10-CM

## 2022-11-04 DIAGNOSIS — D68.59 HYPERCOAGULABLE STATE (H): ICD-10-CM

## 2022-11-04 DIAGNOSIS — I73.9 PAD (PERIPHERAL ARTERY DISEASE) (H): ICD-10-CM

## 2022-11-04 DIAGNOSIS — Z79.01 LONG TERM (CURRENT) USE OF ANTICOAGULANTS: ICD-10-CM

## 2022-11-04 DIAGNOSIS — Z79.01 ANTICOAGULATION MONITORING, INR RANGE 2-3: Primary | ICD-10-CM

## 2022-11-04 LAB — INR BLD: 1.5 (ref 0.9–1.1)

## 2022-11-04 PROCEDURE — 85610 PROTHROMBIN TIME: CPT

## 2022-11-04 PROCEDURE — 36416 COLLJ CAPILLARY BLOOD SPEC: CPT

## 2022-11-04 NOTE — PROGRESS NOTES
ANTICOAGULATION MANAGEMENT     Nery Whitaker 57 year old female is on warfarin with subtherapeutic INR result. (Goal INR 2.0-3.0)    Recent labs: (last 7 days)     11/04/22  1020   INR 1.5*       ASSESSMENT       Source(s): Chart Review, Patient/Caregiver Call and Template       Warfarin doses taken: Held for 1 day  recently which may be affecting INR    Possibly missed one dose last week.   Held warfarin dose on 10/19, as instructed.    Diet: Increased greens/vitamin K in diet; ongoing change    Reported eating more salads now, and would like to keep this up for now.    New illness, injury, or hospitalization: No    Medication/supplement changes: None noted    Signs or symptoms of bleeding or clotting: No    Previous INR: Supratherapeutic at 4.2 on 10/19/22.    Additional findings: None       PLAN     Recommended plan for ongoing change(s) affecting INR     Dosing Instructions: booster dose then Increase your warfarin dose (7.1% change) with next INR in 2 weeks       Summary  As of 11/4/2022    Full warfarin instructions:  11/4: 7.5 mg; Otherwise 7.5 mg every Wed; 5 mg all other days; Starting 11/4/2022   Next INR check:  11/16/2022             Telephone call with  Nery ([phone]) who verbalizes understanding and agrees to plan    - only increased wkly warfarin dose 7.1% vs, the 10-15%. Would like to increase Vitamin-K intake eating salads a little more than usual.   - advised to ensure she stays consistent with intake and amount.    - then will assess warfarin dose on next INR check.      Lab visit scheduled - INR on 11/18/22 @ [facility].    Education provided:     Taking warfarin: Importance of taking warfarin as instructed    Goal range and lab monitoring: goal range and significance of current result    Dietary considerations: importance of consistent vitamin K intake    Symptom monitoring: monitoring for clotting signs and symptoms and monitoring for stroke signs and symptoms    Plan made per [facility]  anticoagulation protocol    Nicki Rosa RN  Anticoagulation Clinic  11/4/2022    _______________________________________________________________________     Anticoagulation Episode Summary     Current INR goal:  2.0-3.0   TTR:  61.0 % (1 y)   Target end date:  Indefinite   Send INR reminders to:  BERNICE MARTINI    Indications    Anticoagulation monitoring  INR range 2-3 [Z79.01]  PAD (peripheral artery disease) (H) [I73.9]  Long term (current) use of anticoagulants [Z79.01]  Hypercoagulable state (H) [D68.59]  Bilateral carotid artery disease  unspecified type (H) [I77.9]           Comments:           Anticoagulation Care Providers     Provider Role Specialty Phone number    Loraine Rees MD Referring Family Medicine [phone]    Vu Winters MD Referring Family Medicine [phone]

## 2022-11-18 ENCOUNTER — ANTICOAGULATION THERAPY VISIT (OUTPATIENT)
Dept: ANTICOAGULATION | Facility: CLINIC | Age: 57
End: 2022-11-18

## 2022-11-18 ENCOUNTER — LAB (OUTPATIENT)
Dept: LAB | Facility: CLINIC | Age: 57
End: 2022-11-18
Payer: COMMERCIAL

## 2022-11-18 DIAGNOSIS — I73.9 PAD (PERIPHERAL ARTERY DISEASE) (H): ICD-10-CM

## 2022-11-18 DIAGNOSIS — D68.59 HYPERCOAGULABLE STATE (H): ICD-10-CM

## 2022-11-18 DIAGNOSIS — I77.9 BILATERAL CAROTID ARTERY DISEASE, UNSPECIFIED TYPE (H): ICD-10-CM

## 2022-11-18 DIAGNOSIS — Z79.01 ANTICOAGULATION MONITORING, INR RANGE 2-3: Primary | ICD-10-CM

## 2022-11-18 DIAGNOSIS — Z79.01 LONG TERM (CURRENT) USE OF ANTICOAGULANTS: ICD-10-CM

## 2022-11-18 LAB — INR BLD: 2.9 (ref 0.9–1.1)

## 2022-11-18 PROCEDURE — 36416 COLLJ CAPILLARY BLOOD SPEC: CPT

## 2022-11-18 PROCEDURE — 85610 PROTHROMBIN TIME: CPT

## 2022-11-18 NOTE — PROGRESS NOTES
ANTICOAGULATION MANAGEMENT     Nery Whitaker 57 year old female is on warfarin with therapeutic INR result. (Goal INR 2.0-3.0)    Recent labs: (last 7 days)     11/18/22  1011   INR 2.9*       ASSESSMENT       Source(s): Chart Review, Patient/Caregiver Call and Template       Warfarin doses taken: Warfarin taken as instructed    Diet: No new diet changes identified    New illness, injury, or hospitalization: No    Medication/supplement changes: None noted    Signs or symptoms of bleeding or clotting: No    Previous INR: Subtherapeutic at 1.5 on 11/4/22.    Additional findings: None       PLAN     Recommended plan for no diet, medication or health factor changes affecting INR     Dosing Instructions: Continue your current warfarin dose with next INR in 2 weeks       Summary  As of 11/18/2022    Full warfarin instructions:  7.5 mg every Wed; 5 mg all other days; Starting 11/18/2022   Next INR check:  12/2/2022             Telephone call with  Nery ([phone]) who verbalizes understanding and agrees to plan    Lab visit scheduled - INR on 12/9/22 @ [facility]   - has mammogram on 12/2.    Education provided:     Taking warfarin: Importance of taking warfarin as instructed    Goal range and lab monitoring: goal range and significance of current result    Dietary considerations: importance of consistent vitamin K intake    Plan made per [facility] anticoagulation protocol    Nicki Rosa RN  Anticoagulation Clinic  11/18/2022    _______________________________________________________________________     Anticoagulation Episode Summary     Current INR goal:  2.0-3.0   TTR:  54.1 % (1 y)   Target end date:  Indefinite   Send INR reminders to:  BERNICE MARTINI    Indications    Anticoagulation monitoring  INR range 2-3 [Z79.01]  PAD (peripheral artery disease) (H) [I73.9]  Long term (current) use of anticoagulants [Z79.01]  Hypercoagulable state (H) [D68.59]  Bilateral carotid artery disease  unspecified type (H)  [I77.9]           Comments:           Anticoagulation Care Providers     Provider Role Specialty Phone number    Loraine Rees MD Referring Family Medicine [phone]    Vu Winters MD Referring Family Medicine [phone]

## 2022-12-02 ENCOUNTER — ANCILLARY PROCEDURE (OUTPATIENT)
Dept: MAMMOGRAPHY | Facility: CLINIC | Age: 57
End: 2022-12-02
Attending: FAMILY MEDICINE
Payer: COMMERCIAL

## 2022-12-02 DIAGNOSIS — Z12.31 VISIT FOR SCREENING MAMMOGRAM: ICD-10-CM

## 2022-12-02 PROCEDURE — 77067 SCR MAMMO BI INCL CAD: CPT

## 2022-12-09 ENCOUNTER — ANTICOAGULATION THERAPY VISIT (OUTPATIENT)
Dept: ANTICOAGULATION | Facility: CLINIC | Age: 57
End: 2022-12-09

## 2022-12-09 ENCOUNTER — LAB (OUTPATIENT)
Dept: LAB | Facility: CLINIC | Age: 57
End: 2022-12-09
Payer: COMMERCIAL

## 2022-12-09 DIAGNOSIS — Z79.01 ANTICOAGULATION MONITORING, INR RANGE 2-3: Primary | ICD-10-CM

## 2022-12-09 DIAGNOSIS — D68.59 HYPERCOAGULABLE STATE (H): ICD-10-CM

## 2022-12-09 DIAGNOSIS — Z79.01 LONG TERM (CURRENT) USE OF ANTICOAGULANTS: ICD-10-CM

## 2022-12-09 DIAGNOSIS — I73.9 PAD (PERIPHERAL ARTERY DISEASE) (H): ICD-10-CM

## 2022-12-09 DIAGNOSIS — I77.9 BILATERAL CAROTID ARTERY DISEASE, UNSPECIFIED TYPE (H): ICD-10-CM

## 2022-12-09 LAB — INR BLD: 5.1 (ref 0.9–1.1)

## 2022-12-09 PROCEDURE — 85610 PROTHROMBIN TIME: CPT

## 2022-12-09 PROCEDURE — 36416 COLLJ CAPILLARY BLOOD SPEC: CPT

## 2022-12-09 NOTE — PROGRESS NOTES
ANTICOAGULATION MANAGEMENT     Nery Whitaker 57 year old female is on warfarin with supratherapeutic INR result. (Goal INR 2.0-3.0)    Recent labs: (last 7 days)     12/09/22  1014   INR 5.1*       ASSESSMENT       Source(s): Chart Review, Patient/Caregiver Call and Template       Warfarin doses taken: Warfarin taken as instructed    Diet: No new diet changes identified    Normal does not eat a lot of vitamin-K foods.    New illness, injury, or hospitalization: No    Medication/supplement changes: None noted    Signs or symptoms of bleeding or clotting: No    Previous INR: Therapeutic last visit at 2.9; previously outside of goal rangeat 1.5    Additional findings: None       PLAN     Recommended plan for no diet, medication or health factor changes affecting INR     Dosing Instructions: hold dose then decrease your warfarin dose (13.3% change) with next INR in 3 days       Summary  As of 12/9/2022    Full warfarin instructions:  12/9: Hold; Otherwise 2.5 mg every Wed; 5 mg all other days; Starting 12/9/2022   Next INR check:  12/12/2022             Telephone call with  Nery ([phone]) who verbalizes understanding and agrees to plan    Lab visit scheduled - INR on 12/14/22 @ [facility]    Education provided:     Taking warfarin: Importance of taking warfarin as instructed    Goal range and lab monitoring: goal range and significance of current result    Dietary considerations: importance of consistent vitamin K intake and impact of vitamin K foods on INR    Symptom monitoring: monitoring for bleeding signs and symptoms, when to seek medical attention/emergency care and if you hit your head or have a bad fall seek emergency care    Plan made with [facility] Pharmacist Tatum Rosa, RN  Anticoagulation Clinic  12/9/2022    _______________________________________________________________________     Anticoagulation Episode Summary     Current INR goal:  2.0-3.0   TTR:  54.1 % (1 y)   Target end date:   Indefinite   Send INR reminders to:  BERNICE MARTINI    Indications    Anticoagulation monitoring  INR range 2-3 [Z79.01]  PAD (peripheral artery disease) (H) [I73.9]  Long term (current) use of anticoagulants [Z79.01]  Hypercoagulable state (H) [D68.59]  Bilateral carotid artery disease  unspecified type (H) [I77.9]           Comments:           Anticoagulation Care Providers     Provider Role Specialty Phone number    Loraine Rees MD Referring Family Medicine [phone]    Vu Winters MD Referring Family Medicine [phone]

## 2022-12-16 ENCOUNTER — ANTICOAGULATION THERAPY VISIT (OUTPATIENT)
Dept: ANTICOAGULATION | Facility: CLINIC | Age: 57
End: 2022-12-16

## 2022-12-16 ENCOUNTER — LAB (OUTPATIENT)
Dept: LAB | Facility: CLINIC | Age: 57
End: 2022-12-16
Payer: COMMERCIAL

## 2022-12-16 DIAGNOSIS — D68.59 HYPERCOAGULABLE STATE (H): ICD-10-CM

## 2022-12-16 DIAGNOSIS — I77.9 BILATERAL CAROTID ARTERY DISEASE, UNSPECIFIED TYPE (H): ICD-10-CM

## 2022-12-16 DIAGNOSIS — Z79.01 LONG TERM (CURRENT) USE OF ANTICOAGULANTS: ICD-10-CM

## 2022-12-16 DIAGNOSIS — I73.9 PAD (PERIPHERAL ARTERY DISEASE) (H): ICD-10-CM

## 2022-12-16 DIAGNOSIS — Z79.01 ANTICOAGULATION MONITORING, INR RANGE 2-3: Primary | ICD-10-CM

## 2022-12-16 LAB — INR BLD: 2.9 (ref 0.9–1.1)

## 2022-12-16 PROCEDURE — 36416 COLLJ CAPILLARY BLOOD SPEC: CPT

## 2022-12-16 PROCEDURE — 85610 PROTHROMBIN TIME: CPT

## 2022-12-16 NOTE — PROGRESS NOTES
ANTICOAGULATION MANAGEMENT     Nery Whitaker 57 year old female is on warfarin with therapeutic INR result. (Goal INR 2.0-3.0)    Recent labs: (last 7 days)     12/16/22  1100   INR 2.9*       ASSESSMENT       Source(s): Chart Review, Patient/Caregiver Call and Template       Warfarin doses taken: Held for 1 day on 12/9/22  recently which may be affecting INR    Diet: No new diet changes identified    New illness, injury, or hospitalization: No    Medication/supplement changes: None noted    Signs or symptoms of bleeding or clotting: No    Previous INR: Supratherapeutic at 5.1 on 12/9/22.    Additional findings: None       PLAN     Recommended plan for no diet, medication or health factor changes affecting INR     Dosing Instructions: Continue your current warfarin dose with next INR in 2 weeks       Summary  As of 12/16/2022    Full warfarin instructions:  2.5 mg every Wed; 5 mg all other days; Starting 12/16/2022   Next INR check:  12/30/2022             Telephone call with  Nery ([phone]) who verbalizes understanding and agrees to plan    Lab visit scheduled - INR on 12/30/22 @ [facility]    Education provided:     Taking warfarin: Importance of taking warfarin as instructed    Goal range and lab monitoring: goal range and significance of current result    Plan made per [facility] anticoagulation protocol    Nicki Rosa RN  Anticoagulation Clinic  12/16/2022    _______________________________________________________________________     Anticoagulation Episode Summary     Current INR goal:  2.0-3.0   TTR:  52.3 % (1 y)   Target end date:  Indefinite   Send INR reminders to:  BERNICE MARTINI    Indications    Anticoagulation monitoring  INR range 2-3 [Z79.01]  PAD (peripheral artery disease) (H) [I73.9]  Long term (current) use of anticoagulants [Z79.01]  Hypercoagulable state (H) [D68.59]  Bilateral carotid artery disease  unspecified type (H) [I77.9]           Comments:           Anticoagulation Care  Providers     Provider Role Specialty Phone number    Loraine Rees MD Referring Family Medicine [phone]    uV Winters MD Referring Family Medicine [phone]

## 2022-12-29 ENCOUNTER — TELEPHONE (OUTPATIENT)
Dept: FAMILY MEDICINE | Facility: CLINIC | Age: 57
End: 2022-12-29

## 2022-12-29 NOTE — TELEPHONE ENCOUNTER
Reason for Call:  Other call back    Detailed comments: Patient canceled 12/30 INR with [facility]. She's having scans done at her artery specialists at [place] tomorrow 12/30 so she arranged to do everything there. She will be home late so she would like you to call her on Monday for the INR results.    Phone Number Patient can be reached at: Cell number on file:    Telephone Information:   Mobile [phone]       Best Time: Monday 1/2/23    Can we leave a detailed message on this number? YES    Call taken on 12/29/2022 at 4:01 PM by Laura Kanavel

## 2023-01-06 ENCOUNTER — LAB (OUTPATIENT)
Dept: LAB | Facility: CLINIC | Age: 58
End: 2023-01-06
Payer: COMMERCIAL

## 2023-01-06 ENCOUNTER — TRANSFERRED RECORDS (OUTPATIENT)
Dept: HEALTH INFORMATION MANAGEMENT | Facility: CLINIC | Age: 58
End: 2023-01-06

## 2023-01-06 ENCOUNTER — ANTICOAGULATION THERAPY VISIT (OUTPATIENT)
Dept: ANTICOAGULATION | Facility: CLINIC | Age: 58
End: 2023-01-06

## 2023-01-06 ENCOUNTER — IMMUNIZATION (OUTPATIENT)
Dept: FAMILY MEDICINE | Facility: CLINIC | Age: 58
End: 2023-01-06
Payer: COMMERCIAL

## 2023-01-06 DIAGNOSIS — Z79.01 LONG TERM (CURRENT) USE OF ANTICOAGULANTS: ICD-10-CM

## 2023-01-06 DIAGNOSIS — I73.9 PAD (PERIPHERAL ARTERY DISEASE) (H): ICD-10-CM

## 2023-01-06 DIAGNOSIS — I77.9 BILATERAL CAROTID ARTERY DISEASE, UNSPECIFIED TYPE (H): ICD-10-CM

## 2023-01-06 DIAGNOSIS — D68.59 HYPERCOAGULABLE STATE (H): ICD-10-CM

## 2023-01-06 DIAGNOSIS — Z79.01 ANTICOAGULATION MONITORING, INR RANGE 2-3: Primary | ICD-10-CM

## 2023-01-06 LAB — INR BLD: 1.8 (ref 0.9–1.1)

## 2023-01-06 PROCEDURE — 91312 COVID-19 VACCINE BIVALENT BOOSTER 12+ (PFIZER): CPT

## 2023-01-06 PROCEDURE — 90471 IMMUNIZATION ADMIN: CPT

## 2023-01-06 PROCEDURE — 85610 PROTHROMBIN TIME: CPT

## 2023-01-06 PROCEDURE — 36416 COLLJ CAPILLARY BLOOD SPEC: CPT

## 2023-01-06 PROCEDURE — 90682 RIV4 VACC RECOMBINANT DNA IM: CPT

## 2023-01-06 PROCEDURE — 0124A COVID-19 VACCINE BIVALENT BOOSTER 12+ (PFIZER): CPT

## 2023-01-06 NOTE — PROGRESS NOTES
ANTICOAGULATION MANAGEMENT     Nery Whitaker 57 year old female is on warfarin with subtherapeutic INR result. (Goal INR 2.0-3.0)    Recent labs: (last 7 days)     01/06/23  1011   INR 1.8*       ASSESSMENT       Source(s): Chart Review, Patient/Caregiver Call and Template       Warfarin doses taken: Missed dose(s) may be affecting INR    Diet: No new diet changes identified    New illness, injury, or hospitalization: No    Medication/supplement changes: None noted    Signs or symptoms of bleeding or clotting: No    Previous INR: Therapeutic last visit; previously outside of goal range    Additional findings: None       PLAN     Recommended plan for no diet, medication or health factor changes affecting INR     Dosing Instructions: booster dose then continue your current warfarin dose with next INR in 2 weeks       Summary  As of 1/6/2023    Full warfarin instructions:  1/6: 7.5 mg; Otherwise 2.5 mg every Wed; 5 mg all other days   Next INR check:  1/20/2023             Telephone call with Nery who verbalizes understanding and agrees to plan    Lab visit scheduled    Education provided:     Contact [phone] with any changes, questions or concerns.     Plan made per [facility] anticoagulation protocol    Celina Hoang RN  Anticoagulation Clinic  1/6/2023    _______________________________________________________________________     Anticoagulation Episode Summary     Current INR goal:  2.0-3.0   TTR:  51.3 % (1 y)   Target end date:  Indefinite   Send INR reminders to:  BERNICE MARTINI    Indications    Anticoagulation monitoring  INR range 2-3 [Z79.01]  PAD (peripheral artery disease) (H) [I73.9]  Long term (current) use of anticoagulants [Z79.01]  Hypercoagulable state (H) [D68.59]  Bilateral carotid artery disease  unspecified type (H) [I77.9]           Comments:           Anticoagulation Care Providers     Provider Role Specialty Phone number    Loraine Rees MD Referring Family Medicine [phone]     Vu Winters MD [facility] Medicine [phone]

## 2023-01-08 DIAGNOSIS — D68.59 HYPERCOAGULABLE STATE (H): ICD-10-CM

## 2023-01-10 RX ORDER — FOLIC ACID 1 MG/1
TABLET ORAL
Qty: 90 TABLET | Refills: 1 | Status: SHIPPED | OUTPATIENT
Start: 2023-01-10 | End: 2023-07-12

## 2023-01-10 NOTE — TELEPHONE ENCOUNTER
"Last Written Prescription Date:  7/7/2022  Last Fill Quantity: 90,  # refills: 1   Last office visit provider:  4/27/2022     Requested Prescriptions   Pending Prescriptions Disp Refills     folic acid (FOLVITE) 1 MG tablet [Pharmacy Med Name: Folic Acid 1 MG Oral Tablet] 90 tablet 0     Sig: Take 1 tablet by mouth once daily       Vitamin Supplements (Adult) Protocol Passed - 1/8/2023  8:47 AM        Passed - High dose Vitamin D not ordered        Passed - Recent (12 mo) or future (30 days) visit within the authorizing provider's specialty     Patient has had an office visit with the authorizing provider or a provider within the authorizing providers department within the previous 12 mos or has a future within next 30 days. See \"Patient Info\" tab in inbasket, or \"Choose Columns\" in Meds & Orders section of the refill encounter.              Passed - Medication is active on med list             uLcila Garcia RN 01/10/23 9:36 AM  "

## 2023-01-20 ENCOUNTER — TELEPHONE (OUTPATIENT)
Dept: FAMILY MEDICINE | Facility: CLINIC | Age: 58
End: 2023-01-20

## 2023-01-20 ENCOUNTER — LAB (OUTPATIENT)
Dept: LAB | Facility: CLINIC | Age: 58
End: 2023-01-20
Payer: COMMERCIAL

## 2023-01-20 ENCOUNTER — ANTICOAGULATION THERAPY VISIT (OUTPATIENT)
Dept: ANTICOAGULATION | Facility: CLINIC | Age: 58
End: 2023-01-20

## 2023-01-20 DIAGNOSIS — D68.59 HYPERCOAGULABLE STATE (H): ICD-10-CM

## 2023-01-20 DIAGNOSIS — I77.9 BILATERAL CAROTID ARTERY DISEASE, UNSPECIFIED TYPE (H): ICD-10-CM

## 2023-01-20 DIAGNOSIS — Z79.01 ANTICOAGULATION MONITORING, INR RANGE 2-3: Primary | ICD-10-CM

## 2023-01-20 DIAGNOSIS — I73.9 PAD (PERIPHERAL ARTERY DISEASE) (H): ICD-10-CM

## 2023-01-20 DIAGNOSIS — Z79.01 LONG TERM (CURRENT) USE OF ANTICOAGULANTS: ICD-10-CM

## 2023-01-20 LAB — INR BLD: 1.2 (ref 0.9–1.1)

## 2023-01-20 PROCEDURE — 85610 PROTHROMBIN TIME: CPT

## 2023-01-20 PROCEDURE — 36416 COLLJ CAPILLARY BLOOD SPEC: CPT

## 2023-01-20 NOTE — TELEPHONE ENCOUNTER
Reason for Call:  Other returning call    Detailed comments: Patient returning call from MARC Mckinney nurse. Faye answered while messaging, she will call patient back ASAP.    Phone Number Patient can be reached at: Home number on file [phone] (home)    Best Time: No need to call    Can we leave a detailed message on this number? Not Applicable    Call taken on 1/20/2023 at 4:03 PM by Laura Kanavel

## 2023-01-20 NOTE — PROGRESS NOTES
ANTICOAGULATION MANAGEMENT     Nery Whitaker 57 year old female is on warfarin with subtherapeutic INR result. (Goal INR 2.0-3.0)    Recent labs: (last 7 days)     01/20/23  1126   INR 1.2*       ASSESSMENT       Source(s): Chart Review, Patient/Caregiver Call and Template       Warfarin doses taken: Warfarin taken as instructed    Possibly missed one dose.    Diet: No new diet changes identified    New illness, injury, or hospitalization: No   On 1/18/23, CT angio Head / neck carotid d/t blurred vision, left eye.    Medication/supplement changes: None noted    Tylenol-PM one tab at bedtime.    Signs or symptoms of bleeding or clotting: No    Previous INR: Subtherapeutic at 1.8 on 1/6/23 d/t missed warfarin dose.    Additional findings: None       PLAN     Recommended plan for temporary change(s) affecting INR     Dosing Instructions:   - booster dose then Increase your warfarin dose   - with next INR in 5-7 days       Summary  As of 1/20/2023    Full warfarin instructions:  1/20: 10 mg; Otherwise 7.5 mg every Wed; 10 mg all other days   Next INR check:  1/27/2023             Telephone call with  Nery ([phone]) who verbalizes understanding and agrees to plan    Lab visit scheduled - INR on 1/27/23 @ [facility]    Education provided:     Taking warfarin: Importance of taking warfarin as instructed    Goal range and lab monitoring: goal range and significance of current result    Symptom monitoring: monitoring for clotting signs and symptoms, monitoring for stroke signs and symptoms and when to seek medical attention/emergency care    Plan made with [facility] Pharmacist Tatum Rosa, RN  Anticoagulation Clinic  1/20/2023    _______________________________________________________________________     Anticoagulation Episode Summary     Current INR goal:  2.0-3.0   TTR:  47.4 % (1 y)   Target end date:  Indefinite   Send INR reminders to:  BERNICE MARTINI    Indications    Anticoagulation  monitoring  INR range 2-3 [Z79.01]  PAD (peripheral artery disease) (H) [I73.9]  Long term (current) use of anticoagulants [Z79.01]  Hypercoagulable state (H) [D68.59]  Bilateral carotid artery disease  unspecified type (H) [I77.9]           Comments:           Anticoagulation Care Providers     Provider Role Specialty Phone number    Loraine Rees MD Referring Family Medicine [phone]    Vu Winters MD Referring Family Medicine [phone]

## 2023-01-27 ENCOUNTER — LAB (OUTPATIENT)
Dept: LAB | Facility: CLINIC | Age: 58
End: 2023-01-27
Payer: COMMERCIAL

## 2023-01-27 ENCOUNTER — ANTICOAGULATION THERAPY VISIT (OUTPATIENT)
Dept: ANTICOAGULATION | Facility: CLINIC | Age: 58
End: 2023-01-27

## 2023-01-27 DIAGNOSIS — Z79.01 LONG TERM (CURRENT) USE OF ANTICOAGULANTS: ICD-10-CM

## 2023-01-27 DIAGNOSIS — I77.9 BILATERAL CAROTID ARTERY DISEASE, UNSPECIFIED TYPE (H): ICD-10-CM

## 2023-01-27 DIAGNOSIS — I73.9 PAD (PERIPHERAL ARTERY DISEASE) (H): ICD-10-CM

## 2023-01-27 DIAGNOSIS — Z79.01 ANTICOAGULATION MONITORING, INR RANGE 2-3: Primary | ICD-10-CM

## 2023-01-27 DIAGNOSIS — D68.59 HYPERCOAGULABLE STATE (H): ICD-10-CM

## 2023-01-27 LAB — INR BLD: 5.9 (ref 0.9–1.1)

## 2023-01-27 PROCEDURE — 36416 COLLJ CAPILLARY BLOOD SPEC: CPT

## 2023-01-27 PROCEDURE — 85610 PROTHROMBIN TIME: CPT

## 2023-01-27 NOTE — PROGRESS NOTES
ANTICOAGULATION MANAGEMENT     Nery Whitaker 57 year old female is on warfarin with supratherapeutic INR result. (Goal INR 2.0-3.0)    Recent labs: (last 7 days)     01/27/23  0844   INR 5.9*       ASSESSMENT       Source(s): Chart Review, Patient/Caregiver Call and Template       Warfarin doses taken: Warfarin taken as instructed    Diet: No new diet changes identified    Not much in eating Vitamin-K foods, but that is her usual.    New illness, injury, or hospitalization: No    Medication/supplement changes: None noted    Signs or symptoms of bleeding or clotting: No    Previous INR: Subtherapeutic at 1.2 on 1/20/23.    Additional findings: None       PLAN     Recommended plan for no diet, medication or health factor changes affecting INR     Dosing Instructions: hold 2 day doses then decrease your warfarin dose (20% change) with next INR in 2-3 days       Summary  As of 1/27/2023    Full warfarin instructions:  1/27: Hold; 1/28: Hold; Otherwise 7.5 mg every Wed, Sat; 5 mg all other days   Next INR check:  1/30/2023             Telephone call with   Nery ([phone]) who verbalizes understanding and agrees to plan    Lab visit scheduled - INR on 2/1/23 @ [facility].   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  Not able to get off work.    Education provided:     Taking warfarin: Importance of taking warfarin as instructed    Goal range and lab monitoring: goal range and significance of current result    Symptom monitoring: monitoring for bleeding signs and symptoms and if you hit your head or have a bad fall seek emergency care    Plan made with [facility] Pharmacist Tatum Rosa, RN  Anticoagulation Clinic  1/27/2023    _______________________________________________________________________     Anticoagulation Episode Summary     Current INR goal:  2.0-3.0   TTR:  46.0 % (1 y)   Target end date:  Indefinite   Send INR reminders to:  BERNICE MARTINI    Indications     Anticoagulation monitoring  INR range 2-3 [Z79.01]  PAD (peripheral artery disease) (H) [I73.9]  Long term (current) use of anticoagulants [Z79.01]  Hypercoagulable state (H) [D68.59]  Bilateral carotid artery disease  unspecified type (H) [I77.9]           Comments:           Anticoagulation Care Providers     Provider Role Specialty Phone number    Loraine Rees MD Referring Family Medicine [phone]    Vu Winters MD Referring Family Medicine [phone]

## 2023-01-31 NOTE — TELEPHONE ENCOUNTER
January 31, 2023      [facility]  [facility] [facility] [address]  Phone: [phone]       Patient: Richy Martinez   YOB: 1992  Date of Visit: 01/31/2023    To Whom It May Concern:    Jimmy Martinez  was at [facility] on 01/31/2023. The patient may return to work/school on 02/02/2023 with no restrictions. If you have any questions or concerns, or if I can be of further assistance, please do not hesitate to contact me.    Sincerely,    ADRIENNE Sorensen      Set up to authorize for sarina Jaimes LPN  7/22/2021  8:30 AM

## 2023-02-01 ENCOUNTER — LAB (OUTPATIENT)
Dept: LAB | Facility: CLINIC | Age: 58
End: 2023-02-01
Payer: COMMERCIAL

## 2023-02-01 ENCOUNTER — ANTICOAGULATION THERAPY VISIT (OUTPATIENT)
Dept: ANTICOAGULATION | Facility: CLINIC | Age: 58
End: 2023-02-01

## 2023-02-01 DIAGNOSIS — I73.9 PAD (PERIPHERAL ARTERY DISEASE) (H): ICD-10-CM

## 2023-02-01 DIAGNOSIS — Z79.01 LONG TERM (CURRENT) USE OF ANTICOAGULANTS: ICD-10-CM

## 2023-02-01 DIAGNOSIS — D68.59 HYPERCOAGULABLE STATE (H): ICD-10-CM

## 2023-02-01 DIAGNOSIS — I77.9 BILATERAL CAROTID ARTERY DISEASE, UNSPECIFIED TYPE (H): ICD-10-CM

## 2023-02-01 DIAGNOSIS — Z79.01 ANTICOAGULATION MONITORING, INR RANGE 2-3: Primary | ICD-10-CM

## 2023-02-01 LAB — INR BLD: 2.2 (ref 0.9–1.1)

## 2023-02-01 PROCEDURE — 36416 COLLJ CAPILLARY BLOOD SPEC: CPT

## 2023-02-01 PROCEDURE — 85610 PROTHROMBIN TIME: CPT

## 2023-02-01 NOTE — PROGRESS NOTES
ANTICOAGULATION MANAGEMENT     Nery Whitaker 57 year old female is on warfarin with therapeutic INR result. (Goal INR 2.0-3.0)    Recent labs: (last 7 days)     02/01/23  1249   INR 2.2*       ASSESSMENT       Source(s): Chart Review, Patient/Caregiver Call and Template       Warfarin doses taken: Held for 2 days  recently which may be affecting INR    Reported held warfarin dose on 1/27-28, as instructed.    Diet: No new diet changes identified    New illness, injury, or hospitalization: No    Medication/supplement changes: None noted    Signs or symptoms of bleeding or clotting: No    Previous INR: Supratherapeutic at 5.9 on 1/27/23.    Additional findings: None       PLAN     Recommended plan for no diet, medication or health factor changes affecting INR     Dosing Instructions: Continue your current warfarin dose with next INR in 2 weeks       Summary  As of 2/1/2023    Full warfarin instructions:  7.5 mg every Wed, Sat; 5 mg all other days   Next INR check:  2/15/2023             Telephone call with  Nery ([phone]) who verbalizes understanding and agrees to plan    Lab visit scheduled - INR on 2/15/23 @ [facility]    Education provided:     Taking warfarin: Importance of taking warfarin as instructed    Goal range and lab monitoring: goal range and significance of current result    Plan made per [facility] anticoagulation protocol    Nicki Rosa RN  Anticoagulation Clinic  2/1/2023    _______________________________________________________________________     Anticoagulation Episode Summary     Current INR goal:  2.0-3.0   TTR:  45.4 % (1 y)   Target end date:  Indefinite   Send INR reminders to:  BERNICE MARTINI    Indications    Anticoagulation monitoring  INR range 2-3 [Z79.01]  PAD (peripheral artery disease) (H) [I73.9]  Long term (current) use of anticoagulants [Z79.01]  Hypercoagulable state (H) [D68.59]  Bilateral carotid artery disease  unspecified type (H) [I77.9]           Comments:            Anticoagulation Care Providers     Provider Role Specialty Phone number    Loraine Rees MD Referring Family Medicine [phone]    Vu Winters MD Referring Family Medicine [phone]

## 2023-02-08 DIAGNOSIS — M81.0 AGE-RELATED OSTEOPOROSIS WITHOUT CURRENT PATHOLOGICAL FRACTURE: ICD-10-CM

## 2023-02-09 RX ORDER — ALENDRONATE SODIUM 70 MG/1
TABLET ORAL
Qty: 12 TABLET | Refills: 0 | Status: SHIPPED | OUTPATIENT
Start: 2023-02-09 | End: 2023-07-18

## 2023-02-09 NOTE — TELEPHONE ENCOUNTER
"Last Written Prescription Date:  10/4/22  Last Fill Quantity: 12,  # refills: 1   Last office visit provider:  4/27/22     Requested Prescriptions   Pending Prescriptions Disp Refills     alendronate (FOSAMAX) 70 MG tablet [Pharmacy Med Name: Alendronate Sodium 70 MG Oral Tablet] 13 tablet 0     Sig: Take 1 tablet by mouth once a week       Bisphosphonates Passed - 2/9/2023  1:54 PM        Passed - Recent (12 mo) or future (30 days) visit within the authorizing provider's specialty     Patient has had an office visit with the authorizing provider or a provider within the authorizing providers department within the previous 12 mos or has a future within next 30 days. See \"Patient Info\" tab in inbasket, or \"Choose Columns\" in Meds & Orders section of the refill encounter.              Passed - Dexa on file within past 2 years     Please review last Dexa result.           Passed - Medication is active on med list        Passed - Patient is age 18 or older        Passed - Normal serum creatinine on file within past 12 months     Recent Labs   Lab Test 02/25/22  0815   CR 0.83       Ok to refill medication if creatinine is low               Yuniel Kelly RN 02/09/23 1:54 PM  "

## 2023-02-22 ENCOUNTER — TELEPHONE (OUTPATIENT)
Dept: FAMILY MEDICINE | Facility: CLINIC | Age: 58
End: 2023-02-22

## 2023-02-22 DIAGNOSIS — I73.9 PAD (PERIPHERAL ARTERY DISEASE) (H): ICD-10-CM

## 2023-02-22 DIAGNOSIS — I77.9 BILATERAL CAROTID ARTERY DISEASE, UNSPECIFIED TYPE (H): ICD-10-CM

## 2023-02-22 DIAGNOSIS — Z79.01 LONG TERM (CURRENT) USE OF ANTICOAGULANTS: ICD-10-CM

## 2023-02-22 DIAGNOSIS — D68.59 HYPERCOAGULABLE STATE (H): ICD-10-CM

## 2023-02-22 DIAGNOSIS — Z79.01 ANTICOAGULATION MONITORING, INR RANGE 2-3: Primary | ICD-10-CM

## 2023-02-22 NOTE — TELEPHONE ENCOUNTER
Reason for Call:  Other Rx dosing    Detailed comments: Pt is calling asking for doing instructions. She states she has been sick for 2 days and was not able to take her warfarin. Pls call with dosing instructions.    Phone Number Patient can be reached at: Home number on file [phone] (home)    Best Time: any    Can we leave a detailed message on this number? YES    Call taken on 2/22/2023 at 9:30 AM by Devora Hong

## 2023-02-22 NOTE — TELEPHONE ENCOUNTER
ANTICOAGULATION MANAGEMENT       ASSESSMENT       Source(s): Chart Review and Patient/Caregiver Call       Warfarin doses taken: Missed dose(s) may be affecting INR    Diet: Vomiting and diarrhea x2 days may be affecting diet and INR    New illness, injury, or hospitalization: Yes: Things she got Norovirus from family    Medication/supplement changes: None noted    Signs or symptoms of bleeding or clotting: No    Previous INR: Therapeutic last visit; previously outside of goal range    Additional findings: So far has kept down crackers and water/soda today.         PLAN     Recommended plan for temporary change(s) affecting INR     Dosing Instructions: booster dose then continue your current warfarin dose with next INR in 1 week       Summary  As of 2/22/2023    Full warfarin instructions:  2/22: 15 mg; Otherwise 7.5 mg every Wed, Sat; 5 mg all other days   Next INR check:  3/1/2023             Telephone call with Nery who verbalizes understanding and agrees to plan    Lab visit scheduled    Education provided:     Symptom monitoring: monitoring for bleeding signs and symptoms and monitoring for clotting signs and symptoms    Importance of notifying anticoagulation clinic for: diarrhea, nausea/vomiting, reduced intake, cold/flu, and/or infections; a sooner lab recheck maybe needed    Contact [phone] with any changes, questions or concerns.     Plan made per [facility] anticoagulation protocol    Shannen Mcclendon RN  Anticoagulation Clinic  2/22/2023    _______________________________________________________________________     Anticoagulation Episode Summary     Current INR goal:  2.0-3.0   TTR:  45.6 % (11.5 mo)   Target end date:  Indefinite   Send INR reminders to:  BERNICE MARTINI    Indications    Anticoagulation monitoring  INR range 2-3 [Z79.01]  PAD (peripheral artery disease) (H) [I73.9]  Long term (current) use of anticoagulants [Z79.01]  Hypercoagulable state (H) [D68.59]  Bilateral carotid artery  disease  unspecified type (H) [I77.9]           Comments:           Anticoagulation Care Providers     Provider Role Specialty Phone number    Loraine Rees MD Referring Family Medicine [phone]    Vu Winters MD Referring Family Medicine [phone]

## 2023-03-01 ENCOUNTER — LAB (OUTPATIENT)
Dept: LAB | Facility: CLINIC | Age: 58
End: 2023-03-01
Payer: COMMERCIAL

## 2023-03-01 ENCOUNTER — ANTICOAGULATION THERAPY VISIT (OUTPATIENT)
Dept: ANTICOAGULATION | Facility: CLINIC | Age: 58
End: 2023-03-01

## 2023-03-01 DIAGNOSIS — D68.59 HYPERCOAGULABLE STATE (H): ICD-10-CM

## 2023-03-01 DIAGNOSIS — Z79.01 LONG TERM (CURRENT) USE OF ANTICOAGULANTS: ICD-10-CM

## 2023-03-01 DIAGNOSIS — I77.9 BILATERAL CAROTID ARTERY DISEASE, UNSPECIFIED TYPE (H): ICD-10-CM

## 2023-03-01 DIAGNOSIS — Z79.01 ANTICOAGULATION MONITORING, INR RANGE 2-3: Primary | ICD-10-CM

## 2023-03-01 DIAGNOSIS — I73.9 PAD (PERIPHERAL ARTERY DISEASE) (H): ICD-10-CM

## 2023-03-01 LAB — INR BLD: 3.8 (ref 0.9–1.1)

## 2023-03-01 PROCEDURE — 85610 PROTHROMBIN TIME: CPT

## 2023-03-01 PROCEDURE — 36416 COLLJ CAPILLARY BLOOD SPEC: CPT

## 2023-03-01 NOTE — PROGRESS NOTES
ANTICOAGULATION MANAGEMENT     Nery Whitaker 57 year old female is on warfarin with supratherapeutic INR result. (Goal INR 2.0-3.0)    Recent labs: (last 7 days)     03/01/23  1300   INR 3.8*       ASSESSMENT     Warfarin Lab Questionnaire    Dose in Tablet or Mg 3/1/2023   TAB or MG? milligram (mg)     Pt Rptd Dose JESSIE MONDAY TUESDAY WEDNESDAY THURSDAY FRIDAY SATURDAY   3/1/2023 5 5 5 15 5 5 5-- 7.5mg taken     Warfarin Lab Questionnaire 3/1/2023   Missed doses? Yes   If yes; please list when: last week i called   Medication changes? No   Abnormal bleeding? No   Shortness of breath? No   Injuries or illness since last INR? No-- feeling much better since last week, no more GI sx   Changes in diet or alcohol? No   Upcoming surgery, procedure? No   Best number to call with results? [phone]       Additional findings: booster dose taken on Weds which is likely contributing to today's elevated INR       PLAN     Recommended plan for temporary change(s) affecting INR     Dosing Instructions: partial hold then continue your current warfarin dose with next INR in 2 weeks       Summary  As of 3/1/2023    Full warfarin instructions:  3/1: 5 mg; Otherwise 7.5 mg every Wed, Sat; 5 mg all other days   Next INR check:               Telephone call with Nery who agrees to plan and repeated back plan correctly    Lab visit scheduled    Education provided:     Contact [phone] with any changes, questions or concerns.     Plan made per [facility] anticoagulation protocol    Marjorie Ball RN  Anticoagulation Clinic  3/1/2023    _______________________________________________________________________     Anticoagulation Episode Summary     Current INR goal:  2.0-3.0   TTR:  44.9 % (1 y)   Target end date:  Indefinite   Send INR reminders to:  BERNICE MARTINI    Indications    Anticoagulation monitoring  INR range 2-3 [Z79.01]  PAD (peripheral artery disease) (H) [I73.9]  Long term (current) use of anticoagulants  [Z79.01]  Hypercoagulable state (H) [D68.59]  Bilateral carotid artery disease  unspecified type (H) [I77.9]           Comments:           Anticoagulation Care Providers     Provider Role Specialty Phone number    Loraine Rees MD Referring Family Medicine [phone]    Vu Winters MD Referring Family Medicine [phone]

## 2023-03-15 ENCOUNTER — LAB (OUTPATIENT)
Dept: LAB | Facility: CLINIC | Age: 58
End: 2023-03-15
Payer: COMMERCIAL

## 2023-03-15 ENCOUNTER — ANTICOAGULATION THERAPY VISIT (OUTPATIENT)
Dept: ANTICOAGULATION | Facility: CLINIC | Age: 58
End: 2023-03-15

## 2023-03-15 DIAGNOSIS — Z79.01 LONG TERM (CURRENT) USE OF ANTICOAGULANTS: ICD-10-CM

## 2023-03-15 DIAGNOSIS — I73.9 PAD (PERIPHERAL ARTERY DISEASE) (H): ICD-10-CM

## 2023-03-15 DIAGNOSIS — I77.9 BILATERAL CAROTID ARTERY DISEASE, UNSPECIFIED TYPE (H): ICD-10-CM

## 2023-03-15 DIAGNOSIS — D68.59 HYPERCOAGULABLE STATE (H): ICD-10-CM

## 2023-03-15 LAB — INR BLD: 3 (ref 0.9–1.1)

## 2023-03-15 PROCEDURE — 36416 COLLJ CAPILLARY BLOOD SPEC: CPT

## 2023-03-15 PROCEDURE — 85610 PROTHROMBIN TIME: CPT

## 2023-03-15 NOTE — PROGRESS NOTES
ANTICOAGULATION MANAGEMENT     Nery Whitaker 57 year old female is on warfarin with therapeutic INR result. (Goal INR 2.0-3.0)    Recent labs: (last 7 days)     03/15/23  1304   INR 3.0*       ASSESSMENT     Warfarin Lab Questionnaire    Dose in Tablet or Mg 3/15/2023   TAB or MG? milligram (mg)     Pt Rptd Dose JESSIE MONDAY TUESDAY WEDNESDAY THURSDAY FRIDAY SATURDAY   3/15/2023 5 5 5 7.5 5 5 7.5     Warfarin Lab Questionnaire 3/15/2023   Missed doses? No   If yes; please list when: -   Medication changes? No   Abnormal bleeding? No   Shortness of breath? No   Injuries or illness since last INR? No   Changes in diet or alcohol? No   Upcoming surgery, procedure? No   Best number to call with results? [phone]       Additional findings: Pt reports that she feels back to her baseline now.       PLAN       Dosing Instructions: Continue your current warfarin dose with next INR in 3 weeks       Summary  As of 3/15/2023    Full warfarin instructions:  7.5 mg every Wed, Sat; 5 mg all other days   Next INR check:  4/5/2023             Telephone call with Nery who verbalizes understanding and agrees to plan    Lab visit scheduled    Education provided:     Please call back if any changes to your diet, medications or how you've been taking warfarin    Contact [phone] with any changes, questions or concerns.     Plan made per [facility] anticoagulation protocol    Kanwal Horne RN  Anticoagulation Clinic  3/15/2023    _______________________________________________________________________     Anticoagulation Episode Summary     Current INR goal:  2.0-3.0   TTR:  44.9 % (1 y)   Target end date:  Indefinite   Send INR reminders to:  BERNICE MARTINI    Indications    Anticoagulation monitoring  INR range 2-3 [Z79.01]  PAD (peripheral artery disease) (H) [I73.9]  Long term (current) use of anticoagulants [Z79.01]  Hypercoagulable state (H) [D68.59]  Bilateral carotid artery disease  unspecified type (H) [I77.9]            Comments:           Anticoagulation Care Providers     Provider Role Specialty Phone number    Loraine Rees MD Referring Family Medicine [phone]    Vu Winters MD Referring Family Medicine [phone]

## 2023-04-02 ENCOUNTER — OFFICE VISIT (OUTPATIENT)
Dept: FAMILY MEDICINE | Facility: CLINIC | Age: 58
End: 2023-04-02
Payer: COMMERCIAL

## 2023-04-02 VITALS
HEART RATE: 55 BPM | OXYGEN SATURATION: 98 % | TEMPERATURE: 97.9 F | DIASTOLIC BLOOD PRESSURE: 61 MMHG | WEIGHT: 120 LBS | SYSTOLIC BLOOD PRESSURE: 125 MMHG | BODY MASS INDEX: 20.15 KG/M2

## 2023-04-02 DIAGNOSIS — N89.8 VAGINAL DISCHARGE: ICD-10-CM

## 2023-04-02 DIAGNOSIS — R39.15 URINARY URGENCY: Primary | ICD-10-CM

## 2023-04-02 LAB
ALBUMIN UR-MCNC: NEGATIVE MG/DL
APPEARANCE UR: CLEAR
BACTERIA #/AREA URNS HPF: ABNORMAL /HPF
BILIRUB UR QL STRIP: NEGATIVE
CLUE CELLS: ABNORMAL
COLOR UR AUTO: YELLOW
GLUCOSE UR STRIP-MCNC: NEGATIVE MG/DL
HGB UR QL STRIP: ABNORMAL
KETONES UR STRIP-MCNC: NEGATIVE MG/DL
LEUKOCYTE ESTERASE UR QL STRIP: NEGATIVE
NITRATE UR QL: NEGATIVE
PH UR STRIP: 6 [PH] (ref 5–8)
RBC #/AREA URNS AUTO: ABNORMAL /HPF
SP GR UR STRIP: 1.02 (ref 1–1.03)
SQUAMOUS #/AREA URNS AUTO: ABNORMAL /LPF
TRICHOMONAS, WET PREP: ABNORMAL
UROBILINOGEN UR STRIP-ACNC: 0.2 E.U./DL
WBC #/AREA URNS AUTO: ABNORMAL /HPF
WBC'S/HIGH POWER FIELD, WET PREP: ABNORMAL
YEAST, WET PREP: ABNORMAL

## 2023-04-02 PROCEDURE — 99213 OFFICE O/P EST LOW 20 MIN: CPT | Performed by: FAMILY MEDICINE

## 2023-04-02 PROCEDURE — 81001 URINALYSIS AUTO W/SCOPE: CPT | Performed by: FAMILY MEDICINE

## 2023-04-02 PROCEDURE — 87210 SMEAR WET MOUNT SALINE/INK: CPT | Performed by: FAMILY MEDICINE

## 2023-04-02 NOTE — PATIENT INSTRUCTIONS
Your urinalysis is normal.  It does not appear that you have a urinary tract infection and your wet prep is normal and does not show that you have a yeast infection or any other abnormality.  No antibiotics are indicated at this time.    You may be having some bladder irritation which can be causing your symptoms.  I would avoid caffeine as this may be attributed to these types of symptoms.    What is a bladder irritant?   A bladder irritant is any food, drink, or medication that causes the bladder to be irritated. Irritation can cause frequency (needing to urinate more often than normal), urgency (the sense of needing to urinate), bladder spasms, and even bladder pain. Bladder spasms can lead to urine leakage if there is a sudden urge, but not enough time to reach a toilet.     What are some examples of bladder irritants?   The following is a list of bladder irritants. The seven MOST IRRITATING are listed first:   *All alcoholic beverages   *Cigarettes/Tobacco   *Cola drinks   *Tea   *Artificial Sweeteners   *Chocolate   *Coffee     Other possible irritants include:   Fruits (and their juices):   cranberries, grapes, oranges, tamie,   peaches, pineapple, plums, apples, and cantaloupe   Vegetables: onions, tomatoes, chilies, peppers   Milk/Dairy: aged cheese, sour cream, yogurt   Grains: rye & sourdough breads   Seasonings: spices & spicy food, especially peppers, acidic foods and beverages, walnuts &   peanuts, vinegar

## 2023-04-02 NOTE — PROGRESS NOTES
Assessment:       Urinary urgency    - UA macro with reflex to Microscopic and Culture - Clinc Collect  - UA Microscopic with Reflex to Culture    Vaginal discharge    - Wet prep - Clinic Collect         Plan:     Patient with several week history of urinary urgency and frequency as well as some whitish vaginal discharge.  Suspect vaginal discharge is likely physiologic.  Continue to monitor symptoms.  No evidence of infection.  UA normal and discussed with patient.  Follow-up if symptoms getting worse or not improving with PCP in 2 weeks.      Subjective:       57 year old female presents for evaluation of a couple week history of increased urinary frequency and urgency.  She denies any dysuria, blood in her urine, back pain, abdominal pain, or vaginal itching or irritation.  She has noticed some whitish vaginal discharge.  She has not had intercourse in over a year.  Has had some low back pain off and on.    Patient Active Problem List   Diagnosis     Adenomatous colon polyp     Anticoagulation monitoring, INR range 2-3     Anxiety state     Bilateral carotid artery disease (H)     Diverticulosis of large intestine without hemorrhage     Dyslipidemia     Elevated lipoprotein(a)     Essential hypertension     Hypercoagulable state (H)     Migraine     Multiple skin nodules     Nocturnal enuresis     Normocytic anemia     PAD (peripheral artery disease) (H)     Thoracic neuralgia     Tobacco use disorder     Atypical lymphocytosis     Thoracic spine pain     Encounter for routine adult health examination without abnormal findings     Age-related osteoporosis without current pathological fracture     Benign paroxysmal positional vertigo of right ear     Long term (current) use of anticoagulants     Bilateral carotid artery disease, unspecified type (H)       Past Medical History:   Diagnosis Date     Abdominal pain, epigastric 12/2/2017     Abnormal cardiovascular stress test 12/13/2017     Anemia      Anxiety       Bilateral carotid artery disease (H)      Coronary artery disease      Dyslipidemia      Foreign body in left foot      Foreign body in left foot, subsequent encounter 10/11/2018     Heart murmur      Hypertension      Intermittent palpitations 12/2/2017     Migraines      Newly recognized murmur 10/18/2018     PAD (peripheral artery disease) (H)      Pure hyperglyceridemia 9/2/2021    Formatting of this note might be different from the original. Has been intoleratant to several medications.    .     Urge incontinence of urine 11/4/2018    Last Assessment & Plan:  Symptoms most consistent with urge incontinence. Ultrasound ordered to evaluate for any issues with incomplete emptying. Trial of Detrol LA 2 mg daily.  Last Assessment & Plan:  Formatting of this note might be different from the original. Detrol LA has been helpful but dries out her mouth. We will try a lower, short acting dose.     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       Past Surgical History:   Procedure Laterality Date     APPENDECTOMY       ARTERIAL BYPASS SURGERY  2006     BIOPSY BREAST Left     benign     BYPASS GRAFT AORTOFEMORAL Bilateral      CHOLECYSTECTOMY       FEMORAL ARTERY STENT  2006     FOOT MASS EXCISION Left 2018    Soft tissue mass - benign     IR MISCELLANEOUS PROCEDURE  1/17/2006     IR MISCELLANEOUS PROCEDURE  1/17/2006     IR MISCELLANEOUS PROCEDURE  1/17/2006     IR MISCELLANEOUS PROCEDURE  1/17/2006     IR MISCELLANEOUS PROCEDURE  1/18/2006     TONSILLECTOMY       TYMPANOSTOMY TUBE PLACEMENT         Current Outpatient Medications   Medication     albuterol (PROAIR HFA/PROVENTIL HFA/VENTOLIN HFA) 108 (90 Base) MCG/ACT inhaler     alendronate (FOSAMAX) 70 MG tablet     amLODIPine (NORVASC) 5 MG tablet     citalopram (CELEXA) 20 MG tablet     clindamycin (CLEOCIN T) 1 % external lotion     famotidine (PEPCID) 20 MG tablet     folic acid (FOLVITE) 1 MG tablet     gabapentin (NEURONTIN) 100 MG capsule     lisinopril (ZESTRIL) 20 MG tablet      REPATHA SURECLICK 140 MG/ML prefilled autoinjector     tolterodine (DETROL) 1 MG tablet     warfarin ANTICOAGULANT (COUMADIN) 5 MG tablet     aspirin 81 MG EC tablet     benzonatate (TESSALON) 100 MG capsule     penicillin V (VEETID) 500 MG tablet     No current facility-administered medications for this visit.       Allergies   Allergen Reactions     Ezetimibe      constipation     Lovastatin Unknown     constipation     Simvastatin      Other reaction(s): Constipation  Intolerance, but tolerated with re-challenge in Feb to April 2009       Family History   Problem Relation Age of Onset     Peripheral Vascular Disease Father      Breast Cancer Mother      Other - See Comments Father         vasular problems      No Known Problems Sister      No Known Problems Brother      No Known Problems Son      No Known Problems Maternal Grandmother      Coronary Artery Disease Maternal Grandfather      Cancer Paternal Grandmother         lung      Cancer Paternal Grandfather         kidney      Colon Cancer No family hx of      Prostate Cancer No family hx of        Social History     Socioeconomic History     Marital status:      Spouse name: None     Number of children: 1     Years of education: None     Highest education level: None   Tobacco Use     Smoking status: Every Day     Packs/day: 1.00     Years: 38.00     Pack years: 38.00     Types: Cigarettes     Smokeless tobacco: Never     Tobacco comments:     1 ppd since 17 years old   Vaping Use     Vaping status: Never Used   Substance and Sexual Activity     Alcohol use: No     Drug use: No     Sexual activity: Yes     Partners: Male     Birth control/protection: Post-menopausal         Review of Systems  Pertinent items are noted in HPI.      Objective:     /61   Pulse 55   Temp 97.9  F (36.6  C) (Oral)   Wt 54.4 kg (120 lb)   SpO2 98%   BMI 20.15 kg/m       General appearance: alert, appears stated age and cooperative  Back: No vertebral  tenderness.  Lungs: clear to auscultation bilaterally  Abdomen: soft, non-tender; bowel sounds normal; no masses,  no organomegaly  Extremities: Warm and well perfused      Results for orders placed or performed in visit on 04/02/23   UA macro with reflex to Microscopic and Culture - [facility] Collect     Status: Abnormal    Specimen: Urine, Clean Catch   Result Value Ref Range    Color Urine Yellow Colorless, Straw, Light Yellow, Yellow    Appearance Urine Clear Clear    Glucose Urine Negative Negative mg/dL    Bilirubin Urine Negative Negative    Ketones Urine Negative Negative mg/dL    Specific Gravity Urine 1.025 1.005 - 1.030    Blood Urine Trace (A) Negative    pH Urine 6.0 5.0 - 8.0    Protein Albumin Urine Negative Negative mg/dL    Urobilinogen Urine 0.2 0.2, 1.0 E.U./dL    Nitrite Urine Negative Negative    Leukocyte Esterase Urine Negative Negative   UA Microscopic with Reflex to Culture     Status: Abnormal   Result Value Ref Range    Bacteria Urine Few (A) None Seen /HPF    RBC Urine 0-2 0-2 /HPF /HPF    WBC Urine 0-5 0-5 /HPF /HPF    Squamous Epithelials Urine Few (A) None Seen /LPF    Narrative    Urine Culture not indicated   Wet prep - Clinic Collect     Status: Abnormal    Specimen: Vagina; Swab   Result Value Ref Range    Trichomonas Absent Absent    Yeast Absent Absent    Clue Cells Absent Absent    WBCs/high power field 1+ (A) None       This note has been dictated using voice recognition software. Any grammatical or context distortions are unintentional and inherent to the software

## 2023-04-05 ENCOUNTER — ANTICOAGULATION THERAPY VISIT (OUTPATIENT)
Dept: ANTICOAGULATION | Facility: CLINIC | Age: 58
End: 2023-04-05

## 2023-04-05 ENCOUNTER — LAB (OUTPATIENT)
Dept: LAB | Facility: CLINIC | Age: 58
End: 2023-04-05
Payer: COMMERCIAL

## 2023-04-05 DIAGNOSIS — Z79.01 LONG TERM (CURRENT) USE OF ANTICOAGULANTS: ICD-10-CM

## 2023-04-05 DIAGNOSIS — I73.9 PAD (PERIPHERAL ARTERY DISEASE) (H): ICD-10-CM

## 2023-04-05 DIAGNOSIS — D68.59 HYPERCOAGULABLE STATE (H): ICD-10-CM

## 2023-04-05 DIAGNOSIS — I77.9 BILATERAL CAROTID ARTERY DISEASE, UNSPECIFIED TYPE (H): ICD-10-CM

## 2023-04-05 DIAGNOSIS — Z79.01 ANTICOAGULATION MONITORING, INR RANGE 2-3: Primary | ICD-10-CM

## 2023-04-05 LAB — INR BLD: 2.8 (ref 0.9–1.1)

## 2023-04-05 PROCEDURE — 85610 PROTHROMBIN TIME: CPT

## 2023-04-05 PROCEDURE — 36416 COLLJ CAPILLARY BLOOD SPEC: CPT

## 2023-04-05 NOTE — PROGRESS NOTES
ANTICOAGULATION MANAGEMENT     Nery Whitaker 57 year old female is on warfarin with therapeutic INR result. (Goal INR 2.0-3.0)    Recent labs: (last 7 days)     04/05/23  1313   INR 2.8*       ASSESSMENT     Warfarin Lab Questionnaire        4/5/2023     1:14 PM   Dose in Tablet or Mg   TAB or MG? milligram (mg)     Pt Rptd Dose SUNDAY MONDAY TUESDAY WED THURS FRIDAY SATURDAY 4/5/2023   1:14 PM 5 5 5 7.5 5 5 7.5         4/5/2023     1:14 PM   Warfarin Lab Questionnaire   Missed doses? No   Medication changes? No   Abnormal bleeding? No   Shortness of breath? No   Injuries or illness since last INR? No   Changes in diet or alcohol? No   Upcoming surgery, procedure? No   Best number to call with results? [phone]       Additional findings: None       PLAN     Recommended plan for no diet, medication or health factor changes affecting INR     Dosing Instructions: Continue your current warfarin dose with next INR in 4 weeks       Summary  As of 4/5/2023    Full warfarin instructions:  7.5 mg every Wed, Sat; 5 mg all other days   Next INR check:  5/3/2023             Telephone call with Nery who verbalizes understanding and agrees to plan    Lab visit scheduled    Education provided:     Contact [phone] with any changes, questions or concerns.     Plan made per [facility] anticoagulation protocol    Marjorie Ball RN  Anticoagulation Clinic  4/5/2023    _______________________________________________________________________     Anticoagulation Episode Summary     Current INR goal:  2.0-3.0   TTR:  47.7 % (1 y)   Target end date:  Indefinite   Send INR reminders to:  BERNICE MARTINI    Indications    Anticoagulation monitoring  INR range 2-3 [Z79.01]  PAD (peripheral artery disease) (H) [I73.9]  Long term (current) use of anticoagulants [Z79.01]  Hypercoagulable state (H) [D68.59]  Bilateral carotid artery disease  unspecified type (H) [I77.9]           Comments:           Anticoagulation Care Providers      Provider Role Specialty Phone number    Loraine Rees MD Referring Family Medicine [phone]    Vu Winters MD Referring Family Medicine [phone]

## 2023-04-11 RX ORDER — ALBUTEROL SULFATE 90 UG/1
2 AEROSOL, METERED RESPIRATORY (INHALATION) EVERY 6 HOURS PRN
Qty: 18 G | Refills: 0 | Status: SHIPPED | OUTPATIENT
Start: 2023-04-11

## 2023-04-23 ENCOUNTER — HEALTH MAINTENANCE LETTER (OUTPATIENT)
Age: 58
End: 2023-04-23

## 2023-05-02 NOTE — PROGRESS NOTES
FOOT AND ANKLE SURGERY/PODIATRY Progress Note        ASSESSMENT:   S/p Excision soft tissue mass left foot       TREATMENT:  -Surgical site left foot is progressing well. Gauze dressing applied today. I cautioned the patient to remain non-weight bearing at all times. Surgical shoe for stability.   -Pathology report indicates the presence of verruca. Will monitor and discuss treatment for this condition if necessary.   -She will keep the gauze dressing intact and follow-up in one week.       Markus Marie, [facility]      HPI: Nery Whitaker was seen again today s/p excision soft tissue mass left foot. She is doing well. Minimal foot pain. She has tried to remain non-weight bearing on the left foot.     Past Medical History:   Diagnosis Date     Anxiety      Bilateral carotid artery disease (H)      Dyslipidemia      Foreign body in left foot      Heart murmur      Hypertension      Migraines      PAD (peripheral artery disease) (H)        Allergies   Allergen Reactions     Ezetimibe Unknown     constipation     Lovastatin Unknown     constipation     Simvastatin Other (See Comments)     Intolerance, but tolerated with re-challenge in Feb to April 2009         Current Outpatient Prescriptions:      amLODIPine (NORVASC) 5 MG tablet, Take 1 tablet (5 mg total) by mouth daily. (Patient taking differently: Take 5 mg by mouth every evening. ), Disp: 90 tablet, Rfl: 3     aspirin 81 MG EC tablet, Take 81 mg by mouth every evening. , Disp: , Rfl:      citalopram (CELEXA) 20 MG tablet, Take 1 tablet (20 mg total) by mouth daily. (Patient taking differently: Take 20 mg by mouth every evening. ), Disp: 90 tablet, Rfl: 3     folic acid (FOLVITE) 1 MG tablet, Take 1 mg by mouth every evening. , Disp: , Rfl:      gabapentin (NEURONTIN) 100 MG capsule, Take 100 mg by mouth 3 (three) times a day., Disp: 270 capsule, Rfl: 3     lisinopril (PRINIVIL,ZESTRIL) 20 MG tablet, Take 1 tablet (20 mg total) by mouth  daily. (Patient taking differently: Take 20 mg by mouth every evening. ), Disp: 90 tablet, Rfl: 3     ranitidine (ZANTAC) 150 MG tablet, Take 1 tablet (150 mg total) by mouth 2 (two) times a day., Disp: 180 tablet, Rfl: 3     warfarin (COUMADIN) 5 MG tablet, Take 1 tablet (5 mg total) by mouth daily., Disp: 100 tablet, Rfl: 3     HYDROcodone-acetaminophen (NORCO) 5-325 mg per tablet, Take 1 tablet by mouth every 4 (four) hours as needed for pain., Disp: 40 tablet, Rfl: 0    Review of Systems - Negative       OBJECTIVE:  Appearance: alert, well appearing, and in no distress.    Vitals:    11/01/18 1335   BP: 130/64   Pulse: 68   Resp: 16   Temp: 98.3  F (36.8  C)       Vascular: Dorsalis pedis palpableLeft.  Dermatologic: Sutures intact plantar left forefoot with skin edges well approximated. No gapping or erythema noted.   Neurologic: Intact to light touch Left.  Musculoskeletal: Contracted digits noted Left.    Imaging: None    Surgical Pathology Exam   Order: [id number]   Status:  Final result   Visible to patient:  Yes (MyChart)      Ref Range & Units 9d ago     Final Diagnosis   SKIN AND SOFT TISSUE, LEFT FOOT, REMOVAL OF FOREIGN BODY:     1) VERRUCA PLANTARIS     2) FIBROADIPOSE SOFT TISSUE WITH MILD FIBROSIS AND EDEMA, WITHOUT INCREASED         INFLAMMATION     3) NO EVIDENCE OF MALIGNANCY              Instructions: This plan will send the code FBSE to the PM system.  DO NOT or CHANGE the price. Detail Level: Simple Price (Do Not Change): 0.00

## 2023-05-03 ENCOUNTER — OFFICE VISIT (OUTPATIENT)
Dept: PULMONOLOGY | Facility: CLINIC | Age: 58
End: 2023-05-03
Payer: COMMERCIAL

## 2023-05-03 VITALS
DIASTOLIC BLOOD PRESSURE: 72 MMHG | BODY MASS INDEX: 20.09 KG/M2 | OXYGEN SATURATION: 98 % | SYSTOLIC BLOOD PRESSURE: 116 MMHG | WEIGHT: 119.6 LBS | HEART RATE: 58 BPM

## 2023-05-03 DIAGNOSIS — F17.210 CIGARETTE NICOTINE DEPENDENCE, UNCOMPLICATED: Primary | ICD-10-CM

## 2023-05-03 PROCEDURE — 99213 OFFICE O/P EST LOW 20 MIN: CPT | Performed by: INTERNAL MEDICINE

## 2023-05-03 NOTE — PROGRESS NOTES
"PULMONARY CLINIC FOLLOW UP NOTE    History:     HPI: Nery Whitaker is a 56 year old female, smoker, who is here for follow-up.  Patient was initially referred to us because of long history of tobacco use and need for screening.      Interval History:   Patient is here for her scheduled follow-up visit. She notes that she is doing well. No hospitalizations, abx, steroids for lung issues. She is able to walk about \"many\" blocks and limited by back pain.  She has a cough that is chronic and unchanged. No hemoptysis, NS, WL, or wheezing. She had covid back on September of 2022 but not hospitalized. She is vaccinated. She is smoking 3/4 pack daily.      PMHx/PSHx:  CAD  Hypertension  Peripheral artery disease s/p stenting and is on coumadin  Migraine headaches  Bilateral carotid artery disease  Anxiety  Anemia  History of appendectomy  History of femoral artery stent  Cholecystectomy  Tympanostomy tube placement     Social history:  Smoker. Smokes 1 pack/day. Has been smoking since age of 17.  Dental assistant    ROS: 10 point review of system done. Pertinent findings are noted in the HPI.    Exam/Data:   /72 (BP Location: Left arm, Patient Position: Chair, Cuff Size: Adult Regular)   Pulse 58   Wt 54.3 kg (119 lb 9.6 oz)   SpO2 98%   BMI 20.09 kg/m  , Body mass index is 20.09 kg/m .  EXAM:  GEN: comfortable, NAD  HEENT: NCAT, EMOI  CVS: S1S2, RRR  Lung: CTA  Abd: soft, nt, + BS. No masses  Ext: no c/c/e  Neuro: nonfocal  Skin: no visible rash  Musculoskeletal: FROM all extremities  Psych: appropriate    Data:     Labs personally reviewed.      IMAGING: personally reviewed images. Formal radiology interpretation noted below.      XR Thoracic Spine 2 Views    Result Date: 9/17/2021  2 views thoracic spine radiographs 9/17/2021 9:57 AM History: Pain in thoracic spine Comparison: Thoracic spine MRI to 4/20/2021, chest CT 7/28/2012 Findings: Standing  AP and lateral  views of the thoracic spine were obtained. 12 " rib bearing vertebral bodies are identified. Osteopenic appearance of the thoracic spine. There is no acute osseous abnormality.  Upper thoracic and lower cervical spine partially obscured soft tissues and shoulders. Diffuse degenerative changes of the thoracic spine. Calcification of the thoracic aorta, better delineated on prior chest CT and MR. The visualized lungs are clear. Cardiomediastinal silhouette is within normal limits.     Impression: 1.  No acute osseous abnormality. Subtle nondisplaced fractures could be obscured in the setting of osteopenic appearing bone. 2.  Diffuse degenerative changes of the thoracic spine, better delineated on prior CT and MR. TING NORWOOD MD   SYSTEM ID:  [id number]      CT chest 2018:   No acute process detected in the chest, abdomen or pelvis.    CT scan on 7/2021:  IMPRESSION:  Mild emphysema  Negative for lung cancer screening purposes.    Assessment/Plan:       Nery Whitaker is a 56 year old female, smoker, who is here for follow up. PFT's are normal with exception of mildly decrease DLCO.  CT scan of the chest showed mild paraseptal emphysema.  Low-dose CT scan 7/2022 done last year is negative.    Recommendations:  On albuterol inhaler - uses approximately once a week.  Yearly low-dose CT chest -ordered for this year 7/2023  Counseled on smoking essation  Vaccinated for COVID-19    FOLLOW UP: 1 year      Brooklynn Navarro MD  Pulmonary and Critical Care Medicine  Electronically Signed on 05/03/2023    Current Outpatient Medications   Medication Sig Dispense Refill     albuterol (PROAIR HFA/PROVENTIL HFA/VENTOLIN HFA) 108 (90 Base) MCG/ACT inhaler Inhale 2 puffs into the lungs every 6 hours as needed for shortness of breath or wheezing 18 g 0     alendronate (FOSAMAX) 70 MG tablet Take 1 tablet by mouth once a week 12 tablet 0     amLODIPine (NORVASC) 5 MG tablet Take 1 tablet by mouth once daily 90 tablet 0     aspirin 81 MG EC tablet Take 81 mg by mouth daily        citalopram (CELEXA) 20 MG tablet Take 1 tablet by mouth once daily 90 tablet 0     clindamycin (CLEOCIN T) 1 % external lotion APPLY LOTION TOPICALLY DAILY TO UPPER BACK       famotidine (PEPCID) 20 MG tablet Take 1 tablet by mouth twice daily 180 tablet 3     folic acid (FOLVITE) 1 MG tablet Take 1 tablet by mouth once daily 90 tablet 1     gabapentin (NEURONTIN) 100 MG capsule Take 1 tab in the morning, 1 tab at noon, and 3 tabs at bedtime daily. 450 capsule 3     lisinopril (ZESTRIL) 20 MG tablet Take 1 tablet by mouth once daily 90 tablet 3     REPATHA SURECLICK 140 MG/ML prefilled autoinjector INJECT 1ML (140MG) SUBCUTANEOUS EVERY 2 WEEKS. INJECT INTO ABDOMEN THIGH OR UPPER ARM. ROTATE INJECTION SITES.       tolterodine (DETROL) 1 MG tablet Take 1 tablet (1 mg) by mouth At Bedtime 90 tablet 3     warfarin ANTICOAGULANT (COUMADIN) 5 MG tablet TAKE 1 TABLET BY MOUTH ONCE DAILY OR  AS  DIRECTED.  ADJUST  DOSE  BASED  ON  INR  RESULTS 100 tablet 3     penicillin V (VEETID) 500 MG tablet TAKE 4 TABLETS BY MOUTH WITH WATER 1 HOUR PRIOR TO APPOINTMENT. THEN TAKE 1 TABLET FOUR TIMES DAILY UNTIL GONE       Allergies   Allergen Reactions     Ezetimibe      constipation     Lovastatin Unknown     constipation     Simvastatin      Other reaction(s): Constipation  Intolerance, but tolerated with re-challenge in Feb to April 2009       Meds and Allergies: See EHR for the updated medication list and Allergies. These were reviewed.     Much or all of the text in this note was generated through the use of the Dragon Dictate voice-to-text software. Errors in spelling or words which seem out of context are unintentional. Sound alike errors, in particular, may have escaped editing.    Lung Cancer Screening pre-scan counseling Visit    The patient fits the risk profile of patients who benefit from this screening:  -The patient is >55 years old and <80 years old  -The patient has 40 pack year history (over 30)  -The patient has  smoked within the past 15 years  -The patient has no medical comorbidity severe enough that it would cause mortality prior to mortality due to the lung cancer attempting to be detected.    Discussion with patient regarding the harms associated with LDCT screening include false-negative and false-positive results, incidental findings, overdiagnosis, and radiation exposure were reviewed at length.   The patient understands that pursuing this screening test may result in a biopsy that was not necessary. It may also produced added stress over a nodule that is likely not cancer.    Of 100 patients who get screening, 25 will have a positive scan. Of those 25, only 1 will have cancer.  Overdiagnosis is estimated at 10% of patients-- they would not have been detected in the patient's lifetime without screening. Less than 1% of patients likely had death related to radiation exposure increase.   Average low-dose CT associated with 0.61 to 1.5 mSv. Annual background radiation exposure in the [place] averages 2.4 mS; mammogram is 0.7mSv.    The benefits are reduction in risk of death from lung cancer. The number needed to treat is 320 (for every 320 patients who undergo screening, 1 patient will have a benefit in mortality from early detection from the screening).    Undergoing this screening implies willingness to pursue further potentially invasive testing to discover potential cancer.    All questions were answered.    The patient was counseled regarding smoking cessation and its risk for lung cancer.

## 2023-05-11 ENCOUNTER — MYC MEDICAL ADVICE (OUTPATIENT)
Dept: ANTICOAGULATION | Facility: CLINIC | Age: 58
End: 2023-05-11
Payer: COMMERCIAL

## 2023-05-11 NOTE — TELEPHONE ENCOUNTER
ANTICOAGULATION     Nery BISMARK Whitaker is overdue for INR check.     [employer] message sent    Marjorie Ball RN

## 2023-05-13 NOTE — TELEPHONE ENCOUNTER
ANTICOAGULATION  MANAGEMENT    Assessment     Today's INR result of 2.5 is Therapeutic (goal INR of 2.0-3.0)        Warfarin taken as previously instructed    No new diet changes affecting INR    No new medication/supplements affecting INR    Continues to tolerate warfarin with no reported s/s of bleeding or thromboembolism     Previous INR was Therapeutic    Plan:     Left detailed message for Nery regarding INR result and instructed:      Warfarin Dosing Instructions:  Continue current warfarin dose 7.5 mg daily on wed/sat; and 5 mg daily rest of week  (0 % change)    Instructed patient to follow up no later than: 4-6 weeks      Instructed to call the [facility] for any changes, questions or concerns. (#[phone])   ?   Celina Hoang RN    Subjective/Objective:      Nery Whitaker, a 55 y.o. female is on warfarin. Nery Gabriel reports:     Home warfarin dose: as updated on anticoagulation calendar per template     Missed doses: No     Medication changes:  No     S/S of bleeding or thromboembolism:  No     New Injury or illness:  No     Changes in diet or alcohol consumption:  No     Upcoming surgery, procedure or cardioversion:  No    Anticoagulation Episode Summary     Current INR goal:  2.0-3.0   TTR:  79.3 % (1 y)   Next INR check:  7/14/2021   INR from last check:  2.50 (6/16/2021)   Weekly max warfarin dose:     Target end date:     INR check location:     Preferred lab:     Send INR reminders to:  BERNICE MARTINI    Indications    Anticoagulation monitoring  INR range 2-3 [Z79.01]  PAD (peripheral artery disease) (H) [I73.9]           Comments:           Anticoagulation Care Providers     Provider Role Specialty Phone number    Loraine Rees MD Referring Family Medicine [phone]        
15

## 2023-05-17 ENCOUNTER — LAB (OUTPATIENT)
Dept: LAB | Facility: CLINIC | Age: 58
End: 2023-05-17
Payer: COMMERCIAL

## 2023-05-17 ENCOUNTER — ANTICOAGULATION THERAPY VISIT (OUTPATIENT)
Dept: ANTICOAGULATION | Facility: CLINIC | Age: 58
End: 2023-05-17

## 2023-05-17 DIAGNOSIS — Z79.01 LONG TERM (CURRENT) USE OF ANTICOAGULANTS: ICD-10-CM

## 2023-05-17 DIAGNOSIS — D68.59 HYPERCOAGULABLE STATE (H): ICD-10-CM

## 2023-05-17 DIAGNOSIS — I73.9 PAD (PERIPHERAL ARTERY DISEASE) (H): ICD-10-CM

## 2023-05-17 DIAGNOSIS — I77.9 BILATERAL CAROTID ARTERY DISEASE, UNSPECIFIED TYPE (H): ICD-10-CM

## 2023-05-17 DIAGNOSIS — Z79.01 ANTICOAGULATION MONITORING, INR RANGE 2-3: Primary | ICD-10-CM

## 2023-05-17 LAB — INR BLD: 1.3 (ref 0.9–1.1)

## 2023-05-17 PROCEDURE — 85610 PROTHROMBIN TIME: CPT

## 2023-05-17 PROCEDURE — 36416 COLLJ CAPILLARY BLOOD SPEC: CPT

## 2023-05-17 NOTE — PROGRESS NOTES
ANTICOAGULATION MANAGEMENT     Nery Whitaker 57 year old female is on warfarin with subtherapeutic INR result. (Goal INR 2.0-3.0)    Recent labs: (last 7 days)     05/17/23  1320   INR 1.3*       ASSESSMENT     Warfarin Lab Questionnaire    Warfarin Doses Last 7 Days      5/17/2023     1:22 PM   Dose in Tablet or Mg   TAB or MG? milligram (mg)     Pt Rptd Dose SUNDAY MONDAY TUESDAY WED THURS FRIDAY SATURDAY 5/17/2023   1:22 PM 5 5 5 7.5 5 5 7.5         5/17/2023   Warfarin Lab Questionnaire   Missed doses within past 14 days? No   Changes in diet or alcohol within past 14 days? No   Medication changes since last result? No   Injuries or illness since last result? No   New shortness of breath, severe headaches or sudden changes in vision since last result? No   Abnormal bleeding since last result? No   Upcoming surgery, procedure? No        Previous result: Therapeutic last 2(+) visits  Additional findings: her  has surgery last week and she has been taking care of him; states she has been stressed but doesn't believe she missed any doses       PLAN     Recommended plan for no diet, medication or health factor changes affecting INR     Dosing Instructions: booster dose then Increase your warfarin dose (12.5% change) with next INR in 1-2 weeks       Summary  As of 5/17/2023    Full warfarin instructions:  5/17: 10 mg; Otherwise 5 mg every Sun, Tue, Fri; 7.5 mg all other days   Next INR check:  5/26/2023             Telephone call with Nery who agrees to plan and repeated back plan correctly    Check at provider office visit    Education provided:     Symptom monitoring: monitoring for clotting signs and symptoms and monitoring for stroke signs and symptoms    Contact [phone] with any changes, questions or concerns.     Plan made per [facility] anticoagulation protocol    Marjorie Ball, RN  Anticoagulation Clinic  5/17/2023    _______________________________________________________________________      Anticoagulation Episode Summary     Current INR goal:  2.0-3.0   TTR:  51.7 % (1 y)   Target end date:  Indefinite   Send INR reminders to:  BERNICE MARTINI    Indications    Anticoagulation monitoring  INR range 2-3 [Z79.01]  PAD (peripheral artery disease) (H) [I73.9]  Long term (current) use of anticoagulants [Z79.01]  Hypercoagulable state (H) [D68.59]  Bilateral carotid artery disease  unspecified type (H) [I77.9]           Comments:           Anticoagulation Care Providers     Provider Role Specialty Phone number    Loraine Rees MD Referring Family Medicine [phone]    Vu Winters MD Referring Family Medicine [phone]

## 2023-05-25 ENCOUNTER — ANTICOAGULATION THERAPY VISIT (OUTPATIENT)
Dept: ANTICOAGULATION | Facility: CLINIC | Age: 58
End: 2023-05-25

## 2023-05-25 ENCOUNTER — LAB (OUTPATIENT)
Dept: LAB | Facility: CLINIC | Age: 58
End: 2023-05-25
Payer: COMMERCIAL

## 2023-05-25 DIAGNOSIS — I77.9 BILATERAL CAROTID ARTERY DISEASE, UNSPECIFIED TYPE (H): ICD-10-CM

## 2023-05-25 DIAGNOSIS — Z79.01 LONG TERM (CURRENT) USE OF ANTICOAGULANTS: ICD-10-CM

## 2023-05-25 DIAGNOSIS — D68.59 HYPERCOAGULABLE STATE (H): ICD-10-CM

## 2023-05-25 DIAGNOSIS — Z79.01 ANTICOAGULATION MONITORING, INR RANGE 2-3: Primary | ICD-10-CM

## 2023-05-25 DIAGNOSIS — I73.9 PAD (PERIPHERAL ARTERY DISEASE) (H): ICD-10-CM

## 2023-05-25 LAB — INR BLD: 2.3 (ref 0.9–1.1)

## 2023-05-25 PROCEDURE — 36416 COLLJ CAPILLARY BLOOD SPEC: CPT

## 2023-05-25 PROCEDURE — 85610 PROTHROMBIN TIME: CPT

## 2023-05-25 NOTE — PROGRESS NOTES
ANTICOAGULATION MANAGEMENT     Nery Whitaker 57 year old female is on warfarin with therapeutic INR result. (Goal INR 2.0-3.0)    Recent labs: (last 7 days)     05/25/23  1315   INR 2.3*       ASSESSMENT     Warfarin Lab Questionnaire    Warfarin Doses Last 7 Days      5/25/2023     1:18 PM   Dose in Tablet or Mg   TAB or MG? milligram (mg)     Pt Rptd Dose SUNDAY MONDAY TUESDAY WED THURS FRIDAY SATURDAY 5/25/2023   1:18 PM 5 7.5 7.5 5 7.5 5 7.5         5/25/2023   Warfarin Lab Questionnaire   Missed doses within past 14 days? No-taken differently (switched Tuesday and Wednesday dose)   Changes in diet or alcohol within past 14 days? No   Medication changes since last result? No   Injuries or illness since last result? No   New shortness of breath, severe headaches or sudden changes in vision since last result? No   Abnormal bleeding since last result? No   Upcoming surgery, procedure? No   Best number to call with results? [phone]        Previous result: Subtherapeutic dosing increased  Additional findings: None       PLAN     Recommended plan for temporary change(s) affecting INR     Dosing Instructions: Continue your current warfarin dose with next INR in 2 weeks.  Rearranged the days but kept the overall same increased dosing as planned from last week.         Summary  As of 5/25/2023    Full warfarin instructions:  5 mg every Mon, Wed, Fri; 7.5 mg all other days   Next INR check:  6/9/2023             Telephone call with Nery who verbalizes understanding and agrees to plan    Lab visit scheduled    Education provided:     Goal range and lab monitoring: goal range and significance of current result    Contact [phone] with any changes, questions or concerns.     Plan made per [facility] anticoagulation protocol    Melissa Ferreira, RN  Anticoagulation Clinic  5/25/2023    _______________________________________________________________________     Anticoagulation Episode Summary     Current INR goal:   2.0-3.0   TTR:  52.3 % (1 y)   Target end date:  Indefinite   Send INR reminders to:  BERNICE MARTINI    Indications    Anticoagulation monitoring  INR range 2-3 [Z79.01]  PAD (peripheral artery disease) (H) [I73.9]  Long term (current) use of anticoagulants [Z79.01]  Hypercoagulable state (H) [D68.59]  Bilateral carotid artery disease  unspecified type (H) [I77.9]           Comments:           Anticoagulation Care Providers     Provider Role Specialty Phone number    Loraine Rees MD Referring Family Medicine [phone]    Vu Winters MD Referring Family Medicine [phone]

## 2023-06-09 ENCOUNTER — LAB (OUTPATIENT)
Dept: LAB | Facility: CLINIC | Age: 58
End: 2023-06-09
Payer: COMMERCIAL

## 2023-06-09 ENCOUNTER — ANTICOAGULATION THERAPY VISIT (OUTPATIENT)
Dept: ANTICOAGULATION | Facility: CLINIC | Age: 58
End: 2023-06-09

## 2023-06-09 DIAGNOSIS — I77.9 BILATERAL CAROTID ARTERY DISEASE, UNSPECIFIED TYPE (H): ICD-10-CM

## 2023-06-09 DIAGNOSIS — I73.9 PAD (PERIPHERAL ARTERY DISEASE) (H): ICD-10-CM

## 2023-06-09 DIAGNOSIS — Z79.01 LONG TERM (CURRENT) USE OF ANTICOAGULANTS: ICD-10-CM

## 2023-06-09 DIAGNOSIS — D68.59 HYPERCOAGULABLE STATE (H): ICD-10-CM

## 2023-06-09 DIAGNOSIS — Z79.01 ANTICOAGULATION MONITORING, INR RANGE 2-3: Primary | ICD-10-CM

## 2023-06-09 LAB — INR BLD: 1.9 (ref 0.9–1.1)

## 2023-06-09 PROCEDURE — 36416 COLLJ CAPILLARY BLOOD SPEC: CPT

## 2023-06-09 PROCEDURE — 85610 PROTHROMBIN TIME: CPT

## 2023-06-09 NOTE — PROGRESS NOTES
ANTICOAGULATION MANAGEMENT     Nery Whitaker 57 year old female is on warfarin with subtherapeutic INR result. (Goal INR 2.0-3.0)    Recent labs: (last 7 days)     06/09/23  0812   INR 1.9*       ASSESSMENT     Warfarin Lab Questionnaire    Warfarin Doses Last 7 Days      6/9/2023     8:12 AM   Dose in Tablet or Mg   TAB or MG? milligram (mg)     Pt Rptd Dose SUNDAY MONDAY TUESDAY WED THURS FRIDAY SATURDAY 6/9/2023   8:12 AM 7.5 5 7.5 5 7.5 5 7.5         6/9/2023   Warfarin Lab Questionnaire   Missed doses within past 14 days? No   Changes in diet or alcohol within past 14 days? More greens   Medication changes since last result? No   Injuries or illness since last result? No   New shortness of breath, severe headaches or sudden changes in vision since last result? No   Abnormal bleeding since last result? No   Upcoming surgery, procedure? No   Best number to call with results? [phone]        Previous result: Therapeutic last visit; previously outside of goal range  Additional findings: None       PLAN     Recommended plan for ongoing change(s) affecting INR     Dosing Instructions: Increase your warfarin dose (5.6% change) with next INR in 2 weeks       Summary  As of 6/9/2023    Full warfarin instructions:  5 mg every Mon, Thu; 7.5 mg all other days   Next INR check:  6/23/2023             Telephone call with Nery who verbalizes understanding and agrees to plan    Check at provider office visit 6/23    Education provided:     Goal range and lab monitoring: goal range and significance of current result    Contact [phone] with any changes, questions or concerns.     Plan made per [facility] anticoagulation protocol    Melissa Ferreira, RN  Anticoagulation Clinic  6/9/2023    _______________________________________________________________________     Anticoagulation Episode Summary     Current INR goal:  2.0-3.0   TTR:  53.4 % (1 y)   Target end date:  Indefinite   Send INR reminders to:  BERNICE  VINI    Indications    Anticoagulation monitoring  INR range 2-3 [Z79.01]  PAD (peripheral artery disease) (H) [I73.9]  Long term (current) use of anticoagulants [Z79.01]  Hypercoagulable state (H) [D68.59]  Bilateral carotid artery disease  unspecified type (H) [I77.9]           Comments:           Anticoagulation Care Providers     Provider Role Specialty Phone number    Loraine Rees MD Referring Family Medicine [phone]    Vu Winters MD Referring Family Medicine [phone]

## 2023-06-30 ENCOUNTER — ANTICOAGULATION THERAPY VISIT (OUTPATIENT)
Dept: ANTICOAGULATION | Facility: CLINIC | Age: 58
End: 2023-06-30

## 2023-06-30 ENCOUNTER — LAB (OUTPATIENT)
Dept: LAB | Facility: CLINIC | Age: 58
End: 2023-06-30
Payer: COMMERCIAL

## 2023-06-30 DIAGNOSIS — I77.9 BILATERAL CAROTID ARTERY DISEASE, UNSPECIFIED TYPE (H): ICD-10-CM

## 2023-06-30 DIAGNOSIS — Z79.01 LONG TERM (CURRENT) USE OF ANTICOAGULANTS: ICD-10-CM

## 2023-06-30 DIAGNOSIS — I73.9 PAD (PERIPHERAL ARTERY DISEASE) (H): ICD-10-CM

## 2023-06-30 DIAGNOSIS — D68.59 HYPERCOAGULABLE STATE (H): ICD-10-CM

## 2023-06-30 DIAGNOSIS — Z79.01 ANTICOAGULATION MONITORING, INR RANGE 2-3: Primary | ICD-10-CM

## 2023-06-30 LAB — INR BLD: 2.8 (ref 0.9–1.1)

## 2023-06-30 PROCEDURE — 85610 PROTHROMBIN TIME: CPT

## 2023-06-30 PROCEDURE — 36416 COLLJ CAPILLARY BLOOD SPEC: CPT

## 2023-07-05 ENCOUNTER — HOSPITAL ENCOUNTER (OUTPATIENT)
Dept: CT IMAGING | Facility: HOSPITAL | Age: 58
Discharge: HOME | End: 2023-07-05
Attending: INTERNAL MEDICINE | Admitting: INTERNAL MEDICINE
Payer: COMMERCIAL

## 2023-07-05 DIAGNOSIS — F17.210 CIGARETTE NICOTINE DEPENDENCE, UNCOMPLICATED: ICD-10-CM

## 2023-07-05 PROCEDURE — 71271 CT THORAX LUNG CANCER SCR C-: CPT

## 2023-07-12 DIAGNOSIS — F41.1 ANXIETY STATE: ICD-10-CM

## 2023-07-12 DIAGNOSIS — I10 ESSENTIAL HYPERTENSION: ICD-10-CM

## 2023-07-12 DIAGNOSIS — K21.9 GASTROESOPHAGEAL REFLUX DISEASE WITHOUT ESOPHAGITIS: ICD-10-CM

## 2023-07-12 DIAGNOSIS — D68.59 HYPERCOAGULABLE STATE (H): ICD-10-CM

## 2023-07-12 RX ORDER — FAMOTIDINE 20 MG/1
TABLET, FILM COATED ORAL
Qty: 180 TABLET | Refills: 0 | OUTPATIENT
Start: 2023-07-12

## 2023-07-12 RX ORDER — AMLODIPINE BESYLATE 5 MG/1
TABLET ORAL
Qty: 90 TABLET | Refills: 3 | Status: SHIPPED | OUTPATIENT
Start: 2023-07-12

## 2023-07-12 RX ORDER — FOLIC ACID 1 MG/1
TABLET ORAL
Qty: 90 TABLET | Refills: 0 | Status: SHIPPED | OUTPATIENT
Start: 2023-07-12 | End: 2023-10-04

## 2023-07-12 RX ORDER — CITALOPRAM HYDROBROMIDE 20 MG/1
TABLET ORAL
Qty: 90 TABLET | Refills: 0 | Status: SHIPPED | OUTPATIENT
Start: 2023-07-12 | End: 2023-10-04

## 2023-07-12 NOTE — TELEPHONE ENCOUNTER
"Pt should have refills on file. Refill declined.      Disp Refills Start End CHANTAL   famotidine (PEPCID) 20 MG tablet 180 tablet 3 4/5/2022  No   Sig: Take 1 tablet by mouth twice daily       Requested Prescriptions   Pending Prescriptions Disp Refills     famotidine (PEPCID) 20 MG tablet [Pharmacy Med Name: Famotidine 20 MG Oral Tablet] 180 tablet 0     Sig: Take 1 tablet by mouth twice daily       H2 Blockers Protocol Passed - 7/12/2023  6:17 AM        Passed - Patient is age 12 or older        Passed - Recent (12 mo) or future (30 days) visit within the authorizing provider's specialty     Patient has had an office visit with the authorizing provider or a provider within the authorizing providers department within the previous 12 mos or has a future within next 30 days. See \"Patient Info\" tab in inbasket, or \"Choose Columns\" in Meds & Orders section of the refill encounter.              Passed - Medication is active on med list          Last Written Prescription Date:  1/10/2023  Last Fill Quantity: 90,  # refills: 1   Last office visit provider:  9/9/2022          folic acid (FOLVITE) 1 MG tablet [Pharmacy Med Name: Folic Acid 1 MG Oral Tablet] 90 tablet 0     Sig: Take 1 tablet by mouth once daily       Vitamin Supplements (Adult) Protocol Passed - 7/12/2023  6:17 AM        Passed - High dose Vitamin D not ordered        Passed - Recent (12 mo) or future (30 days) visit within the authorizing provider's specialty     Patient has had an office visit with the authorizing provider or a provider within the authorizing providers department within the previous 12 mos or has a future within next 30 days. See \"Patient Info\" tab in inbasket, or \"Choose Columns\" in Meds & Orders section of the refill encounter.              Passed - Medication is active on med list            Last Written Prescription Date:  4/11/2023  Last Fill Quantity: 90,  # refills: 0   Last office visit provider:  9/9/2022            citalopram " "(CELEXA) 20 MG tablet [Pharmacy Med Name: Citalopram Hydrobromide 20 MG Oral Tablet] 90 tablet 0     Sig: Take 1 tablet by mouth once daily       SSRIs Protocol Passed - 7/12/2023  6:17 AM        Passed - Recent (12 mo) or future (30 days) visit within the authorizing provider's specialty     Patient has had an office visit with the authorizing provider or a provider within the authorizing providers department within the previous 12 mos or has a future within next 30 days. See \"Patient Info\" tab in inbasket, or \"Choose Columns\" in Meds & Orders section of the refill encounter.              Passed - Medication is active on med list        Passed - Patient is age 18 or older        Passed - No active pregnancy on record        Passed - No positive pregnancy test in last 12 months        Routing refill request to provider for review/approval because:  Labs out of range. Not current    Last Written Prescription Date:  4/11/2023  Last Fill Quantity: 90,  # refills: 0   Last office visit provider:  9/9/2023          amLODIPine (NORVASC) 5 MG tablet [Pharmacy Med Name: amLODIPine Besylate 5 MG Oral Tablet] 90 tablet 0     Sig: Take 1 tablet by mouth once daily       Calcium Channel Blockers Protocol  Failed - 7/12/2023  6:17 AM        Failed - Normal serum creatinine on file in past 12 months     Recent Labs   Lab Test 02/25/22  0815   CR 0.83       Ok to refill medication if creatinine is low          Passed - Blood pressure under 140/90 in past 12 months     BP Readings from Last 3 Encounters:   05/03/23 116/72   04/02/23 125/61   05/17/22 138/56                 Passed - Recent (12 mo) or future (30 days) visit within the authorizing provider's specialty     Patient has had an office visit with the authorizing provider or a provider within the authorizing providers department within the previous 12 mos or has a future within next 30 days. See \"Patient Info\" tab in inbasket, or \"Choose Columns\" in Meds & Orders section of " the refill encounter.              Passed - Medication is active on med list        Passed - Patient is age 18 or older        Passed - No active pregnancy on record        Passed - No positive pregnancy test in past 12 months             Yajaira Osman RN 07/12/23 11:19 AM

## 2023-07-14 DIAGNOSIS — K21.9 GASTROESOPHAGEAL REFLUX DISEASE WITHOUT ESOPHAGITIS: ICD-10-CM

## 2023-07-14 RX ORDER — FAMOTIDINE 20 MG/1
20 TABLET, FILM COATED ORAL 2 TIMES DAILY
Qty: 180 TABLET | Refills: 0 | Status: SHIPPED | OUTPATIENT
Start: 2023-07-14 | End: 2023-10-04

## 2023-07-14 NOTE — TELEPHONE ENCOUNTER
Pharmacy states that they no longer have this RX on file and are requesting a new one asap.  Please advise.    Medication Request  Medication name: famotidine (PEPCID) 20 MG tablet  Patient Sig: Take 1 tablet by mouth twice daily  Requested Pharmacy: [id number]  When was patient last seen?:  04/27/22  Patient offered appointment:  07/28/23  Okay to leave a detailed message: no

## 2023-07-14 NOTE — TELEPHONE ENCOUNTER
"Last Written Prescription Date:  4/5/22  Last Fill Quantity: 180,  # refills: 3   Last office visit provider:  4/27/22 - scheduled 7/28/23    Requested Prescriptions   Pending Prescriptions Disp Refills     famotidine (PEPCID) 20 MG tablet 180 tablet 3     Sig: Take 1 tablet (20 mg) by mouth 2 times daily       H2 Blockers Protocol Passed - 7/14/2023 10:40 AM        Passed - Patient is age 12 or older        Passed - Recent (12 mo) or future (30 days) visit within the authorizing provider's specialty     Patient has had an office visit with the authorizing provider or a provider within the authorizing providers department within the previous 12 mos or has a future within next 30 days. See \"Patient Info\" tab in inbasket, or \"Choose Columns\" in Meds & Orders section of the refill encounter.              Passed - Medication is active on med list             Yuniel Kelly RN 07/14/23 10:48 AM  "

## 2023-07-16 DIAGNOSIS — N39.44 NOCTURNAL ENURESIS: ICD-10-CM

## 2023-07-16 NOTE — TELEPHONE ENCOUNTER
"Routing refill request to provider for review/approval because:  Patient needs to be seen because it has been more than 1 year since last office visit.    Last Written Prescription Date:  2/25/22  Last Fill Quantity: 90,  # refills: 3  Last office visit provider: 4/27/22     Requested Prescriptions   Pending Prescriptions Disp Refills     tolterodine (DETROL) 1 MG tablet [Pharmacy Med Name: Tolterodine Tartrate 1 MG Oral Tablet] 90 tablet 0     Sig: TAKE 1 TABLET BY MOUTH AT BEDTIME       Muscarinic Antagonists (Urinary Incontinence Agents) Passed - 7/16/2023  6:36 PM        Passed - Recent (12 mo) or future (30 days) visit within the authorizing provider's specialty     Patient has had an office visit with the authorizing provider or a provider within the authorizing providers department within the previous 12 mos or has a future within next 30 days. See \"Patient Info\" tab in inbasket, or \"Choose Columns\" in Meds & Orders section of the refill encounter.              Passed - Patient does not have a diagnosis of glaucoma on the problem list     If glaucoma diagnosis is new, refer refill to physician.          Passed - Medication is active on med list        Passed - Patient is 18 years of age or older             Karen Peralta RN 07/16/23 6:42 PM  "

## 2023-07-17 DIAGNOSIS — M81.0 AGE-RELATED OSTEOPOROSIS WITHOUT CURRENT PATHOLOGICAL FRACTURE: ICD-10-CM

## 2023-07-17 RX ORDER — TOLTERODINE TARTRATE 1 MG/1
TABLET, EXTENDED RELEASE ORAL
Qty: 90 TABLET | Refills: 0 | Status: SHIPPED | OUTPATIENT
Start: 2023-07-17 | End: 2023-10-04

## 2023-07-18 RX ORDER — ALENDRONATE SODIUM 70 MG/1
TABLET ORAL
Qty: 12 TABLET | Refills: 1 | Status: SHIPPED | OUTPATIENT
Start: 2023-07-18 | End: 2024-01-09

## 2023-07-18 NOTE — TELEPHONE ENCOUNTER
"Routing refill request to provider for review/approval because:  Labs not current:  CR    Last Written Prescription Date:  02/09/2023  Last Fill Quantity: 12,  # refills: 0   Last office visit provider:  04/02/2023     Requested Prescriptions   Pending Prescriptions Disp Refills     alendronate (FOSAMAX) 70 MG tablet [Pharmacy Med Name: Alendronate Sodium 70 MG Oral Tablet] 12 tablet 0     Sig: Take 1 tablet by mouth once a week       Bisphosphonates Failed - 7/17/2023  4:45 PM        Failed - Normal serum creatinine on file within past 12 months     Recent Labs   Lab Test 02/25/22  0815   CR 0.83       Ok to refill medication if creatinine is low          Passed - Recent (12 mo) or future (30 days) visit within the authorizing provider's specialty     Patient has had an office visit with the authorizing provider or a provider within the authorizing providers department within the previous 12 mos or has a future within next 30 days. See \"Patient Info\" tab in inbasket, or \"Choose Columns\" in Meds & Orders section of the refill encounter.              Passed - Dexa on file within past 2 years     Please review last Dexa result.           Passed - Medication is active on med list        Passed - Patient is age 18 or older             Naa Dick RN 07/18/23 12:19 PM  "

## 2023-07-28 ENCOUNTER — ANTICOAGULATION THERAPY VISIT (OUTPATIENT)
Dept: ANTICOAGULATION | Facility: CLINIC | Age: 58
End: 2023-07-28

## 2023-07-28 ENCOUNTER — OFFICE VISIT (OUTPATIENT)
Dept: FAMILY MEDICINE | Facility: CLINIC | Age: 58
End: 2023-07-28
Payer: COMMERCIAL

## 2023-07-28 VITALS
WEIGHT: 117.4 LBS | RESPIRATION RATE: 16 BRPM | TEMPERATURE: 98.1 F | DIASTOLIC BLOOD PRESSURE: 70 MMHG | OXYGEN SATURATION: 99 % | BODY MASS INDEX: 19.56 KG/M2 | SYSTOLIC BLOOD PRESSURE: 120 MMHG | HEIGHT: 65 IN | HEART RATE: 60 BPM

## 2023-07-28 DIAGNOSIS — I73.9 PAD (PERIPHERAL ARTERY DISEASE) (H): ICD-10-CM

## 2023-07-28 DIAGNOSIS — Z00.00 ROUTINE GENERAL MEDICAL EXAMINATION AT A HEALTH CARE FACILITY: Primary | ICD-10-CM

## 2023-07-28 DIAGNOSIS — Z13.29 SCREENING FOR THYROID DISORDER: ICD-10-CM

## 2023-07-28 DIAGNOSIS — F17.200 TOBACCO USE DISORDER: ICD-10-CM

## 2023-07-28 DIAGNOSIS — H81.11 BENIGN PAROXYSMAL POSITIONAL VERTIGO OF RIGHT EAR: ICD-10-CM

## 2023-07-28 DIAGNOSIS — Z79.01 LONG TERM (CURRENT) USE OF ANTICOAGULANTS: ICD-10-CM

## 2023-07-28 DIAGNOSIS — I10 ESSENTIAL HYPERTENSION: ICD-10-CM

## 2023-07-28 DIAGNOSIS — M81.0 AGE-RELATED OSTEOPOROSIS WITHOUT CURRENT PATHOLOGICAL FRACTURE: ICD-10-CM

## 2023-07-28 DIAGNOSIS — D12.6 ADENOMATOUS POLYP OF COLON, UNSPECIFIED PART OF COLON: ICD-10-CM

## 2023-07-28 DIAGNOSIS — R01.1 HEART MURMUR: ICD-10-CM

## 2023-07-28 DIAGNOSIS — E78.5 DYSLIPIDEMIA: ICD-10-CM

## 2023-07-28 DIAGNOSIS — I77.9 BILATERAL CAROTID ARTERY DISEASE, UNSPECIFIED TYPE (H): ICD-10-CM

## 2023-07-28 DIAGNOSIS — Z79.01 ANTICOAGULATION MONITORING, INR RANGE 2-3: Primary | ICD-10-CM

## 2023-07-28 DIAGNOSIS — D68.59 HYPERCOAGULABLE STATE (H): ICD-10-CM

## 2023-07-28 DIAGNOSIS — F41.1 ANXIETY STATE: ICD-10-CM

## 2023-07-28 DIAGNOSIS — Z78.9 HEPATITIS B VACCINATION STATUS UNKNOWN: ICD-10-CM

## 2023-07-28 DIAGNOSIS — M54.6 THORACIC SPINE PAIN: ICD-10-CM

## 2023-07-28 DIAGNOSIS — R10.12 LUQ ABDOMINAL PAIN: ICD-10-CM

## 2023-07-28 DIAGNOSIS — G43.909 MIGRAINE WITHOUT STATUS MIGRAINOSUS, NOT INTRACTABLE, UNSPECIFIED MIGRAINE TYPE: ICD-10-CM

## 2023-07-28 LAB — INR BLD: 1.8 (ref 0.9–1.1)

## 2023-07-28 PROCEDURE — 36416 COLLJ CAPILLARY BLOOD SPEC: CPT

## 2023-07-28 PROCEDURE — 99396 PREV VISIT EST AGE 40-64: CPT

## 2023-07-28 PROCEDURE — 99213 OFFICE O/P EST LOW 20 MIN: CPT | Mod: 25

## 2023-07-28 PROCEDURE — 85610 PROTHROMBIN TIME: CPT

## 2023-07-28 ASSESSMENT — ENCOUNTER SYMPTOMS
DYSURIA: 0
FEVER: 0
HEMATURIA: 0
JOINT SWELLING: 0
PARESTHESIAS: 0
WEAKNESS: 0
MYALGIAS: 1
ARTHRALGIAS: 1
SHORTNESS OF BREATH: 0
NERVOUS/ANXIOUS: 0
HEMATOCHEZIA: 0
PALPITATIONS: 0
HEARTBURN: 1
CONSTIPATION: 0
HEADACHES: 1
SORE THROAT: 0
COUGH: 0
EYE PAIN: 0
ABDOMINAL PAIN: 1
NAUSEA: 1
FREQUENCY: 0
CHILLS: 0
DIARRHEA: 0
DIZZINESS: 1
BREAST MASS: 0

## 2023-07-28 NOTE — ASSESSMENT & PLAN NOTE
Continues to have symptoms intermittently.  They have not changed or worsened. They do respond to change in positioning well, but are happening at least twice a week.  Reviewed previous documentation with patient's PCP; and it was recommended that she pursue fibular therapy.  Reviewed this recommendation today, and patient is interested in referral.  This was placed today.

## 2023-07-28 NOTE — ASSESSMENT & PLAN NOTE
Patient reports that she follows with the vascular services within [facility].  She sees him annually.  Currently takes Repatha.

## 2023-07-28 NOTE — ASSESSMENT & PLAN NOTE
Medications include gabapentin.  Patient reports that she had an appointment scheduled with the [facility] pain clinic for what sounds like a steroid injections, but due to some scheduling difficulties, she did not end up pursuing this.  She is interested in scheduling with the pain clinic in [place] and believes that she has seen them once before.  Updated referral was placed today.

## 2023-07-28 NOTE — ASSESSMENT & PLAN NOTE
Annual exam.  Acute concerns as documented elsewhere. Patient is up-to-date on cancer screenings, including her Pap smear, colonoscopy and mammograms.  We discussed the shingles vaccination today and she plans to check with insurance about coverage at a pharmacy.  Future orders for pneumococcal vaccination were placed today.  Patient works in dental health, so believe that she is been vaccinated against hepatitis B, but we will draw a titer to assess for reactivity.  History of vascular disease, so recommend tight control of cholesterol, and updated labs were ordered today.  Additional labs as documented elsewhere.  BMI is 19; we discussed diet and exercise.  Patient was concerned about a 10 pound weight loss over the last year, which we discussed and plan to continue to monitor.  Encourage protein rich foods and a variety of fruits and vegetables.  Extensive discussion regarding bone health as documented elsewhere.  Up in 1 year for annual exam or sooner if indicated/needed.

## 2023-07-28 NOTE — ASSESSMENT & PLAN NOTE
Patient is not fasting today, so future orders were placed for fasting labs and she will have this done next week.

## 2023-07-28 NOTE — ASSESSMENT & PLAN NOTE
Well controlled. 120/70 in clinic today. Current medications include amlodipine and lisinopril.  We will update kidney function and electrolytes today.

## 2023-07-28 NOTE — PATIENT INSTRUCTIONS
For the abdominal pain:  Schedule an ultrasound  I will follow up with you when labs result.  Try to manage constipation; a fiber supplement once or twice daily paired with fluids can be very helpful. You can also try Miralax (1 cap full). The goal would be daily, soft bowel movements. If abdominal pain resolves with normal bowel movements, that is reassuring. Try to increase your exercise as well.  Return to clinic with any of the warning signs we spoke about today, including blood in the stools, pain that isn't resolving or worsening, significant abdominal bloating, fevers, etc.    Restart your Fosamax. We should recheck your DEXA scan in 1-2 years.       Preventive Health Recommendations  Female Ages 50 - 64    Yearly exam: See your health care provider every year in order to  Review health changes.   Discuss preventive care.    Review your medicines if your doctor has prescribed any.    Get a Pap test every three years (unless you have an abnormal result and your provider advises testing more often).  If you get Pap tests with HPV test, you only need to test every 5 years, unless you have an abnormal result.   You do not need a Pap test if your uterus was removed (hysterectomy) and you have not had cancer.  You should be tested each year for STDs (sexually transmitted diseases) if you're at risk.   Have a mammogram every 1 to 2 years.  Have a colonoscopy at age 50, or have a yearly FIT test (stool test). These exams screen for colon cancer.    Have a cholesterol test every 5 years, or more often if advised.  Have a diabetes test (fasting glucose) every three years. If you are at risk for diabetes, you should have this test more often.   If you are at risk for osteoporosis (brittle bone disease), think about having a bone density scan (DEXA).    Shots: Get a flu shot each year. Get a tetanus shot every 10 years.    Nutrition:   Eat at least 5 servings of fruits and vegetables each day.  Eat whole-grain bread,  whole-wheat pasta and brown rice instead of white grains and rice.  Get adequate Calcium and Vitamin D.     Lifestyle  Exercise at least 150 minutes a week (30 minutes a day, 5 days a week). This will help you control your weight and prevent disease.  Limit alcohol to one drink per day.  No smoking.   Wear sunscreen to prevent skin cancer.   See your dentist every six months for an exam and cleaning.  See your eye doctor every 1 to 2 years.

## 2023-07-28 NOTE — PROGRESS NOTES
"   SUBJECTIVE:   CC: Nery is an 58 year old who presents for preventive health visit.       7/28/2023     7:43 AM   Additional Questions   Roomed by ac   Accompanied by self       In addition to preventative medicine, she would like to discuss the following:    -Sharp pain in the left upper abdomen  -Intermittent  -Cannot identify any aggravating or alleviating factors  -Unable to identify any food triggers  -Feels as if she is more constipated than her baseline. Does not currently treat it.   -Denies fevers or chills. No blood in the stool. No urinary symptoms. Denies GERD symptoms, but is managed with famotidine BID. Denies bloating of the abdomen.     Is also interested in having her thyroid checked. She is wondering if some of her chronic health conditions are related to     Healthy Habits:     Getting at least 3 servings of Calcium per day:  NO    Bi-annual eye exam:  Yes    Dental care twice a year:  Yes    Sleep apnea or symptoms of sleep apnea:  Excessive snoring and Sleep apnea    Diet:  Regular (no restrictions)    Frequency of exercise:  None    Taking medications regularly:  Yes    Medication side effects:  Not applicable    Additional concerns today:  Yes    Diet is \"not good.\" Has increased her vegetables recently. 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     Today's PHQ-2 Score:       7/28/2023     5:48 AM   PHQ-2 ( 1999 [employer])   Q1: Little interest or pleasure in doing things 0   Q2: Feeling down, depressed or hopeless 0   PHQ-2 Score 0   Q1: Little interest or pleasure in doing things Not at all   Q2: Feeling down, depressed or hopeless Not at all   PHQ-2 Score 0     Social History     Tobacco Use    Smoking status: Every Day     Packs/day: 0.75     Years: 34.00     Pack years: 25.50     Types: Cigarettes    Smokeless tobacco: Never    Tobacco comments:     1 ppd since 17 years old   Substance Use Topics    Alcohol use: No         " 7/28/2023     5:48 AM   Alcohol Use   Prescreen: >3 drinks/day or >7 drinks/week? No     Reviewed orders with patient.  Reviewed health maintenance and updated orders accordingly - Yes  Lab work is in process  Labs reviewed in EPIC  BP Readings from Last 3 Encounters:   07/28/23 120/70   05/03/23 116/72   04/02/23 125/61    Wt Readings from Last 3 Encounters:   07/28/23 53.3 kg (117 lb 6.4 oz)   05/03/23 54.3 kg (119 lb 9.6 oz)   04/02/23 54.4 kg (120 lb)                  Patient Active Problem List   Diagnosis    Adenomatous colon polyp    Anticoagulation monitoring, INR range 2-3    Anxiety state    Bilateral carotid artery disease (H)    Diverticulosis of large intestine without hemorrhage    Dyslipidemia    Elevated lipoprotein(a)    Essential hypertension    Hypercoagulable state (H)    Migraine    Multiple skin nodules    Nocturnal enuresis    Normocytic anemia    PAD (peripheral artery disease) (H)    Thoracic neuralgia    Tobacco use disorder    Atypical lymphocytosis    Thoracic spine pain    Routine general medical examination at a health care facility    Age-related osteoporosis without current pathological fracture    Benign paroxysmal positional vertigo of right ear    Long term (current) use of anticoagulants    Bilateral carotid artery disease, unspecified type (H)    LUQ abdominal pain    Heart murmur     Past Surgical History:   Procedure Laterality Date    APPENDECTOMY      ARTERIAL BYPASS SURGERY  2006    BIOPSY BREAST Left     benign    BYPASS GRAFT AORTOFEMORAL Bilateral     CHOLECYSTECTOMY      FEMORAL ARTERY STENT  2006    FOOT MASS EXCISION Left 2018    Soft tissue mass - benign    IR MISCELLANEOUS PROCEDURE  1/17/2006    IR MISCELLANEOUS PROCEDURE  1/17/2006    IR MISCELLANEOUS PROCEDURE  1/17/2006    IR MISCELLANEOUS PROCEDURE  1/17/2006    IR MISCELLANEOUS PROCEDURE  1/18/2006    TONSILLECTOMY      TYMPANOSTOMY TUBE PLACEMENT         Social History     Tobacco Use    Smoking status: Every  Day     Packs/day: 0.75     Years: 34.00     Pack years: 25.50     Types: Cigarettes    Smokeless tobacco: Never    Tobacco comments:     1 ppd since 17 years old   Substance Use Topics    Alcohol use: No     Family History   Problem Relation Age of Onset    Peripheral Vascular Disease Father     Other - See Comments Father         vasular problems     Hypertension Father     Hyperlipidemia Father     Cerebrovascular Disease Father     Other Cancer Father         skin    Breast Cancer Mother         was pt at the [facility]  21 years ago    No Known Problems Sister     No Known Problems Brother     No Known Problems Son     No Known Problems Maternal Grandmother     Cancer Paternal Grandmother         lung     Cancer Paternal Grandfather         kidney     Colon Cancer No family hx of     Prostate Cancer No family hx of     Coronary Artery Disease No family hx of          Current Outpatient Medications   Medication Sig Dispense Refill    albuterol (PROAIR HFA/PROVENTIL HFA/VENTOLIN HFA) 108 (90 Base) MCG/ACT inhaler Inhale 2 puffs into the lungs every 6 hours as needed for shortness of breath or wheezing 18 g 0    alendronate (FOSAMAX) 70 MG tablet Take 1 tablet by mouth once a week 12 tablet 1    amLODIPine (NORVASC) 5 MG tablet Take 1 tablet by mouth once daily 90 tablet 3    aspirin 81 MG EC tablet Take 81 mg by mouth daily      calcium carbonate-vitamin D (OSCAL) 500-5 MG-MCG tablet Take 1 tablet by mouth 2 times daily 90 tablet 3    citalopram (CELEXA) 20 MG tablet Take 1 tablet by mouth once daily 90 tablet 0    clindamycin (CLEOCIN T) 1 % external lotion APPLY LOTION TOPICALLY DAILY TO UPPER BACK      famotidine (PEPCID) 20 MG tablet Take 1 tablet (20 mg) by mouth 2 times daily 180 tablet 0    folic acid (FOLVITE) 1 MG tablet Take 1 tablet by mouth once daily 90 tablet 0    gabapentin (NEURONTIN) 100 MG capsule Take 1 tab in the morning, 1 tab at noon, and 3 tabs at bedtime daily. 450 capsule 3    lisinopril  (ZESTRIL) 20 MG tablet Take 1 tablet by mouth once daily 90 tablet 3    REPATHA SURECLICK 140 MG/ML prefilled autoinjector INJECT 1ML (140MG) SUBCUTANEOUS EVERY 2 WEEKS. INJECT INTO ABDOMEN THIGH OR UPPER ARM. ROTATE INJECTION SITES.      tolterodine (DETROL) 1 MG tablet TAKE 1 TABLET BY MOUTH AT BEDTIME 90 tablet 0    warfarin ANTICOAGULANT (COUMADIN) 5 MG tablet TAKE 1 TABLET BY MOUTH ONCE DAILY OR  AS  DIRECTED.  ADJUST  DOSE  BASED  ON  INR  RESULTS 100 tablet 3     Allergies   Allergen Reactions    Ezetimibe      constipation    Lovastatin Unknown     constipation    Simvastatin      Other reaction(s): Constipation  Intolerance, but tolerated with re-challenge in Feb to April 2009     Recent Labs   Lab Test 02/25/22  0815 10/15/21  0901 01/22/21  0912 12/20/19  1004 11/14/18  1631 11/02/18  1156   LDL 79 71 199* 162*   < >  --    HDL 37* 38* 38* 37*   < >  --    TRIG 116 117 114 149   < >  --    ALT 9 <9 10 <9   < >  --    CR 0.83  --  0.84 0.78   < >  --    GFRESTIMATED 83  --  >60 >60   < >  --    GFRESTBLACK  --   --  >60 >60   < >  --    POTASSIUM 4.7  --  4.5 4.1   < >  --    TSH  --   --   --   --   --  2.48    < > = values in this interval not displayed.        Breast Cancer Screening:    FHS-7:       11/17/2021     1:19 PM 12/2/2022     9:36 AM 7/28/2023     5:51 AM   Breast CA Risk Assessment (FHS-7)   Did any of your first-degree relatives have breast or ovarian cancer? Yes No Yes   Did any of your relatives have bilateral breast cancer? No No Yes   Did any man in your family have breast cancer? No No No   Did any woman in your family have breast and ovarian cancer? No No Yes   Did any woman in your family have breast cancer before age 50 y? No No Yes   Do you have 2 or more relatives with breast and/or ovarian cancer? No No No   Do you have 2 or more relatives with breast and/or bowel cancer? No No No     Mammogram Screening: Recommended mammography every 1-2 years with patient discussion and risk  "factor consideration  Pertinent mammograms are reviewed under the imaging tab.    History of abnormal Pap smear: NO - age 30-65 PAP every 5 years with negative HPV co-testing recommended      Latest Ref Rng & Units 12/20/2019    10:04 AM   PAP / HPV   PAP Negative for squamous intraepithelial lesion or malignancy. Negative for squamous intraepithelial lesion or malignancy  Electronically signed by Tere Estevez CT (ASCP) on 12/31/2019 at  1:46 PM      HPV 16 DNA NEG Negative    HPV 18 DNA NEG Negative    Other HR HPV NEG Negative      Reviewed and updated as needed this visit by clinical staff   Tobacco  Allergies  Meds              Reviewed and updated as needed this visit by Provider                 Review of Systems   Constitutional:  Negative for chills and fever.   HENT:  Negative for congestion, ear pain, hearing loss and sore throat.    Eyes:  Negative for pain and visual disturbance.   Respiratory:  Negative for cough and shortness of breath.    Cardiovascular:  Negative for chest pain, palpitations and peripheral edema.   Gastrointestinal:  Positive for abdominal pain, heartburn and nausea. Negative for constipation, diarrhea and hematochezia.   Breasts:  Negative for tenderness, breast mass and discharge.   Genitourinary:  Positive for urgency. Negative for dysuria, frequency, genital sores, hematuria, pelvic pain, vaginal bleeding and vaginal discharge.   Musculoskeletal:  Positive for arthralgias and myalgias. Negative for joint swelling.   Skin:  Negative for rash.   Neurological:  Positive for dizziness and headaches. Negative for weakness and paresthesias.   Psychiatric/Behavioral:  Negative for mood changes. The patient is not nervous/anxious.       OBJECTIVE:   /70 (BP Location: Left arm, Patient Position: Sitting, Cuff Size: Adult Small)   Pulse 60   Temp 98.1  F (36.7  C) (Oral)   Resp 16   Ht 1.643 m (5' 4.7\")   Wt 53.3 kg (117 lb 6.4 oz)   SpO2 99%   BMI 19.72 kg/m  "   Physical Exam  GENERAL: healthy, alert and no distress  EYES: Eyes grossly normal to inspection, PERRL and conjunctivae and sclerae normal  HENT: ear canals and TM's normal, nose and mouth without ulcers or lesions  NECK: no adenopathy, no asymmetry, masses, or scars and thyroid normal to palpation  RESP: lungs clear to auscultation - no rales, rhonchi or wheezes  BREAST: Declined by patient due to recent mammography; discussed breast awareness.  CV: regular rate and rhythm, normal S1 S2, no S3 or S4, no murmur, click or rub, no peripheral edema and peripheral pulses strong  ABDOMEN: soft, nontender, no hepatosplenomegaly, no masses and bowel sounds normal  MS: no gross musculoskeletal defects noted, no edema  SKIN: no suspicious lesions or rashes  NEURO: Normal strength and tone, mentation intact and speech normal  PSYCH: mentation appears normal, affect normal/bright    ASSESSMENT/PLAN:     Problem List Items Addressed This Visit          Nervous and Auditory    Migraine     Stable. Responds well to anti-inflammatories, but happens very infrequently.         Thoracic spine pain     Medications include gabapentin.  Patient reports that she had an appointment scheduled with the [facility] pain clinic for what sounds like a steroid injections, but due to some scheduling difficulties, she did not end up pursuing this.  She is interested in scheduling with the pain clinic in [place] and believes that she has seen them once before.  Updated referral was placed today.          Relevant Orders    Pain Management  Referral    Benign paroxysmal positional vertigo of right ear     Continues to have symptoms intermittently.  They have not changed or worsened. They do respond to change in positioning well, but are happening at least twice a week.  Reviewed previous documentation with patient's PCP; and it was recommended that she pursue fibular therapy.  Reviewed this recommendation today, and patient  is interested in referral.  This was placed today.          Relevant Orders    Physical Therapy Referral    LUQ abdominal pain     Differentials considered today include gastritis, constipation, splenomegaly, nephrolithiasis, and with the patient's significant vascular history, did consider aortic involvement.  Pain is intermittent.  She has been unable to tie it to any foods or triggers.  No urinary symptoms or flank pain.  Reflux is controlled with daily famotidine.  Due to intermittent nature of symptoms, and reproduction with palpation, not to likely aortic in nature and patient has had a recent CT of the chest.  She is having some significant constipation, so I am most suspicious of this being the cause of behind her pain, however we will plan to obtain a broad laboratory work-up and an abdominal ultrasound to assess further.  She is up-to-date on colon cancer screening, lung cancer screening and breast cancer screening, but with her smoking history, is considered high risk it.  Additionally, she endorses an approximate 10 pound weight loss in the last year.  Should consider additional work-up such as an abdominal CT if symptoms persist despite normalization of her bowel habits and a negative work-up today.  Patient expressed understanding of and agreement with this plan.  All questions were answered.         Relevant Orders    CBC with platelets    ESR: Erythrocyte sedimentation rate    CRP, inflammation    Comprehensive metabolic panel (BMP + Alb, Alk Phos, ALT, AST, Total. Bili, TP)    US Abdomen Limited       Digestive    Adenomatous colon polyp     Last colonoscopy was in 4/2022 and was told to follow up in 10 years based on these results.            Endocrine    Dyslipidemia     Patient is not fasting today, so future orders were placed for fasting labs and she will have this done next week.          Relevant Orders    Lipid panel reflex to direct LDL Fasting       Circulatory    Essential hypertension      Well controlled. 120/70 in clinic today. Current medications include amlodipine and lisinopril.  We will update kidney function and electrolytes today.         PAD (peripheral artery disease) (H)     Stenting about 20 years ago. No current symptoms per patient. Follows with vascular.          Bilateral carotid artery disease, unspecified type (H)    Heart murmur     Last echocardiogram in 2017 showed trace mitral regurgitation.  He does follow with cardiology within [facility], and I encouraged her to schedule a visit with him to discuss if repeat echocardiogram would be indicated.            Musculoskeletal and Integumentary    Age-related osteoporosis without current pathological fracture     Patient had DEXA scan completed last year which was consistent with osteoporosis.  Is still currently smoking.  She was prescribed Fosamax with her primary care provider, but reports that she only took this for about a month.  Does not know why she stopped.  She does not currently supplement with vitamin D or calcium.  No regular form of weightbearing exercise.  I recommend that she restart her Fosamax as prescribed previously.  Updated order for calcium/vitamin D supplementation was placed today.  Reviewed recommendations of weightbearing exercise and the need for reassessment DEXA scan to assess for response to therapy.         Relevant Medications    calcium carbonate-vitamin D (OSCAL) 500-5 MG-MCG tablet    Other Relevant Orders    Vitamin D Deficiency       Hematologic    Hypercoagulable state (H)     Maintained on warfarin.  Receives regular INR testing.            Behavioral    Tobacco use disorder     Current smoking.  Approximately 3/4 pack a day.  No interest in cessation. Just had annual lung cancer screening completed.  We reviewed in depth the risks that she assumes with continued smoking due to her history of both osteoporosis and extensive vascular disease.  I strongly encouraged her to continue working on  reducing her daily intake.  Again, offered assistance in the form of pharmacotherapy, but patient is not currently interested.  Continue to reassess as needed.            Other    Anxiety state     Stable. Current medications include citalopram.          Relevant Orders    TSH with free T4 reflex    Routine general medical examination at a health care facility - Primary     Annual exam.  Acute concerns as documented elsewhere. Patient is up-to-date on cancer screenings, including her Pap smear, colonoscopy and mammograms.  We discussed the shingles vaccination today and she plans to check with insurance about coverage at a pharmacy.  Future orders for pneumococcal vaccination were placed today.  Patient works in dental health, so believe that she is been vaccinated against hepatitis B, but we will draw a titer to assess for reactivity.  History of vascular disease, so recommend tight control of cholesterol, and updated labs were ordered today.  Additional labs as documented elsewhere.  BMI is 19; we discussed diet and exercise.  Patient was concerned about a 10 pound weight loss over the last year, which we discussed and plan to continue to monitor.  Encourage protein rich foods and a variety of fruits and vegetables.  Extensive discussion regarding bone health as documented elsewhere.  Up in 1 year for annual exam or sooner if indicated/needed.         Long term (current) use of anticoagulants     Other Visit Diagnoses       Hepatitis B vaccination status unknown        Relevant Orders    Hepatitis B Surface Antibody    Screening for thyroid disorder        Relevant Orders    TSH with free T4 reflex          COUNSELING:  Reviewed preventive health counseling, as reflected in patient instructions       Regular exercise       Healthy diet/nutrition       Vision screening       Pneumococcal Vaccine        Immunizations  Declined: Zoster due to Other will check into coverage  Future order placed for pneumococcal  vaccine            Osteoporosis prevention/bone health       Colorectal Cancer Screening       Consider lung cancer screening for ages 55-80 years (77 for Medicare) and 20 pack-year smoking history     She reports that she has been smoking cigarettes. She has a 25.50 pack-year smoking history. She has never used smokeless tobacco.    Nicotine/Tobacco Cessation Plan:   Information offered: Patient not interested at this time          GERALD Crandall CNP  [facility]

## 2023-07-28 NOTE — ASSESSMENT & PLAN NOTE
Differentials considered today include gastritis, constipation, splenomegaly, nephrolithiasis, and with the patient's significant vascular history, did consider aortic involvement.  Pain is intermittent.  She has been unable to tie it to any foods or triggers.  No urinary symptoms or flank pain.  Reflux is controlled with daily famotidine.  Due to intermittent nature of symptoms, and reproduction with palpation, not to likely aortic in nature and patient has had a recent CT of the chest.  She is having some significant constipation, so I am most suspicious of this being the cause of behind her pain, however we will plan to obtain a broad laboratory work-up and an abdominal ultrasound to assess further.  She is up-to-date on colon cancer screening, lung cancer screening and breast cancer screening, but with her smoking history, is considered high risk it.  Additionally, she endorses an approximate 10 pound weight loss in the last year.  Should consider additional work-up such as an abdominal CT if symptoms persist despite normalization of her bowel habits and a negative work-up today.  Patient expressed understanding of and agreement with this plan.  All questions were answered.

## 2023-07-28 NOTE — ASSESSMENT & PLAN NOTE
Current smoking.  Approximately 3/4 pack a day.  No interest in cessation. Just had annual lung cancer screening completed.  We reviewed in depth the risks that she assumes with continued smoking due to her history of both osteoporosis and extensive vascular disease.  I strongly encouraged her to continue working on reducing her daily intake.  Again, offered assistance in the form of pharmacotherapy, but patient is not currently interested.  Continue to reassess as needed.

## 2023-07-28 NOTE — ASSESSMENT & PLAN NOTE
Patient had DEXA scan completed last year which was consistent with osteoporosis.  Is still currently smoking.  She was prescribed Fosamax with her primary care provider, but reports that she only took this for about a month.  Does not know why she stopped.  She does not currently supplement with vitamin D or calcium.  No regular form of weightbearing exercise.  I recommend that she restart her Fosamax as prescribed previously.  Updated order for calcium/vitamin D supplementation was placed today.  Reviewed recommendations of weightbearing exercise and the need for reassessment DEXA scan to assess for response to therapy.

## 2023-07-28 NOTE — PROGRESS NOTES
ANTICOAGULATION MANAGEMENT     Nery Whitaker 58 year old female is on warfarin with subtherapeutic INR result. (Goal INR 2.0-3.0)    Recent labs: (last 7 days)     07/28/23  0738   INR 1.8*       ASSESSMENT     Source(s): Chart Review and Patient/Caregiver Call     Warfarin doses taken: Warfarin taken as instructed  Diet: No new diet changes identified  Medication/supplement changes: None noted  New illness, injury, or hospitalization: No  Signs or symptoms of bleeding or clotting: No  Previous result: Therapeutic last visit; previously outside of goal range  Additional findings: None       PLAN     Recommended plan for no diet, medication or health factor changes affecting INR     Dosing Instructions: Increase your warfarin dose (5.9% change) with next INR in 2 weeks       Summary  As of 7/28/2023      Full warfarin instructions:  5 mg every Sun, Tue, Thu; 7.5 mg all other days   Next INR check:  8/11/2023               Telephone call with Nery who verbalizes understanding and agrees to plan    Patient elected to schedule next visit 8/16    Education provided:   Contact [phone] with any changes, questions or concerns.     Plan made per [facility] anticoagulation protocol    Marjorie Ball, RN  Anticoagulation Clinic  7/28/2023    _______________________________________________________________________     Anticoagulation Episode Summary       Current INR goal:  2.0-3.0   TTR:  51.2 % (1 y)   Target end date:  Indefinite   Send INR reminders to:  BERNICE MARTINI    Indications    Anticoagulation monitoring  INR range 2-3 [Z79.01]  PAD (peripheral artery disease) (H) [I73.9]  Long term (current) use of anticoagulants [Z79.01]  Hypercoagulable state (H) [D68.59]  Bilateral carotid artery disease  unspecified type (H) [I77.9]             Comments:               Anticoagulation Care Providers       Provider Role Specialty Phone number    Loraine Rees MD Referring Family Medicine [phone]    [facility]  MD Vu [facility] [phone]

## 2023-07-28 NOTE — ASSESSMENT & PLAN NOTE
Last echocardiogram in 2017 showed trace mitral regurgitation.  He does follow with cardiology within [facility], and I encouraged her to schedule a visit with him to discuss if repeat echocardiogram would be indicated.

## 2023-08-02 ENCOUNTER — HOSPITAL ENCOUNTER (OUTPATIENT)
Dept: ULTRASOUND IMAGING | Facility: HOSPITAL | Age: 58
Discharge: HOME | End: 2023-08-02
Payer: COMMERCIAL

## 2023-08-02 DIAGNOSIS — R10.12 LUQ ABDOMINAL PAIN: ICD-10-CM

## 2023-08-02 PROCEDURE — 76705 ECHO EXAM OF ABDOMEN: CPT

## 2023-08-04 ENCOUNTER — ALLIED HEALTH/NURSE VISIT (OUTPATIENT)
Dept: FAMILY MEDICINE | Facility: CLINIC | Age: 58
End: 2023-08-04
Payer: COMMERCIAL

## 2023-08-04 ENCOUNTER — LAB (OUTPATIENT)
Dept: LAB | Facility: CLINIC | Age: 58
End: 2023-08-04
Payer: COMMERCIAL

## 2023-08-04 DIAGNOSIS — F41.1 ANXIETY STATE: ICD-10-CM

## 2023-08-04 DIAGNOSIS — Z23 NEED FOR VACCINATION: Primary | ICD-10-CM

## 2023-08-04 DIAGNOSIS — Z13.29 SCREENING FOR THYROID DISORDER: ICD-10-CM

## 2023-08-04 DIAGNOSIS — E78.5 DYSLIPIDEMIA: ICD-10-CM

## 2023-08-04 DIAGNOSIS — Z78.9 HEPATITIS B VACCINATION STATUS UNKNOWN: ICD-10-CM

## 2023-08-04 DIAGNOSIS — R10.12 LUQ ABDOMINAL PAIN: ICD-10-CM

## 2023-08-04 DIAGNOSIS — M81.0 AGE-RELATED OSTEOPOROSIS WITHOUT CURRENT PATHOLOGICAL FRACTURE: ICD-10-CM

## 2023-08-04 LAB
ALBUMIN SERPL BCG-MCNC: 4.2 G/DL (ref 3.5–5.2)
ALP SERPL-CCNC: 99 U/L (ref 35–104)
ALT SERPL W P-5'-P-CCNC: 9 U/L (ref 0–50)
ANION GAP SERPL CALCULATED.3IONS-SCNC: 10 MMOL/L (ref 7–15)
AST SERPL W P-5'-P-CCNC: 23 U/L (ref 0–45)
BILIRUB SERPL-MCNC: 0.3 MG/DL
BUN SERPL-MCNC: 10.7 MG/DL (ref 6–20)
CALCIUM SERPL-MCNC: 9.2 MG/DL (ref 8.6–10)
CHLORIDE SERPL-SCNC: 107 MMOL/L (ref 98–107)
CHOLEST SERPL-MCNC: 115 MG/DL
CREAT SERPL-MCNC: 0.94 MG/DL (ref 0.51–0.95)
CRP SERPL-MCNC: 3.99 MG/L
DEPRECATED CALCIDIOL+CALCIFEROL SERPL-MC: 21 UG/L (ref 20–75)
DEPRECATED HCO3 PLAS-SCNC: 23 MMOL/L (ref 22–29)
ERYTHROCYTE [DISTWIDTH] IN BLOOD BY AUTOMATED COUNT: 14.2 % (ref 10–15)
ERYTHROCYTE [SEDIMENTATION RATE] IN BLOOD BY WESTERGREN METHOD: 16 MM/HR (ref 0–30)
GFR SERPL CREATININE-BSD FRML MDRD: 70 ML/MIN/1.73M2
GLUCOSE SERPL-MCNC: 87 MG/DL (ref 70–99)
HBV SURFACE AB SERPL IA-ACNC: 164.58 M[IU]/ML
HBV SURFACE AB SERPL IA-ACNC: REACTIVE M[IU]/ML
HCT VFR BLD AUTO: 34.6 % (ref 35–47)
HDLC SERPL-MCNC: 38 MG/DL
HGB BLD-MCNC: 11.5 G/DL (ref 11.7–15.7)
LDLC SERPL CALC-MCNC: 62 MG/DL
MCH RBC QN AUTO: 31.1 PG (ref 26.5–33)
MCHC RBC AUTO-ENTMCNC: 33.2 G/DL (ref 31.5–36.5)
MCV RBC AUTO: 94 FL (ref 78–100)
NONHDLC SERPL-MCNC: 77 MG/DL
PLATELET # BLD AUTO: 240 10E3/UL (ref 150–450)
POTASSIUM SERPL-SCNC: 5 MMOL/L (ref 3.4–5.3)
PROT SERPL-MCNC: 6.9 G/DL (ref 6.4–8.3)
RBC # BLD AUTO: 3.7 10E6/UL (ref 3.8–5.2)
SODIUM SERPL-SCNC: 140 MMOL/L (ref 136–145)
TRIGL SERPL-MCNC: 74 MG/DL
TSH SERPL DL<=0.005 MIU/L-ACNC: 2.25 UIU/ML (ref 0.3–4.2)
WBC # BLD AUTO: 8.7 10E3/UL (ref 4–11)

## 2023-08-04 PROCEDURE — 80053 COMPREHEN METABOLIC PANEL: CPT

## 2023-08-04 PROCEDURE — 85652 RBC SED RATE AUTOMATED: CPT

## 2023-08-04 PROCEDURE — 99207 PR NO CHARGE NURSE ONLY: CPT

## 2023-08-04 PROCEDURE — 90677 PCV20 VACCINE IM: CPT

## 2023-08-04 PROCEDURE — 90471 IMMUNIZATION ADMIN: CPT

## 2023-08-04 PROCEDURE — 85027 COMPLETE CBC AUTOMATED: CPT

## 2023-08-04 PROCEDURE — 80061 LIPID PANEL: CPT

## 2023-08-04 PROCEDURE — 86706 HEP B SURFACE ANTIBODY: CPT

## 2023-08-04 PROCEDURE — 36415 COLL VENOUS BLD VENIPUNCTURE: CPT

## 2023-08-04 PROCEDURE — 82306 VITAMIN D 25 HYDROXY: CPT

## 2023-08-04 PROCEDURE — 84443 ASSAY THYROID STIM HORMONE: CPT

## 2023-08-04 PROCEDURE — 86140 C-REACTIVE PROTEIN: CPT

## 2023-08-04 NOTE — PROGRESS NOTES
Prior to immunization administration, verified patients identity using patient s name and date of birth. Please see Immunization Activity for additional information.     Screening Questionnaire for Adult Immunization    Are you sick today?   No   Do you have allergies to medications, food, a vaccine component or latex?   No   Have you ever had a serious reaction after receiving a vaccination?   No   Do you have a long-term health problem with heart, lung, kidney, or metabolic disease (e.g., diabetes), asthma, a blood disorder, no spleen, complement component deficiency, a cochlear implant, or a spinal fluid leak?  Are you on long-term aspirin therapy?   No   Do you have cancer, leukemia, HIV/AIDS, or any other immune system problem?   No   Do you have a parent, brother, or sister with an immune system problem?   No   In the past 3 months, have you taken medications that affect  your immune system, such as prednisone, other steroids, or anticancer drugs; drugs for the treatment of rheumatoid arthritis, Crohn s disease, or psoriasis; or have you had radiation treatments?   No   Have you had a seizure, or a brain or other nervous system problem?   No   During the past year, have you received a transfusion of blood or blood    products, or been given immune (gamma) globulin or antiviral drug?   No   For women: Are you pregnant or is there a chance you could become       pregnant during the next month?   No   Have you received any vaccinations in the past 4 weeks?   No     Immunization questionnaire answers were all negative.    I have reviewed the following standing orders: This patient is due and qualifies for the Pneumococcal vaccine.    Click here for Pneumococcal (Adult) Standing Order    I have reviewed the vaccines inclusion and exclusion criteria;No concerns regarding eligibility.     Patient instructed to remain in clinic for 15 minutes afterwards, and to report any adverse reactions.     Screening performed by  Hussein Barraza RN on 8/4/2023 at 8:19 AM.

## 2023-08-11 ENCOUNTER — OFFICE VISIT (OUTPATIENT)
Dept: PALLIATIVE MEDICINE | Facility: OTHER | Age: 58
End: 2023-08-11
Payer: COMMERCIAL

## 2023-08-11 VITALS
OXYGEN SATURATION: 96 % | BODY MASS INDEX: 19.48 KG/M2 | HEART RATE: 56 BPM | DIASTOLIC BLOOD PRESSURE: 64 MMHG | WEIGHT: 116 LBS | RESPIRATION RATE: 12 BRPM | SYSTOLIC BLOOD PRESSURE: 138 MMHG

## 2023-08-11 DIAGNOSIS — G89.29 CHRONIC INTRACTABLE PAIN: ICD-10-CM

## 2023-08-11 DIAGNOSIS — Z79.01 ANTICOAGULATION MONITORING, INR RANGE 2-3: ICD-10-CM

## 2023-08-11 DIAGNOSIS — M51.34 DDD (DEGENERATIVE DISC DISEASE), THORACIC: Primary | ICD-10-CM

## 2023-08-11 DIAGNOSIS — M54.2 CERVICALGIA: ICD-10-CM

## 2023-08-11 DIAGNOSIS — R10.12 LUQ ABDOMINAL PAIN: Primary | ICD-10-CM

## 2023-08-11 DIAGNOSIS — M54.6 THORACIC SPINE PAIN: ICD-10-CM

## 2023-08-11 DIAGNOSIS — M79.2 THORACIC NEURALGIA: ICD-10-CM

## 2023-08-11 PROCEDURE — 99417 PROLNG OP E/M EACH 15 MIN: CPT | Performed by: NURSE PRACTITIONER

## 2023-08-11 PROCEDURE — G0463 HOSPITAL OUTPT CLINIC VISIT: HCPCS | Performed by: NURSE PRACTITIONER

## 2023-08-11 PROCEDURE — 99215 OFFICE O/P EST HI 40 MIN: CPT | Performed by: NURSE PRACTITIONER

## 2023-08-11 ASSESSMENT — ANXIETY QUESTIONNAIRES
GAD7 TOTAL SCORE: 0
GAD7 TOTAL SCORE: 0
IF YOU CHECKED OFF ANY PROBLEMS ON THIS QUESTIONNAIRE, HOW DIFFICULT HAVE THESE PROBLEMS MADE IT FOR YOU TO DO YOUR WORK, TAKE CARE OF THINGS AT HOME, OR GET ALONG WITH OTHER PEOPLE: NOT DIFFICULT AT ALL
4. TROUBLE RELAXING: NOT AT ALL
3. WORRYING TOO MUCH ABOUT DIFFERENT THINGS: NOT AT ALL
6. BECOMING EASILY ANNOYED OR IRRITABLE: NOT AT ALL
2. NOT BEING ABLE TO STOP OR CONTROL WORRYING: NOT AT ALL
7. FEELING AFRAID AS IF SOMETHING AWFUL MIGHT HAPPEN: NOT AT ALL
1. FEELING NERVOUS, ANXIOUS, OR ON EDGE: NOT AT ALL
5. BEING SO RESTLESS THAT IT IS HARD TO SIT STILL: NOT AT ALL

## 2023-08-11 ASSESSMENT — PAIN SCALES - PAIN ENJOYMENT GENERAL ACTIVITY SCALE (PEG)
INTERFERED_GENERAL_ACTIVITY: 6
AVG_PAIN_PASTWEEK: 9
PEG_TOTALSCORE: 7.67
INTERFERED_ENJOYMENT_LIFE: 8

## 2023-08-11 ASSESSMENT — PAIN SCALES - GENERAL: PAINLEVEL: MODERATE PAIN (5)

## 2023-08-11 NOTE — PATIENT INSTRUCTIONS
[facility] Number:  [phone]  Call with any questions about your care and for scheduling assistance.   Calls are returned Monday through Friday between 8 AM and 4:30 PM. We usually get back to you within 2 business days depending on the issue/request.    If we are prescribing your medications:  For opioid medication refills, call the clinic or send a Radariohart message 7 days in advance.  Please include:  Name of requested medication  Name of the pharmacy.  For non-opioid medications, call your pharmacy directly to request a refill. Please allow 3-4 days to be processed.   Per [place] State Law:  All controlled substance prescriptions must be filled within 30 days of being written.    For those controlled substances allowing refills, pickup must occur within 30 days of last fill.      Plan:  Will obtain new thoracic and cervical MRI today.    A multimodal plan was developed today to treat your pain.  Multimodal analgesia is a strategy that reduces reliance on opioids through the use of non-opioid analgesics and therapies that have different mechanisms of action.      Diagnostics: Thoracic MRI was reviewed today        Medications:  Continue gabapentin 100mg 1 tab in am and pm and 3 tabs q hs    The following OTC pain medications may be helpful, use as directed: Voltaren Gel 1%, CBD products, Capsaicin products, Australian Dream Cream, Epson It,  Lidocaine Patch, Solanpas, Biofreeze, Aspercream, Tiger Balm and Wilfredo Emu cream.  Apply heat or cold PRN.      Therapies:  Discussed anti-inflammatory lifestyle.    Discussed Pain Psychology  Psychological treatments are also important part of pain management.  Understanding and managing the thoughts, emotions and behaviors that accompany the discomfort can help you cope more effectively with your pain and can actually reduce the intensity of your pain.      PHYSICAL THERAPY: Will refer to Barrie Dumont in [place] for  thoracic and cervical pain.  Discussed the importance of core strengthening, ROM, stretching exercises with the patient and how each of these entities is important in decreasing pain.  Explained to the patient that the purpose of physical therapy is to teach the patient a home exercise program.  These exercises need to be performed every day in order to decrease pain and prevent future occurrences of pain.        Discussed Grounding Mat - handout provided  https://www.youGreetzube.com/watch?v=[email or web address]    Discussed Frequency Specific Microcurrent - handout provided  Treatment for Neuropathic Pain.   [facility] [phone]  [facility] [phone]  May be able to be billed as a chiropractic service depending on your insurance coverage.     Discussed Acupuncture.    Fax for [facility] TENs unit.      Interventions:  Schedule thoracic FINA with Dr. Franklin.    Follow up:   1-2 weeks to review new imaging.      GERALD Hernández, RN, CNP, FNP  [facility]            We believe regular attendance is key to your success in our program!    Any time you are unable to keep your appointment we ask that you call us at least 24 hours in advance to cancel.This will allow us to offer the appointment time to another patient.   Multiple missed appointments may lead to dismissal from the clinic.

## 2023-08-11 NOTE — PROGRESS NOTES
Patient presents to the clinic today for a visit with GERALD Carr CNP regarding Pain Management.    UDS/CSA- na    Medications- na    QUESTIONS: Patient reports she had reported no to pain meds by mistake. Gabapentin is being used.    Shahnaz Echevarria  [facility] Visit Facilitator

## 2023-08-13 DIAGNOSIS — M79.2 THORACIC NEURALGIA: ICD-10-CM

## 2023-08-14 RX ORDER — GABAPENTIN 100 MG/1
CAPSULE ORAL
Qty: 150 CAPSULE | Refills: 3 | Status: SHIPPED | OUTPATIENT
Start: 2023-08-14 | End: 2024-01-08

## 2023-08-16 ENCOUNTER — ANTICOAGULATION THERAPY VISIT (OUTPATIENT)
Dept: ANTICOAGULATION | Facility: CLINIC | Age: 58
End: 2023-08-16

## 2023-08-16 ENCOUNTER — TELEPHONE (OUTPATIENT)
Dept: GASTROENTEROLOGY | Facility: CLINIC | Age: 58
End: 2023-08-16

## 2023-08-16 ENCOUNTER — LAB (OUTPATIENT)
Dept: LAB | Facility: CLINIC | Age: 58
End: 2023-08-16
Payer: COMMERCIAL

## 2023-08-16 ENCOUNTER — HOSPITAL ENCOUNTER (OUTPATIENT)
Dept: CT IMAGING | Facility: HOSPITAL | Age: 58
Discharge: HOME | End: 2023-08-16
Payer: COMMERCIAL

## 2023-08-16 DIAGNOSIS — I77.9 BILATERAL CAROTID ARTERY DISEASE, UNSPECIFIED TYPE (H): ICD-10-CM

## 2023-08-16 DIAGNOSIS — Z79.01 LONG TERM (CURRENT) USE OF ANTICOAGULANTS: ICD-10-CM

## 2023-08-16 DIAGNOSIS — I73.9 PAD (PERIPHERAL ARTERY DISEASE) (H): ICD-10-CM

## 2023-08-16 DIAGNOSIS — R10.12 LUQ ABDOMINAL PAIN: ICD-10-CM

## 2023-08-16 DIAGNOSIS — Z79.01 ANTICOAGULATION MONITORING, INR RANGE 2-3: Primary | ICD-10-CM

## 2023-08-16 DIAGNOSIS — D68.59 HYPERCOAGULABLE STATE (H): ICD-10-CM

## 2023-08-16 LAB — INR BLD: 1.6 (ref 0.9–1.1)

## 2023-08-16 PROCEDURE — 36416 COLLJ CAPILLARY BLOOD SPEC: CPT

## 2023-08-16 PROCEDURE — 74160 CT ABDOMEN W/CONTRAST: CPT

## 2023-08-16 PROCEDURE — 85610 PROTHROMBIN TIME: CPT

## 2023-08-16 PROCEDURE — 250N000011 HC RX IP 250 OP 636: Mod: JZ

## 2023-08-16 RX ORDER — IOPAMIDOL 755 MG/ML
90 INJECTION, SOLUTION INTRAVASCULAR ONCE
Status: COMPLETED | OUTPATIENT
Start: 2023-08-16 | End: 2023-08-16

## 2023-08-16 RX ADMIN — IOPAMIDOL 90 ML: 755 INJECTION, SOLUTION INTRAVENOUS at 17:56

## 2023-08-16 NOTE — PROGRESS NOTES
ANTICOAGULATION MANAGEMENT     Nery Harveysang 58 year old female is on warfarin with subtherapeutic INR result. (Goal INR 2.0-3.0)    Recent labs: (last 7 days)     08/16/23  1307   INR 1.6*       ASSESSMENT     Warfarin Lab Questionnaire    Warfarin Doses Last 7 Days      8/16/2023    12:54 PM   Dose in Tablet or Mg   TAB or MG? milligram (mg)     Pt Rptd Dose SUNDAY MONDAY TUESDAY WED THURS FRIDAY SATURDAY 8/16/2023  12:54 PM 5 7.5 5 7.5 5 7.5 7.5         8/16/2023   Warfarin Lab Questionnaire   Missed doses within past 14 days? No   Changes in diet or alcohol within past 14 days? No   Medication changes since last result? Yes   Please list: Calcium vitamin D   Injuries or illness since last result? No-she has been having some abdominal pain and has CT scan scheduled later today.   New shortness of breath, severe headaches or sudden changes in vision since last result? No   Abnormal bleeding since last result? No   Upcoming surgery, procedure? No   Best number to call with results? [phone]     Previous result: Subtherapeutic  Additional findings: None       PLAN     Recommended plan for no diet, medication or health factor changes affecting INR     Dosing Instructions: booster dose then Increase your warfarin dose (11.1% change) with next INR in 1-2 weeks       Summary  As of 8/16/2023      Full warfarin instructions:  8/16: 10 mg; Otherwise 5 mg every Sun; 7.5 mg all other days   Next INR check:  8/30/2023               Telephone call with Nery who verbalizes understanding and agrees to plan and who agrees to plan and repeated back plan correctly    Lab visit scheduled    Education provided:   Please call back if any changes to your diet, medications or how you've been taking warfarin  Goal range and lab monitoring: goal range and significance of current result and Importance of therapeutic range  Importance of notifying anticoagulation clinic for: changes in medications; a sooner lab recheck maybe  needed    Plan made per [facility] anticoagulation protocol    Marti Sheridan RN  Anticoagulation Clinic  8/16/2023    _______________________________________________________________________     Anticoagulation Episode Summary       Current INR goal:  2.0-3.0   TTR:  46.0 % (1 y)   Target end date:  Indefinite   Send INR reminders to:  BERNICE MARTINI    Indications    Anticoagulation monitoring  INR range 2-3 [Z79.01]  PAD (peripheral artery disease) (H) [I73.9]  Long term (current) use of anticoagulants [Z79.01]  Hypercoagulable state (H) [D68.59]  Bilateral carotid artery disease  unspecified type (H) [I77.9]             Comments:               Anticoagulation Care Providers       Provider Role Specialty Phone number    Loraine Rees MD Referring Family Medicine [phone]    Vu Winters MD Referring Family Medicine [phone]

## 2023-08-16 NOTE — TELEPHONE ENCOUNTER
Left message with [facility] requesting records for the past 3 years. Left writers callback along with fax number.     Record received and sent to HIM to scan into patient chart.    Kelly Mcfarland LPN

## 2023-08-23 ENCOUNTER — HOSPITAL ENCOUNTER (OUTPATIENT)
Dept: MRI IMAGING | Facility: CLINIC | Age: 58
Discharge: HOME | End: 2023-08-23
Attending: NURSE PRACTITIONER | Admitting: NURSE PRACTITIONER
Payer: COMMERCIAL

## 2023-08-23 ENCOUNTER — ANTICOAGULATION THERAPY VISIT (OUTPATIENT)
Dept: ANTICOAGULATION | Facility: CLINIC | Age: 58
End: 2023-08-23

## 2023-08-23 ENCOUNTER — LAB (OUTPATIENT)
Dept: LAB | Facility: CLINIC | Age: 58
End: 2023-08-23
Payer: COMMERCIAL

## 2023-08-23 ENCOUNTER — TELEPHONE (OUTPATIENT)
Dept: ANTICOAGULATION | Facility: CLINIC | Age: 58
End: 2023-08-23

## 2023-08-23 DIAGNOSIS — Z79.01 LONG TERM (CURRENT) USE OF ANTICOAGULANTS: ICD-10-CM

## 2023-08-23 DIAGNOSIS — I73.9 PAD (PERIPHERAL ARTERY DISEASE) (H): ICD-10-CM

## 2023-08-23 DIAGNOSIS — Z79.01 ANTICOAGULATION MONITORING, INR RANGE 2-3: Primary | ICD-10-CM

## 2023-08-23 DIAGNOSIS — I77.9 BILATERAL CAROTID ARTERY DISEASE, UNSPECIFIED TYPE (H): ICD-10-CM

## 2023-08-23 DIAGNOSIS — M79.2 THORACIC NEURALGIA: ICD-10-CM

## 2023-08-23 DIAGNOSIS — D68.59 HYPERCOAGULABLE STATE (H): ICD-10-CM

## 2023-08-23 DIAGNOSIS — M54.6 THORACIC SPINE PAIN: ICD-10-CM

## 2023-08-23 DIAGNOSIS — G89.29 CHRONIC INTRACTABLE PAIN: ICD-10-CM

## 2023-08-23 DIAGNOSIS — M51.34 DDD (DEGENERATIVE DISC DISEASE), THORACIC: ICD-10-CM

## 2023-08-23 DIAGNOSIS — M54.2 CERVICALGIA: ICD-10-CM

## 2023-08-23 LAB — INR BLD: 3 (ref 0.9–1.1)

## 2023-08-23 PROCEDURE — 72146 MRI CHEST SPINE W/O DYE: CPT

## 2023-08-23 PROCEDURE — 85610 PROTHROMBIN TIME: CPT

## 2023-08-23 PROCEDURE — 36416 COLLJ CAPILLARY BLOOD SPEC: CPT

## 2023-08-23 PROCEDURE — 72141 MRI NECK SPINE W/O DYE: CPT

## 2023-08-23 NOTE — PROGRESS NOTES
ANTICOAGULATION MANAGEMENT     Nery Whitaker 58 year old female is on warfarin with therapeutic INR result. (Goal INR 2.0-3.0)    Recent labs: (last 7 days)     08/23/23  0809   INR 3.0*       ASSESSMENT     Warfarin Lab Questionnaire    Warfarin Doses Last 7 Days      8/23/2023     6:57 AM   Dose in Tablet or Mg   TAB or MG? milligram (mg)     Pt Rptd Dose SUNDAY MONDAY TUESDAY WED THURS FRIDAY SATURDAY 8/23/2023   6:57 AM 5 7.5 5 7.5 5 7.5 7.5   **less warfarin taken than instructed**        8/23/2023   Warfarin Lab Questionnaire   Missed doses within past 14 days? No   Changes in diet or alcohol within past 14 days? No   Medication changes since last result? No   Injuries or illness since last result? No   New shortness of breath, severe headaches or sudden changes in vision since last result? No   Abnormal bleeding since last result? No   Upcoming surgery, procedure? No   Best number to call with results? [phone]     Previous result: Subtherapeutic  Additional findings: None       PLAN     Recommended plan for no diet, medication or health factor changes affecting INR     Dosing Instructions: Continue your current warfarin dose as you have been taking it with next INR in 2-3 weeks       Summary  As of 8/23/2023      Full warfarin instructions:  5 mg every Sun, Tue, Thu; 7.5 mg all other days   Next INR check:  9/6/2023               Telephone call with Nery who verbalizes understanding and agrees to plan  Sent Netviewer message with dosing and follow up instructions    Lab visit scheduled    Education provided:   Contact [phone] with any changes, questions or concerns.     Plan made per [facility] anticoagulation protocol    Marjorie Ball, RN  Anticoagulation Clinic  8/23/2023    _______________________________________________________________________     Anticoagulation Episode Summary       Current INR goal:  2.0-3.0   TTR:  45.4 % (1 y)   Target end date:  Indefinite   Send INR reminders to:  BERNICE  VINI    Indications    Anticoagulation monitoring  INR range 2-3 [Z79.01]  PAD (peripheral artery disease) (H) [I73.9]  Long term (current) use of anticoagulants [Z79.01]  Hypercoagulable state (H) [D68.59]  Bilateral carotid artery disease  unspecified type (H) [I77.9]             Comments:               Anticoagulation Care Providers       Provider Role Specialty Phone number    Loraine Rees MD Referring Family Medicine [phone]    Vu Winters MD Referring Family Medicine [phone]

## 2023-08-23 NOTE — TELEPHONE ENCOUNTER
"ELMA-PROCEDURAL ANTICOAGULATION  MANAGEMENT    ASSESSMENT     Warfarin interruption plan for Transforaminal Cervical FINA  on TBD.    Indication for Anticoagulation:  PAD    Past procedure management  6/2022-Epidural injection--held with no bridge    4/2022- Colonoscopy-no bridge   cleared by Dr. Nieves, vascular 1/20/22 and 3/9/3033    11/01/2021- cleared to hold warfarin, no bridge for TESI by Dr. Paul    10/23/2018- left foot foreign body removal  Hold warfarin, no bridge; cleared with Vascular per dr. Winters    Hx per Dr. 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 At that time she was found to have positive DR CRAWFORD confirm test twice with 4 months apart with normal baseline INR level. Repeat testing in December of 2013 was negative    Additional factors- Concurrent Aspirin      There are currently no guidelines addressing elma-procedural bridging in this indication. The decision to bridging is based on the risk of bleeding weighed against the risk of clotting.      PLAN     Pre-Procedure:  Okay to hold warfarin 5 days prior to procedure  No Bridge    Post-Procedure:  Resume warfarin dose if okay with provider doing procedure on night of procedure in PM: [facility] RN to instruct booster dose per procedure management protocol  Recheck INR ~ 7 days after resuming warfarin     Plan routed to referring provider for approval  ?   Tatum Solis, [facility]    SUBJECTIVE/OBJECTIVE     Nery Whitaker, a 58 year old female    Goal INR Range: 2.0-3.0    Wt Readings from Last 3 Encounters:   08/11/23 52.6 kg (116 lb)   07/28/23 53.3 kg (117 lb 6.4 oz)   05/03/23 54.3 kg (119 lb 9.6 oz)      Ideal body weight: 56.3 kg (124 lb 2.3 oz)     Estimated body mass index is 19.48 kg/m  as calculated from the following:    Height as of 7/28/23: 1.643 m (5' 4.7\").    Weight as of 8/11/23: 52.6 kg (116 lb).    Lab Results   Component Value Date "    INR 3.0 (H) 08/23/2023    INR 1.6 (H) 08/16/2023    INR 1.8 (H) 07/28/2023     Lab Results   Component Value Date    HGB 11.5 (L) 08/04/2023    HCT 34.6 (L) 08/04/2023     08/04/2023     Lab Results   Component Value Date    CR 0.94 08/04/2023    CR 0.83 02/25/2022    CR 0.84 01/22/2021     Estimated Creatinine Clearance: 54.8 mL/min (based on SCr of 0.94 mg/dL).

## 2023-08-23 NOTE — TELEPHONE ENCOUNTER
Agree with plan as outlined below. Patient has not been bridged with previous procedures and vascular has previously approved this plan per review of documentation.    GERALD Crandall CNP on 8/23/2023 at 12:39 PM

## 2023-09-13 ENCOUNTER — MYC MEDICAL ADVICE (OUTPATIENT)
Dept: ANTICOAGULATION | Facility: CLINIC | Age: 58
End: 2023-09-13

## 2023-09-13 NOTE — TELEPHONE ENCOUNTER
ANTICOAGULATION     Nery Whitaker is overdue for INR check.       MyChart reminder sent    Radha Castillo RN

## 2023-09-15 ENCOUNTER — ANTICOAGULATION THERAPY VISIT (OUTPATIENT)
Dept: ANTICOAGULATION | Facility: CLINIC | Age: 58
End: 2023-09-15

## 2023-09-15 ENCOUNTER — LAB (OUTPATIENT)
Dept: LAB | Facility: CLINIC | Age: 58
End: 2023-09-15
Payer: COMMERCIAL

## 2023-09-15 DIAGNOSIS — D68.59 HYPERCOAGULABLE STATE (H): ICD-10-CM

## 2023-09-15 DIAGNOSIS — I77.9 BILATERAL CAROTID ARTERY DISEASE, UNSPECIFIED TYPE (H): ICD-10-CM

## 2023-09-15 DIAGNOSIS — I73.9 PAD (PERIPHERAL ARTERY DISEASE) (H): ICD-10-CM

## 2023-09-15 DIAGNOSIS — Z79.01 LONG TERM (CURRENT) USE OF ANTICOAGULANTS: ICD-10-CM

## 2023-09-15 DIAGNOSIS — Z79.01 ANTICOAGULATION MONITORING, INR RANGE 2-3: Primary | ICD-10-CM

## 2023-09-15 LAB — INR BLD: 1.2 (ref 0.9–1.1)

## 2023-09-15 PROCEDURE — 85610 PROTHROMBIN TIME: CPT

## 2023-09-15 PROCEDURE — 36416 COLLJ CAPILLARY BLOOD SPEC: CPT

## 2023-09-15 NOTE — PROGRESS NOTES
ANTICOAGULATION MANAGEMENT     Nery Whitaker 58 year old female is on warfarin with subtherapeutic INR result. (Goal INR 2.0-3.0)    Recent labs: (last 7 days)     09/15/23  0847   INR 1.2*       ASSESSMENT     Warfarin Lab Questionnaire    Warfarin Doses Last 7 Days      9/15/2023     7:34 AM   Dose in Tablet or Mg   TAB or MG? milligram (mg)     Pt Rptd Dose JESSIE MONDAY TUESDAY WED THURS FRIDAY SATURDAY   9/15/2023   7:34 AM 5 7.5 5 7.5 5 7.5 7.5         9/15/2023   Warfarin Lab Questionnaire   Missed doses within past 14 days? No   Changes in diet or alcohol within past 14 days? No   Medication changes since last result? No   Injuries or illness since last result? No   New shortness of breath, severe headaches or sudden changes in vision since last result? No   Abnormal bleeding since last result? No   Upcoming surgery, procedure? Yes   Please explain, date scheduled? Sept.27 back injection   Best number to call with results? [phone]     Previous result: Therapeutic last visit; previously outside of goal range  Additional findings: Upcoming surgery/procedure:     TE from 8/23/23:       PLAN     Recommended plan for no diet, medication or health factor changes affecting INR     Dosing Instructions: booster dose then Increase your warfarin dose (11% change) with next INR in 1 week       Summary  As of 9/15/2023      Full warfarin instructions:  9/15: 15 mg; 9/22: Hold; 9/23: Hold; 9/24: Hold; 9/25: Hold; 9/26: Hold; Otherwise 5 mg every Thu; 7.5 mg all other days   Next INR check:  9/22/2023               Telephone call with Nery who verbalizes understanding and agrees to plan  Sent TVTY message with dosing and follow up instructions    Lab visit scheduled    Education provided:   Goal range and lab monitoring: goal range and significance of current result, Importance of therapeutic range, and Importance of following up at instructed interval  Symptom monitoring: monitoring for bleeding signs and  symptoms, monitoring for clotting signs and symptoms, monitoring for stroke signs and symptoms, and when to seek medical attention/emergency care  Written instructions provided  Contact [phone] with any changes, questions or concerns.     Plan made with [facility] Pharmacist Tatum Mcclendon, RN  Anticoagulation Clinic  9/15/2023    _______________________________________________________________________     Anticoagulation Episode Summary       Current INR goal:  2.0-3.0   TTR:  47.9 % (1 y)   Target end date:  Indefinite   Send INR reminders to:  BERNICE MARTINI    Indications    Anticoagulation monitoring  INR range 2-3 [Z79.01]  PAD (peripheral artery disease) (H) [I73.9]  Long term (current) use of anticoagulants [Z79.01]  Hypercoagulable state (H) [D68.59]  Bilateral carotid artery disease  unspecified type (H) [I77.9]             Comments:               Anticoagulation Care Providers       Provider Role Specialty Phone number    Loraine Rees MD Referring Family Medicine [phone]    Vu Winters MD Referring Family Medicine [phone]

## 2023-09-18 ENCOUNTER — DOCUMENTATION ONLY (OUTPATIENT)
Dept: ANTICOAGULATION | Facility: CLINIC | Age: 58
End: 2023-09-18
Payer: COMMERCIAL

## 2023-09-18 DIAGNOSIS — D68.59 HYPERCOAGULABLE STATE (H): Primary | ICD-10-CM

## 2023-09-18 DIAGNOSIS — Z79.01 LONG TERM (CURRENT) USE OF ANTICOAGULANTS: ICD-10-CM

## 2023-09-18 NOTE — PROGRESS NOTES
ANTICOAGULATION CLINIC REFERRAL RENEWAL REQUEST       An annual renewal order is required for all patients referred to [facility].?  Please review and sign the pended referral order for Nery Whitaker.       ANTICOAGULATION SUMMARY      Warfarin indication(s)   Hypercoagulable State    Mechanical heart valve present?  NO       Current goal range   INR: 2.0-3.0     Goal appropriate for indication? Goal INR 2-3, standard for indication(s) above     Time in Therapeutic Range (TTR)  (Goal > 60%) 47.9%       Office visit with referring provider's group within last year yes on 7/28/23       Shannen Mcclendon RN  [facility]

## 2023-09-27 ENCOUNTER — ANTICOAGULATION THERAPY VISIT (OUTPATIENT)
Dept: ANTICOAGULATION | Facility: CLINIC | Age: 58
End: 2023-09-27

## 2023-09-27 ENCOUNTER — RADIOLOGY INJECTION OFFICE VISIT (OUTPATIENT)
Dept: PALLIATIVE MEDICINE | Facility: OTHER | Age: 58
End: 2023-09-27
Attending: STUDENT IN AN ORGANIZED HEALTH CARE EDUCATION/TRAINING PROGRAM
Payer: COMMERCIAL

## 2023-09-27 ENCOUNTER — LAB (OUTPATIENT)
Dept: LAB | Facility: HOSPITAL | Age: 58
End: 2023-09-27
Attending: STUDENT IN AN ORGANIZED HEALTH CARE EDUCATION/TRAINING PROGRAM
Payer: COMMERCIAL

## 2023-09-27 VITALS — DIASTOLIC BLOOD PRESSURE: 55 MMHG | OXYGEN SATURATION: 98 % | SYSTOLIC BLOOD PRESSURE: 115 MMHG | HEART RATE: 56 BPM

## 2023-09-27 DIAGNOSIS — Z79.01 LONG TERM (CURRENT) USE OF ANTICOAGULANTS: ICD-10-CM

## 2023-09-27 DIAGNOSIS — G58.8 INTERCOSTAL NEURALGIA: Primary | ICD-10-CM

## 2023-09-27 DIAGNOSIS — I77.9 BILATERAL CAROTID ARTERY DISEASE, UNSPECIFIED TYPE (H): ICD-10-CM

## 2023-09-27 DIAGNOSIS — Z79.01 ANTICOAGULATION MONITORING, INR RANGE 2-3: Primary | ICD-10-CM

## 2023-09-27 DIAGNOSIS — Z01.812 PRE-PROCEDURE LAB EXAM: ICD-10-CM

## 2023-09-27 DIAGNOSIS — I73.9 PAD (PERIPHERAL ARTERY DISEASE) (H): ICD-10-CM

## 2023-09-27 DIAGNOSIS — D68.59 HYPERCOAGULABLE STATE (H): ICD-10-CM

## 2023-09-27 DIAGNOSIS — M54.6 PAIN IN THORACIC SPINE: ICD-10-CM

## 2023-09-27 LAB — INR PPP: 1.01 (ref 0.85–1.15)

## 2023-09-27 PROCEDURE — 77002 NEEDLE LOCALIZATION BY XRAY: CPT | Performed by: STUDENT IN AN ORGANIZED HEALTH CARE EDUCATION/TRAINING PROGRAM

## 2023-09-27 PROCEDURE — 64420 NJX AA&/STRD NTRCOST NRV 1: CPT | Mod: LT | Performed by: STUDENT IN AN ORGANIZED HEALTH CARE EDUCATION/TRAINING PROGRAM

## 2023-09-27 PROCEDURE — 64421 NJX AA&/STRD NTRCOST NRV EA: CPT | Mod: LT | Performed by: STUDENT IN AN ORGANIZED HEALTH CARE EDUCATION/TRAINING PROGRAM

## 2023-09-27 PROCEDURE — 36415 COLL VENOUS BLD VENIPUNCTURE: CPT

## 2023-09-27 PROCEDURE — 85610 PROTHROMBIN TIME: CPT

## 2023-09-27 PROCEDURE — 96372 THER/PROPH/DIAG INJ SC/IM: CPT | Performed by: STUDENT IN AN ORGANIZED HEALTH CARE EDUCATION/TRAINING PROGRAM

## 2023-09-27 PROCEDURE — 77002 NEEDLE LOCALIZATION BY XRAY: CPT | Mod: 26 | Performed by: STUDENT IN AN ORGANIZED HEALTH CARE EDUCATION/TRAINING PROGRAM

## 2023-09-27 PROCEDURE — 250N000011 HC RX IP 250 OP 636: Mod: JW | Performed by: STUDENT IN AN ORGANIZED HEALTH CARE EDUCATION/TRAINING PROGRAM

## 2023-09-27 PROCEDURE — 255N000002 HC RX 255 OP 636: Performed by: STUDENT IN AN ORGANIZED HEALTH CARE EDUCATION/TRAINING PROGRAM

## 2023-09-27 RX ORDER — ROPIVACAINE HYDROCHLORIDE 5 MG/ML
9 INJECTION, SOLUTION EPIDURAL; INFILTRATION; PERINEURAL ONCE
Status: COMPLETED | OUTPATIENT
Start: 2023-09-27 | End: 2023-09-27

## 2023-09-27 RX ORDER — TRIAMCINOLONE ACETONIDE 40 MG/ML
40 INJECTION, SUSPENSION INTRA-ARTICULAR; INTRAMUSCULAR ONCE
Status: COMPLETED | OUTPATIENT
Start: 2023-09-27 | End: 2023-09-27

## 2023-09-27 RX ADMIN — IOHEXOL 10 ML: 180 INJECTION INTRAVENOUS at 15:17

## 2023-09-27 RX ADMIN — TRIAMCINOLONE ACETONIDE 40 MG: 40 INJECTION, SUSPENSION INTRA-ARTICULAR; INTRAMUSCULAR at 15:46

## 2023-09-27 RX ADMIN — ROPIVACAINE HYDROCHLORIDE 9 ML: 5 INJECTION EPIDURAL; INFILTRATION; PERINEURAL at 15:45

## 2023-09-27 ASSESSMENT — PAIN SCALES - GENERAL
PAINLEVEL: NO PAIN (0)
PAINLEVEL: SEVERE PAIN (7)

## 2023-09-27 NOTE — PROGRESS NOTES
ANTICOAGULATION MANAGEMENT     Nery Whitaker 58 year old female is on warfarin with subtherapeutic INR result. (Goal INR 2.0-3.0)    Recent labs: (last 7 days)     09/27/23  1120   INR 1.01       ASSESSMENT     Source(s): Chart Review and Patient/Caregiver Call     Warfarin doses taken: currently holding warfarin for spinal injection  Diet: No new diet changes identified  Medication/supplement changes: None noted  New illness, injury, or hospitalization: No  Signs or symptoms of bleeding or clotting: No  Previous result: Subtherapeutic  Additional findings: Upcoming surgery/procedure spinal injection this afternoon       PLAN     Recommended plan for temporary change(s) affecting INR     Dosing Instructions: pending provider approval, this evening resume warfarin with booster dose then continue your current warfarin dose with next INR in 1 week       Summary  As of 9/27/2023      Full warfarin instructions:  9/27: 15 mg; Otherwise 5 mg every Thu; 7.5 mg all other days   Next INR check:  10/4/2023               Telephone call with Nery who verbalizes understanding and agrees to plan  Sent [employer] message with dosing and follow up instructions    Will walk in at [facility] pain clinic appt    Education provided:   Symptom monitoring: monitoring for clotting signs and symptoms, monitoring for stroke signs and symptoms, and when to seek medical attention/emergency care  Contact [phone] with any changes, questions or concerns.     Plan made per [facility] anticoagulation protocol    Marjorie Ball RN  Anticoagulation Clinic  9/27/2023    _______________________________________________________________________     Anticoagulation Episode Summary       Current INR goal:  2.0-3.0   TTR:  47.9 % (1 y)   Target end date:  Indefinite   Send INR reminders to:  BERNICE MARTINI    Indications    Anticoagulation monitoring  INR range 2-3 [Z79.01]  PAD (peripheral artery disease) (H) [I73.9]  Long term (current) use of  anticoagulants [Z79.01]  Hypercoagulable state (H) [D68.59]  Bilateral carotid artery disease  unspecified type (H) [I77.9]             Comments:               Anticoagulation Care Providers       Provider Role Specialty Phone number    Loraine Rees MD Referring Family Medicine [phone]    Vu Winters MD Referring Family Medicine [phone]    Priscila Lombardo, APRN CNP Referring Family Medicine [phone]

## 2023-09-27 NOTE — PROGRESS NOTES
"Pre procedure Diagnosis: intercostal neuralgia   Post procedure Diagnosis: Same  Procedure performed: left intercostal nerve blocks, CPT code 41103, 64421 x2 (3 total nerves) and 55675  Anesthesia: none  Complications: none  Operators: Gonzalez Franklin MD    Indications:   Nery Whitaker is a 58 year old female was sent by Ruth Nixon NP for intercostal nerve blocks as above.  They have a history of intercostal neuralgia.    Options/alternatives, benefits and risks were discussed with the patient including bleeding, infection, no pain relief, tissue trauma, exposure to radiation, reaction to medications including seizure, spinal cord injury, weakness, numbness, and lung injury including pneumothorax.  Questions were answered to her satisfaction and she agrees to proceed. Voluntary informed consent was obtained and signed.     Vitals were reviewed: Yes  Allergies were reviewed:  Yes   Medications were reviewed:  Yes   Pre-procedure pain score: 7/10    Procedure:  After getting informed consent, patient was brought into the procedure suite and was placed in a prone position on the procedure table.   A Pause for the Cause was performed.  Patient was prepped and draped in sterile fashion.     Either in the procedure suite or during consent process, the location of maximal pain was determined to be at 6th-8th ribs on the left.. After positioning and prepping as above, at a location several centimeters lateral from the spine, injections were completed. Using a 25G 1.5 inch needle, lidocaine 1% was used at the to anesthetize the skin. A 25G 1.5 inch needle was advanced with an inferior-to-superior approach, touching down on the periosteum of the inferior costal margin of the corresponding rib(s). After touching down on the rib, the needle was \"walked off\" anteriorly and superiorly, into the area of the neurovascular bundle. Advancement was made 1-2mm at a time. Intermittent fluoroscopy in both the AP and lateral projection " was used to verify location superficial to lung tissue throughout the procedure.  2ml of Omnipaque 180 was injected. Successful needle placement was confirmed with the presence of contrast flow pattern along the inferior rib margin via the course of the intercostal nerve. There was no evidence of vascular uptake or spread towards the spinal canal.  In total, 9ml of ropivacaine 0.5%, and and 40mg of kenalog was injected split between the above 3 sites  Hemostasis was achieved, the area was cleaned, and bandaids were placed when appropriate.  The patient tolerated the procedure well, and was taken to the recovery room.    Images were saved to PACS.    Post-procedure pain score: 0/10  Follow-up includes:   -f/u with referring provider    Gonzalez Franklin MD  Interventional Pain Medicine  [facility]

## 2023-09-27 NOTE — PATIENT INSTRUCTIONS
[facility] [place]   Procedure Discharge Instructions    Today you saw:     Dr. Franklin    You had an:  intercostal nerve block        If you were holding your blood thinning medication, please restart taking it: restart within 6 hours of procedure  Be cautious when walking. Numbness and/or weakness in the lower extremities may occur for up to 6-8 hours after the procedure due to effect of the local anesthetic  Do not drive for 6 hours. The effect of the local anesthetic could slow your reflexes.   You may resume your regular activities after 24 hours  Avoid strenuous activity for the first 24 hours  You may shower, however avoid swimming, tub baths or hot tubs for 24 hours following your procedure  You may have a mild to moderate increase in pain for several days following the injection.  It may take up to 14 days for the steroid medication to start working although you may feel the effect as early as a few days after the procedure.     You may use ice packs for 10-15 minutes, 3 to 4 times a day at the injection site for comfort  Do not use heat to painful areas for 6 to 8 hours. This will give the local anesthetic time to wear off and prevent you from accidentally burning your skin.   Unless you have been directed to avoid the use of anti-inflammatory medications (NSAIDS), you may use medications such as ibuprofen, Aleve or Tylenol for pain control if needed.   If you were fasting, you may resume your normal diet and medications after the procedure  If you have diabetes, check your blood sugar more frequently than usual as your blood sugar may be higher than normal for 10-14 days following a steroid injection. Contact your doctor who manages your diabetes if your blood sugar is higher than usual  Possible side effects of steroids that you may experience include flushing, elevated blood pressure, increased appetite, mild headaches and restlessness.  All of these symptoms will get  better with time.  If you experience any of the following, call the Pain Clinic at [phone]:  -Fever over 100 degree F  -Swelling, bleeding, redness, drainage, warmth at the injection site  -Progressive weakness or numbness in your legs or arms  -Loss of bowel or bladder function  -Unusual headache that is not relieved by Tylenol or other pain reliever  -Unusual new onset of pain that is not improving

## 2023-10-04 ENCOUNTER — MYC MEDICAL ADVICE (OUTPATIENT)
Dept: PULMONOLOGY | Facility: CLINIC | Age: 58
End: 2023-10-04
Payer: COMMERCIAL

## 2023-10-04 DIAGNOSIS — J40 BRONCHITIS: Primary | ICD-10-CM

## 2023-10-04 DIAGNOSIS — K21.9 GASTROESOPHAGEAL REFLUX DISEASE WITHOUT ESOPHAGITIS: ICD-10-CM

## 2023-10-04 DIAGNOSIS — F41.1 ANXIETY STATE: ICD-10-CM

## 2023-10-04 DIAGNOSIS — D68.59 HYPERCOAGULABLE STATE (H): ICD-10-CM

## 2023-10-04 DIAGNOSIS — N39.44 NOCTURNAL ENURESIS: ICD-10-CM

## 2023-10-04 RX ORDER — FAMOTIDINE 20 MG/1
20 TABLET, FILM COATED ORAL 2 TIMES DAILY
Qty: 180 TABLET | Refills: 2 | Status: SHIPPED | OUTPATIENT
Start: 2023-10-04

## 2023-10-04 RX ORDER — TOLTERODINE TARTRATE 1 MG/1
TABLET, EXTENDED RELEASE ORAL
Qty: 90 TABLET | Refills: 2 | Status: SHIPPED | OUTPATIENT
Start: 2023-10-04 | End: 2024-04-24

## 2023-10-04 RX ORDER — FOLIC ACID 1 MG/1
TABLET ORAL
Qty: 90 TABLET | Refills: 2 | Status: SHIPPED | OUTPATIENT
Start: 2023-10-04

## 2023-10-04 RX ORDER — DOXYCYCLINE 100 MG/1
100 CAPSULE ORAL 2 TIMES DAILY
Qty: 14 CAPSULE | Refills: 0 | Status: SHIPPED | OUTPATIENT
Start: 2023-10-04 | End: 2023-10-11

## 2023-10-04 RX ORDER — CITALOPRAM HYDROBROMIDE 20 MG/1
TABLET ORAL
Qty: 90 TABLET | Refills: 2 | Status: SHIPPED | OUTPATIENT
Start: 2023-10-04

## 2023-10-11 ENCOUNTER — ANTICOAGULATION THERAPY VISIT (OUTPATIENT)
Dept: ANTICOAGULATION | Facility: CLINIC | Age: 58
End: 2023-10-11

## 2023-10-11 ENCOUNTER — LAB (OUTPATIENT)
Dept: LAB | Facility: CLINIC | Age: 58
End: 2023-10-11
Payer: COMMERCIAL

## 2023-10-11 DIAGNOSIS — Z79.01 LONG TERM (CURRENT) USE OF ANTICOAGULANTS: ICD-10-CM

## 2023-10-11 DIAGNOSIS — I77.9 BILATERAL CAROTID ARTERY DISEASE, UNSPECIFIED TYPE (H): ICD-10-CM

## 2023-10-11 DIAGNOSIS — Z79.01 ANTICOAGULATION MONITORING, INR RANGE 2-3: Primary | ICD-10-CM

## 2023-10-11 DIAGNOSIS — D68.59 HYPERCOAGULABLE STATE (H): ICD-10-CM

## 2023-10-11 DIAGNOSIS — I73.9 PAD (PERIPHERAL ARTERY DISEASE) (H): ICD-10-CM

## 2023-10-11 LAB — INR BLD: 1.5 (ref 0.9–1.1)

## 2023-10-11 PROCEDURE — 85610 PROTHROMBIN TIME: CPT

## 2023-10-11 PROCEDURE — 36416 COLLJ CAPILLARY BLOOD SPEC: CPT

## 2023-10-11 NOTE — PROGRESS NOTES
ANTICOAGULATION MANAGEMENT     Nery Whitaker 58 year old female is on warfarin with subtherapeutic INR result. (Goal INR 2.0-3.0)    Recent labs: (last 7 days)     10/11/23  0920   INR 1.5*       ASSESSMENT     Warfarin Lab Questionnaire    Warfarin Doses Last 7 Days      10/11/2023     8:14 AM   Dose in Tablet or Mg   TAB or MG? milligram (mg)     Pt Rptd Dose JESSIE MONDAY TUESDAY WED THURS FRIDAY SATURDAY   10/11/2023   8:14 AM 7.5 7.5 7.5 7.5 5 7.5 7.5         10/11/2023   Warfarin Lab Questionnaire   Missed doses within past 14 days? No   Changes in diet or alcohol within past 14 days? No   Medication changes since last result? Yes   Please list: Finishing doxycycline today   Injuries or illness since last result? Yes   If yes, please explain: Chest congestion-better with antibiotics   New shortness of breath, severe headaches or sudden changes in vision since last result? No   Abnormal bleeding since last result? No   Upcoming surgery, procedure? No   Best number to call with results? [phone]     Previous result: Subtherapeutic  Additional findings: None       PLAN     Recommended plan for no diet, medication or health factor changes affecting INR     Dosing Instructions: Increase your warfarin dose (10% change) with next INR in 1-2 weeks       Summary  As of 10/11/2023      Full warfarin instructions:  10 mg every Wed; 7.5 mg all other days   Next INR check:  10/20/2023               Telephone call with Nery who verbalizes understanding and agrees to plan  Sent [employer] message with dosing and follow up instructions    Lab visit scheduled    Education provided:   Contact [phone] with any changes, questions or concerns.     Plan made per [facility] anticoagulation protocol    Marjorie Ball, RN  Anticoagulation Clinic  10/11/2023    _______________________________________________________________________     Anticoagulation Episode Summary       Current INR goal:  2.0-3.0   TTR:  45.3% (1 y)   Target end  date:  Indefinite   Send INR reminders to:  BERNICE MARTINI    Indications    Anticoagulation monitoring  INR range 2-3 [Z79.01]  PAD (peripheral artery disease) (H24) [I73.9]  Long term (current) use of anticoagulants [Z79.01]  Hypercoagulable state (H24) [D68.59]  Bilateral carotid artery disease  unspecified type (H24) [I77.9]             Comments:               Anticoagulation Care Providers       Provider Role Specialty Phone number    Loraine Rees MD Referring Family Medicine [phone]    Vu Winters MD Referring Family Medicine [phone]    Priscila Lombardo APRN CNP Referring Family Medicine [phone]

## 2023-10-19 ENCOUNTER — PATIENT OUTREACH (OUTPATIENT)
Dept: GASTROENTEROLOGY | Facility: CLINIC | Age: 58
End: 2023-10-19
Payer: COMMERCIAL

## 2023-10-20 ENCOUNTER — ANTICOAGULATION THERAPY VISIT (OUTPATIENT)
Dept: ANTICOAGULATION | Facility: CLINIC | Age: 58
End: 2023-10-20

## 2023-10-20 ENCOUNTER — LAB (OUTPATIENT)
Dept: LAB | Facility: CLINIC | Age: 58
End: 2023-10-20
Payer: COMMERCIAL

## 2023-10-20 DIAGNOSIS — D68.59 HYPERCOAGULABLE STATE (H): ICD-10-CM

## 2023-10-20 DIAGNOSIS — Z79.01 ANTICOAGULATION MONITORING, INR RANGE 2-3: Primary | ICD-10-CM

## 2023-10-20 DIAGNOSIS — Z79.01 LONG TERM (CURRENT) USE OF ANTICOAGULANTS: ICD-10-CM

## 2023-10-20 DIAGNOSIS — I73.9 PAD (PERIPHERAL ARTERY DISEASE) (H): ICD-10-CM

## 2023-10-20 DIAGNOSIS — I77.9 BILATERAL CAROTID ARTERY DISEASE, UNSPECIFIED TYPE (H): ICD-10-CM

## 2023-10-20 LAB — INR BLD: 2.5 (ref 0.9–1.1)

## 2023-10-20 PROCEDURE — 85610 PROTHROMBIN TIME: CPT

## 2023-10-20 PROCEDURE — 36416 COLLJ CAPILLARY BLOOD SPEC: CPT

## 2023-10-20 NOTE — PROGRESS NOTES
ANTICOAGULATION MANAGEMENT     Nery Whitaker 58 year old female is on warfarin with therapeutic INR result. (Goal INR 2.0-3.0)    Recent labs: (last 7 days)     10/20/23  0915   INR 2.5*       ASSESSMENT     Warfarin Lab Questionnaire    Warfarin Doses Last 7 Days      10/20/2023     7:52 AM   Dose in Tablet or Mg   TAB or MG? milligram (mg)     Pt Rptd Dose JESSIE MONDAY TUESDAY WED THURS FRIDAY SATURDAY   10/20/2023   7:52 AM 7.5 7.5 7.5 10 7.5 7.5 7.5         10/20/2023   Warfarin Lab Questionnaire   Missed doses within past 14 days? No   Changes in diet or alcohol within past 14 days? No   Medication changes since last result? No   Injuries or illness since last result? No   New shortness of breath, severe headaches or sudden changes in vision since last result? No   Abnormal bleeding since last result? No   Upcoming surgery, procedure? No   Best number to call with results? [phone]     Previous result: Subtherapeutic-10% increase  Additional findings: None       PLAN     Recommended plan for no diet, medication or health factor changes affecting INR     Dosing Instructions: Continue your current warfarin dose with next INR in 1-2 weeks       Summary  As of 10/20/2023      Full warfarin instructions:  10 mg every Wed; 7.5 mg all other days   Next INR check:  11/3/2023               Telephone call with Nery who verbalizes understanding and agrees to plan  Sent [employer] message with dosing and follow up instructions    Lab visit scheduled    Education provided:   Goal range and lab monitoring: goal range and significance of current result  Written instructions provided  Contact [phone] with any changes, questions or concerns.     Plan made per [facility] anticoagulation protocol    Shannen Mcclendon RN  Anticoagulation Clinic  10/20/2023    _______________________________________________________________________     Anticoagulation Episode Summary       Current INR goal:  2.0-3.0   TTR:  46.5% (1 y)   Target end  date:  Indefinite   Send INR reminders to:  BERNICE MARTINI    Indications    Anticoagulation monitoring  INR range 2-3 [Z79.01]  PAD (peripheral artery disease) (H24) [I73.9]  Long term (current) use of anticoagulants [Z79.01]  Hypercoagulable state (H24) [D68.59]  Bilateral carotid artery disease  unspecified type (H24) [I77.9]             Comments:               Anticoagulation Care Providers       Provider Role Specialty Phone number    Loraine Rees MD Referring Family Medicine [phone]    Vu Winters MD Referring Family Medicine [phone]    Priscila Lombardo APRN CNP Referring Family Medicine [phone]

## 2023-11-03 ENCOUNTER — LAB (OUTPATIENT)
Dept: LAB | Facility: CLINIC | Age: 58
End: 2023-11-03
Payer: COMMERCIAL

## 2023-11-03 ENCOUNTER — ANTICOAGULATION THERAPY VISIT (OUTPATIENT)
Dept: ANTICOAGULATION | Facility: CLINIC | Age: 58
End: 2023-11-03

## 2023-11-03 DIAGNOSIS — Z79.01 LONG TERM (CURRENT) USE OF ANTICOAGULANTS: ICD-10-CM

## 2023-11-03 DIAGNOSIS — I73.9 PAD (PERIPHERAL ARTERY DISEASE) (H): ICD-10-CM

## 2023-11-03 DIAGNOSIS — Z79.01 ANTICOAGULATION MONITORING, INR RANGE 2-3: Primary | ICD-10-CM

## 2023-11-03 DIAGNOSIS — D68.59 HYPERCOAGULABLE STATE (H): ICD-10-CM

## 2023-11-03 DIAGNOSIS — I77.9 BILATERAL CAROTID ARTERY DISEASE, UNSPECIFIED TYPE (H): ICD-10-CM

## 2023-11-03 LAB — INR BLD: 2.9 (ref 0.9–1.1)

## 2023-11-03 PROCEDURE — 36415 COLL VENOUS BLD VENIPUNCTURE: CPT

## 2023-11-03 PROCEDURE — 85610 PROTHROMBIN TIME: CPT

## 2023-11-03 NOTE — PROGRESS NOTES
ANTICOAGULATION MANAGEMENT     Nery Whitaker 58 year old female is on warfarin with therapeutic INR result. (Goal INR 2.0-3.0)    Recent labs: (last 7 days)     11/03/23  0915   INR 2.9*       ASSESSMENT     Warfarin Lab Questionnaire    Warfarin Doses Last 7 Days      11/3/2023     7:44 AM   Dose in Tablet or Mg   TAB or MG? milligram (mg)     Pt Rptd Dose JESSIE MONDAY TUESDAY WED THURS FRIDAY SATURDAY   11/3/2023   7:44 AM 7.5 7.5 7.5 10 7.5 7.5 7.5         11/3/2023   Warfarin Lab Questionnaire   Missed doses within past 14 days? No   Changes in diet or alcohol within past 14 days? No   Medication changes since last result? No   Injuries or illness since last result? No   New shortness of breath, severe headaches or sudden changes in vision since last result? No   Abnormal bleeding since last result? No   Upcoming surgery, procedure? No   Best number to call with results? [phone]     Previous result: Therapeutic last visit; previously outside of goal range  Additional findings: None       PLAN     Recommended plan for no diet, medication or health factor changes affecting INR     Dosing Instructions: Continue your current warfarin dose with next INR in 3 weeks       Summary  As of 11/3/2023      Full warfarin instructions:  10 mg every Wed; 7.5 mg all other days   Next INR check:  11/27/2023               Detailed voice message left for Nery with dosing instructions and follow up date.   Sent [employer] message with dosing and follow up instructions    Contact [phone] to schedule and with any changes, questions or concerns.     Education provided:   Goal range and lab monitoring: goal range and significance of current result  Contact [phone] with any changes, questions or concerns.     Plan made per [facility] anticoagulation protocol    Melissa Ferreira, RN  Anticoagulation Clinic  11/3/2023    _______________________________________________________________________     Anticoagulation Episode Summary        Current INR goal:  2.0-3.0   TTR:  48.7% (1 y)   Target end date:  Indefinite   Send INR reminders to:  BERNICE MARTINI    Indications    Anticoagulation monitoring  INR range 2-3 [Z79.01]  PAD (peripheral artery disease) (H24) [I73.9]  Long term (current) use of anticoagulants [Z79.01]  Hypercoagulable state (H24) [D68.59]  Bilateral carotid artery disease  unspecified type (H24) [I77.9]             Comments:               Anticoagulation Care Providers       Provider Role Specialty Phone number    Loraine Rees MD Referring Family Medicine [phone]    Vu Winters MD Referring Family Medicine [phone]    Priscila Lombardo APRN CNP Referring Family Medicine [phone]

## 2023-11-10 ENCOUNTER — IMMUNIZATION (OUTPATIENT)
Dept: FAMILY MEDICINE | Facility: CLINIC | Age: 58
End: 2023-11-10
Payer: COMMERCIAL

## 2023-11-10 PROCEDURE — 90471 IMMUNIZATION ADMIN: CPT

## 2023-11-10 PROCEDURE — 90686 IIV4 VACC NO PRSV 0.5 ML IM: CPT

## 2023-11-10 PROCEDURE — 90480 ADMN SARSCOV2 VAC 1/ONLY CMP: CPT

## 2023-11-10 PROCEDURE — 91320 SARSCV2 VAC 30MCG TRS-SUC IM: CPT

## 2023-11-28 ENCOUNTER — TRANSFERRED RECORDS (OUTPATIENT)
Dept: HEALTH INFORMATION MANAGEMENT | Facility: CLINIC | Age: 58
End: 2023-11-28
Payer: COMMERCIAL

## 2023-11-29 ENCOUNTER — LAB (OUTPATIENT)
Dept: LAB | Facility: CLINIC | Age: 58
End: 2023-11-29
Payer: COMMERCIAL

## 2023-11-29 ENCOUNTER — ANTICOAGULATION THERAPY VISIT (OUTPATIENT)
Dept: ANTICOAGULATION | Facility: CLINIC | Age: 58
End: 2023-11-29

## 2023-11-29 DIAGNOSIS — Z79.01 LONG TERM (CURRENT) USE OF ANTICOAGULANTS: ICD-10-CM

## 2023-11-29 DIAGNOSIS — D68.59 HYPERCOAGULABLE STATE (H): ICD-10-CM

## 2023-11-29 DIAGNOSIS — I77.9 BILATERAL CAROTID ARTERY DISEASE, UNSPECIFIED TYPE (H): ICD-10-CM

## 2023-11-29 DIAGNOSIS — Z79.01 ANTICOAGULATION MONITORING, INR RANGE 2-3: Primary | ICD-10-CM

## 2023-11-29 DIAGNOSIS — I73.9 PAD (PERIPHERAL ARTERY DISEASE) (H): ICD-10-CM

## 2023-11-29 LAB — INR BLD: 2.5 (ref 0.9–1.1)

## 2023-11-29 PROCEDURE — 85610 PROTHROMBIN TIME: CPT

## 2023-11-29 PROCEDURE — 36416 COLLJ CAPILLARY BLOOD SPEC: CPT

## 2023-11-29 NOTE — PROGRESS NOTES
ANTICOAGULATION MANAGEMENT     Nery Whitaker 58 year old female is on warfarin with therapeutic INR result. (Goal INR 2.0-3.0)    Recent labs: (last 7 days)     11/29/23  1311   INR 2.5*       ASSESSMENT     Warfarin Lab Questionnaire    Warfarin Doses Last 7 Days      11/29/2023    12:33 PM   Dose in Tablet or Mg   TAB or MG? milligram (mg)     Pt Rptd Dose SUNDAY MONDAY TUESDAY WED THURS FRIDAY SATURDAY 11/29/2023  12:33 PM 7.5 7.5 7.5 10 7.5 7.5 7.5         11/29/2023   Warfarin Lab Questionnaire   Missed doses within past 14 days? No   Changes in diet or alcohol within past 14 days? No   Medication changes since last result? No   Injuries or illness since last result? No   New shortness of breath, severe headaches or sudden changes in vision since last result? No   Abnormal bleeding since last result? No   Upcoming surgery, procedure? No   Best number to call with results? [phone]     Previous result: Therapeutic last 2(+) visits  Additional findings: None       PLAN     Recommended plan for no diet, medication or health factor changes affecting INR     Dosing Instructions: Continue your current warfarin dose with next INR in 5 weeks       Summary  As of 11/29/2023      Full warfarin instructions:  10 mg every Wed; 7.5 mg all other days   Next INR check:  1/3/2024               Telephone call with Nery who verbalizes understanding and agrees to plan    Lab visit scheduled    Education provided:   Contact [phone] with any changes, questions or concerns.     Plan made per [facility] anticoagulation protocol    Marjorie Ball RN  Anticoagulation Clinic  11/29/2023    _______________________________________________________________________     Anticoagulation Episode Summary       Current INR goal:  2.0-3.0   TTR:  53.2% (1 y)   Target end date:  Indefinite   Send INR reminders to:  BERNICE MARTINI    Indications    Anticoagulation monitoring  INR range 2-3 [Z79.01]  PAD (peripheral artery disease)  (H24) [I73.9]  Long term (current) use of anticoagulants [Z79.01]  Hypercoagulable state (H24) [D68.59]  Bilateral carotid artery disease  unspecified type (H24) [I77.9]             Comments:               Anticoagulation Care Providers       Provider Role Specialty Phone number    Loraine Rees MD Referring Family Medicine [phone]    Vu Winters MD Referring Family Medicine [phone]    Priscila Lombardo, APRN CNP Referring Family Medicine [phone]

## 2023-12-13 ENCOUNTER — ANCILLARY PROCEDURE (OUTPATIENT)
Dept: MAMMOGRAPHY | Facility: CLINIC | Age: 58
End: 2023-12-13
Attending: FAMILY MEDICINE
Payer: COMMERCIAL

## 2023-12-13 DIAGNOSIS — Z12.31 VISIT FOR SCREENING MAMMOGRAM: ICD-10-CM

## 2023-12-13 PROCEDURE — 77067 SCR MAMMO BI INCL CAD: CPT

## 2024-01-08 DIAGNOSIS — M79.2 THORACIC NEURALGIA: ICD-10-CM

## 2024-01-08 NOTE — TELEPHONE ENCOUNTER
Medication Request  Medication name: gabapentin (NEURONTIN) 100 MG capsule   Patient Sig: TAKE 1 CAPSULE BY MOUTH IN THE MORNING THEN 1 TABLET AT NOON AND 3 TABLETS AT BEDTIME DAILY     Requested Pharmacy: [id number]  When was patient last seen for this?:  07/28/23  Patient offered appointment:  [facility] pharmacy request.  Okay to leave a detailed message: no

## 2024-01-09 DIAGNOSIS — M81.0 AGE-RELATED OSTEOPOROSIS WITHOUT CURRENT PATHOLOGICAL FRACTURE: ICD-10-CM

## 2024-01-09 RX ORDER — GABAPENTIN 100 MG/1
CAPSULE ORAL
Qty: 450 CAPSULE | Refills: 1 | Status: SHIPPED | OUTPATIENT
Start: 2024-01-09

## 2024-01-09 RX ORDER — ALENDRONATE SODIUM 70 MG/1
TABLET ORAL
Qty: 12 TABLET | Refills: 1 | Status: SHIPPED | OUTPATIENT
Start: 2024-01-09

## 2024-01-10 ENCOUNTER — LAB (OUTPATIENT)
Dept: LAB | Facility: CLINIC | Age: 59
End: 2024-01-10
Payer: COMMERCIAL

## 2024-01-10 ENCOUNTER — ANTICOAGULATION THERAPY VISIT (OUTPATIENT)
Dept: ANTICOAGULATION | Facility: CLINIC | Age: 59
End: 2024-01-10

## 2024-01-10 DIAGNOSIS — Z79.01 LONG TERM (CURRENT) USE OF ANTICOAGULANTS: ICD-10-CM

## 2024-01-10 DIAGNOSIS — I77.9 BILATERAL CAROTID ARTERY DISEASE, UNSPECIFIED TYPE (H): ICD-10-CM

## 2024-01-10 DIAGNOSIS — D68.59 HYPERCOAGULABLE STATE (H): ICD-10-CM

## 2024-01-10 DIAGNOSIS — I73.9 PAD (PERIPHERAL ARTERY DISEASE) (H): ICD-10-CM

## 2024-01-10 DIAGNOSIS — Z79.01 ANTICOAGULATION MONITORING, INR RANGE 2-3: Primary | ICD-10-CM

## 2024-01-10 LAB — INR BLD: 1.5 (ref 0.9–1.1)

## 2024-01-10 PROCEDURE — 36416 COLLJ CAPILLARY BLOOD SPEC: CPT

## 2024-01-10 PROCEDURE — 85610 PROTHROMBIN TIME: CPT

## 2024-01-10 NOTE — PROGRESS NOTES
ANTICOAGULATION MANAGEMENT     Nery Whitaker 58 year old female is on warfarin with subtherapeutic INR result. (Goal INR 2.0-3.0)    Recent labs: (last 7 days)     01/10/24  1343   INR 1.5*       ASSESSMENT     Warfarin Lab Questionnaire    Warfarin Doses Last 7 Days      1/10/2024     1:41 PM   Dose in Tablet or Mg   TAB or MG? milligram (mg)     Pt Rptd Dose JESSIE MONDAY TUESDAY WED THURS FRIDAY SATURDAY   1/10/2024   1:41 PM 7.5 7.5 7.5 10 7.5 7.5 7.5         1/10/2024   Warfarin Lab Questionnaire   Missed doses within past 14 days? No   Changes in diet or alcohol within past 14 days? No-has been eating a lot of meat lately, craving it. She will cut back and be more consistent.   Medication changes since last result? No   Injuries or illness since last result? No   New shortness of breath, severe headaches or sudden changes in vision since last result? No   Abnormal bleeding since last result? No   Upcoming surgery, procedure? No     Previous result: Therapeutic last 2(+) visits  Additional findings: None       PLAN     Recommended plan for temporary change(s) affecting INR     Dosing Instructions: booster dose then continue your current warfarin dose with next INR in 1-2 weeks       Summary  As of 1/10/2024      Full warfarin instructions:  1/10: 15 mg; Otherwise 10 mg every Wed; 7.5 mg all other days   Next INR check:  1/24/2024               Telephone call with Nery who verbalizes understanding and agrees to plan    Lab visit scheduled    Education provided:   Goal range and lab monitoring: goal range and significance of current result and Importance of following up at instructed interval  Dietary considerations: Impact of protein intake on INR   Symptom monitoring: monitoring for clotting signs and symptoms  Contact [phone] with any changes, questions or concerns.     Plan made per [facility] anticoagulation protocol    Shannen Mcclendon RN  Anticoagulation  Clinic  1/10/2024    _______________________________________________________________________     Anticoagulation Episode Summary       Current INR goal:  2.0-3.0   TTR:  54.1% (1 y)   Target end date:  Indefinite   Send INR reminders to:  BERNICE MARTINI    Indications    Anticoagulation monitoring  INR range 2-3 [Z79.01]  PAD (peripheral artery disease) (H24) [I73.9]  Long term (current) use of anticoagulants [Z79.01]  Hypercoagulable state (H24) [D68.59]  Bilateral carotid artery disease  unspecified type (H24) [I77.9]             Comments:               Anticoagulation Care Providers       Provider Role Specialty Phone number    Loraine Rees MD Referring Family Medicine [phone]    Vu Winters MD Referring Family Medicine [phone]    Priscila Lombardo APRN CNP Referring Family Medicine [phone]

## 2024-01-14 ENCOUNTER — HOSPITAL ENCOUNTER (OUTPATIENT)
Dept: CT IMAGING | Facility: HOSPITAL | Age: 59
Discharge: HOME | End: 2024-01-14
Attending: STUDENT IN AN ORGANIZED HEALTH CARE EDUCATION/TRAINING PROGRAM | Admitting: STUDENT IN AN ORGANIZED HEALTH CARE EDUCATION/TRAINING PROGRAM
Payer: COMMERCIAL

## 2024-01-14 ENCOUNTER — OFFICE VISIT (OUTPATIENT)
Dept: FAMILY MEDICINE | Facility: CLINIC | Age: 59
End: 2024-01-14
Payer: COMMERCIAL

## 2024-01-14 VITALS
RESPIRATION RATE: 14 BRPM | DIASTOLIC BLOOD PRESSURE: 62 MMHG | WEIGHT: 117.9 LBS | SYSTOLIC BLOOD PRESSURE: 146 MMHG | BODY MASS INDEX: 19.8 KG/M2 | HEART RATE: 56 BPM | TEMPERATURE: 98 F | OXYGEN SATURATION: 97 %

## 2024-01-14 DIAGNOSIS — R10.9 RIGHT FLANK PAIN: ICD-10-CM

## 2024-01-14 DIAGNOSIS — R10.30 LOWER ABDOMINAL PAIN: ICD-10-CM

## 2024-01-14 DIAGNOSIS — Z79.01 LONG TERM (CURRENT) USE OF ANTICOAGULANTS: ICD-10-CM

## 2024-01-14 DIAGNOSIS — R30.0 DYSURIA: ICD-10-CM

## 2024-01-14 DIAGNOSIS — M54.50 ACUTE RIGHT-SIDED LOW BACK PAIN WITHOUT SCIATICA: Primary | ICD-10-CM

## 2024-01-14 LAB
ALBUMIN SERPL BCG-MCNC: 4.2 G/DL (ref 3.5–5.2)
ALBUMIN UR-MCNC: NEGATIVE MG/DL
ALP SERPL-CCNC: 102 U/L (ref 40–150)
ALT SERPL W P-5'-P-CCNC: 9 U/L (ref 0–50)
ANION GAP SERPL CALCULATED.3IONS-SCNC: 11 MMOL/L (ref 7–15)
APPEARANCE UR: CLEAR
AST SERPL W P-5'-P-CCNC: 18 U/L (ref 0–45)
BACTERIA #/AREA URNS HPF: NORMAL /HPF
BASOPHILS # BLD AUTO: 0.1 10E3/UL (ref 0–0.2)
BASOPHILS NFR BLD AUTO: 1 %
BILIRUB SERPL-MCNC: 0.4 MG/DL
BILIRUB UR QL STRIP: NEGATIVE
BUN SERPL-MCNC: 8 MG/DL (ref 6–20)
CALCIUM SERPL-MCNC: 9.1 MG/DL (ref 8.6–10)
CHLORIDE SERPL-SCNC: 107 MMOL/L (ref 98–107)
COLOR UR AUTO: YELLOW
CREAT SERPL-MCNC: 0.85 MG/DL (ref 0.51–0.95)
DEPRECATED HCO3 PLAS-SCNC: 25 MMOL/L (ref 22–29)
EGFRCR SERPLBLD CKD-EPI 2021: 79 ML/MIN/1.73M2
EOSINOPHIL # BLD AUTO: 0.3 10E3/UL (ref 0–0.7)
EOSINOPHIL NFR BLD AUTO: 3 %
ERYTHROCYTE [DISTWIDTH] IN BLOOD BY AUTOMATED COUNT: 13.2 % (ref 10–15)
GLUCOSE SERPL-MCNC: 89 MG/DL (ref 70–99)
GLUCOSE UR STRIP-MCNC: NEGATIVE MG/DL
HCT VFR BLD AUTO: 34.9 % (ref 35–47)
HGB BLD-MCNC: 11.8 G/DL (ref 11.7–15.7)
HGB UR QL STRIP: ABNORMAL
IMM GRANULOCYTES # BLD: 0 10E3/UL
IMM GRANULOCYTES NFR BLD: 0 %
KETONES UR STRIP-MCNC: NEGATIVE MG/DL
LEUKOCYTE ESTERASE UR QL STRIP: NEGATIVE
LYMPHOCYTES # BLD AUTO: 4.1 10E3/UL (ref 0.8–5.3)
LYMPHOCYTES NFR BLD AUTO: 39 %
MCH RBC QN AUTO: 32.1 PG (ref 26.5–33)
MCHC RBC AUTO-ENTMCNC: 33.8 G/DL (ref 31.5–36.5)
MCV RBC AUTO: 95 FL (ref 78–100)
MONOCYTES # BLD AUTO: 1 10E3/UL (ref 0–1.3)
MONOCYTES NFR BLD AUTO: 10 %
NEUTROPHILS # BLD AUTO: 5.1 10E3/UL (ref 1.6–8.3)
NEUTROPHILS NFR BLD AUTO: 48 %
NITRATE UR QL: NEGATIVE
PH UR STRIP: 6 [PH] (ref 5–8)
PLATELET # BLD AUTO: 242 10E3/UL (ref 150–450)
POTASSIUM SERPL-SCNC: 4.9 MMOL/L (ref 3.4–5.3)
PROT SERPL-MCNC: 7 G/DL (ref 6.4–8.3)
RBC # BLD AUTO: 3.68 10E6/UL (ref 3.8–5.2)
RBC #/AREA URNS AUTO: NORMAL /HPF
SODIUM SERPL-SCNC: 143 MMOL/L (ref 135–145)
SP GR UR STRIP: >=1.03 (ref 1–1.03)
SQUAMOUS #/AREA URNS AUTO: NORMAL /LPF
UROBILINOGEN UR STRIP-ACNC: 0.2 E.U./DL
WBC # BLD AUTO: 10.6 10E3/UL (ref 4–11)
WBC #/AREA URNS AUTO: NORMAL /HPF

## 2024-01-14 PROCEDURE — 81001 URINALYSIS AUTO W/SCOPE: CPT | Performed by: STUDENT IN AN ORGANIZED HEALTH CARE EDUCATION/TRAINING PROGRAM

## 2024-01-14 PROCEDURE — 99215 OFFICE O/P EST HI 40 MIN: CPT | Performed by: STUDENT IN AN ORGANIZED HEALTH CARE EDUCATION/TRAINING PROGRAM

## 2024-01-14 PROCEDURE — 36415 COLL VENOUS BLD VENIPUNCTURE: CPT | Performed by: STUDENT IN AN ORGANIZED HEALTH CARE EDUCATION/TRAINING PROGRAM

## 2024-01-14 PROCEDURE — 80053 COMPREHEN METABOLIC PANEL: CPT | Performed by: STUDENT IN AN ORGANIZED HEALTH CARE EDUCATION/TRAINING PROGRAM

## 2024-01-14 PROCEDURE — 74177 CT ABD & PELVIS W/CONTRAST: CPT

## 2024-01-14 PROCEDURE — 85025 COMPLETE CBC W/AUTO DIFF WBC: CPT | Performed by: STUDENT IN AN ORGANIZED HEALTH CARE EDUCATION/TRAINING PROGRAM

## 2024-01-14 PROCEDURE — 250N000011 HC RX IP 250 OP 636: Performed by: STUDENT IN AN ORGANIZED HEALTH CARE EDUCATION/TRAINING PROGRAM

## 2024-01-14 RX ORDER — IOPAMIDOL 755 MG/ML
60 INJECTION, SOLUTION INTRAVASCULAR ONCE
Status: COMPLETED | OUTPATIENT
Start: 2024-01-14 | End: 2024-01-14

## 2024-01-14 RX ORDER — METHYLPREDNISOLONE 4 MG
TABLET, DOSE PACK ORAL
Qty: 21 TABLET | Refills: 0 | Status: SHIPPED | OUTPATIENT
Start: 2024-01-14 | End: 2024-04-24

## 2024-01-14 RX ADMIN — IOPAMIDOL 60 ML: 755 INJECTION, SOLUTION INTRAVENOUS at 12:15

## 2024-01-14 NOTE — PROGRESS NOTES
Assessment & Plan     Acute right-sided low back pain without sciatica  Long term (current) use of anticoagulants  Patient presents urgent care with right lower quadrant and right flank pain for the past 3 to 4 days.  She is also had urinary frequency.  UA is negative for signs of infection and there is no hematuria.  She does have a history of sigmoid diverticuli and I am concerned that her symptoms are presentation of diverticulitis versus ureteral stone as she has a history of kidney stones.  I advised CT abdomen and pelvis with and without contrast to rule out diverticulitis and kidney stone.  Her CBC is unremarkable.  CT shows no acute findings in the abdomen.  I discussed this with patient and advised treating for musculoskeletal cause.  She is on chronic anticoagulation for hypercoagulable state so she is unable to take NSAIDs.  Advised treating with a Medrol Dosepak for inflammation and scheduling an appointment with the spine specialist if the symptoms or not improving or worsening to further evaluate.  Patient agrees with plan of care.  - methylPREDNISolone (MEDROL DOSEPAK) 4 MG tablet therapy pack  Dispense: 21 tablet; Refill: 0  - Spine  Referral    Dysuria  - UA Macroscopic with reflex to Microscopic and Culture - Clinic Collect  - UA Microscopic with Reflex to Culture    Lower abdominal pain  - CBC with platelets and differential  - Comprehensive metabolic panel (BMP + Alb, Alk Phos, ALT, AST, Total. Bili, TP)  - CBC with platelets and differential  - Comprehensive metabolic panel (BMP + Alb, Alk Phos, ALT, AST, Total. Bili, TP)    Right flank pain  - CBC with platelets and differential  - Comprehensive metabolic panel (BMP + Alb, Alk Phos, ALT, AST, Total. Bili, TP)  - CBC with platelets and differential  - Comprehensive metabolic panel (BMP + Alb, Alk Phos, ALT, AST, Total. Bili, TP)         40 minutes spent by me on the date of the encounter doing chart review, review of test results,  interpretation of tests, patient visit, and documentation         No follow-ups on file.    GERALD Tapia CNP  [facility] LETY Gabriel is a 58 year old female who presents to clinic today for the following health issues:  Chief Complaint   Patient presents with    Urinary Problem     Symptoms started 3-4 days ago with right flank/low back pain then a couple days ago the pain is also in her right lower abdomen and having urinary frequency.  She has history of appendectomy and cholecystectomy.  She also has history of diverticuli in the sigmoid colon.  She has never had diverticulitis.  She has history of kidney stones but it has been a long time since she has had a stone.  She has not noticed any pink or bloody urine.     HPI    Review of Systems  Constitutional, HEENT, cardiovascular, pulmonary, GI, , musculoskeletal, neuro, skin, endocrine and psych systems are negative, except as otherwise noted.      Objective    BP (!) 146/62   Pulse 56   Temp 98  F (36.7  C) (Oral)   Resp 14   Wt 53.5 kg (117 lb 14.4 oz)   SpO2 97%   BMI 19.80 kg/m    Physical Exam   GENERAL: alert and no acute distress  RESP: lungs clear to auscultation - no rales, rhonchi or wheezes  CV: regular rate and rhythm, normal S1 S2, no S3 or S4, no murmur, click or rub, no peripheral edema and peripheral pulses strong  ABDOMEN: tenderness RLQ  MS: no gross musculoskeletal defects noted, no edema  SKIN: no suspicious lesions or rashes  NEURO: Normal strength and tone, mentation intact and speech normal  BACK: right CVA tenderness, no paralumbar tenderness  PSYCH: mentation appears normal, affect normal/bright    Results for orders placed or performed during the hospital encounter of 01/14/24   CT Abdomen Pelvis w Contrast     Status: None    Narrative    EXAM: CT ABDOMEN PELVIS W CONTRAST  LOCATION: [facility]  DATE: 1/14/2024    INDICATION: midline lower abdominal and  right flank pain; hx appendectomy; hx sigmoid diverticuli and kidney stones; r o diverticulitis and kidney stone  COMPARISON: 08/16/2023 CT abdomen  TECHNIQUE: CT scan of the abdomen and pelvis was performed following injection of IV contrast. Multiplanar reformats were obtained. Dose reduction techniques were used.  CONTRAST: 60ml [id number]    FINDINGS:   LOWER CHEST: Normal.    HEPATOBILIARY: Cholecystectomy. No liver mass.    PANCREAS: Normal.    SPLEEN: Normal.    ADRENAL GLANDS: Normal.    KIDNEYS/BLADDER: Normal.    BOWEL: No obstruction or inflammatory change.    LYMPH NODES: Normal.    VASCULATURE: Redemonstrated aortobifemoral arterial bypass graft. Redemonstrated stents in the chronically occluded bilateral common iliac arteries. Unchanged soft tissue thickening anterior to the proximal right femoral bypass graft.    PELVIC ORGANS: Normal.    MUSCULOSKELETAL: Unremarkable      Impression    IMPRESSION:   1.  No acute findings in the abdomen or pelvis.   Results for orders placed or performed in visit on 01/14/24   UA Macroscopic with reflex to Microscopic and Culture - Clinic Collect     Status: Abnormal    Specimen: Urine, Clean Catch   Result Value Ref Range    Color Urine Yellow Colorless, Straw, Light Yellow, Yellow    Appearance Urine Clear Clear    Glucose Urine Negative Negative mg/dL    Bilirubin Urine Negative Negative    Ketones Urine Negative Negative mg/dL    Specific Gravity Urine >=1.030 1.005 - 1.030    Blood Urine Trace (A) Negative    pH Urine 6.0 5.0 - 8.0    Protein Albumin Urine Negative Negative mg/dL    Urobilinogen Urine 0.2 0.2, 1.0 E.U./dL    Nitrite Urine Negative Negative    Leukocyte Esterase Urine Negative Negative   UA Microscopic with Reflex to Culture     Status: Normal   Result Value Ref Range    Bacteria Urine None Seen None Seen /HPF    RBC Urine 0-2 0-2 /HPF /HPF    WBC Urine 0-5 0-5 /HPF /HPF    Squamous Epithelials Urine None Seen None Seen /LPF    Narrative    Urine  Culture not indicated   Comprehensive metabolic panel (BMP + Alb, Alk Phos, ALT, AST, Total. Bili, TP)     Status: Normal   Result Value Ref Range    Sodium 143 135 - 145 mmol/L    Potassium 4.9 3.4 - 5.3 mmol/L    Carbon Dioxide (CO2) 25 22 - 29 mmol/L    Anion Gap 11 7 - 15 mmol/L    Urea Nitrogen 8.0 6.0 - 20.0 mg/dL    Creatinine 0.85 0.51 - 0.95 mg/dL    GFR Estimate 79 >60 mL/min/1.73m2    Calcium 9.1 8.6 - 10.0 mg/dL    Chloride 107 98 - 107 mmol/L    Glucose 89 70 - 99 mg/dL    Alkaline Phosphatase 102 40 - 150 U/L    AST 18 0 - 45 U/L    ALT 9 0 - 50 U/L    Protein Total 7.0 6.4 - 8.3 g/dL    Albumin 4.2 3.5 - 5.2 g/dL    Bilirubin Total 0.4 <=1.2 mg/dL   CBC with platelets and differential     Status: Abnormal   Result Value Ref Range    WBC Count 10.6 4.0 - 11.0 10e3/uL    RBC Count 3.68 (L) 3.80 - 5.20 10e6/uL    Hemoglobin 11.8 11.7 - 15.7 g/dL    Hematocrit 34.9 (L) 35.0 - 47.0 %    MCV 95 78 - 100 fL    MCH 32.1 26.5 - 33.0 pg    MCHC 33.8 31.5 - 36.5 g/dL    RDW 13.2 10.0 - 15.0 %    Platelet Count 242 150 - 450 10e3/uL    % Neutrophils 48 %    % Lymphocytes 39 %    % Monocytes 10 %    % Eosinophils 3 %    % Basophils 1 %    % Immature Granulocytes 0 %    Absolute Neutrophils 5.1 1.6 - 8.3 10e3/uL    Absolute Lymphocytes 4.1 0.8 - 5.3 10e3/uL    Absolute Monocytes 1.0 0.0 - 1.3 10e3/uL    Absolute Eosinophils 0.3 0.0 - 0.7 10e3/uL    Absolute Basophils 0.1 0.0 - 0.2 10e3/uL    Absolute Immature Granulocytes 0.0 <=0.4 10e3/uL   CBC with platelets and differential     Status: Abnormal    Narrative    The following orders were created for panel order CBC with platelets and differential.  Procedure                               Abnormality         Status                     ---------                               -----------         ------                     CBC with platelets and d...[[id number]]  Abnormal            Final result                 Please view results for these tests on the individual  orders.

## 2024-01-19 ENCOUNTER — TELEPHONE (OUTPATIENT)
Dept: ANTICOAGULATION | Facility: CLINIC | Age: 59
End: 2024-01-19

## 2024-01-19 ENCOUNTER — APPOINTMENT (OUTPATIENT)
Dept: ULTRASOUND IMAGING | Facility: HOSPITAL | Age: 59
End: 2024-01-19
Attending: EMERGENCY MEDICINE
Payer: COMMERCIAL

## 2024-01-19 ENCOUNTER — HOSPITAL ENCOUNTER (EMERGENCY)
Facility: HOSPITAL | Age: 59
Discharge: HOME | End: 2024-01-19
Attending: EMERGENCY MEDICINE | Admitting: EMERGENCY MEDICINE
Payer: COMMERCIAL

## 2024-01-19 VITALS
SYSTOLIC BLOOD PRESSURE: 140 MMHG | DIASTOLIC BLOOD PRESSURE: 64 MMHG | OXYGEN SATURATION: 99 % | WEIGHT: 115 LBS | HEART RATE: 53 BPM | BODY MASS INDEX: 19.16 KG/M2 | HEIGHT: 65 IN | TEMPERATURE: 97 F | RESPIRATION RATE: 18 BRPM

## 2024-01-19 DIAGNOSIS — D68.59 HYPERCOAGULABLE STATE (H): ICD-10-CM

## 2024-01-19 DIAGNOSIS — I73.9 PAD (PERIPHERAL ARTERY DISEASE) (H): ICD-10-CM

## 2024-01-19 DIAGNOSIS — Z79.01 ANTICOAGULATION MONITORING, INR RANGE 2-3: Primary | ICD-10-CM

## 2024-01-19 DIAGNOSIS — R11.2 NAUSEA AND VOMITING, UNSPECIFIED VOMITING TYPE: ICD-10-CM

## 2024-01-19 DIAGNOSIS — I77.9 BILATERAL CAROTID ARTERY DISEASE, UNSPECIFIED TYPE (H): ICD-10-CM

## 2024-01-19 DIAGNOSIS — R10.9 BILATERAL FLANK PAIN: ICD-10-CM

## 2024-01-19 DIAGNOSIS — Z79.01 LONG TERM (CURRENT) USE OF ANTICOAGULANTS: ICD-10-CM

## 2024-01-19 LAB
ALBUMIN SERPL BCG-MCNC: 4.1 G/DL (ref 3.5–5.2)
ALBUMIN UR-MCNC: 20 MG/DL
ALP SERPL-CCNC: 90 U/L (ref 40–150)
ALT SERPL W P-5'-P-CCNC: 10 U/L (ref 0–50)
ANION GAP SERPL CALCULATED.3IONS-SCNC: 7 MMOL/L (ref 7–15)
APPEARANCE UR: CLEAR
AST SERPL W P-5'-P-CCNC: 16 U/L (ref 0–45)
BASOPHILS # BLD AUTO: 0.1 10E3/UL (ref 0–0.2)
BASOPHILS NFR BLD AUTO: 1 %
BILIRUB SERPL-MCNC: 0.5 MG/DL
BILIRUB UR QL STRIP: NEGATIVE
BUN SERPL-MCNC: 16.2 MG/DL (ref 6–20)
CALCIUM SERPL-MCNC: 9.1 MG/DL (ref 8.6–10)
CHLORIDE SERPL-SCNC: 108 MMOL/L (ref 98–107)
COLOR UR AUTO: YELLOW
CREAT SERPL-MCNC: 0.93 MG/DL (ref 0.51–0.95)
DEPRECATED HCO3 PLAS-SCNC: 28 MMOL/L (ref 22–29)
EGFRCR SERPLBLD CKD-EPI 2021: 71 ML/MIN/1.73M2
EOSINOPHIL # BLD AUTO: 0.2 10E3/UL (ref 0–0.7)
EOSINOPHIL NFR BLD AUTO: 2 %
ERYTHROCYTE [DISTWIDTH] IN BLOOD BY AUTOMATED COUNT: 13.2 % (ref 10–15)
GLUCOSE SERPL-MCNC: 99 MG/DL (ref 70–99)
GLUCOSE UR STRIP-MCNC: NEGATIVE MG/DL
HCT VFR BLD AUTO: 36.7 % (ref 35–47)
HGB BLD-MCNC: 12 G/DL (ref 11.7–15.7)
HGB UR QL STRIP: ABNORMAL
IMM GRANULOCYTES # BLD: 0 10E3/UL
IMM GRANULOCYTES NFR BLD: 0 %
INR PPP: 1.34 (ref 0.85–1.15)
KETONES UR STRIP-MCNC: NEGATIVE MG/DL
LACTATE SERPL-SCNC: 1 MMOL/L (ref 0.7–2)
LEUKOCYTE ESTERASE UR QL STRIP: ABNORMAL
LIPASE SERPL-CCNC: 25 U/L (ref 13–60)
LYMPHOCYTES # BLD AUTO: 2.3 10E3/UL (ref 0.8–5.3)
LYMPHOCYTES NFR BLD AUTO: 20 %
MCH RBC QN AUTO: 31.2 PG (ref 26.5–33)
MCHC RBC AUTO-ENTMCNC: 32.7 G/DL (ref 31.5–36.5)
MCV RBC AUTO: 95 FL (ref 78–100)
MONOCYTES # BLD AUTO: 1.1 10E3/UL (ref 0–1.3)
MONOCYTES NFR BLD AUTO: 10 %
MUCOUS THREADS #/AREA URNS LPF: PRESENT /LPF
NEUTROPHILS # BLD AUTO: 7.5 10E3/UL (ref 1.6–8.3)
NEUTROPHILS NFR BLD AUTO: 67 %
NITRATE UR QL: NEGATIVE
NRBC # BLD AUTO: 0 10E3/UL
NRBC BLD AUTO-RTO: 0 /100
PH UR STRIP: 6 [PH] (ref 5–7)
PLATELET # BLD AUTO: 222 10E3/UL (ref 150–450)
POTASSIUM SERPL-SCNC: 4.3 MMOL/L (ref 3.4–5.3)
PROT SERPL-MCNC: 6.8 G/DL (ref 6.4–8.3)
RBC # BLD AUTO: 3.85 10E6/UL (ref 3.8–5.2)
RBC URINE: 1 /HPF
SODIUM SERPL-SCNC: 143 MMOL/L (ref 135–145)
SP GR UR STRIP: 1.02 (ref 1–1.03)
SQUAMOUS EPITHELIAL: 2 /HPF
TRANSITIONAL EPI: <1 /HPF
UROBILINOGEN UR STRIP-MCNC: 2 MG/DL
WBC # BLD AUTO: 11.2 10E3/UL (ref 4–11)
WBC URINE: 1 /HPF

## 2024-01-19 PROCEDURE — 99285 EMERGENCY DEPT VISIT HI MDM: CPT | Mod: 25

## 2024-01-19 PROCEDURE — 76770 US EXAM ABDO BACK WALL COMP: CPT

## 2024-01-19 PROCEDURE — 83605 ASSAY OF LACTIC ACID: CPT | Performed by: EMERGENCY MEDICINE

## 2024-01-19 PROCEDURE — 85025 COMPLETE CBC W/AUTO DIFF WBC: CPT | Performed by: EMERGENCY MEDICINE

## 2024-01-19 PROCEDURE — 96361 HYDRATE IV INFUSION ADD-ON: CPT

## 2024-01-19 PROCEDURE — 258N000003 HC RX IP 258 OP 636: Performed by: EMERGENCY MEDICINE

## 2024-01-19 PROCEDURE — 80053 COMPREHEN METABOLIC PANEL: CPT | Performed by: EMERGENCY MEDICINE

## 2024-01-19 PROCEDURE — 96375 TX/PRO/DX INJ NEW DRUG ADDON: CPT

## 2024-01-19 PROCEDURE — 85610 PROTHROMBIN TIME: CPT | Performed by: EMERGENCY MEDICINE

## 2024-01-19 PROCEDURE — 250N000011 HC RX IP 250 OP 636: Performed by: EMERGENCY MEDICINE

## 2024-01-19 PROCEDURE — 36415 COLL VENOUS BLD VENIPUNCTURE: CPT | Performed by: EMERGENCY MEDICINE

## 2024-01-19 PROCEDURE — C9113 INJ PANTOPRAZOLE SODIUM, VIA: HCPCS | Performed by: EMERGENCY MEDICINE

## 2024-01-19 PROCEDURE — 96374 THER/PROPH/DIAG INJ IV PUSH: CPT

## 2024-01-19 PROCEDURE — 81001 URINALYSIS AUTO W/SCOPE: CPT | Performed by: STUDENT IN AN ORGANIZED HEALTH CARE EDUCATION/TRAINING PROGRAM

## 2024-01-19 PROCEDURE — 83690 ASSAY OF LIPASE: CPT | Performed by: EMERGENCY MEDICINE

## 2024-01-19 RX ORDER — ONDANSETRON 4 MG/1
4-8 TABLET, ORALLY DISINTEGRATING ORAL EVERY 6 HOURS PRN
Qty: 20 TABLET | Refills: 0 | Status: SHIPPED | OUTPATIENT
Start: 2024-01-19 | End: 2024-10-04

## 2024-01-19 RX ORDER — OXYCODONE HYDROCHLORIDE 5 MG/1
5 TABLET ORAL EVERY 6 HOURS PRN
Qty: 12 TABLET | Refills: 0 | Status: SHIPPED | OUTPATIENT
Start: 2024-01-19 | End: 2024-01-22

## 2024-01-19 RX ORDER — MORPHINE SULFATE 4 MG/ML
4 INJECTION, SOLUTION INTRAMUSCULAR; INTRAVENOUS ONCE
Status: COMPLETED | OUTPATIENT
Start: 2024-01-19 | End: 2024-01-19

## 2024-01-19 RX ORDER — SUCRALFATE 1 G/1
1 TABLET ORAL 4 TIMES DAILY
Qty: 20 TABLET | Refills: 0 | Status: SHIPPED | OUTPATIENT
Start: 2024-01-19 | End: 2024-01-24

## 2024-01-19 RX ORDER — ONDANSETRON 2 MG/ML
4 INJECTION INTRAMUSCULAR; INTRAVENOUS EVERY 30 MIN PRN
Status: DISCONTINUED | OUTPATIENT
Start: 2024-01-19 | End: 2024-01-19 | Stop reason: HOSPADM

## 2024-01-19 RX ORDER — SUCRALFATE ORAL 1 G/10ML
1 SUSPENSION ORAL 4 TIMES DAILY
Qty: 414 ML | Refills: 0 | Status: SHIPPED | OUTPATIENT
Start: 2024-01-19 | End: 2024-01-19

## 2024-01-19 RX ADMIN — SODIUM CHLORIDE, POTASSIUM CHLORIDE, SODIUM LACTATE AND CALCIUM CHLORIDE 1000 ML: 600; 310; 30; 20 INJECTION, SOLUTION INTRAVENOUS at 08:11

## 2024-01-19 RX ADMIN — MORPHINE SULFATE 4 MG: 4 INJECTION, SOLUTION INTRAMUSCULAR; INTRAVENOUS at 08:11

## 2024-01-19 RX ADMIN — FAMOTIDINE 20 MG: 10 INJECTION, SOLUTION INTRAVENOUS at 08:11

## 2024-01-19 RX ADMIN — ONDANSETRON 4 MG: 2 INJECTION INTRAMUSCULAR; INTRAVENOUS at 08:13

## 2024-01-19 RX ADMIN — PANTOPRAZOLE SODIUM 40 MG: 40 INJECTION, POWDER, FOR SOLUTION INTRAVENOUS at 10:20

## 2024-01-19 ASSESSMENT — ACTIVITIES OF DAILY LIVING (ADL): ADLS_ACUITY_SCORE: 35

## 2024-01-19 NOTE — DISCHARGE INSTRUCTIONS
I gave you information on gastritis is that will help to guide on things that you should or should not eat over the coming weeks while you wait for your GI appointment in case this is part of what is causing your pain.    Start an antacid like Prilosec, but any of the over-the-counter antacids like Nexium, Prevacid, would also be okay and you should continue to take that while waiting for your GI consultation.    The Carafate will help to coat your stomach as if it is inflamed cause your symptoms.    Ondansetron as needed for nausea.

## 2024-01-19 NOTE — ED TRIAGE NOTES
AMB TO TRIAGE.  Pt states in urgent care with R flank pain last week.  Pain moved to L flank for last 3 days.  Pain in R has resolved.  Denies urinary sx.      Triage Assessment (Adult)       Row Name 01/19/24 0730          Triage Assessment    Airway WDL WDL        Respiratory WDL    Respiratory WDL WDL        Skin Circulation/Temperature WDL    Skin Circulation/Temperature WDL WDL        Cardiac WDL    Cardiac WDL WDL        Peripheral/Neurovascular WDL    Peripheral Neurovascular WDL WDL        Cognitive/Neuro/Behavioral WDL    Cognitive/Neuro/Behavioral WDL WDL

## 2024-01-19 NOTE — Clinical Note
Nery Whitaker was seen and treated in our emergency department on 1/19/2024.         Sincerely,     [facility] Emergency Department

## 2024-01-19 NOTE — ED NOTES
Pt alert and oriented x4. Ambulated independently with a steady gait. Discharged to home with family. Pt instructed to keep follow up with GI and  rx from pharmacy.

## 2024-01-19 NOTE — ED PROVIDER NOTES
Emergency Department Encounter     Evaluation Date & Time:   1/19/2024  7:32 AM    CHIEF COMPLAINT:  Flank Pain      Triage Note:AMB TO TRIAGE.  Pt states in urgent care with R flank pain last week.  Pain moved to L flank for last 3 days.  Pain in R has resolved.  Denies urinary sx.      Triage Assessment (Adult)       Row Name 01/19/24 0730          Triage Assessment    Airway WDL WDL        Respiratory WDL    Respiratory WDL WDL        Skin Circulation/Temperature WDL    Skin Circulation/Temperature WDL WDL        Cardiac WDL    Cardiac WDL WDL        Peripheral/Neurovascular WDL    Peripheral Neurovascular WDL WDL        Cognitive/Neuro/Behavioral WDL    Cognitive/Neuro/Behavioral WDL WDL                         FINAL IMPRESSION:    ICD-10-CM    1. Nausea and vomiting, unspecified vomiting type  R11.2       2. Bilateral flank pain  R10.9           Impression and Plan     ED COURSE & MEDICAL DECISION MAKING:        ED Course as of 01/19/24 1047   Fri Jan 19, 2024   0801 Differential for bilateral flank pain with nausea vomiting diarrhea can be broad, but the diarrhea part of it that is not bloody suggest that this is possibly viral.  Otherwise, the diarrhea could be reactive to something else causing the nausea vomiting and flank pain such as kidney stone, pyelonephritis, cholecystitis, or aortic aneurysmal etiology.  Also do still need to consider the possibility of ischemic colitis.  That being said, her abdomen is quite soft and nonpainful and she does not appear to have pain out of proportion to examination as she otherwise appears comfortable lying back in bed so I think that makes ischemic colitis much less likely.  Certainly could have intermittent angina when she tries to eat if she has significant narrowing that has slowly accumulated over time to something like her celiac artery.  She does tell me that her CAT scan done 5 days ago was with contrast so I will review those images.  Will treat  symptomatically in the meantime with fluids and since she is not able to take anti-inflammatories because of the warfarin will give an IV dose of morphine and then see if we can switch to oral meds depending on the results.  Her urinalysis does not look convincing for UTI.  There is a trace amount of blood and protein in it, but that can be related to underlying chronic high blood pressure.   0804 I reviewed her urgent care visit from the 14th as well as the lab results on that day and the CAT scan which was done with contrast.  They did comment on the aorto-fem bypass, and the chronic occlusions but did not comment on the other blood vessels within the gut.  Depending on lactic acid and inflammatory markers/white blood cell count here as well as her ongoing presentation we may or may not need to repeat that as a CTA.   0851 Her blood work shows no evidence of acidemia to suggest gut ischemia.  Her white blood cell count is 11.2 which again is not significantly elevated, her INR remains subtherapeutic at 1.34 so does need to work with her coagulation clinic to get that corrected.  No role for repeat CT imaging today given this and her symptoms primarily of nausea.  May be viral.  Will, however, do ultrasound of her kidneys to assure that there is no developing pyelonephritis and or now bilateral hydronephrosis from something like a stone.   0854 She has no cough so no signs of pulmonary disease causing the bilateral flank pain.  No burning or upper abd pain with this to suggest pud.  Liver and lipase values are normal so no evidence of ongoing cholecystitis or pancreatitis to cause the flank pain, and ct shows no evidence of aortic rupture to cause flank pain, or dissection.   1015 Previous ct shows no acute fracture in thoracic spine to cause radicular symptoms, though cannot rule out pinched nerve, though that is unlikely to be a cause with the associated n/v/d.  Will treat with ppi/carafate   1039 She is going to  follow-up with her chiropractor later today in case this is a pinched nerve we will continue to work with them.  Otherwise for the pain we discussed the risk benefit of a narcotic and she would like to try tried tramadol.  Specifically we discussed the risk of seizures in some people and she would like to proceed with that.  Otherwise we will do Zofran as that helped quite a bit with her nausea here and she does have a follow-up that she is already set up with gastroenterology where they will be able to look to see if there is any developing gastritis or GERD.  In the meantime we will start her on Carafate and PPI.   1046 As it turns out, she does take citalopram which she had forgotten about and so tramadol is contraindicated will instead do a couple days of oxycodone if needed.       At the conclusion of the encounter I discussed the results of all the tests and the disposition. The questions were answered. The patient or family acknowledged understanding and was agreeable with the care plan.          0 minutes of critical care time        MEDICATIONS GIVEN IN THE EMERGENCY DEPARTMENT:  Medications   ondansetron (ZOFRAN) injection 4 mg (4 mg Intravenous $Given 1/19/24 0813)   lactated ringers BOLUS 1,000 mL (0 mLs Intravenous Stopped 1/19/24 1021)   famotidine (PEPCID) injection 20 mg (20 mg Intravenous $Given 1/19/24 0811)   morphine (PF) injection 4 mg (4 mg Intravenous $Given 1/19/24 0811)   pantoprazole (PROTONIX) IV push injection 40 mg (40 mg Intravenous $Given 1/19/24 1020)       NEW PRESCRIPTIONS STARTED AT TODAY'S ED VISIT:  New Prescriptions    ONDANSETRON (ZOFRAN ODT) 4 MG ODT TAB    Take 1-2 tablets (4-8 mg) by mouth every 6 hours as needed for nausea or vomiting (max daily dose of 4 tablets) Allowed to dissolve under your tongue    OXYCODONE (ROXICODONE) 5 MG TABLET    Take 1 tablet (5 mg) by mouth every 6 hours as needed for pain    SUCRALFATE (CARAFATE) 1 GM/10ML SUSPENSION    Take 10 mLs (1 g) by  mouth 4 times daily Take this at least 2 hours after from when you take your warfarin       HPI     HPI     Nery Whitaker is a 58 year old female with a pertinent history of pad on warfarin and chronic back pain who presents to this ED with her son this morning for evaluation of ongoing flank pains, and n/v/d.  States that it started about 5d ago with flank pain on the right and then 2d ago became more severe and on the left now with n/v/d recurrently for about 2 days.  Still feels the need to vomit but stomach is empty.  She may have had a fever yesterday as had hot/cold chills but did not check.  Only pain med she takes daily is gabapentin but she does use thc and notes that the only time she feels better is in the warm shower/bath.  No blood in emesis/stools.  On her last warfarin check for her pad, blood was a bit thick, but she believes they did use contrast when they did her ct 5d ago.    REVIEW OF SYSTEMS:  Review of Systems  remainder of systems are all otherwise negative.        Medical History     Past Medical History:   Diagnosis Date    Abdominal pain, epigastric 12/2/2017    Abnormal cardiovascular stress test 12/13/2017    Anemia     Anxiety     Bilateral carotid artery disease (H24)     Coronary artery disease     Dyslipidemia     Foreign body in left foot     Foreign body in left foot, subsequent encounter 10/11/2018    Heart murmur     Hypertension     Intermittent palpitations 12/2/2017    Migraines     Newly recognized murmur 10/18/2018    PAD (peripheral artery disease) (H24)     Pure hyperglyceridemia 9/2/2021    Urge incontinence of urine 11/4/2018    Verruca plantaris 11/1/2018       Past Surgical History:   Procedure Laterality Date    APPENDECTOMY      ARTERIAL BYPASS SURGERY  2006    BIOPSY BREAST Left     benign    BYPASS GRAFT AORTOFEMORAL Bilateral     CHOLECYSTECTOMY      FEMORAL ARTERY STENT  2006    FOOT MASS EXCISION Left 2018    Soft tissue mass - benign    IR MISCELLANEOUS  PROCEDURE  2006    IR MISCELLANEOUS PROCEDURE  2006    IR MISCELLANEOUS PROCEDURE  2006    IR MISCELLANEOUS PROCEDURE  2006    IR MISCELLANEOUS PROCEDURE  2006    TONSILLECTOMY      TYMPANOSTOMY TUBE PLACEMENT         Family History   Problem Relation Age of Onset    Peripheral Vascular Disease Father     Other - See Comments Father         vasular problems     Hypertension Father     Hyperlipidemia Father     Cerebrovascular Disease Father     Other Cancer Father         skin    Breast Cancer Mother         was pt at the [facility]  21 years ago    No Known Problems Sister     No Known Problems Brother     No Known Problems Son     No Known Problems Maternal Grandmother     Cancer Paternal Grandmother         lung     Cancer Paternal Grandfather         kidney     Colon Cancer No family hx of     Prostate Cancer No family hx of     Coronary Artery Disease No family hx of        Social History     Tobacco Use    Smoking status: Every Day     Packs/day: 0.75     Years: 34.00     Additional pack years: 0.00     Total pack years: 25.50     Types: Cigarettes    Smokeless tobacco: Never    Tobacco comments:     1 ppd since 17 years old   Vaping Use    Vaping Use: Never used   Substance Use Topics    Alcohol use: No    Drug use: No       ondansetron (ZOFRAN ODT) 4 MG ODT tab  oxyCODONE (ROXICODONE) 5 MG tablet  sucralfate (CARAFATE) 1 GM/10ML suspension  albuterol (PROAIR HFA/PROVENTIL HFA/VENTOLIN HFA) 108 (90 Base) MCG/ACT inhaler  alendronate (FOSAMAX) 70 MG tablet  amLODIPine (NORVASC) 5 MG tablet  aspirin 81 MG EC tablet  calcium carbonate-vitamin D (OSCAL) 500-5 MG-MCG tablet  citalopram (CELEXA) 20 MG tablet  clindamycin (CLEOCIN T) 1 % external lotion  famotidine (PEPCID) 20 MG tablet  folic acid (FOLVITE) 1 MG tablet  gabapentin (NEURONTIN) 100 MG capsule  lisinopril (ZESTRIL) 20 MG tablet  methylPREDNISolone (MEDROL DOSEPAK) 4 MG tablet therapy pack  REPATHA SURECLICK 140 MG/ML prefilled  "autoinjector  tolterodine (DETROL) 1 MG tablet  warfarin ANTICOAGULANT (COUMADIN) 5 MG tablet        Physical Exam     First Vitals:  Patient Vitals for the past 24 hrs:   BP Temp Temp src Pulse Resp SpO2 Height Weight   01/19/24 0729 122/56 97  F (36.1  C) Temporal 66 20 97 % 1.651 m (5' 5\") 52.2 kg (115 lb)       PHYSICAL EXAM:   Constitutional:   in nad sitting back In hallway bed   HENT:  Normocephalic, posterior pharynx wnl, mucous membranes tacky and dark pink   Eyes:  PERRL, EOMI, Conjunctiva normal, No discharge, no scleral icterus.  Respiratory:  Breathing easily, cta  Cardiovascular:  rrr nl s1s2 0 murmurs, rubs, or gallops.  Peripheral pulses dp, pt, and radial are wnl.  no peripheral edema   GI:  Bowel sounds normal, Soft, No tenderness, No flank tenderness, nondistended.  :No CVA tenderness to percussion but notes that is the area of significant pain  Musculoskeletal:  Moves all extremities.  No erythematous or swollen major joints,   Integument:  normal color  Lymphatic:  No cervical lymphadenopathy  Neurologic:  Alert & oriented x 3, Normal motor function, Normal sensory function, No focal deficits noted. Normal speech.  Psychiatric:  Affect normal, Judgment normal, Mood normal.     Results     LAB AND RADIOLOGY:  All pertinent labs reviewed and interpreted  Results for orders placed or performed during the hospital encounter of 01/19/24   US Renal Complete Non-Vascular     Status: None    Narrative    EXAM: US RENAL COMPLETE NON-VASCULAR  LOCATION: [facility]  DATE: 1/19/2024    INDICATION: Bilateral flank pain.  abd pain, nausea.  evaluate for possible urolithiasis versus pyelonephritis  COMPARISON: CT abdomen and pelvis from 01/14/2024  TECHNIQUE: Routine Bilateral Renal and Bladder Ultrasound.    FINDINGS:    RIGHT KIDNEY: 9.5 cm. Normal without hydronephrosis or masses.     LEFT KIDNEY: 9.5 cm. Normal without hydronephrosis or masses.     BLADDER: The bladder is " incompletely distended accentuating wall thickening.      Impression    IMPRESSION:  1.  Normal kidney ultrasound.   UA with Microscopic reflex to Culture     Status: Abnormal    Specimen: Urine, Clean Catch   Result Value Ref Range    Color Urine Yellow Colorless, Straw, Light Yellow, Yellow    Appearance Urine Clear Clear    Glucose Urine Negative Negative mg/dL    Bilirubin Urine Negative Negative    Ketones Urine Negative Negative mg/dL    Specific Gravity Urine 1.024 1.001 - 1.030    Blood Urine 0.06 mg/dL (A) Negative    pH Urine 6.0 5.0 - 7.0    Protein Albumin Urine 20 (A) Negative mg/dL    Urobilinogen Urine 2.0 (A) <2.0 mg/dL    Nitrite Urine Negative Negative    Leukocyte Esterase Urine 25 Shelby/uL (A) Negative    Mucus Urine Present (A) None Seen /LPF    RBC Urine 1 <=2 /HPF    WBC Urine 1 <=5 /HPF    Squamous Epithelials Urine 2 (H) <=1 /HPF    Transitional Epithelials Urine <1 <=1 /HPF    Narrative    Urine Culture not indicated   INR     Status: Abnormal   Result Value Ref Range    INR 1.34 (H) 0.85 - 1.15   Comprehensive metabolic panel     Status: Abnormal   Result Value Ref Range    Sodium 143 135 - 145 mmol/L    Potassium 4.3 3.4 - 5.3 mmol/L    Carbon Dioxide (CO2) 28 22 - 29 mmol/L    Anion Gap 7 7 - 15 mmol/L    Urea Nitrogen 16.2 6.0 - 20.0 mg/dL    Creatinine 0.93 0.51 - 0.95 mg/dL    GFR Estimate 71 >60 mL/min/1.73m2    Calcium 9.1 8.6 - 10.0 mg/dL    Chloride 108 (H) 98 - 107 mmol/L    Glucose 99 70 - 99 mg/dL    Alkaline Phosphatase 90 40 - 150 U/L    AST 16 0 - 45 U/L    ALT 10 0 - 50 U/L    Protein Total 6.8 6.4 - 8.3 g/dL    Albumin 4.1 3.5 - 5.2 g/dL    Bilirubin Total 0.5 <=1.2 mg/dL   Lipase     Status: Normal   Result Value Ref Range    Lipase 25 13 - 60 U/L   Lactic acid whole blood     Status: Normal   Result Value Ref Range    Lactic Acid 1.0 0.7 - 2.0 mmol/L   CBC with platelets and differential     Status: Abnormal   Result Value Ref Range    WBC Count 11.2 (H) 4.0 - 11.0 10e3/uL     RBC Count 3.85 3.80 - 5.20 10e6/uL    Hemoglobin 12.0 11.7 - 15.7 g/dL    Hematocrit 36.7 35.0 - 47.0 %    MCV 95 78 - 100 fL    MCH 31.2 26.5 - 33.0 pg    MCHC 32.7 31.5 - 36.5 g/dL    RDW 13.2 10.0 - 15.0 %    Platelet Count 222 150 - 450 10e3/uL    % Neutrophils 67 %    % Lymphocytes 20 %    % Monocytes 10 %    % Eosinophils 2 %    % Basophils 1 %    % Immature Granulocytes 0 %    NRBCs per 100 WBC 0 <1 /100    Absolute Neutrophils 7.5 1.6 - 8.3 10e3/uL    Absolute Lymphocytes 2.3 0.8 - 5.3 10e3/uL    Absolute Monocytes 1.1 0.0 - 1.3 10e3/uL    Absolute Eosinophils 0.2 0.0 - 0.7 10e3/uL    Absolute Basophils 0.1 0.0 - 0.2 10e3/uL    Absolute Immature Granulocytes 0.0 <=0.4 10e3/uL    Absolute NRBCs 0.0 10e3/uL   CBC with platelets differential     Status: Abnormal    Narrative    The following orders were created for panel order CBC with platelets differential.  Procedure                               Abnormality         Status                     ---------                               -----------         ------                     CBC with platelets and d...[[id number]]  Abnormal            Final result                 Please view results for these tests on the individual orders.           PROCEDURES:  Procedures:      [facility] Documentation     Medical Decision Making  Obtained supplemental history:Supplemental history obtained?: No  Reviewed external records: External records reviewed?: Documented in chart  Care impacted by chronic illness:Anticoagulated State, Hypertension, and Peripheral Vascular Disease  Care significantly affected by social determinants of health:N/A  Did you consider but not order tests?: Work up considered but not performed and documented in chart, if applicable  Did you interpret images independently?: My independent interpretation of imaging: from 1/14/23 as noted  Consultation discussion with other provider:Did you involve another provider (consultant, , pharmacy,  etc.)?: No  Discharge. I prescribed additional prescription strength medication(s) as charted. See documentation for any additional details.          Kanwal Caban MD  Emergency Medicine  [facility] EMERGENCY DEPARTMENT         Knawal Caban MD  01/19/24 1042     Mohs : Dedrick Duque MD

## 2024-01-19 NOTE — TELEPHONE ENCOUNTER
ANTICOAGULATION  MANAGEMENT: Discharge Review    Nery Whitaker chart reviewed for anticoagulation continuity of care    Emergency room visit on 1/19/24 for flank pain, nausea, vomiting.    Discharge disposition: Home    Results:    Recent labs: (last 7 days)     01/19/24  0811   INR 1.34*     Anticoagulation inpatient management:     not applicable     Anticoagulation discharge instructions:     Warfarin dosing:  told to contact [facility]   Bridging: No   INR goal change: No      Medication changes affecting anticoagulation: Prescriptions sent for Zofran, Carafate, Oxycodone    Additional factors affecting anticoagulation: Yes: nausea and vomiting, pain      PLAN     Recommend to check INR on 1/24/24    Spoke with patient.  She missed her Warfarin the past few days.  Told patient to take Warfarin booster dose today x1 then normal schedule and recheck INR in 5 days.    Anticoagulation Calendar updated    Melissa Ferreira RN

## 2024-01-24 ENCOUNTER — ANTICOAGULATION THERAPY VISIT (OUTPATIENT)
Dept: ANTICOAGULATION | Facility: CLINIC | Age: 59
End: 2024-01-24

## 2024-01-24 ENCOUNTER — LAB (OUTPATIENT)
Dept: LAB | Facility: CLINIC | Age: 59
End: 2024-01-24
Payer: COMMERCIAL

## 2024-01-24 DIAGNOSIS — I77.9 BILATERAL CAROTID ARTERY DISEASE, UNSPECIFIED TYPE (H): ICD-10-CM

## 2024-01-24 DIAGNOSIS — I73.9 PAD (PERIPHERAL ARTERY DISEASE) (H): ICD-10-CM

## 2024-01-24 DIAGNOSIS — D68.59 HYPERCOAGULABLE STATE (H): ICD-10-CM

## 2024-01-24 DIAGNOSIS — Z79.01 LONG TERM (CURRENT) USE OF ANTICOAGULANTS: ICD-10-CM

## 2024-01-24 DIAGNOSIS — Z79.01 ANTICOAGULATION MONITORING, INR RANGE 2-3: Primary | ICD-10-CM

## 2024-01-24 LAB — INR BLD: 3.7 (ref 0.9–1.1)

## 2024-01-24 PROCEDURE — 36416 COLLJ CAPILLARY BLOOD SPEC: CPT

## 2024-01-24 PROCEDURE — 85610 PROTHROMBIN TIME: CPT

## 2024-01-24 NOTE — PROGRESS NOTES
ANTICOAGULATION MANAGEMENT     Nery Whitaker 58 year old female is on warfarin with supratherapeutic INR result. (Goal INR 2.0-3.0)    Recent labs: (last 7 days)     01/24/24  1425   INR 3.7*       ASSESSMENT     Warfarin Lab Questionnaire    Warfarin Doses Last 7 Days    Pt Rptd Dose SUNDAY MONDAY TUESDAY WED THURS FRIDAY SATURDAY 1/24/2024  12:50 PM 7.5 7.5 7.5 0 0 15 7.5         1/24/2024   Warfarin Lab Questionnaire   Missed doses within past 14 days? Yes    If yes; please list when: Wed. And Thursday    Changes in diet or alcohol within past 14 days? No    Medication changes since last result? Yes    Please list: Sucrate-- may diminish the serum concentration of warfarin   Injuries or illness since last result? No-- reports feeling much better now, no issues with nausea/vomiting   New shortness of breath, severe headaches or sudden changes in vision since last result? No   Abnormal bleeding since last result? No    Upcoming surgery, procedure? No    Best number to call with results? [phone]      Previous result: Subtherapeutic  Additional findings: booster dose taken on Friday which may be impacting today's INR       PLAN     Recommended plan for temporary change(s) affecting INR     Dosing Instructions: partial hold then continue your current warfarin dose with next INR in 1 week       Summary  As of 1/24/2024      Full warfarin instructions:  1/24: 5 mg; Otherwise 10 mg every Wed; 7.5 mg all other days   Next INR check:  1/31/2024               Telephone call with Nery who agrees to plan and repeated back plan correctly    Lab visit scheduled    Education provided:   Contact [phone] with any changes, questions or concerns.     Plan made per [facility] anticoagulation protocol    Marjorie Ball, RN  Anticoagulation Clinic  1/24/2024    _______________________________________________________________________     Anticoagulation Episode Summary       Current INR goal:  2.0-3.0   TTR:  54.3% (1 y)    Target end date:  Indefinite   Send INR reminders to:  BERNICE MARTINI    Indications    Anticoagulation monitoring  INR range 2-3 [Z79.01]  PAD (peripheral artery disease) (H24) [I73.9]  Long term (current) use of anticoagulants [Z79.01]  Hypercoagulable state (H24) [D68.59]  Bilateral carotid artery disease  unspecified type (H24) [I77.9]             Comments:               Anticoagulation Care Providers       Provider Role Specialty Phone number    Loraine Rees MD Referring Family Medicine [phone]    Vu Winters MD Referring Family Medicine [phone]    Priscila Lombardo APRN CNP Referring Family Medicine [phone]

## 2024-01-26 ENCOUNTER — TRANSFERRED RECORDS (OUTPATIENT)
Dept: HEALTH INFORMATION MANAGEMENT | Facility: CLINIC | Age: 59
End: 2024-01-26
Payer: COMMERCIAL

## 2024-01-30 ENCOUNTER — TELEPHONE (OUTPATIENT)
Dept: FAMILY MEDICINE | Facility: CLINIC | Age: 59
End: 2024-01-30

## 2024-01-30 NOTE — TELEPHONE ENCOUNTER
Patient Quality Outreach    Patient is due for the following:   Hypertension -  BP check    Next Steps:   No follow up needed at this time.    Type of outreach:    Note added to lab appointment to have her BP checked also.      Questions for provider review:    None           Margie Pierre MA

## 2024-01-31 ENCOUNTER — ALLIED HEALTH/NURSE VISIT (OUTPATIENT)
Dept: FAMILY MEDICINE | Facility: CLINIC | Age: 59
End: 2024-01-31
Payer: COMMERCIAL

## 2024-01-31 ENCOUNTER — LAB (OUTPATIENT)
Dept: LAB | Facility: CLINIC | Age: 59
End: 2024-01-31
Payer: COMMERCIAL

## 2024-01-31 ENCOUNTER — ANTICOAGULATION THERAPY VISIT (OUTPATIENT)
Dept: ANTICOAGULATION | Facility: CLINIC | Age: 59
End: 2024-01-31

## 2024-01-31 VITALS — SYSTOLIC BLOOD PRESSURE: 122 MMHG | HEART RATE: 62 BPM | DIASTOLIC BLOOD PRESSURE: 68 MMHG

## 2024-01-31 DIAGNOSIS — I10 ESSENTIAL HYPERTENSION: Primary | ICD-10-CM

## 2024-01-31 DIAGNOSIS — I73.9 PAD (PERIPHERAL ARTERY DISEASE) (H): ICD-10-CM

## 2024-01-31 DIAGNOSIS — D68.59 HYPERCOAGULABLE STATE (H): ICD-10-CM

## 2024-01-31 DIAGNOSIS — Z79.01 ANTICOAGULATION MONITORING, INR RANGE 2-3: Primary | ICD-10-CM

## 2024-01-31 DIAGNOSIS — Z79.01 LONG TERM (CURRENT) USE OF ANTICOAGULANTS: ICD-10-CM

## 2024-01-31 DIAGNOSIS — I77.9 BILATERAL CAROTID ARTERY DISEASE, UNSPECIFIED TYPE (H): ICD-10-CM

## 2024-01-31 LAB — INR BLD: 2.4 (ref 0.9–1.1)

## 2024-01-31 PROCEDURE — 99207 PR NO CHARGE NURSE ONLY: CPT

## 2024-01-31 PROCEDURE — 85610 PROTHROMBIN TIME: CPT

## 2024-01-31 PROCEDURE — 36416 COLLJ CAPILLARY BLOOD SPEC: CPT

## 2024-01-31 ASSESSMENT — PAIN SCALES - GENERAL: PAINLEVEL: NO PAIN (0)

## 2024-01-31 NOTE — PROGRESS NOTES
I met with Nery Whitaker at the request of Priscila Lombardo NP to recheck her blood pressure.  Blood pressure medications on the med list were reviewed with patient.    Patient has taken all medications as per usual regimen: Yes  Patient reports tolerating them without any issues or concerns: No    Vitals:    01/31/24 1313   BP: 122/68   BP Location: Left arm   Patient Position: Sitting   Cuff Size: Adult Regular   Pulse: 62       Blood pressure was taken, previous encounter was reviewed, recorded blood pressure below 140/90.  Patient was discharged and the note will be sent to the provider for final review.

## 2024-01-31 NOTE — PROGRESS NOTES
ANTICOAGULATION MANAGEMENT     Nery Whitaker 58 year old female is on warfarin with therapeutic INR result. (Goal INR 2.0-3.0)    Recent labs: (last 7 days)     01/31/24  1306   INR 2.4*       ASSESSMENT     Warfarin Lab Questionnaire    Warfarin Doses Last 7 Days      1/31/2024    12:32 PM   Dose in Tablet or Mg   TAB or MG? milligram (mg)     Pt Rptd Dose SUNDAY MONDAY TUESDAY WED THURS FRIDAY SATURDAY 1/31/2024  12:32 PM 7.5 7.5 7.5 10-- 5mg 7.5 7.5 7.5         1/31/2024   Warfarin Lab Questionnaire   Missed doses within past 14 days? No   Changes in diet or alcohol within past 14 days? No   Medication changes since last result? No   Injuries or illness since last result? No-- still not having any issues with nausea or vomiting, feeling really good   New shortness of breath, severe headaches or sudden changes in vision since last result? No   Abnormal bleeding since last result? No   Upcoming surgery, procedure? No     Previous result: Supratherapeutic  Additional findings: None       PLAN     Recommended plan for no diet, medication or health factor changes affecting INR     Dosing Instructions: Continue your current warfarin dose with next INR in 2 weeks       Summary  As of 1/31/2024      Full warfarin instructions:  10 mg every Wed; 7.5 mg all other days   Next INR check:  2/14/2024               Telephone call with Nery who agrees to plan and repeated back plan correctly  Sent [employer] message with dosing and follow up instructions    Lab visit scheduled    Education provided:   Contact [phone] with any changes, questions or concerns.     Plan made per [facility] anticoagulation protocol    Marjorie Ball RN  Anticoagulation Clinic  1/31/2024    _______________________________________________________________________     Anticoagulation Episode Summary       Current INR goal:  2.0-3.0   TTR:  55.2% (1 y)   Target end date:  Indefinite   Send INR reminders to:  BERNICE Cardona     Anticoagulation monitoring  INR range 2-3 [Z79.01]  PAD (peripheral artery disease) (H24) [I73.9]  Long term (current) use of anticoagulants [Z79.01]  Hypercoagulable state (H24) [D68.59]  Bilateral carotid artery disease  unspecified type (H24) [I77.9]             Comments:               Anticoagulation Care Providers       Provider Role Specialty Phone number    Loraine Rees MD Referring Family Medicine [phone]    Vu Winters MD Referring Family Medicine [phone]    Priscila Lombardo APRN CNP Referring Family Medicine [phone]

## 2024-02-05 ENCOUNTER — VIRTUAL VISIT (OUTPATIENT)
Dept: URGENT CARE | Facility: CLINIC | Age: 59
End: 2024-02-05
Payer: COMMERCIAL

## 2024-02-05 DIAGNOSIS — F41.1 ANXIETY STATE: ICD-10-CM

## 2024-02-05 DIAGNOSIS — I10 ESSENTIAL HYPERTENSION: ICD-10-CM

## 2024-02-05 DIAGNOSIS — U07.1 INFECTION DUE TO 2019 NOVEL CORONAVIRUS: Primary | ICD-10-CM

## 2024-02-05 DIAGNOSIS — Z79.01 ANTICOAGULATION MONITORING, INR RANGE 2-3: ICD-10-CM

## 2024-02-05 PROCEDURE — 99443 PR PHYSICIAN TELEPHONE EVALUATION 21-30 MIN: CPT | Mod: 93

## 2024-02-05 NOTE — PATIENT INSTRUCTIONS
Take only 1/2 a tablet of your tolterodine (Detrol, for bladder) while you are on paxlovid.  Go back to your usual dose 3 days after you finish the paxlovid.  If you feel lightheaded, decrease the amlodipine to 1/2 a tablet while you are on paxlovid.  Go back to your usual dose 3 days after you finish the paxlovid.  If you have bruising or bleeding, go get your INR checked right away.

## 2024-02-05 NOTE — PROGRESS NOTES
"  Nery Whitaker is a 58 year old female who is being evaluated via a billable telephone visit.      The patient has been notified of following at the time patient scheduled visit:     \"This telephone visit will be conducted via a phone call between you and your physician/provider. We have found that certain health care needs can be provided without the need for a physical exam.  This service lets us provide the care you need with a phone conversation.  If a prescription is necessary we can send it directly to your pharmacy.  If lab work is needed we can place an order for that and you can then stop by our lab to have the test done at a later time.\"   Patient has given consent for telephone visit?  Yes    SUBJECTIVE:  Nery Whitaker is an 58 year old female who presents for covid.  Sxs started four days ago with mild cough.  Then yesterday developed more congestion, more cough, and body aches.  Has felt feverish.  Mild nausea today but no v/d.  Has taken paxlovid before and had rebound covid after that.  No shortness of breath or trouble breathing.      PMH:   has a past medical history of Abdominal pain, epigastric (12/2/2017), Abnormal cardiovascular stress test (12/13/2017), Anemia, Anxiety, Bilateral carotid artery disease (H24), Coronary artery disease, Dyslipidemia, Foreign body in left foot, Foreign body in left foot, subsequent encounter (10/11/2018), Heart murmur, Hypertension, Intermittent palpitations (12/2/2017), Migraines, Newly recognized murmur (10/18/2018), PAD (peripheral artery disease) (H24), Pure hyperglyceridemia (9/2/2021), Urge incontinence of urine (11/4/2018), and Verruca plantaris (11/1/2018).  Patient Active Problem List   Diagnosis    Adenomatous colon polyp    Anticoagulation monitoring, INR range 2-3    Anxiety state    Bilateral carotid artery disease (H24)    Diverticulosis of large intestine without hemorrhage    Dyslipidemia    Elevated lipoprotein(a)    Essential hypertension    " Hypercoagulable state (H24)    Migraine    Multiple skin nodules    Nocturnal enuresis    Normocytic anemia    PAD (peripheral artery disease) (H24)    Thoracic neuralgia    Tobacco use disorder    Atypical lymphocytosis    Thoracic spine pain    Routine general medical examination at a health care facility    Age-related osteoporosis without current pathological fracture    Benign paroxysmal positional vertigo of right ear    Long term (current) use of anticoagulants    Bilateral carotid artery disease, unspecified type (H24)    LUQ abdominal pain    Heart murmur     Social History     Socioeconomic History    Marital status:     Number of children: 1   Tobacco Use    Smoking status: Every Day     Packs/day: 0.75     Years: 34.00     Additional pack years: 0.00     Total pack years: 25.50     Types: Cigarettes    Smokeless tobacco: Never    Tobacco comments:     1 ppd since 17 years old   Vaping Use    Vaping Use: Never used   Substance and Sexual Activity    Alcohol use: No    Drug use: No    Sexual activity: Yes     Partners: Male     Birth control/protection: Post-menopausal, Male Surgical   Other Topics Concern    Parent/sibling w/ CABG, MI or angioplasty before 65F 55M? Yes     Family History   Problem Relation Age of Onset    Peripheral Vascular Disease Father     Other - See Comments Father         vasular problems     Hypertension Father     Hyperlipidemia Father     Cerebrovascular Disease Father     Other Cancer Father         skin    Breast Cancer Mother         was pt at the [facility]  21 years ago    No Known Problems Sister     No Known Problems Brother     No Known Problems Son     No Known Problems Maternal Grandmother     Cancer Paternal Grandmother         lung     Cancer Paternal Grandfather         kidney     Colon Cancer No family hx of     Prostate Cancer No family hx of     Coronary Artery Disease No family hx of        ALLERGIES:  Ezetimibe, Lovastatin, and Simvastatin    Current  Outpatient Medications   Medication    albuterol (PROAIR HFA/PROVENTIL HFA/VENTOLIN HFA) 108 (90 Base) MCG/ACT inhaler    alendronate (FOSAMAX) 70 MG tablet    amLODIPine (NORVASC) 5 MG tablet    aspirin 81 MG EC tablet    calcium carbonate-vitamin D (OSCAL) 500-5 MG-MCG tablet    citalopram (CELEXA) 20 MG tablet    clindamycin (CLEOCIN T) 1 % external lotion    famotidine (PEPCID) 20 MG tablet    folic acid (FOLVITE) 1 MG tablet    gabapentin (NEURONTIN) 100 MG capsule    lisinopril (ZESTRIL) 20 MG tablet    methylPREDNISolone (MEDROL DOSEPAK) 4 MG tablet therapy pack    ondansetron (ZOFRAN ODT) 4 MG ODT tab    REPATHA SURECLICK 140 MG/ML prefilled autoinjector    tolterodine (DETROL) 1 MG tablet    warfarin ANTICOAGULANT (COUMADIN) 5 MG tablet     No current facility-administered medications for this visit.         ROS:  ROS is done and is negative for general/constitutional, eye, ENT, Respiratory, cardiovascular, GI, , Skin, musculoskeletal except as noted elsewhere.  All other review of systems negative except as noted elsewhere.      OBJECTIVE:    No vital signs taken as is virtual visit.  GENERAL : Alert and oriented.  No distress detected.    ENT: sounds congested  RESP: No respiratory distress detected during phone conversation.  Frequent cough        ASSESSMENT/PLAN:    ASSESSMENT / PLAN:  (U07.1) Infection due to 2019 novel coronavirus  (primary encounter diagnosis)  Comment: has risk factors.  Treat with paxlovid  Plan: nirmatrelvir and ritonavir (PAXLOVID) 300         mg/100 mg therapy pack        Advised to monitor for any signs of bruising or bleeding as is on warfarin.  Also decrease detrol to 1/2 tablet daily while on paxlovid.  Reviewed medication instructions and side effects. Follow up if experiences side effects.. I reviewed supportive care, otc meds to use if needed, expected course, and signs of concern.  Follow up as needed or if does not improve within 5 day(s) or if worsens in any way.   Reviewed red flag symptoms and is to go to the ER if experiences any of these.  Reviewed quarantine    (Z79.01) Anticoagulation monitoring, INR range 2-3  Comment: on warfarin  Plan: monitor for signs of bruising or bleeding and get inr checked if occur    (I10) Essential hypertension  Comment: has hx of.  Risk factor for covid complications  Plan: if feels lightheaded while on paxlovid, decrease amlodipine to 1/2 dose.    (F41.1) Anxiety state  Comment: risk factor for covid complications  Plan: treat covid as above.        See [facility] for orders, medications, letters, patient instructions    Akiko Jones MD  2/5/2024, 4:17 PM      Phone call duration:  21 minutes

## 2024-02-15 ENCOUNTER — MYC MEDICAL ADVICE (OUTPATIENT)
Dept: ANTICOAGULATION | Facility: CLINIC | Age: 59
End: 2024-02-15
Payer: COMMERCIAL

## 2024-02-21 ENCOUNTER — LAB (OUTPATIENT)
Dept: LAB | Facility: CLINIC | Age: 59
End: 2024-02-21
Payer: COMMERCIAL

## 2024-02-21 ENCOUNTER — ANTICOAGULATION THERAPY VISIT (OUTPATIENT)
Dept: ANTICOAGULATION | Facility: CLINIC | Age: 59
End: 2024-02-21

## 2024-02-21 DIAGNOSIS — D68.59 HYPERCOAGULABLE STATE (H): ICD-10-CM

## 2024-02-21 DIAGNOSIS — I73.9 PAD (PERIPHERAL ARTERY DISEASE) (H): ICD-10-CM

## 2024-02-21 DIAGNOSIS — Z79.01 LONG TERM (CURRENT) USE OF ANTICOAGULANTS: ICD-10-CM

## 2024-02-21 DIAGNOSIS — I77.9 BILATERAL CAROTID ARTERY DISEASE, UNSPECIFIED TYPE (H): ICD-10-CM

## 2024-02-21 DIAGNOSIS — Z79.01 ANTICOAGULATION MONITORING, INR RANGE 2-3: Primary | ICD-10-CM

## 2024-02-21 LAB — INR BLD: 1.7 (ref 0.9–1.1)

## 2024-02-21 PROCEDURE — 85610 PROTHROMBIN TIME: CPT

## 2024-02-21 PROCEDURE — 36416 COLLJ CAPILLARY BLOOD SPEC: CPT

## 2024-02-21 NOTE — PROGRESS NOTES
ANTICOAGULATION MANAGEMENT     Nery Whitaker 58 year old female is on warfarin with subtherapeutic INR result. (Goal INR 2.0-3.0)    Recent labs: (last 7 days)     02/21/24  1515   INR 1.7*       ASSESSMENT     Warfarin Lab Questionnaire    Warfarin Doses Last 7 Days      2/21/2024     1:03 PM   Dose in Tablet or Mg   TAB or MG? milligram (mg)     Pt Rptd Dose SUNDAY MONDAY TUESDAY WED THURS FRIDAY SATURDAY 2/21/2024   1:03 PM 7.5 7.5 7.5 10 7.5 7.6 7.5         2/21/2024   Warfarin Lab Questionnaire   Missed doses within past 14 days? No   Changes in diet or alcohol within past 14 days? No-getting back to normal after being sick.   Medication changes since last result? Yes-Paxlovid, OTC cold meds, Advil   Please list: Had covid and lots stomach problems. On ibgard, stopl softeners and fiber now. IBgard is a gut health supplement that has peppermint oil, cellulose, triethyl citrate, polysorbate and other ingredients to help stomach discomfort. Does not appear to have interaction with Warfarin.   Injuries or illness since last result? Yes   If yes, please explain: Covid   New shortness of breath, severe headaches or sudden changes in vision since last result? No   Abnormal bleeding since last result? No   Upcoming surgery, procedure? No   Best number to call with results? [phone]     Previous result: Therapeutic last visit; previously outside of goal range  Additional findings: virtual visit 2/5/24, was prescribed Paxlovid and advised to take only a half tab of Detrol while on Paxlovid and for 3 additional days (warfarin interaction: decrease in Detrol could increase risk of clotting).    Constipation is improving, not having to take stool softeners every day, going to continue using Ibgard.       PLAN     Recommended plan for temporary change(s) affecting INR     Dosing Instructions: booster dose then continue your current warfarin dose with next INR in 1-2 weeks       Summary  As of 2/21/2024      Full  warfarin instructions:  2/21: 15 mg; Otherwise 10 mg every Wed; 7.5 mg all other days   Next INR check:  3/6/2024               Telephone call with Nery who verbalizes understanding and agrees to plan    Lab visit scheduled    Education provided:   Please call back if any changes to your diet, medications or how you've been taking warfarin  Goal range and lab monitoring: goal range and significance of current result  Importance of notifying anticoagulation clinic for: changes in medications; a sooner lab recheck maybe needed and diarrhea, nausea/vomiting, reduced intake, cold/flu, and/or infections; a sooner lab recheck maybe needed  Contact [phone] with any changes, questions or concerns.     Plan made per [facility] anticoagulation protocol    Shannen Mcclendon RN  Anticoagulation Clinic  2/21/2024    _______________________________________________________________________     Anticoagulation Episode Summary       Current INR goal:  2.0-3.0   TTR:  54.4% (1 y)   Target end date:  Indefinite   Send INR reminders to:  BERNICE MARTINI    Indications    Anticoagulation monitoring  INR range 2-3 [Z79.01]  PAD (peripheral artery disease) (H24) [I73.9]  Long term (current) use of anticoagulants [Z79.01]  Hypercoagulable state (H24) [D68.59]  Bilateral carotid artery disease  unspecified type (H24) [I77.9]             Comments:               Anticoagulation Care Providers       Provider Role Specialty Phone number    Loraine Rees MD Referring Family Medicine [phone]    Vu Winters MD Referring Family Medicine [phone]    Priscila Lombardo APRN CNP Referring Family Medicine [phone]

## 2024-02-29 ENCOUNTER — MYC MEDICAL ADVICE (OUTPATIENT)
Dept: FAMILY MEDICINE | Facility: CLINIC | Age: 59
End: 2024-02-29
Payer: COMMERCIAL

## 2024-02-29 DIAGNOSIS — R45.86 MOOD DISTURBANCE: Primary | ICD-10-CM

## 2024-03-06 ENCOUNTER — LAB (OUTPATIENT)
Dept: LAB | Facility: CLINIC | Age: 59
End: 2024-03-06
Payer: COMMERCIAL

## 2024-03-06 ENCOUNTER — ANTICOAGULATION THERAPY VISIT (OUTPATIENT)
Dept: ANTICOAGULATION | Facility: CLINIC | Age: 59
End: 2024-03-06

## 2024-03-06 DIAGNOSIS — Z79.01 LONG TERM (CURRENT) USE OF ANTICOAGULANTS: ICD-10-CM

## 2024-03-06 DIAGNOSIS — I77.9 BILATERAL CAROTID ARTERY DISEASE, UNSPECIFIED TYPE (H): ICD-10-CM

## 2024-03-06 DIAGNOSIS — Z79.01 ANTICOAGULATION MONITORING, INR RANGE 2-3: Primary | ICD-10-CM

## 2024-03-06 DIAGNOSIS — I73.9 PAD (PERIPHERAL ARTERY DISEASE) (H): ICD-10-CM

## 2024-03-06 DIAGNOSIS — D68.59 HYPERCOAGULABLE STATE (H): ICD-10-CM

## 2024-03-06 LAB — INR BLD: 1.7 (ref 0.9–1.1)

## 2024-03-06 PROCEDURE — 85610 PROTHROMBIN TIME: CPT

## 2024-03-06 PROCEDURE — 36416 COLLJ CAPILLARY BLOOD SPEC: CPT

## 2024-03-06 NOTE — PROGRESS NOTES
ANTICOAGULATION MANAGEMENT     Nery Whitaker 58 year old female is on warfarin with subtherapeutic INR result. (Goal INR 2.0-3.0)    Recent labs: (last 7 days)     03/06/24  1313   INR 1.7*       ASSESSMENT     Warfarin Lab Questionnaire    Warfarin Doses Last 7 Days      3/6/2024     1:13 PM   Dose in Tablet or Mg   TAB or MG? milligram (mg)     Pt Rptd Dose JESSIE MONDAY TUESDAY WED THURS FRIDAY SATURDAY   3/6/2024   1:13 PM 7.5 7.5 7.5 10 7.5 7.5 7.5         3/6/2024   Warfarin Lab Questionnaire   Missed doses within past 14 days? No   Changes in diet or alcohol within past 14 days? No   Medication changes since last result? No-- continues on same medications for stomach (IBgard and fiber supplement)   Injuries or illness since last result? No   New shortness of breath, severe headaches or sudden changes in vision since last result? No   Abnormal bleeding since last result? No   Upcoming surgery, procedure? No   Best number to call with results? [phone]     Previous result: Subtherapeutic  Additional findings: None       PLAN     Recommended plan for no diet, medication or health factor changes affecting INR     Dosing Instructions: Increase your warfarin dose (9.5% change) with next INR in 2 weeks       Summary  As of 3/6/2024      Full warfarin instructions:  10 mg every Mon, Wed, Fri; 7.5 mg all other days   Next INR check:  3/20/2024               Telephone call with Nery who verbalizes understanding and agrees to plan  Sent [employer] message with dosing and follow up instructions    Lab visit scheduled    Education provided:   Contact [phone] with any changes, questions or concerns.     Plan made per [facility] anticoagulation protocol    Marjorie Ball, RN  Anticoagulation Clinic  3/6/2024    _______________________________________________________________________     Anticoagulation Episode Summary       Current INR goal:  2.0-3.0   TTR:  54.4% (1 y)   Target end date:  Indefinite   Send INR reminders  to:  BERNICE MARTINI    Indications    Anticoagulation monitoring  INR range 2-3 [Z79.01]  PAD (peripheral artery disease) (H24) [I73.9]  Long term (current) use of anticoagulants [Z79.01]  Hypercoagulable state (H24) [D68.59]  Bilateral carotid artery disease  unspecified type (H24) [I77.9]             Comments:               Anticoagulation Care Providers       Provider Role Specialty Phone number    Loraine Rees MD Referring Family Medicine [phone]    Vu Winters MD Referring Family Medicine [phone]    Priscila Lombardo APRN CNP Referring Family Medicine [phone]

## 2024-03-20 ENCOUNTER — ANTICOAGULATION THERAPY VISIT (OUTPATIENT)
Dept: ANTICOAGULATION | Facility: CLINIC | Age: 59
End: 2024-03-20

## 2024-03-20 ENCOUNTER — LAB (OUTPATIENT)
Dept: LAB | Facility: CLINIC | Age: 59
End: 2024-03-20
Payer: COMMERCIAL

## 2024-03-20 DIAGNOSIS — D68.59 HYPERCOAGULABLE STATE (H): ICD-10-CM

## 2024-03-20 DIAGNOSIS — Z79.01 LONG TERM (CURRENT) USE OF ANTICOAGULANTS: ICD-10-CM

## 2024-03-20 DIAGNOSIS — I73.9 PAD (PERIPHERAL ARTERY DISEASE) (H): ICD-10-CM

## 2024-03-20 DIAGNOSIS — Z79.01 ANTICOAGULATION MONITORING, INR RANGE 2-3: Primary | ICD-10-CM

## 2024-03-20 DIAGNOSIS — I77.9 BILATERAL CAROTID ARTERY DISEASE, UNSPECIFIED TYPE (H): ICD-10-CM

## 2024-03-20 LAB — INR BLD: 3.3 (ref 0.9–1.1)

## 2024-03-20 PROCEDURE — 85610 PROTHROMBIN TIME: CPT

## 2024-03-20 PROCEDURE — 36416 COLLJ CAPILLARY BLOOD SPEC: CPT

## 2024-03-20 NOTE — PROGRESS NOTES
ANTICOAGULATION MANAGEMENT     Nery Whitaker 58 year old female is on warfarin with supratherapeutic INR result. (Goal INR 2.0-3.0)    Recent labs: (last 7 days)     03/20/24  1310   INR 3.3*       ASSESSMENT     Warfarin Lab Questionnaire    Warfarin Doses Last 7 Days      3/20/2024     1:11 PM   Dose in Tablet or Mg   TAB or MG? milligram (mg)     Pt Rptd Dose JESSIE MONDAY TUESDAY WED THURS FRIDAY SATURDAY   3/20/2024   1:11 PM 7.5 10 7.5 10 7.5 10 7.5         3/20/2024   Warfarin Lab Questionnaire   Missed doses within past 14 days? No   Changes in diet or alcohol within past 14 days? No   Medication changes since last result? No   Injuries or illness since last result? No   New shortness of breath, severe headaches or sudden changes in vision since last result? No   Abnormal bleeding since last result? No   Upcoming surgery, procedure? No   Best number to call with results? [phone]     Previous result: Subtherapeutic increased by 9.5%  Additional findings: None       PLAN     Recommended plan for no diet, medication or health factor changes affecting INR     Dosing Instructions: decrease your warfarin dose (4.2% change) with next INR in 2 weeks, decrease starts today.         Summary  As of 3/20/2024      Full warfarin instructions:  10 mg every Mon, Fri; 7.5 mg all other days   Next INR check:  4/3/2024               Telephone call with Nery who verbalizes understanding and agrees to plan.  [email or web address] message also sent.    Lab visit scheduled 4/3/24    Education provided:   Goal range and lab monitoring: goal range and significance of current result  Contact [phone] with any changes, questions or concerns.     Plan made per [facility] anticoagulation protocol    Melissa Ferreira, RN  Anticoagulation Clinic  3/20/2024    _______________________________________________________________________     Anticoagulation Episode Summary       Current INR goal:  2.0-3.0   TTR:  55.3% (1 y)   Target end date:   Indefinite   Send INR reminders to:  BERNICE MARTINI    Indications    Anticoagulation monitoring  INR range 2-3 [Z79.01]  PAD (peripheral artery disease) (H24) [I73.9]  Long term (current) use of anticoagulants [Z79.01]  Hypercoagulable state (H24) [D68.59]  Bilateral carotid artery disease  unspecified type (H24) [I77.9]             Comments:               Anticoagulation Care Providers       Provider Role Specialty Phone number    Loraine Rees MD Referring Family Medicine [phone]    Vu Winters MD Referring Family Medicine [phone]    Priscila Lombardo APRN CNP Referring Family Medicine [phone]

## 2024-03-22 DIAGNOSIS — I10 ESSENTIAL HYPERTENSION: ICD-10-CM

## 2024-03-22 RX ORDER — LISINOPRIL 20 MG/1
TABLET ORAL
Qty: 90 TABLET | Refills: 0 | OUTPATIENT
Start: 2024-03-22

## 2024-04-01 ENCOUNTER — OFFICE VISIT (OUTPATIENT)
Dept: FAMILY MEDICINE | Facility: CLINIC | Age: 59
End: 2024-04-01
Payer: COMMERCIAL

## 2024-04-01 VITALS
RESPIRATION RATE: 16 BRPM | BODY MASS INDEX: 18.69 KG/M2 | WEIGHT: 112.2 LBS | OXYGEN SATURATION: 97 % | SYSTOLIC BLOOD PRESSURE: 123 MMHG | HEIGHT: 65 IN | DIASTOLIC BLOOD PRESSURE: 70 MMHG | TEMPERATURE: 98.2 F | HEART RATE: 53 BPM

## 2024-04-01 DIAGNOSIS — J44.1 COPD EXACERBATION (H): Primary | ICD-10-CM

## 2024-04-01 PROCEDURE — 99214 OFFICE O/P EST MOD 30 MIN: CPT | Performed by: FAMILY MEDICINE

## 2024-04-01 RX ORDER — PREDNISONE 20 MG/1
40 TABLET ORAL DAILY
Qty: 10 TABLET | Refills: 0 | Status: SHIPPED | OUTPATIENT
Start: 2024-04-01 | End: 2024-04-06

## 2024-04-01 RX ORDER — DOXYCYCLINE 100 MG/1
100 CAPSULE ORAL 2 TIMES DAILY
Qty: 20 CAPSULE | Refills: 0 | Status: SHIPPED | OUTPATIENT
Start: 2024-04-01 | End: 2024-04-24

## 2024-04-01 ASSESSMENT — ENCOUNTER SYMPTOMS: COUGH: 1

## 2024-04-01 NOTE — PROGRESS NOTES
Assessment & Plan     COPD exacerbation (H): Known emphysema.  Albuterol helps.  Diffuse rhonchi and wheezes on physical exam.  X-ray is not available in clinic.  We discussed that we could send her to another facility.  She did note some improvement while on azithromycin.  We will treat for asthma exacerbation and assume bronchiectasis.  Prednisone burst.  Doxycycline as an alternative antibiotic.  She will let us know how she is doing in 48-72 hours.  Consider x-ray and reevaluation at any time if she feels as though her symptoms are worsening.  - doxycycline hyclate (VIBRAMYCIN) 100 MG capsule; Take 1 capsule (100 mg) by mouth 2 times daily  - predniSONE (DELTASONE) 20 MG tablet; Take 2 tablets (40 mg) by mouth daily for 5 days      Arsenio Gabriel is a 58 year old, presenting for the following health issues:  Cough (Coughing, lung congestion and sweating x over a week ago)        4/1/2024     8:50 AM   Additional Questions   Roomed by rommel     Lung concerns:  -  she was on azithromycin.  Sputum is now clear. Was yellow.   - sweaty   - She has been using inhaler which helps.  Cold medicines help.   - she has not been on steroids.    - monthly illnesses for the past year.  COVID infection with recovery in February.     History of Present Illness       Reason for visit:  Lung congestion for over a week  Symptom onset:  1-2 weeks ago  Symptoms include:  Congestion lungs.breathing. Tired. Sweaty  Symptom intensity:  Severe  Symptom progression:  Staying the same  Had these symptoms before:  No  What makes it worse:  No  What makes it better:  Advil cold medicine    She eats 0-1 servings of fruits and vegetables daily.She consumes 4 sweetened beverage(s) daily.She exercises with enough effort to increase her heart rate 9 or less minutes per day.  She exercises with enough effort to increase her heart rate 3 or less days per week.   She is taking medications regularly.           Objective    /70 (BP Location:  "Left arm, Patient Position: Sitting, Cuff Size: Adult Regular)   Pulse 53   Temp 98.2  F (36.8  C) (Oral)   Resp 16   Ht 1.651 m (5' 5\")   Wt 50.9 kg (112 lb 3.2 oz)   SpO2 97%   BMI 18.67 kg/m    Body mass index is 18.67 kg/m .  Physical Exam  Vitals reviewed.   Constitutional:       Appearance: Normal appearance. She is not ill-appearing.   HENT:      Head: Normocephalic.      Right Ear: External ear normal.      Left Ear: External ear normal.      Ears:      Comments: TMs not well visualized due to cerumen.     Nose: Nose normal.      Mouth/Throat:      Mouth: Mucous membranes are moist.      Pharynx: No oropharyngeal exudate or posterior oropharyngeal erythema.   Eyes:      General: No scleral icterus.        Right eye: No discharge.         Left eye: No discharge.      Conjunctiva/sclera: Conjunctivae normal.   Cardiovascular:      Rate and Rhythm: Normal rate and regular rhythm.      Heart sounds: Normal heart sounds. No murmur heard.     No friction rub. No gallop.   Pulmonary:      Effort: Pulmonary effort is normal. No respiratory distress.      Breath sounds: Wheezing and rhonchi present. No rales.      Comments: Course cough. Bilateral wheezes and rhonchi in all fields.   Musculoskeletal:      Cervical back: Neck supple.   Lymphadenopathy:      Cervical: No cervical adenopathy.   Skin:     Findings: No rash.   Neurological:      General: No focal deficit present.      Mental Status: She is alert and oriented to person, place, and time.   Psychiatric:         Mood and Affect: Mood normal.                    Signed Electronically by: Vu Winters MD    "

## 2024-04-02 ENCOUNTER — TELEPHONE (OUTPATIENT)
Dept: ANTICOAGULATION | Facility: CLINIC | Age: 59
End: 2024-04-02
Payer: COMMERCIAL

## 2024-04-02 DIAGNOSIS — D68.59 HYPERCOAGULABLE STATE (H): ICD-10-CM

## 2024-04-02 DIAGNOSIS — I77.9 BILATERAL CAROTID ARTERY DISEASE, UNSPECIFIED TYPE (H): ICD-10-CM

## 2024-04-02 DIAGNOSIS — I73.9 PAD (PERIPHERAL ARTERY DISEASE) (H): ICD-10-CM

## 2024-04-02 DIAGNOSIS — Z79.01 ANTICOAGULATION MONITORING, INR RANGE 2-3: Primary | ICD-10-CM

## 2024-04-02 DIAGNOSIS — Z79.01 LONG TERM (CURRENT) USE OF ANTICOAGULANTS: ICD-10-CM

## 2024-04-02 NOTE — TELEPHONE ENCOUNTER
Spoke to patient.  Yesterday she was started on Doxycycline for 10 days and Prednisone for 5 days.  She is scheduled for an INR check tomorrow, told patient to keep INR as scheduled tomorrow.  Jeffrey FRAZIER

## 2024-04-02 NOTE — TELEPHONE ENCOUNTER
FYI - Status Update    Who is Calling: patient    Update: Patient has pneumonia and has been put on a few medications. She would like to discuss this with an INR nurse.    Does caller want a call/response back: Yes     Could we send this information to you in [email or web address] or would you prefer to receive a phone call?:   Patient would prefer a phone call   Okay to leave a detailed message?: Yes at Home number on file [phone] (home)

## 2024-04-05 ENCOUNTER — LAB (OUTPATIENT)
Dept: LAB | Facility: CLINIC | Age: 59
End: 2024-04-05
Payer: COMMERCIAL

## 2024-04-05 ENCOUNTER — ANTICOAGULATION THERAPY VISIT (OUTPATIENT)
Dept: ANTICOAGULATION | Facility: CLINIC | Age: 59
End: 2024-04-05

## 2024-04-05 DIAGNOSIS — I77.9 BILATERAL CAROTID ARTERY DISEASE, UNSPECIFIED TYPE (H): ICD-10-CM

## 2024-04-05 DIAGNOSIS — D68.59 HYPERCOAGULABLE STATE (H): ICD-10-CM

## 2024-04-05 DIAGNOSIS — Z79.01 LONG TERM (CURRENT) USE OF ANTICOAGULANTS: ICD-10-CM

## 2024-04-05 DIAGNOSIS — I73.9 PAD (PERIPHERAL ARTERY DISEASE) (H): ICD-10-CM

## 2024-04-05 DIAGNOSIS — Z79.01 ANTICOAGULATION MONITORING, INR RANGE 2-3: Primary | ICD-10-CM

## 2024-04-05 LAB — INR BLD: 3.4 (ref 0.9–1.1)

## 2024-04-05 PROCEDURE — 36416 COLLJ CAPILLARY BLOOD SPEC: CPT

## 2024-04-05 PROCEDURE — 85610 PROTHROMBIN TIME: CPT

## 2024-04-05 NOTE — PROGRESS NOTES
ANTICOAGULATION MANAGEMENT     Nery Whitaker 58 year old female is on warfarin with supratherapeutic INR result. (Goal INR 2.0-3.0)    Recent labs: (last 7 days)     04/05/24  1008   INR 3.4*       ASSESSMENT     Warfarin Lab Questionnaire    Warfarin Doses Last 7 Days      4/5/2024     8:48 AM   Dose in Tablet or Mg   TAB or MG? milligram (mg)     Pt Rptd Dose SUNDAY MONDAY TUESDAY WED THURS FRIDAY SATURDAY 4/5/2024   8:48 AM 7.5 10 7.5 7.5 7.5 10 7.5         4/5/2024   Warfarin Lab Questionnaire   Missed doses within past 14 days? No   Changes in diet or alcohol within past 14 days? No   Medication changes since last result? Yes   Please list: Antibiotics and prednisone-- today is last day of pred, continues on docycycline   Injuries or illness since last result? Yes   If yes, please explain: Pneumonia-- feels like the ABX and pred have started to help a little, worried that without pred things will get worse again   New shortness of breath, severe headaches or sudden changes in vision since last result? No   Abnormal bleeding since last result? No   Upcoming surgery, procedure? No     Previous result: Supratherapeutic  Additional findings: None       PLAN     Recommended plan for temporary change(s) affecting INR     Dosing Instructions: partial hold then continue your current warfarin dose with next INR in 1-2 weeks depending on if meds are restarted or changed    Summary  As of 4/5/2024      Full warfarin instructions:  4/5: 7.5 mg; Otherwise 10 mg every Mon, Fri; 7.5 mg all other days   Next INR check:  4/12/2024               Telephone call with Nery who agrees to plan and repeated back plan correctly  Sent [employer] message with dosing and follow up instructions    Patient offered & declined to schedule next visit-- states that she will call to schedule once she sees how she feels over the weekend and if she restarts pred.    Education provided:   Contact [phone] with any changes, questions or  concerns.     Plan made per [facility] anticoagulation protocol    Marjorie Ball RN  Anticoagulation Clinic  4/5/2024    _______________________________________________________________________     Anticoagulation Episode Summary       Current INR goal:  2.0-3.0   TTR:  50.9% (1 y)   Target end date:  Indefinite   Send INR reminders to:  BERNICE MARTINI    Indications    Anticoagulation monitoring  INR range 2-3 [Z79.01]  PAD (peripheral artery disease) (H24) [I73.9]  Long term (current) use of anticoagulants [Z79.01]  Hypercoagulable state (H24) [D68.59]  Bilateral carotid artery disease  unspecified type (H24) [I77.9]             Comments:               Anticoagulation Care Providers       Provider Role Specialty Phone number    Loraine Rees MD Referring Family Medicine [phone]    Vu Winters MD Referring Family Medicine [phone]    Priscila Lombardo APRN CNP Referring Family Medicine [phone]

## 2024-04-12 ENCOUNTER — APPOINTMENT (OUTPATIENT)
Dept: RADIOLOGY | Facility: HOSPITAL | Age: 59
End: 2024-04-12
Attending: EMERGENCY MEDICINE
Payer: COMMERCIAL

## 2024-04-12 ENCOUNTER — ANTICOAGULATION THERAPY VISIT (OUTPATIENT)
Dept: ANTICOAGULATION | Facility: CLINIC | Age: 59
End: 2024-04-12

## 2024-04-12 ENCOUNTER — APPOINTMENT (OUTPATIENT)
Dept: CT IMAGING | Facility: HOSPITAL | Age: 59
End: 2024-04-12
Attending: EMERGENCY MEDICINE
Payer: COMMERCIAL

## 2024-04-12 ENCOUNTER — HOSPITAL ENCOUNTER (EMERGENCY)
Facility: HOSPITAL | Age: 59
Discharge: HOME | End: 2024-04-12
Attending: EMERGENCY MEDICINE | Admitting: EMERGENCY MEDICINE
Payer: COMMERCIAL

## 2024-04-12 VITALS
WEIGHT: 115 LBS | DIASTOLIC BLOOD PRESSURE: 64 MMHG | TEMPERATURE: 98 F | OXYGEN SATURATION: 100 % | RESPIRATION RATE: 22 BRPM | BODY MASS INDEX: 19.14 KG/M2 | SYSTOLIC BLOOD PRESSURE: 147 MMHG | HEART RATE: 65 BPM

## 2024-04-12 DIAGNOSIS — R00.2 PALPITATIONS: ICD-10-CM

## 2024-04-12 DIAGNOSIS — E87.29 KETOACIDOSIS: ICD-10-CM

## 2024-04-12 DIAGNOSIS — R10.9 INTERMITTENT ABDOMINAL PAIN: ICD-10-CM

## 2024-04-12 DIAGNOSIS — Z79.01 ANTICOAGULATION MONITORING, INR RANGE 2-3: Primary | ICD-10-CM

## 2024-04-12 DIAGNOSIS — D68.59 HYPERCOAGULABLE STATE (H): ICD-10-CM

## 2024-04-12 DIAGNOSIS — I73.9 PAD (PERIPHERAL ARTERY DISEASE) (H): ICD-10-CM

## 2024-04-12 DIAGNOSIS — I77.9 BILATERAL CAROTID ARTERY DISEASE, UNSPECIFIED TYPE (H): ICD-10-CM

## 2024-04-12 DIAGNOSIS — Z79.01 LONG TERM (CURRENT) USE OF ANTICOAGULANTS: ICD-10-CM

## 2024-04-12 LAB
ALBUMIN UR-MCNC: NEGATIVE MG/DL
ANION GAP SERPL CALCULATED.3IONS-SCNC: 20 MMOL/L (ref 7–15)
APPEARANCE UR: CLEAR
B-OH-BUTYR SERPL-SCNC: 0.9 MMOL/L
BASOPHILS # BLD AUTO: 0.1 10E3/UL (ref 0–0.2)
BASOPHILS NFR BLD AUTO: 0 %
BILIRUB UR QL STRIP: NEGATIVE
BUN SERPL-MCNC: 26.5 MG/DL (ref 6–20)
CALCIUM SERPL-MCNC: 10 MG/DL (ref 8.6–10)
CHLORIDE SERPL-SCNC: 100 MMOL/L (ref 98–107)
COLOR UR AUTO: ABNORMAL
CREAT SERPL-MCNC: 1.05 MG/DL (ref 0.51–0.95)
DEPRECATED HCO3 PLAS-SCNC: 19 MMOL/L (ref 22–29)
EGFRCR SERPLBLD CKD-EPI 2021: 62 ML/MIN/1.73M2
EOSINOPHIL # BLD AUTO: 0.1 10E3/UL (ref 0–0.7)
EOSINOPHIL NFR BLD AUTO: 0 %
ERYTHROCYTE [DISTWIDTH] IN BLOOD BY AUTOMATED COUNT: 13.9 % (ref 10–15)
ETHANOL SERPL-MCNC: <0.01 G/DL
FLUAV RNA SPEC QL NAA+PROBE: NEGATIVE
FLUBV RNA RESP QL NAA+PROBE: NEGATIVE
GLUCOSE SERPL-MCNC: 138 MG/DL (ref 70–99)
GLUCOSE UR STRIP-MCNC: NEGATIVE MG/DL
HCT VFR BLD AUTO: 37.4 % (ref 35–47)
HGB BLD-MCNC: 12.6 G/DL (ref 11.7–15.7)
HGB UR QL STRIP: ABNORMAL
HOLD SPECIMEN: NORMAL
HOLD SPECIMEN: NORMAL
IMM GRANULOCYTES # BLD: 0.2 10E3/UL
IMM GRANULOCYTES NFR BLD: 1 %
INR PPP: 1.29 (ref 0.85–1.15)
KETONES UR STRIP-MCNC: 20 MG/DL
LACTATE SERPL-SCNC: 1.1 MMOL/L (ref 0.7–2)
LEUKOCYTE ESTERASE UR QL STRIP: NEGATIVE
LYMPHOCYTES # BLD AUTO: 4.8 10E3/UL (ref 0.8–5.3)
LYMPHOCYTES NFR BLD AUTO: 24 %
MAGNESIUM SERPL-MCNC: 2 MG/DL (ref 1.7–2.3)
MCH RBC QN AUTO: 30.6 PG (ref 26.5–33)
MCHC RBC AUTO-ENTMCNC: 33.7 G/DL (ref 31.5–36.5)
MCV RBC AUTO: 91 FL (ref 78–100)
MONOCYTES # BLD AUTO: 1.8 10E3/UL (ref 0–1.3)
MONOCYTES NFR BLD AUTO: 9 %
MUCOUS THREADS #/AREA URNS LPF: PRESENT /LPF
NEUTROPHILS # BLD AUTO: 12.8 10E3/UL (ref 1.6–8.3)
NEUTROPHILS NFR BLD AUTO: 66 %
NITRATE UR QL: NEGATIVE
NRBC # BLD AUTO: 0 10E3/UL
NRBC BLD AUTO-RTO: 0 /100
PH UR STRIP: 6 [PH] (ref 5–7)
PLATELET # BLD AUTO: 386 10E3/UL (ref 150–450)
POTASSIUM SERPL-SCNC: 4.1 MMOL/L (ref 3.4–5.3)
RBC # BLD AUTO: 4.12 10E6/UL (ref 3.8–5.2)
RBC URINE: 2 /HPF
RSV RNA SPEC NAA+PROBE: NEGATIVE
SARS-COV-2 RNA RESP QL NAA+PROBE: NEGATIVE
SODIUM SERPL-SCNC: 139 MMOL/L (ref 135–145)
SP GR UR STRIP: 1 (ref 1–1.03)
SQUAMOUS EPITHELIAL: 1 /HPF
TROPONIN T SERPL HS-MCNC: 13 NG/L
TROPONIN T SERPL HS-MCNC: 16 NG/L
TSH SERPL DL<=0.005 MIU/L-ACNC: 2.18 UIU/ML (ref 0.3–4.2)
UROBILINOGEN UR STRIP-MCNC: <2 MG/DL
WBC # BLD AUTO: 19.6 10E3/UL (ref 4–11)
WBC URINE: <1 /HPF

## 2024-04-12 PROCEDURE — 85610 PROTHROMBIN TIME: CPT | Performed by: EMERGENCY MEDICINE

## 2024-04-12 PROCEDURE — 96365 THER/PROPH/DIAG IV INF INIT: CPT | Mod: 59

## 2024-04-12 PROCEDURE — 82010 KETONE BODYS QUAN: CPT | Performed by: EMERGENCY MEDICINE

## 2024-04-12 PROCEDURE — 81001 URINALYSIS AUTO W/SCOPE: CPT | Performed by: EMERGENCY MEDICINE

## 2024-04-12 PROCEDURE — 250N000011 HC RX IP 250 OP 636: Performed by: EMERGENCY MEDICINE

## 2024-04-12 PROCEDURE — 96375 TX/PRO/DX INJ NEW DRUG ADDON: CPT

## 2024-04-12 PROCEDURE — 83735 ASSAY OF MAGNESIUM: CPT | Performed by: EMERGENCY MEDICINE

## 2024-04-12 PROCEDURE — 99285 EMERGENCY DEPT VISIT HI MDM: CPT | Mod: 25

## 2024-04-12 PROCEDURE — 71046 X-RAY EXAM CHEST 2 VIEWS: CPT

## 2024-04-12 PROCEDURE — 87637 SARSCOV2&INF A&B&RSV AMP PRB: CPT | Performed by: EMERGENCY MEDICINE

## 2024-04-12 PROCEDURE — 82077 ASSAY SPEC XCP UR&BREATH IA: CPT | Performed by: EMERGENCY MEDICINE

## 2024-04-12 PROCEDURE — 84484 ASSAY OF TROPONIN QUANT: CPT | Performed by: EMERGENCY MEDICINE

## 2024-04-12 PROCEDURE — 74177 CT ABD & PELVIS W/CONTRAST: CPT

## 2024-04-12 PROCEDURE — 84443 ASSAY THYROID STIM HORMONE: CPT | Performed by: EMERGENCY MEDICINE

## 2024-04-12 PROCEDURE — 36415 COLL VENOUS BLD VENIPUNCTURE: CPT | Performed by: EMERGENCY MEDICINE

## 2024-04-12 PROCEDURE — 85025 COMPLETE CBC W/AUTO DIFF WBC: CPT | Performed by: EMERGENCY MEDICINE

## 2024-04-12 PROCEDURE — 87040 BLOOD CULTURE FOR BACTERIA: CPT | Performed by: EMERGENCY MEDICINE

## 2024-04-12 PROCEDURE — 96361 HYDRATE IV INFUSION ADD-ON: CPT

## 2024-04-12 PROCEDURE — 83605 ASSAY OF LACTIC ACID: CPT | Performed by: STUDENT IN AN ORGANIZED HEALTH CARE EDUCATION/TRAINING PROGRAM

## 2024-04-12 PROCEDURE — 80048 BASIC METABOLIC PNL TOTAL CA: CPT | Performed by: EMERGENCY MEDICINE

## 2024-04-12 PROCEDURE — 93005 ELECTROCARDIOGRAM TRACING: CPT | Performed by: STUDENT IN AN ORGANIZED HEALTH CARE EDUCATION/TRAINING PROGRAM

## 2024-04-12 PROCEDURE — 258N000003 HC RX IP 258 OP 636: Performed by: EMERGENCY MEDICINE

## 2024-04-12 RX ORDER — IOPAMIDOL 755 MG/ML
56 INJECTION, SOLUTION INTRAVASCULAR ONCE
Status: COMPLETED | OUTPATIENT
Start: 2024-04-12 | End: 2024-04-12

## 2024-04-12 RX ADMIN — FAMOTIDINE 20 MG: 10 INJECTION, SOLUTION INTRAVENOUS at 12:42

## 2024-04-12 RX ADMIN — DEXTROSE AND SODIUM CHLORIDE 500 ML: 5; 900 INJECTION, SOLUTION INTRAVENOUS at 12:42

## 2024-04-12 RX ADMIN — IOPAMIDOL 56 ML: 755 INJECTION, SOLUTION INTRAVENOUS at 11:19

## 2024-04-12 RX ADMIN — SODIUM CHLORIDE 1000 ML: 9 INJECTION, SOLUTION INTRAVENOUS at 10:20

## 2024-04-12 ASSESSMENT — ACTIVITIES OF DAILY LIVING (ADL)
ADLS_ACUITY_SCORE: 35

## 2024-04-12 ASSESSMENT — COLUMBIA-SUICIDE SEVERITY RATING SCALE - C-SSRS
6. HAVE YOU EVER DONE ANYTHING, STARTED TO DO ANYTHING, OR PREPARED TO DO ANYTHING TO END YOUR LIFE?: NO
2. HAVE YOU ACTUALLY HAD ANY THOUGHTS OF KILLING YOURSELF IN THE PAST MONTH?: NO
1. IN THE PAST MONTH, HAVE YOU WISHED YOU WERE DEAD OR WISHED YOU COULD GO TO SLEEP AND NOT WAKE UP?: YES

## 2024-04-12 NOTE — ED TRIAGE NOTES
Patient arrives by private car for evaluation of irregular heart rate.  Patient reports she has not felt well for several weeks was started on antibiotics due to possible pneumonia a couple weeks ago and was unable to finish  the dose due to nausea, vomiting and diarrhea.  Reports that for approx 1 week felt that her heart was beating irregularly.  Called medics and was advised to go to ED.

## 2024-04-12 NOTE — DISCHARGE INSTRUCTIONS
You are seen in the emergency department for symptoms of palpitations, intermittent abdominal and back pain, intermittent GI upset.  Your evaluation included extensive lab testing, chest x-ray and CT imaging.  This testing has looked generally stable and reassuring so far.  On your initial heart monitoring we did see that you are having intermittent extra heartbeats called PVCs, these are generally not life-threatening.  Recommendations for managing this includes trying to decrease caffeine and alcohol intake and try to increase liquid intake like water or Gatorade.  We did not see signs of an ongoing heart attack.  Your x-ray was negative for pneumonia.  Your labs did show that your body had developed a mild ketoacidosis, this is related to your body using fat and protein for energy and it can make people feel poorly.  We recommend trying to stick to a very bland diet but make sure you are drinking plenty of water, Gatorade, and try to eat balanced small meals to keep yourself hydrated and promote good nutrition.  We agree with your plan to follow-up soon as possible with your primary doctor and consider further discussion with your cardiologist through [facility] especially if there are persistent concerns about palpitations or any intermittent chest discomfort.  If you have other immediate concerns like tractable vomiting, severe evolving chest pain, severe lightheadedness, etc. we can reevaluate at any time in the emergency department.

## 2024-04-12 NOTE — ED PROVIDER NOTES
"EMERGENCY DEPARTMENT ENCOUNTER      NAME: Nery Whitaker  AGE: 58 year old female  YOB: 1965  MRN: [id number]  EVALUATION DATE & TIME: 2024  9:51 AM    PCP: Priscila Lombardo    ED PROVIDER: Ash Hawk M.D.      Chief Complaint   Patient presents with    Irregular Heart Beat         FINAL IMPRESSION:  1. Palpitations    2. Intermittent abdominal pain    3. Ketoacidosis          ED COURSE & MEDICAL DECISION MAKIN:58 AM I met with the patient to obtain patient history and performed a physical exam. Discussed plan for ED work up including potential diagnostic studies and interventions.  11:35 AM I reviewed patient's labs and imaging.  1:18 PM Reassessed and updated patient with findings. We discussed the plan for discharge and the patient is agreeable. Reviewed supportive cares, symptomatic treatment, outpatient follow up, and reasons to return to the Emergency Department. Patient to be discharged by ED RN.       58 year old female presents to the Emergency Department for evaluation of palpitations, fatigue, malaise, intermittent abdominal and back pain.  Patient is vitally stable and nontoxic-appearing when she arrives to the emergency department.  She has a history of longstanding smoking, anxiety, anticoagulation on Coumadin, vascular disease.  She presents with a chief complaint of palpitations.  She is initially somewhat difficult to obtain a full history from.  She reports being \"fed up \"with symptoms, mostly intermittent heart palpitations but also dealing with ongoing abdominal pain flank pain and back pain, with her last visit in January for the symptoms.  She has a fairly stable exam including clear lungs, benign abdomen, no current reported pain.  On telemetry she does have rather frequent PVCs on the monitor but no other arrhythmia.   She also was recently treated for a respiratory illness with doxycycline and prednisone, discontinued just a few days prior to the visit " here.  She underwent a broad lab and imaging evaluation for the symptoms.  She has a somewhat abnormal EKG with ST depressions in the inferior and lateral leads, overall morphology is similar to prior although slightly evolved today.  She has a high-sensitivity troponin on the upper limits of normal however this was improved and actually within normal limits on recheck.  Patient did have a notable leukocytosis although as mentioned above she has recently been on prednisone.  Her workup for infection overall otherwise was reassuring with a clear chest x-ray and negative abdominal CT.  Urinalysis not convincing for infection and she does not have urinary symptoms.  She does have a history of acid reflux, this could certainly be worse from her recent prednisone and antibiotic use and we discussed some supportive management for this.  She is already on famotidine twice daily.  She had a ketoacidosis on arrival but a normal lactic acid level.  She was given some IV fluids including some containing dextrose.  She was resting with significant improvement after this.  Given her leukocytosis, acidemia, did consider even potentially observing her in the hospital however given her significant improvement I think she is stable for discharge home with continued follow-up.  Return precautions were reviewed.  Patient was discharged in stable condition.    At the conclusion of the encounter I discussed the results of all of the tests and the disposition. The questions were answered. The patient or family acknowledged understanding and was agreeable with the care plan.       Medical Decision Making  Obtained supplemental history:Supplemental history obtained?: No  Reviewed external records: External records reviewed?: Inpatient Record: ED 1/19/2024  Care impacted by chronic illness:Anticoagulated State, Heart Disease, Hypertension, Smoking / Nicotine Use, and Other: JENNIFER  Care significantly affected by social determinants of  "health:Access to Medical Care  Did you consider but not order tests?: Work up considered but not performed and documented in chart, if applicable  Did you interpret images independently?: Independent interpretation of ECG and images noted in documentation, when applicable.  Consultation discussion with other provider:Did you involve another provider (consultant, , pharmacy, etc.)?: No  Discharge. No recommendations on prescription strength medication(s). I considered admission, but discharged patient after significant clinical improvement.        MEDICATIONS GIVEN IN THE EMERGENCY:  Medications   sodium chloride 0.9% BOLUS 1,000 mL (0 mLs Intravenous Stopped 4/12/24 1207)   iopamidol (ISOVUE-370) solution 56 mL (56 mLs Intravenous $Given 4/12/24 1119)   dextrose 5% and 0.9% NaCl BOLUS 500 mL (0 mLs Intravenous Stopped 4/12/24 1337)   famotidine (PEPCID) injection 20 mg (20 mg Intravenous $Given 4/12/24 1242)       NEW PRESCRIPTIONS STARTED AT TODAY'S ER VISIT  Discharge Medication List as of 4/12/2024  1:39 PM             =================================================================    HPI    Patient information was obtained from: patient    Use of : N/A         Nery Whitaker is a 58 year old female with a pertinent history of HTN, HLD, heart murmur, intermittent palpitations, CAD, PAD, anticoagulants (on warfarin), s/p CABG (2006), cholecystectomy, appendectomy, diverticulosis without diverticulitis, tobacco use disorder, who presents to this ED via private vehicle with family for evaluation of palpitations.    Patient reports for the past couple of weeks, she's been dealing with pneumonia and has been on steroids and antibiotics for this (currently finished steroids about 2 days ago, didn't finish the last 3 doses of antibiotics). Her partner has been taking her blood pressure and notes that it's been going \"up and down.\" This morning, she developed \"heart racing\" palpitations that has been " constant since onset. They called EMS who advised her to go to the ED for further evaluation. She otherwise denies associating chest pain or cough. She also endorses that left sided abdominal pain and back pain for the past year. There were no other concerns/complaints at this time.    Shx: She is currently a smoker (1 pack daily).     Per chart review:  1/19/2024 - Patient was seen at [facility] ED for nausea, vomiting, and bilateral flank pain for 5 days. Labs were WNL aside form her INR which was 1.34. CT abdomen pelvis was negative. She was given PPI/Carafate and zofran with relief of symptoms. She was discharged home with Carate, Zofran, and oxycodone with follow up with GI and PCP.      REVIEW OF SYSTEMS   All systems reviewed and negative except as noted in HPI.    PAST MEDICAL HISTORY:  Past Medical History:   Diagnosis Date    Abdominal pain, epigastric 12/2/2017    Abnormal cardiovascular stress test 12/13/2017    Anemia     Anxiety     Bilateral carotid artery disease (H24)     Coronary artery disease     Dyslipidemia     Foreign body in left foot     Foreign body in left foot, subsequent encounter 10/11/2018    Heart murmur     Hypertension     Intermittent palpitations 12/2/2017    Migraines     Newly recognized murmur 10/18/2018    PAD (peripheral artery disease) (H24)     Pure hyperglyceridemia 9/2/2021    Formatting of this note might be different from the original. Has been intoleratant to several medications.    .    Urge incontinence of urine 11/4/2018    Last Assessment & Plan:  Symptoms most consistent with urge incontinence. Ultrasound ordered to evaluate for any issues with incomplete emptying. Trial of Detrol LA 2 mg daily.  Last Assessment & Plan:  Formatting of this note might be different from the original. Detrol LA has been helpful but dries out her mouth. We will try a lower, short acting dose.    Verruca plantaris 11/1/2018       PAST SURGICAL HISTORY:  Past Surgical History:    Procedure Laterality Date    APPENDECTOMY      ARTERIAL BYPASS SURGERY  2006    BIOPSY BREAST Left     benign    BYPASS GRAFT AORTOFEMORAL Bilateral     CHOLECYSTECTOMY      FEMORAL ARTERY STENT  2006    FOOT MASS EXCISION Left 2018    Soft tissue mass - benign    IR MISCELLANEOUS PROCEDURE  1/17/2006    IR MISCELLANEOUS PROCEDURE  1/17/2006    IR MISCELLANEOUS PROCEDURE  1/17/2006    IR MISCELLANEOUS PROCEDURE  1/17/2006    IR MISCELLANEOUS PROCEDURE  1/18/2006    TONSILLECTOMY      TYMPANOSTOMY TUBE PLACEMENT             CURRENT MEDICATIONS:    No current facility-administered medications for this encounter.     Current Outpatient Medications   Medication Sig Dispense Refill    albuterol (PROAIR HFA/PROVENTIL HFA/VENTOLIN HFA) 108 (90 Base) MCG/ACT inhaler Inhale 2 puffs into the lungs every 6 hours as needed for shortness of breath or wheezing 18 g 0    alendronate (FOSAMAX) 70 MG tablet Take 1 tablet by mouth once a week 12 tablet 1    amLODIPine (NORVASC) 5 MG tablet Take 1 tablet by mouth once daily 90 tablet 3    aspirin 81 MG EC tablet Take 81 mg by mouth daily      calcium carbonate-vitamin D (OSCAL) 500-5 MG-MCG tablet Take 1 tablet by mouth 2 times daily 90 tablet 3    citalopram (CELEXA) 20 MG tablet Take 1 tablet by mouth once daily 90 tablet 2    clindamycin (CLEOCIN T) 1 % external lotion APPLY LOTION TOPICALLY DAILY TO UPPER BACK      doxycycline hyclate (VIBRAMYCIN) 100 MG capsule Take 1 capsule (100 mg) by mouth 2 times daily 20 capsule 0    famotidine (PEPCID) 20 MG tablet Take 1 tablet by mouth twice daily 180 tablet 2    folic acid (FOLVITE) 1 MG tablet Take 1 tablet by mouth once daily 90 tablet 2    gabapentin (NEURONTIN) 100 MG capsule TAKE 1 CAPSULE BY MOUTH IN THE MORNING THEN  1  TABLET  AT  NOON  AND  3  TABLETS  AT  BEDTIME  DAILY 450 capsule 1    lisinopril (ZESTRIL) 20 MG tablet Take 1 tablet by mouth once daily 90 tablet 3    methylPREDNISolone (MEDROL DOSEPAK) 4 MG tablet therapy  pack Follow Package Directions 21 tablet 0    ondansetron (ZOFRAN ODT) 4 MG ODT tab Take 1-2 tablets (4-8 mg) by mouth every 6 hours as needed for nausea or vomiting (max daily dose of 4 tablets) Allowed to dissolve under your tongue 20 tablet 0    predniSONE (DELTASONE) 10 MG tablet Take 3 tablets (30 mg) by mouth daily for 3 days, THEN 2 tablets (20 mg) daily for 3 days, THEN 1 tablet (10 mg) daily for 3 days, THEN 0.5 tablets (5 mg) daily for 4 days. 20 tablet 0    REPATHA SURECLICK 140 MG/ML prefilled autoinjector INJECT 1ML (140MG) SUBCUTANEOUS EVERY 2 WEEKS. INJECT INTO ABDOMEN THIGH OR UPPER ARM. ROTATE INJECTION SITES.      tolterodine (DETROL) 1 MG tablet TAKE 1 TABLET BY MOUTH AT BEDTIME 90 tablet 2    warfarin ANTICOAGULANT (COUMADIN) 5 MG tablet TAKE 1 TABLET BY MOUTH ONCE DAILY OR  AS  DIRECTED.  ADJUST  DOSE  BASED  ON  INR  RESULTS 100 tablet 3         ALLERGIES:  Allergies   Allergen Reactions    Ezetimibe      constipation    Lovastatin Unknown     constipation    Simvastatin      Other reaction(s): Constipation  Intolerance, but tolerated with re-challenge in Feb to 2009       FAMILY HISTORY:  Family History   Problem Relation Age of Onset    Peripheral Vascular Disease Father     Other - See Comments Father         vasular problems     Hypertension Father     Hyperlipidemia Father     Cerebrovascular Disease Father     Other Cancer Father         skin    Breast Cancer Mother         was pt at the [facility]  21 years ago    No Known Problems Sister     No Known Problems Brother     No Known Problems Son     No Known Problems Maternal Grandmother     Cancer Paternal Grandmother         lung     Cancer Paternal Grandfather         kidney     Colon Cancer No family hx of     Prostate Cancer No family hx of     Coronary Artery Disease No family hx of        SOCIAL HISTORY:   Social History     Socioeconomic History    Marital status:     Number of children: 1   Tobacco Use    Smoking  status: Every Day     Current packs/day: 0.75     Average packs/day: 0.8 packs/day for 34.0 years (25.5 ttl pk-yrs)     Types: Cigarettes    Smokeless tobacco: Never    Tobacco comments:     1 ppd since 17 years old   Vaping Use    Vaping status: Never Used   Substance and Sexual Activity    Alcohol use: No    Drug use: No    Sexual activity: Yes     Partners: Male     Birth control/protection: Post-menopausal, Male Surgical   Other Topics Concern    Parent/sibling w/ CABG, MI or angioplasty before 65F 55M? Yes     Social Determinants of Health      Received from [employer] [employer]    Financial Resource Strain    Received from [employer] [employer]    Social Connections   Interpersonal Safety: Low Risk  (4/1/2024)    Interpersonal Safety     Do you feel physically and emotionally safe where you currently live?: Yes     Within the past 12 months, have you been hit, slapped, kicked or otherwise physically hurt by someone?: No     Within the past 12 months, have you been humiliated or emotionally abused in other ways by your partner or ex-partner?: No       VITALS:  BP (!) 147/64   Pulse 65   Temp 98  F (36.7  C) (Oral)   Resp 22   Wt 52.2 kg (115 lb)   SpO2 100%   BMI 19.14 kg/m      PHYSICAL EXAM    Constitutional: Thin chronically ill-appearing middle-age female patient, laying in bed, no acute distress  HENT: Normocephalic, Atraumatic. Neck Supple.  Eyes: EOMI, Conjunctiva normal.  Respiratory: Breathing comfortably on room air. Speaks full sentences easily. Lungs clear to ascultation.  Cardiovascular: Normal heart rate, Regular rhythm. No peripheral edema.  Abdomen: Soft, nontender  Musculoskeletal: Good range of motion in all major joints. No major deformities noted.  Integument: Warm, Dry.  Neurologic: Alert & awake, Normal motor function, Normal sensory function, No  focal deficits noted.   Psychiatric: Cooperative. Affect appropriate.     LAB:  All pertinent labs reviewed and interpreted.  Labs Ordered and Resulted from Time of ED Arrival to Time of ED Departure   BASIC METABOLIC PANEL - Abnormal       Result Value    Sodium 139      Potassium 4.1      Chloride 100      Carbon Dioxide (CO2) 19 (*)     Anion Gap 20 (*)     Urea Nitrogen 26.5 (*)     Creatinine 1.05 (*)     GFR Estimate 62      Calcium 10.0      Glucose 138 (*)    TROPONIN T, HIGH SENSITIVITY - Abnormal    Troponin T, High Sensitivity 16 (*)    INR - Abnormal    INR 1.29 (*)    CBC WITH PLATELETS AND DIFFERENTIAL - Abnormal    WBC Count 19.6 (*)     RBC Count 4.12      Hemoglobin 12.6      Hematocrit 37.4      MCV 91      MCH 30.6      MCHC 33.7      RDW 13.9      Platelet Count 386      % Neutrophils 66      % Lymphocytes 24      % Monocytes 9      % Eosinophils 0      % Basophils 0      % Immature Granulocytes 1      NRBCs per 100 WBC 0      Absolute Neutrophils 12.8 (*)     Absolute Lymphocytes 4.8      Absolute Monocytes 1.8 (*)     Absolute Eosinophils 0.1      Absolute Basophils 0.1      Absolute Immature Granulocytes 0.2      Absolute NRBCs 0.0     KETONE BETA-HYDROXYBUTYRATE QUANTITATIVE, RAPID - Abnormal    Ketone (Beta-Hydroxybutyrate) Quantitative 0.90 (*)    ROUTINE UA WITH MICROSCOPIC REFLEX TO CULTURE - Abnormal    Color Urine Light Yellow      Appearance Urine Clear      Glucose Urine Negative      Bilirubin Urine Negative      Ketones Urine 20 (*)     Specific Gravity Urine 1.005      Blood Urine 0.03 mg/dL (*)     pH Urine 6.0      Protein Albumin Urine Negative      Urobilinogen Urine <2.0      Nitrite Urine Negative      Leukocyte Esterase Urine Negative      Mucus Urine Present (*)     RBC Urine 2      WBC Urine <1      Squamous Epithelials Urine 1     MAGNESIUM - Normal    Magnesium 2.0     TSH WITH FREE T4 REFLEX - Normal    TSH 2.18     LACTIC ACID WHOLE BLOOD - Normal    Lactic Acid 1.1      TROPONIN T, HIGH SENSITIVITY - Normal    Troponin T, High Sensitivity 13     INFLUENZA A/B, RSV, & SARS-COV2 PCR - Normal    Influenza A PCR Negative      Influenza B PCR Negative      RSV PCR Negative      SARS CoV2 PCR Negative     ETHYL ALCOHOL LEVEL - Normal    Alcohol ethyl <0.01         RADIOLOGY:  Reviewed all pertinent imaging. Please see official radiology report.  CT Abdomen Pelvis w Contrast   Final Result   IMPRESSION:    1.  No bowel obstruction. There is mild colonic diverticulosis.   2.  Patent aortobifemoral arterial bypass graft. There is slight narrowing at the origin of the celiac axis though the vessel remains patent.   3.  Subtle groundglass opacities in the lung bases could be mild atypical infection, edema, or atelectasis.         Chest XR,  PA & LAT   Final Result   IMPRESSION: Hyperinflation compatible with COPD. No pulmonary infiltrates.          EKG:    Performed at: 942    Impression: Sinus rhythm with short KY interval, right atrial enlargement, nonspecific ST-T wave abnormality    Rate: 79  Rhythm: Sinus  Axis: Normal  KY Interval: 108  QRS Interval: 78  QTc Interval: 431  ST Changes: Nonspecific ST abnormality  Comparison: Compared to 2018, nonspecific ST-T wave abnormality is slightly more pronounced in the inferior and lateral leads with borderline ST depression in V4 and V5 although overall morphology is similar    I have independently reviewed and interpreted the EKG(s) documented above.    PROCEDURES:   None      I, Shantelle Levine, am serving as a scribe to document services personally performed by Dr. Ash Hawk, based on my observation and the provider's statements to me. I, Ash Hawk MD attest that Shantelle Levine is acting in a scribe capacity, has observed my performance of the services and has documented them in accordance with my direction.    Ash Hawk M.D.  Emergency Medicine  [facility] EMERGENCY DEPARTMENT  [address]  [id number]  [phone]  Dept: [phone]       Ash Hawk MD  04/13/24 0938

## 2024-04-12 NOTE — PROGRESS NOTES
ANTICOAGULATION MANAGEMENT     Nery Whitaker 58 year old female is on warfarin with subtherapeutic INR result. (Goal INR 2.0-3.0)    Recent labs: (last 7 days)     04/12/24  1006   INR 1.29*       ASSESSMENT     Source(s): Chart Review and Patient/Caregiver Call     Warfarin doses taken: Missed dose(s) may be affecting INR  Diet:  No appetite and vomiting for a few days may be affecting diet and INR  Medication/supplement changes: Per ED visit today: finished steroids about 2 days ago, didn't finish the last 3 doses of antibiotics (4/1/24: Doxycycline for 10 days and Prednisone for 5 days)  New illness, injury, or hospitalization: ED visit today for palpitations, ketoacidosis, abdominal pain.  Signs or symptoms of bleeding or clotting: No  Previous result: Supra on 4/5/24-partial hold advised  Additional findings: Eating today after getting treated in ED. Feeling better.       PLAN     Recommended plan for temporary change(s) affecting INR     Dosing Instructions: booster dose then continue your current warfarin dose with next INR in 1 week       Summary  As of 4/12/2024      Full warfarin instructions:  4/12: 15 mg; Otherwise 10 mg every Mon, Fri; 7.5 mg all other days   Next INR check:  4/19/2024               Telephone call with Nery who verbalizes understanding and agrees to plan  Sent uControl message with dosing and follow up instructions    Lab visit scheduled    Education provided:   Goal range and lab monitoring: goal range and significance of current result and Importance of following up at instructed interval  Importance of notifying anticoagulation clinic for: changes in medications; a sooner lab recheck maybe needed and diarrhea, nausea/vomiting, reduced intake, cold/flu, and/or infections; a sooner lab recheck maybe needed  Written instructions provided  Contact [phone] with any changes, questions or concerns.     Plan made per [facility] anticoagulation protocol    Shannen Mcclendon RN  Anticoagulation  Clinic  4/12/2024    _______________________________________________________________________     Anticoagulation Episode Summary       Current INR goal:  2.0-3.0   TTR:  49.9% (1 y)   Target end date:  Indefinite   Send INR reminders to:  BERNICE MARTINI    Indications    Anticoagulation monitoring  INR range 2-3 [Z79.01]  PAD (peripheral artery disease) (H24) [I73.9]  Long term (current) use of anticoagulants [Z79.01]  Hypercoagulable state (H24) [D68.59]  Bilateral carotid artery disease  unspecified type (H24) [I77.9]             Comments:               Anticoagulation Care Providers       Provider Role Specialty Phone number    Loraine Rees MD Referring Family Medicine [phone]    Vu Winters MD Referring Family Medicine [phone]    Priscila Lombardo APRN CNP Referring Family Medicine [phone]

## 2024-04-14 LAB
ATRIAL RATE - MUSE: 79 BPM
DIASTOLIC BLOOD PRESSURE - MUSE: NORMAL MMHG
INTERPRETATION ECG - MUSE: NORMAL
P AXIS - MUSE: 80 DEGREES
PR INTERVAL - MUSE: 108 MS
QRS DURATION - MUSE: 78 MS
QT - MUSE: 376 MS
QTC - MUSE: 431 MS
R AXIS - MUSE: 72 DEGREES
SYSTOLIC BLOOD PRESSURE - MUSE: NORMAL MMHG
T AXIS - MUSE: 43 DEGREES
VENTRICULAR RATE- MUSE: 79 BPM

## 2024-04-17 LAB
BACTERIA BLD CULT: NO GROWTH
BACTERIA BLD CULT: NO GROWTH

## 2024-04-19 ENCOUNTER — ANTICOAGULATION THERAPY VISIT (OUTPATIENT)
Dept: ANTICOAGULATION | Facility: CLINIC | Age: 59
End: 2024-04-19

## 2024-04-19 ENCOUNTER — TELEPHONE (OUTPATIENT)
Dept: ANTICOAGULATION | Facility: CLINIC | Age: 59
End: 2024-04-19

## 2024-04-19 ENCOUNTER — LAB (OUTPATIENT)
Dept: LAB | Facility: CLINIC | Age: 59
End: 2024-04-19
Payer: COMMERCIAL

## 2024-04-19 DIAGNOSIS — Z79.01 LONG TERM (CURRENT) USE OF ANTICOAGULANTS: ICD-10-CM

## 2024-04-19 DIAGNOSIS — D68.59 HYPERCOAGULABLE STATE (H): ICD-10-CM

## 2024-04-19 DIAGNOSIS — I73.9 PAD (PERIPHERAL ARTERY DISEASE) (H): ICD-10-CM

## 2024-04-19 DIAGNOSIS — I77.9 BILATERAL CAROTID ARTERY DISEASE, UNSPECIFIED TYPE (H): ICD-10-CM

## 2024-04-19 DIAGNOSIS — Z79.01 ANTICOAGULATION MONITORING, INR RANGE 2-3: Primary | ICD-10-CM

## 2024-04-19 LAB — INR BLD: 5.8 (ref 0.9–1.1)

## 2024-04-19 PROCEDURE — 36416 COLLJ CAPILLARY BLOOD SPEC: CPT

## 2024-04-19 PROCEDURE — 85610 PROTHROMBIN TIME: CPT

## 2024-04-19 NOTE — PROGRESS NOTES
ANTICOAGULATION MANAGEMENT     Nery Whitaker 58 year old female is on warfarin with supratherapeutic INR result. (Goal INR 2.0-3.0)    Recent labs: (last 7 days)     04/19/24  1019   INR 5.8*       ASSESSMENT     Warfarin Lab Questionnaire    Warfarin Doses Last 7 Days      4/19/2024     8:02 AM   Dose in Tablet or Mg   TAB or MG? milligram (mg)     Pt Rptd Dose SUNDAY MONDAY TUESDAY WED THURS FRIDAY SATURDAY 4/19/2024   8:02 AM 7.5 10 7.5 7.5 7.5 10 7.5         4/19/2024   Warfarin Lab Questionnaire   Missed doses within past 14 days? Missed 2 doses last week then booster dose on 4/12   Changes in diet or alcohol within past 14 days? Has a good appetite this past week   Medication changes since last result? 4/1 Doxy for 10 days and Prednisone for 5 days, she has been taking advil and tylenol the past few days but plans to stop now   Injuries or illness since last result? No   New shortness of breath, severe headaches or sudden changes in vision since last result? No   Abnormal bleeding since last result? Denied any signs of bleeding    Upcoming surgery, procedure? No   Best number to call with results? [phone]     Previous result: Subtherapeutic booster dose x1  Additional findings: None       PLAN     Recommended plan for temporary change(s) affecting INR     Dosing Instructions: hold 2 doses then continue your current warfarin dose with next INR in 6 days       Summary  As of 4/19/2024      Full warfarin instructions:  4/19: Hold; 4/20: Hold; Otherwise 10 mg every Mon, Fri; 7.5 mg all other days   Next INR check:  4/24/2024               Telephone call with Nery who verbalizes understanding and agrees to plan.  My Chart message sent.  Sent FYI message with critical INR value to PCP.     Check at provider office visit Patient wanted to wait until office visit on 4/24/24 to check her INR    Education provided:   Goal range and lab monitoring: goal range and significance of current result, Importance of  therapeutic range, and Importance of following up at instructed interval  Symptom monitoring: monitoring for bleeding signs and symptoms, monitoring for stroke signs and symptoms, when to seek medical attention/emergency care, and if you hit your head or have a bad fall seek emergency care  Contact [phone] with any changes, questions or concerns.     Plan made per [facility] anticoagulation protocol    Melissa Ferreira, RN  Anticoagulation Clinic  4/19/2024    _______________________________________________________________________     Anticoagulation Episode Summary       Current INR goal:  2.0-3.0   TTR:  48.4% (1 y)   Target end date:  Indefinite   Send INR reminders to:  BERNICE MARTINI    Indications    Anticoagulation monitoring  INR range 2-3 [Z79.01]  PAD (peripheral artery disease) (H24) [I73.9]  Long term (current) use of anticoagulants [Z79.01]  Hypercoagulable state (H24) [D68.59]  Bilateral carotid artery disease  unspecified type (H24) [I77.9]             Comments:               Anticoagulation Care Providers       Provider Role Specialty Phone number    Loraine Rees MD Referring Family Medicine [phone]    Vu Winters MD Referring Family Medicine [phone]    Priscila Lombardo APRN CNP Referring Family Medicine [phone]

## 2024-04-24 ENCOUNTER — ANTICOAGULATION THERAPY VISIT (OUTPATIENT)
Dept: ANTICOAGULATION | Facility: CLINIC | Age: 59
End: 2024-04-24

## 2024-04-24 ENCOUNTER — LAB (OUTPATIENT)
Dept: LAB | Facility: CLINIC | Age: 59
End: 2024-04-24
Payer: COMMERCIAL

## 2024-04-24 ENCOUNTER — OFFICE VISIT (OUTPATIENT)
Dept: FAMILY MEDICINE | Facility: CLINIC | Age: 59
End: 2024-04-24
Payer: COMMERCIAL

## 2024-04-24 VITALS
BODY MASS INDEX: 19.69 KG/M2 | WEIGHT: 118.3 LBS | DIASTOLIC BLOOD PRESSURE: 64 MMHG | TEMPERATURE: 98.4 F | OXYGEN SATURATION: 99 % | HEART RATE: 64 BPM | SYSTOLIC BLOOD PRESSURE: 115 MMHG | RESPIRATION RATE: 20 BRPM

## 2024-04-24 DIAGNOSIS — Z79.01 ANTICOAGULATION MONITORING, INR RANGE 2-3: Primary | ICD-10-CM

## 2024-04-24 DIAGNOSIS — D68.59 HYPERCOAGULABLE STATE (H): ICD-10-CM

## 2024-04-24 DIAGNOSIS — R10.13 EPIGASTRIC PAIN: Primary | ICD-10-CM

## 2024-04-24 DIAGNOSIS — I77.9 BILATERAL CAROTID ARTERY DISEASE, UNSPECIFIED TYPE (H): ICD-10-CM

## 2024-04-24 DIAGNOSIS — R10.13 EPIGASTRIC ABDOMINAL PAIN: ICD-10-CM

## 2024-04-24 DIAGNOSIS — Z79.01 LONG TERM (CURRENT) USE OF ANTICOAGULANTS: ICD-10-CM

## 2024-04-24 DIAGNOSIS — I73.9 PAD (PERIPHERAL ARTERY DISEASE) (H): ICD-10-CM

## 2024-04-24 LAB
BASOPHILS # BLD AUTO: 0.1 10E3/UL (ref 0–0.2)
BASOPHILS NFR BLD AUTO: 0 %
EOSINOPHIL # BLD AUTO: 0.5 10E3/UL (ref 0–0.7)
EOSINOPHIL NFR BLD AUTO: 4 %
ERYTHROCYTE [DISTWIDTH] IN BLOOD BY AUTOMATED COUNT: 15.2 % (ref 10–15)
HCT VFR BLD AUTO: 33.5 % (ref 35–47)
HGB BLD-MCNC: 10.7 G/DL (ref 11.7–15.7)
IMM GRANULOCYTES # BLD: 0 10E3/UL
IMM GRANULOCYTES NFR BLD: 0 %
INR BLD: 2.4 (ref 0.9–1.1)
LYMPHOCYTES # BLD AUTO: 4.1 10E3/UL (ref 0.8–5.3)
LYMPHOCYTES NFR BLD AUTO: 34 %
MCH RBC QN AUTO: 30.8 PG (ref 26.5–33)
MCHC RBC AUTO-ENTMCNC: 31.9 G/DL (ref 31.5–36.5)
MCV RBC AUTO: 97 FL (ref 78–100)
MONOCYTES # BLD AUTO: 0.9 10E3/UL (ref 0–1.3)
MONOCYTES NFR BLD AUTO: 8 %
NEUTROPHILS # BLD AUTO: 6.5 10E3/UL (ref 1.6–8.3)
NEUTROPHILS NFR BLD AUTO: 54 %
PLATELET # BLD AUTO: 307 10E3/UL (ref 150–450)
RBC # BLD AUTO: 3.47 10E6/UL (ref 3.8–5.2)
WBC # BLD AUTO: 12.1 10E3/UL (ref 4–11)
WBC # BLD AUTO: 12.4 10E3/UL (ref 4–11)

## 2024-04-24 PROCEDURE — 36415 COLL VENOUS BLD VENIPUNCTURE: CPT | Performed by: FAMILY MEDICINE

## 2024-04-24 PROCEDURE — 36416 COLLJ CAPILLARY BLOOD SPEC: CPT

## 2024-04-24 PROCEDURE — 80053 COMPREHEN METABOLIC PANEL: CPT | Performed by: FAMILY MEDICINE

## 2024-04-24 PROCEDURE — 85025 COMPLETE CBC W/AUTO DIFF WBC: CPT | Performed by: FAMILY MEDICINE

## 2024-04-24 PROCEDURE — 99214 OFFICE O/P EST MOD 30 MIN: CPT | Performed by: FAMILY MEDICINE

## 2024-04-24 PROCEDURE — 85610 PROTHROMBIN TIME: CPT

## 2024-04-24 RX ORDER — SUCRALFATE 1 G/1
1 TABLET ORAL 4 TIMES DAILY
Qty: 120 TABLET | Refills: 0 | Status: SHIPPED | OUTPATIENT
Start: 2024-04-24 | End: 2024-10-04

## 2024-04-24 RX ORDER — OMEPRAZOLE 40 MG/1
40 CAPSULE, DELAYED RELEASE ORAL DAILY
Qty: 30 CAPSULE | Refills: 2 | Status: SHIPPED | OUTPATIENT
Start: 2024-04-24

## 2024-04-24 NOTE — PROGRESS NOTES
ANTICOAGULATION MANAGEMENT     Nery Whitaker 58 year old female is on warfarin with therapeutic INR result. (Goal INR 2.0-3.0)    Recent labs: (last 7 days)     04/24/24  1113   INR 2.4*       ASSESSMENT     Warfarin Lab Questionnaire    Warfarin Doses Last 7 Days      4/23/2024     6:47 PM   Dose in Tablet or Mg   TAB or MG? milligram (mg)     Pt Rptd Dose SUNDAY MONDAY TUESDAY WED THURS FRIDAY SATURDAY 4/23/2024   6:47 PM 7.5 10 7.5 7.5 7.5 0 0         4/23/2024   Warfarin Lab Questionnaire   Missed doses within past 14 days? No - held 2 doses last week as directed   Changes in diet or alcohol within past 14 days? No   Medication changes since last result? No - sucralfate and omeprazole added today. Omeprazole has potential to increase INR   Injuries or illness since last result? No - ongoing stomach and lower back pain   New shortness of breath, severe headaches or sudden changes in vision since last result? No   Abnormal bleeding since last result? No   Upcoming surgery, procedure? No   Best number to call with results? [phone]     Previous result: Supratherapeutic  Additional findings:  due to recent high INR and addition of omeprazole today, elected to decrease dose a little with close INR follow up.        PLAN     Recommended plan for temporary change(s) and ongoing change(s) affecting INR     Dosing Instructions: decrease your warfarin dose (4.3% change) with next INR in 1 week       Summary  As of 4/24/2024      Full warfarin instructions:  10 mg every Mon; 7.5 mg all other days   Next INR check:  5/1/2024               Telephone call with Nery who agrees to plan and repeated back plan correctly    Lab visit scheduled    Education provided:   Goal range and lab monitoring: goal range and significance of current result, Importance of therapeutic range, and Importance of following up at instructed interval  Interaction IS anticipated between warfarin and omeprazole    Plan made per [facility]  anticoagulation protocol    Nery Sutherland RN  Anticoagulation Clinic  4/24/2024    _______________________________________________________________________     Anticoagulation Episode Summary       Current INR goal:  2.0-3.0   TTR:  47.3% (1 y)   Target end date:  Indefinite   Send INR reminders to:  BERNICE MARTINI    Indications    Anticoagulation monitoring  INR range 2-3 [Z79.01]  PAD (peripheral artery disease) (H24) [I73.9]  Long term (current) use of anticoagulants [Z79.01]  Hypercoagulable state (H24) [D68.59]  Bilateral carotid artery disease  unspecified type (H24) [I77.9]             Comments:               Anticoagulation Care Providers       Provider Role Specialty Phone number    Loraine Rees MD Referring Family Medicine [phone]    Vu Winters MD Referring Family Medicine [phone]    Priscila Lombardo APRN CNP Referring Family Medicine [phone]

## 2024-04-24 NOTE — PROGRESS NOTES
Assessment & Plan   Problem List Items Addressed This Visit       Epigastric abdominal pain     Ongoing abdominal pain.  Her most recent emergency department evaluation was reviewed.  This included a CT scan that did not have an etiology for her ongoing abdominal pain.  Her pain does have characteristics that would fit with gastritis.  She has been deliberately trying to gain weight through increased overall nutrition and she has been successful over the past couple of weeks.  She appears comfortable during our exam today.  She had cardiovascular workup in the emergency department.  - Treat for gastritis.  Add omeprazole.  Add Carafate.  Continue famotidine.  - Upper endoscopy referral.  She did undergo this procedure back in 2017 without abnormalities.  I reviewed the note and included discussion of celiac sprue biopsies.  - We also reviewed her January consultation with gastroenterology.  Based on today's discussion, I suspect that the clinician understood a different story than what the patient was trying to communicate and focus more on lower GI symptoms.  Constipation has improved somewhat and some of her left lower quadrant symptoms have subsided.          Other Visit Diagnoses       Epigastric pain    -  Primary    Relevant Medications    omeprazole (PRILOSEC) 40 MG DR capsule    sucralfate (CARAFATE) 1 GM tablet    Other Relevant Orders    Comprehensive metabolic panel (BMP + Alb, Alk Phos, ALT, AST, Total. Bili, TP)    Adult GI  Referral - Procedure Only           36 minutes spent by me on the date of the encounter doing chart review, history and exam, documentation and further activities per the note    MED REC REQUIRED  Post Medication Reconciliation Status:  Patient was not discharged from an inpatient facility or TCU      Arsenio Gabriel is a 58 year old, presenting for the following health issues:  ER F/U (Follow-up from [facility] Emergency Department on  "4/12/24 for Irregular Heart Beat)        4/24/2024    11:12 AM   Additional Questions   Roomed by xl     Ongoing abdominal pain.  Left side with radiation down toward her hip and her back. Has seen [facility].  She does not feel like she had a meaningful interaction.  Central abdominal pain and urgency to use the bathroom.  Was offered fiber and peppermint pills. \"It didn't help.\"  Stool softeners have helped with constipation.  Urgency got better.  She still has attacks of abdominal pain, sweats, dizzinesss, nausea and vomiting.  She has tried to eat more food. She gave up SSB (coke).  \"Its been a long time.\"  2+ years at least.    - she canceled her appointment today with [facility]           Objective    /64 (BP Location: Left arm, Patient Position: Sitting, Cuff Size: Adult Regular)   Pulse 64   Temp 98.4  F (36.9  C) (Oral)   Resp 20   Wt 53.7 kg (118 lb 4.8 oz)   SpO2 99%   BMI 19.69 kg/m    Body mass index is 19.69 kg/m .  Physical Exam  Nursing note reviewed.   Constitutional:       General: She is not in acute distress.     Appearance: Normal appearance. She is not ill-appearing.   HENT:      Head: Normocephalic and atraumatic.   Eyes:      Extraocular Movements: Extraocular movements intact.      Conjunctiva/sclera: Conjunctivae normal.   Pulmonary:      Effort: Pulmonary effort is normal.   Neurological:      Mental Status: She is alert and oriented to person, place, and time.   Psychiatric:         Attention and Perception: Attention normal.         Mood and Affect: Mood normal.         Speech: Speech normal.         Thought Content: Thought content normal.                Signed Electronically by: Vu Winters MD    "

## 2024-04-24 NOTE — ASSESSMENT & PLAN NOTE
Ongoing abdominal pain.  Her most recent emergency department evaluation was reviewed.  This included a CT scan that did not have an etiology for her ongoing abdominal pain.  Her pain does have characteristics that would fit with gastritis.  She has been deliberately trying to gain weight through increased overall nutrition and she has been successful over the past couple of weeks.  She appears comfortable during our exam today.  She had cardiovascular workup in the emergency department.  - Treat for gastritis.  Add omeprazole.  Add Carafate.  Continue famotidine.  - Upper endoscopy referral.  She did undergo this procedure back in 2017 without abnormalities.  I reviewed the note and included discussion of celiac sprue biopsies.  - We also reviewed her January consultation with gastroenterology.  Based on today's discussion, I suspect that the clinician understood a different story than what the patient was trying to communicate and focus more on lower GI symptoms.  Constipation has improved somewhat and some of her left lower quadrant symptoms have subsided.

## 2024-04-25 LAB
ALBUMIN SERPL BCG-MCNC: 4.2 G/DL (ref 3.5–5.2)
ALP SERPL-CCNC: 114 U/L (ref 40–150)
ALT SERPL W P-5'-P-CCNC: 22 U/L (ref 0–50)
ANION GAP SERPL CALCULATED.3IONS-SCNC: 11 MMOL/L (ref 7–15)
AST SERPL W P-5'-P-CCNC: 27 U/L (ref 0–45)
BILIRUB SERPL-MCNC: <0.2 MG/DL
BUN SERPL-MCNC: 12.9 MG/DL (ref 6–20)
CALCIUM SERPL-MCNC: 9.3 MG/DL (ref 8.6–10)
CHLORIDE SERPL-SCNC: 105 MMOL/L (ref 98–107)
CREAT SERPL-MCNC: 0.94 MG/DL (ref 0.51–0.95)
DEPRECATED HCO3 PLAS-SCNC: 24 MMOL/L (ref 22–29)
EGFRCR SERPLBLD CKD-EPI 2021: 70 ML/MIN/1.73M2
GLUCOSE SERPL-MCNC: 87 MG/DL (ref 70–99)
POTASSIUM SERPL-SCNC: 5.1 MMOL/L (ref 3.4–5.3)
PROT SERPL-MCNC: 7.3 G/DL (ref 6.4–8.3)
SODIUM SERPL-SCNC: 140 MMOL/L (ref 135–145)

## 2024-05-01 ENCOUNTER — DOCUMENTATION ONLY (OUTPATIENT)
Dept: ANTICOAGULATION | Facility: CLINIC | Age: 59
End: 2024-05-01

## 2024-05-01 ENCOUNTER — LAB (OUTPATIENT)
Dept: LAB | Facility: CLINIC | Age: 59
End: 2024-05-01
Payer: COMMERCIAL

## 2024-05-01 ENCOUNTER — HOSPITAL ENCOUNTER (OUTPATIENT)
Facility: AMBULATORY SURGERY CENTER | Age: 59
End: 2024-05-01
Attending: INTERNAL MEDICINE
Payer: COMMERCIAL

## 2024-05-01 ENCOUNTER — ANTICOAGULATION THERAPY VISIT (OUTPATIENT)
Dept: ANTICOAGULATION | Facility: CLINIC | Age: 59
End: 2024-05-01

## 2024-05-01 ENCOUNTER — TELEPHONE (OUTPATIENT)
Dept: GASTROENTEROLOGY | Facility: CLINIC | Age: 59
End: 2024-05-01

## 2024-05-01 DIAGNOSIS — Z79.01 LONG TERM (CURRENT) USE OF ANTICOAGULANTS: ICD-10-CM

## 2024-05-01 DIAGNOSIS — I77.9 BILATERAL CAROTID ARTERY DISEASE, UNSPECIFIED TYPE (H): ICD-10-CM

## 2024-05-01 DIAGNOSIS — D68.59 HYPERCOAGULABLE STATE (H): ICD-10-CM

## 2024-05-01 DIAGNOSIS — I73.9 PAD (PERIPHERAL ARTERY DISEASE) (H): ICD-10-CM

## 2024-05-01 DIAGNOSIS — Z79.01 ANTICOAGULATION MONITORING, INR RANGE 2-3: Primary | ICD-10-CM

## 2024-05-01 LAB — INR BLD: 3 (ref 0.9–1.1)

## 2024-05-01 PROCEDURE — 36416 COLLJ CAPILLARY BLOOD SPEC: CPT

## 2024-05-01 PROCEDURE — 85610 PROTHROMBIN TIME: CPT

## 2024-05-01 NOTE — TELEPHONE ENCOUNTER
Per Dr. Rincon, can be at [facility] but needs to be [facility] given her chronic health hx that is complex.   Also COPD symptoms should be back to baseline before having procedure.     Called and spoke with the Pt. She feels her COPD symptoms are back to baseline. Will send message to Dr. Winters to ensure COPD is indeed stable to have the procedure done.     Staff message sent to cancel procedure on 5/10/24.     Yisel Kyle RN   Endoscopy Procedure Pre Assessment RN

## 2024-05-01 NOTE — TELEPHONE ENCOUNTER
"Endoscopy Scheduling Screen    Have you had a positive Covid test in the last 14 days?  No    What is your communication preference for Instructions and/or Bowel Prep?   MyChart    What insurance is in the chart?  Other:  [employer]bs     Ordering/Referring Provider:     GURU ALMENDAREZ      (If ordering provider performs procedure, schedule with ordering provider unless otherwise instructed. )    BMI: Estimated body mass index is 19.69 kg/m  as calculated from the following:    Height as of 4/1/24: 1.651 m (5' 5\").    Weight as of 4/24/24: 53.7 kg (118 lb 4.8 oz).     Sedation Ordered  moderate sedation.   If patient BMI > 50 do not schedule in [facility].    If patient BMI > 45 do not schedule at [facility].    Are you taking methadone or Suboxone?  No    Have you had difficulties, pain, or discomfort during past endoscopy procedures?  No    Are you taking any prescription medications for pain 3 or more times per week?   NO, No RN review required.    Do you have a history of malignant hyperthermia?  No    (Females) Are you currently pregnant?   No     Have you been diagnosed or told you have pulmonary hypertension?   No    Do you have an LVAD?  No    Have you been told you have moderate to severe sleep apnea?  No    Have you been told you have COPD, asthma, or any other lung disease?  Yes     What breathing problems do you have?  COPD     Do you use home oxygen?  No    Have your breathing problems required an ED visit or hospitalization in the last year?  No    Do you have any heart conditions?  Yes     In the past year, have you had any hospitalizations for heart related issues including cardiomyopathy, heart attack, or stent placement?  No    Do you have any implantable devices in your body (pacemaker, ICD)?  No    Do you take nitroglycerine?  No    Have you ever had or are you waiting for an organ transplant?  No. Continue scheduling, no site restrictions.    Have you had a stroke or transient ischemic attack (TIA aka \"mini " "stroke\" in the last 6 months?   No    Have you been diagnosed with or been told you have cirrhosis of the liver?   No    Are you currently on dialysis?   No    Do you need assistance transferring?   No    BMI: Estimated body mass index is 19.69 kg/m  as calculated from the following:    Height as of 4/1/24: 1.651 m (5' 5\").    Weight as of 4/24/24: 53.7 kg (118 lb 4.8 oz).     Is patients BMI > 40 and scheduling location [facility]?  No    Do you take an injectable medication for weight loss or diabetes (excluding insulin)?  No    Do you take the medication Naltrexone?  No    Do you take blood thinners?  Yes coumadin    Are you taking Effient/Prasugrel?  No, you must contact your prescribing provider for direction on holding or bridging with a different medication.       Prep   Are you currently on dialysis or do you have chronic kidney disease?  No    Do you have a diagnosis of diabetes?  No    Do you have a diagnosis of cystic fibrosis (CF)?  No    On a regular basis do you go 3 -5 days between bowel movements?  No    BMI > 40?  No    Preferred Pharmacy:    [facility] [place], [place] - [address]  [address] [address]  Phone: [phone] Fax: [phone]    [id number] [address] AT [facility] [address]  [address]  [facility] [address]  Phone: [phone] Fax: [phone]      Final Scheduling Details     Procedure scheduled  Upper endoscopy (EGD)    Surgeon:  Jadon     Date of procedure:  5/10/24     Pre-OP / PAC:   No - Not required for this site.    Location  [facility] - [facility] - Patient preference. Soonest opening that work for her and with her blood thinner being off of them    Sedation   Moderate Sedation - Per order.      Patient Reminders:   You will receive a call from a Nurse to review instructions and health history.  This assessment must be completed prior to your procedure.  Failure to complete the Nurse " assessment may result in the procedure being cancelled.      On the day of your procedure, please designate an adult(s) who can drive you home stay with you for the next 24 hours. The medicines used in the exam will make you sleepy. You will not be able to drive.      You cannot take public transportation, ride share services, or non-medical taxi service without a responsible caregiver.  Medical transport services are allowed with the requirement that a responsible caregiver will receive you at your destination.  We require that drivers and caregivers are confirmed prior to your procedure.

## 2024-05-01 NOTE — PROGRESS NOTES
[facility]-PROCEDURAL ANTICOAGULATION  MANAGEMENT    Nery requesting pre-procedure hold orders for warfarin and review for bridging      Procedure date: 5/10/24       Procedure:  Upper Endoscopy      Procedure location and phone number (if external): [place]     Number of warfarin hold days requested and/or target INR: 5 days    Pre-op date: Not applicable      Routing to Anticoagulation Pharmacist for review.      Shannen Mcclendon RN

## 2024-05-01 NOTE — PROGRESS NOTES
ANTICOAGULATION MANAGEMENT     Nery Whitaker 58 year old female is on warfarin with therapeutic INR result. (Goal INR 2.0-3.0)    Recent labs: (last 7 days)     05/01/24  1312   INR 3.0*       ASSESSMENT     Warfarin Lab Questionnaire    Warfarin Doses Last 7 Days      5/1/2024     7:00 AM   Dose in Tablet or Mg   TAB or MG? milligram (mg)     Pt Rptd Dose SUNDAY MONDAY TUESDAY WED THURS FRIDAY SATURDAY 5/1/2024   7:00 AM 7.5 10 7.5 7.5 7.5 7.5 7.5         5/1/2024   Warfarin Lab Questionnaire   Missed doses within past 14 days? No   Changes in diet or alcohol within past 14 days? No   Medication changes since last result? Yes   Please list: Omeprazale ans sucralfate-started last week (see [facility] 4/24/24)   Injuries or illness since last result? No   New shortness of breath, severe headaches or sudden changes in vision since last result? No   Abnormal bleeding since last result? No   Upcoming surgery, procedure? No     Previous result: Therapeutic last visit; previously outside of goal range  Additional findings: Upper Endo 5/10/24, message to [facility] for 5 day hold today.       PLAN     Recommended plan for no diet, medication or health factor changes affecting INR     Dosing Instructions: Continue your current warfarin dose until you are advised to hold for upcoming procedure; with next INR 1 week after restarting Warfarin.      **5 day hold marked on [facility] calendar 5/5-5/9 in this encounter**    Summary  As of 5/1/2024      Full warfarin instructions:  5/5: Hold; 5/6: Hold; 5/7: Hold; 5/8: Hold; 5/9: Hold; Otherwise 10 mg every Mon; 7.5 mg all other days   Next INR check:  5/17/2024               Telephone call with Nery who verbalizes understanding and agrees to plan    Education provided:   Goal range and lab monitoring: goal range and significance of current result and take warfarin as usual today and tomorrow, we will call you by Friday afternoon with hold plan.  Contact [phone] with any changes, questions  or concerns.     Plan made per [facility] anticoagulation protocol    Shannen Mcclendon RN  Anticoagulation Clinic  5/1/2024    _______________________________________________________________________     Anticoagulation Episode Summary       Current INR goal:  2.0-3.0   TTR:  48.4% (1 y)   Target end date:  Indefinite   Send INR reminders to:  BERNICE MARTINI    Indications    Anticoagulation monitoring  INR range 2-3 [Z79.01]  PAD (peripheral artery disease) (H24) [I73.9]  Long term (current) use of anticoagulants [Z79.01]  Hypercoagulable state (H24) [D68.59]  Bilateral carotid artery disease  unspecified type (H24) [I77.9]             Comments:               Anticoagulation Care Providers       Provider Role Specialty Phone number    Loraine Rees MD Referring Family Medicine [phone]    Vu Winters MD Referring Family Medicine [phone]    Priscila Lombardo APRN CNP Referring Family Medicine [phone]

## 2024-05-01 NOTE — TELEPHONE ENCOUNTER
Pt seen in ER on 4/12/24 for palpitations, ABD pain and ketoacidosis.Seen for ER follow up on 4/24/24. This is when EGD was ordered.     Pt also had visit for COPD exacerbation on 4/1/24 -- was issued antibiotics and prednisone taper.     Will have this reviewed by Dr. Rincon.       Procedure details:    Patient scheduled for Upper endoscopy (EGD) on 5/10/24.     Arrival time: 0835. Procedure time 0920    Facility location: [facility]; [address], [place] [address]. Check in location: 2nd Floor at Surgery desk. -- Has anxiety, last JENNIFER 7 was normal on 8/11/23.     Sedation type: Conscious sedation     Pre op exam needed? N/A    Indication for procedure: ongoing abdominal pain with anorexia.  Comes and goes over years.       Chart review:     Electronic implanted devices? No    Recent diagnosis of diverticulitis within the last 6 weeks? No    Diabetic? No      Medication review:    Anticoagulants? Yes Coumadin (Warfarin): Recommended HOLD 5 days before procedure.  Consult with your managing provider.    NSAIDS? No NSAID medications per patient's medication list.  RN will verify with pre-assessment call.    Other medication HOLDING recommendations:  EGD: Sucralfate (Carafate): HOLD 1 day before procedure.      Prep for procedure:         Prep instructions sent via Chargeback         Yisel Kyle RN  Endoscopy Procedure Pre Assessment RN  [phone] option 4

## 2024-05-01 NOTE — PROGRESS NOTES
"ELMA-PROCEDURAL ANTICOAGULATION  MANAGEMENT    ASSESSMENT     Warfarin interruption plan for Upper Endoscopy on 5/10/24.    Indication for Anticoagulation:  PAD    5/1 Gastro TE recommends 5 day warfarin hold  Past procedure management reviewed with Dr Paul from vascular  6/2022-Epidural injection-held with no bridge  4/2022- Colonoscopy- held with no bridge  11/01/2021-TESI- held with no bridge  10/23/2018- left foot foreign body removal-held with no bridge    There are currently no guidelines addressing elma-procedural bridging in this indication. The decision to bridging is based on the risk of bleeding weighed against the risk of clotting.      RECOMMENDATION     Based on past procedure management, recommend:    Pre-Procedure:  Hold warfarin for 5 days, until after procedure starting: Sunday 5/5   No Bridge    Post-Procedure:  Resume warfarin dose if okay with provider doing procedure on night of procedure, Friday 5/10 PM: 15 mg daily x2, then resume current dosing  Recheck INR ~ 7 days after resuming warfarin     Plan routed to referring provider for approval  ?   Elizabeth Tan, PharmD Student    SUBJECTIVE/OBJECTIVE     Nery Sanchezgiancarlosang, a 58 year old female    Goal INR Range: 2.0-3.0     Wt Readings from Last 3 Encounters:   04/24/24 53.7 kg (118 lb 4.8 oz)   04/12/24 52.2 kg (115 lb)   04/01/24 50.9 kg (112 lb 3.2 oz)      Ideal body weight: 57 kg (125 lb 10.6 oz)     Estimated body mass index is 19.69 kg/m  as calculated from the following:    Height as of 4/1/24: 1.651 m (5' 5\").    Weight as of 4/24/24: 53.7 kg (118 lb 4.8 oz).    Lab Results   Component Value Date    INR 3.0 (H) 05/01/2024    INR 2.4 (H) 04/24/2024    INR 5.8 (HH) 04/19/2024     Lab Results   Component Value Date    HGB 10.7 (L) 04/24/2024    HCT 33.5 (L) 04/24/2024     04/24/2024     Lab Results   Component Value Date    CR 0.94 04/24/2024    CR 1.05 (H) 04/12/2024    CR 0.93 01/19/2024     Estimated Creatinine Clearance: 56 mL/min " (based on SCr of 0.94 mg/dL).

## 2024-05-02 RX ORDER — FLUMAZENIL 0.1 MG/ML
0.2 INJECTION, SOLUTION INTRAVENOUS
Status: CANCELLED | OUTPATIENT
Start: 2024-05-02 | End: 2024-05-03

## 2024-05-02 RX ORDER — PROCHLORPERAZINE MALEATE 10 MG
10 TABLET ORAL EVERY 6 HOURS PRN
Status: CANCELLED | OUTPATIENT
Start: 2024-05-02

## 2024-05-02 RX ORDER — NALOXONE HYDROCHLORIDE 0.4 MG/ML
0.4 INJECTION, SOLUTION INTRAMUSCULAR; INTRAVENOUS; SUBCUTANEOUS
Status: CANCELLED | OUTPATIENT
Start: 2024-05-02

## 2024-05-02 RX ORDER — LIDOCAINE 40 MG/G
CREAM TOPICAL
Status: CANCELLED | OUTPATIENT
Start: 2024-05-02

## 2024-05-02 RX ORDER — NALOXONE HYDROCHLORIDE 0.4 MG/ML
0.2 INJECTION, SOLUTION INTRAMUSCULAR; INTRAVENOUS; SUBCUTANEOUS
Status: CANCELLED | OUTPATIENT
Start: 2024-05-02

## 2024-05-02 RX ORDER — ONDANSETRON 2 MG/ML
4 INJECTION INTRAMUSCULAR; INTRAVENOUS
Status: CANCELLED | OUTPATIENT
Start: 2024-05-02

## 2024-05-02 RX ORDER — ONDANSETRON 4 MG/1
4 TABLET, ORALLY DISINTEGRATING ORAL EVERY 6 HOURS PRN
Status: CANCELLED | OUTPATIENT
Start: 2024-05-02

## 2024-05-02 RX ORDER — ONDANSETRON 2 MG/ML
4 INJECTION INTRAMUSCULAR; INTRAVENOUS EVERY 6 HOURS PRN
Status: CANCELLED | OUTPATIENT
Start: 2024-05-02

## 2024-05-02 NOTE — PROGRESS NOTES
Priscila Lombardo, GERALD CNP  Vu Winters MD43 minutes ago (12:14 PM)     I agree as well; thanks!

## 2024-05-02 NOTE — TELEPHONE ENCOUNTER
MG with MAC booking out too far. Sent staff message to [facility] for review. Ok for [facility] with MAC per Prince. Message sent to scheduling.     Yisel Kyle, RN   Endoscopy Procedure Pre Assessment RN

## 2024-05-02 NOTE — TELEPHONE ENCOUNTER
Pt cleared by Dr. Winters to schedule procedure since her COPD symptoms are back to baseline. Staff message sent to scheduling to schedule with MAC.     Yisel Kyle, RN   Endoscopy Procedure Pre Assessment RN

## 2024-05-03 NOTE — PROGRESS NOTES
AC calendar updated to reflect post-procedure dosing.     Marjorie Ball RN  [facility]  [phone]

## 2024-05-03 NOTE — PROGRESS NOTES
Called and spoke with patient.  She states that EGD was cancelled due to complex health and needing more than moderate sedation.   Message sent to nurse to confirm as procedure is still listed as an upcoming visit.    Will follow-up with patient once response received.    Marjorie Ball, RN  [facility]  [phone]

## 2024-05-03 NOTE — PROGRESS NOTES
Discussed with Tatum Solis [facility].  Will have patient take warfarin Sunday.  Patient instructed to contact [facility] with any updates she has.   Patient in agreement and will follow-up next week.    Marjorie Ball RN  [facility]  [phone]

## 2024-05-06 NOTE — TELEPHONE ENCOUNTER
Incoming call from patient.      The patient states her  spoke to someone who said there is potentially an earlier available time for her.    Writer relayed below message to patient and warm transferred patient to scheduling to r/s to sooner time at patient request.    The patient has no further questions at this time.    Corinne Kliber, RN  Endoscopy Procedure Pre Assessment RN  [phone] option 4

## 2024-05-06 NOTE — TELEPHONE ENCOUNTER
Resent staff message to switch to MAC at [facility].     Yisel Kyle, RN   Endoscopy Procedure Pre Assessment RN

## 2024-05-06 NOTE — PROGRESS NOTES
Procedure has been rescheduled.  AC calendar updated to remove holds/post-procedure dosing.    Marjorie Ball RN  [facility]  [phone]

## 2024-05-06 NOTE — TELEPHONE ENCOUNTER
Caller: Nery     Reason for Reschedule/Cancellation   (please be detailed, any staff messages or encounters to note?): Needs MAC      Prior to reschedule please review:  Ordering Provider: Vu Winters MD  Sedation Determined: MAC  Does patient have any ASC Exclusions, please identify?: N      Notes on Cancelled Procedure:  Procedure: Upper Endoscopy [EGD]   Date: 05/10/2024  Location: [facility]; [address] [address] [place] [address], [address]nd Floor, [place] [address]   Surgeon: Jadon      Rescheduled: Yes,   Procedure: Upper Endoscopy [EGD]    Date: 08/06/2024   Location: [facility]; [address], 5th [address], [place] [address]   Surgeon: Sri   Sedation Level Scheduled  MAC ,  Reason for Sedation Level RN Assessment    Instructions updated and sent: y     Does patient need PAC or Pre -Op Rescheduled? : n       Did you cancel or rescheduled an EUS procedure? No.

## 2024-05-06 NOTE — TELEPHONE ENCOUNTER
Pt called and said she was returning a call. Pt wanted to know if it was scheduling that called due to pt's request for a sooner appt. Pt stated she was in a lot of pain and cannot wait until August to be seen. Writer called scheduling and spoke with [employer]. There is a sooner appt on 7/5. Writer had [employer] put this time slot on HOLD. Writer also found outgoing message from ANDER that stated below (see TE at 1310 today 5/6):        Left message to call back for: pt  Information to relay to patient: get update on symptoms and perhaps have patient reach out to insurance to find another in-network GI provider to check for sooner availability (confirmed with [facility] that 8/6 is soonest available).          Writer left message with pt  Timothy (consent to communicate on file) regarding the 7/5 opening that is on HOLD. Timothy will let pt know to call us when she gets home from work. Maribell Strickland RN

## 2024-05-06 NOTE — TELEPHONE ENCOUNTER
Rescheduled: Yes,   Procedure: Upper Endoscopy [EGD]    Date: 7/5/24   Location: [facility]; [address] [place] [address], 5[address] Floor, [place] [address]   Surgeon: Quang   Sedation Level Scheduled  MAC ,  Reason for Sedation Level Per RN assessment  Instructions updated and sent: Yes     Does patient need PAC or Pre -Op Rescheduled? : N       Did you cancel or rescheduled an EUS procedure? No.

## 2024-05-06 NOTE — PROGRESS NOTES
Chart reviewed.  Procedure rescheduled to August.    Will request procedure plan review/re-approval closer to that date.    Marjorie Ball RN  [facility]  [phone]

## 2024-05-08 ENCOUNTER — TELEPHONE (OUTPATIENT)
Dept: GASTROENTEROLOGY | Facility: CLINIC | Age: 59
End: 2024-05-08
Payer: COMMERCIAL

## 2024-05-08 ENCOUNTER — TELEPHONE (OUTPATIENT)
Dept: ANTICOAGULATION | Facility: CLINIC | Age: 59
End: 2024-05-08
Payer: COMMERCIAL

## 2024-05-08 DIAGNOSIS — I77.9 BILATERAL CAROTID ARTERY DISEASE, UNSPECIFIED TYPE (H): ICD-10-CM

## 2024-05-08 DIAGNOSIS — Z79.01 ANTICOAGULATION MONITORING, INR RANGE 2-3: Primary | ICD-10-CM

## 2024-05-08 DIAGNOSIS — I73.9 PAD (PERIPHERAL ARTERY DISEASE) (H): ICD-10-CM

## 2024-05-08 DIAGNOSIS — D68.59 HYPERCOAGULABLE STATE (H): ICD-10-CM

## 2024-05-08 DIAGNOSIS — Z79.01 LONG TERM (CURRENT) USE OF ANTICOAGULANTS: ICD-10-CM

## 2024-05-08 NOTE — TELEPHONE ENCOUNTER
Chart reviewed and AC calendar updated to reflect holds and post-procedure dosing.    LVM for patient to take usual warfarin dose tonight.  ACC to follow-up tomorrow and provide hold instructions per 5/2/24 procedure plan instructions encounter.     Marjorie Ball RN  [facility]  [phone]

## 2024-05-08 NOTE — CONFIDENTIAL NOTE
Caller: Nery Whitaker    Reason for Reschedule/Cancellation   (please be detailed, any staff messages or encounters to note?): Sooner date became available.      Prior to reschedule please review:  Ordering Provider: Dr. Vu Winters  Sedation Determined: MAC  Does patient have any ASC Exclusions, please identify?: No      Notes on Cancelled Procedure:  Procedure: Upper Endoscopy [EGD]   Date: 7/5/2024  Location: [facility]; [address] [place] [address], [address] [place] [address]  Surgeon: Dr. Del Rio      Rescheduled: Yes,   Procedure: Upper Endoscopy [EGD]    Date: 5/14/2024   Location: [facility]; [address] [place] [address], [address] [place] [address]   Surgeon: Dr. Bray     Sedation Level Scheduled  MAC ,  Pt has a complex medical hx including uncontrolled COPD and recent ketoacidosis. The determination for MAC was made by both Dr. Rincon at , and Prince Cadena at the [facility] (both require MAC). This is noted in the TE from 5/1/24.     Instructions updated and sent: Yes     Does patient need PAC or Pre -Op Rescheduled? : No       Did you cancel or rescheduled an EUS procedure? No.

## 2024-05-08 NOTE — TELEPHONE ENCOUNTER
Pre assessment completed for upcoming procedure.      Procedure details:    Patient scheduled for Upper endoscopy (EGD) on 5.14.24.     Arrival time: 1300. Procedure time 1400    Facility location: [facility]; [address] [place] [address], 5[address] Floor, [place] [address]. Check in location: 5th Floor.    Sedation type: MAC    Pre op exam needed? N/A    Indication for procedure: ongoing abdominal pain with anorexia.  Comes and goes over years.     Designated  policy reviewed. Instructed to have someone stay 24 hours post procedure.       Chart review:     Electronic implanted devices? No    Recent diagnosis of diverticulitis within the last 6 weeks?  N/A    Diabetic? No      Medication review:    Anticoagulants? Yes Coumadin (Warfarin): Recommended HOLD 5 days before procedure.  Consult with your managing provider.    NSAIDS? No    Other medication HOLDING recommendations:  N/A      Prep for procedure:       Prep instructions sent via In[employer]     Reviewed procedure prep instructions.     Patient verbalized understanding and had no questions or concerns at this time.        Mikki Valladares RN  Endoscopy Procedure Pre Assessment RN  [phone] option 4

## 2024-05-08 NOTE — TELEPHONE ENCOUNTER
FYI - Status Update    Who is Calling: patient    Update: patient is calling back in because she is going to have an upper endoscopy scope on Tuesday, May 14, 2024 - needs to be off of anticoagulations for 5 days before     Does caller want a call/response back: Yes     Could we send this information to you in [facility] or would you prefer to receive a phone call?:   Patient would prefer a phone call   Okay to leave a detailed message?: Yes at Home number on file [phone] (home)

## 2024-05-09 NOTE — TELEPHONE ENCOUNTER
Called and left message for patient with Warfarin hold details for upcoming EGD on 5/14/24.  Also sent Aframet message.  Warfarin hold starts today.  No bridge.  INR check due on 5/21 or 5/22.  Jeffrey FRAZIER

## 2024-05-13 ENCOUNTER — ANESTHESIA EVENT (OUTPATIENT)
Dept: SURGERY | Facility: AMBULATORY SURGERY CENTER | Age: 59
End: 2024-05-13
Payer: COMMERCIAL

## 2024-05-14 ENCOUNTER — ANESTHESIA (OUTPATIENT)
Dept: SURGERY | Facility: AMBULATORY SURGERY CENTER | Age: 59
End: 2024-05-14
Payer: COMMERCIAL

## 2024-05-14 ENCOUNTER — HOSPITAL ENCOUNTER (OUTPATIENT)
Facility: AMBULATORY SURGERY CENTER | Age: 59
Discharge: HOME | End: 2024-05-14
Attending: INTERNAL MEDICINE
Payer: COMMERCIAL

## 2024-05-14 VITALS
OXYGEN SATURATION: 99 % | SYSTOLIC BLOOD PRESSURE: 171 MMHG | HEART RATE: 58 BPM | RESPIRATION RATE: 12 BRPM | TEMPERATURE: 97.4 F | DIASTOLIC BLOOD PRESSURE: 81 MMHG

## 2024-05-14 VITALS — HEART RATE: 79 BPM

## 2024-05-14 LAB — UPPER GI ENDOSCOPY: NORMAL

## 2024-05-14 PROCEDURE — 43239 EGD BIOPSY SINGLE/MULTIPLE: CPT | Performed by: ANESTHESIOLOGY

## 2024-05-14 PROCEDURE — 88305 TISSUE EXAM BY PATHOLOGIST: CPT | Mod: 26 | Performed by: PATHOLOGY

## 2024-05-14 PROCEDURE — 88305 TISSUE EXAM BY PATHOLOGIST: CPT | Mod: TC | Performed by: INTERNAL MEDICINE

## 2024-05-14 PROCEDURE — 43239 EGD BIOPSY SINGLE/MULTIPLE: CPT | Performed by: INTERNAL MEDICINE

## 2024-05-14 PROCEDURE — 43239 EGD BIOPSY SINGLE/MULTIPLE: CPT | Performed by: NURSE ANESTHETIST, CERTIFIED REGISTERED

## 2024-05-14 RX ORDER — NALOXONE HYDROCHLORIDE 0.4 MG/ML
0.4 INJECTION, SOLUTION INTRAMUSCULAR; INTRAVENOUS; SUBCUTANEOUS
Status: DISCONTINUED | OUTPATIENT
Start: 2024-05-14 | End: 2024-05-15 | Stop reason: HOSPADM

## 2024-05-14 RX ORDER — PROPOFOL 10 MG/ML
INJECTION, EMULSION INTRAVENOUS PRN
Status: DISCONTINUED | OUTPATIENT
Start: 2024-05-14 | End: 2024-05-14

## 2024-05-14 RX ORDER — SODIUM CHLORIDE, SODIUM LACTATE, POTASSIUM CHLORIDE, CALCIUM CHLORIDE 600; 310; 30; 20 MG/100ML; MG/100ML; MG/100ML; MG/100ML
INJECTION, SOLUTION INTRAVENOUS CONTINUOUS
Status: DISCONTINUED | OUTPATIENT
Start: 2024-05-14 | End: 2024-05-15 | Stop reason: HOSPADM

## 2024-05-14 RX ORDER — ONDANSETRON 2 MG/ML
4 INJECTION INTRAMUSCULAR; INTRAVENOUS EVERY 6 HOURS PRN
Status: DISCONTINUED | OUTPATIENT
Start: 2024-05-14 | End: 2024-05-15 | Stop reason: HOSPADM

## 2024-05-14 RX ORDER — LIDOCAINE 40 MG/G
CREAM TOPICAL
Status: DISCONTINUED | OUTPATIENT
Start: 2024-05-14 | End: 2024-05-15 | Stop reason: HOSPADM

## 2024-05-14 RX ORDER — GLYCOPYRROLATE 0.2 MG/ML
INJECTION, SOLUTION INTRAMUSCULAR; INTRAVENOUS PRN
Status: DISCONTINUED | OUTPATIENT
Start: 2024-05-14 | End: 2024-05-14

## 2024-05-14 RX ORDER — ONDANSETRON 2 MG/ML
4 INJECTION INTRAMUSCULAR; INTRAVENOUS
Status: COMPLETED | OUTPATIENT
Start: 2024-05-14 | End: 2024-05-14

## 2024-05-14 RX ORDER — ONDANSETRON 4 MG/1
4 TABLET, ORALLY DISINTEGRATING ORAL EVERY 30 MIN PRN
Status: DISCONTINUED | OUTPATIENT
Start: 2024-05-14 | End: 2024-05-15 | Stop reason: HOSPADM

## 2024-05-14 RX ORDER — PROPOFOL 10 MG/ML
INJECTION, EMULSION INTRAVENOUS CONTINUOUS PRN
Status: DISCONTINUED | OUTPATIENT
Start: 2024-05-14 | End: 2024-05-14

## 2024-05-14 RX ORDER — OXYCODONE HYDROCHLORIDE 5 MG/1
5 TABLET ORAL
Status: DISCONTINUED | OUTPATIENT
Start: 2024-05-14 | End: 2024-05-15 | Stop reason: HOSPADM

## 2024-05-14 RX ORDER — PROCHLORPERAZINE MALEATE 10 MG
10 TABLET ORAL EVERY 6 HOURS PRN
Status: DISCONTINUED | OUTPATIENT
Start: 2024-05-14 | End: 2024-05-15 | Stop reason: HOSPADM

## 2024-05-14 RX ORDER — NALOXONE HYDROCHLORIDE 0.4 MG/ML
0.2 INJECTION, SOLUTION INTRAMUSCULAR; INTRAVENOUS; SUBCUTANEOUS
Status: DISCONTINUED | OUTPATIENT
Start: 2024-05-14 | End: 2024-05-15 | Stop reason: HOSPADM

## 2024-05-14 RX ORDER — FLUMAZENIL 0.1 MG/ML
0.2 INJECTION, SOLUTION INTRAVENOUS
Status: DISCONTINUED | OUTPATIENT
Start: 2024-05-14 | End: 2024-05-15 | Stop reason: HOSPADM

## 2024-05-14 RX ORDER — ONDANSETRON 4 MG/1
4 TABLET, ORALLY DISINTEGRATING ORAL EVERY 6 HOURS PRN
Status: DISCONTINUED | OUTPATIENT
Start: 2024-05-14 | End: 2024-05-15 | Stop reason: HOSPADM

## 2024-05-14 RX ORDER — ONDANSETRON 2 MG/ML
4 INJECTION INTRAMUSCULAR; INTRAVENOUS EVERY 30 MIN PRN
Status: DISCONTINUED | OUTPATIENT
Start: 2024-05-14 | End: 2024-05-15 | Stop reason: HOSPADM

## 2024-05-14 RX ORDER — DEXAMETHASONE SODIUM PHOSPHATE 10 MG/ML
4 INJECTION, SOLUTION INTRAMUSCULAR; INTRAVENOUS
Status: DISCONTINUED | OUTPATIENT
Start: 2024-05-14 | End: 2024-05-15 | Stop reason: HOSPADM

## 2024-05-14 RX ORDER — LIDOCAINE HYDROCHLORIDE 20 MG/ML
INJECTION, SOLUTION INFILTRATION; PERINEURAL PRN
Status: DISCONTINUED | OUTPATIENT
Start: 2024-05-14 | End: 2024-05-14

## 2024-05-14 RX ORDER — OXYCODONE HYDROCHLORIDE 5 MG/1
10 TABLET ORAL
Status: DISCONTINUED | OUTPATIENT
Start: 2024-05-14 | End: 2024-05-15 | Stop reason: HOSPADM

## 2024-05-14 RX ORDER — NALOXONE HYDROCHLORIDE 0.4 MG/ML
0.1 INJECTION, SOLUTION INTRAMUSCULAR; INTRAVENOUS; SUBCUTANEOUS
Status: DISCONTINUED | OUTPATIENT
Start: 2024-05-14 | End: 2024-05-15 | Stop reason: HOSPADM

## 2024-05-14 RX ADMIN — ONDANSETRON 4 MG: 2 INJECTION INTRAMUSCULAR; INTRAVENOUS at 12:41

## 2024-05-14 RX ADMIN — SODIUM CHLORIDE, SODIUM LACTATE, POTASSIUM CHLORIDE, CALCIUM CHLORIDE: 600; 310; 30; 20 INJECTION, SOLUTION INTRAVENOUS at 14:18

## 2024-05-14 RX ADMIN — LIDOCAINE HYDROCHLORIDE 80 MG: 20 INJECTION, SOLUTION INFILTRATION; PERINEURAL at 14:31

## 2024-05-14 RX ADMIN — PROPOFOL 50 MG: 10 INJECTION, EMULSION INTRAVENOUS at 14:31

## 2024-05-14 RX ADMIN — SODIUM CHLORIDE, SODIUM LACTATE, POTASSIUM CHLORIDE, CALCIUM CHLORIDE: 600; 310; 30; 20 INJECTION, SOLUTION INTRAVENOUS at 12:39

## 2024-05-14 RX ADMIN — PROPOFOL 50 MG: 10 INJECTION, EMULSION INTRAVENOUS at 14:34

## 2024-05-14 RX ADMIN — PROPOFOL 250 MCG/KG/MIN: 10 INJECTION, EMULSION INTRAVENOUS at 14:31

## 2024-05-14 RX ADMIN — GLYCOPYRROLATE 0.1 MG: 0.2 INJECTION, SOLUTION INTRAMUSCULAR; INTRAVENOUS at 14:31

## 2024-05-14 RX ADMIN — PROPOFOL 50 MG: 10 INJECTION, EMULSION INTRAVENOUS at 14:36

## 2024-05-14 RX ADMIN — ONDANSETRON 4 MG: 2 INJECTION INTRAMUSCULAR; INTRAVENOUS at 15:26

## 2024-05-14 NOTE — ANESTHESIA PREPROCEDURE EVALUATION
Anesthesia Pre-Procedure Evaluation    Patient: Nery Whitaker   MRN: [id number] : 1965        Procedure : Procedure(s):  Esophagoscopy, gastroscopy, duodenoscopy (EGD), combined          Past Medical History:   Diagnosis Date    Abdominal pain, epigastric 2017    Abnormal cardiovascular stress test 2017    Anemia     Anxiety     Bilateral carotid artery disease (H24)     Coronary artery disease     Dyslipidemia     Foreign body in left foot     Foreign body in left foot, subsequent encounter 10/11/2018    Heart murmur     Hypertension     Intermittent palpitations 2017    Migraines     Newly recognized murmur 10/18/2018    PAD (peripheral artery disease) (H24)     Pure hyperglyceridemia 2021    Formatting of this note might be different from the original. Has been intoleratant to several medications.    .    Urge incontinence of urine 2018    Last Assessment & Plan:  Symptoms most consistent with urge incontinence. Ultrasound ordered to evaluate for any issues with incomplete emptying. Trial of Detrol LA 2 mg daily.  Last Assessment & Plan:  Formatting of this note might be different from the original. Detrol LA has been helpful but dries out her mouth. We will try a lower, short acting dose.    Verruca plantaris 2018      Past Surgical History:   Procedure Laterality Date    APPENDECTOMY      ARTERIAL BYPASS SURGERY  2006    BIOPSY BREAST Left     benign    BYPASS GRAFT AORTOFEMORAL Bilateral     CHOLECYSTECTOMY      FEMORAL ARTERY STENT  2006    FOOT MASS EXCISION Left 2018    Soft tissue mass - benign    IR MISCELLANEOUS PROCEDURE  2006    IR MISCELLANEOUS PROCEDURE  2006    IR MISCELLANEOUS PROCEDURE  2006    IR MISCELLANEOUS PROCEDURE  2006    IR MISCELLANEOUS PROCEDURE  2006    TONSILLECTOMY      TYMPANOSTOMY TUBE PLACEMENT        Allergies   Allergen Reactions    Ezetimibe      constipation    Lovastatin Unknown     constipation    Simvastatin       Other reaction(s): Constipation  Intolerance, but tolerated with re-challenge in Feb to April 2009      Social History     Tobacco Use    Smoking status: Every Day     Current packs/day: 0.75     Average packs/day: 0.8 packs/day for 34.0 years (25.5 ttl pk-yrs)     Types: Cigarettes    Smokeless tobacco: Never    Tobacco comments:     1 ppd since 17 years old   Substance Use Topics    Alcohol use: No      Wt Readings from Last 1 Encounters:   04/24/24 53.7 kg (118 lb 4.8 oz)        Anesthesia Evaluation            ROS/MED HX  ENT/Pulmonary:       Neurologic:       Cardiovascular:     (+) Dyslipidemia hypertension- -  CAD -  - -                                      METS/Exercise Tolerance:     Hematologic:       Musculoskeletal:       GI/Hepatic: Comment: Patient nauseous today.   Feels like its due to not eating and not taking her omeprazole.  Has bouts of nausea and vomiting about once a month.  Feeling a little better now.    (+) GERD,                   Renal/Genitourinary:       Endo:       Psychiatric/Substance Use:       Infectious Disease:       Malignancy:       Other:            Physical Exam    Airway  airway exam normal      Mallampati: II   TM distance: > 3 FB   Neck ROM: full   Mouth opening: > 3 cm    Respiratory Devices and Support         Dental       (+) Completely normal teeth      Cardiovascular          Rhythm and rate: regular and normal     Pulmonary   pulmonary exam normal        breath sounds clear to auscultation           OUTSIDE LABS:  CBC:   Lab Results   Component Value Date    WBC 12.4 (H) 04/24/2024    WBC 12.1 (H) 04/24/2024    HGB 10.7 (L) 04/24/2024    HGB 12.6 04/12/2024    HCT 33.5 (L) 04/24/2024    HCT 37.4 04/12/2024     04/24/2024     04/12/2024     BMP:   Lab Results   Component Value Date     04/24/2024     04/12/2024    POTASSIUM 5.1 04/24/2024    POTASSIUM 4.1 04/12/2024    CHLORIDE 105 04/24/2024    CHLORIDE 100 04/12/2024    CO2 24 04/24/2024     CO2 19 (L) 04/12/2024    BUN 12.9 04/24/2024    BUN 26.5 (H) 04/12/2024    CR 0.94 04/24/2024    CR 1.05 (H) 04/12/2024    GLC 87 04/24/2024     (H) 04/12/2024     COAGS:   Lab Results   Component Value Date    INR 3.0 (H) 05/01/2024     POC:   Lab Results   Component Value Date    HCG Negative 10/23/2018     HEPATIC:   Lab Results   Component Value Date    ALBUMIN 4.2 04/24/2024    PROTTOTAL 7.3 04/24/2024    ALT 22 04/24/2024    AST 27 04/24/2024    ALKPHOS 114 04/24/2024    BILITOTAL <0.2 04/24/2024     OTHER:   Lab Results   Component Value Date    LACT 1.1 04/12/2024    BEVERLY 9.3 04/24/2024    MAG 2.0 04/12/2024    LIPASE 25 01/19/2024    TSH 2.18 04/12/2024    SED 16 08/04/2023       Anesthesia Plan    ASA Status:  2    NPO Status:  NPO Appropriate    Anesthesia Type: MAC.     - Reason for MAC: immobility needed, straight local not clinically adequate   Induction: Intravenous.   Maintenance: TIVA.        Consents    Anesthesia Plan(s) and associated risks, benefits, and realistic alternatives discussed. Questions answered and patient/representative(s) expressed understanding.     - Discussed:     - Discussed with:  Patient      - Extended Intubation/Ventilatory Support Discussed: No.      - Patient is DNR/DNI Status: No     Use of blood products discussed: No .     Postoperative Care    Pain management: IV analgesics, Oral pain medications, Multi-modal analgesia.   PONV prophylaxis: Ondansetron (or other 5HT-3), Background Propofol Infusion     Comments:               Tomas Cyr MD    I have reviewed the pertinent notes and labs in the chart from the past 30 days and (re)examined the patient.  Any updates or changes from those notes are reflected in this note.            # Drug Induced Coagulation Defect: home medication list includes an anticoagulant medication  # Drug Induced Platelet Defect: home medication list includes an antiplatelet medication

## 2024-05-14 NOTE — ANESTHESIA POSTPROCEDURE EVALUATION
Patient: Nery Whitaker    Procedure: Procedure(s):  ESOPHAGOGASTRODUODENOSCOPY, WITH BIOPSY       Anesthesia Type:  MAC    Note:  Disposition: Outpatient   Postop Pain Control: Uneventful            Sign Out: Well controlled pain   PONV: No   Neuro/Psych: Uneventful            Sign Out: Acceptable/Baseline neuro status   Airway/Respiratory: Uneventful            Sign Out: Acceptable/Baseline resp. status   CV/Hemodynamics: Uneventful            Sign Out: Acceptable CV status   Other NRE: NONE   DID A NON-ROUTINE EVENT OCCUR? No           Last vitals:  Vitals Value Taken Time   /84 05/14/24 1545   Temp 36.3  C (97.4  F) 05/14/24 1545   Pulse 56 05/14/24 1545   Resp 12 05/14/24 1545   SpO2 100 % 05/14/24 1545       Electronically Signed By: Tomas Cyr MD  May 14, 2024  3:56 PM

## 2024-05-14 NOTE — H&P
Nery SOFIA Sherman  [id number]  female  58 year old      Reason for procedure/surgery: abdominal pain    Patient Active Problem List   Diagnosis    Adenomatous colon polyp    Anticoagulation monitoring, INR range 2-3    Anxiety state    Bilateral carotid artery disease (H24)    Diverticulosis of large intestine without hemorrhage    Dyslipidemia    Elevated lipoprotein(a)    Essential hypertension    Hypercoagulable state (H24)    Migraine    Multiple skin nodules    Nocturnal enuresis    Normocytic anemia    PAD (peripheral artery disease) (H24)    Thoracic neuralgia    Tobacco use disorder    Atypical lymphocytosis    Thoracic spine pain    Routine general medical examination at a health care facility    Age-related osteoporosis without current pathological fracture    Benign paroxysmal positional vertigo of right ear    Long term (current) use of anticoagulants    Bilateral carotid artery disease, unspecified type (H24)    Epigastric abdominal pain    Heart murmur       Past Surgical History:    Past Surgical History:   Procedure Laterality Date    APPENDECTOMY      ARTERIAL BYPASS SURGERY  2006    BIOPSY BREAST Left     benign    BYPASS GRAFT AORTOFEMORAL Bilateral     CHOLECYSTECTOMY      FEMORAL ARTERY STENT  2006    FOOT MASS EXCISION Left 2018    Soft tissue mass - benign    IR MISCELLANEOUS PROCEDURE  1/17/2006    IR MISCELLANEOUS PROCEDURE  1/17/2006    IR MISCELLANEOUS PROCEDURE  1/17/2006    IR MISCELLANEOUS PROCEDURE  1/17/2006    IR MISCELLANEOUS PROCEDURE  1/18/2006    TONSILLECTOMY      TYMPANOSTOMY TUBE PLACEMENT         Past Medical History:   Past Medical History:   Diagnosis Date    Abdominal pain, epigastric 12/2/2017    Abnormal cardiovascular stress test 12/13/2017    Anemia     Anxiety     Bilateral carotid artery disease (H24)     Coronary artery disease     Dyslipidemia     Foreign body in left foot     Foreign body in left foot, subsequent encounter 10/11/2018    Heart murmur     Hypertension      Intermittent palpitations 2017    Migraines     Newly recognized murmur 10/18/2018    PAD (peripheral artery disease) (H24)     Pure hyperglyceridemia 2021    Formatting of this note might be different from the original. Has been intoleratant to several medications.    .    Urge incontinence of urine 2018    Last Assessment & Plan:  Symptoms most consistent with urge incontinence. Ultrasound ordered to evaluate for any issues with incomplete emptying. Trial of Detrol LA 2 mg daily.  Last Assessment & Plan:  Formatting of this note might be different from the original. Detrol LA has been helpful but dries out her mouth. We will try a lower, short acting dose.    Verruca plantaris 2018       Social History:   Social History     Tobacco Use    Smoking status: Every Day     Current packs/day: 0.75     Average packs/day: 0.8 packs/day for 34.0 years (25.5 ttl pk-yrs)     Types: Cigarettes    Smokeless tobacco: Never    Tobacco comments:     1 ppd since 17 years old   Substance Use Topics    Alcohol use: No       Family History:   Family History   Problem Relation Age of Onset    Peripheral Vascular Disease Father     Other - See Comments Father         vasular problems     Hypertension Father     Hyperlipidemia Father     Cerebrovascular Disease Father     Other Cancer Father         skin    Breast Cancer Mother         was pt at the [facility]  21 years ago    No Known Problems Sister     No Known Problems Brother     No Known Problems Son     No Known Problems Maternal Grandmother     Cancer Paternal Grandmother         lung     Cancer Paternal Grandfather         kidney     Colon Cancer No family hx of     Prostate Cancer No family hx of     Coronary Artery Disease No family hx of        Allergies:   Allergies   Allergen Reactions    Ezetimibe      constipation    Lovastatin Unknown     constipation    Simvastatin      Other reaction(s): Constipation  Intolerance, but tolerated with re-challenge  in Feb to April 2009       Active Medications:   Current Outpatient Medications   Medication Sig Dispense Refill    albuterol (PROAIR HFA/PROVENTIL HFA/VENTOLIN HFA) 108 (90 Base) MCG/ACT inhaler Inhale 2 puffs into the lungs every 6 hours as needed for shortness of breath or wheezing 18 g 0    alendronate (FOSAMAX) 70 MG tablet Take 1 tablet by mouth once a week 12 tablet 1    amLODIPine (NORVASC) 5 MG tablet Take 1 tablet by mouth once daily 90 tablet 3    aspirin 81 MG EC tablet Take 81 mg by mouth daily      calcium carbonate-vitamin D (OSCAL) 500-5 MG-MCG tablet Take 1 tablet by mouth 2 times daily 90 tablet 3    citalopram (CELEXA) 20 MG tablet Take 1 tablet by mouth once daily 90 tablet 2    clindamycin (CLEOCIN T) 1 % external lotion APPLY LOTION TOPICALLY DAILY TO UPPER BACK      famotidine (PEPCID) 20 MG tablet Take 1 tablet by mouth twice daily 180 tablet 2    folic acid (FOLVITE) 1 MG tablet Take 1 tablet by mouth once daily 90 tablet 2    gabapentin (NEURONTIN) 100 MG capsule TAKE 1 CAPSULE BY MOUTH IN THE MORNING THEN  1  TABLET  AT  NOON  AND  3  TABLETS  AT  BEDTIME  DAILY 450 capsule 1    lisinopril (ZESTRIL) 20 MG tablet Take 1 tablet by mouth once daily 90 tablet 3    omeprazole (PRILOSEC) 40 MG DR capsule Take 1 capsule (40 mg) by mouth daily 30 capsule 2    ondansetron (ZOFRAN ODT) 4 MG ODT tab Take 1-2 tablets (4-8 mg) by mouth every 6 hours as needed for nausea or vomiting (max daily dose of 4 tablets) Allowed to dissolve under your tongue 20 tablet 0    REPATHA SURECLICK 140 MG/ML prefilled autoinjector INJECT 1ML (140MG) SUBCUTANEOUS EVERY 2 WEEKS. INJECT INTO ABDOMEN THIGH OR UPPER ARM. ROTATE INJECTION SITES.      sucralfate (CARAFATE) 1 GM tablet Take 1 tablet (1 g) by mouth 4 times daily 120 tablet 0    warfarin ANTICOAGULANT (COUMADIN) 5 MG tablet TAKE 1 TABLET BY MOUTH ONCE DAILY OR  AS  DIRECTED.  ADJUST  DOSE  BASED  ON  INR  RESULTS 100 tablet 3       Systemic Review:   ROS  otherwise negative    Physical Examination:   Vital Signs: BP (!) 154/69 (Cuff Size: Adult Regular)   Pulse 60   Temp 97  F (36.1  C) (Temporal)   Resp 16   SpO2 99%   GENERAL: healthy, alert and no distress  HENT: oropharynx clear and oral mucous membranes moist, Mallampati II  NECK: Normal ROM  RESP: lungs clear to auscultation - no rales, rhonchi or wheezes  CV: regular rate and rhythm, normal S1 S2,   ABDOMEN: soft, nontender, and bowel sounds normal  MS: no gross musculoskeletal defects noted, no edema      Plan: Appropriate to proceed as scheduled.      Lizz Bray MD  5/14/2024    PCP:  Priscila Lombardo

## 2024-05-14 NOTE — ANESTHESIA CARE TRANSFER NOTE
Patient: Nery Whitaker    Procedure: Procedure(s):  ESOPHAGOGASTRODUODENOSCOPY, WITH BIOPSY       Diagnosis: Epigastric pain [R10.13]  Diagnosis Additional Information: No value filed.    Anesthesia Type:   MAC     Note:    Oropharynx: oropharynx clear of all foreign objects  Level of Consciousness: drowsy  Oxygen Supplementation: room air    Independent Airway: airway patency satisfactory and stable  Dentition: dentition unchanged  Vital Signs Stable: post-procedure vital signs reviewed and stable  Report to RN Given: handoff report given  Patient transferred to: Phase II    Handoff Report: Identifed the Patient, Identified the Reponsible Provider, Reviewed the pertinent medical history, Discussed the surgical course, Reviewed Intra-OP anesthesia mangement and issues during anesthesia, Set expectations for post-procedure period and Allowed opportunity for questions and acknowledgement of understanding      Vitals:  Vitals Value Taken Time   BP     Temp     Pulse     Resp     SpO2         Electronically Signed By: GERALD Hong CRNA  May 14, 2024  2:58 PM   Billing Type: Third-Party Bill

## 2024-05-14 NOTE — DISCHARGE INSTRUCTIONS
[facility]  Home Care Following Anesthesia  For 24 hours after surgery:  Get plenty of rest.  A responsible adult must stay with you for at least 24 hours after you leave the surgery center.  Do not drive or use heavy equipment.  If you have weakness or tingling, don't drive or use heavy equipment until this feeling goes away.   Do not drink alcohol.   Avoid strenuous or risky activities.  Ask for help when climbing stairs.  You may feel lightheaded.  IF so, sit for a few minutes before standing.  Have someone help you get up.   If you have nausea (feel sick to your stomach): Drink only clear liquids such as apple juice, ginger ale, broth or 7-Up.  Rest may also help.  Be sure to drink enough fluids.  Move to a regular diet as you feel able.   You may have a slight fever.  Call the doctor if your fever is over 100 F (37.7 C) (taken under the tongue) or lasts longer than 24 hours.  You may have a dry mouth, a sore throat, muscle aches or trouble sleeping. These should go away after 24 hours.  Do not make important or legal decisions.   It is recommended to avoid smoking.               Tips for taking pain medications  To get the best pain relief possible, remember these points:  Take pain medications as directed, before pain becomes severe.  Pain medication can upset your stomach: taking it with food may help.  Constipation is a common side effect of pain medication. Drink plenty of  fluids.  Eat foods high in fiber. Take a stool softener if recommended by your doctor or pharmacist.  Do not drink alcohol, drive or operate machinery while taking pain medications.  Ask about other ways to control pain, such as with heat, ice or relaxation.    Tylenol/Acetaminophen Consumption    If you feel your pain relief is insufficient, you may take Tylenol/Acetaminophen in addition to your narcotic pain medication.   Be careful not to exceed 4,000 mg of Tylenol/Acetaminophen in a 24 hour  period from all sources.  If you are taking extra strength Tylenol/acetaminophen (500 mg), the maximum dose is 8 tablets in 24 hours.  If you are taking regular strength acetaminophen (325 mg), the maximum dose is 12 tablets in 24 hours.    Call a doctor for any of the following:  Signs of infection (fever, growing tenderness at the surgery site, a large amount of drainage or bleeding, severe pain, foul-smelling drainage, redness, swelling).  It has been over 8 to 10 hours since surgery and you are still not able to urinate (pass water).  Headache for over 24 hours.  Numbness, tingling or weakness the day after surgery (if you had spinal anesthesia).  Signs of Covid-19 infection (temperature over 100 degrees, shortness of breath, cough, loss of taste/smell, generalized body aches, persistent headache, chills, sore throat, nausea/vomiting/diarrhea)  Your doctor is: MD Melissa Bray

## 2024-05-15 LAB
PATH REPORT.COMMENTS IMP SPEC: NORMAL
PATH REPORT.COMMENTS IMP SPEC: NORMAL
PATH REPORT.FINAL DX SPEC: NORMAL
PATH REPORT.GROSS SPEC: NORMAL
PATH REPORT.MICROSCOPIC SPEC OTHER STN: NORMAL
PATH REPORT.RELEVANT HX SPEC: NORMAL
PHOTO IMAGE: NORMAL

## 2024-05-22 ENCOUNTER — ANTICOAGULATION THERAPY VISIT (OUTPATIENT)
Dept: ANTICOAGULATION | Facility: CLINIC | Age: 59
End: 2024-05-22

## 2024-05-22 ENCOUNTER — MYC MEDICAL ADVICE (OUTPATIENT)
Dept: ANTICOAGULATION | Facility: CLINIC | Age: 59
End: 2024-05-22
Payer: COMMERCIAL

## 2024-05-22 ENCOUNTER — HOSPITAL ENCOUNTER (OUTPATIENT)
Dept: CT IMAGING | Facility: HOSPITAL | Age: 59
Discharge: HOME | End: 2024-05-22
Attending: FAMILY MEDICINE | Admitting: FAMILY MEDICINE
Payer: COMMERCIAL

## 2024-05-22 ENCOUNTER — OFFICE VISIT (OUTPATIENT)
Dept: FAMILY MEDICINE | Facility: CLINIC | Age: 59
End: 2024-05-22
Payer: COMMERCIAL

## 2024-05-22 VITALS
BODY MASS INDEX: 19.64 KG/M2 | SYSTOLIC BLOOD PRESSURE: 130 MMHG | RESPIRATION RATE: 18 BRPM | DIASTOLIC BLOOD PRESSURE: 73 MMHG | WEIGHT: 118 LBS | TEMPERATURE: 98.4 F | HEART RATE: 72 BPM | OXYGEN SATURATION: 99 %

## 2024-05-22 DIAGNOSIS — R10.9 RIGHT FLANK PAIN: ICD-10-CM

## 2024-05-22 DIAGNOSIS — Z79.01 ANTICOAGULATION MONITORING, INR RANGE 2-3: Primary | ICD-10-CM

## 2024-05-22 DIAGNOSIS — I77.9 BILATERAL CAROTID ARTERY DISEASE, UNSPECIFIED TYPE (H): ICD-10-CM

## 2024-05-22 DIAGNOSIS — D68.59 HYPERCOAGULABLE STATE (H): ICD-10-CM

## 2024-05-22 DIAGNOSIS — Z79.01 LONG TERM (CURRENT) USE OF ANTICOAGULANTS: ICD-10-CM

## 2024-05-22 DIAGNOSIS — I73.9 PAD (PERIPHERAL ARTERY DISEASE) (H): ICD-10-CM

## 2024-05-22 DIAGNOSIS — R39.15 URGENCY OF URINATION: Primary | ICD-10-CM

## 2024-05-22 LAB
ALBUMIN UR-MCNC: NEGATIVE MG/DL
APPEARANCE UR: CLEAR
BACTERIA #/AREA URNS HPF: ABNORMAL /HPF
BASOPHILS # BLD AUTO: 0 10E3/UL (ref 0–0.2)
BASOPHILS NFR BLD AUTO: 0 %
BILIRUB UR QL STRIP: NEGATIVE
COLOR UR AUTO: YELLOW
EOSINOPHIL # BLD AUTO: 0.3 10E3/UL (ref 0–0.7)
EOSINOPHIL NFR BLD AUTO: 4 %
ERYTHROCYTE [DISTWIDTH] IN BLOOD BY AUTOMATED COUNT: 14.2 % (ref 10–15)
GLUCOSE UR STRIP-MCNC: NEGATIVE MG/DL
HCT VFR BLD AUTO: 30.6 % (ref 35–47)
HGB BLD-MCNC: 10 G/DL (ref 11.7–15.7)
HGB UR QL STRIP: ABNORMAL
IMM GRANULOCYTES # BLD: 0 10E3/UL
IMM GRANULOCYTES NFR BLD: 0 %
INR BLD: 3.1 (ref 0.9–1.1)
KETONES UR STRIP-MCNC: NEGATIVE MG/DL
LEUKOCYTE ESTERASE UR QL STRIP: NEGATIVE
LYMPHOCYTES # BLD AUTO: 2.9 10E3/UL (ref 0.8–5.3)
LYMPHOCYTES NFR BLD AUTO: 30 %
MCH RBC QN AUTO: 31 PG (ref 26.5–33)
MCHC RBC AUTO-ENTMCNC: 32.7 G/DL (ref 31.5–36.5)
MCV RBC AUTO: 95 FL (ref 78–100)
MONOCYTES # BLD AUTO: 0.7 10E3/UL (ref 0–1.3)
MONOCYTES NFR BLD AUTO: 8 %
NEUTROPHILS # BLD AUTO: 5.8 10E3/UL (ref 1.6–8.3)
NEUTROPHILS NFR BLD AUTO: 59 %
NITRATE UR QL: NEGATIVE
PH UR STRIP: 6 [PH] (ref 5–8)
PLATELET # BLD AUTO: 282 10E3/UL (ref 150–450)
RBC # BLD AUTO: 3.23 10E6/UL (ref 3.8–5.2)
RBC #/AREA URNS AUTO: ABNORMAL /HPF
SP GR UR STRIP: 1.01 (ref 1–1.03)
SQUAMOUS #/AREA URNS AUTO: ABNORMAL /LPF
UROBILINOGEN UR STRIP-ACNC: 0.2 E.U./DL
WBC # BLD AUTO: 9.8 10E3/UL (ref 4–11)
WBC #/AREA URNS AUTO: ABNORMAL /HPF

## 2024-05-22 PROCEDURE — 74176 CT ABD & PELVIS W/O CONTRAST: CPT

## 2024-05-22 PROCEDURE — 36416 COLLJ CAPILLARY BLOOD SPEC: CPT | Performed by: FAMILY MEDICINE

## 2024-05-22 PROCEDURE — 36415 COLL VENOUS BLD VENIPUNCTURE: CPT | Performed by: FAMILY MEDICINE

## 2024-05-22 PROCEDURE — 99214 OFFICE O/P EST MOD 30 MIN: CPT | Performed by: FAMILY MEDICINE

## 2024-05-22 PROCEDURE — 81001 URINALYSIS AUTO W/SCOPE: CPT | Performed by: FAMILY MEDICINE

## 2024-05-22 PROCEDURE — 85025 COMPLETE CBC W/AUTO DIFF WBC: CPT | Performed by: FAMILY MEDICINE

## 2024-05-22 PROCEDURE — 85610 PROTHROMBIN TIME: CPT | Performed by: FAMILY MEDICINE

## 2024-05-22 NOTE — PROGRESS NOTES
ANTICOAGULATION MANAGEMENT     Nery Whitaker 58 year old female is on warfarin with supratherapeutic INR result. (Goal INR 2.0-3.0)    Recent labs: (last 7 days)     05/22/24  1358   INR 3.1*       ASSESSMENT     Source(s): Chart Review     Warfarin doses taken: Resumed warfarin on 5/14/24 after interruption for surgery/procedure which may be affecting INR  Diet: No new diet changes identified  Medication/supplement changes: None noted  New illness, injury, or hospitalization: Yes: Office visit today for urgency of urination, right flank pain, UTI  Signs or symptoms of bleeding or clotting: No  Previous result: Therapeutic last 2(+) visits  Additional findings:  Not bridging, two days of boost warfarin dosing last week when restarting after EGD.       PLAN     Recommended plan for temporary change(s) affecting INR     Dosing Instructions: Continue your current warfarin dose with next INR in 1 week       Summary  As of 5/22/2024      Full warfarin instructions:  10 mg every Mon; 7.5 mg all other days   Next INR check:  5/29/2024               Detailed voice message left for Nery with dosing instructions and follow up date.   Sent [employer] message with dosing and follow up instructions    Contact [phone] to schedule and with any changes, questions or concerns.     Education provided:   Please call back if any changes to your diet, medications or how you've been taking warfarin  Goal range and lab monitoring: goal range and significance of current result and Importance of following up at instructed interval  Symptom monitoring: monitoring for bleeding signs and symptoms  Importance of notifying anticoagulation clinic for: changes in medications; a sooner lab recheck maybe needed  Written instructions provided  Contact [phone] with any changes, questions or concerns.     Plan made per [facility] anticoagulation protocol    Shannen Mcclendon RN  Anticoagulation  Clinic  5/22/2024    _______________________________________________________________________     Anticoagulation Episode Summary       Current INR goal:  2.0-3.0   TTR:  48.4% (1 y)   Target end date:  Indefinite   Send INR reminders to:  BERNICE MARTINI    Indications    Anticoagulation monitoring  INR range 2-3 [Z79.01]  PAD (peripheral artery disease) (H24) [I73.9]  Long term (current) use of anticoagulants [Z79.01]  Hypercoagulable state (H24) [D68.59]  Bilateral carotid artery disease  unspecified type (H24) [I77.9]             Comments:               Anticoagulation Care Providers       Provider Role Specialty Phone number    Loraine Rees MD Referring Family Medicine [phone]    Vu Winters MD Referring Family Medicine [phone]    Priscila Lombardo APRN CNP Referring Family Medicine [phone]

## 2024-05-22 NOTE — PROGRESS NOTES
Assessment:       Urgency of urination  - UA Macroscopic with reflex to Microscopic and Culture - Clinic Collect  - UA Microscopic with Reflex to Culture    Right flank pain  - CBC with platelets differential  - CT Abdomen Pelvis w/o Contrast  - CBC with platelets differential    Long term (current) use of anticoagulants  - INR point of care    Hypercoagulable state (H24)  - INR point of care         Plan:     Patient with some urinary urgency and right flank pain.  CBC unremarkable other than hemoglobin that is low which has been stable.  UA shows small blood but otherwise no signs of infection.  CT scan ordered and radiology report reviewed showing no acute changes or evidence of kidney stone.  Unclear as to the etiology of patient's pain or prognosis.  Recommend continuing to monitor and follow-up with PCP if symptoms getting worse or not improving in 1 week.    MEDICATIONS:   No orders of the defined types were placed in this encounter.      Subjective:       58 year old female presents for evaluation of a 1 and half to 2-week history of right lower back pain radiating around to her right groin.  She is status postcholecystectomy and appendectomy.  She has had some increased urinary frequency but no urgency.  Denies blood in her urine.  No known injury.  No fevers or chills.  Denies nausea or vomiting.    Patient Active Problem List   Diagnosis    Adenomatous colon polyp    Anticoagulation monitoring, INR range 2-3    Anxiety state    Bilateral carotid artery disease (H24)    Diverticulosis of large intestine without hemorrhage    Dyslipidemia    Elevated lipoprotein(a)    Essential hypertension    Hypercoagulable state (H24)    Migraine    Multiple skin nodules    Nocturnal enuresis    Normocytic anemia    PAD (peripheral artery disease) (H24)    Thoracic neuralgia    Tobacco use disorder    Atypical lymphocytosis    Thoracic spine pain    Routine general medical examination at a health care facility     Age-related osteoporosis without current pathological fracture    Benign paroxysmal positional vertigo of right ear    Long term (current) use of anticoagulants    Bilateral carotid artery disease, unspecified type (H24)    Epigastric abdominal pain    Heart murmur       Past Medical History:   Diagnosis Date    Abdominal pain, epigastric 12/2/2017    Abnormal cardiovascular stress test 12/13/2017    Anemia     Anxiety     Bilateral carotid artery disease (H24)     Coronary artery disease     Dyslipidemia     Foreign body in left foot     Foreign body in left foot, subsequent encounter 10/11/2018    Heart murmur     Hypertension     Intermittent palpitations 12/2/2017    Migraines     Newly recognized murmur 10/18/2018    PAD (peripheral artery disease) (H24)     Pure hyperglyceridemia 9/2/2021    Formatting of this note might be different from the original. Has been intoleratant to several medications.    .    Urge incontinence of urine 11/4/2018    Last Assessment & Plan:  Symptoms most consistent with urge incontinence. Ultrasound ordered to evaluate for any issues with incomplete emptying. Trial of Detrol LA 2 mg daily.  Last Assessment & Plan:  Formatting of this note might be different from the original. Detrol LA has been helpful but dries out her mouth. We will try a lower, short acting dose.    Verruca plantaris 11/1/2018       Past Surgical History:   Procedure Laterality Date    APPENDECTOMY      ARTERIAL BYPASS SURGERY  2006    BIOPSY BREAST Left     benign    BYPASS GRAFT AORTOFEMORAL Bilateral     CHOLECYSTECTOMY      ESOPHAGOSCOPY, GASTROSCOPY, DUODENOSCOPY (EGD), COMBINED N/A 5/14/2024    Procedure: ESOPHAGOGASTRODUODENOSCOPY, WITH BIOPSY;  Surgeon: Lizz Bray MD;  Location: [facility] OR    FEMORAL ARTERY STENT  2006    FOOT MASS EXCISION Left 2018    Soft tissue mass - benign    IR MISCELLANEOUS PROCEDURE  1/17/2006    IR MISCELLANEOUS PROCEDURE  1/17/2006    IR MISCELLANEOUS PROCEDURE   1/17/2006    IR MISCELLANEOUS PROCEDURE  1/17/2006    IR MISCELLANEOUS PROCEDURE  1/18/2006    TONSILLECTOMY      TYMPANOSTOMY TUBE PLACEMENT         Current Outpatient Medications   Medication Sig Dispense Refill    albuterol (PROAIR HFA/PROVENTIL HFA/VENTOLIN HFA) 108 (90 Base) MCG/ACT inhaler Inhale 2 puffs into the lungs every 6 hours as needed for shortness of breath or wheezing 18 g 0    alendronate (FOSAMAX) 70 MG tablet Take 1 tablet by mouth once a week 12 tablet 1    amLODIPine (NORVASC) 5 MG tablet Take 1 tablet by mouth once daily 90 tablet 3    aspirin 81 MG EC tablet Take 81 mg by mouth daily      calcium carbonate-vitamin D (OSCAL) 500-5 MG-MCG tablet Take 1 tablet by mouth 2 times daily 90 tablet 3    citalopram (CELEXA) 20 MG tablet Take 1 tablet by mouth once daily 90 tablet 2    clindamycin (CLEOCIN T) 1 % external lotion APPLY LOTION TOPICALLY DAILY TO UPPER BACK      famotidine (PEPCID) 20 MG tablet Take 1 tablet by mouth twice daily 180 tablet 2    folic acid (FOLVITE) 1 MG tablet Take 1 tablet by mouth once daily 90 tablet 2    gabapentin (NEURONTIN) 100 MG capsule TAKE 1 CAPSULE BY MOUTH IN THE MORNING THEN  1  TABLET  AT  NOON  AND  3  TABLETS  AT  BEDTIME  DAILY 450 capsule 1    lisinopril (ZESTRIL) 20 MG tablet Take 1 tablet by mouth once daily 90 tablet 3    omeprazole (PRILOSEC) 40 MG DR capsule Take 1 capsule (40 mg) by mouth daily 30 capsule 2    ondansetron (ZOFRAN ODT) 4 MG ODT tab Take 1-2 tablets (4-8 mg) by mouth every 6 hours as needed for nausea or vomiting (max daily dose of 4 tablets) Allowed to dissolve under your tongue 20 tablet 0    REPATHA SURECLICK 140 MG/ML prefilled autoinjector INJECT 1ML (140MG) SUBCUTANEOUS EVERY 2 WEEKS. INJECT INTO ABDOMEN THIGH OR UPPER ARM. ROTATE INJECTION SITES.      sucralfate (CARAFATE) 1 GM tablet Take 1 tablet (1 g) by mouth 4 times daily 120 tablet 0    warfarin ANTICOAGULANT (COUMADIN) 5 MG tablet TAKE 1 TABLET BY MOUTH ONCE DAILY OR   AS  DIRECTED.  ADJUST  DOSE  BASED  ON  INR  RESULTS 100 tablet 3     No current facility-administered medications for this visit.       Allergies   Allergen Reactions    Ezetimibe      constipation    Lovastatin Unknown     constipation    Simvastatin      Other reaction(s): Constipation  Intolerance, but tolerated with re-challenge in Feb to 2009       Family History   Problem Relation Age of Onset    Peripheral Vascular Disease Father     Other - See Comments Father         vasular problems     Hypertension Father     Hyperlipidemia Father     Cerebrovascular Disease Father     Other Cancer Father         skin    Breast Cancer Mother         was pt at the [facility]  21 years ago    No Known Problems Sister     No Known Problems Brother     No Known Problems Son     No Known Problems Maternal Grandmother     Cancer Paternal Grandmother         lung     Cancer Paternal Grandfather         kidney     Colon Cancer No family hx of     Prostate Cancer No family hx of     Coronary Artery Disease No family hx of        Social History     Socioeconomic History    Marital status:      Spouse name: None    Number of children: 1    Years of education: None    Highest education level: None   Tobacco Use    Smoking status: Every Day     Current packs/day: 0.75     Average packs/day: 0.8 packs/day for 34.0 years (25.5 ttl pk-yrs)     Types: Cigarettes    Smokeless tobacco: Never    Tobacco comments:     1 ppd since 17 years old   Vaping Use    Vaping status: Never Used   Substance and Sexual Activity    Alcohol use: No    Drug use: No    Sexual activity: Yes     Partners: Male     Birth control/protection: Post-menopausal, Male Surgical   Other Topics Concern    Parent/sibling w/ CABG, MI or angioplasty before 65F 55M? Yes     Social Determinants of Health      [facility] [employer] [email or web address] [employer] [facility]    Financial Resource Strain    Received from  [facility], [facility]    Social Connections   Interpersonal Safety: Low Risk  (4/1/2024)    Interpersonal Safety     Do you feel physically and emotionally safe where you currently live?: Yes     Within the past 12 months, have you been hit, slapped, kicked or otherwise physically hurt by someone?: No     Within the past 12 months, have you been humiliated or emotionally abused in other ways by your partner or ex-partner?: No         Review of Systems  Pertinent items are noted in HPI.      Objective:   /73 (BP Location: Right arm, Patient Position: Sitting, Cuff Size: Adult Regular)   Pulse 72   Temp 98.4  F (36.9  C) (Oral)   Resp 18   Wt 53.5 kg (118 lb)   SpO2 99%   BMI 19.64 kg/m      General Appearance:    Alert, pleasant, cooperative, no distress, appears stated age   Head:    Normocephalic, without obvious abnormality, atraumatic   Eyes:    Conjunctiva/corneas clear   Ears:    Normal TM's without erythema or bulging. Normal external ear canals, both ears   Nose:   Nares normal, septum midline, mucosa normal, no drainage    or sinus tenderness   Throat:   Lips, mucosa, and tongue normal; teeth and gums normal.  No tonsilar hypertrophy or exudate.   Neck:    Cardiovascular:   Supple, symmetrical, trachea midline, no adenopathy;     thyroid:  no enlargement/tenderness/nodules  Regular rate and rhythm, no murmurs, rubs, or gallops.   Lungs:    Back:  Abdomen:    Extremities:  Skin:         Clear to auscultation bilaterally without wheezes, rales, or rhonchi, respirations unlabored  No CVA tenderness.  Soft, mild tenderness in the right lower quadrant.  No hernias noted.  Bowel sounds present all 4 quadrants.  Warm and well perfused  No rashes                           Results for orders placed or performed in visit on 05/22/24   UA Macroscopic with reflex to Microscopic and Culture - Clinic Collect     Status: Abnormal    Specimen:  Urine, Clean Catch   Result Value Ref Range    Color Urine Yellow Colorless, Straw, Light Yellow, Yellow    Appearance Urine Clear Clear    Glucose Urine Negative Negative mg/dL    Bilirubin Urine Negative Negative    Ketones Urine Negative Negative mg/dL    Specific Gravity Urine 1.015 1.005 - 1.030    Blood Urine Small (A) Negative    pH Urine 6.0 5.0 - 8.0    Protein Albumin Urine Negative Negative mg/dL    Urobilinogen Urine 0.2 0.2, 1.0 E.U./dL    Nitrite Urine Negative Negative    Leukocyte Esterase Urine Negative Negative   UA Microscopic with Reflex to Culture     Status: Abnormal   Result Value Ref Range    Bacteria Urine Few (A) None Seen /HPF    RBC Urine 2-5 (A) 0-2 /HPF /HPF    WBC Urine None Seen 0-5 /HPF /HPF    Squamous Epithelials Urine Few (A) None Seen /LPF    Narrative    Urine Culture not indicated   INR point of care     Status: Abnormal   Result Value Ref Range    INR 3.1 (H) 0.9 - 1.1    Narrative    This test is intended for monitoring Coumadin therapy. Results are not accurate in patients with prolonged INR due to factor deficiency.   CBC with platelets differential     Status: None (In process)    Narrative    The following orders were created for panel order CBC with platelets differential.  Procedure                               Abnormality         Status                     ---------                               -----------         ------                     CBC with platelets and d...[[id number]]                      In process                   Please view results for these tests on the individual orders.       This note has been dictated using voice recognition software. Any grammatical or context distortions are unintentional and inherent to the software

## 2024-05-31 ENCOUNTER — LAB (OUTPATIENT)
Dept: LAB | Facility: CLINIC | Age: 59
End: 2024-05-31
Payer: COMMERCIAL

## 2024-05-31 ENCOUNTER — ANTICOAGULATION THERAPY VISIT (OUTPATIENT)
Dept: ANTICOAGULATION | Facility: CLINIC | Age: 59
End: 2024-05-31

## 2024-05-31 DIAGNOSIS — D68.59 HYPERCOAGULABLE STATE (H): ICD-10-CM

## 2024-05-31 DIAGNOSIS — I73.9 PAD (PERIPHERAL ARTERY DISEASE) (H): ICD-10-CM

## 2024-05-31 DIAGNOSIS — Z79.01 ANTICOAGULATION MONITORING, INR RANGE 2-3: Primary | ICD-10-CM

## 2024-05-31 DIAGNOSIS — I77.9 BILATERAL CAROTID ARTERY DISEASE, UNSPECIFIED TYPE (H): ICD-10-CM

## 2024-05-31 DIAGNOSIS — Z79.01 LONG TERM (CURRENT) USE OF ANTICOAGULANTS: ICD-10-CM

## 2024-05-31 LAB — INR BLD: 2.9 (ref 0.9–1.1)

## 2024-05-31 PROCEDURE — 85610 PROTHROMBIN TIME: CPT

## 2024-05-31 PROCEDURE — 36416 COLLJ CAPILLARY BLOOD SPEC: CPT

## 2024-05-31 NOTE — PROGRESS NOTES
ANTICOAGULATION MANAGEMENT     Nery Whitaker 58 year old female is on warfarin with therapeutic INR result. (Goal INR 2.0-3.0)    Recent labs: (last 7 days)     05/31/24  0906   INR 2.9*       ASSESSMENT     Warfarin Lab Questionnaire    Warfarin Doses Last 7 Days      5/31/2024     8:08 AM   Dose in Tablet or Mg   TAB or MG? milligram (mg)     Pt Rptd Dose SUNDAY MONDAY TUESDAY WED THURS FRIDAY SATURDAY 5/31/2024   8:08 AM 7.5 10 7.5 7.5 7.5 7.5 7.5         5/31/2024   Warfarin Lab Questionnaire   Missed doses within past 14 days? No   Changes in diet or alcohol within past 14 days? No   Medication changes since last result? No   Injuries or illness since last result? No   New shortness of breath, severe headaches or sudden changes in vision since last result? No   Abnormal bleeding since last result? No   Upcoming surgery, procedure? No   Best number to call with results? [phone]     Previous result: Supratherapeutic  Additional findings: None       PLAN     Recommended plan for no diet, medication or health factor changes affecting INR     Dosing Instructions: Continue your current warfarin dose with next INR in 3 weeks       Summary  As of 5/31/2024      Full warfarin instructions:  10 mg every Mon; 7.5 mg all other days   Next INR check:  6/14/2024               Telephone call with Nery who verbalizes understanding and agrees to plan    Lab visit scheduled    Education provided:   Contact [phone] with any changes, questions or concerns.     Plan made per [facility] anticoagulation protocol    Marjorie Ball RN  Anticoagulation Clinic  5/31/2024    _______________________________________________________________________     Anticoagulation Episode Summary       Current INR goal:  2.0-3.0   TTR:  47.2% (1 y)   Target end date:  Indefinite   Send INR reminders to:  BERNICE MARTINI    Indications    Anticoagulation monitoring  INR range 2-3 [Z79.01]  PAD (peripheral artery disease) (H24) [I73.9]  Long  term (current) use of anticoagulants [Z79.01]  Hypercoagulable state (H24) [D68.59]  Bilateral carotid artery disease  unspecified type (H24) [I77.9]             Comments:               Anticoagulation Care Providers       Provider Role Specialty Phone number    Loraine Rees MD Referring Family Medicine [phone]    Vu Winters MD Referring Family Medicine [phone]    Priscila Lombardo, APRN CNP Referring [facility] Medicine [phone]

## 2024-06-21 ENCOUNTER — ANTICOAGULATION THERAPY VISIT (OUTPATIENT)
Dept: ANTICOAGULATION | Facility: CLINIC | Age: 59
End: 2024-06-21

## 2024-06-21 ENCOUNTER — LAB (OUTPATIENT)
Dept: LAB | Facility: CLINIC | Age: 59
End: 2024-06-21
Payer: COMMERCIAL

## 2024-06-21 DIAGNOSIS — Z79.01 ANTICOAGULATION MONITORING, INR RANGE 2-3: Primary | ICD-10-CM

## 2024-06-21 DIAGNOSIS — Z79.01 LONG TERM (CURRENT) USE OF ANTICOAGULANTS: ICD-10-CM

## 2024-06-21 DIAGNOSIS — I73.9 PAD (PERIPHERAL ARTERY DISEASE) (H): ICD-10-CM

## 2024-06-21 DIAGNOSIS — D68.59 HYPERCOAGULABLE STATE (H): ICD-10-CM

## 2024-06-21 DIAGNOSIS — I77.9 BILATERAL CAROTID ARTERY DISEASE, UNSPECIFIED TYPE (H): ICD-10-CM

## 2024-06-21 LAB — INR BLD: 2.4 (ref 0.9–1.1)

## 2024-06-21 PROCEDURE — 85610 PROTHROMBIN TIME: CPT

## 2024-06-21 PROCEDURE — 36416 COLLJ CAPILLARY BLOOD SPEC: CPT

## 2024-06-21 NOTE — PROGRESS NOTES
ANTICOAGULATION MANAGEMENT     Nery Whitaker 58 year old female is on warfarin with therapeutic INR result. (Goal INR 2.0-3.0)    Recent labs: (last 7 days)     06/21/24  0904   INR 2.4*       ASSESSMENT     Warfarin Lab Questionnaire    Warfarin Doses Last 7 Days      6/20/2024     5:03 PM   Dose in Tablet or Mg   TAB or MG? milligram (mg)     Pt Rptd Dose SUNDAY MONDAY TUESDAY WED THURS FRIDAY SATURDAY 6/20/2024   5:03 PM 7.5 10 7.5 7.5 7.5 7.5 7.5         6/20/2024   Warfarin Lab Questionnaire   Missed doses within past 14 days? No   Changes in diet or alcohol within past 14 days? No   Medication changes since last result? No   Injuries or illness since last result? No   New shortness of breath, severe headaches or sudden changes in vision since last result? No   Abnormal bleeding since last result? No   Upcoming surgery, procedure? No   Best number to call with results? [phone]        Previous result: Therapeutic last visit; previously outside of goal range  Additional findings: None       PLAN     Recommended plan for no diet, medication or health factor changes affecting INR     Dosing Instructions: Continue your current warfarin dose with next INR in 4 weeks       Summary  As of 6/21/2024      Full warfarin instructions:  10 mg every Mon; 7.5 mg all other days   Next INR check:  7/19/2024               Telephone call with Nery who verbalizes understanding and agrees to plan. My Chart message sent.     Lab visit scheduled    Education provided:   Goal range and lab monitoring: goal range and significance of current result  Contact [phone]  with any changes, questions or concerns.     Plan made per [facility] anticoagulation protocol    Melissa Ferreira, RN  Anticoagulation Clinic  6/21/2024    _______________________________________________________________________     Anticoagulation Episode Summary       Current INR goal:  2.0-3.0   TTR:  48.9% (1 y)   Target end date:  Indefinite   Send INR  reminders to:  BERNICE MARTINI    Indications    Anticoagulation monitoring  INR range 2-3 [Z79.01]  PAD (peripheral artery disease) (H24) [I73.9]  Long term (current) use of anticoagulants [Z79.01]  Hypercoagulable state (H24) [D68.59]  Bilateral carotid artery disease  unspecified type (H24) [I77.9]             Comments:               Anticoagulation Care Providers       Provider Role Specialty Phone number    Loraine Rees MD Referring Family Medicine [phone]    Vu Winters MD Referring Family Medicine [phone]    Priscila Lombardo APRN CNP Referring Family Medicine [phone]

## 2024-06-28 ENCOUNTER — PATIENT OUTREACH (OUTPATIENT)
Dept: CARE COORDINATION | Facility: CLINIC | Age: 59
End: 2024-06-28
Payer: COMMERCIAL

## 2024-07-12 ENCOUNTER — PATIENT OUTREACH (OUTPATIENT)
Dept: CARE COORDINATION | Facility: CLINIC | Age: 59
End: 2024-07-12
Payer: COMMERCIAL

## 2024-07-15 ENCOUNTER — TELEPHONE (OUTPATIENT)
Dept: PULMONOLOGY | Facility: CLINIC | Age: 59
End: 2024-07-15

## 2024-07-15 NOTE — TELEPHONE ENCOUNTER
----- Message from Roslyn HONG sent at 7/11/2024  2:16 PM CDT -----  Patient is due for the yrly and lung ca screening. Letter already sent, please call to help schedule.     Thank you,     Roslyn

## 2024-08-01 ENCOUNTER — MYC MEDICAL ADVICE (OUTPATIENT)
Dept: ANTICOAGULATION | Facility: CLINIC | Age: 59
End: 2024-08-01
Payer: COMMERCIAL

## 2024-08-02 ENCOUNTER — LAB (OUTPATIENT)
Dept: LAB | Facility: CLINIC | Age: 59
End: 2024-08-02
Payer: COMMERCIAL

## 2024-08-02 ENCOUNTER — ANTICOAGULATION THERAPY VISIT (OUTPATIENT)
Dept: ANTICOAGULATION | Facility: CLINIC | Age: 59
End: 2024-08-02

## 2024-08-02 DIAGNOSIS — Z79.01 LONG TERM (CURRENT) USE OF ANTICOAGULANTS: ICD-10-CM

## 2024-08-02 DIAGNOSIS — D68.59 HYPERCOAGULABLE STATE (H): ICD-10-CM

## 2024-08-02 DIAGNOSIS — I77.9 BILATERAL CAROTID ARTERY DISEASE, UNSPECIFIED TYPE (H): ICD-10-CM

## 2024-08-02 DIAGNOSIS — I73.9 PAD (PERIPHERAL ARTERY DISEASE) (H): ICD-10-CM

## 2024-08-02 DIAGNOSIS — Z79.01 ANTICOAGULATION MONITORING, INR RANGE 2-3: Primary | ICD-10-CM

## 2024-08-02 LAB — INR BLD: 1.3 (ref 0.9–1.1)

## 2024-08-02 PROCEDURE — 85610 PROTHROMBIN TIME: CPT

## 2024-08-02 PROCEDURE — 36416 COLLJ CAPILLARY BLOOD SPEC: CPT

## 2024-08-02 NOTE — PROGRESS NOTES
ANTICOAGULATION MANAGEMENT     Nery Whitaker 59 year old female is on warfarin with subtherapeutic INR result. (Goal INR 2.0-3.0)    Recent labs: (last 7 days)     08/02/24  0824   INR 1.3*       ASSESSMENT     Warfarin Lab Questionnaire    Warfarin Doses Last 7 Days          8/2/2024   Warfarin Lab Questionnaire   Missed doses within past 14 days? Patient said there is a chance that she missed a dose this past week   Changes in diet or alcohol within past 14 days? No   Medication changes since last result? No   Injuries or illness since last result? No   New shortness of breath, severe headaches or sudden changes in vision since last result? No   Abnormal bleeding since last result? No   Upcoming surgery, procedure? No   Best number to call with results? [phone]        Previous result: Therapeutic last 2(+) visits  Additional findings: None       PLAN     Recommended plan for temporary change(s) affecting INR     Dosing Instructions: booster dose then continue your current warfarin dose with next INR in 5 days       Summary  As of 8/2/2024      Full warfarin instructions:  8/2: 15 mg; Otherwise 10 mg every Mon; 7.5 mg all other days   Next INR check:  8/7/2024               Telephone call with Nery who verbalizes understanding and agrees to plan.  My Chart sent.      Lab visit scheduled 8/7/24    Education provided: Goal range and lab monitoring: goal range and significance of current result  Contact [phone] with any changes, questions or concerns.     Plan made per [facility] anticoagulation protocol    Melissa Ferreira, RN  Anticoagulation Clinic  8/2/2024    _______________________________________________________________________     Anticoagulation Episode Summary       Current INR goal:  2.0-3.0   TTR:  44.6% (1 y)   Target end date:  Indefinite   Send INR reminders to:  BERNICE MARTINI    Indications    Anticoagulation monitoring  INR range 2-3 [Z79.01]  PAD (peripheral artery disease) (H24)  [I73.9]  Long term (current) use of anticoagulants [Z79.01]  Hypercoagulable state (H24) [D68.59]  Bilateral carotid artery disease  unspecified type (H24) [I77.9]             Comments:               Anticoagulation Care Providers       Provider Role Specialty Phone number    Loraine Rees MD Referring Family Medicine [phone]    Vu Winters MD Referring Family Medicine [phone]    Priscila Lombardo, APRN CNP Referring Family Medicine [phone]

## 2024-08-07 ENCOUNTER — ANTICOAGULATION THERAPY VISIT (OUTPATIENT)
Dept: ANTICOAGULATION | Facility: CLINIC | Age: 59
End: 2024-08-07

## 2024-08-07 ENCOUNTER — DOCUMENTATION ONLY (OUTPATIENT)
Dept: ANTICOAGULATION | Facility: CLINIC | Age: 59
End: 2024-08-07

## 2024-08-07 ENCOUNTER — LAB (OUTPATIENT)
Dept: LAB | Facility: CLINIC | Age: 59
End: 2024-08-07
Payer: COMMERCIAL

## 2024-08-07 DIAGNOSIS — Z79.01 LONG TERM (CURRENT) USE OF ANTICOAGULANTS: ICD-10-CM

## 2024-08-07 DIAGNOSIS — D68.59 HYPERCOAGULABLE STATE (H): Primary | ICD-10-CM

## 2024-08-07 DIAGNOSIS — I77.9 BILATERAL CAROTID ARTERY DISEASE, UNSPECIFIED TYPE (H): ICD-10-CM

## 2024-08-07 DIAGNOSIS — D68.59 HYPERCOAGULABLE STATE (H): ICD-10-CM

## 2024-08-07 DIAGNOSIS — Z79.01 ANTICOAGULATION MONITORING, INR RANGE 2-3: Primary | ICD-10-CM

## 2024-08-07 DIAGNOSIS — I73.9 PAD (PERIPHERAL ARTERY DISEASE) (H): ICD-10-CM

## 2024-08-07 LAB — INR BLD: 4.1 (ref 0.9–1.1)

## 2024-08-07 PROCEDURE — 36416 COLLJ CAPILLARY BLOOD SPEC: CPT

## 2024-08-07 PROCEDURE — 85610 PROTHROMBIN TIME: CPT

## 2024-08-07 NOTE — PROGRESS NOTES
ANTICOAGULATION MANAGEMENT     Nery Whitaker 59 year old female is on warfarin with supratherapeutic INR result. (Goal INR 2.0-3.0)    Recent labs: (last 7 days)     08/07/24  1423   INR 4.1*       ASSESSMENT     Warfarin Lab Questionnaire    Warfarin Doses Last 7 Days      8/7/2024     1:14 PM   Dose in Tablet or Mg   TAB or MG? milligram (mg)     Pt Rptd Dose SUNDAY MONDAY TUESDAY WED THURS FRIDAY SATURDAY 8/7/2024   1:14 PM 7.5 10 7.5 15 7.5 15 7.5         8/7/2024   Warfarin Lab Questionnaire   Missed doses within past 14 days? No    No   Changes in diet or alcohol within past 14 days? No    No   Medication changes since last result? No    No   Injuries or illness since last result? No    No   New shortness of breath, severe headaches or sudden changes in vision since last result? No    No   Abnormal bleeding since last result? No    No   Upcoming surgery, procedure? No    No   Best number to call with results? [phone]    [phone]       Multiple values from one day are sorted in reverse-chronological order     Previous result: Subtherapeutic booster x1   Additional findings: None       PLAN     Recommended plan for temporary change(s) affecting INR     Dosing Instructions: partial hold then continue your current warfarin dose with next INR in 10 days       Summary  As of 8/7/2024      Full warfarin instructions:  8/7: 5 mg; Otherwise 10 mg every Mon; 7.5 mg all other days   Next INR check:  8/21/2024               Telephone call with Nery who verbalizes understanding and agrees to plan.  My Chart message sent.     Patient elected to schedule next visit 8/21/24    Education provided: Goal range and lab monitoring: goal range and significance of current result  Contact [phone] with any changes, questions or concerns.     Plan made per [facility] anticoagulation protocol    Melissa Ferreira, RN  Anticoagulation Clinic  8/7/2024    _______________________________________________________________________      Anticoagulation Episode Summary       Current INR goal:  2.0-3.0   TTR:  45.1% (1 y)   Target end date:  Indefinite   Send INR reminders to:  BERNICE MARTINI    Indications    Anticoagulation monitoring  INR range 2-3 [Z79.01]  PAD (peripheral artery disease) (H24) [I73.9]  Long term (current) use of anticoagulants [Z79.01]  Hypercoagulable state (H24) [D68.59]  Bilateral carotid artery disease  unspecified type (H24) [I77.9]             Comments:               Anticoagulation Care Providers       Provider Role Specialty Phone number    Loraine Rees MD Referring Family Medicine [phone]    Vu Winters MD Referring Family Medicine [phone]    Priscila Lombardo APRN CNP Referring Family Medicine [phone]

## 2024-08-07 NOTE — PROGRESS NOTES
ANTICOAGULATION CLINIC REFERRAL RENEWAL REQUEST       An annual renewal order is required for all patients referred to [facility].?  Please review and sign the pended referral order for Nery Whitaker.       ANTICOAGULATION SUMMARY      Warfarin indication(s)   PAD, Hypercoagulable state    Mechanical heart valve present?  NO       Current goal range   INR: 2.0-3.0     Goal appropriate for indication? Goal INR 2-3, standard for indication(s) above     Time in Therapeutic Range (TTR)  (Goal > 60%) 45.1%       Office visit with referring provider's group within last year yes on 4/24/24       Melissa Ferreira, RN  [facility]

## 2024-08-20 ENCOUNTER — ANTICOAGULATION THERAPY VISIT (OUTPATIENT)
Dept: ANTICOAGULATION | Facility: CLINIC | Age: 59
End: 2024-08-20

## 2024-08-20 ENCOUNTER — LAB (OUTPATIENT)
Dept: LAB | Facility: CLINIC | Age: 59
End: 2024-08-20
Payer: COMMERCIAL

## 2024-08-20 DIAGNOSIS — Z79.01 ANTICOAGULATION MONITORING, INR RANGE 2-3: Primary | ICD-10-CM

## 2024-08-20 DIAGNOSIS — I77.9 BILATERAL CAROTID ARTERY DISEASE, UNSPECIFIED TYPE (H): ICD-10-CM

## 2024-08-20 DIAGNOSIS — I73.9 PAD (PERIPHERAL ARTERY DISEASE) (H): ICD-10-CM

## 2024-08-20 DIAGNOSIS — D68.59 HYPERCOAGULABLE STATE (H): ICD-10-CM

## 2024-08-20 DIAGNOSIS — Z79.01 LONG TERM (CURRENT) USE OF ANTICOAGULANTS: ICD-10-CM

## 2024-08-20 LAB — INR BLD: 3.7 (ref 0.9–1.1)

## 2024-08-20 PROCEDURE — 85610 PROTHROMBIN TIME: CPT

## 2024-08-20 PROCEDURE — 36416 COLLJ CAPILLARY BLOOD SPEC: CPT

## 2024-08-20 NOTE — PROGRESS NOTES
ANTICOAGULATION MANAGEMENT     Nery Whitaker 59 year old female is on warfarin with supratherapeutic INR result. (Goal INR 2.0-3.0)    Recent labs: (last 7 days)     08/20/24  1356   INR 3.7*       ASSESSMENT     Warfarin Lab Questionnaire    Warfarin Doses Last 7 Days      8/19/2024     5:45 PM   Dose in Tablet or Mg   TAB or MG? milligram (mg)     Pt Rptd Dose SUNDAY MONDAY TUESDAY WED THURS FRIDAY SATURDAY 8/19/2024   5:45 PM 7.5 10 7.5 7.5 7.5 7.5 7.5         8/19/2024   Warfarin Lab Questionnaire   Missed doses within past 14 days? No   Changes in diet or alcohol within past 14 days? No   Medication changes since last result? No   Injuries or illness since last result? No   New shortness of breath, severe headaches or sudden changes in vision since last result? No   Abnormal bleeding since last result? No   Upcoming surgery, procedure? No        Previous result: Supratherapeutic-hold then continue maintenance dose.  Additional findings: None       PLAN     Recommended plan for no diet, medication or health factor changes affecting INR     Dosing Instructions: decrease your warfarin dose (9.1% change) with next INR in 2 weeks       Summary  As of 8/20/2024      Full warfarin instructions:  5 mg every Tue; 7.5 mg all other days   Next INR check:  9/4/2024               Telephone call with Nery who verbalizes understanding and agrees to plan  Sent [employer] message with dosing and follow up instructions    Lab visit scheduled    Education provided: Please call back if any changes to your diet, medications or how you've been taking warfarin  Goal range and lab monitoring: goal range and significance of current result  Written instructions provided  Contact [phone] with any changes, questions or concerns.     Plan made per [facility] anticoagulation protocol    Shannen Mcclendon RN  Anticoagulation Clinic  8/20/2024    _______________________________________________________________________     Anticoagulation Episode  Summary       Current INR goal:  2.0-3.0   TTR:  44.4% (1 y)   Target end date:  Indefinite   Send INR reminders to:  BERNICE MARTINI    Indications    Anticoagulation monitoring  INR range 2-3 [Z79.01]  PAD (peripheral artery disease) (H24) [I73.9]  Long term (current) use of anticoagulants [Z79.01]  Hypercoagulable state (H24) [D68.59]  Bilateral carotid artery disease  unspecified type (H24) [I77.9]             Comments:               Anticoagulation Care Providers       Provider Role Specialty Phone number    Loraine Rees MD Referring Family Medicine [phone]    Vu Winters MD Referring Family Medicine [phone]    Priscila Lombardo APRN CNP Referring Family Medicine [phone]

## 2024-09-05 ENCOUNTER — MYC MEDICAL ADVICE (OUTPATIENT)
Dept: ANTICOAGULATION | Facility: CLINIC | Age: 59
End: 2024-09-05
Payer: COMMERCIAL

## 2024-09-11 ENCOUNTER — LAB (OUTPATIENT)
Dept: LAB | Facility: CLINIC | Age: 59
End: 2024-09-11
Payer: COMMERCIAL

## 2024-09-11 ENCOUNTER — ANTICOAGULATION THERAPY VISIT (OUTPATIENT)
Dept: ANTICOAGULATION | Facility: CLINIC | Age: 59
End: 2024-09-11

## 2024-09-11 DIAGNOSIS — Z79.01 LONG TERM (CURRENT) USE OF ANTICOAGULANTS: ICD-10-CM

## 2024-09-11 DIAGNOSIS — D68.59 HYPERCOAGULABLE STATE (H): ICD-10-CM

## 2024-09-11 DIAGNOSIS — I73.9 PAD (PERIPHERAL ARTERY DISEASE) (H): ICD-10-CM

## 2024-09-11 DIAGNOSIS — Z79.01 ANTICOAGULATION MONITORING, INR RANGE 2-3: Primary | ICD-10-CM

## 2024-09-11 DIAGNOSIS — I77.9 BILATERAL CAROTID ARTERY DISEASE, UNSPECIFIED TYPE (H): ICD-10-CM

## 2024-09-11 LAB — INR BLD: 3.9 (ref 0.9–1.1)

## 2024-09-11 PROCEDURE — 36416 COLLJ CAPILLARY BLOOD SPEC: CPT

## 2024-09-11 PROCEDURE — 85610 PROTHROMBIN TIME: CPT

## 2024-09-11 NOTE — PROGRESS NOTES
ANTICOAGULATION MANAGEMENT     Nery Whitaker 59 year old female is on warfarin with supratherapeutic INR result. (Goal INR 2.0-3.0)    Recent labs: (last 7 days)     09/11/24  1312   INR 3.9*       ASSESSMENT     Warfarin Lab Questionnaire    Warfarin Doses Last 7 Days      9/11/2024     6:41 AM   Dose in Tablet or Mg   TAB or MG? milligram (mg)           9/11/2024   Warfarin Lab Questionnaire   Missed doses within past 14 days? No   Changes in diet or alcohol within past 14 days? No-more fiber, wheat, etc.   Medication changes since last result? No-daily stool softener to keep things moving   Injuries or illness since last result? No   New shortness of breath, severe headaches or sudden changes in vision since last result? No   Abnormal bleeding since last result? No   Upcoming surgery, procedure? No   Best number to call with results? [phone]        Previous result: Supratherapeutic-9.1% reduction 8/20/24  Additional findings: dosing verified with Nery.       PLAN     Recommended plan for no diet, medication or health factor changes affecting INR     Dosing Instructions: hold dose then decrease your warfarin dose (10% change) with next INR in 9 days       Summary  As of 9/11/2024      Full warfarin instructions:  9/11: Hold; Otherwise 5 mg every Tue, Thu, Sat; 7.5 mg all other days   Next INR check:  9/20/2024               Telephone call with Nery who verbalizes understanding and agrees to plan  Sent [employer] message with dosing and follow up instructions    Check at provider office visit    Education provided: Goal range and lab monitoring: goal range and significance of current result  Symptom monitoring: monitoring for bleeding signs and symptoms  Written instructions provided  Contact [phone] with any changes, questions or concerns.     Plan made per [facility] anticoagulation protocol    Shannen Mcclendon RN  9/11/2024  Anticoagulation Clinic  [employer] for routing messages: gloria MARTINI  [facility]  patient phone line: [phone]        _______________________________________________________________________     Anticoagulation Episode Summary       Current INR goal:  2.0-3.0   TTR:  40.3% (1 y)   Target end date:  Indefinite   Send INR reminders to:  BERNICE MARTINI    Indications    Anticoagulation monitoring  INR range 2-3 [Z79.01]  PAD (peripheral artery disease) (H24) [I73.9]  Long term (current) use of anticoagulants [Z79.01]  Hypercoagulable state (H24) [D68.59]  Bilateral carotid artery disease  unspecified type (H24) [I77.9]             Comments:               Anticoagulation Care Providers       Provider Role Specialty Phone number    Loraine Rees MD Referring Family Medicine [phone]    Vu Winters MD Referring Family Medicine [phone]    Priscila Lombardo APRN CNP Referring Family Medicine [phone]

## 2024-09-14 ENCOUNTER — HEALTH MAINTENANCE LETTER (OUTPATIENT)
Age: 59
End: 2024-09-14

## 2024-09-20 ENCOUNTER — OFFICE VISIT (OUTPATIENT)
Dept: PEDIATRICS | Facility: CLINIC | Age: 59
End: 2024-09-20
Payer: COMMERCIAL

## 2024-09-20 ENCOUNTER — HOSPITAL ENCOUNTER (OUTPATIENT)
Dept: CT IMAGING | Facility: HOSPITAL | Age: 59
Discharge: HOME | End: 2024-09-20
Attending: EMERGENCY MEDICINE
Payer: COMMERCIAL

## 2024-09-20 ENCOUNTER — NURSE TRIAGE (OUTPATIENT)
Dept: FAMILY MEDICINE | Facility: CLINIC | Age: 59
End: 2024-09-20

## 2024-09-20 ENCOUNTER — HOSPITAL ENCOUNTER (OUTPATIENT)
Dept: MRI IMAGING | Facility: HOSPITAL | Age: 59
Discharge: HOME | End: 2024-09-20
Attending: EMERGENCY MEDICINE
Payer: COMMERCIAL

## 2024-09-20 ENCOUNTER — ANTICOAGULATION THERAPY VISIT (OUTPATIENT)
Dept: ANTICOAGULATION | Facility: CLINIC | Age: 59
End: 2024-09-20

## 2024-09-20 VITALS
DIASTOLIC BLOOD PRESSURE: 65 MMHG | RESPIRATION RATE: 12 BRPM | BODY MASS INDEX: 20.69 KG/M2 | OXYGEN SATURATION: 98 % | TEMPERATURE: 98.1 F | WEIGHT: 124.2 LBS | HEIGHT: 65 IN | HEART RATE: 59 BPM | SYSTOLIC BLOOD PRESSURE: 164 MMHG

## 2024-09-20 DIAGNOSIS — I77.9 BILATERAL CAROTID ARTERY DISEASE, UNSPECIFIED TYPE (H): ICD-10-CM

## 2024-09-20 DIAGNOSIS — N13.1 HYDRONEPHROSIS WITH URETERAL STRICTURE, NOT ELSEWHERE CLASSIFIED: Primary | ICD-10-CM

## 2024-09-20 DIAGNOSIS — I73.9 PAD (PERIPHERAL ARTERY DISEASE) (H): ICD-10-CM

## 2024-09-20 DIAGNOSIS — Z79.01 LONG TERM (CURRENT) USE OF ANTICOAGULANTS: ICD-10-CM

## 2024-09-20 DIAGNOSIS — R10.31 RLQ ABDOMINAL PAIN: ICD-10-CM

## 2024-09-20 DIAGNOSIS — Z79.01 ANTICOAGULATION MONITORING, INR RANGE 2-3: Primary | ICD-10-CM

## 2024-09-20 DIAGNOSIS — D68.59 HYPERCOAGULABLE STATE (H): ICD-10-CM

## 2024-09-20 LAB
ALBUMIN UR-MCNC: NEGATIVE MG/DL
ANION GAP SERPL CALCULATED.3IONS-SCNC: 12 MMOL/L (ref 3–14)
APPEARANCE UR: CLEAR
BACTERIA #/AREA URNS HPF: ABNORMAL /HPF
BILIRUB UR QL STRIP: NEGATIVE
BUN SERPL-MCNC: 13 MG/DL (ref 7–30)
CALCIUM SERPL-MCNC: 9 MG/DL (ref 8.5–10.1)
CHLORIDE BLD-SCNC: 105 MMOL/L (ref 94–109)
CO2 SERPL-SCNC: 25 MMOL/L (ref 20–32)
COLOR UR AUTO: YELLOW
CREAT SERPL-MCNC: 1.1 MG/DL (ref 0.52–1.04)
EGFRCR SERPLBLD CKD-EPI 2021: 58 ML/MIN/1.73M2
ERYTHROCYTE [DISTWIDTH] IN BLOOD BY AUTOMATED COUNT: 14.5 % (ref 10–15)
GLUCOSE BLD-MCNC: 90 MG/DL (ref 70–99)
GLUCOSE UR STRIP-MCNC: NEGATIVE MG/DL
HCT VFR BLD AUTO: 33.9 % (ref 35–47)
HGB BLD-MCNC: 11 G/DL (ref 11.7–15.7)
HGB UR QL STRIP: ABNORMAL
INR PPP: 2.07 (ref 0.85–1.15)
KETONES UR STRIP-MCNC: NEGATIVE MG/DL
LEUKOCYTE ESTERASE UR QL STRIP: NEGATIVE
MCH RBC QN AUTO: 29.6 PG (ref 26.5–33)
MCHC RBC AUTO-ENTMCNC: 32.4 G/DL (ref 31.5–36.5)
MCV RBC AUTO: 91 FL (ref 78–100)
NITRATE UR QL: NEGATIVE
PH UR STRIP: 5.5 [PH] (ref 5–8)
PLATELET # BLD AUTO: 263 10E3/UL (ref 150–450)
POTASSIUM BLD-SCNC: 4.3 MMOL/L (ref 3.4–5.3)
RBC # BLD AUTO: 3.72 10E6/UL (ref 3.8–5.2)
RBC #/AREA URNS AUTO: ABNORMAL /HPF
SODIUM SERPL-SCNC: 142 MMOL/L (ref 135–145)
SP GR UR STRIP: 1.01 (ref 1–1.03)
SQUAMOUS #/AREA URNS AUTO: ABNORMAL /LPF
UROBILINOGEN UR STRIP-ACNC: 0.2 E.U./DL
WBC # BLD AUTO: 10.7 10E3/UL (ref 4–11)
WBC #/AREA URNS AUTO: ABNORMAL /HPF

## 2024-09-20 PROCEDURE — 85610 PROTHROMBIN TIME: CPT | Performed by: EMERGENCY MEDICINE

## 2024-09-20 PROCEDURE — 74183 MRI ABD W/O CNTR FLWD CNTR: CPT

## 2024-09-20 PROCEDURE — 80048 BASIC METABOLIC PNL TOTAL CA: CPT | Performed by: EMERGENCY MEDICINE

## 2024-09-20 PROCEDURE — 36415 COLL VENOUS BLD VENIPUNCTURE: CPT | Performed by: EMERGENCY MEDICINE

## 2024-09-20 PROCEDURE — 99215 OFFICE O/P EST HI 40 MIN: CPT | Mod: 25 | Performed by: EMERGENCY MEDICINE

## 2024-09-20 PROCEDURE — 250N000011 HC RX IP 250 OP 636: Performed by: EMERGENCY MEDICINE

## 2024-09-20 PROCEDURE — 74177 CT ABD & PELVIS W/CONTRAST: CPT

## 2024-09-20 PROCEDURE — 255N000002 HC RX 255 OP 636: Performed by: EMERGENCY MEDICINE

## 2024-09-20 PROCEDURE — 81001 URINALYSIS AUTO W/SCOPE: CPT | Performed by: EMERGENCY MEDICINE

## 2024-09-20 PROCEDURE — 96360 HYDRATION IV INFUSION INIT: CPT | Performed by: EMERGENCY MEDICINE

## 2024-09-20 PROCEDURE — A9585 GADOBUTROL INJECTION: HCPCS | Performed by: EMERGENCY MEDICINE

## 2024-09-20 PROCEDURE — 250N000009 HC RX 250: Performed by: EMERGENCY MEDICINE

## 2024-09-20 PROCEDURE — 85027 COMPLETE CBC AUTOMATED: CPT | Performed by: EMERGENCY MEDICINE

## 2024-09-20 RX ORDER — OXYCODONE HYDROCHLORIDE 5 MG/1
5 TABLET ORAL EVERY 6 HOURS PRN
Qty: 20 TABLET | Refills: 0 | Status: SHIPPED | OUTPATIENT
Start: 2024-09-20

## 2024-09-20 RX ORDER — GADOBUTROL 604.72 MG/ML
0.1 INJECTION INTRAVENOUS ONCE
Status: COMPLETED | OUTPATIENT
Start: 2024-09-20 | End: 2024-09-20

## 2024-09-20 RX ORDER — IOPAMIDOL 755 MG/ML
61 INJECTION, SOLUTION INTRAVASCULAR ONCE
Status: COMPLETED | OUTPATIENT
Start: 2024-09-20 | End: 2024-09-20

## 2024-09-20 RX ORDER — ONDANSETRON 4 MG/1
4 TABLET, ORALLY DISINTEGRATING ORAL EVERY 6 HOURS PRN
Qty: 16 TABLET | Refills: 0 | Status: SHIPPED | OUTPATIENT
Start: 2024-09-20

## 2024-09-20 RX ADMIN — GADOBUTROL 6 ML: 604.72 INJECTION INTRAVENOUS at 15:15

## 2024-09-20 RX ADMIN — SODIUM CHLORIDE 65 ML: 9 INJECTION, SOLUTION INTRAVENOUS at 11:10

## 2024-09-20 RX ADMIN — IOPAMIDOL 61 ML: 755 INJECTION, SOLUTION INTRAVENOUS at 11:09

## 2024-09-20 RX ADMIN — Medication 500 ML: at 11:18

## 2024-09-20 ASSESSMENT — PAIN SCALES - GENERAL: PAINLEVEL: MODERATE PAIN (5)

## 2024-09-20 NOTE — PATIENT INSTRUCTIONS
Urology will call you to set up an appointment for you to be seen and schedule placement of a stent to relieve the pressure from your right kidney next week.  If they do not call you please call them at [phone]    Fill your prescriptions and take as directed    Go to the ER for any fever or worsening    Check your MyChart for your MRI results and follow-up with your primary care physician for recheck in 7 to 10 days

## 2024-09-20 NOTE — PROGRESS NOTES
"Acute and Diagnostic Services Clinic Visit    Nursing triage note    Subjective   Nery is a 59 year old, presenting for the following health issues:  Abdominal Pain    Abdominal/Flank Pain  Onset/Duration: Months ago, this round of stronger pain for a couple weeks.  Description:   Character: Sharp  Location: right lower quadrant right lower back  Radiation: None  Intensity: 5/10 now, 10/10 at worst.   Progression of Symptoms:  worsening  Accompanying Signs & Symptoms:  Fever/chills: no   Gas/Bloating: YES  Nausea: YES  Vomitting: YES- occasionally   Diarrhea: YES- occasionally  Constipation:YES  Dysuria: no            Hematuria: no            Frequency: no            Incontinence of urine: no   History:            Last bowel movement: today  Trauma: no   Previous similar pain: no    Previous tests done: CT, Upper Endoscopy, and US           Previous Abdominal surgery: YES- Gallbladder and appendix are gone, artery bypass from stomach to legs.   Precipitating factors:   Does the pain change with:     Food: no      Bowel Movement: no     Urination: no              Other factors: no   Therapies tried and outcome:  stool softener, famotidine, advil     When food last eaten: 5:00 last night            Objective    BP (!) 164/65 (BP Location: Right arm, Patient Position: Sitting, Cuff Size: Adult Regular)   Pulse 59   Temp 98.1  F (36.7  C) (Oral)   Resp 12   Ht 1.651 m (5' 5\")   Wt 56.3 kg (124 lb 3.2 oz)   SpO2 98%   BMI 20.67 kg/m    Body mass index is 20.67 kg/m .          ADS PROVIDER NOTE  [facility]    History     Chief Complaint   Patient presents with    Abdominal Pain     HPI  Nery Whitaker is a 59 year old female status postcholecystectomy status post appendectomy who states she has been dealing with some right flank and right-sided abdominal pain for the last several months.  Patient states she has been seen at other facilities and scanned and no one has been able to tell her what is " causing her pain.  Patient has a history of arterial bypass grafting and is on Coumadin.  She denies any UTI symptoms denies any dysuria denies any hematuria and states that she has had a normal bowel movement as of this morning.    I have reviewed the Medications, Allergies, Past Medical and Surgical History, and Social History in the [facility] system.  Past Medical History:   Diagnosis Date    Abdominal pain, epigastric 12/2/2017    Abnormal cardiovascular stress test 12/13/2017    Anemia     Anxiety     Bilateral carotid artery disease (H24)     Coronary artery disease     Dyslipidemia     Foreign body in left foot     Foreign body in left foot, subsequent encounter 10/11/2018    Heart murmur     Hypertension     Intermittent palpitations 12/2/2017    Migraines     Newly recognized murmur 10/18/2018    PAD (peripheral artery disease) (H24)     Pure hyperglyceridemia 9/2/2021    Formatting of this note might be different from the original. Has been intoleratant to several medications.    .    Urge incontinence of urine 11/4/2018    Last Assessment & Plan:  Symptoms most consistent with urge incontinence. Ultrasound ordered to evaluate for any issues with incomplete emptying. Trial of Detrol LA 2 mg daily.  Last Assessment & Plan:  Formatting of this note might be different from the original. Detrol LA has been helpful but dries out her mouth. We will try a lower, short acting dose.    Verruca plantaris 11/1/2018       Past Surgical History:   Procedure Laterality Date    APPENDECTOMY      ARTERIAL BYPASS SURGERY  2006    BIOPSY BREAST Left     benign    BYPASS GRAFT AORTOFEMORAL Bilateral     CHOLECYSTECTOMY      ESOPHAGOSCOPY, GASTROSCOPY, DUODENOSCOPY (EGD), COMBINED N/A 5/14/2024    Procedure: ESOPHAGOGASTRODUODENOSCOPY, WITH BIOPSY;  Surgeon: Lizz Bray MD;  Location: [facility] OR    FEMORAL ARTERY STENT  2006    FOOT MASS EXCISION Left 2018    Soft tissue mass - benign    IR MISCELLANEOUS PROCEDURE   1/17/2006    IR MISCELLANEOUS PROCEDURE  1/17/2006    IR MISCELLANEOUS PROCEDURE  1/17/2006    IR MISCELLANEOUS PROCEDURE  1/17/2006    IR MISCELLANEOUS PROCEDURE  1/18/2006    TONSILLECTOMY      TYMPANOSTOMY TUBE PLACEMENT          Review of your medicines            Accurate as of September 20, 2024  3:42 PM. If you have any questions, ask your nurse or doctor.                START taking        Dose / Directions   oxyCODONE 5 MG tablet  Commonly known as: ROXICODONE  Used for: Hydronephrosis with ureteral stricture, not elsewhere classified  Started by: Christos Montanez MD      Dose: 5 mg  Take 1 tablet (5 mg) by mouth every 6 hours as needed for pain.  Quantity: 20 tablet  Refills: 0            CONTINUE these medicines which may have CHANGED, or have new prescriptions. If we are uncertain of the size of tablets/capsules you have at home, strength may be listed as something that might have changed.        Dose / Directions   * ondansetron 4 MG ODT tab  Commonly known as: ZOFRAN ODT  This may have changed: Another medication with the same name was added. Make sure you understand how and when to take each.  Changed by: Christos Montanez MD      Dose: 4-8 mg  Take 1-2 tablets (4-8 mg) by mouth every 6 hours as needed for nausea or vomiting (max daily dose of 4 tablets) Allowed to dissolve under your tongue  Quantity: 20 tablet  Refills: 0     * ondansetron 4 MG ODT tab  Commonly known as: ZOFRAN ODT  This may have changed: You were already taking a medication with the same name, and this prescription was added. Make sure you understand how and when to take each.  Used for: Hydronephrosis with ureteral stricture, not elsewhere classified  Changed by: Christos Montanez MD      Dose: 4 mg  Take 1 tablet (4 mg) by mouth every 6 hours as needed for nausea.  Quantity: 16 tablet  Refills: 0           * This list has 2 medication(s) that are the same as other medications prescribed for you. Read the  directions carefully, and ask your doctor or other care provider to review them with you.                CONTINUE these medicines which have NOT CHANGED        Dose / Directions   albuterol 108 (90 Base) MCG/ACT inhaler  Commonly known as: PROAIR HFA/PROVENTIL HFA/VENTOLIN HFA  Used for: Interstitial emphysema and related condition of       Dose: 2 puff  Inhale 2 puffs into the lungs every 6 hours as needed for shortness of breath or wheezing  Quantity: 18 g  Refills: 0     alendronate 70 MG tablet  Commonly known as: FOSAMAX  Used for: Age-related osteoporosis without current pathological fracture      Take 1 tablet by mouth once a week  Quantity: 12 tablet  Refills: 1     amLODIPine 5 MG tablet  Commonly known as: NORVASC  Used for: Essential hypertension      Take 1 tablet by mouth once daily  Quantity: 90 tablet  Refills: 3     aspirin 81 MG EC tablet      Dose: 81 mg  Take 81 mg by mouth daily  Refills: 0     calcium carbonate-vitamin D 500-5 MG-MCG tablet  Commonly known as: OSCAL  Used for: Age-related osteoporosis without current pathological fracture      Dose: 1 tablet  Take 1 tablet by mouth 2 times daily  Quantity: 90 tablet  Refills: 3     citalopram 20 MG tablet  Commonly known as: celeXA  Used for: Anxiety state      Take 1 tablet by mouth once daily  Quantity: 90 tablet  Refills: 2     famotidine 20 MG tablet  Commonly known as: PEPCID  Used for: Gastroesophageal reflux disease without esophagitis      Dose: 20 mg  Take 1 tablet by mouth twice daily  Quantity: 180 tablet  Refills: 2     folic acid 1 MG tablet  Commonly known as: FOLVITE  Used for: Hypercoagulable state (H24)      Take 1 tablet by mouth once daily  Quantity: 90 tablet  Refills: 2     gabapentin 100 MG capsule  Commonly known as: NEURONTIN  Used for: Thoracic neuralgia      TAKE 1 CAPSULE BY MOUTH IN THE MORNING THEN  1  TABLET  AT  NOON  AND  3  TABLETS  AT  BEDTIME  DAILY  Quantity: 450 capsule  Refills: 1     lisinopril 20 MG  tablet  Commonly known as: ZESTRIL  Used for: Essential hypertension      Take 1 tablet by mouth once daily  Quantity: 90 tablet  Refills: 3     omeprazole 40 MG DR capsule  Commonly known as: PriLOSEC  Used for: Epigastric pain      Dose: 40 mg  Take 1 capsule (40 mg) by mouth daily  Quantity: 30 capsule  Refills: 2     Repatha SureClick 140 MG/ML prefilled autoinjector  Generic drug: evolocumab      INJECT 1ML (140MG) SUBCUTANEOUS EVERY 2 WEEKS. INJECT INTO ABDOMEN THIGH OR UPPER ARM. ROTATE INJECTION SITES.  Refills: 0     sucralfate 1 GM tablet  Commonly known as: CARAFATE  Used for: Epigastric pain      Dose: 1 g  Take 1 tablet (1 g) by mouth 4 times daily  Quantity: 120 tablet  Refills: 0     warfarin ANTICOAGULANT 5 MG tablet  Commonly known as: COUMADIN  Used for: PAD (peripheral artery disease) (H24), Hypercoagulable state (H24), Bilateral carotid artery disease, unspecified type (H24), Long term (current) use of anticoagulants      Take as directed by the anticoagulation clinic. If you are unsure how to take this medication, talk to your nurse or doctor.  Original instructions: TAKE 1 TABLET BY MOUTH ONCE DAILY OR  AS  DIRECTED.  ADJUST  DOSE  BASED  ON  INR  RESULTS  Quantity: 100 tablet  Refills: 3               Where to get your medicines        Some of these will need a paper prescription and others can be bought over the counter. Ask your nurse if you have questions.    Bring a paper prescription for each of these medications  ondansetron 4 MG ODT tab  oxyCODONE 5 MG tablet         Past medical history, past surgical history, medications, and allergies were reviewed with the patient. Additional pertinent items: None    Family History   Problem Relation Age of Onset    Peripheral Vascular Disease Father     Other - See Comments Father         vasular problems     Hypertension Father     Hyperlipidemia Father     Cerebrovascular Disease Father     Other Cancer Father         skin    Breast Cancer Mother  "        was pt at the [facility]  21 years ago    No Known Problems Sister     No Known Problems Brother     No Known Problems Son     No Known Problems Maternal Grandmother     Cancer Paternal Grandmother         lung     Cancer Paternal Grandfather         kidney     Colon Cancer No family hx of     Prostate Cancer No family hx of     Coronary Artery Disease No family hx of        Social History     Tobacco Use    Smoking status: Every Day     Current packs/day: 0.75     Average packs/day: 0.8 packs/day for 34.0 years (25.5 ttl pk-yrs)     Types: Cigarettes    Smokeless tobacco: Never    Tobacco comments:     1 ppd since 17 years old   Substance Use Topics    Alcohol use: No     Social history was reviewed with the patient. Additional pertinent items: None    Allergies   Allergen Reactions    Ezetimibe      constipation    Lovastatin Unknown     constipation    Simvastatin      Other reaction(s): Constipation  Intolerance, but tolerated with re-challenge in Feb to 2009       Review of Systems  A medically appropriate review of systems was performed with pertinent positives and negatives noted in the HPI, and all other systems negative.    Physical Exam   BP: (!) 164/65  Pulse: 59  Temp: 98.1  F (36.7  C)  Resp: 12  Height: 165.1 cm (5' 5\")  Weight: 56.3 kg (124 lb 3.2 oz)  SpO2: 98 %      Physical Exam  Vitals and nursing note reviewed.   Constitutional:       Appearance: She is not ill-appearing or diaphoretic.   HENT:      Head: Atraumatic.   Eyes:      Extraocular Movements: Extraocular movements intact.      Pupils: Pupils are equal, round, and reactive to light.   Cardiovascular:      Rate and Rhythm: Regular rhythm.      Heart sounds: Normal heart sounds.   Pulmonary:      Breath sounds: Normal breath sounds.   Abdominal:      Palpations: Abdomen is soft.      Comments: Minimal tenderness in the McBurney's point area with some equivocal right CVA tenderness   Musculoskeletal:         General: No " deformity.      Cervical back: Neck supple.   Neurological:      General: No focal deficit present.      Mental Status: She is alert and oriented to person, place, and time.   Psychiatric:         Mood and Affect: Mood normal.         ADS Course     Orders Placed This Encounter   Procedures    CT Abdomen Pelvis w Contrast    MR Abdomen w/o & w Contrast    CBC with platelets    Basic metabolic panel    UA with Microscopic reflex to Culture    INR    UA Microscopic with Reflex to Culture    Adult Urology  Referral    IV- discontinue upon discharge       Procedures           Results for orders placed or performed in visit on 09/20/24 (from the past 24 hour(s))   Basic metabolic panel   Result Value Ref Range    Sodium 142 135 - 145 mmol/L    Potassium 4.3 3.4 - 5.3 mmol/L    Chloride 105 94 - 109 mmol/L    Carbon Dioxide (CO2) 25 20 - 32 mmol/L    Anion Gap 12 3 - 14 mmol/L    Urea Nitrogen 13 7 - 30 mg/dL    Creatinine 1.10 (H) 0.52 - 1.04 mg/dL    GFR Estimate 58 (L) >60 mL/min/1.73m2    Calcium 9.0 8.5 - 10.1 mg/dL    Glucose 90 70 - 99 mg/dL   CBC with platelets   Result Value Ref Range    WBC Count 10.7 4.0 - 11.0 10e3/uL    RBC Count 3.72 (L) 3.80 - 5.20 10e6/uL    Hemoglobin 11.0 (L) 11.7 - 15.7 g/dL    Hematocrit 33.9 (L) 35.0 - 47.0 %    MCV 91 78 - 100 fL    MCH 29.6 26.5 - 33.0 pg    MCHC 32.4 31.5 - 36.5 g/dL    RDW 14.5 10.0 - 15.0 %    Platelet Count 263 150 - 450 10e3/uL   UA with Microscopic reflex to Culture    Specimen: Urine, Clean Catch   Result Value Ref Range    Color Urine Yellow Colorless, Straw, Light Yellow, Yellow    Appearance Urine Clear Clear    Glucose Urine Negative Negative mg/dL    Bilirubin Urine Negative Negative    Ketones Urine Negative Negative mg/dL    Specific Gravity Urine 1.010 1.005 - 1.030    Blood Urine Trace (A) Negative    pH Urine 5.5 5.0 - 8.0    Protein Albumin Urine Negative Negative mg/dL    Urobilinogen Urine 0.2 0.2, 1.0 E.U./dL    Nitrite Urine Negative  Negative    Leukocyte Esterase Urine Negative Negative   INR   Result Value Ref Range    INR 2.07 (H) 0.85 - 1.15   UA Microscopic with Reflex to Culture   Result Value Ref Range    Bacteria Urine None Seen None Seen /HPF    RBC Urine 2-5 (A) 0-2 /HPF /HPF    WBC Urine None Seen 0-5 /HPF /HPF    Squamous Epithelials Urine Few (A) None Seen /LPF    Narrative    Urine Culture not indicated       EXAM: CT ABDOMEN PELVIS W CONTRAST  LOCATION: [facility]  DATE: 9/20/2024     INDICATION:  RLQ abdominal pain.  COMPARISON: 5/22/2024.  TECHNIQUE: CT scan of the abdomen and pelvis was performed following injection of IV contrast. Multiplanar reformats were obtained. Dose reduction techniques were used.  CONTRAST: 61 ml Isovue 370     FINDINGS:   LOWER CHEST: Normal.     HEPATOBILIARY: Normal.     PANCREAS: Normal.     SPLEEN: Normal.     ADRENAL GLANDS: Normal.     KIDNEYS/BLADDER: New moderate right hydronephrosis and hydroureter extending to the mid right ureter, across the bifurcation of the internal and external iliac arteries. Mild soft tissue thickening and stranding in this location; soft tissue has mildly   increased along the inferior aspect of thickening with example area measuring 1.6 cm transverse dimension versus 11 mm previously (series 3, image 85). Otherwise, remaining soft tissue thickening is stable. Symmetric renal enhancement. No left   hydronephrosis. Bladder is partially decompressed.     BOWEL: Unremarkable bowel. No obstruction. Colonic diverticulosis without diverticulitis.     LYMPH NODES: Upper normal left retroperitoneal node measuring 9 mm short axis (series 3, image 56).     VASCULATURE: Post aorto-bifemoral bypass. Stenting along the bilateral native common iliac arteries.     PELVIC ORGANS: No free fluid.     MUSCULOSKELETAL: Bony demineralization.                                                                      IMPRESSION:      1.  New moderate right  hydroureteronephrosis secondary to the previously documented area of soft tissue thickening in the right lower quadrant. Recommend urologic consultation.     2.  Etiology of right lower quadrant soft tissue thickening is indeterminate, though has mildly increased since May 2024. Neoplastic etiology not excluded, though as previously reported on the May 2024 exam, a fibrosing disorder would also be a   differential. Recommend further workup.     3.  No other acute findings to explain symptoms.     4.  No free fluid or air. No abscess.     Right hydronephrosis and right lower quadrant soft tissue thickening findings verbally communicated to ordering Dr. Montanez at 11:43 AM on 9/20/2024.      Abdominal MRI to further define the patient's soft tissue swelling causing the hydronephrosis is pending at this time       Critical care was not performed.     Medical Decision Making  The patient's presentation was of moderate complexity (an undiagnosed new problem with uncertain prognosis).    The patient's evaluation involved:  ordering and/or review of 3+ test(s) in this encounter (see above)  Case discussed with urology as well    The patient's management necessitated moderate risk (prescription drug management including medications given in the ED) and moderate risk (IV contrast administration).      Assessments & Plan (with Medical Decision Making)     I have reviewed the nursing notes and discussed the case with urology on call.    Patient eloped immediately after the MRI was completed.    TOTAL PATIENT CARE TIME INCLUDING DOCUMENTATION, FAMILY COMMUNICATION AND CARE COORDINATION WAS greater than 40 MINUTES.     New Prescriptions    ONDANSETRON (ZOFRAN ODT) 4 MG ODT TAB    Take 1 tablet (4 mg) by mouth every 6 hours as needed for nausea.    OXYCODONE (ROXICODONE) 5 MG TABLET    Take 1 tablet (5 mg) by mouth every 6 hours as needed for pain.       Final diagnoses:   Hydronephrosis with ureteral stricture, not elsewhere  classified     Urology will call you to set up an appointment for you to be seen and schedule placement of a stent to relieve the pressure from your right kidney next week.  If they do not call you please call them at [phone]    Fill your prescriptions and take as directed    Go to the ER for any fever or worsening    Check your MyChart for your MRI results and follow-up with your primary care physician for recheck in 7 to 10 days    JOSH ORO MD    9/20/2024   [facility] [place]

## 2024-09-20 NOTE — TELEPHONE ENCOUNTER
"Priscila Lombardo CNP requested RN call patient to triage symptoms as they are on provider's schedule for this morning at 11:00am, for severe abdominal pain.    Patient states \"same pain I've had for months and months,\" but worse the past few weeks.  States last time went to  and frustrated with no answers \"and just started living with it;\" however, pain now \"is almost to the point it's bending me over.\"  Describes location of pain as \"way down low in my pelvis on the R side, a little higher than the pubic hairline (appendix previously removed).    Questioning if IBS, hip pain, or uterine/pelvic pain (reports due to surgical complication in her 20's, she's not had a menstrual cycle since but does still have uterus/ovaries).  States if pressing on lower pelvis in the front \"it hurts in that organ area, I can feel it in the back/hip area,\" and the same when pressing on her R lower back, about 4-5 inches below the top of the hip \"in my leg socket,\" can feel the pain in the front lower pelvis.  Rating pain 4-5/10 now, increases to 10/10 \"daily and constant.\"  Denies vaginal bleeding, hematuria, UTI symptoms.      Has been taking stool softeners daily and increasing fiber thinking possible constipation and has been having normal bowel movements daily.      Hx of diverticulosis      RN informed patient clinic appointment not appropriate for severity of symptoms and reviewed ADS option with patient and they're agreeable to ADS.  Called [facility], spoke with Sandra and accepted patient for today; [facility]S will call patient directly with appointment time.  Clinic appointment cancelled.  No further action required of RN.       Chapis Gabriel RN  [facility]               Reason for Disposition   MILD TO MODERATE constant pain lasting > 2 hours    Additional Information   Negative: Passed out (i.e., fainted, collapsed and was not responding)   Negative: Shock suspected (e.g., cold/pale/clammy skin, " "too weak to stand, low BP, rapid pulse)   Negative: Sounds like a life-threatening emergency to the triager   Negative: Followed an abdomen (stomach) injury   Negative: Chest pain   Negative: Abdominal pain and pregnant < 20 weeks   Negative: Abdominal pain and pregnant 20 or more weeks   Negative: Pain is mainly in upper abdomen (if needed ask: 'is it mainly above the belly button?')   Negative: Abdomen bloating or swelling are main symptoms   Negative: SEVERE abdominal pain (e.g., excruciating)     States not currently severe, rating pain 4-5/10 now, but does reach severe 10/10 \"daily.\"   Negative: Vomiting red blood or black (coffee ground) material   Negative: Blood in bowel movements  (Exception: Blood on surface of BM with constipation.)   Negative: Black or tarry bowel movements  (Exception: Chronic-unchanged black-grey BMs AND is taking iron pills or Pepto-Bismol.)    Protocols used: Abdominal Pain - Female-A-OH    "

## 2024-09-20 NOTE — PROGRESS NOTES
ANTICOAGULATION MANAGEMENT     Nery Whitaker 59 year old female is on warfarin with therapeutic INR result. (Goal INR 2.0-3.0)    Recent labs: (last 7 days)     09/20/24  1056   INR 2.07*       ASSESSMENT     Warfarin Lab Questionnaire    Warfarin Doses Last 7 Days      9/20/2024     8:56 AM   Dose in Tablet or Mg   TAB or MG? milligram (mg)           9/20/2024   Warfarin Lab Questionnaire   Missed doses within past 14 days? No   Changes in diet or alcohol within past 14 days? No   Medication changes since last result? No   Injuries or illness since last result? Having testing done today for abdominal pain.  Results pending.  Patient said she might have urine backing up and needs evaluation for urinary stent placement.   New shortness of breath, severe headaches or sudden changes in vision since last result? No   Abnormal bleeding since last result? No   Upcoming surgery, procedure? No   Best number to call with results? [phone]        Previous result: Supratherapeutic hold x1 then decreased by 10%  Additional findings: None       PLAN     Recommended plan for temporary change(s) affecting INR     Dosing Instructions: Continue your current warfarin dose with next INR in 1 week       Summary  As of 9/20/2024      Full warfarin instructions:  5 mg every Tue, Thu, Sat; 7.5 mg all other days   Next INR check:  9/27/2024               Telephone call with Nery who verbalizes understanding and agrees to plan.  [employer] message sent.    Patient offered & declined to schedule next visit    Education provided: Goal range and lab monitoring: goal range and significance of current result  Contact [phone] with any changes, questions or concerns.     Plan made per [facility] anticoagulation protocol    Melissa Ferreira RN  9/20/2024  Anticoagulation Clinic  [facility] for routing messages: gloria MARTINI  [facility] patient phone line:  [phone]        _______________________________________________________________________     Anticoagulation Episode Summary       Current INR goal:  2.0-3.0   TTR:  41.5% (1 y)   Target end date:  Indefinite   Send INR reminders to:  BERNICE MARTINI    Indications    Anticoagulation monitoring  INR range 2-3 [Z79.01]  PAD (peripheral artery disease) (H24) [I73.9]  Long term (current) use of anticoagulants [Z79.01]  Hypercoagulable state (H24) [D68.59]  Bilateral carotid artery disease  unspecified type (H24) [I77.9]             Comments:               Anticoagulation Care Providers       Provider Role Specialty Phone number    Loraine Rees MD Referring Family Medicine [phone]    Vu Winters MD Referring Family Medicine [phone]    Priscila Lombardo APRN CNP Referring Family Medicine [phone]

## 2024-09-25 ENCOUNTER — OFFICE VISIT (OUTPATIENT)
Dept: UROLOGY | Facility: CLINIC | Age: 59
End: 2024-09-25
Payer: COMMERCIAL

## 2024-09-25 VITALS — HEART RATE: 59 BPM | DIASTOLIC BLOOD PRESSURE: 70 MMHG | TEMPERATURE: 98.8 F | SYSTOLIC BLOOD PRESSURE: 151 MMHG

## 2024-09-25 DIAGNOSIS — N13.1 HYDRONEPHROSIS WITH URETERAL STRICTURE, NOT ELSEWHERE CLASSIFIED: ICD-10-CM

## 2024-09-25 PROCEDURE — 99204 OFFICE O/P NEW MOD 45 MIN: CPT | Performed by: STUDENT IN AN ORGANIZED HEALTH CARE EDUCATION/TRAINING PROGRAM

## 2024-09-25 RX ORDER — ACETAMINOPHEN 650 MG/1
650 SUPPOSITORY RECTAL ONCE
OUTPATIENT
Start: 2024-09-25

## 2024-09-25 RX ORDER — SENNOSIDES 8.6 MG
1 TABLET ORAL DAILY
COMMUNITY

## 2024-09-25 RX ORDER — ACETAMINOPHEN 325 MG/1
975 TABLET ORAL ONCE
OUTPATIENT
Start: 2024-09-25 | End: 2024-09-25

## 2024-09-25 ASSESSMENT — PAIN SCALES - GENERAL: PAINLEVEL: MILD PAIN (3)

## 2024-09-26 ENCOUNTER — TELEPHONE (OUTPATIENT)
Dept: UROLOGY | Facility: CLINIC | Age: 59
End: 2024-09-26
Payer: COMMERCIAL

## 2024-09-27 ENCOUNTER — MYC MEDICAL ADVICE (OUTPATIENT)
Dept: FAMILY MEDICINE | Facility: CLINIC | Age: 59
End: 2024-09-27
Payer: COMMERCIAL

## 2024-09-27 NOTE — TELEPHONE ENCOUNTER
Spoke with patient. Due to patient's work schedule, needs to be scheduled on a Friday morning. PCP unavailable before surgery. Scheduled 10/4/24 with Dr. Rosado.

## 2024-09-27 NOTE — TELEPHONE ENCOUNTER
Scheduled surgery for pt 10/16 with Dr. Powers. Went through instructions over the phone, she is aware she needs a pre-op physical. Surgery packet mailed.

## 2024-10-01 DIAGNOSIS — D68.59 HYPERCOAGULABLE STATE (H): ICD-10-CM

## 2024-10-01 DIAGNOSIS — Z79.01 LONG TERM (CURRENT) USE OF ANTICOAGULANTS: ICD-10-CM

## 2024-10-01 DIAGNOSIS — I73.9 PAD (PERIPHERAL ARTERY DISEASE) (H): ICD-10-CM

## 2024-10-01 DIAGNOSIS — I77.9 BILATERAL CAROTID ARTERY DISEASE, UNSPECIFIED TYPE (H): ICD-10-CM

## 2024-10-01 RX ORDER — WARFARIN SODIUM 5 MG/1
5-7.5 TABLET ORAL DAILY
Qty: 135 TABLET | Refills: 1 | Status: SHIPPED | OUTPATIENT
Start: 2024-10-01

## 2024-10-01 NOTE — TELEPHONE ENCOUNTER
ANTICOAGULATION MANAGEMENT:  Medication Refill    Anticoagulation Summary  As of 9/20/2024      Warfarin maintenance plan:  5 mg (5 mg x 1) every Tue, Thu, Sat; 7.5 mg (5 mg x 1.5) all other days   Next INR check:  9/27/2024   Target end date:  Indefinite    Indications    Anticoagulation monitoring  INR range 2-3 [Z79.01]  PAD (peripheral artery disease) (H24) [I73.9]  Long term (current) use of anticoagulants [Z79.01]  Hypercoagulable state (H24) [D68.59]  Bilateral carotid artery disease  unspecified type (H24) [I77.9]                 Anticoagulation Care Providers       Provider Role Specialty Phone number    Loraine Rees MD Referring Family Medicine [phone]    Vu Winters MD Referring Family Medicine [phone]    Priscila Lombardo APRN CNP Referring Family Medicine [phone]            Refill Criteria    Visit with referring provider/group: Meets criteria: visit within referring provider group in the last 15 months on 4/24/24    [facility] referral last signed: 08/08/2024; within last year: Yes    Lab monitoring not exceeding 2 weeks overdue: Yes    Nery meets all criteria for refill. Rx instructions and quantity supplied updated to match patient's current dosing plan. Warfarin 90 day supply with 1 refill granted per [facility] protocol     Marjorie Ball RN  Anticoagulation Clinic

## 2024-10-03 ENCOUNTER — TELEPHONE (OUTPATIENT)
Dept: FAMILY MEDICINE | Facility: CLINIC | Age: 59
End: 2024-10-03
Payer: COMMERCIAL

## 2024-10-03 NOTE — TELEPHONE ENCOUNTER
Prior Authorization Request  Who s requesting:  Pharmacy  Pharmacy Name and Location: [place] [address]  Medication Name: famotidine (PEPCID) 20 MG tablet   Insurance Plan: [employer]  Insurance Member ID Number:  [id number]   CoverMyMeds Key: [id number]  Informed patient that prior authorizations can take up to 10 business days for response:   NA  Okay to leave a detailed message: No

## 2024-10-04 ENCOUNTER — ANTICOAGULATION THERAPY VISIT (OUTPATIENT)
Dept: ANTICOAGULATION | Facility: CLINIC | Age: 59
End: 2024-10-04

## 2024-10-04 ENCOUNTER — OFFICE VISIT (OUTPATIENT)
Dept: FAMILY MEDICINE | Facility: CLINIC | Age: 59
End: 2024-10-04
Payer: COMMERCIAL

## 2024-10-04 ENCOUNTER — LAB (OUTPATIENT)
Dept: LAB | Facility: CLINIC | Age: 59
End: 2024-10-04
Payer: COMMERCIAL

## 2024-10-04 VITALS
WEIGHT: 120.4 LBS | TEMPERATURE: 98.2 F | RESPIRATION RATE: 12 BRPM | HEIGHT: 65 IN | OXYGEN SATURATION: 98 % | DIASTOLIC BLOOD PRESSURE: 64 MMHG | SYSTOLIC BLOOD PRESSURE: 110 MMHG | HEART RATE: 61 BPM | BODY MASS INDEX: 20.06 KG/M2

## 2024-10-04 DIAGNOSIS — D68.59 HYPERCOAGULABLE STATE (H): ICD-10-CM

## 2024-10-04 DIAGNOSIS — Z01.818 PREOPERATIVE EXAMINATION: Primary | ICD-10-CM

## 2024-10-04 DIAGNOSIS — I77.9 BILATERAL CAROTID ARTERY DISEASE, UNSPECIFIED TYPE (H): ICD-10-CM

## 2024-10-04 DIAGNOSIS — I73.9 PAD (PERIPHERAL ARTERY DISEASE) (H): ICD-10-CM

## 2024-10-04 DIAGNOSIS — Z79.01 LONG TERM (CURRENT) USE OF ANTICOAGULANTS: ICD-10-CM

## 2024-10-04 DIAGNOSIS — Z79.01 ANTICOAGULATION MONITORING, INR RANGE 2-3: Primary | ICD-10-CM

## 2024-10-04 DIAGNOSIS — N13.1 HYDRONEPHROSIS WITH URETERAL STRICTURE, NOT ELSEWHERE CLASSIFIED: ICD-10-CM

## 2024-10-04 LAB
ATRIAL RATE - MUSE: 59 BPM
DIASTOLIC BLOOD PRESSURE - MUSE: 56 MMHG
INR BLD: 1.6 (ref 0.9–1.1)
INTERPRETATION ECG - MUSE: NORMAL
P AXIS - MUSE: 71 DEGREES
PR INTERVAL - MUSE: 118 MS
QRS DURATION - MUSE: 88 MS
QT - MUSE: 430 MS
QTC - MUSE: 425 MS
R AXIS - MUSE: 70 DEGREES
SYSTOLIC BLOOD PRESSURE - MUSE: 88 MMHG
T AXIS - MUSE: 49 DEGREES
VENTRICULAR RATE- MUSE: 59 BPM

## 2024-10-04 PROCEDURE — 93010 ELECTROCARDIOGRAM REPORT: CPT | Performed by: INTERNAL MEDICINE

## 2024-10-04 PROCEDURE — 36416 COLLJ CAPILLARY BLOOD SPEC: CPT

## 2024-10-04 PROCEDURE — 85610 PROTHROMBIN TIME: CPT

## 2024-10-04 PROCEDURE — 99213 OFFICE O/P EST LOW 20 MIN: CPT | Performed by: FAMILY MEDICINE

## 2024-10-04 PROCEDURE — 93005 ELECTROCARDIOGRAM TRACING: CPT | Performed by: FAMILY MEDICINE

## 2024-10-04 RX ORDER — SODIUM FLUORIDE 1.1 G/100G
CREAM ORAL
COMMUNITY
Start: 2024-03-22

## 2024-10-04 NOTE — PATIENT INSTRUCTIONS
How to Take Your Medication Before Surgery  Preoperative Medication Instructions   Antiplatelet or Anticoagulation Medication Instructions   - warfarin: With INR at 1.6 today, take normal dose tonight then hold and restart the day after. Anticoag clinic will be in contact with appropriate dose.    Additional Medication Instructions  Hold all medications the morning of surgery.       Patient Education   Preparing for Your Surgery  For Adults  Getting started  In most cases, a nurse will call to review your health history and instructions. They will give you an arrival time based on your scheduled surgery time. Please be ready to share:  Your doctor's clinic name and phone number  Your medical, surgical, and anesthesia history  A list of allergies and sensitivities  A list of medicines, including herbal treatments and over-the-counter drugs  Whether the patient has a legal guardian (ask how to send us the papers in advance)  Note: You may not receive a call if you were seen at our PAC (Preoperative Assessment Center).  Please tell us if you're pregnant--or if there's any chance you might be pregnant. Some surgeries may injure a fetus (unborn baby), so they require a pregnancy test. Surgeries that are safe for a fetus don't always need a test, and you can choose whether to have one.   Preparing for surgery  Within 10 to 30 days of surgery: Have a pre-op exam (sometimes called an H&P, or History and Physical). This can be done at a clinic or pre-operative center.  If you're having a , you may not need this exam. Talk to your care team.  At your pre-op exam, talk to your care team about all medicines you take. (This includes CBD oil and any drugs, such as THC, marijuana, and other forms of cannabis.) If you need to stop any medicine before surgery, ask when to start taking it again.  This is for your safety. Many medicines and drugs can make you bleed too much during surgery. Some change how well surgery  (anesthesia) drugs work.  Call your insurance company to let them know you're having surgery. (If you don't have insurance, call [phone].)  Call your clinic if there's any change in your health. This includes a scrape or scratch near the surgery site, or any signs of a cold (sore throat, runny nose, cough, rash, fever).  Eating and drinking guidelines  For your safety: Unless your surgeon tells you otherwise, follow the guidelines below.  Eat and drink as normal until 8 hours before you arrive for surgery. After that, no food or milk. You can spit out gum when you arrive.  Drink clear liquids until 2 hours before you arrive. These are liquids you can see through, like water, Gatorade, and Propel Water. They also include plain black coffee and tea (no cream or milk).  No alcohol for 24 hours before you arrive. The night before surgery, stop any drinks that contain THC.  If your care team tells you to take medicine on the morning of surgery, it's okay to take it with a sip of water. No other medicines or drugs are allowed (including CBD oil)--follow your care team's instructions.  If you have questions the day of surgery, call your hospital or surgery center.   Preventing infection  Shower or bathe the night before and the morning of surgery. Follow the instructions your clinic gave you. (If no instructions, use regular soap.)  Don't shave or clip hair near your surgery site. We'll remove the hair if needed.  Don't smoke or vape the morning of surgery. No chewing tobacco for 6 hours before you arrive. A nicotine patch is okay. You may spit out nicotine gum when you arrive.  For some surgeries, the surgeon will tell you to fully quit smoking and nicotine.  We will make every effort to keep you safe from infection. We will:  Clean our hands often with soap and water (or an alcohol-based hand rub).  Clean the skin at your surgery site with a special soap that kills germs.  Give you a special gown to keep you warm.  (Cold raises the risk of infection.)  Wear hair covers, masks, gowns, and gloves during surgery.  Give antibiotic medicine, if prescribed. Not all surgeries need this medicine.  What to bring on the day of surgery  Photo ID and insurance card  Copy of your health care directive, if you have one  Glasses and hearing aids (bring cases)  You can't wear contacts during surgery  Inhaler and eye drops, if you use them (tell us about these when you arrive)  CPAP machine or breathing device, if you use them  A few personal items, if spending the night  If you have . . .  A pacemaker, ICD (cardiac defibrillator), or other implant: Bring the ID card.  An implanted stimulator: Bring the remote control.  A legal guardian: Bring a copy of the certified (court-stamped) guardianship papers.  Please remove any jewelry, including body piercings. Leave jewelry and other valuables at home.  If you're going home the day of surgery  You must have a responsible adult drive you home. They should stay with you overnight as well.  If you don't have someone to stay with you, and you aren't safe to go home alone, we may keep you overnight. Insurance often won't pay for this.  After surgery  If it's hard to control your pain or you need more pain medicine, please call your surgeon's office.  Questions?   If you have any questions for your care team, list them here:   ____________________________________________________________________________________________________________________________________________________________________________________________________________________________________________________________  For informational purposes only. Not to replace the advice of your health care provider. Copyright   2003, 2019 [facility]. All rights reserved. Clinically reviewed by Corey Brumfield MD. Interactive Project [id number] - REV 08/24.

## 2024-10-04 NOTE — PROGRESS NOTES
ANTICOAGULATION MANAGEMENT     Nery Whitaker 59 year old female is on warfarin with subtherapeutic INR result. (Goal INR 2.0-3.0)    Recent labs: (last 7 days)     10/04/24  0810   INR 1.6*       ASSESSMENT     Warfarin Lab Questionnaire    Warfarin Doses Last 7 Days      10/4/2024     5:53 AM   Dose in Tablet or Mg   TAB or MG? milligram (mg)           10/4/2024   Warfarin Lab Questionnaire   Missed doses within past 14 days? Yes   If yes; please list when: Yesterday vomiting   Changes in diet or alcohol within past 14 days? No   Medication changes since last result? Yes   Please list: Oxycodone antinausea   Injuries or illness since last result? 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 No   Abnormal bleeding since last result? No   Upcoming surgery, procedure? Yes   Please explain, date scheduled? Hopefully the 9th ureter stent and biopsy   Best number to call with results? [phone]        Previous result: Therapeutic last visit; previously outside of goal range  Additional findings: None       PLAN     Unable to reach Nery today.    Left message to follow 's instructions this weekend (7.5 mg tonight, then begin holding warfarin on Saturday 10/5/24). Request call back for assessment.    Follow up required to confirm warfarin dose taken and assess for changes and discuss dosing instructions and confirm understanding of instructions for procedure.    Marjorie Ball, RN  10/4/2024  Anticoagulation Clinic  [facility] for routing messages: gloria MARTINI  [facility] patient phone line: [phone]

## 2024-10-04 NOTE — PROGRESS NOTES
Preoperative Evaluation  [facility]  [address]  [facility] [address]  Phone: [phone]  Fax: [phone]  Primary Provider: GERALD Crandall CNP  Pre-op Performing Provider: Jeff Rosado DO  Oct 4, 2024             10/1/2024   Surgical Information   What procedure is being done? CYSTOURETEROSCOPY   Facility or Hospital where procedure/surgery will be performed: [facility]   Who is doing the procedure / surgery? Dr. Clark Garcia   Date of surgery / procedure: 10/9/24   Time of surgery / procedure: AM   Where do you plan to recover after surgery? at home with family        Fax number for surgical facility: Note does not need to be faxed, will be available electronically in Epic.    Assessment & Plan     The proposed surgical procedure is considered INTERMEDIATE risk.    Problem List Items Addressed This Visit       Hypercoagulable state (H)     Given the fact that INR is 1.6 today, will have her take this evening's dose of warfarin and then hold until the day after procedure.  I do think the current drop in INR is secondary to the nausea and vomiting that is happening because of the stricture, and once the stricture is resolved we will be able to resume the previous dose without adjustment to achieve INR of 2-3.          Other Visit Diagnoses       Preoperative examination    -  Primary    Relevant Orders    EKG 12-lead, tracing only (Completed)    Hydronephrosis with ureteral stricture, not elsewhere classified                        - No identified additional risk factors other than previously addressed    Preoperative Medication Instructions  Antiplatelet or Anticoagulation Medication Instructions   - warfarin: With INR at 1.6 today, take normal dose tonight then hold and restart the day after. Anticoag clinic will be in contact with appropriate dose.    Additional Medication Instructions  Hold all medications the morning of  surgery.    Recommendation  Approval given to proceed with proposed procedure, without further diagnostic evaluation.    Arsenio Gabriel is a 59 year old, presenting for the following:  Pre-Op Exam (10/9/24; [facility]; Dr. lCark Garcia; CYSTOURETEROSCOPY)          10/4/2024     8:22 AM   Additional Questions   Roomed by SARAH Cuevas   Accompanied by Self         10/4/2024     8:22 AM   Patient Reported Additional Medications   Patient reports taking the following new medications N/A     HPI related to upcoming procedure: 59-year-old female with a stricture to the ureter which seems to be secondary to scar tissue surrounding the arterial femoral bypass she previously had.  Is developing hydronephrosis.  Urology notes reviewed.  Symptoms of discomfort are worsening over the past week along with nausea and vomiting.  Otherwise denies any fevers, chills, changes in urinary bowel habits, or changes in vision or headaches.        10/1/2024   Pre-Op Questionnaire   Have you ever had a heart attack or stroke? No   Have you ever had surgery on your heart or blood vessels, such as a stent placement, a coronary artery bypass, or surgery on an artery in your head, neck, heart, or legs? (!) YES Artofemoral bypass graft bilateral   Do you have chest pain with activity? No   Do you have a history of heart failure? No   Do you currently have a cold, bronchitis or symptoms of other infection? No   Do you have a cough, shortness of breath, or wheezing? No   Do you or anyone in your family have previous history of blood clots? (!) YES Personal history, see instructions   Do you or does anyone in your family have a serious bleeding problem such as prolonged bleeding following surgeries or cuts? No   Have you ever had problems with anemia or been told to take iron pills? No   Have you had any abnormal blood loss such as black, tarry or bloody stools, or abnormal vaginal bleeding? No   Have you ever had a blood transfusion? No   Are  you willing to have a blood transfusion if it is medically needed before, during, or after your surgery? Yes   Have you or any of your relatives ever had problems with anesthesia? No   Do you have sleep apnea, excessive snoring or daytime drowsiness? No   Do you have any artifical heart valves or other implanted medical devices like a pacemaker, defibrillator, or continuous glucose monitor? No   Do you have artificial joints? No   Are you allergic to latex? No        Health Care Directive  Patient does not have a Health Care Directive or Living Will: Discussed advance care planning with patient; however, patient declined at this time.    Preoperative Review of    reviewed - last rx for 20 tabs on 9/20. Still has majority at home        Patient Active Problem List    Diagnosis Date Noted    Epigastric abdominal pain 07/28/2023     Priority: Medium    Heart murmur 07/28/2023     Priority: Medium    Long term (current) use of anticoagulants 09/26/2022     Priority: Medium    Bilateral carotid artery disease, unspecified type (H) 09/26/2022     Priority: Medium    Age-related osteoporosis without current pathological fracture 04/27/2022     Priority: Medium    Benign paroxysmal positional vertigo of right ear 04/27/2022     Priority: Medium    Routine general medical examination at a health care facility 02/25/2022     Priority: Medium    Thoracic spine pain 02/09/2022     Priority: Medium     Added automatically from request for surgery [id number]      Atypical lymphocytosis 01/12/2019     Priority: Medium    Adenomatous colon polyp 11/04/2018     Priority: Medium    Normocytic anemia 10/22/2018     Priority: Medium     Continue to follow. History of polyps.  Colonoscopy scheduled for 2022.      Anxiety state 04/11/2018     Priority: Medium    Migraine 04/11/2018     Priority: Medium    PAD (peripheral artery disease) (H) 04/11/2018     Priority: Medium     Overview:   Premature aortoiliac occlusive disease with  claudication, s/p bilateral iliac stents 1/17/2006 complicated by acute thrombosis and ao-bifem bypass 2/15/06    Formatting of this note might be different from the original.  Premature aortoiliac occlusive disease with claudication, s/p bilateral iliac stents 1/17/2006 complicated by acute thrombosis and ao-bifem bypass 2/15/06  Formatting of this note might be different from the original.  Overview:   Premature aortoiliac occlusive disease with claudication, s/p bilateral iliac stents 1/17/2006 complicated by acute thrombosis and ao-bifem bypass 2/15/06  Overview:   Premature aortoiliac occlusive disease with claudication  S/P bilateral iliac   stents 1/17/2006 complicated by acute thrombosis resulting in rest pain    Last Assessment & Plan:   Formatting of this note might be different from the original.  Anticoagulated. Continued follow up with vascular specialist.  Formatting of this note might be different from the original.  Premature aortoiliac occlusive disease with claudication  S/P bilateral iliac   stents 1/17/2006 complicated by acute thrombosis resulting in rest pain      Tobacco use disorder 04/11/2018     Priority: Medium    Bilateral carotid artery disease (H) 12/02/2017     Priority: Medium    Diverticulosis of large intestine without hemorrhage 12/02/2017     Priority: Medium    Dyslipidemia 12/02/2017     Priority: Medium    Elevated lipoprotein(a) 12/02/2017     Priority: Medium    Essential hypertension 12/02/2017     Priority: Medium     Well controlled.  Check BMP.        Hypercoagulable state (H) 12/02/2017     Priority: Medium     History of positive lupus anticoagulant twice, subsequently negative      Multiple skin nodules 12/02/2017     Priority: Medium    Nocturnal enuresis 12/02/2017     Priority: Medium    Thoracic neuralgia 08/08/2017     Priority: Medium    Anticoagulation monitoring, INR range 2-3 06/16/2017     Priority: Medium      Past Medical History:   Diagnosis Date     Abdominal pain, epigastric 12/02/2017    Abnormal cardiovascular stress test 12/13/2017    Anemia     Anxiety     Arthritis     Bilateral carotid artery disease (H)     COPD (chronic obstructive pulmonary disease) (H)     Coronary artery disease     Dyslipidemia     Foreign body in left foot     Foreign body in left foot, subsequent encounter 10/11/2018    Heart murmur     Hypertension     Intermittent palpitations 12/02/2017    Migraines     Newly recognized murmur 10/18/2018    PAD (peripheral artery disease) (H)     Pure hyperglyceridemia 09/02/2021    Formatting of this note might be different from the original. Has been intoleratant to several medications.    .    Urge incontinence of urine 11/04/2018    Last Assessment & Plan:  Symptoms most consistent with urge incontinence. Ultrasound ordered to evaluate for any issues with incomplete emptying. Trial of Detrol LA 2 mg daily.  Last Assessment & Plan:  Formatting of this note might be different from the original. Detrol LA has been helpful but dries out her mouth. We will try a lower, short acting dose.    Verruca plantaris 11/01/2018     Past Surgical History:   Procedure Laterality Date    ABDOMEN SURGERY      APPENDECTOMY      ARTERIAL BYPASS SURGERY  01/01/2006    BIOPSY BREAST Left     benign    BYPASS GRAFT AORTOFEMORAL Bilateral     CHOLECYSTECTOMY      COLONOSCOPY      ESOPHAGOSCOPY, GASTROSCOPY, DUODENOSCOPY (EGD), COMBINED N/A 05/14/2024    Procedure: ESOPHAGOGASTRODUODENOSCOPY, WITH BIOPSY;  Surgeon: Lizz Bray MD;  Location: [facility] OR    FEMORAL ARTERY STENT  01/01/2006    FOOT MASS EXCISION Left 01/01/2018    Soft tissue mass - benign    IR MISCELLANEOUS PROCEDURE  01/17/2006    IR MISCELLANEOUS PROCEDURE  01/17/2006    IR MISCELLANEOUS PROCEDURE  01/17/2006    IR MISCELLANEOUS PROCEDURE  01/17/2006    IR MISCELLANEOUS PROCEDURE  01/18/2006    TONSILLECTOMY      TYMPANOSTOMY TUBE PLACEMENT       Current Outpatient Medications    Medication Sig Dispense Refill    albuterol (PROAIR HFA/PROVENTIL HFA/VENTOLIN HFA) 108 (90 Base) MCG/ACT inhaler Inhale 2 puffs into the lungs every 6 hours as needed for shortness of breath or wheezing 18 g 0    alendronate (FOSAMAX) 70 MG tablet Take 1 tablet by mouth once a week 12 tablet 1    amLODIPine (NORVASC) 5 MG tablet Take 1 tablet by mouth once daily 90 tablet 3    aspirin 81 MG EC tablet Take 81 mg by mouth daily      calcium carbonate-vitamin D (OSCAL) 500-5 MG-MCG tablet Take 1 tablet by mouth 2 times daily 90 tablet 3    citalopram (CELEXA) 20 MG tablet Take 1 tablet by mouth once daily 90 tablet 2    DENTA 5000 PLUS 1.1 % CREA USE TO BRUSH TEETH AS DIRECTED TWICE DAILY      famotidine (PEPCID) 20 MG tablet Take 1 tablet by mouth twice daily 180 tablet 2    folic acid (FOLVITE) 1 MG tablet Take 1 tablet by mouth once daily 90 tablet 2    gabapentin (NEURONTIN) 100 MG capsule TAKE 1 CAPSULE BY MOUTH IN THE MORNING THEN  1  TABLET  AT  NOON  AND  3  TABLETS  AT  BEDTIME  DAILY 450 capsule 1    lisinopril (ZESTRIL) 20 MG tablet Take 1 tablet by mouth once daily 90 tablet 3    omeprazole (PRILOSEC) 40 MG DR capsule Take 1 capsule (40 mg) by mouth daily 30 capsule 2    ondansetron (ZOFRAN ODT) 4 MG ODT tab Take 1 tablet (4 mg) by mouth every 6 hours as needed for nausea. 16 tablet 0    oxyCODONE (ROXICODONE) 5 MG tablet Take 1 tablet (5 mg) by mouth every 6 hours as needed for pain. 20 tablet 0    REPATHA SURECLICK 140 MG/ML prefilled autoinjector INJECT 1ML (140MG) SUBCUTANEOUS EVERY 2 WEEKS. INJECT INTO ABDOMEN THIGH OR UPPER ARM. ROTATE INJECTION SITES.      sennosides (SENOKOT) 8.6 MG tablet Take 1 tablet by mouth daily.      warfarin ANTICOAGULANT (COUMADIN) 5 MG tablet Take 1-1.5 tablets (5-7.5 mg) by mouth daily. Adjust dose per INR as directed by  tablet 1    ondansetron (ZOFRAN ODT) 4 MG ODT tab Take 1-2 tablets (4-8 mg) by mouth every 6 hours as needed for nausea or vomiting (max daily  "dose of 4 tablets) Allowed to dissolve under your tongue (Patient not taking: Reported on 10/4/2024) 20 tablet 0    sucralfate (CARAFATE) 1 GM tablet Take 1 tablet (1 g) by mouth 4 times daily (Patient not taking: Reported on 9/25/2024) 120 tablet 0       Allergies   Allergen Reactions    Ezetimibe      constipation    Lovastatin Unknown     constipation    Simvastatin      Other reaction(s): Constipation  Intolerance, but tolerated with re-challenge in Feb to April 2009        Social History     Tobacco Use    Smoking status: Every Day     Current packs/day: 0.75     Average packs/day: 0.8 packs/day for 50.5 years (42.0 ttl pk-yrs)     Types: Cigarettes     Passive exposure: Current    Smokeless tobacco: Never    Tobacco comments:     1 ppd since 17 years old   Substance Use Topics    Alcohol use: Not Currently       History   Drug Use No           Objective    BP (!) 88/56 (BP Location: Left arm, Patient Position: Sitting, Cuff Size: Adult Large)   Pulse 61   Temp 98.2  F (36.8  C) (Oral)   Resp 12   Ht 1.638 m (5' 4.5\")   Wt 54.6 kg (120 lb 6.4 oz)   LMP  (LMP Unknown)   SpO2 98%   Breastfeeding No   BMI 20.35 kg/m     Estimated body mass index is 20.35 kg/m  as calculated from the following:    Height as of this encounter: 1.638 m (5' 4.5\").    Weight as of this encounter: 54.6 kg (120 lb 6.4 oz).  Physical Exam  Vitals and nursing note reviewed.   Constitutional:       General: She is not in acute distress.     Appearance: Normal appearance. She is not ill-appearing.   HENT:      Head: Normocephalic and atraumatic.      Right Ear: Tympanic membrane, ear canal and external ear normal.      Left Ear: Tympanic membrane, ear canal and external ear normal.      Nose: Nose normal.      Mouth/Throat:      Mouth: Mucous membranes are moist.      Pharynx: Oropharynx is clear. No oropharyngeal exudate or posterior oropharyngeal erythema.   Eyes:      Extraocular Movements: Extraocular movements intact.      " Conjunctiva/sclera: Conjunctivae normal.   Cardiovascular:      Rate and Rhythm: Normal rate and regular rhythm.      Pulses: Normal pulses.      Heart sounds: Normal heart sounds.   Pulmonary:      Effort: Pulmonary effort is normal.      Breath sounds: Normal breath sounds.   Musculoskeletal:      Cervical back: Normal range of motion and neck supple. No tenderness.      Right lower leg: No edema.      Left lower leg: No edema.   Lymphadenopathy:      Cervical: No cervical adenopathy.   Skin:     Capillary Refill: Capillary refill takes less than 2 seconds.   Neurological:      General: No focal deficit present.      Mental Status: She is alert and oriented to person, place, and time.      Cranial Nerves: Cranial nerves 2-12 are intact.      Gait: Gait normal.      Deep Tendon Reflexes:      Reflex Scores:       Bicep reflexes are 2+ on the right side and 2+ on the left side.       Patellar reflexes are 2+ on the right side and 2+ on the left side.  Psychiatric:         Attention and Perception: Attention normal.         Mood and Affect: Mood normal.         Speech: Speech normal.         Thought Content: Thought content normal.           Recent Labs   Lab Test 10/04/24  0810 09/20/24  1056 05/22/24  1358 05/22/24  1357 05/01/24  1312 04/24/24  1211   HGB  --  11.0*  --  10.0*  --  10.7*   PLT  --  263  --  282  --  307   INR 1.6* 2.07*   < >  --    < >  --    NA  --  142  --   --   --  140   POTASSIUM  --  4.3  --   --   --  5.1   CR  --  1.10*  --   --   --  0.94    < > = values in this interval not displayed.        Diagnostics  Recent Results (from the past 24 hour(s))   INR point of care (finger stick)    Collection Time: 10/04/24  8:10 AM   Result Value Ref Range    INR 1.6 (H) 0.9 - 1.1   EKG 12-lead, tracing only    Collection Time: 10/04/24  8:39 AM   Result Value Ref Range    Systolic Blood Pressure 88 mmHg    Diastolic Blood Pressure 56 mmHg    Ventricular Rate 59 BPM    Atrial Rate 59 BPM    AZ  Interval 118 ms    QRS Duration 88 ms     ms    QTc 425 ms    P Axis 71 degrees    R AXIS 70 degrees    T Axis 49 degrees    Interpretation ECG       Sinus bradycardia  Otherwise normal ECG  When compared with ECG of 12-Apr-2024 09:42,  Nonspecific T wave abnormality no longer evident in Lateral leads  Confirmed by LAY HOPE MD LOC:[facility] ([id number]) on 10/4/2024 8:58:54 AM          Revised Cardiac Risk Index (RCRI)  The patient has the following serious cardiovascular risks for perioperative complications:   - No serious cardiac risks = 0 points     RCRI Interpretation: 0 points: Class I (very low risk - 0.4% complication rate)         Signed Electronically by: Jeff Rosado DO  A copy of this evaluation report is provided to the requesting physician.

## 2024-10-04 NOTE — ASSESSMENT & PLAN NOTE
Given the fact that INR is 1.6 today, will have her take this evening's dose of warfarin and then hold until the day after procedure.  I do think the current drop in INR is secondary to the nausea and vomiting that is happening because of the stricture, and once the stricture is resolved we will be able to resume the previous dose without adjustment to achieve INR of 2-3.

## 2024-10-05 ENCOUNTER — HOSPITAL ENCOUNTER (EMERGENCY)
Facility: CLINIC | Age: 59
Discharge: HOME | End: 2024-10-05
Attending: EMERGENCY MEDICINE | Admitting: EMERGENCY MEDICINE
Payer: COMMERCIAL

## 2024-10-05 VITALS
WEIGHT: 120 LBS | OXYGEN SATURATION: 100 % | BODY MASS INDEX: 19.99 KG/M2 | HEART RATE: 62 BPM | DIASTOLIC BLOOD PRESSURE: 78 MMHG | SYSTOLIC BLOOD PRESSURE: 177 MMHG | RESPIRATION RATE: 18 BRPM | HEIGHT: 65 IN | TEMPERATURE: 98.3 F

## 2024-10-05 DIAGNOSIS — R11.2 NAUSEA AND VOMITING, UNSPECIFIED VOMITING TYPE: ICD-10-CM

## 2024-10-05 LAB
ALBUMIN UR-MCNC: 100 MG/DL
ANION GAP SERPL CALCULATED.3IONS-SCNC: 19 MMOL/L (ref 7–15)
APPEARANCE UR: CLEAR
BASOPHILS # BLD AUTO: 0 10E3/UL (ref 0–0.2)
BASOPHILS NFR BLD AUTO: 0 %
BILIRUB UR QL STRIP: NEGATIVE
BUN SERPL-MCNC: 25.4 MG/DL (ref 6–20)
CALCIUM SERPL-MCNC: 9.9 MG/DL (ref 8.8–10.4)
CHLORIDE SERPL-SCNC: 99 MMOL/L (ref 98–107)
COLOR UR AUTO: YELLOW
CREAT SERPL-MCNC: 1.23 MG/DL (ref 0.51–0.95)
EGFRCR SERPLBLD CKD-EPI 2021: 51 ML/MIN/1.73M2
EOSINOPHIL # BLD AUTO: 0 10E3/UL (ref 0–0.7)
EOSINOPHIL NFR BLD AUTO: 0 %
ERYTHROCYTE [DISTWIDTH] IN BLOOD BY AUTOMATED COUNT: 14.4 % (ref 10–15)
GLUCOSE SERPL-MCNC: 141 MG/DL (ref 70–99)
GLUCOSE UR STRIP-MCNC: NEGATIVE MG/DL
HCO3 SERPL-SCNC: 19 MMOL/L (ref 22–29)
HCT VFR BLD AUTO: 32.9 % (ref 35–47)
HGB BLD-MCNC: 11.3 G/DL (ref 11.7–15.7)
HGB UR QL STRIP: ABNORMAL
HYALINE CASTS: 5 /LPF
IMM GRANULOCYTES # BLD: 0.1 10E3/UL
IMM GRANULOCYTES NFR BLD: 0 %
INR PPP: 1.51 (ref 0.85–1.15)
KETONES UR STRIP-MCNC: NEGATIVE MG/DL
LEUKOCYTE ESTERASE UR QL STRIP: NEGATIVE
LYMPHOCYTES # BLD AUTO: 1.8 10E3/UL (ref 0.8–5.3)
LYMPHOCYTES NFR BLD AUTO: 12 %
MAGNESIUM SERPL-MCNC: 2.2 MG/DL (ref 1.7–2.3)
MCH RBC QN AUTO: 29.9 PG (ref 26.5–33)
MCHC RBC AUTO-ENTMCNC: 34.3 G/DL (ref 31.5–36.5)
MCV RBC AUTO: 87 FL (ref 78–100)
MONOCYTES # BLD AUTO: 1.3 10E3/UL (ref 0–1.3)
MONOCYTES NFR BLD AUTO: 9 %
MUCOUS THREADS #/AREA URNS LPF: PRESENT /LPF
NEUTROPHILS # BLD AUTO: 10.9 10E3/UL (ref 1.6–8.3)
NEUTROPHILS NFR BLD AUTO: 78 %
NITRATE UR QL: NEGATIVE
NRBC # BLD AUTO: 0 10E3/UL
NRBC BLD AUTO-RTO: 0 /100
PH UR STRIP: 5.5 [PH] (ref 5–7)
PLATELET # BLD AUTO: 246 10E3/UL (ref 150–450)
POTASSIUM SERPL-SCNC: 4 MMOL/L (ref 3.4–5.3)
RBC # BLD AUTO: 3.78 10E6/UL (ref 3.8–5.2)
RBC URINE: 3 /HPF
SODIUM SERPL-SCNC: 137 MMOL/L (ref 135–145)
SP GR UR STRIP: 1.03 (ref 1–1.03)
SQUAMOUS EPITHELIAL: 2 /HPF
UROBILINOGEN UR STRIP-MCNC: <2 MG/DL
WBC # BLD AUTO: 14.1 10E3/UL (ref 4–11)
WBC URINE: 1 /HPF

## 2024-10-05 PROCEDURE — 85610 PROTHROMBIN TIME: CPT | Performed by: EMERGENCY MEDICINE

## 2024-10-05 PROCEDURE — 80048 BASIC METABOLIC PNL TOTAL CA: CPT

## 2024-10-05 PROCEDURE — 250N000011 HC RX IP 250 OP 636

## 2024-10-05 PROCEDURE — 96361 HYDRATE IV INFUSION ADD-ON: CPT

## 2024-10-05 PROCEDURE — 258N000003 HC RX IP 258 OP 636

## 2024-10-05 PROCEDURE — 83735 ASSAY OF MAGNESIUM: CPT

## 2024-10-05 PROCEDURE — 81001 URINALYSIS AUTO W/SCOPE: CPT

## 2024-10-05 PROCEDURE — 96374 THER/PROPH/DIAG INJ IV PUSH: CPT

## 2024-10-05 PROCEDURE — 85025 COMPLETE CBC W/AUTO DIFF WBC: CPT

## 2024-10-05 PROCEDURE — 36415 COLL VENOUS BLD VENIPUNCTURE: CPT

## 2024-10-05 PROCEDURE — 36415 COLL VENOUS BLD VENIPUNCTURE: CPT | Performed by: EMERGENCY MEDICINE

## 2024-10-05 PROCEDURE — 99284 EMERGENCY DEPT VISIT MOD MDM: CPT | Mod: 25

## 2024-10-05 RX ORDER — PROCHLORPERAZINE MALEATE 10 MG
10 TABLET ORAL EVERY 6 HOURS PRN
Qty: 28 TABLET | Refills: 0 | Status: SHIPPED | OUTPATIENT
Start: 2024-10-05 | End: 2024-10-12

## 2024-10-05 RX ADMIN — PROCHLORPERAZINE EDISYLATE 10 MG: 5 INJECTION INTRAMUSCULAR; INTRAVENOUS at 09:18

## 2024-10-05 RX ADMIN — SODIUM CHLORIDE 1000 ML: 9 INJECTION, SOLUTION INTRAVENOUS at 09:17

## 2024-10-05 ASSESSMENT — ACTIVITIES OF DAILY LIVING (ADL)
ADLS_ACUITY_SCORE: 35

## 2024-10-05 ASSESSMENT — COLUMBIA-SUICIDE SEVERITY RATING SCALE - C-SSRS
6. HAVE YOU EVER DONE ANYTHING, STARTED TO DO ANYTHING, OR PREPARED TO DO ANYTHING TO END YOUR LIFE?: NO
2. HAVE YOU ACTUALLY HAD ANY THOUGHTS OF KILLING YOURSELF IN THE PAST MONTH?: NO
1. IN THE PAST MONTH, HAVE YOU WISHED YOU WERE DEAD OR WISHED YOU COULD GO TO SLEEP AND NOT WAKE UP?: NO

## 2024-10-05 NOTE — ED PROVIDER NOTES
Emergency Department Encounter   NAME: Nery Whitaker ; AGE: 59 year old female ; YOB: 1965 ; MRN: [id number] ; PCP: Priscila Lombardo   ED PROVIDER: Paulina Gonzalez PA-C    Evaluation Date & Time:   10/5/2024  8:47 AM    CHIEF COMPLAINT:  Nausea, Vomiting, & Diarrhea      Impression and Plan   MDM: Nery Whitaker is a 59 year old female with a pertinent history of HTN, CAD, anticoagulation use of warfarin, urethral stricture who presents to the ED for evaluation of nausea/vomiting/diarrhea x4 days. Patient has a known urethral stricture with upcoming cystourethroscopy on 10/9 for right sided stent placement. Has been unable to keep any food/fluids down in the past couple of days, tried Zofran and OTC meds. Patient denies chest pain, shortness of breath, vision changes, headache, confusion. Endorses RLQ abdominal pain and urinary incontinence at baseline, reports new occasional loose stools. Patient is anticoagulated on warfarin, last INR 1.6 yesterday, holding now for her upcoming surgery. No fatigue.     Patient appears relatively uncomfortable in bed but in no acute distress, is hypertensive but otherwise vitally stable. Physical exam is overall benign with abdominal tenderness in the RLQ to be expected. Heart and lung sounds are normal no signs of swelling, skin is warm and dry with good capillary refill. IV fluids, compazine and basic labs ordered.     I independently reviewed and interpreted the lab work which show mild anemia and mild leukocytosis. There is some trace blood in her urine but it is not infected. BMP shows creatinine, GFR and BUN consistent with previous results from two weeks ago. INR was 1.5.    Re-evaluated the patient who reports significant improvement with the fluids and compazine. Discussed lab results, plan to discharge home with compazine after successful oral hydration trial to which patient agreed.      Return precautions to the ED were discussed, patient and   verbalized understanding and were agreeable with the plan. All questions were answered.     Medical Decision Making  Obtained supplemental history:Supplemental history obtained?: Family Member/Significant Other  Reviewed external records: External records reviewed?: Documented in chart, Inpatient Record: 10/9 future admit, , Outpatient Record: 10/4 FP and anticoag clinic visits,  Urology visit, Prior Labs:  UA, CBC, BMP, 10/4 INR level , and Prior Imagin24 CT and MRI  Care impacted by chronic illness:Cerebrovascular Disease, Chronic Kidney Disease, and Heart Disease  Did you consider but not order tests?: Work up considered but not performed and documented in chart, if applicable  Did you interpret images independently?: Independent interpretation of ECG and images noted in documentation, when applicable.  Consultation discussion with other provider:Did you involve another provider (consultant, MH, pharmacy, etc.)?: No  Discharge. I prescribed additional prescription strength medication(s) as charted. I considered admission, but discharged patient after significant clinical improvement.    MIPS:  Not Applicable    ED COURSE:  8:59 AM I met and introduced myself to the patient. I gathered initial history and performed my physical exam. We discussed plan for initial workup.   9:10 AM I have staffed the patient with Dr. Shaffer, ED MD, who has evaluated the patient and agrees with all aspects of today's care.   10:45 AM I rechecked the patient and discussed results, discharge, follow up, and reasons to return to the ED.       FINAL IMPRESSION:    ICD-10-CM    1. Nausea and vomiting, unspecified vomiting type  R11.2             MEDICATIONS GIVEN IN THE EMERGENCY DEPARTMENT:  Medications   prochlorperazine (COMPAZINE) injection 10 mg (10 mg Intravenous $Given 10/5/24 5609)   sodium chloride 0.9% BOLUS 1,000 mL (1,000 mLs Intravenous $New Bag 10/5/24 1553)         NEW PRESCRIPTIONS STARTED AT TODAY'S ED  VISIT:  New Prescriptions    No medications on file         HPI   Use of Intrepreter: N/A     Nery Whitaker is a 59 year old female with a pertinent history of HTN, CAD, anticoagulation use of warfarin, urethral stricture who presents to the ED for evaluation of nausea/vomiting/diarrhea x4 days. Patient has a known urethral stricture with upcoming cystourethroscopy on 10/9 for right sided stent placement. Has been unable to keep any food/fluids down in the past couple of days, has Zofran and OTC antinausea medication at home that have provided no relief. Patient denies chest pain, shortness of breath, vision changes, headache, confusion. Endorses abdominal pain in RLQ but states this has been her baseline with the stricture, no new pain. Endorses urinary incontinence at baseline, reports new occasional loose stools in the past three days.     Patient is anticoagulated on warfarin, last INR 1.6 yesterday, holding now for her upcoming surgery.      REVIEW OF SYSTEMS:  Pertinent positive and negative symptoms per HPI.       Medical History     Past Medical History:   Diagnosis Date    Abdominal pain, epigastric 12/02/2017    Abnormal cardiovascular stress test 12/13/2017    Anemia     Anxiety     Arthritis     Bilateral carotid artery disease (H)     COPD (chronic obstructive pulmonary disease) (H)     Coronary artery disease     Dyslipidemia     Foreign body in left foot     Foreign body in left foot, subsequent encounter 10/11/2018    Heart murmur     Hypertension     Intermittent palpitations 12/02/2017    Migraines     Newly recognized murmur 10/18/2018    PAD (peripheral artery disease) (H)     Pure hyperglyceridemia 09/02/2021    Urge incontinence of urine 11/04/2018    Verruca plantaris 11/01/2018       Past Surgical History:   Procedure Laterality Date    ABDOMEN SURGERY      APPENDECTOMY      ARTERIAL BYPASS SURGERY  01/01/2006    BIOPSY BREAST Left     benign    BYPASS GRAFT AORTOFEMORAL Bilateral      CHOLECYSTECTOMY      COLONOSCOPY      ESOPHAGOSCOPY, GASTROSCOPY, DUODENOSCOPY (EGD), COMBINED N/A 2024    Procedure: ESOPHAGOGASTRODUODENOSCOPY, WITH BIOPSY;  Surgeon: Lizz Bray MD;  Location: [facility] OR    FEMORAL ARTERY STENT  2006    FOOT MASS EXCISION Left 2018    Soft tissue mass - benign    IR MISCELLANEOUS PROCEDURE  2006    IR MISCELLANEOUS PROCEDURE  2006    IR MISCELLANEOUS PROCEDURE  2006    IR MISCELLANEOUS PROCEDURE  2006    IR MISCELLANEOUS PROCEDURE  2006    TONSILLECTOMY      TYMPANOSTOMY TUBE PLACEMENT         Family History   Problem Relation Age of Onset    Breast Cancer Mother         was pt at the [facility]  21 years ago    Peripheral Vascular Disease Father     Other - See Comments Father         vasular problems     Hypertension Father     Hyperlipidemia Father     Cerebrovascular Disease Father     Other Cancer Father         skin    Coronary Artery Disease Father     No Known Problems Sister     No Known Problems Brother     Cancer Maternal Grandmother     Heart Disease Maternal Grandfather     Lung Cancer Paternal Grandmother     Kidney Cancer Paternal Grandfather     Colon Cancer No family hx of     Prostate Cancer No family hx of        Social History     Tobacco Use    Smoking status: Every Day     Current packs/day: 0.75     Average packs/day: 0.8 packs/day for 50.5 years (42.0 ttl pk-yrs)     Types: Cigarettes     Passive exposure: Current    Smokeless tobacco: Never    Tobacco comments:     1 ppd since 17 years old   Vaping Use    Vaping status: Never Used   Substance Use Topics    Alcohol use: Not Currently    Drug use: No       albuterol (PROAIR HFA/PROVENTIL HFA/VENTOLIN HFA) 108 (90 Base) MCG/ACT inhaler  alendronate (FOSAMAX) 70 MG tablet  amLODIPine (NORVASC) 5 MG tablet  aspirin 81 MG EC tablet  calcium carbonate-vitamin D (OSCAL) 500-5 MG-MCG tablet  citalopram (CELEXA) 20 MG tablet  DENTA 5000 PLUS 1.1 %  "CREA  famotidine (PEPCID) 20 MG tablet  folic acid (FOLVITE) 1 MG tablet  gabapentin (NEURONTIN) 100 MG capsule  lisinopril (ZESTRIL) 20 MG tablet  omeprazole (PRILOSEC) 40 MG DR capsule  ondansetron (ZOFRAN ODT) 4 MG ODT tab  oxyCODONE (ROXICODONE) 5 MG tablet  REPATHA SURECLICK 140 MG/ML prefilled autoinjector  sennosides (SENOKOT) 8.6 MG tablet  warfarin ANTICOAGULANT (COUMADIN) 5 MG tablet          Physical Exam     First Vitals:  Patient Vitals for the past 24 hrs:   BP Temp Temp src Pulse Resp SpO2 Height Weight   10/05/24 1000 (!) 183/78 -- -- -- -- -- -- --   10/05/24 0930 (!) 210/74 -- -- 59 -- 100 % -- --   10/05/24 0851 (!) 224/90 98.7  F (37.1  C) Oral 61 16 98 % 1.651 m (5' 5\") 54.4 kg (120 lb)         PHYSICAL EXAM:   Physical Exam  Constitutional:       General: She is not in acute distress.     Appearance: She is well-developed. She is not ill-appearing.   HENT:      Head: Normocephalic.      Nose: Nose normal. No congestion.      Mouth/Throat:      Mouth: Mucous membranes are moist.   Eyes:      Conjunctiva/sclera: Conjunctivae normal.   Cardiovascular:      Rate and Rhythm: Normal rate and regular rhythm.      Heart sounds: Normal heart sounds.   Pulmonary:      Effort: Pulmonary effort is normal.      Breath sounds: Normal breath sounds. No wheezing, rhonchi or rales.   Abdominal:      General: Abdomen is flat. There is no distension.      Palpations: There is no mass.      Tenderness: There is abdominal tenderness (RLQ). There is no guarding or rebound.   Musculoskeletal:         General: No swelling or tenderness.      Cervical back: Neck supple.   Skin:     General: Skin is warm and dry.      Capillary Refill: Capillary refill takes less than 2 seconds.   Neurological:      General: No focal deficit present.      Mental Status: She is alert and oriented to person, place, and time.   Psychiatric:         Mood and Affect: Mood normal.         Behavior: Behavior normal.             Results "     LAB:  All pertinent labs reviewed and interpreted  Labs Ordered and Resulted from Time of ED Arrival to Time of ED Departure   BASIC METABOLIC PANEL - Abnormal       Result Value    Sodium 137      Potassium 4.0      Chloride 99      Carbon Dioxide (CO2) 19 (*)     Anion Gap 19 (*)     Urea Nitrogen 25.4 (*)     Creatinine 1.23 (*)     GFR Estimate 51 (*)     Calcium 9.9      Glucose 141 (*)    ROUTINE UA WITH MICROSCOPIC REFLEX TO CULTURE - Abnormal    Color Urine Yellow      Appearance Urine Clear      Glucose Urine Negative      Bilirubin Urine Negative      Ketones Urine Negative      Specific Gravity Urine 1.027      Blood Urine 0.2 mg/dL (*)     pH Urine 5.5      Protein Albumin Urine 100 (*)     Urobilinogen Urine <2.0      Nitrite Urine Negative      Leukocyte Esterase Urine Negative      Mucus Urine Present (*)     RBC Urine 3 (*)     WBC Urine 1      Squamous Epithelials Urine 2 (*)     Hyaline Casts Urine 5 (*)    CBC WITH PLATELETS AND DIFFERENTIAL - Abnormal    WBC Count 14.1 (*)     RBC Count 3.78 (*)     Hemoglobin 11.3 (*)     Hematocrit 32.9 (*)     MCV 87      MCH 29.9      MCHC 34.3      RDW 14.4      Platelet Count 246      % Neutrophils 78      % Lymphocytes 12      % Monocytes 9      % Eosinophils 0      % Basophils 0      % Immature Granulocytes 0      NRBCs per 100 WBC 0      Absolute Neutrophils 10.9 (*)     Absolute Lymphocytes 1.8      Absolute Monocytes 1.3      Absolute Eosinophils 0.0      Absolute Basophils 0.0      Absolute Immature Granulocytes 0.1      Absolute NRBCs 0.0     MAGNESIUM - Normal    Magnesium 2.2         RADIOLOGY:  No orders to display          Paulina Gonzalez PA-C   Emergency Medicine   [facility] EMERGENCY ROOM        Paulina Gonzalez PA-C  10/05/24 1135

## 2024-10-05 NOTE — DISCHARGE INSTRUCTIONS
You were given IV fluids and compazine while in the emergency department to help with your nausea. You were also given a prescription for compazine for nausea management at home. You may use this every 6-8 hours as needed.    If you develop worsening nausea/vomiting, vomiting blood, loss of bowel or bladder control, diffuse abdominal pain, fever and chills, or any new symptoms, please return to the Emergency Department for evaluation.     Your blood pressure was high here in the emergency department. It is very common to have high blood pressure in the emergency department, this is usually from anxiety, pain, being in a new area. Over long periods of time high blood pressure can be damaging to the organs. We want to be sure that you get this checked at your primary care provider in the next few days to make sure that it is improved.

## 2024-10-05 NOTE — ED TRIAGE NOTES
"Pt c/o N/V/D x4 days. Denies abd or flank pain. Denies fevers. States she has \"kidney surgery\" for stent placement scheduled this upcoming Wednesday. Has been seen in the ED previously for similar symptoms related to her kidneys. Denies  issues but does endorse some normal incontinence.     Triage Assessment (Adult)       Row Name 10/05/24 0851          Triage Assessment    Airway WDL WDL        Respiratory WDL    Respiratory WDL WDL        Skin Circulation/Temperature WDL    Skin Circulation/Temperature WDL WDL        Cardiac WDL    Cardiac WDL WDL        Peripheral/Neurovascular WDL    Peripheral Neurovascular WDL WDL        Cognitive/Neuro/Behavioral WDL    Cognitive/Neuro/Behavioral WDL WDL                     "

## 2024-10-05 NOTE — ED PROVIDER NOTES
Emergency Department Midlevel Supervisory Note     I had a face to face encounter with this patient seen by the Advanced Practice Provider (JACQUELIN). I personally made/approved the management plan and take responsibility for the patient management. I personally saw patient and performed a substantive portion of the visit including all aspects of the medical decision making.     ED Course:  8:50 AM Paulina Sutherland PA-C staffed patient with me. I agree with their assessment and plan of management, and I will see the patient.  9:38 AM   I met with the patient to introduce myself, gather additional history, perform my initial exam, and discuss the plan.     Brief HPI:     Nery Whitaker is a 59 year old female who presents for evaluation of vomiting.     The patient has had nausea, vomiting and diarrhea since 10/01. She has a known urethral stricture and has an upcoming cystourethroscopy for right-sided stent placement on 10/09. She has not been able to keep food and liquids down for since 10/01 despite trying Zofran and OTC medications. She endorses right lower quadrant abdominal pain and urinary incontinence at baseline. The patient has had new diarrhea. She is anticoagulated on Warfarin.    She denies chest pain, shortness of breath, vision changes, fatigue, a headache or confusion.     Per Chart Review, the patient was seen on 10/04/2024 at [facility] in [place] for a preoperative examination. The patient was scheduled for a cystourethroscopy on 10/09/2024 with Dr. Clark Garcia. Because she had an INR of 1.6, she was instructed to take her evening dose of Warfarin and then hold this until the day after the procedure. Goal INR of 2-3. It was thought that this drop in INR was due to nausea and vomiting secondary to the stricture. The procedure was considered intermediate risk.     I, Hayley Byrne, am serving as a scribe to document services personally performed by Pratik Shaffer M.D., based on my  "observations and the provider's statements to me.   I, Pratik Shaffer M.D. attest that Hyaley Byrne was acting in a scribe capacity, has observed my performance of the services and has documented them in accordance with my direction.    Brief Physical Exam: BP (!) 183/78   Pulse 59   Temp 98.7  F (37.1  C) (Oral)   Resp 16   Ht 1.651 m (5' 5\")   Wt 54.4 kg (120 lb)   LMP  (LMP Unknown)   SpO2 100%   BMI 19.97 kg/m    Constitutional:  Alert, in no acute distress  EYES: Conjunctivae clear  HENT:  Atraumatic  Respiratory:  Respirations even, unlabored, in no acute respiratory distress  Cardiovascular:  Regular rate and rhythm, good peripheral perfusion  GI: Soft, non-distended, non-tender  Musculoskeletal:  Moves all 4 extremities equally, grossly symmetrical strength  Integument: Warm & dry. No appreciable rash, erythema.  Neurologic:  Alert & oriented, speech clear and fluent, no focal deficits noted  Psych: Normal mood and affect       MDM:  59-year-old female presents the emergency department with ongoing nausea and vomiting.  Acute on chronic issue secondary to known ureteral stricture in the right lower abdomen.  Scheduled for ureteral stent placement this week.  Difficulty controlling vomiting at home not responsive to nausea medication.  On examination patient noted to be hypertensive.  Appeared fatigued.  Issues with the ureteral stricture and hydronephrosis have primarily manifested as repetitive nausea and vomiting and difficulty with oral intake.  Our plan is for hydration in the emergency department antiemetics screening laboratory testing and urinalysis.  CT scan imaging reviewed from 9/20 both MRI and CT scan.  At this point I do not think there is indications for repeat imaging at this time we will plan to reassess after return of her laboratory testing and urinalysis.    10:53 AM  Patient improved with interventions in the emergency department and able to tolerate some oral intake.  Received " a liter of fluids for hydration.  Mild bump to kidney function but overall similar compared to previous.  No signs of infection at this time.  I think overall at this point patient is appropriate for discharge with plan to continue outpatient management and  outpatient intervention from urological services planned later this week.  We will adjust her antinausea medications for better symptomatic control patient comfortable this plan of care reviewed return precautions prior to discharge.  Please see JACQUELIN note for further details.       1. Nausea and vomiting, unspecified vomiting type          Labs and Imaging:  Results for orders placed or performed during the hospital encounter of 10/05/24   Basic metabolic panel   Result Value Ref Range    Sodium 137 135 - 145 mmol/L    Potassium 4.0 3.4 - 5.3 mmol/L    Chloride 99 98 - 107 mmol/L    Carbon Dioxide (CO2) 19 (L) 22 - 29 mmol/L    Anion Gap 19 (H) 7 - 15 mmol/L    Urea Nitrogen 25.4 (H) 6.0 - 20.0 mg/dL    Creatinine 1.23 (H) 0.51 - 0.95 mg/dL    GFR Estimate 51 (L) >60 mL/min/1.73m2    Calcium 9.9 8.8 - 10.4 mg/dL    Glucose 141 (H) 70 - 99 mg/dL   Result Value Ref Range    Magnesium 2.2 1.7 - 2.3 mg/dL   UA with Microscopic reflex to Culture    Specimen: Urine, Clean Catch   Result Value Ref Range    Color Urine Yellow Colorless, Straw, Light Yellow, Yellow    Appearance Urine Clear Clear    Glucose Urine Negative Negative mg/dL    Bilirubin Urine Negative Negative    Ketones Urine Negative Negative mg/dL    Specific Gravity Urine 1.027 1.001 - 1.030    Blood Urine 0.2 mg/dL (A) Negative    pH Urine 5.5 5.0 - 7.0    Protein Albumin Urine 100 (A) Negative mg/dL    Urobilinogen Urine <2.0 <2.0 mg/dL    Nitrite Urine Negative Negative    Leukocyte Esterase Urine Negative Negative    Mucus Urine Present (A) None Seen /LPF    RBC Urine 3 (H) <=2 /HPF    WBC Urine 1 <=5 /HPF    Squamous Epithelials Urine 2 (H) <=1 /HPF    Hyaline Casts Urine 5 (H) <=2 /LPF   CBC with  platelets and differential   Result Value Ref Range    WBC Count 14.1 (H) 4.0 - 11.0 10e3/uL    RBC Count 3.78 (L) 3.80 - 5.20 10e6/uL    Hemoglobin 11.3 (L) 11.7 - 15.7 g/dL    Hematocrit 32.9 (L) 35.0 - 47.0 %    MCV 87 78 - 100 fL    MCH 29.9 26.5 - 33.0 pg    MCHC 34.3 31.5 - 36.5 g/dL    RDW 14.4 10.0 - 15.0 %    Platelet Count 246 150 - 450 10e3/uL    % Neutrophils 78 %    % Lymphocytes 12 %    % Monocytes 9 %    % Eosinophils 0 %    % Basophils 0 %    % Immature Granulocytes 0 %    NRBCs per 100 WBC 0 <1 /100    Absolute Neutrophils 10.9 (H) 1.6 - 8.3 10e3/uL    Absolute Lymphocytes 1.8 0.8 - 5.3 10e3/uL    Absolute Monocytes 1.3 0.0 - 1.3 10e3/uL    Absolute Eosinophils 0.0 0.0 - 0.7 10e3/uL    Absolute Basophils 0.0 0.0 - 0.2 10e3/uL    Absolute Immature Granulocytes 0.1 <=0.4 10e3/uL    Absolute NRBCs 0.0 10e3/uL   Result Value Ref Range    INR 1.51 (H) 0.85 - 1.15       I have reviewed the relevant laboratory studies above.    Procedures:  I was present for the key portions of procedures documented in JACQUELIN/midlevel note, see midlevel note for further details.    Pratik Shaffer M.D.  [facility] EMERGENCY ROOM  [address] [id number] [phone]       Pratik Shaffer MD  10/05/24 3798

## 2024-10-07 NOTE — PROGRESS NOTES
ANTICOAGULATION MANAGEMENT     Nery Whitaker 59 year old female is on warfarin with subtherapeutic INR result. (Goal INR 2.0-3.0)     Anticoagulation Monitoring    INR   Latest Ref Rng 0.85 - 1.15    10/4/2024  8:10 AM 1.6 (H)    10/5/2024  10:35 AM 1.51 (H)       Legend:  (H) High    ASSESSMENT     Warfarin Lab Questionnaire    Warfarin Doses Last 7 Days      10/4/2024     5:53 AM   Dose in Tablet or Mg   TAB or MG? milligram (mg)           10/4/2024   Warfarin Lab Questionnaire   Missed doses within past 14 days? Yes-- reports that she hadn't been able to take warfarin since Tuesday because of her GI sx   If yes; please list when: Yesterday vomiting   Changes in diet or alcohol within past 14 days? No   Medication changes since last result? Yes   Please list: Oxycodone antinausea-- no longer taking oxycodone; taking compazine, tylenol and ibuprofen Q6H (reports this was advised by provider and is ok since not taking warfarin right now)   Injuries or illness since last result? Yes   If yes, please explain: Ureter blocked to kidney-- patient sounds like she does not feel well and is very uncomfortable   New shortness of breath, severe headaches or sudden changes in vision since last result? No   Abnormal bleeding since last result? No   Upcoming surgery, procedure? Yes   Please explain, date scheduled? Hopefully the 9th ureter stent and biopsy   Best number to call with results? [phone]        Previous result: Therapeutic last visit; previously outside of goal range  Additional findings: None       PLAN     Recommended plan for temporary change(s) affecting INR     Dosing Instructions: continue holding warfarin until after procedure; pending provider's approval, resume with booster doses evening of surgery with next INR ~1 week later       Summary  As of 10/4/2024      Full warfarin instructions:  10/4: Hold; 10/5: Hold; 10/6: Hold; 10/7: Hold; 10/8: Hold; 10/9: 12.5 mg; 10/10: 12.5 mg; Otherwise 5 mg every  Tue, Thu, Sat; 7.5 mg all other days   Next INR check:  10/16/2024               Telephone call with Nery who agrees to plan and repeated back plan correctly  Sent Publish[employer] message with dosing and follow up instructions    Patient offered & declined to schedule next visit-- states that she needs an OV for follow-up per pre-op provider, so will have INR checked with that visit (will call to schedule)    Education provided: Symptom monitoring: monitoring for clotting signs and symptoms, monitoring for stroke signs and symptoms, and when to seek medical attention/emergency care  Importance of notifying anticoagulation clinic for: diarrhea, nausea/vomiting, reduced intake, cold/flu, and/or infections; a sooner lab recheck maybe needed  Contact [phone] with any changes, questions or concerns.     Plan made per [facility] anticoagulation protocol    Marjorie Ball RN  10/7/2024  Anticoagulation Clinic  Beddit for routing messages: gloria MARTINI  [facility] patient phone line: [phone]        _______________________________________________________________________     Anticoagulation Episode Summary       Current INR goal:  2.0-3.0   TTR:  42.3% (1 y)   Target end date:  Indefinite   Send INR reminders to:  BERNICE MARTINI    Indications    Anticoagulation monitoring  INR range 2-3 [Z79.01]  PAD (peripheral artery disease) (H) [I73.9]  Long term (current) use of anticoagulants [Z79.01]  Hypercoagulable state (H) [D68.59]  Bilateral carotid artery disease  unspecified type (H) [I77.9]             Comments:               Anticoagulation Care Providers       Provider Role Specialty Phone number    Loraine Rees MD Referring Family Medicine [phone]    Vu Winters MD Referring Family Medicine [phone]    Priscila Lombardo APRN CNP Referring [facility] Medicine [phone]

## 2024-10-07 NOTE — TELEPHONE ENCOUNTER
Central Prior Authorization Team  Phone: [phone]    PA Initiation    Medication: FAMOTIDINE 20 MG PO TABS  Insurance Company: [employer] - Phone [phone] Fax [phone]  Pharmacy Filling the Rx: [facility] [address] [place] [address] [address]  Filling Pharmacy Phone: [phone]  Filling Pharmacy Fax: [phone]  Start Date: 10/7/2024

## 2024-10-07 NOTE — PROGRESS NOTES
ANTICOAGULATION  MANAGEMENT: Discharge Review    Nery Whitaker chart reviewed for anticoagulation continuity of care    Emergency room visit on 10/5/24 for nausea, vomiting, diarrhea.    Discharge disposition: Home    Prior to Admission:     Summary  As of 10/4/2024      Full warfarin instructions:  10/5: Hold; 10/6: Hold; 10/7: Hold; 10/8: Hold; 10/9: 12.5 mg; 10/10: 12.5 mg; Otherwise 5 mg every Tue; 7.5 mg all other days   Next INR check:            Recent Results:  Recent labs: (last 7 days)     10/04/24  0810 10/05/24  1035   INR 1.6* 1.51*       Anticoagulation inpatient management:     not applicable     Anticoagulation discharge instructions:     Warfarin dosing:  continue hold plan for upcoming surgery   Bridging: No    INR goal change: No      Medication changes affecting anticoagulation: No    Additional factors affecting anticoagulation: Yes: GI sx could affect INR if continues after resuming warfarin        Marjorie Ball RN  [facility]tico[facility]  [phone]

## 2024-10-07 NOTE — PROGRESS NOTES
Chief Complaint:    Hydronephrosis           Consult or Referral:     Mr. Nery Whitaker is a 59 year old female seen at the request of Dr. Montanez.         History of Present Illness:     Nery Whitaker is a 59 year old female being seen for RT LQ pain with hydronephrosis.  Duration of problem:  months   Previous treatments: none      Reviewed previous notes from Dr. Montanez    Nery complains of right lower abdomen and back pain  Seems to be getting worse  She has had history of aortoiliac bypass and there is dense fibrotic tissue especially on the right side which has increased in size over the last 6 months and is possibly causing obstruction on the right ureter  She is a smoker  She denies any gross hematuria             Past Medical History:     Past Medical History:   Diagnosis Date    Abdominal pain, epigastric 12/02/2017    Abnormal cardiovascular stress test 12/13/2017    Anemia     Anxiety     Arthritis     Bilateral carotid artery disease (H)     COPD (chronic obstructive pulmonary disease) (H)     Coronary artery disease     Dyslipidemia     Foreign body in left foot     Foreign body in left foot, subsequent encounter 10/11/2018    Heart murmur     Hypertension     Intermittent palpitations 12/02/2017    Migraines     Newly recognized murmur 10/18/2018    PAD (peripheral artery disease) (H)     Pure hyperglyceridemia 09/02/2021    Formatting of this note might be different from the original. Has been intoleratant to several medications.    .    Urge incontinence of urine 11/04/2018    Last Assessment & Plan:  Symptoms most consistent with urge incontinence. Ultrasound ordered to evaluate for any issues with incomplete emptying. Trial of Detrol LA 2 mg daily.  Last Assessment & Plan:  Formatting of this note might be different from the original. Detrol LA has been helpful but dries out her mouth. We will try a lower, short acting dose.    Verruca plantaris 11/01/2018            Past Surgical  History:     Past Surgical History:   Procedure Laterality Date    ABDOMEN SURGERY      APPENDECTOMY      ARTERIAL BYPASS SURGERY  01/01/2006    BIOPSY BREAST Left     benign    BYPASS GRAFT AORTOFEMORAL Bilateral     CHOLECYSTECTOMY      COLONOSCOPY      ESOPHAGOSCOPY, GASTROSCOPY, DUODENOSCOPY (EGD), COMBINED N/A 05/14/2024    Procedure: ESOPHAGOGASTRODUODENOSCOPY, WITH BIOPSY;  Surgeon: Lizz Bray MD;  Location: [facility] OR    FEMORAL ARTERY STENT  01/01/2006    FOOT MASS EXCISION Left 01/01/2018    Soft tissue mass - benign    IR MISCELLANEOUS PROCEDURE  01/17/2006    IR MISCELLANEOUS PROCEDURE  01/17/2006    IR MISCELLANEOUS PROCEDURE  01/17/2006    IR MISCELLANEOUS PROCEDURE  01/17/2006    IR MISCELLANEOUS PROCEDURE  01/18/2006    TONSILLECTOMY      TYMPANOSTOMY TUBE PLACEMENT              Medications     Current Outpatient Medications   Medication Sig Dispense Refill    albuterol (PROAIR HFA/PROVENTIL HFA/VENTOLIN HFA) 108 (90 Base) MCG/ACT inhaler Inhale 2 puffs into the lungs every 6 hours as needed for shortness of breath or wheezing 18 g 0    alendronate (FOSAMAX) 70 MG tablet Take 1 tablet by mouth once a week 12 tablet 1    amLODIPine (NORVASC) 5 MG tablet Take 1 tablet by mouth once daily 90 tablet 3    aspirin 81 MG EC tablet Take 81 mg by mouth daily      calcium carbonate-vitamin D (OSCAL) 500-5 MG-MCG tablet Take 1 tablet by mouth 2 times daily 90 tablet 3    citalopram (CELEXA) 20 MG tablet Take 1 tablet by mouth once daily 90 tablet 2    famotidine (PEPCID) 20 MG tablet Take 1 tablet by mouth twice daily 180 tablet 2    folic acid (FOLVITE) 1 MG tablet Take 1 tablet by mouth once daily 90 tablet 2    gabapentin (NEURONTIN) 100 MG capsule TAKE 1 CAPSULE BY MOUTH IN THE MORNING THEN  1  TABLET  AT  NOON  AND  3  TABLETS  AT  BEDTIME  DAILY 450 capsule 1    lisinopril (ZESTRIL) 20 MG tablet Take 1 tablet by mouth once daily 90 tablet 3    omeprazole (PRILOSEC) 40 MG DR capsule Take 1  capsule (40 mg) by mouth daily 30 capsule 2    ondansetron (ZOFRAN ODT) 4 MG ODT tab Take 1 tablet (4 mg) by mouth every 6 hours as needed for nausea. 16 tablet 0    oxyCODONE (ROXICODONE) 5 MG tablet Take 1 tablet (5 mg) by mouth every 6 hours as needed for pain. 20 tablet 0    REPATHA SURECLICK 140 MG/ML prefilled autoinjector INJECT 1ML (140MG) SUBCUTANEOUS EVERY 2 WEEKS. INJECT INTO ABDOMEN THIGH OR UPPER ARM. ROTATE INJECTION SITES.      sennosides (SENOKOT) 8.6 MG tablet Take 1 tablet by mouth daily.      DENTA 5000 PLUS 1.1 % CREA USE TO BRUSH TEETH AS DIRECTED TWICE DAILY      prochlorperazine (COMPAZINE) 10 MG tablet Take 1 tablet (10 mg) by mouth every 6 hours as needed for nausea or vomiting. 28 tablet 0    warfarin ANTICOAGULANT (COUMADIN) 5 MG tablet Take 1-1.5 tablets (5-7.5 mg) by mouth daily. Adjust dose per INR as directed by  tablet 1     No current facility-administered medications for this visit.            Family History:     Family History   Problem Relation Age of Onset    Breast Cancer Mother         was pt at the [facility]  21 years ago    Peripheral Vascular Disease Father     Other - See Comments Father         vasular problems     Hypertension Father     Hyperlipidemia Father     Cerebrovascular Disease Father     Other Cancer Father         skin    Coronary Artery Disease Father     No Known Problems Sister     No Known Problems Brother     Cancer Maternal Grandmother     Heart Disease Maternal Grandfather     Lung Cancer Paternal Grandmother     Kidney Cancer Paternal Grandfather     Colon Cancer No family hx of     Prostate Cancer No family hx of             Social History:     Social History     Socioeconomic History    Marital status:      Spouse name: Timothy    Number of children: 1    Years of education: 14    Highest education level: Associate degree: academic program   Occupational History    Occupation: [employer]   Tobacco Use    Smoking status: Every Day      Current packs/day: 0.75     Average packs/day: 0.8 packs/day for 50.5 years (42.0 ttl pk-yrs)     Types: Cigarettes     Passive exposure: Current    Smokeless tobacco: Never    Tobacco comments:     1 ppd since 17 years old   Vaping Use    Vaping status: Never Used   Substance and Sexual Activity    Alcohol use: Not Currently    Drug use: No    Sexual activity: Yes     Partners: Male     Birth control/protection: Post-menopausal, Male Surgical   Other Topics Concern    Parent/sibling w/ CABG, MI or angioplasty before 65F 55M? Yes   Social History Narrative    Not on file     Social Determinants of Health     Financial Resource Strain: High Risk (1/1/2022)    [facility] [email or web address], [facility]    Financial Resource Strain     Difficulty of Paying Living Expenses: Not on file     Difficulty of Paying Living Expenses: Not on file   Food Insecurity: Not on file   Transportation Needs: Not on file   Physical Activity: Not on file   Stress: Not on file   Social Connections: Unknown (1/1/2022)    Received from [employer], [email or web address]    Social Connections     Frequency of Communication with Friends and Family: Not on file   Interpersonal Safety: Low Risk  (10/4/2024)    Interpersonal Safety     Do you feel physically and emotionally safe where you currently live?: Yes     Within the past 12 months, have you been hit, slapped, kicked or otherwise physically hurt by someone?: No     Within the past 12 months, have you been humiliated or emotionally abused in other ways by your partner or ex-partner?: No   Housing Stability: Not on file            Allergies:   Ezetimibe, Lovastatin, and Simvastatin         Review of Systems:  From intake questionnaire     Skin: negative  Eyes: negative  Ears/Nose/Throat: negative  Respiratory: No shortness of breath, dyspnea on exertion, cough, or  "hemoptysis  Cardiovascular: No chest pain or palpitations  Gastrointestinal: negative; no nausea/vomiting, constipation or diarrhea  Genitourinary: as per HPI  Musculoskeletal: negative  Neurologic: negative  Psychiatric: negative  Hematologic/Lymphatic/Immunologic: negative  Endocrine: negative         Physical Exam:     Patient is a 59 year old  female   Vitals: Blood pressure (!) 151/70, pulse 59, temperature 98.8  F (37.1  C), temperature source Tympanic, not currently breastfeeding.  Constitutional: There is no height or weight on file to calculate BMI.  Alert, no acute distress, oriented, conversant  Eyes: no scleral icterus; extraocular muscles intact, moist conjunctivae  Neck: trachea midline, no thyromegaly  Ears/nose/mouth: throat/mouth:normal, good dentition  Respiratory: no respiratory distress, or pursed lip breathing  Cardiovascular: pulses strong and intact; no obvious jugular venous distension present  Gastrointestinal: soft, nontender, no organomegaly or masses,   Lymphatics: No inguinal adenopathy  Musculoskeletal: extremities normal, no peripheral edema  Skin: no suspicious lesions or rashes  Neuro: Alert, oriented, speech and mentation normal  Psych: affect and mood normal, alert and oriented to person, place and time  Gait: Normal  : deferred      Labs and Pathology:    The following labs were reviewed by me and discussed with the patient:  UA: Normal  Significant for   Lab Results   Component Value Date    CR 1.23 10/05/2024    CR 1.10 09/20/2024    CR 0.94 04/24/2024    CR 1.05 04/12/2024    CR 0.93 01/19/2024    CR 0.85 01/14/2024    CR 0.94 08/04/2023    CR 0.83 02/25/2022    CR 0.84 01/22/2021    CR 0.78 12/20/2019     No results found for: \"PSA\"          Imaging:    The following imaging exams were independently viewed and interpreted by me and discussed with patient:  CT Scan Abd/Pelvis: EXAM: CT ABDOMEN PELVIS W CONTRAST  LOCATION: [facility]  DATE: 9/20/2024   "   INDICATION:  RLQ abdominal pain.  COMPARISON: 5/22/2024.  TECHNIQUE: CT scan of the abdomen and pelvis was performed following injection of IV contrast. Multiplanar reformats were obtained. Dose reduction techniques were used.  CONTRAST: 61 ml Isovue 370     FINDINGS:   LOWER CHEST: Normal.     HEPATOBILIARY: Normal.     PANCREAS: Normal.     SPLEEN: Normal.     ADRENAL GLANDS: Normal.     KIDNEYS/BLADDER: New moderate right hydronephrosis and hydroureter extending to the mid right ureter, across the bifurcation of the internal and external iliac arteries. Mild soft tissue thickening and stranding in this location; soft tissue has mildly   increased along the inferior aspect of thickening with example area measuring 1.6 cm transverse dimension versus 11 mm previously (series 3, image 85). Otherwise, remaining soft tissue thickening is stable. Symmetric renal enhancement. No left   hydronephrosis. Bladder is partially decompressed.     BOWEL: Unremarkable bowel. No obstruction. Colonic diverticulosis without diverticulitis.     LYMPH NODES: Upper normal left retroperitoneal node measuring 9 mm short axis (series 3, image 56).     VASCULATURE: Post aorto-bifemoral bypass. Stenting along the bilateral native common iliac arteries.     PELVIC ORGANS: No free fluid.     MUSCULOSKELETAL: Bony demineralization.                                                                      IMPRESSION:      1.  New moderate right hydroureteronephrosis secondary to the previously documented area of soft tissue thickening in the right lower quadrant. Recommend urologic consultation.  MRI Abd/Pelvis:    Study Result    Narrative & Impression   EXAM: MR ABDOMEN W/O and W CONTRAST  LOCATION: [facility]  DATE: 9/20/2024     INDICATION: soft tissue swelling obstructing right ureter   ?etiology  COMPARISON: CTs AP 9/20/2024, 5/22/2024, 4/12/2024 and 1/14/2024  TECHNIQUE: Routine MRI abdomen protocol including T1  in/out phase, diffusion, multiplane T2, and dynamic T1 without and with IV contrast.   CONTRAST: Gadavist 6mL     FINDINGS:  KIDNEYS: Moderate right hydronephrosis and proximal ureterectasis secondary to encasement of the middle third of the right ureter by enhancing soft tissue infiltration of the retroperitoneal fat along the right side of the aortic portion and surrounding   the right iliac limb of the patient's aortobifemoral bypass graft. Left kidney is within normal limits.     HEPATOBILIARY: Liver is normal.  No bile duct dilatation. Cholecystectomy.      PANCREAS: Normal.     SPLEEN: Spleen size normal.     ADRENAL GLANDS: Normal.     BOWEL: Unremarkable. No ascites..     LYMPH NODES: No lymphadenopathy.     VASCULATURE: Aortobifemoral bypass graft. Enhancing soft tissue infiltration of the retroperitoneal fat along the right side of the aortic segment and surrounding the right iliac limb of the patient's aortobifemoral bypass graft has increased only   minimally since 8/16/2023. Stents in the atretic native aortic bifurcation and common iliac arteries.                                                                      IMPRESSION:   1.  Moderate right hydronephrosis and proximal ureterectasis secondary to encasement of the middle third of the right ureter by the soft tissue stranding along the right side of the aortic portion and surrounding the right iliac limb of the patient's   aortobifemoral bypass graft. The degree of soft tissue infiltration has increased only minimally since 8/16/2023. Findings are nonspecific but favor an indolent retroperitoneal fibrosis-type process with indolent ill-defined lymphadenopathy not   completely excluded. The minimal ill-defined tissue would not be amenable to accurate for safe image guided percutaneous biopsy.          Standardized Questionnaire:             Assessment and Plan:     Hydronephrosis with ureteral stricture, not elsewhere classified  Possibly  obstruction related to fibrotic tissue.  I reviewed the images and reviewed the results from the radiologist doing these appear to be right between the native arteries and the graft and therefore any biopsy will be possibly very risky in this location  I think the first important thing to do is to put a stent in 2 drain that obstructed right kidney and help her with her symptom relief.  I have put in a case request for the same  We discussed in detail about stent discomfort and hematuria as well as possible urinary tract infection being the possible complications of this procedure  She was understanding of this fact and is agreeable to proceed ahead with the planned procedure  Also discussed with her about the possibility of ureteral sampling and biopsy if you can get a camera all the way across to that narrow segment and find something suspicious over there.  This is for the rare possibility that the obstruction is caused by a ureteral mass and not by the fibrosis around.  Will then discuss the follow-up plan subsequent to the procedure  - Adult Urology  Referral  - Case Request: CYSTOURETEROSCOPY, WITH RETROGRADE PYELOGRAM AND STENT INSERTION, ureteral sampling and possible ureteral biopsy; Standing  - Case Request: CYSTOURETEROSCOPY, WITH RETROGRADE PYELOGRAM AND STENT INSERTION, ureteral sampling and possible ureteral biopsy      Plan:  Scheduled for surgery      Orders  Orders Placed This Encounter   Procedures    Case Request: CYSTOURETEROSCOPY, WITH RETROGRADE PYELOGRAM AND STENT INSERTION, ureteral sampling and possible ureteral biopsy       Clark Garcia MD  [facility]      ==========================    Additional Billing and Coding Information:  Review of external notes as documented above   Review of the result(s) of each unique test - UA, creatinine, CT, MRI                20 minutes spent by me on the date of the encounter doing chart review, review of  test results, interpretation of tests, patient visit, and documentation     ==========================

## 2024-10-09 ENCOUNTER — TELEPHONE (OUTPATIENT)
Dept: FAMILY MEDICINE | Facility: CLINIC | Age: 59
End: 2024-10-09
Payer: COMMERCIAL

## 2024-10-09 ENCOUNTER — PREP FOR PROCEDURE (OUTPATIENT)
Dept: SURGERY | Facility: CLINIC | Age: 59
End: 2024-10-09
Payer: COMMERCIAL

## 2024-10-09 ENCOUNTER — TELEPHONE (OUTPATIENT)
Dept: ANTICOAGULATION | Facility: CLINIC | Age: 59
End: 2024-10-09
Payer: COMMERCIAL

## 2024-10-09 DIAGNOSIS — D68.59 HYPERCOAGULABLE STATE (H): ICD-10-CM

## 2024-10-09 DIAGNOSIS — Z79.01 LONG TERM (CURRENT) USE OF ANTICOAGULANTS: ICD-10-CM

## 2024-10-09 DIAGNOSIS — Z79.01 ANTICOAGULATION MONITORING, INR RANGE 2-3: Primary | ICD-10-CM

## 2024-10-09 DIAGNOSIS — I77.9 BILATERAL CAROTID ARTERY DISEASE, UNSPECIFIED TYPE (H): ICD-10-CM

## 2024-10-09 DIAGNOSIS — I73.9 PAD (PERIPHERAL ARTERY DISEASE) (H): ICD-10-CM

## 2024-10-09 PROBLEM — N13.1 HYDRONEPHROSIS WITH URETERAL STRICTURE, NOT ELSEWHERE CLASSIFIED: Status: ACTIVE | Noted: 2024-09-25

## 2024-10-09 NOTE — TELEPHONE ENCOUNTER
[facility]-PROCEDURAL ANTICOAGULATION  MANAGEMENT    Nery requesting pre-procedure hold orders for warfarin and review for bridging      Procedure date: 10/22/24 - CANCELLED ON 10/9/24 DUE TO IV SHORTAGE       Procedure: CYSTOURETEROSCOPY, WITH RETROGRADE PYELOGRAM AND STENT INSERTION       Procedure location and phone number (if external): [place]     Number of warfarin hold days requested and/or target INR: 5 days    Pre-op date:  10/4/24      Routing to Anticoagulation Pharmacist for review.     ACC pool: gloria Mcclendon RN

## 2024-10-09 NOTE — TELEPHONE ENCOUNTER
PRIOR AUTHORIZATION DENIED    Medication: FAMOTIDINE 20 MG PO TABS  Insurance Company: [employer] - Phone [phone] Fax [phone]  Denial Date: 10/9/2024    Denial Reason(s): Patient must have a history of trial & failure to the formulary alternatives or have a contraindication or intolerance to the formulary alternatives:  - Famotidine 40mg tablets and Famotidine 40mg/ 5mL susp    Appeal Information: N/A

## 2024-10-09 NOTE — TELEPHONE ENCOUNTER
General Call    Contacts       Contact Date/Time Type Contact Phone/Fax    10/09/2024 08:22 AM CDT Phone (Incoming) Nery Whitaker (Self) [phone] (H)          Reason for Call:  surgery cancelled and has dosing questions for today    What are your questions or concerns:  dosing     Date of last appointment with provider: no     Could we send this information to you in [employer] or would you prefer to receive a phone call?:   Patient would prefer a phone call   Okay to leave a detailed message?: Yes at Cell number on file:    Telephone Information:   Home  [phone]

## 2024-10-09 NOTE — TELEPHONE ENCOUNTER
Please let patient know that her insurance is not covering famotidine. I'd recommend she purchase over the counter equivalent. Her pharmacist can help her. Thanks!   GERALD Crandall CNP on 10/9/2024 at 1:00 PM

## 2024-10-09 NOTE — TELEPHONE ENCOUNTER
Surgery was cancelled for today. Has not had Warfarin since 10/1/24.  Per plan from several days ago - On 10/9/24 AND 10/10/24 take warfarin booster dose of 12.5 mg then resume current warfarin dose, 5 mg Tuesdays, Thursdays and Saturdays and 7.5 mg all other days of the week.   Recheck INR on 10/16/24 to ensure INR returning to goal range.    Nery will resume warfarin as planned above. Surgery is rescheduled to 10/22/24. I will send a message to [facility] for a new plan.    I will also send a [email or web address] Message to Nery per her request.    Shannen Mcclendon RN

## 2024-10-10 NOTE — TELEPHONE ENCOUNTER
"ELMA-PROCEDURAL ANTICOAGULATION  MANAGEMENT    ASSESSMENT     Warfarin interruption plan for rescheduled-CYSTOURETEROSCOPY, WITH RETROGRADE PYELOGRAM AND STENT INSERTION  on 10/22/24.    Indication for Anticoagulation:  PAD    Past procedure management  Held without bridge for multiple procedures  5/2024-upper endoscopy; 6/2022-FINA; 4/2022-Colonoscopy; 11/2021-[facility]A; 10/2018  Previously reviewed with Dr. Nieves vascular-3/2022, 1/2022, 11/2021    There are currently no guidelines addressing elma-procedural bridging in this indication. The decision to bridging is based on the risk of bleeding weighed against the risk of clotting.      PLAN     Recently cleared at 10/4 pre-op to hold warfarin 10/5-10/8 for 10/8 procedure which was rescheduled. Resumed 10/9 PM and INR planned 10/16    Pre-Procedure:   Hold for 5 days, until after procedure starting: 10/17/24   IF INR < 2 on 10/16 then reduce hold to:  Hold for 4 days, until after procedure starting: 10/18/24  No Bridge    Post-Procedure:  Resume warfarin dose if okay with provider doing procedure on night of procedure, 10/22 PM: 12.5 mg daily x 2 then resume current dose  Recheck INR ~ 7 days after resuming warfarin     Plan routed to referring provider for approval  ?   aTtum Solis, [facility]    SUBJECTIVE/OBJECTIVE     Nery Whitaker, a 59 year old female    Goal INR Range: 2.0-3.0     Wt Readings from Last 3 Encounters:   10/05/24 54.4 kg (120 lb)   10/04/24 54.6 kg (120 lb 6.4 oz)   09/20/24 56.3 kg (124 lb 3.2 oz)      Ideal body weight: 57 kg (125 lb 10.6 oz)     Estimated body mass index is 19.97 kg/m  as calculated from the following:    Height as of 10/5/24: 1.651 m (5' 5\").    Weight as of 10/5/24: 54.4 kg (120 lb).    Lab Results   Component Value Date    INR 1.51 (H) 10/05/2024    INR 1.6 (H) 10/04/2024    INR 2.07 (H) 09/20/2024     Lab Results   Component Value Date    HGB 11.3 (L) 10/05/2024    HCT 32.9 (L) 10/05/2024     10/05/2024     Lab Results "   Component Value Date    CR 1.23 (H) 10/05/2024    CR 1.10 (H) 09/20/2024    CR 0.94 04/24/2024     Estimated Creatinine Clearance: 42.8 mL/min (A) (based on SCr of 1.23 mg/dL (H)).

## 2024-10-10 NOTE — PROGRESS NOTES
ACC RN plan reviewed at time of encounter. Agree with plan as outlined.    Tatum Solis, [facility]

## 2024-10-11 NOTE — TELEPHONE ENCOUNTER
Priscila Lombardo, APRN CNP  You17 hours ago (8:57 PM)     Cristofer Winn,  That plan sounds more than reasonable. Thanks for updating me.  -Priscila

## 2024-10-15 ENCOUNTER — MYC MEDICAL ADVICE (OUTPATIENT)
Dept: FAMILY MEDICINE | Facility: CLINIC | Age: 59
End: 2024-10-15
Payer: COMMERCIAL

## 2024-10-15 DIAGNOSIS — R11.0 NAUSEA: Primary | ICD-10-CM

## 2024-10-15 RX ORDER — PROCHLORPERAZINE MALEATE 10 MG
10 TABLET ORAL EVERY 6 HOURS PRN
Qty: 20 TABLET | Refills: 0 | Status: SHIPPED | OUTPATIENT
Start: 2024-10-15

## 2024-10-16 ENCOUNTER — LAB (OUTPATIENT)
Dept: LAB | Facility: CLINIC | Age: 59
End: 2024-10-16
Payer: COMMERCIAL

## 2024-10-16 ENCOUNTER — ANTICOAGULATION THERAPY VISIT (OUTPATIENT)
Dept: ANTICOAGULATION | Facility: CLINIC | Age: 59
End: 2024-10-16

## 2024-10-16 DIAGNOSIS — Z79.01 LONG TERM (CURRENT) USE OF ANTICOAGULANTS: ICD-10-CM

## 2024-10-16 DIAGNOSIS — Z79.01 ANTICOAGULATION MONITORING, INR RANGE 2-3: Primary | ICD-10-CM

## 2024-10-16 DIAGNOSIS — D68.59 HYPERCOAGULABLE STATE (H): ICD-10-CM

## 2024-10-16 DIAGNOSIS — I77.9 BILATERAL CAROTID ARTERY DISEASE, UNSPECIFIED TYPE (H): ICD-10-CM

## 2024-10-16 DIAGNOSIS — I73.9 PAD (PERIPHERAL ARTERY DISEASE) (H): ICD-10-CM

## 2024-10-16 LAB — INR BLD: 3.6 (ref 0.9–1.1)

## 2024-10-16 PROCEDURE — 85610 PROTHROMBIN TIME: CPT

## 2024-10-16 PROCEDURE — 36416 COLLJ CAPILLARY BLOOD SPEC: CPT

## 2024-10-16 NOTE — TELEPHONE ENCOUNTER
Per Tatum Solis, [facility], reduce post-procedure boost to 10 mg x1, then resume maintenance dose.   Patient instructed on procedure plan in AC visit today.     AC calendar updated to reflect plan; no questions from patient.      Marjorie Ball RN  [facility]  [phone]

## 2024-10-16 NOTE — PROGRESS NOTES
ANTICOAGULATION MANAGEMENT     Nery Whitaker 59 year old female is on warfarin with supratherapeutic INR result. (Goal INR 2.0-3.0)    Recent labs: (last 7 days)     10/16/24  1321   INR 3.6*       ASSESSMENT     Warfarin Lab Questionnaire    Warfarin Doses Last 7 Days      10/15/2024    11:23 AM   Dose in Tablet or Mg   TAB or MG? milligram (mg)   **verbally confirmed dosing with patient-- took booster doses last week as instructed, then resumed maintenance dose**        10/15/2024   Warfarin Lab Questionnaire   Missed doses within past 14 days? No   Changes in diet or alcohol within past 14 days? No   Medication changes since last result? No   Injuries or illness since last result? No   New shortness of breath, severe headaches or sudden changes in vision since last result? No   Abnormal bleeding since last result? No   Upcoming surgery, procedure? Yes   Please explain, date scheduled? Kidney October 23   Best number to call with results? [phone]        Previous result: Subtherapeutic  Additional findings: Upcoming surgery/procedure urology surgery on 10/22/24-- procedure plan finalized in TE from 10/9/24       PLAN     3:09 PM: consulted with Tatum Solis [facility] regarding tonight's dose, given supratherapeutic INR today.      Recommended plan for temporary change(s) affecting INR     Dosing Instructions: partial hold then follow procedure plan instructions with next INR ~1 week after resuming warfarin       Summary  As of 10/16/2024      Full warfarin instructions:  10/16: 5 mg; 10/17: Hold; 10/18: Hold; 10/19: Hold; 10/20: Hold; 10/21: Hold; 10/22: 10 mg; Otherwise 5 mg every Tue, Thu, Sat; 7.5 mg all other days   Next INR check:  10/30/2024               Telephone call with Nery who agrees to plan and repeated back plan correctly  Sent Spottly message with dosing and follow up instructions    Lab visit scheduled    Education provided: Symptom monitoring: monitoring for bleeding signs and symptoms,  monitoring for clotting signs and symptoms, monitoring for stroke signs and symptoms, when to seek medical attention/emergency care, and if you hit your head or have a bad fall seek emergency care  Contact [phone] with any changes, questions or concerns.     Plan made with [facility] Pharmacist Tatum Ball, RN  10/16/2024  Anticoagulation Clinic  [facility] for routing messages: gloria MARTINI  ACC patient phone line: [phone]        _______________________________________________________________________     Anticoagulation Episode Summary       Current INR goal:  2.0-3.0   TTR:  43.2% (1 y)   Target end date:  Indefinite   Send INR reminders to:  BERNICE MARTINI    Indications    Anticoagulation monitoring  INR range 2-3 [Z79.01]  PAD (peripheral artery disease) (H) [I73.9]  Long term (current) use of anticoagulants [Z79.01]  Hypercoagulable state (H) [D68.59]  Bilateral carotid artery disease  unspecified type (H) [I77.9]             Comments:               Anticoagulation Care Providers       Provider Role Specialty Phone number    Loraine Rees MD Referring Family Medicine [phone]    Vu Winters MD Referring Family Medicine [phone]    Priscila Lombardo APRN CNP Referring Family Medicine [phone]

## 2024-10-21 ENCOUNTER — ANESTHESIA EVENT (OUTPATIENT)
Dept: SURGERY | Facility: AMBULATORY SURGERY CENTER | Age: 59
End: 2024-10-21
Payer: COMMERCIAL

## 2024-10-22 ENCOUNTER — TELEPHONE (OUTPATIENT)
Dept: FAMILY MEDICINE | Facility: CLINIC | Age: 59
End: 2024-10-22
Payer: COMMERCIAL

## 2024-10-22 ENCOUNTER — HOSPITAL ENCOUNTER (OUTPATIENT)
Facility: AMBULATORY SURGERY CENTER | Age: 59
Discharge: HOME | End: 2024-10-22
Attending: STUDENT IN AN ORGANIZED HEALTH CARE EDUCATION/TRAINING PROGRAM | Admitting: STUDENT IN AN ORGANIZED HEALTH CARE EDUCATION/TRAINING PROGRAM
Payer: COMMERCIAL

## 2024-10-22 ENCOUNTER — ANESTHESIA (OUTPATIENT)
Dept: SURGERY | Facility: AMBULATORY SURGERY CENTER | Age: 59
End: 2024-10-22
Payer: COMMERCIAL

## 2024-10-22 ENCOUNTER — LAB REQUISITION (OUTPATIENT)
Dept: LAB | Facility: HOSPITAL | Age: 59
End: 2024-10-22

## 2024-10-22 VITALS
SYSTOLIC BLOOD PRESSURE: 119 MMHG | HEART RATE: 48 BPM | DIASTOLIC BLOOD PRESSURE: 53 MMHG | BODY MASS INDEX: 20.66 KG/M2 | HEIGHT: 65 IN | TEMPERATURE: 96.9 F | WEIGHT: 124 LBS | RESPIRATION RATE: 15 BRPM | OXYGEN SATURATION: 99 %

## 2024-10-22 DIAGNOSIS — N13.1 HYDRONEPHROSIS WITH URETERAL STRICTURE, NOT ELSEWHERE CLASSIFIED: Primary | ICD-10-CM

## 2024-10-22 PROCEDURE — 52354 CYSTOURETERO W/BIOPSY: CPT | Mod: RT | Performed by: STUDENT IN AN ORGANIZED HEALTH CARE EDUCATION/TRAINING PROGRAM

## 2024-10-22 PROCEDURE — 88112 CYTOPATH CELL ENHANCE TECH: CPT | Mod: TC | Performed by: STUDENT IN AN ORGANIZED HEALTH CARE EDUCATION/TRAINING PROGRAM

## 2024-10-22 PROCEDURE — 52332 CYSTOSCOPY AND TREATMENT: CPT | Mod: RT | Performed by: STUDENT IN AN ORGANIZED HEALTH CARE EDUCATION/TRAINING PROGRAM

## 2024-10-22 PROCEDURE — 74420 UROGRAPHY RTRGR +-KUB: CPT | Mod: 26 | Performed by: STUDENT IN AN ORGANIZED HEALTH CARE EDUCATION/TRAINING PROGRAM

## 2024-10-22 DEVICE — STENT, URETERAL, 6FR X 26CM, HYDROPLUS COATING, WITHOUT WIRE, PERCUFLEX PLUS: Type: IMPLANTABLE DEVICE | Site: URETER | Status: FUNCTIONAL

## 2024-10-22 RX ORDER — ACETAMINOPHEN 325 MG/1
975 TABLET ORAL ONCE
Status: COMPLETED | OUTPATIENT
Start: 2024-10-22 | End: 2024-10-22

## 2024-10-22 RX ORDER — HYDROMORPHONE HCL IN WATER/PF 6 MG/30 ML
0.4 PATIENT CONTROLLED ANALGESIA SYRINGE INTRAVENOUS EVERY 5 MIN PRN
Status: DISCONTINUED | OUTPATIENT
Start: 2024-10-22 | End: 2024-10-23 | Stop reason: HOSPADM

## 2024-10-22 RX ORDER — ACETAMINOPHEN 650 MG/1
650 SUPPOSITORY RECTAL ONCE
Status: COMPLETED | OUTPATIENT
Start: 2024-10-22 | End: 2024-10-22

## 2024-10-22 RX ORDER — DEXAMETHASONE SODIUM PHOSPHATE 4 MG/ML
4 INJECTION, SOLUTION INTRA-ARTICULAR; INTRALESIONAL; INTRAMUSCULAR; INTRAVENOUS; SOFT TISSUE
Status: DISCONTINUED | OUTPATIENT
Start: 2024-10-22 | End: 2024-10-23 | Stop reason: HOSPADM

## 2024-10-22 RX ORDER — ACETAMINOPHEN 325 MG/1
975 TABLET ORAL ONCE
Status: DISCONTINUED | OUTPATIENT
Start: 2024-10-22 | End: 2024-10-23 | Stop reason: HOSPADM

## 2024-10-22 RX ORDER — ONDANSETRON 2 MG/ML
4 INJECTION INTRAMUSCULAR; INTRAVENOUS EVERY 30 MIN PRN
Status: DISCONTINUED | OUTPATIENT
Start: 2024-10-22 | End: 2024-10-23 | Stop reason: HOSPADM

## 2024-10-22 RX ORDER — ONDANSETRON 4 MG/1
4 TABLET, ORALLY DISINTEGRATING ORAL EVERY 30 MIN PRN
Status: DISCONTINUED | OUTPATIENT
Start: 2024-10-22 | End: 2024-10-23 | Stop reason: HOSPADM

## 2024-10-22 RX ORDER — HYDROMORPHONE HCL IN WATER/PF 6 MG/30 ML
0.2 PATIENT CONTROLLED ANALGESIA SYRINGE INTRAVENOUS EVERY 5 MIN PRN
Status: DISCONTINUED | OUTPATIENT
Start: 2024-10-22 | End: 2024-10-23 | Stop reason: HOSPADM

## 2024-10-22 RX ORDER — OXYCODONE HYDROCHLORIDE 5 MG/1
5 TABLET ORAL
Status: DISCONTINUED | OUTPATIENT
Start: 2024-10-22 | End: 2024-10-23 | Stop reason: HOSPADM

## 2024-10-22 RX ORDER — LIDOCAINE HYDROCHLORIDE 20 MG/ML
INJECTION, SOLUTION INFILTRATION; PERINEURAL PRN
Status: DISCONTINUED | OUTPATIENT
Start: 2024-10-22 | End: 2024-10-22

## 2024-10-22 RX ORDER — SODIUM CHLORIDE, SODIUM LACTATE, POTASSIUM CHLORIDE, CALCIUM CHLORIDE 600; 310; 30; 20 MG/100ML; MG/100ML; MG/100ML; MG/100ML
INJECTION, SOLUTION INTRAVENOUS CONTINUOUS
Status: DISCONTINUED | OUTPATIENT
Start: 2024-10-22 | End: 2024-10-23 | Stop reason: HOSPADM

## 2024-10-22 RX ORDER — OXYCODONE HYDROCHLORIDE 10 MG/1
10 TABLET ORAL
Status: DISCONTINUED | OUTPATIENT
Start: 2024-10-22 | End: 2024-10-23 | Stop reason: HOSPADM

## 2024-10-22 RX ORDER — LIDOCAINE 40 MG/G
CREAM TOPICAL
Status: DISCONTINUED | OUTPATIENT
Start: 2024-10-22 | End: 2024-10-23 | Stop reason: HOSPADM

## 2024-10-22 RX ORDER — ONDANSETRON 4 MG/1
4 TABLET, ORALLY DISINTEGRATING ORAL EVERY 8 HOURS PRN
Qty: 4 TABLET | Refills: 0 | Status: SHIPPED | OUTPATIENT
Start: 2024-10-22

## 2024-10-22 RX ORDER — DEXAMETHASONE SODIUM PHOSPHATE 4 MG/ML
INJECTION, SOLUTION INTRA-ARTICULAR; INTRALESIONAL; INTRAMUSCULAR; INTRAVENOUS; SOFT TISSUE PRN
Status: DISCONTINUED | OUTPATIENT
Start: 2024-10-22 | End: 2024-10-22

## 2024-10-22 RX ORDER — CEFAZOLIN SODIUM/WATER 2 G/20 ML
2 SYRINGE (ML) INTRAVENOUS SEE ADMIN INSTRUCTIONS
Status: DISCONTINUED | OUTPATIENT
Start: 2024-10-22 | End: 2024-10-23 | Stop reason: HOSPADM

## 2024-10-22 RX ORDER — FENTANYL CITRATE 0.05 MG/ML
25 INJECTION, SOLUTION INTRAMUSCULAR; INTRAVENOUS EVERY 5 MIN PRN
Status: DISCONTINUED | OUTPATIENT
Start: 2024-10-22 | End: 2024-10-23 | Stop reason: HOSPADM

## 2024-10-22 RX ORDER — FENTANYL CITRATE 0.05 MG/ML
25 INJECTION, SOLUTION INTRAMUSCULAR; INTRAVENOUS
Status: DISCONTINUED | OUTPATIENT
Start: 2024-10-22 | End: 2024-10-23 | Stop reason: HOSPADM

## 2024-10-22 RX ORDER — PROPOFOL 10 MG/ML
INJECTION, EMULSION INTRAVENOUS CONTINUOUS PRN
Status: DISCONTINUED | OUTPATIENT
Start: 2024-10-22 | End: 2024-10-22

## 2024-10-22 RX ORDER — GLYCOPYRROLATE 0.2 MG/ML
INJECTION, SOLUTION INTRAMUSCULAR; INTRAVENOUS PRN
Status: DISCONTINUED | OUTPATIENT
Start: 2024-10-22 | End: 2024-10-22

## 2024-10-22 RX ORDER — OXYBUTYNIN CHLORIDE 5 MG/1
5 TABLET, EXTENDED RELEASE ORAL DAILY PRN
Qty: 30 TABLET | Refills: 0 | Status: SHIPPED | OUTPATIENT
Start: 2024-10-22 | End: 2024-11-21

## 2024-10-22 RX ORDER — PROPOFOL 10 MG/ML
INJECTION, EMULSION INTRAVENOUS PRN
Status: DISCONTINUED | OUTPATIENT
Start: 2024-10-22 | End: 2024-10-22

## 2024-10-22 RX ORDER — DEXMEDETOMIDINE HYDROCHLORIDE 4 UG/ML
INJECTION, SOLUTION INTRAVENOUS PRN
Status: DISCONTINUED | OUTPATIENT
Start: 2024-10-22 | End: 2024-10-22

## 2024-10-22 RX ORDER — TAMSULOSIN HYDROCHLORIDE 0.4 MG/1
0.4 CAPSULE ORAL DAILY
Qty: 30 CAPSULE | Refills: 0 | Status: SHIPPED | OUTPATIENT
Start: 2024-10-22 | End: 2024-11-21

## 2024-10-22 RX ORDER — FENTANYL CITRATE 0.05 MG/ML
50 INJECTION, SOLUTION INTRAMUSCULAR; INTRAVENOUS EVERY 5 MIN PRN
Status: DISCONTINUED | OUTPATIENT
Start: 2024-10-22 | End: 2024-10-23 | Stop reason: HOSPADM

## 2024-10-22 RX ORDER — ONDANSETRON 2 MG/ML
INJECTION INTRAMUSCULAR; INTRAVENOUS PRN
Status: DISCONTINUED | OUTPATIENT
Start: 2024-10-22 | End: 2024-10-22

## 2024-10-22 RX ORDER — FENTANYL CITRATE 50 UG/ML
INJECTION, SOLUTION INTRAMUSCULAR; INTRAVENOUS PRN
Status: DISCONTINUED | OUTPATIENT
Start: 2024-10-22 | End: 2024-10-22

## 2024-10-22 RX ORDER — NALOXONE HYDROCHLORIDE 0.4 MG/ML
0.1 INJECTION, SOLUTION INTRAMUSCULAR; INTRAVENOUS; SUBCUTANEOUS
Status: DISCONTINUED | OUTPATIENT
Start: 2024-10-22 | End: 2024-10-23 | Stop reason: HOSPADM

## 2024-10-22 RX ORDER — AMOXICILLIN 250 MG
1-2 CAPSULE ORAL 2 TIMES DAILY
Qty: 30 TABLET | Refills: 0 | Status: SHIPPED | OUTPATIENT
Start: 2024-10-22

## 2024-10-22 RX ORDER — CEFAZOLIN SODIUM/WATER 2 G/20 ML
2 SYRINGE (ML) INTRAVENOUS
Status: COMPLETED | OUTPATIENT
Start: 2024-10-22 | End: 2024-10-22

## 2024-10-22 RX ADMIN — PROPOFOL 200 MG: 10 INJECTION, EMULSION INTRAVENOUS at 11:27

## 2024-10-22 RX ADMIN — LIDOCAINE HYDROCHLORIDE 2.5 ML: 20 INJECTION, SOLUTION INFILTRATION; PERINEURAL at 11:23

## 2024-10-22 RX ADMIN — Medication 2 G: at 11:32

## 2024-10-22 RX ADMIN — DEXAMETHASONE SODIUM PHOSPHATE 8 MG: 4 INJECTION, SOLUTION INTRA-ARTICULAR; INTRALESIONAL; INTRAMUSCULAR; INTRAVENOUS; SOFT TISSUE at 11:27

## 2024-10-22 RX ADMIN — PROPOFOL 150 MCG/KG/MIN: 10 INJECTION, EMULSION INTRAVENOUS at 11:27

## 2024-10-22 RX ADMIN — DEXMEDETOMIDINE HYDROCHLORIDE 8 MCG: 4 INJECTION, SOLUTION INTRAVENOUS at 12:02

## 2024-10-22 RX ADMIN — ACETAMINOPHEN 975 MG: 325 TABLET ORAL at 10:54

## 2024-10-22 RX ADMIN — DEXMEDETOMIDINE HYDROCHLORIDE 8 MCG: 4 INJECTION, SOLUTION INTRAVENOUS at 11:50

## 2024-10-22 RX ADMIN — GLYCOPYRROLATE 0.2 MG: 0.2 INJECTION, SOLUTION INTRAMUSCULAR; INTRAVENOUS at 11:27

## 2024-10-22 RX ADMIN — FENTANYL CITRATE 100 MCG: 50 INJECTION, SOLUTION INTRAMUSCULAR; INTRAVENOUS at 11:23

## 2024-10-22 RX ADMIN — ONDANSETRON 4 MG: 2 INJECTION INTRAMUSCULAR; INTRAVENOUS at 11:52

## 2024-10-22 RX ADMIN — SODIUM CHLORIDE, SODIUM LACTATE, POTASSIUM CHLORIDE, CALCIUM CHLORIDE: 600; 310; 30; 20 INJECTION, SOLUTION INTRAVENOUS at 11:06

## 2024-10-22 ASSESSMENT — COPD QUESTIONNAIRES: COPD: 1

## 2024-10-22 ASSESSMENT — LIFESTYLE VARIABLES: TOBACCO_USE: 1

## 2024-10-22 NOTE — ANESTHESIA POSTPROCEDURE EVALUATION
Patient: Nery Whitaker    Procedure: Procedure(s):  CYSTOURETEROSCOPY, WITH RETROGRADE PYELOGRAM AND STENT INSERTION, ureteral sampling , Right       Anesthesia Type:  General    Note:  Disposition: Outpatient   Postop Pain Control: Uneventful            Sign Out: Well controlled pain   PONV: No   Neuro/Psych: Uneventful            Sign Out: Acceptable/Baseline neuro status   Airway/Respiratory: Uneventful            Sign Out: Acceptable/Baseline resp. status   CV/Hemodynamics: Uneventful            Sign Out: Acceptable CV status; No obvious hypovolemia; No obvious fluid overload   Other NRE: NONE   DID A NON-ROUTINE EVENT OCCUR? No           Last vitals:  Vitals Value Taken Time   /53 10/22/24 1230   Temp 96.9  F (36.1  C) 10/22/24 1209   Pulse 52 10/22/24 1235   Resp 14 10/22/24 1209   SpO2 98 % 10/22/24 1236   Vitals shown include unfiled device data.    Electronically Signed By: Unique Chow MD  October 22, 2024  1:06 PM

## 2024-10-22 NOTE — TELEPHONE ENCOUNTER
"Called and spoke with patient.   Reviewed dosing instructions post-op; Therativet message sent per patient request as she is still \"a little out of it\" from the anesthesia.     Will follow-up with patient next week after INR unless something comes up sooner.    Marjorie Ball RN  [facility] Anticoagulation  [phone]    "

## 2024-10-22 NOTE — ANESTHESIA PROCEDURE NOTES
Airway    Staff -        Anesthesiologist:  Unique Chow MD       CRNA: Ariana Welch APRN CRNA       Performed By: CRNAIndications and Patient Condition       Indications for airway management: ragini-procedural and airway protection       Induction type:intravenous       Mask difficulty assessment: 0 - not attempted    Final Airway Details       Final airway type: supraglottic airway    Supraglottic Airway Details        Type: LMA       LMA size: 3    Post intubation assessment        Placement verified by: capnometry, equal breath sounds and chest rise        Number of attempts at approach: 1       Ease of procedure: easy       Dentition: Intact and Unchanged

## 2024-10-22 NOTE — ANESTHESIA CARE TRANSFER NOTE
Patient: Nery Whitaker    Procedure: Procedure(s):  CYSTOURETEROSCOPY, WITH RETROGRADE PYELOGRAM AND STENT INSERTION, ureteral sampling , Right       Diagnosis: Hydronephrosis with ureteral stricture, not elsewhere classified [N13.1]  Diagnosis Additional Information: No value filed.    Anesthesia Type:   MAC     Note:    Oropharynx: oral airway in place and spontaneously breathing  Level of Consciousness: unresponsive  Oxygen Supplementation: face mask  Level of Supplemental Oxygen (L/min / FiO2): 8  Independent Airway: airway patency satisfactory and stable  Dentition: dentition unchanged  Vital Signs Stable: post-procedure vital signs reviewed and stable  Report to RN Given: handoff report given  Patient transferred to: PACU    Handoff Report: Identifed the Patient, Identified the Reponsible Provider, Reviewed the pertinent medical history, Discussed the surgical course, Reviewed Intra-OP anesthesia mangement and issues during anesthesia, Set expectations for post-procedure period and Allowed opportunity for questions and acknowledgement of understanding  Vitals:  Vitals Value Taken Time   /63 10/22/24 1209   Temp 96.9  F (36.1  C) 10/22/24 1209   Pulse     Resp 14 10/22/24 1209   SpO2 100 % 10/22/24 1209       Electronically Signed By: GERALD Trinh CRNA  October 22, 2024  12:10 PM

## 2024-10-22 NOTE — ANESTHESIA PREPROCEDURE EVALUATION
Anesthesia Pre-Procedure Evaluation    Patient: Nery Whitaker   MRN: [id number] : 1965        Procedure : Procedure(s):  CYSTOURETEROSCOPY, WITH RETROGRADE PYELOGRAM AND STENT INSERTION, ureteral sampling and possible ureteral biopsy          Past Medical History:   Diagnosis Date    Abdominal pain, epigastric 2017    Abnormal cardiovascular stress test 2017    Adenomatous colon polyp     Anemia     Antiplatelet or antithrombotic long-term use     Anxiety     Arthritis     Atypical lymphocytosis     Bilateral carotid artery disease (H)     COPD (chronic obstructive pulmonary disease) (H)     Coronary artery disease     Dyslipidemia     Foreign body in left foot     Foreign body in left foot, subsequent encounter 10/11/2018    Heart murmur     Hydronephrosis with ureteral stricture, not elsewhere classified     Hypertension     Intermittent palpitations 2017    Migraines     Newly recognized murmur 10/18/2018    Osteoporosis     PAD (peripheral artery disease) (H)     Pain in thoracic spine     Pure hyperglyceridemia 2021    Formatting of this note might be different from the original. Has been intoleratant to several medications.    .    Stented coronary artery     Urge incontinence of urine 2018    Last Assessment & Plan:  Symptoms most consistent with urge incontinence. Ultrasound ordered to evaluate for any issues with incomplete emptying. Trial of Detrol LA 2 mg daily.  Last Assessment & Plan:  Formatting of this note might be different from the original. Detrol LA has been helpful but dries out her mouth. We will try a lower, short acting dose.    Nora plantaris 2018      Past Surgical History:   Procedure Laterality Date    ABDOMEN SURGERY      APPENDECTOMY      ARTERIAL BYPASS SURGERY  2006    BIOPSY BREAST Left     benign    BYPASS GRAFT AORTOFEMORAL Bilateral     CHOLECYSTECTOMY      COLONOSCOPY      ESOPHAGOSCOPY, GASTROSCOPY, DUODENOSCOPY (EGD),  COMBINED N/A 05/14/2024    Procedure: ESOPHAGOGASTRODUODENOSCOPY, WITH BIOPSY;  Surgeon: Lizz Bray MD;  Location: [facility] OR    FEMORAL ARTERY STENT  01/01/2006    FOOT MASS EXCISION Left 01/01/2018    Soft tissue mass - benign    IR MISCELLANEOUS PROCEDURE  01/17/2006    IR MISCELLANEOUS PROCEDURE  01/17/2006    IR MISCELLANEOUS PROCEDURE  01/17/2006    IR MISCELLANEOUS PROCEDURE  01/17/2006    IR MISCELLANEOUS PROCEDURE  01/18/2006    TONSILLECTOMY      TYMPANOSTOMY TUBE PLACEMENT        Allergies   Allergen Reactions    Ezetimibe      constipation    Lovastatin Unknown     constipation    Simvastatin      Other reaction(s): Constipation  Intolerance, but tolerated with re-challenge in Feb to April 2009      Social History     Tobacco Use    Smoking status: Every Day     Current packs/day: 0.75     Average packs/day: 0.8 packs/day for 50.5 years (42.0 ttl pk-yrs)     Types: Cigarettes     Passive exposure: Current    Smokeless tobacco: Never    Tobacco comments:     1 ppd since 17 years old   Substance Use Topics    Alcohol use: Not Currently      Wt Readings from Last 1 Encounters:   10/22/24 56.2 kg (124 lb)        Anesthesia Evaluation            ROS/MED HX  ENT/Pulmonary:     (+)                tobacco use, Current use,         COPD,              Neurologic:       Cardiovascular: Comment: Carotid artery disease  Peripheral arterial disease    (+)  hypertension- -  CAD -  - -   Taking blood thinners                        valvular problems/murmurs           METS/Exercise Tolerance: >4 METS    Hematologic:       Musculoskeletal:       GI/Hepatic:  - neg GI/hepatic ROS     Renal/Genitourinary:     (+) renal disease,             Endo:  - neg endo ROS     Psychiatric/Substance Use:       Infectious Disease:  - neg infectious disease ROS     Malignancy:       Other:            Physical Exam    Airway        Mallampati: II   TM distance: > 3 FB   Neck ROM: full   Mouth opening: > 3 cm    Respiratory  Devices and Support         Dental     Comment: Permanent crowns upper jaw        Cardiovascular   cardiovascular exam normal       Rhythm and rate: regular and normal     Pulmonary   pulmonary exam normal                OUTSIDE LABS:  CBC:   Lab Results   Component Value Date    WBC 14.1 (H) 10/05/2024    WBC 10.7 09/20/2024    HGB 11.3 (L) 10/05/2024    HGB 11.0 (L) 09/20/2024    HCT 32.9 (L) 10/05/2024    HCT 33.9 (L) 09/20/2024     10/05/2024     09/20/2024     BMP:   Lab Results   Component Value Date     10/05/2024     09/20/2024    POTASSIUM 4.0 10/05/2024    POTASSIUM 4.3 09/20/2024    CHLORIDE 99 10/05/2024    CHLORIDE 105 09/20/2024    CO2 19 (L) 10/05/2024    CO2 25 09/20/2024    BUN 25.4 (H) 10/05/2024    BUN 13 09/20/2024    CR 1.23 (H) 10/05/2024    CR 1.10 (H) 09/20/2024     (H) 10/05/2024    GLC 90 09/20/2024     COAGS:   Lab Results   Component Value Date    INR 3.6 (H) 10/16/2024     POC:   Lab Results   Component Value Date    HCG Negative 10/23/2018     HEPATIC:   Lab Results   Component Value Date    ALBUMIN 4.2 04/24/2024    PROTTOTAL 7.3 04/24/2024    ALT 22 04/24/2024    AST 27 04/24/2024    ALKPHOS 114 04/24/2024    BILITOTAL <0.2 04/24/2024     OTHER:   Lab Results   Component Value Date    LACT 1.1 04/12/2024    BEVERLY 9.9 10/05/2024    MAG 2.2 10/05/2024    LIPASE 25 01/19/2024    TSH 2.18 04/12/2024    SED 16 08/04/2023       Anesthesia Plan    ASA Status:  3    NPO Status:  NPO Appropriate    Anesthesia Type: General.     - Airway: LMA   Induction: Propofol.   Maintenance: TIVA.        Consents    Anesthesia Plan(s) and associated risks, benefits, and realistic alternatives discussed. Questions answered and patient/representative(s) expressed understanding.     - Discussed:     - Discussed with:  Patient, Spouse       - Patient is DNR/DNI Status: No          Postoperative Care    Pain management: IV analgesics.   PONV prophylaxis: Ondansetron (or other  5HT-3), Dexamethasone or Solumedrol     Comments:               Unique Chow MD    I have reviewed the pertinent notes and labs in the chart from the past 30 days and (re)examined the patient.  Any updates or changes from those notes are reflected in this note.            # Drug Induced Coagulation Defect: home medication list includes an anticoagulant medication  # Drug Induced Platelet Defect: home medication list includes an antiplatelet medication   # Hypertension: Noted on problem list

## 2024-10-22 NOTE — H&P
Urology H and P Update    I have evaluated Nery Whitaker  today and examined her. She does not note any significant issues since her last evaluation. Based on my evaluation, she is fit to proceed ahead with the planned procedure.    Clark Garcia MD

## 2024-10-22 NOTE — BRIEF OP NOTE
Brief Operative Note    Pre-operative diagnosis: Hydronephrosis with ureteral stricture, not elsewhere classified [N13.1]   Post-operative diagnosis * No post-op diagnosis entered *   Procedure: Procedure(s):  CYSTOURETEROSCOPY, WITH RETROGRADE PYELOGRAM AND STENT INSERTION, ureteral sampling , Right   Surgeon: Clark Garcia MD   Assistants(s): None   Estimated blood loss: 0 ml    Specimens: Right ureteral washing   Findings: Long segment narrowing in the distal ureter, no ureteral lesions identified

## 2024-10-22 NOTE — TELEPHONE ENCOUNTER
General Call      Reason for Call:  patient called and would like to speak with Shannen or Marjorie anti coag nurse at [place].    Surgery and INR questions.    Thank you.      What are your questions or concerns:  no    Date of last appointment with provider: na    Could we send this information to you in [place] or would you prefer to receive a phone call?:   Patient would prefer a phone call   Okay to leave a detailed message?: Yes at Home number on file [phone] (home)

## 2024-10-22 NOTE — DISCHARGE INSTRUCTIONS
You have received 975 mg of Acetaminophen (Tylenol) at 10:55AM. Please do not take an additional dose of Tylenol until after 4:55PM     Do not exceed 4,000 mg of acetaminophen during a 24 hour period and keep in mind that acetaminophen can also be found in many over-the-counter cold medications as well as narcotics that may be given for pain.        You received a dose of IV Toradol at ***. Please do not take any additional Ibuprofen/NSAID products (Aleve/Advil/Motrin/Naproxen/Celebrex) until after ***.        If you have any questions or concerns regarding your procedure, please contact Dr. Garcia, his office number is [phone].     Adult Discharge Orders & Instructions     For 24 hours after surgery    Get plenty of rest.  A responsible adult must stay with you for at least 24 hours after you leave the hospital.   Do not drive or use heavy equipment.  If you have weakness or tingling, don't drive or use heavy equipment until this feeling goes away.  Do not drink alcohol.  Avoid strenuous or risky activities.  Ask for help when climbing stairs.   You may feel lightheaded.  IF so, sit for a few minutes before standing.  Have someone help you get up.   If you have nausea (feel sick to your stomach): Drink only clear liquids such as apple juice, ginger ale, broth or 7-Up.  Rest may also help.  Be sure to drink enough fluids.  Move to a regular diet as you feel able.  You may have a slight fever. Call the doctor if your fever is over 100 F (37.7 C) (taken under the tongue) or lasts longer than 24 hours.  You may have a dry mouth, a sore throat, muscle aches or trouble sleeping.  These should go away after 24 hours.  Do not make important or legal decisions.     Call your doctor for any of the followin.  Signs of infection (fever, growing tenderness at the surgery site, a large amount of drainage or bleeding, severe pain, foul-smelling drainage, redness, swelling).    2. It has been over 8 to 10 hours since  surgery and you are still not able to urinate (pass water).    3.  Headache for over 24 hours.

## 2024-10-24 ENCOUNTER — MYC MEDICAL ADVICE (OUTPATIENT)
Dept: UROLOGY | Facility: CLINIC | Age: 59
End: 2024-10-24
Payer: COMMERCIAL

## 2024-10-24 NOTE — TELEPHONE ENCOUNTER
Writer called pt and discussed stent sx-pain is expected and can take otc and meds she was given to help.  Educated pt that stent discomfort will remain until removed-pt verbalized understanding, went over at home cares for pain and sx to go back to ER for-stops urinating, fever/chills or severe pain, pt unable to eat much due to vomiting yesterday and does have pain but is controlled.  Advised if vomiting begins again needs to go back to the ER-pt is hydrating.  No other concerns and all questions answered.  Pt will call with any other needs  KARIN John  Care Coordinator Urology  [phone]

## 2024-10-29 ENCOUNTER — OFFICE VISIT (OUTPATIENT)
Dept: PEDIATRICS | Facility: CLINIC | Age: 59
End: 2024-10-29
Payer: COMMERCIAL

## 2024-10-29 ENCOUNTER — ANTICOAGULATION THERAPY VISIT (OUTPATIENT)
Dept: ANTICOAGULATION | Facility: CLINIC | Age: 59
End: 2024-10-29

## 2024-10-29 VITALS
OXYGEN SATURATION: 97 % | RESPIRATION RATE: 14 BRPM | TEMPERATURE: 98.1 F | BODY MASS INDEX: 19.24 KG/M2 | SYSTOLIC BLOOD PRESSURE: 113 MMHG | WEIGHT: 115.6 LBS | HEART RATE: 77 BPM | DIASTOLIC BLOOD PRESSURE: 69 MMHG

## 2024-10-29 DIAGNOSIS — I77.9 BILATERAL CAROTID ARTERY DISEASE, UNSPECIFIED TYPE (H): ICD-10-CM

## 2024-10-29 DIAGNOSIS — E86.1 HYPOTENSION DUE TO HYPOVOLEMIA: Primary | ICD-10-CM

## 2024-10-29 DIAGNOSIS — D68.59 HYPERCOAGULABLE STATE (H): ICD-10-CM

## 2024-10-29 DIAGNOSIS — Z79.01 ANTICOAGULATION MONITORING, INR RANGE 2-3: Primary | ICD-10-CM

## 2024-10-29 DIAGNOSIS — N17.9 AKI (ACUTE KIDNEY INJURY) (H): ICD-10-CM

## 2024-10-29 DIAGNOSIS — I73.9 PAD (PERIPHERAL ARTERY DISEASE) (H): ICD-10-CM

## 2024-10-29 DIAGNOSIS — Z96.0 STATUS POST PLACEMENT OF URETERAL STENT: ICD-10-CM

## 2024-10-29 DIAGNOSIS — Z79.01 LONG TERM (CURRENT) USE OF ANTICOAGULANTS: ICD-10-CM

## 2024-10-29 LAB
ALBUMIN SERPL-MCNC: 4.5 G/DL (ref 3.4–5)
ALBUMIN UR-MCNC: >=300 MG/DL
ALP SERPL-CCNC: 81 U/L (ref 40–150)
ALT SERPL W P-5'-P-CCNC: 11 U/L (ref 0–50)
AMORPH CRY #/AREA URNS HPF: ABNORMAL /HPF
ANION GAP SERPL CALCULATED.3IONS-SCNC: 17 MMOL/L (ref 3–14)
APPEARANCE UR: ABNORMAL
AST SERPL W P-5'-P-CCNC: 20 U/L (ref 0–45)
BACTERIA #/AREA URNS HPF: ABNORMAL /HPF
BASOPHILS # BLD AUTO: 0.1 10E3/UL (ref 0–0.2)
BASOPHILS NFR BLD AUTO: 0 %
BILIRUB SERPL-MCNC: 0.6 MG/DL (ref 0.2–1.3)
BILIRUB UR QL STRIP: ABNORMAL
BUN SERPL-MCNC: 49 MG/DL (ref 7–30)
CALCIUM SERPL-MCNC: 9.1 MG/DL (ref 8.5–10.1)
CHLORIDE BLD-SCNC: 105 MMOL/L (ref 94–109)
CO2 SERPL-SCNC: 19 MMOL/L (ref 20–32)
COLOR UR AUTO: ABNORMAL
CREAT SERPL-MCNC: 2.7 MG/DL (ref 0.52–1.04)
EGFRCR SERPLBLD CKD-EPI 2021: 20 ML/MIN/1.73M2
EOSINOPHIL # BLD AUTO: 0.3 10E3/UL (ref 0–0.7)
EOSINOPHIL NFR BLD AUTO: 2 %
ERYTHROCYTE [DISTWIDTH] IN BLOOD BY AUTOMATED COUNT: 14.6 % (ref 10–15)
GLUCOSE BLD-MCNC: 108 MG/DL (ref 70–99)
GLUCOSE UR STRIP-MCNC: NEGATIVE MG/DL
HCT VFR BLD AUTO: 35.6 % (ref 35–47)
HGB BLD-MCNC: 12.1 G/DL (ref 11.7–15.7)
HGB UR QL STRIP: ABNORMAL
IMM GRANULOCYTES # BLD: 0.1 10E3/UL
IMM GRANULOCYTES NFR BLD: 0 %
INR PPP: 2.2 (ref 0.85–1.15)
KETONES UR STRIP-MCNC: ABNORMAL MG/DL
LACTATE SERPL-SCNC: 2 MMOL/L (ref 0.7–2)
LEUKOCYTE ESTERASE UR QL STRIP: ABNORMAL
LYMPHOCYTES # BLD AUTO: 4.6 10E3/UL (ref 0.8–5.3)
LYMPHOCYTES NFR BLD AUTO: 34 %
MCH RBC QN AUTO: 30.2 PG (ref 26.5–33)
MCHC RBC AUTO-ENTMCNC: 34 G/DL (ref 31.5–36.5)
MCV RBC AUTO: 89 FL (ref 78–100)
MONOCYTES # BLD AUTO: 1.2 10E3/UL (ref 0–1.3)
MONOCYTES NFR BLD AUTO: 9 %
NEUTROPHILS # BLD AUTO: 7.3 10E3/UL (ref 1.6–8.3)
NEUTROPHILS NFR BLD AUTO: 54 %
NITRATE UR QL: NEGATIVE
PH UR STRIP: 5.5 [PH] (ref 5–8)
PLATELET # BLD AUTO: 292 10E3/UL (ref 150–450)
POTASSIUM BLD-SCNC: 4.2 MMOL/L (ref 3.4–5.3)
PROCALCITONIN SERPL IA-MCNC: 0.06 NG/ML
PROT SERPL-MCNC: 7.5 G/DL (ref 6.8–8.8)
RBC # BLD AUTO: 4.01 10E6/UL (ref 3.8–5.2)
RBC #/AREA URNS AUTO: >100 /HPF
SODIUM SERPL-SCNC: 141 MMOL/L (ref 135–145)
SP GR UR STRIP: >=1.03 (ref 1–1.03)
SQUAMOUS #/AREA URNS AUTO: ABNORMAL /LPF
TROPONIN T SERPL HS-MCNC: 18 NG/L
TROPONIN T SERPL HS-MCNC: 27 NG/L
UROBILINOGEN UR STRIP-ACNC: 1 E.U./DL
WBC # BLD AUTO: 13.5 10E3/UL (ref 4–11)
WBC #/AREA URNS AUTO: ABNORMAL /HPF

## 2024-10-29 PROCEDURE — 84484 ASSAY OF TROPONIN QUANT: CPT | Performed by: EMERGENCY MEDICINE

## 2024-10-29 PROCEDURE — 99215 OFFICE O/P EST HI 40 MIN: CPT | Mod: 25 | Performed by: EMERGENCY MEDICINE

## 2024-10-29 PROCEDURE — 87040 BLOOD CULTURE FOR BACTERIA: CPT | Performed by: EMERGENCY MEDICINE

## 2024-10-29 PROCEDURE — 96361 HYDRATE IV INFUSION ADD-ON: CPT | Performed by: EMERGENCY MEDICINE

## 2024-10-29 PROCEDURE — 87086 URINE CULTURE/COLONY COUNT: CPT | Performed by: EMERGENCY MEDICINE

## 2024-10-29 PROCEDURE — 80053 COMPREHEN METABOLIC PANEL: CPT | Performed by: EMERGENCY MEDICINE

## 2024-10-29 PROCEDURE — 36415 COLL VENOUS BLD VENIPUNCTURE: CPT | Performed by: EMERGENCY MEDICINE

## 2024-10-29 PROCEDURE — 84145 PROCALCITONIN (PCT): CPT | Performed by: EMERGENCY MEDICINE

## 2024-10-29 PROCEDURE — 87149 DNA/RNA DIRECT PROBE: CPT | Performed by: EMERGENCY MEDICINE

## 2024-10-29 PROCEDURE — 96374 THER/PROPH/DIAG INJ IV PUSH: CPT | Performed by: EMERGENCY MEDICINE

## 2024-10-29 PROCEDURE — 85610 PROTHROMBIN TIME: CPT | Performed by: EMERGENCY MEDICINE

## 2024-10-29 PROCEDURE — 81001 URINALYSIS AUTO W/SCOPE: CPT | Performed by: EMERGENCY MEDICINE

## 2024-10-29 PROCEDURE — 83605 ASSAY OF LACTIC ACID: CPT | Performed by: EMERGENCY MEDICINE

## 2024-10-29 PROCEDURE — 87077 CULTURE AEROBIC IDENTIFY: CPT | Performed by: EMERGENCY MEDICINE

## 2024-10-29 PROCEDURE — 99417 PROLNG OP E/M EACH 15 MIN: CPT | Performed by: EMERGENCY MEDICINE

## 2024-10-29 PROCEDURE — 85025 COMPLETE CBC W/AUTO DIFF WBC: CPT | Performed by: EMERGENCY MEDICINE

## 2024-10-29 PROCEDURE — 93005 ELECTROCARDIOGRAM TRACING: CPT | Performed by: EMERGENCY MEDICINE

## 2024-10-29 PROCEDURE — 93010 ELECTROCARDIOGRAM REPORT: CPT | Performed by: INTERNAL MEDICINE

## 2024-10-29 RX ORDER — CEFTRIAXONE 1 G/1
1 INJECTION, POWDER, FOR SOLUTION INTRAMUSCULAR; INTRAVENOUS ONCE
Status: DISCONTINUED | OUTPATIENT
Start: 2024-10-29 | End: 2024-10-29

## 2024-10-29 RX ADMIN — CEFTRIAXONE 1 G: 1 INJECTION, POWDER, FOR SOLUTION INTRAMUSCULAR; INTRAVENOUS at 14:13

## 2024-10-29 RX ADMIN — Medication 1000 ML: at 12:50

## 2024-10-29 RX ADMIN — Medication 1000 ML: at 11:34

## 2024-10-29 ASSESSMENT — PAIN SCALES - GENERAL: PAINLEVEL_OUTOF10: MILD PAIN (2)

## 2024-10-29 NOTE — PROGRESS NOTES
[facility] Visit    Nursing triage note    Subjective   Nery is a 59 year old, presenting for the following health issues:  Chest Pain      Objective    /69 (BP Location: Right arm, Patient Position: Sitting, Cuff Size: Adult Small)   Pulse 77   Temp 98.1  F (36.7  C) (Oral)   Resp 14   Wt 52.4 kg (115 lb 9.6 oz)   LMP  (LMP Unknown)   SpO2 97%   BMI 19.24 kg/m    Body mass index is 19.24 kg/m .    Orthostatic blood pressures are as follows:  Laying 90/58 pulse 60  Sitting 86/56 pulse 67  Standing 83/62 pulse 99        ADS PROVIDER NOTE  [facility], located within [facility]      History     Chief Complaint   Patient presents with    Chest Pain     HPI  Nery Whitaker is a 59 year old female who was diagnosed with a right moderate hydronephrosis by CT and MRI in September.  Patient had an ill-defined mass along her right ureter causing obstruction which is why the MRI was performed to rule out neoplasm.  Patient finally underwent right ureteral stenting 1 week ago.  Since that time the patient has been having episodes of dizziness mostly while standing and walking around but now also while sitting.  Patient states the dizziness is near syncopal in nature and not vertiginous.  Patient states she has not had a lot of oral intake since her surgery but states that she has been drinking fluids.  Patient denies any diarrhea any fevers any abdominal pain but states that she has pain across her upper lumbar back and in her left side that is dull and pressure-like in nature.  Patient states she has been having that for several days.    Patient does have a previous history of Aorto-Bi-Fem bypass in 2006 with stents placed previously that clotted and subsequently she had bypass and wound up on Coumadin.  Patient also has a history of a nondiagnostic stress echo in 2017 followed up by a cardiac CT which revealed some plaque in the LAD that was non occlusive.  Patient has not had any coronary stents  placed.    I have reviewed the Medications, Allergies, Past Medical and Surgical History, and Social History in the Epic system.      Past Medical History:   Diagnosis Date    Abdominal pain, epigastric 12/02/2017    Abnormal cardiovascular stress test 12/13/2017    Adenomatous colon polyp     Anemia     Antiplatelet or antithrombotic long-term use     Anxiety     Arthritis     Atypical lymphocytosis     Bilateral carotid artery disease (H)     COPD (chronic obstructive pulmonary disease) (H)     Coronary artery disease     Dyslipidemia     Foreign body in left foot     Foreign body in left foot, subsequent encounter 10/11/2018    Heart murmur     Hydronephrosis with ureteral stricture, not elsewhere classified     Hypertension     Intermittent palpitations 12/02/2017    Migraines     Newly recognized murmur 10/18/2018    Osteoporosis     PAD (peripheral artery disease) (H)     Pain in thoracic spine     Pure hyperglyceridemia 09/02/2021    Formatting of this note might be different from the original. Has been intoleratant to several medications.    .    Stented coronary artery     Urge incontinence of urine 11/04/2018    Last Assessment & Plan:  Symptoms most consistent with urge incontinence. Ultrasound ordered to evaluate for any issues with incomplete emptying. Trial of Detrol LA 2 mg daily.  Last Assessment & Plan:  Formatting of this note might be different from the original. Detrol LA has been helpful but dries out her mouth. We will try a lower, short acting dose.    Verruca plantaris 11/01/2018       Past Surgical History:   Procedure Laterality Date    ABDOMEN SURGERY      APPENDECTOMY      ARTERIAL BYPASS SURGERY  01/01/2006    BIOPSY BREAST Left     benign    BYPASS GRAFT AORTOFEMORAL Bilateral     CHOLECYSTECTOMY      COLONOSCOPY      COMBINED CYSTOSCOPY, RETROGRADES, EXCHANGE STENT URETER(S) Right 10/22/2024    Procedure: CYSTOURETEROSCOPY, WITH RETROGRADE PYELOGRAM AND STENT INSERTION, ureteral  sampling , Right;  Surgeon: Clark Garcia MD;  Location: [facility]    ESOPHAGOSCOPY, GASTROSCOPY, DUODENOSCOPY (EGD), COMBINED N/A 2024    Procedure: ESOPHAGOGASTRODUODENOSCOPY, WITH BIOPSY;  Surgeon: Lizz Bray MD;  Location: [facility]    FEMORAL ARTERY STENT  2006    FOOT MASS EXCISION Left 2018    Soft tissue mass - benign    IR MISCELLANEOUS PROCEDURE  2006    IR MISCELLANEOUS PROCEDURE  2006    IR MISCELLANEOUS PROCEDURE  2006    IR MISCELLANEOUS PROCEDURE  2006    IR MISCELLANEOUS PROCEDURE  2006    TONSILLECTOMY      TYMPANOSTOMY TUBE PLACEMENT             Dose / Directions   albuterol 108 (90 Base) MCG/ACT inhaler  Commonly known as: PROAIR HFA/PROVENTIL HFA/VENTOLIN HFA  Used for: Interstitial emphysema and related condition of       Dose: 2 puff  Inhale 2 puffs into the lungs every 6 hours as needed for shortness of breath or wheezing  Quantity: 18 g  Refills: 0     alendronate 70 MG tablet  Commonly known as: FOSAMAX  Used for: Age-related osteoporosis without current pathological fracture      Take 1 tablet by mouth once a week  Quantity: 12 tablet  Refills: 1     amLODIPine 5 MG tablet  Commonly known as: NORVASC  Used for: Essential hypertension      Take 1 tablet by mouth once daily  Quantity: 90 tablet  Refills: 3     aspirin 81 MG EC tablet      Dose: 81 mg  Take 81 mg by mouth daily  Refills: 0     calcium carbonate-vitamin D 500-5 MG-MCG tablet  Commonly known as: OSCAL  Used for: Age-related osteoporosis without current pathological fracture      Dose: 1 tablet  Take 1 tablet by mouth 2 times daily  Quantity: 90 tablet  Refills: 3     citalopram 20 MG tablet  Commonly known as: celeXA  Used for: Anxiety state      Take 1 tablet by mouth once daily  Quantity: 90 tablet  Refills: 2     Denta 5000 Plus 1.1 % Crea  Generic drug: sodium fluoride      USE TO BRUSH TEETH AS DIRECTED TWICE DAILY  Refills: 0      famotidine 20 MG tablet  Commonly known as: PEPCID  Used for: Gastroesophageal reflux disease without esophagitis      Dose: 20 mg  Take 1 tablet by mouth twice daily  Quantity: 180 tablet  Refills: 2     folic acid 1 MG tablet  Commonly known as: FOLVITE  Used for: Hypercoagulable state (H)      Take 1 tablet by mouth once daily  Quantity: 90 tablet  Refills: 2     gabapentin 100 MG capsule  Commonly known as: NEURONTIN  Used for: Thoracic neuralgia      TAKE 1 CAPSULE BY MOUTH IN THE MORNING THEN  1  TABLET  AT  NOON  AND  3  TABLETS  AT  BEDTIME  DAILY  Quantity: 450 capsule  Refills: 1     lisinopril 20 MG tablet  Commonly known as: ZESTRIL  Used for: Essential hypertension      Take 1 tablet by mouth once daily  Quantity: 90 tablet  Refills: 3     omeprazole 40 MG DR capsule  Commonly known as: PriLOSEC  Used for: Epigastric pain      Dose: 40 mg  Take 1 capsule (40 mg) by mouth daily  Quantity: 30 capsule  Refills: 2     * ondansetron 4 MG ODT tab  Commonly known as: ZOFRAN ODT  Used for: Hydronephrosis with ureteral stricture, not elsewhere classified      Dose: 4 mg  Take 1 tablet (4 mg) by mouth every 6 hours as needed for nausea.  Quantity: 16 tablet  Refills: 0     * ondansetron 4 MG ODT tab  Commonly known as: ZOFRAN ODT  Used for: Hydronephrosis with ureteral stricture, not elsewhere classified      Dose: 4 mg  Take 1 tablet (4 mg) by mouth every 8 hours as needed for nausea.  Quantity: 4 tablet  Refills: 0     oxyBUTYnin ER 5 MG 24 hr tablet  Commonly known as: DITROPAN XL  Used for: Hydronephrosis with ureteral stricture, not elsewhere classified      Dose: 5 mg  Take 1 tablet (5 mg) by mouth daily as needed for bladder spasms.  Quantity: 30 tablet  Refills: 0     oxyCODONE 5 MG tablet  Commonly known as: ROXICODONE  Used for: Hydronephrosis with ureteral stricture, not elsewhere classified      Dose: 5 mg  Take 1 tablet (5 mg) by mouth every 6 hours as needed for pain.  Quantity: 20  tablet  Refills: 0     prochlorperazine 10 MG tablet  Commonly known as: COMPAZINE  Used for: Nausea      Dose: 10 mg  Take 1 tablet (10 mg) by mouth every 6 hours as needed for nausea or vomiting.  Quantity: 20 tablet  Refills: 0     Repatha SureClick 140 MG/ML prefilled autoinjector  Generic drug: evolocumab      INJECT 1ML (140MG) SUBCUTANEOUS EVERY 2 WEEKS. INJECT INTO ABDOMEN THIGH OR UPPER ARM. ROTATE INJECTION SITES.  Refills: 0     senna-docusate 8.6-50 MG tablet  Commonly known as: SENOKOT-S/PERICOLACE  Used for: Hydronephrosis with ureteral stricture, not elsewhere classified      Dose: 1-2 tablet  Take 1-2 tablets by mouth 2 times daily.  Quantity: 30 tablet  Refills: 0     sennosides 8.6 MG tablet  Commonly known as: SENOKOT      Dose: 1 tablet  Take 1 tablet by mouth daily.  Refills: 0     tamsulosin 0.4 MG capsule  Commonly known as: FLOMAX  Used for: Hydronephrosis with ureteral stricture, not elsewhere classified      Dose: 0.4 mg  Take 1 capsule (0.4 mg) by mouth daily.  Quantity: 30 capsule  Refills: 0     warfarin ANTICOAGULANT 5 MG tablet  Commonly known as: COUMADIN  Used for: PAD (peripheral artery disease) (H), Hypercoagulable state (H), Bilateral carotid artery disease, unspecified type (H), Long term (current) use of anticoagulants      Dose: 5-7.5 mg  Take as directed by the anticoagulation clinic. If you are unsure how to take this medication, talk to your nurse or doctor.  Original instructions: Take 1-1.5 tablets (5-7.5 mg) by mouth daily. Adjust dose per INR as directed by [facility]  Quantity: 135 tablet  Refills: 1         Past medical history, past surgical history, medications, and allergies were reviewed with the patient. Additional pertinent items: None    Family History   Problem Relation Age of Onset    Breast Cancer Mother         was pt at the [facility]  21 years ago    Peripheral Vascular Disease Father     Other - See Comments Father         vasular problems     Hypertension  Father     Hyperlipidemia Father     Cerebrovascular Disease Father     Other Cancer Father         skin    Coronary Artery Disease Father     Cancer Maternal Grandmother     Heart Disease Maternal Grandfather     Lung Cancer Paternal Grandmother     Kidney Cancer Paternal Grandfather     No Known Problems Brother     No Known Problems Sister     Colon Cancer No family hx of     Prostate Cancer No family hx of     Anesthesia Reaction No family hx of     Malignant Hyperthermia No family hx of        Social History     Tobacco Use    Smoking status: Every Day     Current packs/day: 0.75     Average packs/day: 0.8 packs/day for 50.5 years (42.0 ttl pk-yrs)     Types: Cigarettes     Passive exposure: Current    Smokeless tobacco: Never    Tobacco comments:     1 ppd since 17 years old   Substance Use Topics    Alcohol use: Not Currently     Social history was reviewed with the patient. Additional pertinent items: None    Allergies   Allergen Reactions    Ezetimibe      constipation    Lovastatin Unknown     constipation    Simvastatin      Other reaction(s): Constipation  Intolerance, but tolerated with re-challenge in Feb to April 2009       Review of Systems  A medically appropriate review of systems was performed with pertinent positives and negatives noted in the HPI, and all other systems negative.    Physical Exam   Blood pressure below when retaken was 113/69  BP: (!) 70/45  Pulse: 77  Temp: 98.1  F (36.7  C)  Resp: 14  Weight: 52.4 kg (115 lb 9.6 oz)  SpO2: 97 %  Lying Orthostatic BP: 90/58  Lying Orthostatic Pulse: 60 bpm  Sitting Orthostatic BP: 86/56  Sitting Orthostatic Pulse: 67 bpm  Standing Orthostatic BP: 83/62  Standing Orthostatic Pulse: 99 bpm      Physical Exam  Vitals and nursing note reviewed.   Constitutional:       Comments: Sitting upright conversant pleasant   HENT:      Head: Atraumatic.   Eyes:      Extraocular Movements: Extraocular movements intact.      Pupils: Pupils are equal, round, and  reactive to light.   Cardiovascular:      Rate and Rhythm: Regular rhythm.   Pulmonary:      Breath sounds: Normal breath sounds. No wheezing or rales.   Abdominal:      Palpations: Abdomen is soft.      Tenderness: There is no abdominal tenderness.   Musculoskeletal:         General: No deformity.      Cervical back: Neck supple.   Neurological:      General: No focal deficit present.      Mental Status: She is alert and oriented to person, place, and time.      Cranial Nerves: No cranial nerve deficit.      Motor: No weakness.   Psychiatric:         Mood and Affect: Mood normal.         ADS Course     Orders Placed This Encounter   Procedures    Comprehensive metabolic panel    INR    UA with Microscopic reflex to Culture    Troponin T, High Sensitivity    CBC with platelets and differential    Lactic acid whole blood    Procalcitonin    UA Microscopic with Reflex to Culture    Troponin T, High Sensitivity    EKG 12-lead, tracing only    IV access    IV- discontinue upon discharge    Orthostatic blood pressure and pulse    Blood Culture Peripheral Blood    Urine Culture    CBC with platelets differential       Procedures         EKG revealed a normal sinus rhythm at a rate of 67 with a DC interval of 0.110 and a QRS duration of 0.080.  The patient had some ST wave depressions in the inferior leads and lateral chest leads however these were unchanged from previous.  The patient had no acute ST or T wave changes significant for ischemia.  Patient had a normal axis.  This is read by me personally.      Administrations This Visit       cefTRIAXone (ROCEPHIN) in sterile water for IV administration 1 g       Admin Date  10/29/2024 Action  $Given Dose  1 g Route  Intravenous Documented By  Tere Kennedy RN              sodium chloride 0.9% BOLUS 1,000 mL       Admin Date  10/29/2024 Action  $New Bag Dose  1,000 mL Rate  1,000 mL/hr Route  Intravenous Documented By  Tere Kennedy RN               Admin  Date  10/29/2024 Action  $New Bag Dose  1,000 mL Rate  1,000 mL/hr Route  Intravenous Documented By  Tere Kennedy RN                       Results for orders placed or performed in visit on 10/29/24 (from the past 24 hours)   Troponin T, High Sensitivity   Result Value Ref Range    Troponin T, High Sensitivity 27 (H) <=14 ng/L   UA with Microscopic reflex to Culture    Specimen: Urine, Clean Catch   Result Value Ref Range    Color Urine Dark Yellow (A) Colorless, Straw, Light Yellow, Yellow    Appearance Urine Slightly Cloudy (A) Clear    Glucose Urine Negative Negative mg/dL    Bilirubin Urine Small (A) Negative    Ketones Urine Trace (A) Negative mg/dL    Specific Gravity Urine >=1.030 1.005 - 1.030    Blood Urine Large (A) Negative    pH Urine 5.5 5.0 - 8.0    Protein Albumin Urine >=300 (A) Negative mg/dL    Urobilinogen Urine 1.0 0.2, 1.0 E.U./dL    Nitrite Urine Negative Negative    Leukocyte Esterase Urine Moderate (A) Negative   INR   Result Value Ref Range    INR 2.20 (H) 0.85 - 1.15   Comprehensive metabolic panel   Result Value Ref Range    Sodium 141 135 - 145 mmol/L    Potassium 4.2 3.4 - 5.3 mmol/L    Chloride 105 94 - 109 mmol/L    Carbon Dioxide (CO2) 19 (L) 20 - 32 mmol/L    Anion Gap 17 (H) 3 - 14 mmol/L    Urea Nitrogen 49 (H) 7 - 30 mg/dL    Creatinine 2.70 (H) 0.52 - 1.04 mg/dL    GFR Estimate 20 (L) >60 mL/min/1.73m2    Calcium 9.1 8.5 - 10.1 mg/dL    Glucose 108 (H) 70 - 99 mg/dL    Alkaline Phosphatase 81 40 - 150 U/L    AST 20 0 - 45 U/L    ALT 11 0 - 50 U/L    Protein Total 7.5 6.8 - 8.8 g/dL    Albumin 4.5 3.4 - 5.0 g/dL    Bilirubin Total 0.6 0.2 - 1.3 mg/dL   CBC with platelets differential    Narrative    The following orders were created for panel order CBC with platelets differential.  Procedure                               Abnormality         Status                     ---------                               -----------         ------                     CBC with platelets  huma lewis[[id number]]  Abnormal            Final result                 Please view results for these tests on the individual orders.   CBC with platelets and differential   Result Value Ref Range    WBC Count 13.5 (H) 4.0 - 11.0 10e3/uL    RBC Count 4.01 3.80 - 5.20 10e6/uL    Hemoglobin 12.1 11.7 - 15.7 g/dL    Hematocrit 35.6 35.0 - 47.0 %    MCV 89 78 - 100 fL    MCH 30.2 26.5 - 33.0 pg    MCHC 34.0 31.5 - 36.5 g/dL    RDW 14.6 10.0 - 15.0 %    Platelet Count 292 150 - 450 10e3/uL    % Neutrophils 54 %    % Lymphocytes 34 %    % Monocytes 9 %    % Eosinophils 2 %    % Basophils 0 %    % Immature Granulocytes 0 %    Absolute Neutrophils 7.3 1.6 - 8.3 10e3/uL    Absolute Lymphocytes 4.6 0.8 - 5.3 10e3/uL    Absolute Monocytes 1.2 0.0 - 1.3 10e3/uL    Absolute Eosinophils 0.3 0.0 - 0.7 10e3/uL    Absolute Basophils 0.1 0.0 - 0.2 10e3/uL    Absolute Immature Granulocytes 0.1 <=0.4 10e3/uL   Procalcitonin   Result Value Ref Range    Procalcitonin 0.06 <0.50 ng/mL   Lactic acid whole blood   Result Value Ref Range    Lactic Acid 2.0 0.7 - 2.0 mmol/L   UA Microscopic with Reflex to Culture   Result Value Ref Range    Bacteria Urine Moderate (A) None Seen /HPF    RBC Urine >100 (A) 0-2 /HPF /HPF    WBC Urine  (A) 0-5 /HPF /HPF    Squamous Epithelials Urine Few (A) None Seen /LPF    Amorphous Crystals Urine Moderate (A) None Seen /HPF   EKG 12-lead, tracing only   Result Value Ref Range    Systolic Blood Pressure  mmHg    Diastolic Blood Pressure  mmHg    Ventricular Rate 67 BPM    Atrial Rate 67 BPM    MO Interval 110 ms    QRS Duration 80 ms     ms    QTc 448 ms    P Axis 74 degrees    R AXIS 72 degrees    T Axis -1 degrees    Interpretation ECG       Sinus rhythm with short MO  Possible Left atrial enlargement  Nonspecific T wave abnormality  Abnormal ECG  When compared with ECG of 04-Oct-2024 08:39,  Inverted T waves have replaced nonspecific T wave abnormality in Inferior leads  Nonspecific T wave  abnormality now evident in Anterolateral leads     Troponin T, High Sensitivity   Result Value Ref Range    Troponin T, High Sensitivity 18 (H) <=14 ng/L     Patient received 2 L of IV fluid and then received a dose of Rocephin because of her UA.  At this time she is nitrate negative and has a ureteral stent in but an underlying UTI complicated by the operative procedure cannot be completely ruled out.  Culture pending      Repeat orthostatic vital signs reveal the patient to have a supine blood pressure of 110/65 with a pulse of 56, sitting blood pressure of 126/74 with a pulse was 57, and a standing blood pressure of 86/57 with a pulse of 69..... Overall markedly improved.  Meanwhile a 2-hour troponin is actually less than the first 1 consistent with her rehydration and is negative for evidence of ischemia.    Clinically the patient feels much better and is no longer near syncopal.       Critical Care Addendum  My initial assessment, based on my review of nursing observations and review of vital signs, established a high suspicion that Nery Whitaker has severe hypotension, which requires immediate intervention, and therefore she is critically ill.     After the initial assessment, the care team initiated multiple lab tests, initiated IV fluid administration, and initiated medication therapy with IV Rocephin  to provide stabilization care. Due to the critical nature of this patient, I reassessed nursing observations, vital signs, and physical exam multiple times prior to her disposition.     Time also spent performing documentation, reviewing test results, and coordination of care.     Critical care time (excluding teaching time and procedures): Greater than 60 minutes.         Assessments & Plan (with Medical Decision Making)     I have reviewed the nursing notes.    Patient was found to be significantly orthostatic with an initial baseline blood pressure of 70 systolic and upon retake was 113 systolic prior to  intervention.  Differential diagnosis included sepsis, cardiogenic shock, MI.  Patient was found to be significantly hypovolemic and had IV fluids replaced.  Creatinine jumped to 2.7 from 1.2, 2 weeks ago.    After 2 L of fluid with 1 L of Rocephin the patient feels much better and will be sent home.  Patient will be brought back to the [facility] tomorrow for 1 more liter of fluid IV along with 1 more gram of Rocephin IV and will have her basic panel checked.  Patient was told that should her creatinine start going back towards her baseline that she should be in the clear; however, should the patient's creatinine continue to worsen tomorrow she will need to be hospitalized.  I am not convinced the patient has an ongoing UTI complicating her ureteral stent procedure but believe it is prudent to cover her with 1 more gram of Rocephin tomorrow with her culture results to be resulted on Thursday.  If her culture results are positive with a ureteral stent, the patient may need to be hospitalized for IV antibiotics or at the very least urology consulted.    I have reviewed the findings, diagnosis, plan and need for follow up tomorrow with further treatment and additional lab testing with the patient.  She agrees with the plan.  Patient will be called and told to skip 1 dose of her lisinopril and Norvasc while being worked up for her orthostatic hypotension.    TOTAL PATIENT CARE TIME INCLUDING DOCUMENTATION, FAMILY COMMUNICATION AND CARE COORDINATION WAS 90 MINUTES.     Final diagnoses:   Hypotension due to hypovolemia   GER (acute kidney injury) (H)   Status post placement of ureteral stent     Return to the [facility] tomorrow for additional IV fluids and 1 more dose of IV antibiotics while we wait for your cultures to return.    Keep hydrated    Hold your norvasc and lisinopril for one dose.    Go to the ER for any worsening of symptoms      JOSH ORO MD    10/29/2024   [facility]

## 2024-10-29 NOTE — PROGRESS NOTES
ANTICOAGULATION MANAGEMENT     Nery Whitaker 59 year old female is on warfarin with therapeutic INR result. (Goal INR 2.0-3.0)    Recent labs: (last 7 days)     10/29/24  1128   INR 2.20*       ASSESSMENT     Source(s): Chart Review     Warfarin doses taken: Reviewed in chart held for 5 days, resumed on 10/22 with boost x 1, no bridge  Diet: No new diet changes identified  Medication/supplement changes:  10/29 IV Rocephin   New illness, injury, or hospitalization: 10/22/24 urology procedure, 10/29/24 visit for chest pain (note is incomplete, labs pending)  Signs or symptoms of bleeding or clotting: Yes: 10/29 UA shows blood in urine (just had cystoureteroscopy, with retrograde pyelogram and stent insertion)   Previous result: Supratherapeutic  Additional findings:  1520: Spoke to patient.  She said her kidneys are failing and she went in for IV fluids and IV antibiotics today and will go again tomorrow and if kidneys are not getting better then she will be admitted to the hospital.        PLAN     Recommended plan for temporary change(s) affecting INR     Dosing Instructions: Continue your current warfarin dose with next INR in 3 days.      Summary  As of 10/29/2024      Full warfarin instructions:  5 mg every Tue, Thu, Sat; 7.5 mg all other days   Next INR check:  11/5/2024               Detailed voice message left for Nery with dosing instructions and follow up date.   Sent Pr[employer] message with dosing and follow up instructions  1520: Spoke to patient.     Contact [phone] to schedule and with any changes, questions or concerns.     Education provided: Goal range and lab monitoring: goal range and significance of current result  Contact [phone] with any changes, questions or concerns.     Plan made per [facility] anticoagulation protocol    Melissa Ferreira, RN  10/29/2024  Anticoagulation Clinic  [facility] for routing messages: gloria MARTINI  [facility] patient phone line:  [phone]        _______________________________________________________________________     Anticoagulation Episode Summary       Current INR goal:  2.0-3.0   TTR:  41.7% (1 y)   Target end date:  Indefinite   Send INR reminders to:  BERNICE MARTINI    Indications    Anticoagulation monitoring  INR range 2-3 [Z79.01]  PAD (peripheral artery disease) (H) [I73.9]  Long term (current) use of anticoagulants [Z79.01]  Hypercoagulable state (H) [D68.59]  Bilateral carotid artery disease  unspecified type (H) [I77.9]             Comments:  --             Anticoagulation Care Providers       Provider Role Specialty Phone number    Loraine Rees MD Referring Family Medicine [phone]    Vu Winters MD Referring Family Medicine [phone]    Priscila Lombardo APRN CNP Referring Family Medicine [phone]

## 2024-10-29 NOTE — PATIENT INSTRUCTIONS
Return to the [facility] tomorrow for additional IV fluids and 1 more dose of IV antibiotics while we wait for your cultures to return.    Keep hydrated    Hold your norvasc and lisinopril for one dose.    Go to the ER for any worsening of symptoms

## 2024-10-30 ENCOUNTER — ANTICOAGULATION THERAPY VISIT (OUTPATIENT)
Dept: ANTICOAGULATION | Facility: CLINIC | Age: 59
End: 2024-10-30

## 2024-10-30 ENCOUNTER — APPOINTMENT (OUTPATIENT)
Dept: CT IMAGING | Facility: CLINIC | Age: 59
End: 2024-10-30
Attending: PHYSICIAN ASSISTANT
Payer: COMMERCIAL

## 2024-10-30 ENCOUNTER — NURSE TRIAGE (OUTPATIENT)
Dept: NURSING | Facility: CLINIC | Age: 59
End: 2024-10-30

## 2024-10-30 ENCOUNTER — HOSPITAL ENCOUNTER (EMERGENCY)
Facility: CLINIC | Age: 59
Discharge: HOME | End: 2024-10-30
Attending: EMERGENCY MEDICINE | Admitting: EMERGENCY MEDICINE
Payer: COMMERCIAL

## 2024-10-30 VITALS
HEART RATE: 59 BPM | WEIGHT: 115 LBS | OXYGEN SATURATION: 100 % | BODY MASS INDEX: 19.16 KG/M2 | RESPIRATION RATE: 17 BRPM | DIASTOLIC BLOOD PRESSURE: 78 MMHG | HEIGHT: 65 IN | TEMPERATURE: 97.7 F | SYSTOLIC BLOOD PRESSURE: 175 MMHG

## 2024-10-30 DIAGNOSIS — I73.9 PAD (PERIPHERAL ARTERY DISEASE) (H): ICD-10-CM

## 2024-10-30 DIAGNOSIS — Z79.01 ANTICOAGULATION MONITORING, INR RANGE 2-3: Primary | ICD-10-CM

## 2024-10-30 DIAGNOSIS — D68.59 HYPERCOAGULABLE STATE (H): ICD-10-CM

## 2024-10-30 DIAGNOSIS — I77.9 BILATERAL CAROTID ARTERY DISEASE, UNSPECIFIED TYPE (H): ICD-10-CM

## 2024-10-30 DIAGNOSIS — K52.9 ACUTE COLITIS: ICD-10-CM

## 2024-10-30 DIAGNOSIS — R11.2 NAUSEA AND VOMITING: ICD-10-CM

## 2024-10-30 DIAGNOSIS — Z79.01 LONG TERM (CURRENT) USE OF ANTICOAGULANTS: ICD-10-CM

## 2024-10-30 LAB
ALBUMIN SERPL BCG-MCNC: 4.3 G/DL (ref 3.5–5.2)
ALBUMIN UR-MCNC: 70 MG/DL
ALP SERPL-CCNC: 104 U/L (ref 40–150)
ALT SERPL W P-5'-P-CCNC: 5 U/L (ref 0–50)
ANION GAP SERPL CALCULATED.3IONS-SCNC: 17 MMOL/L (ref 7–15)
APPEARANCE UR: CLEAR
AST SERPL W P-5'-P-CCNC: 17 U/L (ref 0–45)
ATRIAL RATE - MUSE: 67 BPM
BACTERIA UR CULT: NORMAL
BASOPHILS # BLD AUTO: 0.1 10E3/UL (ref 0–0.2)
BASOPHILS NFR BLD AUTO: 1 %
BILIRUB DIRECT SERPL-MCNC: <0.2 MG/DL (ref 0–0.3)
BILIRUB SERPL-MCNC: 0.3 MG/DL
BILIRUB UR QL STRIP: NEGATIVE
BUN SERPL-MCNC: 27.2 MG/DL (ref 6–20)
CALCIUM SERPL-MCNC: 9.1 MG/DL (ref 8.8–10.4)
CHLORIDE SERPL-SCNC: 109 MMOL/L (ref 98–107)
COLOR UR AUTO: ABNORMAL
CREAT SERPL-MCNC: 1.38 MG/DL (ref 0.51–0.95)
CRP SERPL-MCNC: 11.6 MG/L
DIASTOLIC BLOOD PRESSURE - MUSE: NORMAL MMHG
EGFRCR SERPLBLD CKD-EPI 2021: 44 ML/MIN/1.73M2
ENTEROCOCCUS FAECALIS: NOT DETECTED
ENTEROCOCCUS FAECIUM: NOT DETECTED
EOSINOPHIL # BLD AUTO: 0.1 10E3/UL (ref 0–0.7)
EOSINOPHIL NFR BLD AUTO: 1 %
ERYTHROCYTE [DISTWIDTH] IN BLOOD BY AUTOMATED COUNT: 14.9 % (ref 10–15)
GLUCOSE SERPL-MCNC: 126 MG/DL (ref 70–99)
GLUCOSE UR STRIP-MCNC: NEGATIVE MG/DL
HCO3 SERPL-SCNC: 14 MMOL/L (ref 22–29)
HCT VFR BLD AUTO: 33.2 % (ref 35–47)
HGB BLD-MCNC: 11.6 G/DL (ref 11.7–15.7)
HGB UR QL STRIP: ABNORMAL
HOLD SPECIMEN: NORMAL
IMM GRANULOCYTES # BLD: 0.1 10E3/UL
IMM GRANULOCYTES NFR BLD: 1 %
INR PPP: 2.21 (ref 0.85–1.15)
INTERPRETATION ECG - MUSE: NORMAL
KETONES UR STRIP-MCNC: ABNORMAL MG/DL
LACTATE SERPL-SCNC: 1.5 MMOL/L (ref 0.7–2)
LEUKOCYTE ESTERASE UR QL STRIP: ABNORMAL
LIPASE SERPL-CCNC: 26 U/L (ref 13–60)
LISTERIA SPECIES (DETECTED/NOT DETECTED): NOT DETECTED
LYMPHOCYTES # BLD AUTO: 1.9 10E3/UL (ref 0.8–5.3)
LYMPHOCYTES NFR BLD AUTO: 16 %
MAGNESIUM SERPL-MCNC: 2.2 MG/DL (ref 1.7–2.3)
MCH RBC QN AUTO: 30.2 PG (ref 26.5–33)
MCHC RBC AUTO-ENTMCNC: 34.9 G/DL (ref 31.5–36.5)
MCV RBC AUTO: 87 FL (ref 78–100)
MONOCYTES # BLD AUTO: 0.8 10E3/UL (ref 0–1.3)
MONOCYTES NFR BLD AUTO: 6 %
MUCOUS THREADS #/AREA URNS LPF: PRESENT /LPF
NEUTROPHILS # BLD AUTO: 8.9 10E3/UL (ref 1.6–8.3)
NEUTROPHILS NFR BLD AUTO: 75 %
NITRATE UR QL: NEGATIVE
NRBC # BLD AUTO: 0 10E3/UL
NRBC BLD AUTO-RTO: 0 /100
P AXIS - MUSE: 74 DEGREES
PH UR STRIP: 6 [PH] (ref 5–7)
PLATELET # BLD AUTO: 267 10E3/UL (ref 150–450)
POTASSIUM SERPL-SCNC: 4.4 MMOL/L (ref 3.4–5.3)
PR INTERVAL - MUSE: 110 MS
PROCALCITONIN SERPL IA-MCNC: 0.05 NG/ML
PROT SERPL-MCNC: 7.3 G/DL (ref 6.4–8.3)
QRS DURATION - MUSE: 80 MS
QT - MUSE: 424 MS
QTC - MUSE: 448 MS
R AXIS - MUSE: 72 DEGREES
RBC # BLD AUTO: 3.84 10E6/UL (ref 3.8–5.2)
RBC URINE: 181 /HPF
SODIUM SERPL-SCNC: 140 MMOL/L (ref 135–145)
SP GR UR STRIP: 1.02 (ref 1–1.03)
SQUAMOUS EPITHELIAL: 1 /HPF
STAPHYLOCOCCUS AUREUS: NOT DETECTED
STAPHYLOCOCCUS EPIDERMIDIS: NOT DETECTED
STAPHYLOCOCCUS LUGDUNENSIS: NOT DETECTED
STAPHYLOCOCCUS SPECIES: DETECTED
STREPTOCOCCUS AGALACTIAE: NOT DETECTED
STREPTOCOCCUS ANGINOSUS GROUP: NOT DETECTED
STREPTOCOCCUS PNEUMONIAE: NOT DETECTED
STREPTOCOCCUS PYOGENES: NOT DETECTED
STREPTOCOCCUS SPECIES: NOT DETECTED
SYSTOLIC BLOOD PRESSURE - MUSE: NORMAL MMHG
T AXIS - MUSE: -1 DEGREES
TROPONIN T SERPL HS-MCNC: 11 NG/L
UROBILINOGEN UR STRIP-MCNC: <2 MG/DL
VENTRICULAR RATE- MUSE: 67 BPM
WBC # BLD AUTO: 11.8 10E3/UL (ref 4–11)
WBC URINE: 24 /HPF

## 2024-10-30 PROCEDURE — 87186 SC STD MICRODIL/AGAR DIL: CPT | Performed by: PHYSICIAN ASSISTANT

## 2024-10-30 PROCEDURE — 258N000003 HC RX IP 258 OP 636: Performed by: PHYSICIAN ASSISTANT

## 2024-10-30 PROCEDURE — 82310 ASSAY OF CALCIUM: CPT | Performed by: PHYSICIAN ASSISTANT

## 2024-10-30 PROCEDURE — 250N000013 HC RX MED GY IP 250 OP 250 PS 637: Performed by: PHYSICIAN ASSISTANT

## 2024-10-30 PROCEDURE — 99285 EMERGENCY DEPT VISIT HI MDM: CPT | Mod: 25

## 2024-10-30 PROCEDURE — 84145 PROCALCITONIN (PCT): CPT | Performed by: PHYSICIAN ASSISTANT

## 2024-10-30 PROCEDURE — 87040 BLOOD CULTURE FOR BACTERIA: CPT | Performed by: PHYSICIAN ASSISTANT

## 2024-10-30 PROCEDURE — 96374 THER/PROPH/DIAG INJ IV PUSH: CPT

## 2024-10-30 PROCEDURE — 83690 ASSAY OF LIPASE: CPT | Performed by: PHYSICIAN ASSISTANT

## 2024-10-30 PROCEDURE — 83735 ASSAY OF MAGNESIUM: CPT | Performed by: PHYSICIAN ASSISTANT

## 2024-10-30 PROCEDURE — 86140 C-REACTIVE PROTEIN: CPT | Performed by: PHYSICIAN ASSISTANT

## 2024-10-30 PROCEDURE — 96361 HYDRATE IV INFUSION ADD-ON: CPT

## 2024-10-30 PROCEDURE — 96375 TX/PRO/DX INJ NEW DRUG ADDON: CPT

## 2024-10-30 PROCEDURE — 85025 COMPLETE CBC W/AUTO DIFF WBC: CPT | Performed by: PHYSICIAN ASSISTANT

## 2024-10-30 PROCEDURE — 85610 PROTHROMBIN TIME: CPT | Performed by: PHYSICIAN ASSISTANT

## 2024-10-30 PROCEDURE — 81003 URINALYSIS AUTO W/O SCOPE: CPT | Performed by: PHYSICIAN ASSISTANT

## 2024-10-30 PROCEDURE — 84484 ASSAY OF TROPONIN QUANT: CPT | Performed by: PHYSICIAN ASSISTANT

## 2024-10-30 PROCEDURE — 83605 ASSAY OF LACTIC ACID: CPT | Performed by: PHYSICIAN ASSISTANT

## 2024-10-30 PROCEDURE — 250N000011 HC RX IP 250 OP 636: Performed by: PHYSICIAN ASSISTANT

## 2024-10-30 PROCEDURE — 36415 COLL VENOUS BLD VENIPUNCTURE: CPT | Performed by: PHYSICIAN ASSISTANT

## 2024-10-30 PROCEDURE — 74176 CT ABD & PELVIS W/O CONTRAST: CPT

## 2024-10-30 PROCEDURE — 82248 BILIRUBIN DIRECT: CPT | Performed by: PHYSICIAN ASSISTANT

## 2024-10-30 RX ORDER — METOCLOPRAMIDE HYDROCHLORIDE 5 MG/ML
5 INJECTION INTRAMUSCULAR; INTRAVENOUS ONCE
Status: COMPLETED | OUTPATIENT
Start: 2024-10-30 | End: 2024-10-30

## 2024-10-30 RX ORDER — DIPHENHYDRAMINE HCL 25 MG
25 CAPSULE ORAL EVERY 6 HOURS PRN
Qty: 30 CAPSULE | Refills: 0 | Status: SHIPPED | OUTPATIENT
Start: 2024-10-30

## 2024-10-30 RX ORDER — HYDROXYZINE HYDROCHLORIDE 25 MG/1
25 TABLET, FILM COATED ORAL EVERY 6 HOURS PRN
Status: DISCONTINUED | OUTPATIENT
Start: 2024-10-30 | End: 2024-10-30 | Stop reason: HOSPADM

## 2024-10-30 RX ORDER — ONDANSETRON 2 MG/ML
4 INJECTION INTRAMUSCULAR; INTRAVENOUS ONCE
Status: COMPLETED | OUTPATIENT
Start: 2024-10-30 | End: 2024-10-30

## 2024-10-30 RX ORDER — HYDROMORPHONE HYDROCHLORIDE 1 MG/ML
0.5 INJECTION, SOLUTION INTRAMUSCULAR; INTRAVENOUS; SUBCUTANEOUS ONCE
Status: COMPLETED | OUTPATIENT
Start: 2024-10-30 | End: 2024-10-30

## 2024-10-30 RX ADMIN — HYDROXYZINE HYDROCHLORIDE 25 MG: 25 TABLET, FILM COATED ORAL at 10:41

## 2024-10-30 RX ADMIN — ONDANSETRON 4 MG: 2 INJECTION INTRAMUSCULAR; INTRAVENOUS at 09:27

## 2024-10-30 RX ADMIN — SODIUM CHLORIDE 1000 ML: 9 INJECTION, SOLUTION INTRAVENOUS at 11:12

## 2024-10-30 RX ADMIN — METOCLOPRAMIDE HYDROCHLORIDE 5 MG: 5 INJECTION INTRAMUSCULAR; INTRAVENOUS at 10:35

## 2024-10-30 ASSESSMENT — ACTIVITIES OF DAILY LIVING (ADL)
ADLS_ACUITY_SCORE: 0

## 2024-10-30 NOTE — ED TRIAGE NOTES
Pt presents to the ED with c/o N/V/D, sent here for positive blood cultures taken through PCP yesterday. Pt reports multiple episodes of vomiting this morning. Hypertensive in triage, on BP meds, has not taken meds today.      Triage Assessment (Adult)       Row Name 10/30/24 0907          Triage Assessment    Airway WDL WDL        Respiratory WDL    Respiratory WDL WDL        Skin Circulation/Temperature WDL    Skin Circulation/Temperature WDL WDL        Cardiac WDL    Cardiac WDL WDL        Peripheral/Neurovascular WDL    Peripheral Neurovascular WDL WDL        Cognitive/Neuro/Behavioral WDL    Cognitive/Neuro/Behavioral WDL WDL

## 2024-10-30 NOTE — PROGRESS NOTES
ANTICOAGULATION MANAGEMENT     Nery Whitaker 59 year old female is on warfarin with therapeutic INR result. (Goal INR 2.0-3.0)    Recent labs: (last 7 days)     10/30/24  0918   INR 2.21*       ASSESSMENT     Source(s): Chart Review and Patient/Caregiver Call     Warfarin doses taken: Warfarin taken as instructed  Diet:  illness may be affecting diet and INR  Medication/supplement changes:  10/30 ED gave her Zofran, Reglan, Atarax, Dilaudid, saline, Benadryl  New illness, injury, or hospitalization: Yes: 10/30 ED for nausea and vomiting, acute colitis  Signs or symptoms of bleeding or clotting: some blood in urine but she is s/p urology procedure on 10/22/24  Previous result: Therapeutic last visit; previously outside of goal range  Additional findings:  Blood culture thought to be contaminated.  Labs are better and patient was not admitted.       PLAN     Recommended plan for temporary change(s) affecting INR     Dosing Instructions: Continue your current warfarin dose with next INR in 2-5 days       Summary  As of 10/30/2024      Full warfarin instructions:  5 mg every Tue, Thu, Sat; 7.5 mg all other days   Next INR check:  11/4/2024               Telephone call with family member who verbalizes understanding and agrees to plan.  Sent patient a my chart message.      Contact [phone] to schedule and with any changes, questions or concerns.     Education provided: Goal range and lab monitoring: goal range and significance of current result  Contact [phone] with any changes, questions or concerns.     Plan made per [facility] anticoagulation protocol    Melissa Ferreira RN  10/30/2024  Anticoagulation Clinic  [employer] for routing messages: gloria MARTINI  [facility] patient phone line: [phone]        _______________________________________________________________________     Anticoagulation Episode Summary       Current INR goal:  2.0-3.0   TTR:  41.7% (1 y)   Target end date:  Indefinite   Send INR  reminders to:  BERNICE MARTINI    Indications    Anticoagulation monitoring  INR range 2-3 [Z79.01]  PAD (peripheral artery disease) (H) [I73.9]  Long term (current) use of anticoagulants [Z79.01]  Hypercoagulable state (H) [D68.59]  Bilateral carotid artery disease  unspecified type (H) [I77.9]             Comments:  --             Anticoagulation Care Providers       Provider Role Specialty Phone number    Loraine Rees MD Referring Family Medicine [phone]    Vu Winters MD Referring Family Medicine [phone]    Priscila Lombardo, APRN CNP Referring Family Medicine [phone]

## 2024-10-30 NOTE — TELEPHONE ENCOUNTER
Blood cultures are positive. Assessment and plan from yesterday reviewed. Given hypotension, worsening kidney tests and now positive blood culture, will send to ER for further evaluation and possible admission.    Contacted patient and . Will head straight to  ER.  Contacted ER and spoke to attending of impending arrival.

## 2024-10-30 NOTE — PHARMACY-ADMISSION MEDICATION HISTORY
Pharmacist Admission Medication History    Admission medication history is complete. The information provided in this note is only as accurate as the sources available at the time of the update.    Information Source(s): Patient and CareEverywhere/SureScripts via in-person    Pertinent Information: Patient has not had any meds today. Has been only taking 5mg of warfarin daily.    Changes made to PTA medication list:  Added: None  Deleted: albuterol, alendronate, amlodipine, as[irin, calcium-vitamin d, omeprzole, dupilcate zofran, oxycodone, senokot, sodium chloride boluses  Changed: warfarin    Allergies reviewed with patient and updates made in EHR: yes    Medication History Completed By: Rosales Donis [facility] 10/30/2024 10:18 AM    PTA Med List   Medication Sig Last Dose/Taking    citalopram (CELEXA) 20 MG tablet Take 1 tablet by mouth once daily 10/29/2024 Morning    DENTA 5000 PLUS 1.1 % CREA USE TO BRUSH TEETH AS DIRECTED TWICE DAILY 10/29/2024 Bedtime    famotidine (PEPCID) 20 MG tablet Take 1 tablet by mouth twice daily 10/29/2024 Evening    folic acid (FOLVITE) 1 MG tablet Take 1 tablet by mouth once daily 10/29/2024 Morning    gabapentin (NEURONTIN) 100 MG capsule TAKE 1 CAPSULE BY MOUTH IN THE MORNING THEN  1  TABLET  AT  NOON  AND  3  TABLETS  AT  BEDTIME  DAILY 10/29/2024 Bedtime    lisinopril (ZESTRIL) 20 MG tablet Take 1 tablet by mouth once daily 10/29/2024 Morning    ondansetron (ZOFRAN ODT) 4 MG ODT tab Take 1 tablet (4 mg) by mouth every 8 hours as needed for nausea. 10/29/2024    oxyBUTYnin ER (DITROPAN XL) 5 MG 24 hr tablet Take 1 tablet (5 mg) by mouth daily as needed for bladder spasms. 10/29/2024 Morning    prochlorperazine (COMPAZINE) 10 MG tablet Take 1 tablet (10 mg) by mouth every 6 hours as needed for nausea or vomiting. 10/29/2024    REPATHA SURECLICK 140 MG/ML prefilled autoinjector INJECT 1ML (140MG) SUBCUTANEOUS EVERY 2 WEEKS. INJECT INTO ABDOMEN THIGH OR UPPER ARM. ROTATE INJECTION  SITES. Unknown    senna-docusate (SENOKOT-S/PERICOLACE) 8.6-50 MG tablet Take 1-2 tablets by mouth 2 times daily. 10/29/2024    tamsulosin (FLOMAX) 0.4 MG capsule Take 1 capsule (0.4 mg) by mouth daily. 10/29/2024    warfarin ANTICOAGULANT (COUMADIN) 5 MG tablet Take 1-1.5 tablets (5-7.5 mg) by mouth daily. Adjust dose per INR as directed by [facility] (Patient taking differently: Take 5 mg by mouth daily. Adjust dose per INR as directed by ACC) 10/29/2024 Evening

## 2024-10-30 NOTE — OP NOTE
OPERATIVE NOTE    PREOPERATIVE DIAGNOSIS:  Right-sided ureteral stricture    POSTOPERATIVE DIAGNOSIS:  Same    PROCEDURES PERFORMED:   1. Cystourethroscopy  2.  Right ureteroscopy  3.  Right retrograde pyelogram with interpretation of intraoperative fluoroscopic imaging  4.  Ureteral sampling on the right/ureteral washings  5.  Right Ureteral stent placement      STAFF SURGEON:  Dr. Clark Garcia MD, present for the entire case.     ANESTHESIA:  General    ESTIMATED BLOOD LOSS: 1 cc  DRAINS/TUBES:  6 South Korean x 26 cm double-J ureteral stent         IV FLUIDS:   Please see dictated anesthesia record  COMPLICATIONS:  None.   SPECIMEN:   Ureteral washing    SIGNIFICANT FINDINGS: Long segment narrowing in the distal ureter.  Proximal curl of the ureteral stent seen in the renal pelvis under fluoroscopy and distal curl seen in the bladder fluoroscopically and under direct vision.     BRIEF OPERATIVE INDICATIONS:  Nery Whitaker is a(n) 59 year old female with right ureteral stricture due to extrinsic compression with hydronephrosis.  After a discussion of all risks, benefits, and alternatives, the patient elected to proceed with definitive stone management. The patient understands the potential need for more than one procedure to eliminate all stone burden.     DESCRIPTION OF PROCEDURE:  After informed consent was obtained, the patient was transported to the operating room & placed supine on the table. Pneumoboots were applied.  After adequate anesthesia was induced, she was placed in lithotomy and prepped and draped in the usual sterile fashion. A timeout was taken to confirm correct patient, procedure and laterality. Pre-operative IV antibiotics were administered.     A 22-[place] Korean rigid cystoscope was inserted into a well-lubricated urethra. The anterior urethra was unremarkable,. The right ureteral orifice was identified and  cannulated with a Sensor wire and 5 [place] open-ended catheter. The wire passed without  resistance into the upper pole and the 5-Nepalese open ended catheter was used to perform ureteral washings and sent for cytology.  Subsequently sensor wire was reinserted and the 5 Nepalese open-ended catheter was removed after a retrograde pyelogram.  The retrograde pyelogram demonstrated hydroureteronephrosis with a long segment narrowing of the mid to distal ureter.  We then reinserted the sensor wire.     The Semirigid ureteroscope was used to evaluate the distal in the mid ureter, which did not show narrowing however no evidence of mass or tumors were seen.  After satisfactory evaluation we then opted to proceed ahead with stent placement. A 6 Nepalese 26-cm double-J stent was advanced over the Sensor wire, and a good proximal curl was seen in the renal pelvis fluoroscopically and the distal curl was seen in the bladder fluoroscopically and under direct vision. The bladder was drained.  The patient tolerated the procedure well and there were no apparent complications. The patient  was transported to the postanesthesia care unit in stable condition.     POST-OPERATIVE PLAN: Following recovery in the PACU, the patient can be discharged.  She will need a stent exchange in 3 months.  We will update her for the same      Clarkstacey Garcia MD  [facility], Urology

## 2024-10-30 NOTE — ED PROVIDER NOTES
I am seeing this patient along with Syd Skelton PA-C. I had a face to face encounter with this patient seen by the Advanced Practice Provider (JACQUELIN).  I have seen, examined, and discussed the patient with the JACQUELIN and agree with their assessment and plan of management. I personally saw the patient and performed a substantive portion of the visit including all aspects of the medical decision making.    HPI:  Nery Whitaker is a 59 year old female with a pertinent history of hyperlipidemia, hypertension, hydronephrosis, anticoagulation who presents to this ED for evaluation of multiple complaints including nausea, vomiting, diarrhea, as well as 1 set of positive blood cultures yesterday.  Patient was seen at the acute diagnostic center, reportedly hypotensive, received ceftriaxone, as well as 2 doses of IV fluids was sent home, noted to have GER, was post return to the diagnostic center today for more fluids and antibiotics, however was noted to have 1 set of positive blood cultures and was sent to the emergency department.  She denies any abdominal pain.  Unsure if she has any blood in her urine.  No fevers.  Did hold her blood pressure medications last night.     Physical Exam: Nontoxic      LABS  Pertinent lab results reviewed in chart.  Labs Ordered and Resulted from Time of ED Arrival to Time of ED Departure   INR - Abnormal       Result Value    INR 2.21 (*)    CBC WITH PLATELETS AND DIFFERENTIAL - Abnormal    WBC Count 11.8 (*)     RBC Count 3.84      Hemoglobin 11.6 (*)     Hematocrit 33.2 (*)     MCV 87      MCH 30.2      MCHC 34.9      RDW 14.9      Platelet Count 267      % Neutrophils 75      % Lymphocytes 16      % Monocytes 6      % Eosinophils 1      % Basophils 1      % Immature Granulocytes 1      NRBCs per 100 WBC 0      Absolute Neutrophils 8.9 (*)     Absolute Lymphocytes 1.9      Absolute Monocytes 0.8      Absolute Eosinophils 0.1      Absolute Basophils 0.1      Absolute Immature  Granulocytes 0.1      Absolute NRBCs 0.0     TROPONIN T, HIGH SENSITIVITY - Normal    Troponin T, High Sensitivity 11     BASIC METABOLIC PANEL   HEPATIC FUNCTION PANEL   LIPASE   LACTIC ACID WHOLE BLOOD   PROCALCITONIN   ROUTINE UA WITH MICROSCOPIC REFLEX TO CULTURE   MAGNESIUM   CRP INFLAMMATION   TROPONIN T, HIGH SENSITIVITY   BLOOD CULTURE       EKG      RADIOLOGY  CT Abdomen Pelvis w/o Contrast   Final Result   IMPRESSION:    1.  Mild circumferential mural thickening from the proximal ascending colon to the distal descending colon is partially due to physiologic contraction but an underlying nonspecific acute colitis is possible.   2.  Interval placement of a well-positioned right ureteral stent with resolution of previously seen hydronephrosis and proximal ureterectasis.    3.  Encasement of the middle third of the right ureter secondary to the soft tissue density infiltration of the periureteric retroperitoneal fat adjacent to the aortobifemoral graft unchanged.    4.  Kidneys, ureters and bladder otherwise normal.                PROCEDURES       ED COURSE & MEDICAL DECISION MAKING    Pertinent Labs and Imagaing reviewed (see chart for details)    59 year old female here after she was called and told come to the hospital due to a positive blood culture.  On chart review, patient was actually seen at the acute care yesterday at which time she was hypotensive, had a leukocytosis, had a creatinine of 2.7 with acute kidney injury and with recent ureteral stent was given ceftriaxone and put on antibiotics with concern for infection.  I have reviewed the chart thoroughly and actually the patient had 1 out of 2 blood cultures that are growing and we talked to the lab and it is staph.  This is highly suspicious for skin contaminant and not true bacteremia.  In addition, urine culture so far itself is no growth, and today in the ER the patient has no hypotension and if anything has been hypertensive, labs are all  significantly improved with the resolution of the acute kidney injury, no further leukocytosis, and a CT that does not show any further hydronephrosis.  At this time, everything looks reassuring and patient is comfortable with discharge with return precautions.    At the conclusion of the encounter I discussed  the results of all of the tests and the disposition.   The questions were answered.  The patient or family acknowledged understanding and was agreeable with the care plan.       FINAL IMPRESSION      1. Nausea and vomiting    2. Acute colitis            CRITICAL CARE  0 Minutes    Margie Pappas MD  10/30/2024 9:51 AM     Margie Pappas MD  10/30/24 8400

## 2024-10-30 NOTE — ED NOTES
Expected Patient Referral to ED  8:17 AM    Referring Clinic/Provider:  PCP clinic    Reason for referral/Clinical facts:  Blood cultures positive for gram + cocci, hypotensive, hx recent ureteral stent. BP 70/45    Recommendations provided:  Send to ED for further evaluation    Caller was informed that this institution does possess the capabilities and/or resources to provide for patient and should be transferred to our facility.    Discussed that if direct admit is sought and any hurdles are encountered, this ED would be happy to see the patient and evaluate.    Informed caller that recommendations provided are recommendations based only on the facts provided and that they responsible to accept or reject the advice, or to seek a formal in person consultation as needed and that this ED will see/treat patient should they arrive.      Margie Pappas MD  [facility] EMERGENCY ROOM  [address] [place] [id number]  [phone]       Margie Pappas MD  10/30/24 0818

## 2024-10-30 NOTE — TELEPHONE ENCOUNTER
Date/time of call received from lab: 10/30/24 at 6:58 AM.    Lab test:  Blood culture drawn from L wrist     Lab value:  positive, gram positive cocci in clusters    Ordering provider name:  Christos Montanez    Ordering provider department:     Primary Care Provider: Priscila Lombardo     Result managed by: Clinic Hours: Primary Care clinic RN staff. Was test ordered in Primary Care?: Yes, ordered in Primary Care Primary Care: route telephone encounter high priority to ordering provider and ordering provider's clinic Nurse pool       Lab will be running a rapid PCR test that will take approx: 2.5 hours. They will follow up upon completion.      Reason for Disposition   Lab or radiology calling with CRITICAL test results    Protocols used: PCP Call - No Triage-A-

## 2024-10-30 NOTE — DISCHARGE INSTRUCTIONS
You are seen here in the emergency department for evaluation of diffuse abdominal pain, nausea and vomiting.  The CT scan here shows nonspecific colitis which is inflammation and irritation within the bowel which I suspect is causing your symptoms.  Your inflammatory and infectious labs here are improved, and I suspect your blood culture is not truly representative of infection and is likely contaminant based on the bacteria.  I do not think there is any indication for additional antibiotics here.  Continue to use your medications at home for nausea, you can additionally add 25 mg or 50 mg of Benadryl with your nausea medicines.  This been sent to your pharmacy.

## 2024-10-30 NOTE — ED PROVIDER NOTES
EMERGENCY DEPARTMENT ENCOUNTER      NAME: Nery Whitaker  AGE: 59 year old female  YOB: 1965  MRN: [id number]  EVALUATION DATE & TIME: No admission date for patient encounter.    PCP: Priscila Lombardo    ED PROVIDER: Bandar Tillman PA-C      Chief Complaint   Patient presents with    Nausea, Vomiting, & Diarrhea    Abnormal Labs       FINAL IMPRESSION:  1. Nausea and vomiting    2. Acute colitis      ED COURSE & MEDICAL DECISION MAKING:    Pertinent Labs & Imaging studies reviewed. (See chart for details)  9:17 AM I met the patient and performed my initial interview and exam.   9:33 AM Staffed with Dr. Pappas.   11:01 AM Patient updated on labs, plan for fluids and discharge    12:10 PM Patient updated, feeling improved, plan for discharge.     59 year old female presents to the Emergency Department for evaluation of nausea, vomiting, diarrhea.     ED Course as of 10/30/24 1101   Wed Oct 30, 2024   0926 Patient is a 59-year-old female, past medical history of anxiety, dyslipidemia, hypertension, anticoagulated, diverticulosis, presents emergency department for evaluation of multiple complaints.  Was seen at [facility] yesterday, noted to have a set of blood cultures that was positive for gram-positive cocci, was noted to be hypotensive.  Was given 2 L of fluid.  Urinalysis was negative.  She does have a history of recent ureteral stent.  No imaging was performed.  She returns emergency department after getting called this morning, complaining of nausea, vomiting, and some diarrhea.  She was sent in by her primary care doctor for positive blood cultures.  She has had multiple episodes of vomiting this morning, notably hypertensive in triage today, did not take her Norvasc or lisinopril.  She denies any fevers at home.  No blood in her urine that she is aware of.  She did receive ceftriaxone yesterday for presumed infection.  Noted to have mild GER yesterday, will recheck labs here  in the emergency department.  Possible need for admission here for possible GER, in the context of positive blood cultures.  Will repeat another set of blood cultures here, additionally will obtain CRP, INR, procalcitonin, lipase, BMP, hepatic function, lactic acid, UA, as well as magnesium.   0942 Patient with mild leukocytosis here actually improved from yesterday, white blood count was noted to be 13.5 yesterday, now 11.8.   0950     I have independently reviewed the CT abdomen pelvis, there does appear to be a stent in place, I do not appreciate any significant obstruction.  Pending final radiology read.   1005 Patient with significant improvement in her creatinine here, mild elevation in CRP, lipase here normal, Paddock function otherwise unremarkable.  Troponin improved.   1007     Central lab called with Verigene panel from yesterdays blood culture, growing out a staph species.  Unfortunately, they were not able to speciate this yet.  High suspicion for contamination.   1011 Lactic Acid: 1.5   1031 CT Abdomen Pelvis w/o Contrast  CT abdomen pelvis here shows mild circumferential mural thickening from the proximal ascending colon to the distal descending colon, could be due to nonspecific acute colitis.  Interval placement of well-positioned right ureteral stent with resolution of hydronephrosis.  Encasement of the middle third of the right ureter secondary to the soft tissue density infiltration of the periureteral rec retroperitoneal fat is unchanged.  Kidneys ureters and bladder are otherwise unremarkable.   1100     Patient seen and reevaluated here in the emergency department, updated on laboratory results and imaging.  GER has nearly resolved here, white count is improving, lactic acid is not elevated, and troponin has trended down even further.  Patient given some Reglan and Zofran here.  Will given 1 L of normal saline bolus.  Discussed with patient and family that there is no indication for admission  at this point given her labs are improved and I suspect her blood culture especially in the context of growing out staph is likely contaminant.  After fluids, patient will be discharged here, will send Benadryl to use with her additional at home nausea medicines for ongoing management.  She is agreeable with this plan.       Medical Decision Making  Obtained supplemental history:Supplemental history obtained?: No  Reviewed external records: External records reviewed?: Outpatient Record: Outpatient office visit yesterday, anticoagulation yesterday, outpatient office visit for hydronephrosis with stricture and ureteral stent placement on 10/22  Care impacted by chronic illness:Anticoagulated State, Hyperlipidemia, and Hypertension  Did you consider but not order tests?: Work up considered but not performed and documented in chart, if applicable  Did you interpret images independently?: Independent interpretation of ECG and images noted in documentation, when applicable.  Consultation discussion with other provider:Did you involve another provider (consultant, , pharmacy, etc.)?: No  Discharge. I prescribed additional prescription strength medication(s) as charted. I considered admission, but discharged patient after significant clinical improvement.    MIPS: Not Applicable      At the conclusion of the encounter I discussed the results of all of the tests and the disposition. The questions were answered. The patient or family acknowledged understanding and was agreeable with the care plan.       0 minutes of critical care time     MEDICATIONS GIVEN IN THE EMERGENCY:  Medications   hydrOXYzine HCl (ATARAX) tablet 25 mg (25 mg Oral $Given 10/30/24 1041)   sodium chloride 0.9% BOLUS 1,000 mL (has no administration in time range)   ondansetron (ZOFRAN) injection 4 mg (4 mg Intravenous $Given 10/30/24 5562)   metoclopramide (REGLAN) injection 5 mg (5 mg Intravenous $Given 10/30/24 1035)   HYDROmorphone (PF) (DILAUDID)  injection 0.5 mg (0.5 mg Intravenous Not Given 10/30/24 1035)       NEW PRESCRIPTIONS STARTED AT TODAY'S ER VISIT  New Prescriptions    No medications on file          =================================================================    HPI    Patient information was obtained from: Patient     Use of : N/A         Nery Whitaker is a 59 year old female with a pertinent history of hyperlipidemia, hypertension, hydronephrosis, anticoagulation who presents to this ED for evaluation of multiple complaints including nausea, vomiting, diarrhea, as well as 1 set of positive blood cultures yesterday.  Patient was seen at the acute diagnostic center, reportedly hypotensive, received ceftriaxone, as well as 2 doses of IV fluids was sent home, noted to have GER, was post return to the diagnostic center today for more fluids and antibiotics, however was noted to have 1 set of positive blood cultures and was sent to the emergency department.  She denies any abdominal pain.  Unsure if she has any blood in her urine.  No fevers.  Did hold her blood pressure medications last night.    Per Chart Review: After 2 L of fluid with 1 L of Rocephin the patient feels much better and will be sent home.  Patient will be brought back to [facility] tomorrow for 1 more liter of fluid IV along with 1 more gram of Rocephin IV and will have her basic panel checked.  Patient was told that should her creatinine start going back towards her baseline that she should be in the clear; however, should the patient's creatinine continue to worsen tomorrow she will need to be hospitalized.  I am not convinced the patient has an ongoing UTI complicating her ureteral stent procedure but believe it is prudent to cover her with 1 more gram of Rocephin tomorrow with her culture results to be resulted on Thursday.  If her culture results are positive with a ureteral stent, the patient may need to be hospitalized for IV antibiotics or at the very  least urology consulted.     1 of 2 cultures noted to be growing gram-positive cocci in pairs.  Patient did hold the blood pressure medications, lisinopril and Norvasc.    PAST MEDICAL HISTORY:  Past Medical History:   Diagnosis Date    Abdominal pain, epigastric 12/02/2017    Abnormal cardiovascular stress test 12/13/2017    Adenomatous colon polyp     Anemia     Antiplatelet or antithrombotic long-term use     Anxiety     Arthritis     Atypical lymphocytosis     Bilateral carotid artery disease (H)     COPD (chronic obstructive pulmonary disease) (H)     Coronary artery disease     Dyslipidemia     Foreign body in left foot     Foreign body in left foot, subsequent encounter 10/11/2018    Heart murmur     Hydronephrosis with ureteral stricture, not elsewhere classified     Hypertension     Intermittent palpitations 12/02/2017    Migraines     Newly recognized murmur 10/18/2018    Osteoporosis     PAD (peripheral artery disease) (H)     Pain in thoracic spine     Pure hyperglyceridemia 09/02/2021    Formatting of this note might be different from the original. Has been intoleratant to several medications.    .    Stented coronary artery     Urge incontinence of urine 11/04/2018    Last Assessment & Plan:  Symptoms most consistent with urge incontinence. Ultrasound ordered to evaluate for any issues with incomplete emptying. Trial of Detrol LA 2 mg daily.  Last Assessment & Plan:  Formatting of this note might be different from the original. Detrol LA has been helpful but dries out her mouth. We will try a lower, short acting dose.    Versoteroca plantaris 11/01/2018       PAST SURGICAL HISTORY:  Past Surgical History:   Procedure Laterality Date    ABDOMEN SURGERY      APPENDECTOMY      ARTERIAL BYPASS SURGERY  01/01/2006    BIOPSY BREAST Left     benign    BYPASS GRAFT AORTOFEMORAL Bilateral     CHOLECYSTECTOMY      COLONOSCOPY      COMBINED CYSTOSCOPY, RETROGRADES, EXCHANGE STENT URETER(S) Right 10/22/2024     Procedure: CYSTOURETEROSCOPY, WITH RETROGRADE PYELOGRAM AND STENT INSERTION, ureteral sampling , Right;  Surgeon: Clark Garcia MD;  Location: [facility]    ESOPHAGOSCOPY, GASTROSCOPY, DUODENOSCOPY (EGD), COMBINED N/A 2024    Procedure: ESOPHAGOGASTRODUODENOSCOPY, WITH BIOPSY;  Surgeon: Lizz Bray MD;  Location: [facility] OR    FEMORAL ARTERY STENT  2006    FOOT MASS EXCISION Left 2018    Soft tissue mass - benign    IR MISCELLANEOUS PROCEDURE  2006    IR MISCELLANEOUS PROCEDURE  2006    IR MISCELLANEOUS PROCEDURE  2006    IR MISCELLANEOUS PROCEDURE  2006    IR MISCELLANEOUS PROCEDURE  2006    TONSILLECTOMY      TYMPANOSTOMY TUBE PLACEMENT             CURRENT MEDICATIONS:    citalopram (CELEXA) 20 MG tablet  DENTA 5000 PLUS 1.1 % CREA  famotidine (PEPCID) 20 MG tablet  folic acid (FOLVITE) 1 MG tablet  gabapentin (NEURONTIN) 100 MG capsule  lisinopril (ZESTRIL) 20 MG tablet  ondansetron (ZOFRAN ODT) 4 MG ODT tab  oxyBUTYnin ER (DITROPAN XL) 5 MG 24 hr tablet  prochlorperazine (COMPAZINE) 10 MG tablet  REPATHA SURECLICK 140 MG/ML prefilled autoinjector  senna-docusate (SENOKOT-S/PERICOLACE) 8.6-50 MG tablet  tamsulosin (FLOMAX) 0.4 MG capsule  warfarin ANTICOAGULANT (COUMADIN) 5 MG tablet         ALLERGIES:  Allergies   Allergen Reactions    Ezetimibe      constipation    Lovastatin Unknown     constipation    Simvastatin      Other reaction(s): Constipation  Intolerance, but tolerated with re-challenge in Feb to 2009       FAMILY HISTORY:  Family History   Problem Relation Age of Onset    Breast Cancer Mother         was pt at the [facility]  21 years ago    Peripheral Vascular Disease Father     Other - See Comments Father         vasular problems     Hypertension Father     Hyperlipidemia Father     Cerebrovascular Disease Father     Other Cancer Father         skin    Coronary Artery Disease Father     Cancer Maternal Grandmother      Heart Disease Maternal Grandfather     Lung Cancer Paternal Grandmother     Kidney Cancer Paternal Grandfather     No Known Problems Brother     No Known Problems Sister     Colon Cancer No family hx of     Prostate Cancer No family hx of     Anesthesia Reaction No family hx of     Malignant Hyperthermia No family hx of        SOCIAL HISTORY:   Social History     Socioeconomic History    Marital status:      Spouse name: Timothy    Number of children: Pam    Years of education: 14    Highest education level: Associate degree: academic program   Occupational History    Occupation: [employer]   Tobacco Use    Smoking status: Every Day     Current packs/day: 0.75     Average packs/day: 0.8 packs/day for 50.5 years (42.0 ttl pk-yrs)     Types: Cigarettes     Passive exposure: Current    Smokeless tobacco: Never    Tobacco comments:     1 ppd since 17 years old   Vaping Use    Vaping status: Never Used   Substance and Sexual Activity    Alcohol use: Not Currently    Drug use: No    Sexual activity: Yes     Partners: Male     Birth control/protection: Post-menopausal, Male Surgical   Other Topics Concern    Parent/sibling w/ CABG, MI or angioplasty before 65F 55M? Yes     Social Drivers of Health      Received from [employer] [employer] [facility]    Financial Resource Strain    Received from [employer], [employer] & [employer]    Social Connections   Interpersonal Safety: Low Risk  (10/21/2024)    Interpersonal Safety     Do you feel physically and emotionally safe where you currently live?: Yes     Within the past 12 months, have you been hit, slapped, kicked or otherwise physically hurt by someone?: No     Within the past 12 months, have you been humiliated or emotionally abused in other ways by your partner or ex-partner?: No       VITALS:  BP (!) 172/103   Pulse 60   Temp 97.7  F (36.5  C)  "(Temporal)   Resp 18   Ht 1.651 m (5' 5\")   Wt 52.2 kg (115 lb)   LMP  (LMP Unknown)   SpO2 99%   BMI 19.14 kg/m      PHYSICAL EXAM    Physical Exam  Vitals and nursing note reviewed.   Constitutional:       General: She is not in acute distress.     Appearance: Normal appearance. She is normal weight. She is ill-appearing. She is not toxic-appearing or diaphoretic.   HENT:      Right Ear: External ear normal.      Left Ear: External ear normal.   Eyes:      Conjunctiva/sclera: Conjunctivae normal.   Cardiovascular:      Rate and Rhythm: Normal rate and regular rhythm.      Pulses: Normal pulses.   Pulmonary:      Effort: Pulmonary effort is normal.   Abdominal:      General: Abdomen is flat.      Palpations: Abdomen is soft.      Tenderness: There is no abdominal tenderness. There is no right CVA tenderness, left CVA tenderness, guarding or rebound.   Musculoskeletal:      Right lower leg: No edema.      Left lower leg: No edema.   Skin:     Findings: No erythema or rash.   Neurological:      General: No focal deficit present.      Mental Status: She is alert. Mental status is at baseline.           LAB:  All pertinent labs reviewed and interpreted.  Labs Ordered and Resulted from Time of ED Arrival to Time of ED Departure   BASIC METABOLIC PANEL - Abnormal       Result Value    Sodium 140      Potassium 4.4      Chloride 109 (*)     Carbon Dioxide (CO2) 14 (*)     Anion Gap 17 (*)     Urea Nitrogen 27.2 (*)     Creatinine 1.38 (*)     GFR Estimate 44 (*)     Calcium 9.1      Glucose 126 (*)    INR - Abnormal    INR 2.21 (*)    CRP INFLAMMATION - Abnormal    CRP Inflammation 11.60 (*)    CBC WITH PLATELETS AND DIFFERENTIAL - Abnormal    WBC Count 11.8 (*)     RBC Count 3.84      Hemoglobin 11.6 (*)     Hematocrit 33.2 (*)     MCV 87      MCH 30.2      MCHC 34.9      RDW 14.9      Platelet Count 267      % Neutrophils 75      % Lymphocytes 16      % Monocytes 6      % Eosinophils 1      % Basophils 1      % " Immature Granulocytes 1      NRBCs per 100 WBC 0      Absolute Neutrophils 8.9 (*)     Absolute Lymphocytes 1.9      Absolute Monocytes 0.8      Absolute Eosinophils 0.1      Absolute Basophils 0.1      Absolute Immature Granulocytes 0.1      Absolute NRBCs 0.0     HEPATIC FUNCTION PANEL - Normal    Protein Total 7.3      Albumin 4.3      Bilirubin Total 0.3      Alkaline Phosphatase 104      AST 17      ALT 5      Bilirubin Direct <0.20     LIPASE - Normal    Lipase 26     LACTIC ACID WHOLE BLOOD - Normal    Lactic Acid 1.5     MAGNESIUM - Normal    Magnesium 2.2     TROPONIN T, HIGH SENSITIVITY - Normal    Troponin T, High Sensitivity 11     PROCALCITONIN   ROUTINE UA WITH MICROSCOPIC REFLEX TO CULTURE   BLOOD CULTURE       RADIOLOGY:  Reviewed all pertinent imaging. Please see official radiology report.  CT Abdomen Pelvis w/o Contrast   Final Result   IMPRESSION:    1.  Mild circumferential mural thickening from the proximal ascending colon to the distal descending colon is partially due to physiologic contraction but an underlying nonspecific acute colitis is possible.   2.  Interval placement of a well-positioned right ureteral stent with resolution of previously seen hydronephrosis and proximal ureterectasis.    3.  Encasement of the middle third of the right ureter secondary to the soft tissue density infiltration of the periureteric retroperitoneal fat adjacent to the aortobifemoral graft unchanged.    4.  Kidneys, ureters and bladder otherwise normal.              PROCEDURES:   None.     Bandar Tillman PA-C  Emergency Medicine  [facility] EMERGENCY ROOM  [address] [address] [phone]  Dept: [phone]       Bandar Tillman PA-C  10/30/24 1211

## 2024-10-31 ENCOUNTER — TELEPHONE (OUTPATIENT)
Dept: FAMILY MEDICINE | Facility: CLINIC | Age: 59
End: 2024-10-31
Payer: COMMERCIAL

## 2024-10-31 NOTE — TELEPHONE ENCOUNTER
Patient notified of provider's message as written, she  verbalized understanding.    Encouraged patient to call the clinic with further questions/concerns.     Hussein CHRISTIANSON RN  [facility]

## 2024-10-31 NOTE — TELEPHONE ENCOUNTER
Patient notified of provider's message as written, she verbalized understanding.     She inquires about the colitis comment on the CT scan and ER notes, wondering if she needs treatment for this. States she had diarrhea yesterday and deals with alternating diarrhea/constipation frequently.     Hussein CHRISTIANSON RN  [facility]

## 2024-10-31 NOTE — TELEPHONE ENCOUNTER
General Call    Contacts       Contact Date/Time Type Contact Phone/Fax    10/31/2024 10:06 AM CDT Phone (Incoming) Nery Whitaker (Self) [phone] (H)          Reason for Call: Nery calling after ER visit 10/30/24. Nery requesting that Priscila Lombardo NP look over records from yesterday as they found what looks like colitis.  Nery also stated that she was told by the Neurologist that a referral to reconstruct ureter, was going to be placed but she does not see it.  She states she is feeling much better and did not have a blood infection.      Date of last appointment with provider: 10/4/24 with Restad/ 7/28/23 with Priscila Lombardo NP    Could we send this information to you in [employer] or would you prefer to receive a phone call?:   Patient would prefer a phone call   Okay to leave a detailed message?: Yes at Cell number on file:    Telephone Information:   Mobile [phone]

## 2024-10-31 NOTE — TELEPHONE ENCOUNTER
If she was not treated in the ER regarding this, I assume it does not need treatment. They did mention it in their note but it didn't appear like an infectious colitis. She has also had antibiotics in the [facility] and fluids which is generally our treatment. I haven't seen Nery in over a year so other than reading her specialists notes when they are sent to me, I likely don't have the full picture of what's been going on. She would benefit from an office visit if she has more questions. If at any point she develops fevers or significant abdominal pain, that is something that warrants immediate evaluation.

## 2024-10-31 NOTE — TELEPHONE ENCOUNTER
I cannot see anything in the note regarding patient's comment about the ureter reconstruction, however I know she is established with urology and would suggest that she start there.  I am not sure she needs another referral?

## 2024-11-01 LAB
BACTERIA BLD CULT: ABNORMAL
BACTERIA BLD CULT: ABNORMAL

## 2024-11-03 LAB
BACTERIA UR CULT: ABNORMAL

## 2024-11-03 NOTE — RESULT ENCOUNTER NOTE
Final urine culture report is negative.  Adult: Negative urine culture parameters per protocol: <10,000 col/ml (Catheter and clean catch) single organism    [facility] Emergency Dept discharge antibiotic prescribed (If applicable): None  Treatment recommendations per [facility] ED Lab Result Urine Culture protocol: No change in plan of care.

## 2024-11-04 LAB — BACTERIA BLD CULT: NO GROWTH

## 2024-11-06 ENCOUNTER — MYC MEDICAL ADVICE (OUTPATIENT)
Dept: UROLOGY | Facility: CLINIC | Age: 59
End: 2024-11-06
Payer: COMMERCIAL

## 2024-11-11 NOTE — TELEPHONE ENCOUNTER
LM for patient to schedule with Dr. Boyle for reconstructive consult.     KARIN Reynoso  Care Coordinator- Urology   [phone]

## 2024-11-13 ENCOUNTER — TELEPHONE (OUTPATIENT)
Dept: UROLOGY | Facility: CLINIC | Age: 59
End: 2024-11-13
Payer: COMMERCIAL

## 2024-11-13 NOTE — TELEPHONE ENCOUNTER
Spoke with the patient to schedule an appointment to see Dr Boyle for consult.  Patient agreed with plan  Refer from Dr Radha Rinaldi, RN, BSN, PHN  Care Coordinator Urology  [facility]  Urology [facility]  [phone]

## 2024-11-13 NOTE — TELEPHONE ENCOUNTER
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  ----- Message -----  From: Clark Garcia MD  Sent: 10/22/2024  12:11 PM CST  To: Jayden Boyle MD  Subject: Retroperitoneal fibrosis secondary to vascul#    Hi Cisco  I just stented this lady for hydronephrosis caused by obstruction from possibly retroperitoneal fibrosis around her vascular graft and the common iliac location.  The radiologist thought that they could not biopsy that fibrotic tissue which seems to be increasing over the last year or so with worsening hydro  I was wondering if you wanted to take over her care and discuss options for her because she is does not seem to be too keen on continued stent exchanges.  I was wondering if you had other ideas about sampling the tissue or starting on on something like mycophenolate versus steroids.  Thanks  Clark

## 2024-11-14 SDOH — HEALTH STABILITY: PHYSICAL HEALTH: ON AVERAGE, HOW MANY DAYS PER WEEK DO YOU ENGAGE IN MODERATE TO STRENUOUS EXERCISE (LIKE A BRISK WALK)?: 1 DAY

## 2024-11-14 SDOH — HEALTH STABILITY: PHYSICAL HEALTH: ON AVERAGE, HOW MANY MINUTES DO YOU ENGAGE IN EXERCISE AT THIS LEVEL?: 10 MIN

## 2024-11-14 ASSESSMENT — SOCIAL DETERMINANTS OF HEALTH (SDOH): HOW OFTEN DO YOU GET TOGETHER WITH FRIENDS OR RELATIVES?: ONCE A WEEK

## 2024-11-15 ENCOUNTER — OFFICE VISIT (OUTPATIENT)
Dept: FAMILY MEDICINE | Facility: CLINIC | Age: 59
End: 2024-11-15
Payer: COMMERCIAL

## 2024-11-15 VITALS
WEIGHT: 120 LBS | BODY MASS INDEX: 19.99 KG/M2 | DIASTOLIC BLOOD PRESSURE: 64 MMHG | SYSTOLIC BLOOD PRESSURE: 111 MMHG | HEART RATE: 60 BPM | RESPIRATION RATE: 12 BRPM | TEMPERATURE: 98.2 F | OXYGEN SATURATION: 99 % | HEIGHT: 65 IN

## 2024-11-15 DIAGNOSIS — Z12.4 CERVICAL CANCER SCREENING: ICD-10-CM

## 2024-11-15 DIAGNOSIS — F17.200 TOBACCO USE DISORDER: ICD-10-CM

## 2024-11-15 DIAGNOSIS — Z79.01 LONG TERM (CURRENT) USE OF ANTICOAGULANTS: ICD-10-CM

## 2024-11-15 DIAGNOSIS — I77.9 BILATERAL CAROTID ARTERY DISEASE, UNSPECIFIED TYPE (H): ICD-10-CM

## 2024-11-15 DIAGNOSIS — M81.0 AGE-RELATED OSTEOPOROSIS WITHOUT CURRENT PATHOLOGICAL FRACTURE: ICD-10-CM

## 2024-11-15 DIAGNOSIS — Z12.31 ENCOUNTER FOR SCREENING MAMMOGRAM FOR BREAST CANCER: ICD-10-CM

## 2024-11-15 DIAGNOSIS — F41.1 ANXIETY STATE: ICD-10-CM

## 2024-11-15 DIAGNOSIS — F33.1 MODERATE EPISODE OF RECURRENT MAJOR DEPRESSIVE DISORDER (H): ICD-10-CM

## 2024-11-15 DIAGNOSIS — I73.9 PAD (PERIPHERAL ARTERY DISEASE) (H): ICD-10-CM

## 2024-11-15 DIAGNOSIS — E78.5 DYSLIPIDEMIA: ICD-10-CM

## 2024-11-15 DIAGNOSIS — I10 ESSENTIAL HYPERTENSION: ICD-10-CM

## 2024-11-15 DIAGNOSIS — N13.1 HYDRONEPHROSIS WITH URETERAL STRICTURE, NOT ELSEWHERE CLASSIFIED: ICD-10-CM

## 2024-11-15 DIAGNOSIS — R01.1 HEART MURMUR: ICD-10-CM

## 2024-11-15 DIAGNOSIS — Z00.00 ROUTINE GENERAL MEDICAL EXAMINATION AT A HEALTH CARE FACILITY: Primary | ICD-10-CM

## 2024-11-15 PROBLEM — R22.9 MULTIPLE SKIN NODULES: Status: RESOLVED | Noted: 2017-12-02 | Resolved: 2024-11-15

## 2024-11-15 RX ORDER — LISINOPRIL 20 MG/1
20 TABLET ORAL DAILY
Qty: 90 TABLET | Refills: 3 | Status: SHIPPED | OUTPATIENT
Start: 2024-11-15

## 2024-11-15 RX ORDER — BUPROPION HYDROCHLORIDE 150 MG/1
150 TABLET ORAL EVERY MORNING
Qty: 90 TABLET | Refills: 3 | Status: SHIPPED | OUTPATIENT
Start: 2024-11-15

## 2024-11-15 RX ORDER — CITALOPRAM HYDROBROMIDE 20 MG/1
20 TABLET ORAL DAILY
Qty: 90 TABLET | Refills: 3 | Status: SHIPPED | OUTPATIENT
Start: 2024-11-15

## 2024-11-15 NOTE — ASSESSMENT & PLAN NOTE
Also managed by vascular through [facility]. Nery has a history of peripheral artery disease in the presence of a positive lupus anticoagulant. She had bilateral iliac disease treated with stents in her late 30's. She also had aorti bifemoral bypass surgery which continues to be patent per recent imaging. Currently on Repatha and aspirin as well as the Eliquis.

## 2024-11-15 NOTE — ASSESSMENT & PLAN NOTE
Updated echocardiogram in 2021 showed mild mitral regurgitation. Asymptomatic today without any reports of lower extremity swelling, orthopnea, dyspnea, chest pain, palpitations.

## 2024-11-15 NOTE — ASSESSMENT & PLAN NOTE
Managed by vascular through [facility]. Specialty notes reviewed. Last US 2022 showed bilateral plaque with <50% stenosis.

## 2024-11-15 NOTE — ASSESSMENT & PLAN NOTE
See plan as documented under depression.  Orders:    buPROPion (WELLBUTRIN XL) 150 MG 24 hr tablet; Take 1 tablet (150 mg) by mouth every morning.    citalopram (CELEXA) 20 MG tablet; Take 1 tablet (20 mg) by mouth daily.

## 2024-11-15 NOTE — ASSESSMENT & PLAN NOTE
Managed by urology. Patient has a history of aortoiliac bypass with fibrotic tissue on the right, thought to be the cause of her stricture which also caused obstruction/hydronephrosis. The fibrotic tissue is in a location where biopsy is extremely challenging. She had a stent placed and this has been a challenging course. Per her report and the chart, it sounds as if she will be meeting with another urologist to discuss an alternative plan given complicated anatomy and the hope to avoid just repeated stent exchanges for management. This appointment is scheduled for the end of the month.

## 2024-11-15 NOTE — PROGRESS NOTES
Preventive Care Visit  [facility]  GERALD Crandall CNP, Family Medicine  Nov 15, 2024      Assessment & Plan   Assessment & Plan  Routine general medical examination at a health care facility  Annual exam.  Mammogram: Ordered  PAP: Updated today  Colonoscopy: Up to date  Immunizations: COVID and influenza today.  Deferred zoster but strongly recommend.  Will check with insurance.  Labs: Discussed and offered.  None desired nor indicated at this point.  Mood: As documented  BMI: 20.28  Bone health: Osteoporosis as documented elsewhere  Recommend follow up in one year for annual exam or sooner if needed/indicated elsewhere.         Hydronephrosis with ureteral stricture, not elsewhere classified  Managed by urology. Patient has a history of aortoiliac bypass with fibrotic tissue on the right, thought to be the cause of her stricture which also caused obstruction/hydronephrosis. The fibrotic tissue is in a location where biopsy is extremely challenging. She had a stent placed and this has been a challenging course. Per her report and the chart, it sounds as if she will be meeting with another urologist to discuss an alternative plan given complicated anatomy and the hope to avoid just repeated stent exchanges for management. This appointment is scheduled for the end of the month.        Cervical cancer screening    Orders:    HPV and Gynecologic Cytology Panel - Recommended Age 30 - 65 Years    Long term (current) use of anticoagulants  Recently transitioned to Eliquis and is happy with this.       Age-related osteoporosis without current pathological fracture  DEXA completed in 2022 consistent with osteoporosis. Patient has been prescribed Fosamax two separate times and has stopped for unclear reasons.  She is experiencing fairly significant gastrointestinal symptoms over the last few months, so this in combination with her noncompliance makes me think she would be a better candidate  for something such as an injectable medication that would require less frequent dosing and less taxing effects on the GI system.  For this reason, I will place a referral to endocrinology to discuss options.  Orders:    Adult Endocrinology  Referral; Future    Bilateral carotid artery disease, unspecified type (H)  Managed by vascular through [facility]. Specialty notes reviewed. Last US 2022 showed bilateral plaque with <50% stenosis.       PAD (peripheral artery disease) (H)  Also managed by vascular through [facility]. Nery has a history of peripheral artery disease in the presence of a positive lupus anticoagulant. She had bilateral iliac disease treated with stents in her late 30's. She also had aorti bifemoral bypass surgery which continues to be patent per recent imaging. Currently on Repatha and aspirin as well as the Eliquis.        Dyslipidemia  On repatha. Vascular tyshawn lipids showing an LDL of 116.  She thinks that she may have missed some doses of the Repatha given her recent illnesses and procedures, so we will follow the instruction of the prescriber once labs are reviewed by them.        Essential hypertension  Blood pressure today is 111/64. Current therapies include lisinopril 20mg daily. She denies any concerns for side effects. Had some hypotension secondary to infection/colitis last month which has resolved. Up to date on monitoring labs.  Orders:    lisinopril (ZESTRIL) 20 MG tablet; Take 1 tablet (20 mg) by mouth daily.    Tobacco use disorder  Currently smoking 3/4 PPD. Multiple comorbidities. Continues to express no desire in cessation despite multiple conversations in multiple settings.  Due for annual CT and this was ordered today.  Orders:    CT Chest Lung Cancer Screen Low Dose Without; Future    Anxiety state  See plan as documented under depression.  Orders:    buPROPion (WELLBUTRIN XL) 150 MG 24 hr tablet; Take 1 tablet (150 mg) by mouth every morning.    citalopram (CELEXA) 20 MG  tablet; Take 1 tablet (20 mg) by mouth daily.    Moderate episode of recurrent major depressive disorder (H)  Currently quite exacerbated which she believes is multifactorial.  She has been having many health struggles as documented elsewhere and also notes relationship concerns. Depression is much more prevalant than anxiety at this point per her report. She is currently on citalopram - we reviewed options today including increasing her citalopram dose versus adding on a second agent and after discussion have opted to add on Wellbutrin. Expected therapeutic effects and potential side effects were discussed today. I have asked she follow up with me in 4-6 weeks regarding efficacy and tolerability.   Orders:    buPROPion (WELLBUTRIN XL) 150 MG 24 hr tablet; Take 1 tablet (150 mg) by mouth every morning.    Encounter for screening mammogram for breast cancer    Orders:    MA Screen Bilateral w/Scott; Future    Heart murmur  Updated echocardiogram in 2021 showed mild mitral regurgitation. Asymptomatic today without any reports of lower extremity swelling, orthopnea, dyspnea, chest pain, palpitations.          Patient has been advised of split billing requirements and indicates understanding: Yes       Nicotine/Tobacco Cessation  She reports that she has been smoking cigarettes. She has a 42 pack-year smoking history. She has been exposed to tobacco smoke. She has never used smokeless tobacco.  Nicotine/Tobacco Cessation Plan  Information offered: Patient not interested at this time      Counseling  Appropriate preventive services were addressed with this patient via screening, questionnaire, or discussion as appropriate for fall prevention, nutrition, physical activity, Tobacco-use cessation, social engagement, weight loss and cognition.  Checklist reviewing preventive services available has been given to the patient.  Reviewed patient's diet, addressing concerns and/or questions.   She is at risk for lack of exercise  and has been provided with information to increase physical activity for the benefit of her well-being.   She is at risk for psychosocial distress and has been provided with information to reduce risk.     Arsenio Gabriel is a 59 year old, presenting for the following:  Physical (Pt. Fasting. PAP due.) and Follow Up (Colitis/IBS)        11/15/2024    10:20 AM   Additional Questions   Roomed by sac   Accompanied by self         11/15/2024    10:20 AM   Patient Reported Additional Medications   Patient reports taking the following new medications no      Patient is a pleasant 59-year-old presenting today for her annual visit.  She notes the last year has been particularly stressful.  She is struggled with recurrent gastrointestinal distress as well as a recently diagnosed hydronephrosis and stricture.  She continues work with her vascular providers mental health is poor at the moment; she notes that the health concerns have been quite daunting.  She is also having difficulties with her marriage.    Health Care Directive  Patient does not have a Health Care Directive: Patient states has Advance Directive and will bring in a copy to clinic.      11/14/2024   General Health   How would you rate your overall physical health? (!) FAIR   Feel stress (tense, anxious, or unable to sleep) To some extent      (!) STRESS CONCERN      11/14/2024   Nutrition   Three or more servings of calcium each day? Yes   Diet: Regular (no restrictions)   How many servings of fruit and vegetables per day? (!) 0-1   How many sweetened beverages each day? (!) 2            11/14/2024   Exercise   Days per week of moderate/strenous exercise 1 day   Average minutes spent exercising at this level 10 min      (!) EXERCISE CONCERN      11/14/2024   Social Factors   Frequency of gathering with friends or relatives Once a week   Worry food won't last until get money to buy more No   Food not last or not have enough money for food? No   Do you have  housing? (Housing is defined as stable permanent housing and does not include staying ouside in a car, in a tent, in an abandoned building, in an overnight shelter, or couch-surfing.) Yes   Are you worried about losing your housing? No   Lack of transportation? No   Unable to get utilities (heat,electricity)? No          11/14/2024   Fall Risk   Fallen 2 or more times in the past year? No    Trouble with walking or balance? No        Patient-reported          11/14/2024   Dental   Dentist two times every year? Yes          11/14/2024   TB Screening   Were you born outside of the [place]? No          Today's PHQ-2 Score:       4/1/2024     6:32 AM   PHQ-2 ( 1999 [employer])   Q1: Little interest or pleasure in doing things 0    Q2: Feeling down, depressed or hopeless 0    PHQ-2 Score 0   Q1: Little interest or pleasure in doing things Not at all   Q2: Feeling down, depressed or hopeless Not at all   PHQ-2 Score 0       Patient-reported         11/14/2024   Substance Use   Alcohol more than 3/day or more than 7/wk No   Do you use any other substances recreationally? No      Social History     Tobacco Use    Smoking status: Every Day     Current packs/day: 0.75     Average packs/day: 0.8 packs/day for 50.5 years (42.0 ttl pk-yrs)     Types: Cigarettes     Passive exposure: Current    Smokeless tobacco: Never    Tobacco comments:     1 ppd since 17 years old   Vaping Use    Vaping status: Never Used   Substance Use Topics    Alcohol use: Not Currently    Drug use: No           7/28/2023   LAST FHS-7 RESULTS   1st degree relative breast or ovarian cancer Yes    Any relative bilateral breast cancer Yes    Any male have breast cancer No    Any ONE woman have BOTH breast AND ovarian cancer Yes    Any woman with breast cancer before 50yrs Yes    2 or more relatives with breast AND/OR ovarian cancer No    2 or more relatives with breast AND/OR bowel cancer No        Patient-reported        Mammogram Screening - Mammogram every 1-2  "years updated in Health Maintenance based on mutual decision making        2024   STI Screening   New sexual partner(s) since last STI/HIV test? No        History of abnormal Pap smear: No - age 30- 64 PAP with HPV every 5 years recommended        Latest Ref Rng & Units 2019    10:04 AM   PAP / HPV   PAP Negative for squamous intraepithelial lesion or malignancy. Negative for squamous intraepithelial lesion or malignancy  Electronically signed by Tere Estevez CT (ASCP) on 2019 at  1:46 PM      HPV 16 DNA NEG Negative    HPV 18 DNA NEG Negative    Other HR HPV NEG Negative      ASCVD Risk   The ASCVD Risk score (Dilan GAMEZ, et al., 2019) failed to calculate for the following reasons:    The valid total cholesterol range is 130 to 320 mg/dL    Fracture Risk Assessment Tool  Link to Frax Calculator  Use the information below to complete the Frax calculator  : 1965  Sex: female  Weight (kg): 54.4 kg (actual weight)  Height (cm): 163.8 cm  Previous Fragility Fracture:  No  History of parent with fractured hip:  No  Current Smoking:  Yes  Patient has been on glucocorticoids for more than 3 months (5mg/day or more): No  Rheumatoid Arthritis on Problem List:  No  Secondary Osteoporosis on Problem List:  No  Consumes 3 or more units of alcohol per day: No  Femoral Neck BMD (g/cm2)           Reviewed and updated as needed this visit by Provider                   Objective    Exam  LMP  (LMP Unknown)    Estimated body mass index is 19.14 kg/m  as calculated from the following:    Height as of 10/30/24: 1.651 m (5' 5\").    Weight as of 10/30/24: 52.2 kg (115 lb).    Physical Exam  Vitals and nursing note reviewed.   Constitutional:       General: She is not in acute distress.     Appearance: Normal appearance.   HENT:      Right Ear: Tympanic membrane, ear canal and external ear normal.      Left Ear: Tympanic membrane, ear canal and external ear normal.      Nose: Nose normal.      " Mouth/Throat:      Mouth: Mucous membranes are moist.      Pharynx: No oropharyngeal exudate or posterior oropharyngeal erythema.   Eyes:      General: No scleral icterus.     Conjunctiva/sclera: Conjunctivae normal.      Pupils: Pupils are equal, round, and reactive to light.   Cardiovascular:      Rate and Rhythm: Normal rate and regular rhythm.      Heart sounds: Normal heart sounds.   Pulmonary:      Effort: Pulmonary effort is normal. No respiratory distress.      Breath sounds: No wheezing.   Abdominal:      General: Abdomen is flat. Bowel sounds are normal.      Palpations: Abdomen is soft.   Genitourinary:     Pubic Area: No rash.       Labia:         Right: No rash, tenderness or lesion.         Left: No rash, tenderness or lesion.       Urethra: No prolapse.      Vagina: No vaginal discharge, erythema, tenderness or bleeding.      Cervix: No discharge, friability or erythema.      Rectum: Normal.   Musculoskeletal:         General: Normal range of motion.      Cervical back: Normal range of motion and neck supple. No rigidity or tenderness.   Lymphadenopathy:      Cervical: No cervical adenopathy.   Skin:     General: Skin is warm.      Coloration: Skin is not pale.      Findings: No rash.   Neurological:      General: No focal deficit present.      Mental Status: She is alert and oriented to person, place, and time.   Psychiatric:         Mood and Affect: Mood normal.         Behavior: Behavior normal.         Thought Content: Thought content normal.       Signed Electronically by: GERALD Crandall CNP

## 2024-11-15 NOTE — ASSESSMENT & PLAN NOTE
DEXA completed in 2022 consistent with osteoporosis. Patient has been prescribed Fosamax two separate times and has stopped for unclear reasons.  She is experiencing fairly significant gastrointestinal symptoms over the last few months, so this in combination with her noncompliance makes me think she would be a better candidate for something such as an injectable medication that would require less frequent dosing and less taxing effects on the GI system.  For this reason, I will place a referral to endocrinology to discuss options.  Orders:    Adult Endocrinology  Referral; Future

## 2024-11-15 NOTE — ASSESSMENT & PLAN NOTE
Annual exam.  Mammogram: Ordered  PAP: Updated today  Colonoscopy: Up to date  Immunizations: COVID and influenza today.  Deferred zoster but strongly recommend.  Will check with insurance.  Labs: Discussed and offered.  None desired nor indicated at this point.  Mood: As documented  BMI: 20.28  Bone health: Osteoporosis as documented elsewhere  Recommend follow up in one year for annual exam or sooner if needed/indicated elsewhere.

## 2024-11-15 NOTE — ASSESSMENT & PLAN NOTE
On repatha. Vascular tyshawn lipids showing an LDL of 116.  She thinks that she may have missed some doses of the Repatha given her recent illnesses and procedures, so we will follow the instruction of the prescriber once labs are reviewed by them.

## 2024-11-15 NOTE — PATIENT INSTRUCTIONS
Discussed the following today:  For mental health, we are going to add on a medication called Wellbutrin.  See attachment.  I like you to take this every day and follow-up with me in about 4 to 6 weeks regarding how it is working any side effects that you are experiencing.    Patient Education   Preventive Care Advice   This is general advice given by our system to help you stay healthy. However, your care team may have specific advice just for you. Please talk to your care team about your preventive care needs.  Nutrition  Eat 5 or more servings of fruits and vegetables each day.  Try wheat bread, brown rice and whole grain pasta (instead of white bread, rice, and pasta).  Get enough calcium and vitamin D. Check the label on foods and aim for 100% of the RDA (recommended daily allowance).  Lifestyle  Exercise at least 150 minutes each week  (30 minutes a day, 5 days a week).  Do muscle strengthening activities 2 days a week. These help control your weight and prevent disease.  No smoking.  Wear sunscreen to prevent skin cancer.  Have a dental exam and cleaning every 6 months.  Yearly exams  See your health care team every year to talk about:  Any changes in your health.  Any medicines your care team has prescribed.  Preventive care, family planning, and ways to prevent chronic diseases.  Shots (vaccines)   HPV shots (up to age 26), if you've never had them before.  Hepatitis B shots (up to age 59), if you've never had them before.  COVID-19 shot: Get this shot when it's due.  Flu shot: Get a flu shot every year.  Tetanus shot: Get a tetanus shot every 10 years.  Pneumococcal, hepatitis A, and RSV shots: Ask your care team if you need these based on your risk.  Shingles shot (for age 50 and up)  General health tests  Diabetes screening:  Starting at age 35, Get screened for diabetes at least every 3 years.  If you are younger than age 35, ask your care team if you should be screened for diabetes.  Cholesterol test:  At age 39, start having a cholesterol test every 5 years, or more often if advised.  Bone density scan (DEXA): At age 50, ask your care team if you should have this scan for osteoporosis (brittle bones).  Hepatitis C: Get tested at least once in your life.  STIs (sexually transmitted infections)  Before age 24: Ask your care team if you should be screened for STIs.  After age 24: Get screened for STIs if you're at risk. You are at risk for STIs (including HIV) if:  You are sexually active with more than one person.  You don't use condoms every time.  You or a partner was diagnosed with a sexually transmitted infection.  If you are at risk for HIV, ask about PrEP medicine to prevent HIV.  Get tested for HIV at least once in your life, whether you are at risk for HIV or not.  Cancer screening tests  Cervical cancer screening: If you have a cervix, begin getting regular cervical cancer screening tests starting at age 21.  Breast cancer scan (mammogram): If you've ever had breasts, begin having regular mammograms starting at age 40. This is a scan to check for breast cancer.  Colon cancer screening: It is important to start screening for colon cancer at age 45.  Have a colonoscopy test every 10 years (or more often if you're at risk) Or, ask your provider about stool tests like a FIT test every year or Cologuard test every 3 years.  To learn more about your testing options, visit:   .  For help making a decision, visit:   https://bit.ly/cu32509.  Prostate cancer screening test: If you have a prostate, ask your care team if a prostate cancer screening test (PSA) at age 55 is right for you.  Lung cancer screening: If you are a current or former smoker ages 50 to 80, ask your care team if ongoing lung cancer screenings are right for you.  For informational purposes only. Not to replace the advice of your health care provider. Copyright   2023 [employer]. All rights reserved. Clinically reviewed by the [facility]itions [facility]. [employer] [id number] - REV 01/24.  Learning About Stress  What is stress?     Stress is your body's response to a hard situation. Your body can have a physical, emotional, or mental response. Stress is a fact of life for most people, and it affects everyone differently. What causes stress for you may not be stressful for someone else.  A lot of things can cause stress. You may feel stress when you go on a job interview, take a test, or run a race. This kind of short-term stress is normal and even useful. It can help you if you need to work hard or react quickly. For example, stress can help you finish an important job on time.  Long-term stress is caused by ongoing stressful situations or events. Examples of long-term stress include long-term health problems, ongoing problems at work, or conflicts in your family. Long-term stress can harm your health.  How does stress affect your health?  When you are stressed, your body responds as though you are in danger. It makes hormones that speed up your heart, make you breathe faster, and give you a burst of energy. This is called the fight-or-flight stress response. If the stress is over quickly, your body goes back to normal and no harm is done.  But if stress happens too often or lasts too long, it can have bad effects. Long-term stress can make you more likely to get sick, and it can make symptoms of some diseases worse. If you tense up when you are stressed, you may develop neck, shoulder, or low back pain. Stress is linked to high blood pressure and heart disease.  Stress also harms your emotional health. It can make you bueno, tense, or depressed. Your relationships may suffer, and you may not do well at work or school.  What can you do to manage stress?  You can try these things to help manage stress:   Do something active. Exercise or activity can help reduce stress. Walking is a great way to get started. Even everyday activities  such as housecleaning or yard work can help.  Try yoga or elissa chi. These techniques combine exercise and meditation. You may need some training at first to learn them.  Do something you enjoy. For example, listen to music or go to a movie. Practice your hobby or do volunteer work.  Meditate. This can help you relax, because you are not worrying about what happened before or what may happen in the future.  Do guided imagery. Imagine yourself in any setting that helps you feel calm. You can use online videos, books, or a teacher to guide you.  Do breathing exercises. For example:  From a standing position, bend forward from the waist with your knees slightly bent. Let your arms dangle close to the floor.  Breathe in slowly and deeply as you return to a standing position. Roll up slowly and lift your head last.  Hold your breath for just a few seconds in the standing position.  Breathe out slowly and bend forward from the waist.  Let your feelings out. Talk, laugh, cry, and express anger when you need to. Talking with supportive friends or family, a counselor, or a lillie leader about your feelings is a healthy way to relieve stress. Avoid discussing your feelings with people who make you feel worse.  Write. It may help to write about things that are bothering you. This helps you find out how much stress you feel and what is causing it. When you know this, you can find better ways to cope.  What can you do to prevent stress?  You might try some of these things to help prevent stress:  Manage your time. This helps you find time to do the things you want and need to do.  Get enough sleep. Your body recovers from the stresses of the day while you are sleeping.  Get support. Your family, friends, and community can make a difference in how you experience stress.  Limit your news feed. Avoid or limit time on social media or news that may make you feel stressed.  Do something active. Exercise or activity can help reduce  "stress. Walking is a great way to get started.  Where can you learn more?  Go to https://www.[employer].net/patiented  Enter N032 in the search box to learn more about \"Learning About Stress.\"  Current as of: October 24, 2023  Content Version: 14.2 2024 [facility] [employer] [facility].   Care instructions adapted under license by your healthcare professional. If you have questions about a medical condition or this instruction, always ask your healthcare professional. [employer] disclaims any warranty or liability for your use of this information.       "

## 2024-11-15 NOTE — ASSESSMENT & PLAN NOTE
Currently smoking 3/4 PPD. Multiple comorbidities. Continues to express no desire in cessation despite multiple conversations in multiple settings.  Due for annual CT and this was ordered today.  Orders:    CT Chest Lung Cancer Screen Low Dose Without; Future

## 2024-11-15 NOTE — ASSESSMENT & PLAN NOTE
Blood pressure today is 111/64. Current therapies include lisinopril 20mg daily. She denies any concerns for side effects. Had some hypotension secondary to infection/colitis last month which has resolved. Up to date on monitoring labs.  Orders:    lisinopril (ZESTRIL) 20 MG tablet; Take 1 tablet (20 mg) by mouth daily.

## 2024-11-18 ENCOUNTER — MYC MEDICAL ADVICE (OUTPATIENT)
Dept: ANTICOAGULATION | Facility: CLINIC | Age: 59
End: 2024-11-18
Payer: COMMERCIAL

## 2024-11-18 ENCOUNTER — MYC MEDICAL ADVICE (OUTPATIENT)
Dept: FAMILY MEDICINE | Facility: CLINIC | Age: 59
End: 2024-11-18
Payer: COMMERCIAL

## 2024-11-18 DIAGNOSIS — R01.1 HEART MURMUR: ICD-10-CM

## 2024-11-18 DIAGNOSIS — I34.0 NONRHEUMATIC MITRAL VALVE REGURGITATION: Primary | ICD-10-CM

## 2024-11-18 LAB
HPV HR 12 DNA CVX QL NAA+PROBE: NEGATIVE
HPV16 DNA CVX QL NAA+PROBE: NEGATIVE
HPV18 DNA CVX QL NAA+PROBE: NEGATIVE
HUMAN PAPILLOMA VIRUS FINAL DIAGNOSIS: NORMAL

## 2024-11-19 ENCOUNTER — DOCUMENTATION ONLY (OUTPATIENT)
Dept: ANTICOAGULATION | Facility: CLINIC | Age: 59
End: 2024-11-19
Payer: COMMERCIAL

## 2024-11-19 DIAGNOSIS — I77.9 BILATERAL CAROTID ARTERY DISEASE, UNSPECIFIED TYPE (H): ICD-10-CM

## 2024-11-19 DIAGNOSIS — Z79.01 LONG TERM (CURRENT) USE OF ANTICOAGULANTS: ICD-10-CM

## 2024-11-19 DIAGNOSIS — I73.9 PAD (PERIPHERAL ARTERY DISEASE) (H): Primary | ICD-10-CM

## 2024-11-19 DIAGNOSIS — D68.59 HYPERCOAGULABLE STATE (H): ICD-10-CM

## 2024-11-19 NOTE — PROGRESS NOTES
ANTICOAGULATION  MANAGEMENT    Nery Whitaker is being discharged from the [facility] Anticoagulation Management Program ([facility]).    Reason for discharge: warfarin replaced by alternate therapy, Xarelto.   Switched by Dr. Nieves on 11/8/24.     Anticoagulation episode resolved, ACC referral closed, Standing order discontinued, and Warfarin discontinued from medication list (on alternate agent)    If patient needs warfarin management in the future, please send a new referral    Melissa Ferreira RN

## 2024-11-20 ENCOUNTER — PRE VISIT (OUTPATIENT)
Dept: UROLOGY | Facility: CLINIC | Age: 59
End: 2024-11-20
Payer: COMMERCIAL

## 2024-11-20 NOTE — TELEPHONE ENCOUNTER
Reason for visit: consult for      Dx/Hx/Sx: Hydronephrosis with ureteral stricture     Records/imaging/labs/orders: in epic     At Rooming: virtual visit

## 2024-11-21 LAB
BKR AP ASSOCIATED HPV REPORT: NORMAL
BKR LAB AP GYN ADEQUACY: NORMAL
BKR LAB AP GYN INTERPRETATION: NORMAL
BKR LAB AP PREVIOUS ABNORMAL: NORMAL
PATH REPORT.COMMENTS IMP SPEC: NORMAL
PATH REPORT.COMMENTS IMP SPEC: NORMAL
PATH REPORT.RELEVANT HX SPEC: NORMAL

## 2024-11-27 ENCOUNTER — VIRTUAL VISIT (OUTPATIENT)
Dept: UROLOGY | Facility: CLINIC | Age: 59
End: 2024-11-27
Payer: COMMERCIAL

## 2024-11-27 DIAGNOSIS — N13.30 HYDRONEPHROSIS OF RIGHT KIDNEY: ICD-10-CM

## 2024-11-27 DIAGNOSIS — K68.2 RETROPERITONEAL FIBROSIS: Primary | ICD-10-CM

## 2024-11-27 PROCEDURE — 99214 OFFICE O/P EST MOD 30 MIN: CPT | Mod: 95 | Performed by: UROLOGY

## 2024-11-27 RX ORDER — ACETAMINOPHEN 325 MG/1
975 TABLET ORAL ONCE
OUTPATIENT
Start: 2024-11-27 | End: 2024-11-27

## 2024-11-27 RX ORDER — CEFAZOLIN SODIUM 2 G/50ML
2 SOLUTION INTRAVENOUS
OUTPATIENT
Start: 2024-11-27

## 2024-11-27 RX ORDER — ACETAMINOPHEN 650 MG/1
650 SUPPOSITORY RECTAL ONCE
OUTPATIENT
Start: 2024-11-27

## 2024-11-27 RX ORDER — CEFAZOLIN SODIUM 2 G/50ML
2 SOLUTION INTRAVENOUS SEE ADMIN INSTRUCTIONS
OUTPATIENT
Start: 2024-11-27

## 2024-11-27 RX ORDER — HEPARIN SODIUM 5000 [USP'U]/.5ML
5000 INJECTION, SOLUTION INTRAVENOUS; SUBCUTANEOUS
OUTPATIENT
Start: 2024-11-27

## 2024-11-27 ASSESSMENT — PAIN SCALES - GENERAL: PAINLEVEL_OUTOF10: MODERATE PAIN (4)

## 2024-11-27 NOTE — LETTER
11/27/2024       RE: Nery Whitaker  [address]  [facility] [address]     Dear Colleague,    Thank you for referring your patient, Nery Whitaker, to the [facility] [place] at [facility]. Please see a copy of my visit note below.    Virtual Visit Details    Type of service:  Video Visit     Originating Location (pt. Location): Home    Distant Location (provider location):  Off-site  Platform used for Video Visit: Mara Whitaker is 59 year old female.  She has history of lupus.  She has a history of vascular disease.  She had stenting in her native iliacs.  S/p aortobifemoral bypass (laparoscopic hand assist) by Dr. Escobar in 2006.  She was doing well for years. Then most recently in Sept she had a CT scan which showed significant right hydronephrosis.  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  There is a transition point where the ureter crosses the bypass graft.  She takes warfarin.  She has a stent in place.    Exam:           Constitutional - No apparent distress. Patient of stated age.               Eyes - no redness or discharge.              Respiratory - no cough. no labored breathing              Musculoskeletal - full range of motion in all extremities              Skin - no visible skin discoloration or lesions              Neurological - no tremor              Psychiatric - no anxiety, alert & oriented                I personally reviewed Cr which was 1.38 on 10/30/2024.    I personally reviewed numerous CT scans most recently from 9/2024 which shows right hydronephrosis down to level of aortobifem graft.    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  We discussed ureteral reconstruction.  We also discussed nephrectomy.  Ureteral reconstruction is likely ureterolysis. Likely UU (I worry the ureter passes completely under bypass graft).  Thus, we'll plan to  explore, ureterolysis. If completely frees, then we'll ureteroscope during the case. It's possible it's ureterolysis with omental flap only.  If it doesn't then it's likely an augmented anastomotic with buccal or appendiceal flap.    Discussed risks and benefits with patient including major vascular or bowel injury.  Will plan to take stent out preop.    Jayden Boyle MD      Again, thank you for allowing me to participate in the care of your patient.      Sincerely,    Jayden Boyle MD

## 2024-11-27 NOTE — PROGRESS NOTES
Virtual Visit Details    Type of service:  Video Visit     Originating Location (pt. Location): Home    Distant Location (provider location):  Off-site  Platform used for Video Visit: Mara Whitaker is 59 year old female.  She has history of lupus.  She has a history of vascular disease.  She had stenting in her native iliacs.  S/p aortobifemoral bypass (laparoscopic hand assist) by Dr. Escobar in 2006.  She was doing well for years. Then most recently in Sept she had a CT scan which showed significant right hydronephrosis.  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  There is a transition point where the ureter crosses the bypass graft.  She takes warfarin.  She has a stent in place.    Exam:           Constitutional - No apparent distress. Patient of stated age.               Eyes - no redness or discharge.              Respiratory - no cough. no labored breathing              Musculoskeletal - full range of motion in all extremities              Skin - no visible skin discoloration or lesions              Neurological - no tremor              Psychiatric - no anxiety, alert & oriented                I personally reviewed Cr which was 1.38 on 10/30/2024.    I personally reviewed numerous CT scans most recently from 9/2024 which shows right hydronephrosis down to level of aortobifem graft.    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  We discussed ureteral reconstruction.  We also discussed nephrectomy.  Ureteral reconstruction is likely ureterolysis. Likely UU (I worry the ureter passes completely under bypass graft).  Thus, we'll plan to explore, ureterolysis. If completely frees, then we'll ureteroscope during the case. It's possible it's ureterolysis with omental flap only.  If it doesn't then it's likely an augmented anastomotic with buccal or appendiceal flap.    Discussed risks and benefits with patient including major vascular or bowel  injury.  Will plan to take stent out preop.    Jayden Boyle MD

## 2024-11-27 NOTE — NURSING NOTE
Current patient location: [address] [facility] [address]    Is the patient currently in the state of [place]? YES    Visit mode:VIDEO    If the visit is dropped, the patient can be reconnected by:VIDEO VISIT: Text to cell phone:   Telephone Information:   Mobile [phone]       Will anyone else be joining the visit? NO  (If patient encounters technical issues they should call [phone]    Are changes needed to the allergy or medication list? No    Are refills needed on medications prescribed by this physician? NO    Rooming Documentation:  Questionnaire(s) not done per department protocol    Reason for visit: RECHECK Shelby Kocher VVF

## 2024-12-05 ENCOUNTER — TRANSFERRED RECORDS (OUTPATIENT)
Dept: HEALTH INFORMATION MANAGEMENT | Facility: CLINIC | Age: 59
End: 2024-12-05
Payer: COMMERCIAL

## 2024-12-12 ENCOUNTER — NURSE TRIAGE (OUTPATIENT)
Dept: FAMILY MEDICINE | Facility: CLINIC | Age: 59
End: 2024-12-12

## 2024-12-12 ENCOUNTER — OFFICE VISIT (OUTPATIENT)
Dept: FAMILY MEDICINE | Facility: CLINIC | Age: 59
End: 2024-12-12
Payer: COMMERCIAL

## 2024-12-12 VITALS
RESPIRATION RATE: 12 BRPM | OXYGEN SATURATION: 97 % | TEMPERATURE: 98.7 F | WEIGHT: 106.44 LBS | SYSTOLIC BLOOD PRESSURE: 167 MMHG | BODY MASS INDEX: 17.73 KG/M2 | HEIGHT: 65 IN | DIASTOLIC BLOOD PRESSURE: 90 MMHG | HEART RATE: 73 BPM

## 2024-12-12 DIAGNOSIS — G47.01 INSOMNIA DUE TO MEDICAL CONDITION: ICD-10-CM

## 2024-12-12 DIAGNOSIS — F12.13 CANNABIS ABUSE WITH WITHDRAWAL (H): Primary | ICD-10-CM

## 2024-12-12 DIAGNOSIS — R31.0 GROSS HEMATURIA: ICD-10-CM

## 2024-12-12 DIAGNOSIS — F41.1 ANXIETY STATE: ICD-10-CM

## 2024-12-12 DIAGNOSIS — Z96.0 URETERAL STENT PRESENT: ICD-10-CM

## 2024-12-12 DIAGNOSIS — R11.2 NAUSEA AND VOMITING, UNSPECIFIED VOMITING TYPE: ICD-10-CM

## 2024-12-12 DIAGNOSIS — I10 ESSENTIAL HYPERTENSION: ICD-10-CM

## 2024-12-12 LAB
ALBUMIN UR-MCNC: >=300 MG/DL
APPEARANCE UR: ABNORMAL
BACTERIA #/AREA URNS HPF: ABNORMAL /HPF
BILIRUB UR QL STRIP: ABNORMAL
COLOR UR AUTO: YELLOW
GLUCOSE UR STRIP-MCNC: NEGATIVE MG/DL
HGB UR QL STRIP: ABNORMAL
KETONES UR STRIP-MCNC: ABNORMAL MG/DL
LEUKOCYTE ESTERASE UR QL STRIP: ABNORMAL
NITRATE UR QL: NEGATIVE
PH UR STRIP: 6 [PH] (ref 5–8)
RBC #/AREA URNS AUTO: ABNORMAL /HPF
SP GR UR STRIP: >=1.03 (ref 1–1.03)
SQUAMOUS #/AREA URNS AUTO: ABNORMAL /LPF
UROBILINOGEN UR STRIP-ACNC: 0.2 E.U./DL
WBC #/AREA URNS AUTO: ABNORMAL /HPF

## 2024-12-12 RX ORDER — ZOLPIDEM TARTRATE 5 MG/1
5 TABLET ORAL
Qty: 30 TABLET | Refills: 0 | Status: SHIPPED | OUTPATIENT
Start: 2024-12-12

## 2024-12-12 RX ORDER — CITALOPRAM HYDROBROMIDE 40 MG/1
40 TABLET ORAL DAILY
Qty: 90 TABLET | Refills: 0 | Status: SHIPPED | OUTPATIENT
Start: 2024-12-12

## 2024-12-12 RX ORDER — ONDANSETRON 4 MG/1
4 TABLET, ORALLY DISINTEGRATING ORAL EVERY 6 HOURS PRN
Qty: 30 TABLET | Refills: 0 | Status: SHIPPED | OUTPATIENT
Start: 2024-12-12

## 2024-12-12 RX ORDER — GABAPENTIN 100 MG/1
CAPSULE ORAL
Qty: 630 CAPSULE | Refills: 0 | Status: SHIPPED | OUTPATIENT
Start: 2024-12-12

## 2024-12-12 ASSESSMENT — PATIENT HEALTH QUESTIONNAIRE - PHQ9
10. IF YOU CHECKED OFF ANY PROBLEMS, HOW DIFFICULT HAVE THESE PROBLEMS MADE IT FOR YOU TO DO YOUR WORK, TAKE CARE OF THINGS AT HOME, OR GET ALONG WITH OTHER PEOPLE: VERY DIFFICULT
SUM OF ALL RESPONSES TO PHQ QUESTIONS 1-9: 4
SUM OF ALL RESPONSES TO PHQ QUESTIONS 1-9: 4

## 2024-12-12 NOTE — TELEPHONE ENCOUNTER
Spoke in person with covering provider for PCP who recommends in clinic eval today appropriate. Writer called patient to discuss recommendations and she was agreeable to appointment this afternoon. This was scheduled.

## 2024-12-12 NOTE — PROGRESS NOTES
Assessment & Plan   Assessment & Plan  Cannabis abuse with withdrawal (H)  I do think the patient has some symptoms consistent with withdrawal from cannabis.  I read an article through up-to-date which recommended gabapentin as a medication that can help with withdrawal symptoms and since she is already taking this, I recommended a slight increase in dosage.  It also recommended treating insomnia with Ambien and a prescription was provided today.  I recommended close follow-up in 2 weeks with her primary provider, Priscila Lombardo.2       Hematuria  The patient's urinalysis does have some white blood cells and bacteria.  I recommended sending this for culture and we will call in an antibiotic if this is positive for infection.  She does not have any symptoms to suggest infection at this time.  Orders:    UA Macroscopic with reflex to Microscopic and Culture - Clinic Collect    UA Microscopic with Reflex to Culture    Urine Culture    Insomnia due to medical condition    Orders:    zolpidem (AMBIEN) 5 MG tablet; Take 1 tablet (5 mg) by mouth nightly as needed for sleep.    Anxiety state  I did suggest anticipating an increase in anxiety off of the cannabis and suggested increasing her citalopram to 40 mg daily which was done today.  Orders:    citalopram (CELEXA) 40 MG tablet; Take 1 tablet (40 mg) by mouth daily.    Nausea and vomiting, unspecified vomiting type  I do think that her recurrent nausea and vomiting as well as abdominal pain over the past few years is related to her marijuana use and commended her for proceeding with discontinuation of the drug.  A prescription for Zofran was provided today to take as needed for nausea.  She will need close follow-up to make sure that she has resolution of symptoms and that she is able to start seeing some weight gain.  Orders:    ondansetron (ZOFRAN ODT) 4 MG ODT tab; Take 1 tablet (4 mg) by mouth every 6 hours as needed for nausea.    Ureteral stent present          Essential hypertension  The patient blood pressure was elevated in clinic today but this may be due to withdrawal symptoms.  I did not make a medication adjustment as she also has not been able to consistently take her blood pressure medicine while sick this week.  Close follow-up in 2 weeks with her primary.               Arsenio Gabriel is a 59 year old who presents today after calling into triage earlier today with a concern regarding abdominal pain and hematuria.  The patient  has a recent complicated medical history which includes a ureteral stent being placed in her right ureter.  She has had intermittent bleeding since that was placed.  The patient states that yesterday she started experiencing right sided back pain and abdominal pain that became quite severe.  She notes that she has not been taking her regular medications including her blood thinner the past few days due to nausea and vomiting.  However, she did take her blood thinner yesterday and then noted passing a significant amount of bloody urine but had improvement in her symptoms of back pain and abdominal pain.  Her back pain and abdominal pain have remained resolved today although she does get intermittent and frequent discomfort due to the stent on the right side in her abdomen.    The patient has a more complicated and long-term history of cyclical vomiting.  The patient states that this has been ongoing for the past few years but got quite a bit more frequent over the past year.  She gets recurrent bouts of abdominal pain and then gets severe nausea and vomiting.  She has been in the emergency room a couple times and has had imaging because of this which is how the issue with her right kidney and ureter were found incidentally.  The patient had a consultation with a gastroenterologist last Thursday and reports providing a significant amount of history to try to help determine her symptoms.  She has had a fair amount of weight loss  recently due to the severity of the symptoms.  During questioning, she states that they asked her twice regarding marijuana use.  She was too embarrassed to admit that she has used marijuana daily for the past many years.  However, when she got home she read about marijuana and the cyclical vomiting that can occur secondary to that drug.  In addition, she states that in order to get relief of the nausea and vomiting she would frequently go in the hot bath and in fact sometimes would actually fall asleep in the bath at night trying to get relief.  She read that this was quite specific for cyclical vomiting secondary to marijuana use.  After reading this, the patient stopped using marijuana altogether 6 days ago.  This is the first time she has been abstinent from this drug for many many years.  She feels she is going through withdrawal currently from discontinuation of marijuana and feels agitated, anxious and has had ongoing nausea but states she has not actually vomited for 2 days which she thinks is a positive sign.  She is not sleeping at night either due to the withdrawal.  Hematuria (Nausea, vomiting, tachycardia, weak. Sx for one week.)        12/12/2024     1:37 PM   Additional Questions   Roomed by sac   Accompanied by self         12/12/2024     1:37 PM   Patient Reported Additional Medications   Patient reports taking the following new medications no     History of Present Illness       Reason for visit:  Nausea vomiting etc    She eats 0-1 servings of fruits and vegetables daily.She consumes 1 sweetened beverage(s) daily.She exercises with enough effort to increase her heart rate 9 or less minutes per day.  She exercises with enough effort to increase her heart rate 3 or less days per week.   She is taking medications regularly.           Objective    BP (!) 167/90 (BP Location: Left arm, Patient Position: Sitting, Cuff Size: Adult Regular)   Pulse 73   Temp 98.7  F (37.1  C) (Oral)   Resp 12   Ht  "1.638 m (5' 4.5\")   Wt 48.3 kg (106 lb 7 oz)   LMP  (LMP Unknown)   SpO2 97%   BMI 17.99 kg/m    Body mass index is 17.99 kg/m .  Physical Exam   GENERAL: alert, frail, and appears older than stated age  RESP: lungs clear to auscultation - no rales, rhonchi or wheezes  CV: regular rate and rhythm, normal S1 S2, no S3 or S4, no murmur, click or rub, no peripheral edema   ABDOMEN: soft, non-distended, mild tenderness RLQ  PSYCH: mentation appears normal, tearful, and anxious            Signed Electronically by: REE MATOS MD    "

## 2024-12-12 NOTE — ASSESSMENT & PLAN NOTE
The patient blood pressure was elevated in clinic today but this may be due to withdrawal symptoms.  I did not make a medication adjustment as she also has not been able to consistently take her blood pressure medicine while sick this week.  Close follow-up in 2 weeks with her primary.

## 2024-12-12 NOTE — TELEPHONE ENCOUNTER
Nurse Triage SBAR    Is this a 2nd Level Triage? YES, LICENSED PRACTITIONER REVIEW IS REQUIRED    Situation: Patient with multiple symptoms which include nausea, vomiting, loss of appetite, blood in urine    Background: Complex medical history which includes recent R uretal stent placement on 10/22/24 r/t hydronephrosis and stricture.    Assessment: Over the last week has had the following symptoms-   Nausea and vomiting (last episode of emesis 2 days ago).   Abdominal and R stent pain yesterday that has since resolved.   Patient states she was not peeing a lot despite drinking large amounts of fluids.. she took her blood thinner yesterday which resulted in the production of a large amount of bloody urine which lasted all day. She notes she normally has blood in urine in the mornings but does not typically last all day.  Denies fever, pain with urination, diarrhea, chest pain, SOB.        Protocol Recommended Disposition:   Go To Office Now    Recommendation: Recommended patient be seen, she would like to see how today goes given the slight improvement in her symptoms. She would also like to monitor for ongoing blood in urine. She is agreeable to appointment tomorrow.     RN to update urology     Routed to provider    Does the patient meet one of the following criteria for ADS visit consideration? 16+ years old, with an [facility] PCP     TIP  Providers, please consider if this condition is appropriate for management at one of our Acute and Diagnostic Services sites.     If patient is a good candidate, please use dotphrase <dot>triageresponse and select Refer to ADS to document.    Reason for Disposition   Taking Coumadin (warfarin) or other strong blood thinner, or known bleeding disorder (e.g., thrombocytopenia)    Additional Information   Negative: Shock suspected (e.g., cold/pale/clammy skin, too weak to stand, low BP, rapid pulse)   Negative: Sounds like a life-threatening emergency to the triager   Negative:  "Urinary catheter, questions about   Negative: Recent back or abdominal injury   Negative: Recent genital injury   Negative: Unable to urinate (or only a few drops) > 4 hours and bladder feels very full (e.g., palpable bladder or strong urge to urinate)   Negative: Diffuse (all over) muscle pains in the shoulders, arms, legs, and back and dark (cola or tea-colored) or red-colored urine   Negative: Passing pure blood or large blood clots (i.e., larger than a dime or grape)   Negative: Fever > 100.4 F (38.0 C)   Negative: Patient sounds very sick or weak to the triager   Negative: Known sickle cell disease    Answer Assessment - Initial Assessment Questions  1. COLOR of URINE: \"Describe the color of the urine.\"  (e.g., tea-colored, pink, red, bloody) \"Do you have blood clots in your urine?\" (e.g., none, pea, grape, small coin)      Red, bloody. No clots  2. ONSET: \"When did the bleeding start?\"       Has been going on for some time, usually only in AM. Now with recent illness, was peeing blood all day into this morning.   3. EPISODES: \"How many times has there been blood in the urine?\" or \"How many times today?\"      All day yesterday  4. PAIN with URINATION: \"Is there any pain with passing your urine?\" If Yes, ask: \"How bad is the pain?\"  (Scale 1-10; or mild, moderate, severe)     - MILD: Complains slightly about urination hurting.     - MODERATE: Interferes with normal activities.       - SEVERE: Excruciating, unwilling or unable to urinate because of the pain.       No  5. FEVER: \"Do you have a fever?\" If Yes, ask: \"What is your temperature, how was it measured, and when did it start?\"      No  6. ASSOCIATED SYMPTOMS: \"Are you passing urine more frequently than usual?\"      Patient reports she was actually not passing urine as much as should have been given the amount of fluids she was drinking. Took blood thinner yesterday and peed a large amount of bright red blood.   7. OTHER SYMPTOMS: \"Do you have any other " "symptoms?\" (e.g., back/flank pain, abdomen pain, vomiting)      Had R stent pain yesterday as well as abdominal pain which has since resolved. Nausea and vomiting over last 5 days  8. PREGNANCY: \"Is there any chance you are pregnant?\" \"When was your last menstrual period?\"      no    Protocols used: Urine - Blood In-A-OH    "

## 2024-12-12 NOTE — ASSESSMENT & PLAN NOTE
I did suggest anticipating an increase in anxiety off of the cannabis and suggested increasing her citalopram to 40 mg daily which was done today.  Orders:    citalopram (CELEXA) 40 MG tablet; Take 1 tablet (40 mg) by mouth daily.

## 2024-12-13 ENCOUNTER — APPOINTMENT (OUTPATIENT)
Dept: CT IMAGING | Facility: CLINIC | Age: 59
End: 2024-12-13
Attending: EMERGENCY MEDICINE
Payer: COMMERCIAL

## 2024-12-13 ENCOUNTER — HOSPITAL ENCOUNTER (OUTPATIENT)
Facility: CLINIC | Age: 59
Setting detail: OBSERVATION
Discharge: HOME | End: 2024-12-14
Attending: EMERGENCY MEDICINE | Admitting: EMERGENCY MEDICINE
Payer: COMMERCIAL

## 2024-12-13 ENCOUNTER — ANCILLARY PROCEDURE (OUTPATIENT)
Dept: ULTRASOUND IMAGING | Facility: CLINIC | Age: 59
End: 2024-12-13
Attending: EMERGENCY MEDICINE
Payer: COMMERCIAL

## 2024-12-13 DIAGNOSIS — F17.200 TOBACCO USE DISORDER: Primary | ICD-10-CM

## 2024-12-13 DIAGNOSIS — R79.89 ELEVATED SERUM FREE T4 LEVEL: ICD-10-CM

## 2024-12-13 DIAGNOSIS — R55 SYNCOPE, UNSPECIFIED SYNCOPE TYPE: ICD-10-CM

## 2024-12-13 DIAGNOSIS — R11.2 NAUSEA AND VOMITING, UNSPECIFIED VOMITING TYPE: ICD-10-CM

## 2024-12-13 DIAGNOSIS — N17.9 ACUTE RENAL FAILURE SUPERIMPOSED ON CHRONIC KIDNEY DISEASE, UNSPECIFIED ACUTE RENAL FAILURE TYPE, UNSPECIFIED CKD STAGE (H): ICD-10-CM

## 2024-12-13 DIAGNOSIS — I95.9 TRANSIENT HYPOTENSION: ICD-10-CM

## 2024-12-13 DIAGNOSIS — N18.9 ACUTE RENAL FAILURE SUPERIMPOSED ON CHRONIC KIDNEY DISEASE, UNSPECIFIED ACUTE RENAL FAILURE TYPE, UNSPECIFIED CKD STAGE (H): ICD-10-CM

## 2024-12-13 DIAGNOSIS — I10 ESSENTIAL HYPERTENSION: ICD-10-CM

## 2024-12-13 LAB
ALBUMIN SERPL BCG-MCNC: 4.3 G/DL (ref 3.5–5.2)
ALBUMIN UR-MCNC: 100 MG/DL
ALP SERPL-CCNC: 89 U/L (ref 40–150)
ALT SERPL W P-5'-P-CCNC: 9 U/L (ref 0–50)
ANION GAP SERPL CALCULATED.3IONS-SCNC: 19 MMOL/L (ref 7–15)
APPEARANCE UR: ABNORMAL
AST SERPL W P-5'-P-CCNC: 26 U/L (ref 0–45)
ATRIAL RATE - MUSE: 54 BPM
ATRIAL RATE - MUSE: 61 BPM
B-OH-BUTYR SERPL-SCNC: 1.9 MMOL/L
BASE EXCESS BLDV CALC-SCNC: -3.7 MMOL/L (ref -3–3)
BASOPHILS # BLD AUTO: 0.1 10E3/UL (ref 0–0.2)
BASOPHILS NFR BLD AUTO: 1 %
BILIRUB SERPL-MCNC: 0.3 MG/DL
BILIRUB UR QL STRIP: NEGATIVE
BUN SERPL-MCNC: 46.8 MG/DL (ref 6–20)
CALCIUM SERPL-MCNC: 9.3 MG/DL (ref 8.8–10.4)
CHLORIDE SERPL-SCNC: 93 MMOL/L (ref 98–107)
COLOR UR AUTO: YELLOW
CREAT SERPL-MCNC: 3.05 MG/DL (ref 0.51–0.95)
DIASTOLIC BLOOD PRESSURE - MUSE: 55 MMHG
DIASTOLIC BLOOD PRESSURE - MUSE: NORMAL MMHG
EGFRCR SERPLBLD CKD-EPI 2021: 17 ML/MIN/1.73M2
EOSINOPHIL # BLD AUTO: 0.2 10E3/UL (ref 0–0.7)
EOSINOPHIL NFR BLD AUTO: 2 %
ERYTHROCYTE [DISTWIDTH] IN BLOOD BY AUTOMATED COUNT: 14 % (ref 10–15)
GLUCOSE SERPL-MCNC: 91 MG/DL (ref 70–99)
GLUCOSE UR STRIP-MCNC: NEGATIVE MG/DL
HCO3 BLDV-SCNC: 22 MMOL/L (ref 21–28)
HCO3 SERPL-SCNC: 19 MMOL/L (ref 22–29)
HCT VFR BLD AUTO: 39.2 % (ref 35–47)
HGB BLD-MCNC: 13.4 G/DL (ref 11.7–15.7)
HGB UR QL STRIP: ABNORMAL
HOLD SPECIMEN: NORMAL
HYALINE CASTS: 43 /LPF
IMM GRANULOCYTES # BLD: 0.1 10E3/UL
IMM GRANULOCYTES NFR BLD: 1 %
INTERPRETATION ECG - MUSE: NORMAL
INTERPRETATION ECG - MUSE: NORMAL
KETONES UR STRIP-MCNC: ABNORMAL MG/DL
LACTATE SERPL-SCNC: 1.4 MMOL/L (ref 0.7–2)
LEUKOCYTE ESTERASE UR QL STRIP: ABNORMAL
LIPASE SERPL-CCNC: 28 U/L (ref 13–60)
LYMPHOCYTES # BLD AUTO: 2.9 10E3/UL (ref 0.8–5.3)
LYMPHOCYTES NFR BLD AUTO: 23 %
MAGNESIUM SERPL-MCNC: 2.7 MG/DL (ref 1.7–2.3)
MCH RBC QN AUTO: 30.8 PG (ref 26.5–33)
MCHC RBC AUTO-ENTMCNC: 34.2 G/DL (ref 31.5–36.5)
MCV RBC AUTO: 90 FL (ref 78–100)
MONOCYTES # BLD AUTO: 1.4 10E3/UL (ref 0–1.3)
MONOCYTES NFR BLD AUTO: 11 %
MUCOUS THREADS #/AREA URNS LPF: PRESENT /LPF
NEUTROPHILS # BLD AUTO: 7.7 10E3/UL (ref 1.6–8.3)
NEUTROPHILS NFR BLD AUTO: 62 %
NITRATE UR QL: NEGATIVE
NRBC # BLD AUTO: 0 10E3/UL
NRBC BLD AUTO-RTO: 0 /100
O2/TOTAL GAS SETTING VFR VENT: 0 %
OXYHGB MFR BLDV: 50 % (ref 70–75)
P AXIS - MUSE: 74 DEGREES
P AXIS - MUSE: 81 DEGREES
PCO2 BLDV: 39 MM HG (ref 40–50)
PH BLDV: 7.35 [PH] (ref 7.32–7.43)
PH UR STRIP: 5.5 [PH] (ref 5–7)
PLATELET # BLD AUTO: 303 10E3/UL (ref 150–450)
PO2 BLDV: 31 MM HG (ref 25–47)
POTASSIUM SERPL-SCNC: 4 MMOL/L (ref 3.4–5.3)
PR INTERVAL - MUSE: 114 MS
PR INTERVAL - MUSE: 124 MS
PROT SERPL-MCNC: 7.3 G/DL (ref 6.4–8.3)
QRS DURATION - MUSE: 106 MS
QRS DURATION - MUSE: 96 MS
QT - MUSE: 472 MS
QT - MUSE: 502 MS
QTC - MUSE: 475 MS
QTC - MUSE: 476 MS
R AXIS - MUSE: 77 DEGREES
R AXIS - MUSE: 79 DEGREES
RBC # BLD AUTO: 4.35 10E6/UL (ref 3.8–5.2)
RBC URINE: >182 /HPF
SAO2 % BLDV: 51.3 % (ref 70–75)
SODIUM SERPL-SCNC: 131 MMOL/L (ref 135–145)
SP GR UR STRIP: 1.02 (ref 1–1.03)
SQUAMOUS EPITHELIAL: 3 /HPF
SYSTOLIC BLOOD PRESSURE - MUSE: 94 MMHG
SYSTOLIC BLOOD PRESSURE - MUSE: NORMAL MMHG
T AXIS - MUSE: 267 DEGREES
T AXIS - MUSE: 79 DEGREES
T4 FREE SERPL-MCNC: 1.73 NG/DL (ref 0.9–1.7)
TROPONIN T SERPL HS-MCNC: 33 NG/L
TROPONIN T SERPL HS-MCNC: 40 NG/L
TSH SERPL DL<=0.005 MIU/L-ACNC: 4.76 UIU/ML (ref 0.3–4.2)
UROBILINOGEN UR STRIP-MCNC: <2 MG/DL
VENTRICULAR RATE- MUSE: 54 BPM
VENTRICULAR RATE- MUSE: 61 BPM
WBC # BLD AUTO: 12.3 10E3/UL (ref 4–11)
WBC URINE: 60 /HPF

## 2024-12-13 PROCEDURE — 36415 COLL VENOUS BLD VENIPUNCTURE: CPT | Performed by: STUDENT IN AN ORGANIZED HEALTH CARE EDUCATION/TRAINING PROGRAM

## 2024-12-13 PROCEDURE — 84443 ASSAY THYROID STIM HORMONE: CPT | Performed by: EMERGENCY MEDICINE

## 2024-12-13 PROCEDURE — 250N000011 HC RX IP 250 OP 636: Performed by: EMERGENCY MEDICINE

## 2024-12-13 PROCEDURE — 71250 CT THORAX DX C-: CPT

## 2024-12-13 PROCEDURE — 96367 TX/PROPH/DG ADDL SEQ IV INF: CPT

## 2024-12-13 PROCEDURE — 83690 ASSAY OF LIPASE: CPT | Performed by: EMERGENCY MEDICINE

## 2024-12-13 PROCEDURE — 99285 EMERGENCY DEPT VISIT HI MDM: CPT | Mod: 25

## 2024-12-13 PROCEDURE — 81001 URINALYSIS AUTO W/SCOPE: CPT | Performed by: EMERGENCY MEDICINE

## 2024-12-13 PROCEDURE — 96361 HYDRATE IV INFUSION ADD-ON: CPT

## 2024-12-13 PROCEDURE — 83735 ASSAY OF MAGNESIUM: CPT | Performed by: EMERGENCY MEDICINE

## 2024-12-13 PROCEDURE — 87086 URINE CULTURE/COLONY COUNT: CPT | Performed by: EMERGENCY MEDICINE

## 2024-12-13 PROCEDURE — 258N000003 HC RX IP 258 OP 636: Performed by: STUDENT IN AN ORGANIZED HEALTH CARE EDUCATION/TRAINING PROGRAM

## 2024-12-13 PROCEDURE — 82010 KETONE BODYS QUAN: CPT | Performed by: STUDENT IN AN ORGANIZED HEALTH CARE EDUCATION/TRAINING PROGRAM

## 2024-12-13 PROCEDURE — 96365 THER/PROPH/DIAG IV INF INIT: CPT

## 2024-12-13 PROCEDURE — 82805 BLOOD GASES W/O2 SATURATION: CPT | Performed by: STUDENT IN AN ORGANIZED HEALTH CARE EDUCATION/TRAINING PROGRAM

## 2024-12-13 PROCEDURE — 99223 1ST HOSP IP/OBS HIGH 75: CPT | Performed by: STUDENT IN AN ORGANIZED HEALTH CARE EDUCATION/TRAINING PROGRAM

## 2024-12-13 PROCEDURE — 85004 AUTOMATED DIFF WBC COUNT: CPT | Performed by: EMERGENCY MEDICINE

## 2024-12-13 PROCEDURE — 250N000011 HC RX IP 250 OP 636: Performed by: STUDENT IN AN ORGANIZED HEALTH CARE EDUCATION/TRAINING PROGRAM

## 2024-12-13 PROCEDURE — 96376 TX/PRO/DX INJ SAME DRUG ADON: CPT

## 2024-12-13 PROCEDURE — 70450 CT HEAD/BRAIN W/O DYE: CPT

## 2024-12-13 PROCEDURE — 93005 ELECTROCARDIOGRAM TRACING: CPT | Performed by: EMERGENCY MEDICINE

## 2024-12-13 PROCEDURE — 82040 ASSAY OF SERUM ALBUMIN: CPT | Performed by: EMERGENCY MEDICINE

## 2024-12-13 PROCEDURE — 84439 ASSAY OF FREE THYROXINE: CPT | Performed by: EMERGENCY MEDICINE

## 2024-12-13 PROCEDURE — 82565 ASSAY OF CREATININE: CPT | Performed by: EMERGENCY MEDICINE

## 2024-12-13 PROCEDURE — 85041 AUTOMATED RBC COUNT: CPT | Performed by: EMERGENCY MEDICINE

## 2024-12-13 PROCEDURE — 93005 ELECTROCARDIOGRAM TRACING: CPT | Performed by: STUDENT IN AN ORGANIZED HEALTH CARE EDUCATION/TRAINING PROGRAM

## 2024-12-13 PROCEDURE — 83605 ASSAY OF LACTIC ACID: CPT | Performed by: EMERGENCY MEDICINE

## 2024-12-13 PROCEDURE — 36415 COLL VENOUS BLD VENIPUNCTURE: CPT | Performed by: EMERGENCY MEDICINE

## 2024-12-13 PROCEDURE — 96366 THER/PROPH/DIAG IV INF ADDON: CPT

## 2024-12-13 PROCEDURE — 250N000013 HC RX MED GY IP 250 OP 250 PS 637: Performed by: STUDENT IN AN ORGANIZED HEALTH CARE EDUCATION/TRAINING PROGRAM

## 2024-12-13 PROCEDURE — 84484 ASSAY OF TROPONIN QUANT: CPT | Performed by: EMERGENCY MEDICINE

## 2024-12-13 PROCEDURE — G0378 HOSPITAL OBSERVATION PER HR: HCPCS

## 2024-12-13 PROCEDURE — 258N000003 HC RX IP 258 OP 636: Performed by: EMERGENCY MEDICINE

## 2024-12-13 RX ORDER — ONDANSETRON 4 MG/1
4 TABLET, ORALLY DISINTEGRATING ORAL EVERY 6 HOURS PRN
Status: DISCONTINUED | OUTPATIENT
Start: 2024-12-13 | End: 2024-12-14 | Stop reason: HOSPADM

## 2024-12-13 RX ORDER — PROCHLORPERAZINE MALEATE 10 MG
10 TABLET ORAL EVERY 6 HOURS PRN
Status: DISCONTINUED | OUTPATIENT
Start: 2024-12-13 | End: 2024-12-14 | Stop reason: HOSPADM

## 2024-12-13 RX ORDER — PIPERACILLIN SODIUM, TAZOBACTAM SODIUM 3; .375 G/15ML; G/15ML
3.38 INJECTION, POWDER, LYOPHILIZED, FOR SOLUTION INTRAVENOUS EVERY 12 HOURS
Status: DISCONTINUED | OUTPATIENT
Start: 2024-12-13 | End: 2024-12-14

## 2024-12-13 RX ORDER — PIPERACILLIN SODIUM, TAZOBACTAM SODIUM 3; .375 G/15ML; G/15ML
3.38 INJECTION, POWDER, LYOPHILIZED, FOR SOLUTION INTRAVENOUS ONCE
Status: COMPLETED | OUTPATIENT
Start: 2024-12-13 | End: 2024-12-13

## 2024-12-13 RX ORDER — COLESTIPOL HYDROCHLORIDE 1 G/1
1 TABLET ORAL 2 TIMES DAILY
COMMUNITY

## 2024-12-13 RX ORDER — NOREPINEPHRINE BITARTRATE 0.02 MG/ML
.01-.6 INJECTION, SOLUTION INTRAVENOUS CONTINUOUS
Status: DISCONTINUED | OUTPATIENT
Start: 2024-12-13 | End: 2024-12-13

## 2024-12-13 RX ORDER — NICOTINE 21 MG/24HR
1 PATCH, TRANSDERMAL 24 HOURS TRANSDERMAL DAILY
Status: DISCONTINUED | OUTPATIENT
Start: 2024-12-13 | End: 2024-12-14 | Stop reason: HOSPADM

## 2024-12-13 RX ORDER — ACETAMINOPHEN 325 MG/1
650 TABLET ORAL EVERY 4 HOURS PRN
Status: DISCONTINUED | OUTPATIENT
Start: 2024-12-13 | End: 2024-12-14 | Stop reason: HOSPADM

## 2024-12-13 RX ORDER — COLESTIPOL HYDROCHLORIDE 1 G/1
1 TABLET ORAL 2 TIMES DAILY
Status: DISCONTINUED | OUTPATIENT
Start: 2024-12-13 | End: 2024-12-14 | Stop reason: HOSPADM

## 2024-12-13 RX ORDER — PIPERACILLIN SODIUM, TAZOBACTAM SODIUM 2; .25 G/10ML; G/10ML
2.25 INJECTION, POWDER, LYOPHILIZED, FOR SOLUTION INTRAVENOUS EVERY 8 HOURS
Status: DISCONTINUED | OUTPATIENT
Start: 2024-12-13 | End: 2024-12-13

## 2024-12-13 RX ORDER — AMOXICILLIN 250 MG
1-2 CAPSULE ORAL 2 TIMES DAILY
Status: DISCONTINUED | OUTPATIENT
Start: 2024-12-13 | End: 2024-12-14 | Stop reason: HOSPADM

## 2024-12-13 RX ORDER — PIPERACILLIN SODIUM, TAZOBACTAM SODIUM 3; .375 G/15ML; G/15ML
3.38 INJECTION, POWDER, LYOPHILIZED, FOR SOLUTION INTRAVENOUS EVERY 8 HOURS
Status: DISCONTINUED | OUTPATIENT
Start: 2024-12-13 | End: 2024-12-13

## 2024-12-13 RX ORDER — FOLIC ACID 1 MG/1
1000 TABLET ORAL DAILY
Status: DISCONTINUED | OUTPATIENT
Start: 2024-12-14 | End: 2024-12-14 | Stop reason: HOSPADM

## 2024-12-13 RX ORDER — PANTOPRAZOLE SODIUM 40 MG/1
40 TABLET, DELAYED RELEASE ORAL DAILY
Status: DISCONTINUED | OUTPATIENT
Start: 2024-12-14 | End: 2024-12-14 | Stop reason: HOSPADM

## 2024-12-13 RX ORDER — OMEPRAZOLE 40 MG/1
40 CAPSULE, DELAYED RELEASE ORAL DAILY
COMMUNITY

## 2024-12-13 RX ORDER — LIDOCAINE 40 MG/G
CREAM TOPICAL
Status: DISCONTINUED | OUTPATIENT
Start: 2024-12-13 | End: 2024-12-13

## 2024-12-13 RX ORDER — VANCOMYCIN HYDROCHLORIDE 1 G/200ML
1000 INJECTION, SOLUTION INTRAVENOUS
Status: DISCONTINUED | OUTPATIENT
Start: 2024-12-15 | End: 2024-12-14

## 2024-12-13 RX ORDER — AMOXICILLIN 250 MG
1 CAPSULE ORAL 2 TIMES DAILY PRN
Status: DISCONTINUED | OUTPATIENT
Start: 2024-12-13 | End: 2024-12-13

## 2024-12-13 RX ORDER — SODIUM CHLORIDE 9 MG/ML
INJECTION, SOLUTION INTRAVENOUS CONTINUOUS
Status: DISCONTINUED | OUTPATIENT
Start: 2024-12-13 | End: 2024-12-14

## 2024-12-13 RX ORDER — ROPIVACAINE IN 0.9% SOD CHL/PF 0.1 %
.01-.125 PLASTIC BAG, INJECTION (ML) EPIDURAL CONTINUOUS
Status: DISCONTINUED | OUTPATIENT
Start: 2024-12-13 | End: 2024-12-13

## 2024-12-13 RX ORDER — CITALOPRAM HYDROBROMIDE 10 MG/1
20 TABLET ORAL EVERY EVENING
Status: DISCONTINUED | OUTPATIENT
Start: 2024-12-14 | End: 2024-12-14 | Stop reason: HOSPADM

## 2024-12-13 RX ORDER — ONDANSETRON 2 MG/ML
4 INJECTION INTRAMUSCULAR; INTRAVENOUS EVERY 6 HOURS PRN
Status: DISCONTINUED | OUTPATIENT
Start: 2024-12-13 | End: 2024-12-14 | Stop reason: HOSPADM

## 2024-12-13 RX ORDER — AMOXICILLIN 250 MG
2 CAPSULE ORAL 2 TIMES DAILY PRN
Status: DISCONTINUED | OUTPATIENT
Start: 2024-12-13 | End: 2024-12-13

## 2024-12-13 RX ORDER — PROCHLORPERAZINE MALEATE 10 MG
10 TABLET ORAL EVERY 6 HOURS PRN
Status: DISCONTINUED | OUTPATIENT
Start: 2024-12-13 | End: 2024-12-13

## 2024-12-13 RX ORDER — ACETAMINOPHEN 650 MG/1
650 SUPPOSITORY RECTAL EVERY 4 HOURS PRN
Status: DISCONTINUED | OUTPATIENT
Start: 2024-12-13 | End: 2024-12-14 | Stop reason: HOSPADM

## 2024-12-13 RX ORDER — CITALOPRAM HYDROBROMIDE 20 MG/1
20 TABLET ORAL DAILY
COMMUNITY
End: 2024-12-17 | Stop reason: DRUGHIGH

## 2024-12-13 RX ADMIN — SODIUM CHLORIDE, PRESERVATIVE FREE: 5 INJECTION INTRAVENOUS at 15:40

## 2024-12-13 RX ADMIN — Medication 1250 MG: at 13:22

## 2024-12-13 RX ADMIN — NICOTINE 1 PATCH: 21 PATCH, EXTENDED RELEASE TRANSDERMAL at 15:40

## 2024-12-13 RX ADMIN — SENNOSIDES AND DOCUSATE SODIUM 1 TABLET: 50; 8.6 TABLET ORAL at 20:06

## 2024-12-13 RX ADMIN — PIPERACILLIN AND TAZOBACTAM 3.38 G: 3; .375 INJECTION, POWDER, FOR SOLUTION INTRAVENOUS at 22:46

## 2024-12-13 RX ADMIN — SODIUM CHLORIDE 1000 ML: 9 INJECTION, SOLUTION INTRAVENOUS at 11:14

## 2024-12-13 RX ADMIN — MONTELUKAST SODIUM 1 G: 4 TABLET, CHEWABLE ORAL at 22:47

## 2024-12-13 RX ADMIN — PIPERACILLIN AND TAZOBACTAM 3.38 G: 3; .375 INJECTION, POWDER, FOR SOLUTION INTRAVENOUS at 11:42

## 2024-12-13 RX ADMIN — SODIUM CHLORIDE 500 ML: 9 INJECTION, SOLUTION INTRAVENOUS at 12:41

## 2024-12-13 ASSESSMENT — ACTIVITIES OF DAILY LIVING (ADL)
ADLS_ACUITY_SCORE: 49
ADLS_ACUITY_SCORE: 44
ADLS_ACUITY_SCORE: 41
ADLS_ACUITY_SCORE: 49
ADLS_ACUITY_SCORE: 41
ADLS_ACUITY_SCORE: 44
ADLS_ACUITY_SCORE: 41
ADLS_ACUITY_SCORE: 49
ADLS_ACUITY_SCORE: 44
ADLS_ACUITY_SCORE: 44
ADLS_ACUITY_SCORE: 41
ADLS_ACUITY_SCORE: 44
ADLS_ACUITY_SCORE: 41

## 2024-12-13 ASSESSMENT — COLUMBIA-SUICIDE SEVERITY RATING SCALE - C-SSRS
2. HAVE YOU ACTUALLY HAD ANY THOUGHTS OF KILLING YOURSELF IN THE PAST MONTH?: NO
6. HAVE YOU EVER DONE ANYTHING, STARTED TO DO ANYTHING, OR PREPARED TO DO ANYTHING TO END YOUR LIFE?: NO
1. IN THE PAST MONTH, HAVE YOU WISHED YOU WERE DEAD OR WISHED YOU COULD GO TO SLEEP AND NOT WAKE UP?: NO

## 2024-12-13 NOTE — ED TRIAGE NOTES
Pt presents with C/O increased weakness and an episode of syncope with vomiting this morning.  States she has not been eating for the past several days.  Has a history of cyclical vomiting from mariajuana use but has not used for over 1 week.  Pt C/O lower abdominal pain from a renal stent placed in October.  Urine sent for culture by her clinic yesterday.  Initial BP per EMS was 60's systolic.  IV initiated and fluids given.     Triage Assessment (Adult)       Row Name 12/13/24 1028          Triage Assessment    Airway WDL WDL        Respiratory WDL    Respiratory WDL WDL        Skin Circulation/Temperature WDL    Skin Circulation/Temperature WDL WDL        Cardiac WDL    Cardiac WDL WDL        Peripheral/Neurovascular WDL    Peripheral Neurovascular WDL WDL     Capillary Refill, General less than/equal to 3 secs        Cognitive/Neuro/Behavioral WDL    Cognitive/Neuro/Behavioral WDL WDL        [place] Coma Scale    Best Eye Response 4-->(E4) spontaneous     Best Motor Response 6-->(M6) obeys commands     Best Verbal Response 5-->(V5) oriented     Moustapha Coma Scale Score 15                     
,

## 2024-12-13 NOTE — PHARMACY-VANCOMYCIN DOSING SERVICE
"Pharmacy Vancomycin Initial Note  Date of Service 2024  Patient's  1965  59 year old, female    Indication: Urinary Tract Infection    Current estimated CrCl = Estimated Creatinine Clearance: 15 mL/min (A) (based on SCr of 3.05 mg/dL (H)).    Creatinine for last 3 days  2024: 11:11 AM Creatinine 3.05 mg/dL    Recent Vancomycin Level(s) for last 3 days  No results found for requested labs within last 3 days.      Vancomycin IV Administrations (past 72 hours)                     vancomycin (VANCOCIN) 1,250 mg in 0.9% NaCl 250 mL intermittent infusion (mg) 1,250 mg New Bag 24 1322                    Nephrotoxins and other renal medications (From now, onward)      Start     Dose/Rate Route Frequency Ordered Stop    24 2330  piperacillin-tazobactam (ZOSYN) 3.375 g vial to attach to  mL bag        Note to Pharmacy: For [facility], [facility] and [facility]: For Zosyn-naive patients, use the \"Zosyn initial dose + extended infusion\" order panel.    3.375 g  over 240 Minutes Intravenous EVERY 12 HOURS 24 1520              Contrast Orders - past 72 hours (72h ago, onward)      None            [employer] Prediction of Planned Initial Vancomycin Regimen  Loading dose:  1250 mg  Regimen: 1000 mg IV every 48 hours.  Start time: 13:22 on 12/15/2024  Exposure target: AUC24 (range)400-600 mg/L.hr   AUC24,ss: 552 mg/L.hr  Probability of AUC24 > 400: 84 %  Ctrough,ss: 17.1 mg/L  Probability of Ctrough,ss > 20: 34 %  Probability of nephrotoxicity (Lodise ROSSI ): 13 %          Plan:  Start vancomycin  1000 mg IV q48h. Patient received 1250mg dose in ED . Will watch renal function closely , and may be able to adjust dose before next level  Vancomycin monitoring method: AUC  Vancomycin therapeutic monitoring goal: 400-600 mg*h/L  Pharmacy will check vancomycin levels as appropriate in 1-3 Days.    Serum creatinine levels will be ordered daily for the first week of therapy and at least twice weekly for " subsequent weeks.      Yisel Kaur, RPH

## 2024-12-13 NOTE — PHARMACY-ADMISSION MEDICATION HISTORY
Pharmacist Admission Medication History    Admission medication history is complete. The information provided in this note is only as accurate as the sources available at the time of the update.    Medication reconciliation/reorder completed by provider prior to medication history? No    Information Source(s): Patient and CareEverywhere/SureScripts via in-person    Pertinent Information:  celexa recently increased to 40mg but patient is still using 20mg      Changes made to PTA medication list:  Added: colestipol, omeprazole  Deleted: Benadryl, Pepcid  Changed: none      Allergies reviewed with patient and updates made in EHR: yes    Medications available for use during hospital stay: NONE.     Medication History Completed By: Zafar Gloria RPH 12/13/2024 1:28 PM    PTA Med List   Medication Sig Note Last Dose/Taking    buPROPion (WELLBUTRIN XL) 150 MG 24 hr tablet Take 1 tablet (150 mg) by mouth every morning. 12/13/2024: Still not started yet Taking    citalopram (CELEXA) 20 MG tablet Take 20 mg by mouth daily. 12/13/2024: Has new Rx for 40mg but has not increased dose yet 12/12/2024 Morning    citalopram (CELEXA) 40 MG tablet Take 1 tablet (40 mg) by mouth daily. 12/13/2024: Still using 20mg dose. Taking    colestipol (COLESTID) 1 g tablet Take 1 g by mouth 2 times daily.  12/11/2024 Morning    DENTA 5000 PLUS 1.1 % CREA Take by mouth daily.  12/12/2024    folic acid (FOLVITE) 1 MG tablet Take 1 tablet by mouth once daily  12/6/2024 Morning    gabapentin (NEURONTIN) 100 MG capsule TAKE 2 CAPSULE BY MOUTH IN THE MORNING THEN  2  TABLET  AT  NOON  AND  3  TABLETS  AT  BEDTIME  DAILY  12/12/2024 Bedtime    lisinopril (ZESTRIL) 20 MG tablet Take 1 tablet (20 mg) by mouth daily.  12/12/2024 Morning    omeprazole (PRILOSEC) 40 MG DR capsule Take 40 mg by mouth daily.  12/12/2024 Morning    ondansetron (ZOFRAN ODT) 4 MG ODT tab Take 1 tablet (4 mg) by mouth every 6 hours as needed for nausea.  12/12/2024 Morning     prochlorperazine (COMPAZINE) 10 MG tablet Take 1 tablet (10 mg) by mouth every 6 hours as needed for nausea or vomiting.  More than a month    REPATHA SURECLICK 140 MG/ML prefilled autoinjector 140 mg every 14 days.  12/1/2024    rivaroxaban ANTICOAGULANT (XARELTO ANTICOAGULANT) 20 MG TABS tablet Take 20 mg by mouth daily (with dinner).  12/12/2024 Evening    senna-docusate (SENOKOT-S/PERICOLACE) 8.6-50 MG tablet Take 1-2 tablets by mouth 2 times daily.  12/12/2024 Morning    zolpidem (AMBIEN) 5 MG tablet Take 1 tablet (5 mg) by mouth nightly as needed for sleep. 12/13/2024: Not started yet Taking As Needed

## 2024-12-13 NOTE — ED PROVIDER NOTES
EMERGENCY DEPARTMENT ENCOUNTER      NAME: Nery Whitaker  AGE: 59 year old female  YOB: 1965  MRN: [id number]  EVALUATION DATE & TIME: 12/13/2024 10:24 AM    PCP: Priscila Lombardo    ED PROVIDER: Bladimir Clarke MD      Chief Complaint   Patient presents with    Syncope    Hypotension         FINAL IMPRESSION:  1. Syncope, unspecified syncope type    2. Transient hypotension    3. Acute renal failure superimposed on chronic kidney disease, unspecified acute renal failure type, unspecified CKD stage (H)    4. Nausea and vomiting, unspecified vomiting type    5. Elevated serum free T4 level          ED COURSE & MEDICAL DECISION MAKING:    Pertinent Labs & Imaging studies reviewed. (See chart for details)  59 year old female presents to the Emergency Department for evaluation of fever, vomiting.  Initial systolic blood pressure for EMS was in the 60s.  Improved with IV fluids.  Did have some syncope with vomiting    ED Course as of 12/13/24 1357   Fri Dec 13, 2024   1123 Patient is here with hypotension and syncope.    1123 DDX seizure, subarachnoid hemorrhage, ruptured abdominal aortic aneurysm, aortic dissection, perforated gastric/duodenal ulcer, ruptured ectopic pregnancy, stroke/TIA, acute coronary syndrome, pulmonary embolism, arrhythmia (atrioventricular block/symptomatic bradycardia, John-Parkinson-White syndrome, Brugada syndrome, hypertrophic cardiomyopathy, long QT syndrome, arrhythmogenic right ventricular dysplasia), aortic stenosis, hypoglycemia, vasovagal, or orthostatic hypotension.     1123 Interesting she is not tachycardic.  Will add on TSH.  Denies steroid use.   1125 With history of aortic bifemoral bypass.  Is on anticoagulation secondary to hypercoagulability from lupus anticoagulant   1125 Had urine done yesterday that showed some bacteria was not placed on antibiotics secondary to waiting for urine culture   1125 Has known stent.  Bedside ultrasound does not appear to show any  hydronephrosis on right, free fluid right upper quadrant, or AAA.  IVC is collapsible.  IV fluids ordered.   1130 With hypotension and increased white blood cell count Zosyn ordered   1206 Lactic Acid: 1.4   1207 Urea Nitrogen(!): 46.8   1207 Creatinine(!): 3.05  Likely from dehydration.  Fluids ordered   1207 TSH(!): 4.76   1207 Troponin T, High Sensitivity(!): 40  Delta Troponin pending   1214 Patient be on a blood thinner PE is unlikely however her GFR is less than 30 and worsening therefore I feel contrast would potentially be more harmful than beneficial   1238 Patient did improve with IV fluids and now is hypotensive again.  Will place PICC line order Levophed   1238 Hemoglobin: 13.4   1255 BP(!): 72/46  Nurse indicates that blood pressure was not on her arm was actually at bedside when it was taken.  Blood pressure with fluids is MAP of 70.  Will still place PICC for IV access to hold off Levophed currently   1332 Patient is feeling better and wants to leave   1336 Blood pressure has normalized.  Patient feeling much better.  Will hold off on PICC currently   1336 Creatinine(!): 3.05  Likely dehydration   1356 With hypotension, vomiting, acute on chronic renal failure and elevated troponin do recommend admission to the hospital.  Better wants to go home.  Did get antibiotics for sepsis with sepsis seems less likely currently based on imaging.  UA is pending however.   1357 .  Discussed with the hospitalist who recommends admission for observation no telemetry   1357 I spoke with Saima admit for observation       Medical Decision Making  Obtained supplemental history:Supplemental history obtained?: Documented in chart  Reviewed external records: External records reviewed?: Documented in chart and Outpatient Record: [facility] on 12/12/24  Care impacted by chronic illness:Anticoagulated State, Heart Disease, and Hypertension  Did you consider but not order tests?: Work up considered  but not performed and documented in chart, if applicable  Did you interpret images independently?: Independent interpretation of ECG and images noted in documentation, when applicable.  Consultation discussion with other provider:Did you involve another provider (consultant, , pharmacy, etc.)?: I discussed the care with another health care provider, see documentation for details.  Admit.    MIPS: Adult Minor Head Trauma:Currently taking anticoagulant medications: warfarin or other novel anticoagulant medications            At the conclusion of the encounter I discussed the results of all of the tests and the disposition. The questions were answered. The patient or family acknowledged understanding and was agreeable with the care plan.       MEDICATIONS GIVEN IN THE EMERGENCY:  Medications   piperacillin-tazobactam (ZOSYN) 3.375 g vial to attach to  mL bag (has no administration in time range)   vancomycin (VANCOCIN) 1,250 mg in 0.9% NaCl 250 mL intermittent infusion (1,250 mg Intravenous $New Bag 12/13/24 1322)   sodium chloride 0.9% BOLUS 1,000 mL (0 mLs Intravenous Stopped 12/13/24 1240)   piperacillin-tazobactam (ZOSYN) 3.375 g vial to attach to  mL bag (0 g Intravenous Stopped 12/13/24 1240)   sodium chloride 0.9% BOLUS 500 mL (500 mLs Intravenous $New Bag 12/13/24 1241)       NEW PRESCRIPTIONS STARTED AT TODAY'S ER VISIT  New Prescriptions    No medications on file          =================================================================    TRIAGE ASSESSMENT:  Pt presents with C/O increased weakness and an episode of syncope with vomiting this morning.  States she has not been eating for the past several days.  Has a history of cyclical vomiting from mariajuana use but has not used for over 1 week.  Pt C/O lower abdominal pain from a renal stent placed in October.  Urine sent for culture by her clinic yesterday.  Initial BP per EMS was 60's systolic.  IV initiated and fluids given.     Triage  Assessment (Adult)       Row Name 12/13/24 1028          Triage Assessment    Airway WDL WDL        Respiratory WDL    Respiratory WDL WDL        Skin Circulation/Temperature WDL    Skin Circulation/Temperature WDL WDL        Cardiac WDL    Cardiac WDL WDL        Peripheral/Neurovascular WDL    Peripheral Neurovascular WDL WDL     Capillary Refill, General less than/equal to 3 secs        Cognitive/Neuro/Behavioral WDL    Cognitive/Neuro/Behavioral WDL WDL        Moustapha Coma Scale    Best Eye Response 4-->(E4) spontaneous     Best Motor Response 6-->(M6) obeys commands     Best Verbal Response 5-->(V5) oriented     Moustapha Coma Scale Score 15                          HPI    Patient information was obtained from: patient and chart review        Nery Whitaker is a 59 year old female with a pertinent history of disorder of sulfur-bearing amino acid metabolism, benign paroxysmal positional vertigo, bilateral carotid artery disease, PAD, HTN, tobacco use disorder   who presents to this ED for evaluation of syncope, hypotension.    This morning to use the restroom and fainted because she was lightheaded.  Has had nausea for months.  Been seen by GI.  Does use marijuana chronically.  Has not started the Ambien yet.  Had had Zofran at home.  Denies chest pain or abdominal pain.  Has had a recent endoscopy.    Denies steroid use.  She has not recently been ill.  Has only been drinking water this week because when she eats food she gets nauseated    Per chart review,  Patient visited [facility] on 12/12/24 regarding hematuria. UA shows some white blood cells and bacteria. Recommend culture and will call in with antibiotics if infected. No symptoms currently to suggest infection. Yesterday began experiencing right sided back pain and abdominal pain. History of cyclical vomiting that has gotten more frequent over the past years with recurrent bouts of abdominal pain and nausea and vomiting. Issue  with right kidney and ureter found incidentally and has been in the ER couple of times for this. Patient stopped marijuana use 6 days ago and feels like she is currently going through withdrawal.         REVIEW OF SYSTEMS   Review of Systems     PAST MEDICAL HISTORY:  Past Medical History:   Diagnosis Date    Abdominal pain, epigastric 12/02/2017    Abnormal cardiovascular stress test 12/13/2017    Adenomatous colon polyp     Anemia     Antiplatelet or antithrombotic long-term use     Anxiety     Arthritis     Atypical lymphocytosis     Bilateral carotid artery disease (H)     COPD (chronic obstructive pulmonary disease) (H)     Coronary artery disease     Dyslipidemia     Foreign body in left foot     Foreign body in left foot, subsequent encounter 10/11/2018    Heart murmur     Hydronephrosis with ureteral stricture, not elsewhere classified     Hypertension     Intermittent palpitations 12/02/2017    Migraines     Nausea and vomiting, unspecified vomiting type 12/13/2024    Newly recognized murmur 10/18/2018    Osteoporosis     PAD (peripheral artery disease) (H)     Pain in thoracic spine     Pure hyperglyceridemia 09/02/2021    Formatting of this note might be different from the original. Has been intoleratant to several medications.    .    Stented coronary artery     Urge incontinence of urine 11/04/2018    Last Assessment & Plan:  Symptoms most consistent with urge incontinence. Ultrasound ordered to evaluate for any issues with incomplete emptying. Trial of Detrol LA 2 mg daily.  Last Assessment & Plan:  Formatting of this note might be different from the original. Detrol LA has been helpful but dries out her mouth. We will try a lower, short acting dose.    Nora plantaris 11/01/2018       PAST SURGICAL HISTORY:  Past Surgical History:   Procedure Laterality Date    ABDOMEN SURGERY      APPENDECTOMY      ARTERIAL BYPASS SURGERY  01/01/2006    BIOPSY BREAST Left     benign    BYPASS GRAFT AORTOFEMORAL  Bilateral     CHOLECYSTECTOMY      COLONOSCOPY      COMBINED CYSTOSCOPY, RETROGRADES, EXCHANGE STENT URETER(S) Right 10/22/2024    Procedure: CYSTOURETEROSCOPY, WITH RETROGRADE PYELOGRAM AND STENT INSERTION, ureteral sampling , Right;  Surgeon: Clark Garcia MD;  Location: [facility]    ESOPHAGOSCOPY, GASTROSCOPY, DUODENOSCOPY (EGD), COMBINED N/A 2024    Procedure: ESOPHAGOGASTRODUODENOSCOPY, WITH BIOPSY;  Surgeon: Lizz Bray MD;  Location: [facility] OR    FEMORAL ARTERY STENT  2006    FOOT MASS EXCISION Left 2018    Soft tissue mass - benign    IR MISCELLANEOUS PROCEDURE  2006    IR MISCELLANEOUS PROCEDURE  2006    IR MISCELLANEOUS PROCEDURE  2006    IR MISCELLANEOUS PROCEDURE  2006    IR MISCELLANEOUS PROCEDURE  2006    TONSILLECTOMY      TYMPANOSTOMY TUBE PLACEMENT             CURRENT MEDICATIONS:    buPROPion (WELLBUTRIN XL) 150 MG 24 hr tablet  citalopram (CELEXA) 20 MG tablet  citalopram (CELEXA) 40 MG tablet  colestipol (COLESTID) 1 g tablet  DENTA 5000 PLUS 1.1 % CREA  folic acid (FOLVITE) 1 MG tablet  gabapentin (NEURONTIN) 100 MG capsule  lisinopril (ZESTRIL) 20 MG tablet  omeprazole (PRILOSEC) 40 MG DR capsule  ondansetron (ZOFRAN ODT) 4 MG ODT tab  prochlorperazine (COMPAZINE) 10 MG tablet  REPATHA SURECLICK 140 MG/ML prefilled autoinjector  rivaroxaban ANTICOAGULANT (XARELTO ANTICOAGULANT) 20 MG TABS tablet  senna-docusate (SENOKOT-S/PERICOLACE) 8.6-50 MG tablet  zolpidem (AMBIEN) 5 MG tablet        ALLERGIES:  Allergies   Allergen Reactions    Ezetimibe      constipation    Lovastatin Unknown     constipation    Simvastatin      Other reaction(s): Constipation  Intolerance, but tolerated with re-challenge in Feb to 2009       FAMILY HISTORY:  Family History   Problem Relation Age of Onset    Breast Cancer Mother         was pt at the [facility]  21 years ago    Peripheral Vascular Disease Father     Other - See Comments  Father         vasular problems     Hypertension Father     Hyperlipidemia Father     Cerebrovascular Disease Father     Other Cancer Father         skin    Cancer Maternal Grandmother     Heart Disease Maternal Grandfather     Lung Cancer Paternal Grandmother     Kidney Cancer Paternal Grandfather     No Known Problems Brother     No Known Problems Sister     Colon Cancer No family hx of     Prostate Cancer No family hx of     Anesthesia Reaction No family hx of     Malignant Hyperthermia No family hx of     Coronary Artery Disease No family hx of        SOCIAL HISTORY:   Social History     Socioeconomic History    Marital status:      Spouse name: Timothy    Number of children: 1    Years of education: 14    Highest education level: Associate degree: academic program   Occupational History    Occupation: [employer]   Tobacco Use    Smoking status: Every Day     Current packs/day: 0.75     Average packs/day: 0.8 packs/day for 50.5 years (42.0 ttl pk-yrs)     Types: Cigarettes     Passive exposure: Current    Smokeless tobacco: Never    Tobacco comments:     1 ppd since 17 years old   Vaping Use    Vaping status: Never Used   Substance and Sexual Activity    Alcohol use: Not Currently    Drug use: No    Sexual activity: Yes     Partners: Male     Birth control/protection: Post-menopausal, Male Surgical   Other Topics Concern    Parent/sibling w/ CABG, MI or angioplasty before 65F 55M? Yes     Social Drivers of Health     Financial Resource Strain: Low Risk  (11/14/2024)    Financial Resource Strain     Within the past 12 months, have you or your family members you live with been unable to get utilities (heat, electricity) when it was really needed?: No   Food Insecurity: Low Risk  (11/14/2024)    Food Insecurity     Within the past 12 months, did you worry that your food would run out before you got money to buy more?: No     Within the past 12 months, did the food you bought just not last and you didn t  "have money to get more?: No   Transportation Needs: Low Risk  (11/14/2024)    Transportation Needs     Within the past 12 months, has lack of transportation kept you from medical appointments, getting your medicines, non-medical meetings or appointments, work, or from getting things that you need?: No   Physical Activity: Insufficiently Active (11/14/2024)    Exercise Vital Sign     Days of Exercise per Week: 1 day     Minutes of Exercise per Session: 10 min   Stress: Stress Concern Present (11/14/2024)    [employer] - Occupational Stress Questionnaire     Feeling of Stress : To some extent   Social Connections: Unknown (11/14/2024)    Social Connection and Isolation Panel [NHANES]     Frequency of Social Gatherings with Friends and Family: Once a week   Interpersonal Safety: Low Risk  (11/15/2024)    Interpersonal Safety     Do you feel physically and emotionally safe where you currently live?: Yes     Within the past 12 months, have you been hit, slapped, kicked or otherwise physically hurt by someone?: No     Within the past 12 months, have you been humiliated or emotionally abused in other ways by your partner or ex-partner?: No   Housing Stability: Low Risk  (11/14/2024)    Housing Stability     Do you have housing? : Yes     Are you worried about losing your housing?: No       VITALS:  /55   Pulse 55   Temp 97.9  F (36.6  C) (Oral)   Resp 16   Ht 1.626 m (5' 4\")   Wt 47.2 kg (104 lb)   LMP  (LMP Unknown)   SpO2 100%   BMI 17.85 kg/m      PHYSICAL EXAM      Vitals: /55   Pulse 55   Temp 97.9  F (36.6  C) (Oral)   Resp 16   Ht 1.626 m (5' 4\")   Wt 47.2 kg (104 lb)   LMP  (LMP Unknown)   SpO2 100%   BMI 17.85 kg/m    General: Appears in no acute distress, awake, alert, interactive.  Eyes: Conjunctivae non-injected. Sclera anicteric.  HENT: Atraumatic.  Neck: Supple.  Respiratory/Chest: Respiration unlabored.  No wheezing or rhonchi  Abdomen: non distended, " no abdominal tenderness  Musculoskeletal: Normal extremities. No edema or erythema.  Skin: Normal color. No rash or diaphoresis.  Neurologic: Face symmetric, moves all extremities spontaneously. Speech clear.  Psychiatric: Oriented to person, place, and time. Affect appropriate.    LAB:  All pertinent labs reviewed and interpreted.  Results for orders placed or performed during the hospital encounter of 12/13/24   CT Head w/o Contrast    Impression    IMPRESSION:  1.  No acute intracranial process.   CT Chest Abdomen Pelvis w/o Contrast    Impression    IMPRESSION:  1.  No abnormalities are seen to explain symptoms.  2.  Specifically, no evidence of traumatic injury to the chest, abdomen or pelvis.  3.  No evidence of bowel obstruction or any inflammatory changes.  4.  Right double-J ureteral stent in place with no change in the distention of the proximal right ureter as noted above. No calculi or perinephric stranding.  5.  The ureteral stent again passes between the right common iliac limb of the aortobifem bypass graft and the abandoned, stented native right common iliac artery. Soft tissue stranding medial to these 2 vessels continues to decrease since the September   exam.   6.  Scattered colonic diverticulosis.  7.  Other noncritical findings as noted above.   Comprehensive metabolic panel   Result Value Ref Range    Sodium 131 (L) 135 - 145 mmol/L    Potassium 4.0 3.4 - 5.3 mmol/L    Carbon Dioxide (CO2) 19 (L) 22 - 29 mmol/L    Anion Gap 19 (H) 7 - 15 mmol/L    Urea Nitrogen 46.8 (H) 6.0 - 20.0 mg/dL    Creatinine 3.05 (H) 0.51 - 0.95 mg/dL    GFR Estimate 17 (L) >60 mL/min/1.73m2    Calcium 9.3 8.8 - 10.4 mg/dL    Chloride 93 (L) 98 - 107 mmol/L    Glucose 91 70 - 99 mg/dL    Alkaline Phosphatase 89 40 - 150 U/L    AST 26 0 - 45 U/L    ALT 9 0 - 50 U/L    Protein Total 7.3 6.4 - 8.3 g/dL    Albumin 4.3 3.5 - 5.2 g/dL    Bilirubin Total 0.3 <=1.2 mg/dL   Result Value Ref Range    Lipase 28 13 - 60 U/L    Result Value Ref Range    Magnesium 2.7 (H) 1.7 - 2.3 mg/dL   Result Value Ref Range    Troponin T, High Sensitivity 40 (H) <=14 ng/L   Lactic Acid Whole Blood with 1X Repeat in 2 HR when >2   Result Value Ref Range    Lactic Acid, Initial 1.4 0.7 - 2.0 mmol/L   TSH with free T4 reflex   Result Value Ref Range    TSH 4.76 (H) 0.30 - 4.20 uIU/mL   CBC with platelets and differential   Result Value Ref Range    WBC Count 12.3 (H) 4.0 - 11.0 10e3/uL    RBC Count 4.35 3.80 - 5.20 10e6/uL    Hemoglobin 13.4 11.7 - 15.7 g/dL    Hematocrit 39.2 35.0 - 47.0 %    MCV 90 78 - 100 fL    MCH 30.8 26.5 - 33.0 pg    MCHC 34.2 31.5 - 36.5 g/dL    RDW 14.0 10.0 - 15.0 %    Platelet Count 303 150 - 450 10e3/uL    % Neutrophils 62 %    % Lymphocytes 23 %    % Monocytes 11 %    % Eosinophils 2 %    % Basophils 1 %    % Immature Granulocytes 1 %    NRBCs per 100 WBC 0 <1 /100    Absolute Neutrophils 7.7 1.6 - 8.3 10e3/uL    Absolute Lymphocytes 2.9 0.8 - 5.3 10e3/uL    Absolute Monocytes 1.4 (H) 0.0 - 1.3 10e3/uL    Absolute Eosinophils 0.2 0.0 - 0.7 10e3/uL    Absolute Basophils 0.1 0.0 - 0.2 10e3/uL    Absolute Immature Granulocytes 0.1 <=0.4 10e3/uL    Absolute NRBCs 0.0 10e3/uL   Extra Blue Top Tube   Result Value Ref Range    Hold Specimen JIC    Result Value Ref Range    Free T4 1.73 (H) 0.90 - 1.70 ng/dL       RADIOLOGY:  Reviewed all pertinent imaging. Please see official radiology report.  CT Chest Abdomen Pelvis w/o Contrast   Final Result   IMPRESSION:   1.  No abnormalities are seen to explain symptoms.   2.  Specifically, no evidence of traumatic injury to the chest, abdomen or pelvis.   3.  No evidence of bowel obstruction or any inflammatory changes.   4.  Right double-J ureteral stent in place with no change in the distention of the proximal right ureter as noted above. No calculi or perinephric stranding.   5.  The ureteral stent again passes between the right common iliac limb of the aortobifem bypass graft and the  abandoned, stented native right common iliac artery. Soft tissue stranding medial to these 2 vessels continues to decrease since the September    exam.    6.  Scattered colonic diverticulosis.   7.  Other noncritical findings as noted above.      CT Head w/o Contrast   Final Result   IMPRESSION:   1.  No acute intracranial process.      POC US ECHO LIMITED    (Results Pending)       EKG:    Performed at: 10:34    Impression: Sinus rhythm, right atrial enlargement, nonspecific ST abnormality, when compared with ECG of 29-OCT-2024 11:39, T wave inversion with no longer evident in inferior leads, nonspecific T wave abnormality no longer evident in anterolateral leads    Rate: 61 BPM  Rhythm: sinus rhythm  Axis: 79  [place] Interval: 114 ms  QRS Interval: 96 ms  QTc Interval: 475 ms  ST Changes: None  Comparison: when compared with ECG of 29-OCT-2024 11:39, T wave inversion with no longer evident in inferior leads, nonspecific T wave abnormality no longer evident in anterolateral leads    I have independently reviewed and interpreted the EKG(s) documented above.    PROCEDURES:     POC US ECHO LIMITED    Date/Time: 12/13/2024 11:29 AM    Performed by: Bladimir Clarke MD  Authorized by: Bladimir Clarke MD    Procedure details:     Indications comment:  Hypotension  Comments:      [facility] protocol.  Technically difficult cardiac exam, EF appeared grossly normal, IVC collapsible, no AAA, no definitive hydro on right, no free fluid for quadrant abdomen, bilateral sliding lung signs.  Images saved and uploaded          ISophie, am serving as a scribe to document services personally performed by Bladimir Clarke MD based on my observation and the provider's statements to me. I, Bladimir Clarke MD, attest that Sophie Harris is acting in a scribe capacity, has observed my performance of the services and has documented them in accordance with my direction.    Bladimir Clarke MD  [facility] EMERGENCY ROOM  [address] [facility] [place] [id number] [phone]      Bladimir Clarke MD  12/13/24 [id number]

## 2024-12-13 NOTE — PROGRESS NOTES
Pt A &Ox4. Pain in lower abd area has resolved. Denies n/v states she is starving and hasn't eaten in 5 days, so excited for food. NS @75/hr. BP now WNL. Nicotine patch placed on L shoulder. Family at bedside.

## 2024-12-13 NOTE — H&P
[facility]    History and Physical - Hospitalist Service       Date of Admission:  12/13/2024    Assessment & Plan      Nery Whitaker is a 59 year old female admitted on 12/13/2024.  She has a past medical history significant for lupus anticoagulant, peripheral vascular disease status post aortobifemoral bypass in 2006, right hydronephrosis status post right ureteral stent placement in October 2024, cyclic vomiting of unclear etiology with recurrent ED visit and hospitalization over the last few months.  She presented to the hospital nausea, vomiting, decreased p.o. intake, syncope and presyncope on 12/13.    History of cyclic vomiting  Nausea and vomiting  Cyclic vomiting over the last few months.  Evaluated by gastroenterology multiple times.  Etiology remains unclear possibly related to marijuana use disorder, other possible etiologies include gastritis and constipation.  Patient was already started on PPI and laxatives as an outpatient.  She stopped using marijuana around 1 week ago.  Nausea and vomiting stopped over the last 2 days.    Plan  Continue home omeprazole  As needed antiemetics.  EKG to check for QTc prolongation    Starvation ketoacidosis  High anion gap metabolic acidosis  Limited p.o. intake over the last week  Anion gap elevated at 19.  Lactic acid normal.    Plan  Check ketones  Check VBG  Start diet.  Continue to monitor electrolyte closely.    Presyncope/syncope  Heart murmur  Had an episode of presyncope/syncope on 12/13 most likely due to dehydration and hypotension.  Blood pressure on presentation was low, responded to IV fluid.  Systolic murmur best heard on the aortic valve area on auscultation.    Plan  IV fluid as below  Transthoracic echocardiogram  Continue to monitor blood pressure    Acute kidney injury on possible CKD stage IIIa.  Baseline creatinine around 1.2-1.3.  Creatinine presentation around 3.  GER most likely prerenal due to  dehydration.  Received IV fluids in the emergency department.    Plan  Normal saline at 75 cc/h  Continue to monitor BMP  Consider referral to nephrology as outpatient    Hypertension  At home on lisinopril 20 mg daily.    Plan  Hold lisinopril given hypotension and GER.  Continue to monitor blood pressure  No indication for strict blood pressure control.    Hyponatremia  Most likely hypovolemic hyponatremia    Plan  IV fluid as above  Recheck BMP on 12/14.    History of right ureteral stent  Possible urinary tract infection  No symptoms suggestive of UTI, however, urinalysis on 12/12 and 12/13 suggestive of possible infection.    Plan  Continue broad-spectrum antibiotic  Follow-up with urine culture.    History of constipation    Plan  Continue home doxycycline senna    History of lupus anticoagulant  At home on Xarelto 20 mg daily    Plan  Continue rivaroxaban on 12/14 as long as kidney function is improving.    Cannabis use disorder with withdrawal  Patient reported that she has been using marijuana for the last 42 years, she stopped using around a week ago.  Some of her symptoms are consistent with alcohol withdrawal including increased anxiety, abdominal discomfort.  She was evaluated by her primary care physician and her gabapentin along with citalopram doses were increased to control alcohol withdrawal symptoms.  Patient has been taking her medications intermittently over the last week due to her nausea and vomiting.  She was also started on Ambien as needed, however, she did not started yet.    Plan  Continue citalopram on the lower dose of 20 mg daily for now given GER.  Hold starting Ambien for now.    Tobacco use disorder  Smokes 1 pack/day    Plan  Nicotine patch.                              Diet:  Regular diet  DVT Prophylaxis: Pneumatic Compression Devices/DOAC  Corona Catheter: Not present  Lines: None     Cardiac Monitoring: None  Code Status:  Full code    Clinically Significant Risk Factors Present  "on Admission         # Hyponatremia: Lowest Na = 131 mmol/L in last 2 days, will monitor as appropriate  # Hypochloremia: Lowest Cl = 93 mmol/L in last 2 days, will monitor as appropriate     # Anion Gap Metabolic Acidosis: Highest Anion Gap = 19 mmol/L in last 2 days, will monitor and treat as appropriate   # Drug Induced Coagulation Defect: home medication list includes an anticoagulant medication   # Acute Kidney Injury, unspecified: based on a >150% or 0.3 mg/dL increase in last creatinine compared to past 90 day average, will monitor renal function  # Hypertension: Noted on problem list           # Cachexia: Estimated body mass index is 17.85 kg/m  as calculated from the following:    Height as of this encounter: 1.626 m (5' 4\").    Weight as of this encounter: 47.2 kg (104 lb).              Disposition Plan     Medically Ready for Discharge: Anticipated Tomorrow           ANTONIO MCCORD MD  Hospitalist Service  [facility]  Securely message with [employer] (more info)  Text page via [employer] Paging/Directory     ______________________________________________________________________    Chief Complaint   Lightheadedness and syncope    History is obtained from the patient    History of Present Illness   Nery Whitaker is a 59 year old female admitted on 12/13/2024.  She has a past medical history significant for lupus anticoagulant, peripheral vascular disease status post aortobifemoral bypass in 2006, right hydronephrosis status post right ureteral stent placement in October 2024, cyclic vomiting of unclear etiology with recurrent ED visit and hospitalization over the last few months.    Per patient, over the last 10 days she has been experiencing nausea, recurrent vomiting.  Minimal abdominal discomfort.  She denies any fever or chills.  She denies any chest pain or shortness of breath.  She reported very limited p.o. intake over the last week.  She denies any burning with urination.  Her " nausea has stopped over the last few days, however, she has been feeling very lightheaded over the last 2 days and had an episode of presyncope/syncope on 12/13/2024.  She denies any head trauma.    Patient's has been using marijuana for over 40 years.  Given her cyclic vomiting, she discussed with her primary care physician and she stopped using marijuana around a week ago.  She smokes 1 pack of cigarettes per day for the last 40 years.  She denies any illicit drug use or alcohol use.    Vitals on presentation: Afebrile, heart rate around 60, blood pressure 90/50, SpO2 100% on room air.  BMP significant for sodium of 131, creatinine 3 (baseline creatinine 1.3) anion gap 19, magnesium 2.7, troponin 40 and peaked, TSH mildly elevated at 4.76 with free T4 mildly elevated at 1.73.  Mild leukocytosis.    CT abdomen pelvis showed right ureteral stent without any significant changes.  CT head without acute abnormality.      Past Medical History    Past Medical History:   Diagnosis Date    Abdominal pain, epigastric 12/02/2017    Abnormal cardiovascular stress test 12/13/2017    Adenomatous colon polyp     Anemia     Antiplatelet or antithrombotic long-term use     Anxiety     Arthritis     Atypical lymphocytosis     Bilateral carotid artery disease (H)     COPD (chronic obstructive pulmonary disease) (H)     Coronary artery disease     Dyslipidemia     Foreign body in left foot     Foreign body in left foot, subsequent encounter 10/11/2018    Heart murmur     Hydronephrosis with ureteral stricture, not elsewhere classified     Hypertension     Intermittent palpitations 12/02/2017    Migraines     Nausea and vomiting, unspecified vomiting type 12/13/2024    Newly recognized murmur 10/18/2018    Osteoporosis     PAD (peripheral artery disease) (H)     Pain in thoracic spine     Pure hyperglyceridemia 09/02/2021    Formatting of this note might be different from the original. Has been intoleratant to several medications.     .    Stented coronary artery     Urge incontinence of urine 2018    Last Assessment & Plan:  Symptoms most consistent with urge incontinence. Ultrasound ordered to evaluate for any issues with incomplete emptying. Trial of Detrol LA 2 mg daily.  Last Assessment & Plan:  Formatting of this note might be different from the original. Detrol LA has been helpful but dries out her mouth. We will try a lower, short acting dose.    Dairuca plantaris 2018       Past Surgical History   Past Surgical History:   Procedure Laterality Date    ABDOMEN SURGERY      APPENDECTOMY      ARTERIAL BYPASS SURGERY  2006    BIOPSY BREAST Left     benign    BYPASS GRAFT AORTOFEMORAL Bilateral     CHOLECYSTECTOMY      COLONOSCOPY      COMBINED CYSTOSCOPY, RETROGRADES, EXCHANGE STENT URETER(S) Right 10/22/2024    Procedure: CYSTOURETEROSCOPY, WITH RETROGRADE PYELOGRAM AND STENT INSERTION, ureteral sampling , Right;  Surgeon: Clark Garcia MD;  [facility]: [facility]    ESOPHAGOSCOPY, GASTROSCOPY, DUODENOSCOPY (EGD), COMBINED N/A 2024    Procedure: ESOPHAGOGASTRODUODENOSCOPY, WITH BIOPSY;  Surgeon: Lizz Bray MD;  Location: [facility] OR    FEMORAL ARTERY STENT  2006    FOOT MASS EXCISION Left 2018    Soft tissue mass - benign    IR MISCELLANEOUS PROCEDURE  2006    IR MISCELLANEOUS PROCEDURE  2006    IR MISCELLANEOUS PROCEDURE  2006    IR MISCELLANEOUS PROCEDURE  2006    IR MISCELLANEOUS PROCEDURE  2006    TONSILLECTOMY      TYMPANOSTOMY TUBE PLACEMENT         Prior to Admission Medications   Prior to Admission Medications   Prescriptions Last Dose Informant Patient Reported? Taking?   DENTA 5000 PLUS 1.1 % CREA 2024  Yes Yes   Sig: Take by mouth daily.   REPATHA SURECLICK 140 MG/ML prefilled autoinjector 2024  Yes Yes   Si mg every 14 days.   buPROPion (WELLBUTRIN XL) 150 MG 24 hr tablet   No Yes   Sig: Take 1 tablet (150 mg) by mouth  every morning.   citalopram (CELEXA) 20 MG tablet 12/12/2024 Morning  Yes Yes   Sig: Take 20 mg by mouth daily.   citalopram (CELEXA) 40 MG tablet   No Yes   Sig: Take 1 tablet (40 mg) by mouth daily.   colestipol (COLESTID) 1 g tablet 12/11/2024 Morning  Yes Yes   Sig: Take 1 g by mouth 2 times daily.   folic acid (FOLVITE) 1 MG tablet 12/6/2024 Morning  No Yes   Sig: Take 1 tablet by mouth once daily   gabapentin (NEURONTIN) 100 MG capsule 12/12/2024 Bedtime  No Yes   Sig: TAKE 2 CAPSULE BY MOUTH IN THE MORNING THEN  2  TABLET  AT  NOON  AND  3  TABLETS  AT  BEDTIME  DAILY   lisinopril (ZESTRIL) 20 MG tablet 12/12/2024 Morning  No Yes   Sig: Take 1 tablet (20 mg) by mouth daily.   omeprazole (PRILOSEC) 40 MG DR capsule 12/12/2024 Morning  Yes Yes   Sig: Take 40 mg by mouth daily.   ondansetron (ZOFRAN ODT) 4 MG ODT tab 12/12/2024 Morning  No Yes   Sig: Take 1 tablet (4 mg) by mouth every 6 hours as needed for nausea.   prochlorperazine (COMPAZINE) 10 MG tablet More than a month  No Yes   Sig: Take 1 tablet (10 mg) by mouth every 6 hours as needed for nausea or vomiting.   rivaroxaban ANTICOAGULANT (XARELTO ANTICOAGULANT) 20 MG TABS tablet 12/12/2024 Evening  Yes Yes   Sig: Take 20 mg by mouth daily (with dinner).   senna-docusate (SENOKOT-S/PERICOLACE) 8.6-50 MG tablet 12/12/2024 Morning  No Yes   Sig: Take 1-2 tablets by mouth 2 times daily.   zolpidem (AMBIEN) 5 MG tablet   No Yes   Sig: Take 1 tablet (5 mg) by mouth nightly as needed for sleep.      Facility-Administered Medications: None          Physical Exam   Vital Signs: Temp: 97.9  F (36.6  C) Temp src: Oral BP: 108/55 Pulse: 55   Resp: 16 SpO2: 100 %      Weight: 104 lbs 0 oz    Physical Exam  Constitutional:       General: She is not in acute distress.     Appearance: She is not toxic-appearing.   Cardiovascular:      Rate and Rhythm: Normal rate.      Heart sounds: Murmur heard.      Comments: Systolic murmur best heard at the aortic valve  area  Pulmonary:      Effort: Pulmonary effort is normal. No respiratory distress.      Breath sounds: No wheezing.   Abdominal:      General: There is no distension.      Palpations: Abdomen is soft.      Tenderness: There is no abdominal tenderness. There is no guarding.   Skin:     General: Skin is warm and dry.   Neurological:      Mental Status: She is alert.          Medical Decision Making       80 MINUTES SPENT BY ME on the date of service doing chart review, history, exam, documentation & further activities per the note.      Data     I have personally reviewed the following data over the past 24 hrs:    12.3 (H)  \   13.4   / 303     131 (L) 93 (L) 46.8 (H) /  91   4.0 19 (L) 3.05 (H) \     ALT: 9 AST: 26 AP: 89 TBILI: 0.3   ALB: 4.3 TOT PROTEIN: 7.3 LIPASE: 28     Trop: 33 (H) BNP: N/A     TSH: 4.76 (H) T4: 1.73 (H) A1C: N/A     Procal: N/A CRP: N/A Lactic Acid: 1.4         Imaging results reviewed over the past 24 hrs:   Recent Results (from the past 24 hours)   POC US ECHO LIMITED    Narrative    Bladimir Clarke MD     12/13/2024  1:58 PM  POC US ECHO LIMITED    Date/Time: 12/13/2024 11:29 AM    Performed by: Bladimir Clarke MD  Authorized by: Bladimir Clarke MD    Procedure details:     Indications comment:  Hypotension  Comments:      [facility] protocol.  Technically difficult cardiac exam, EF appeared grossly   normal, IVC collapsible, no AAA, no definitive hydro on right, no free   fluid for quadrant abdomen, bilateral sliding lung signs.  Images saved   and uploaded   CT Head w/o Contrast    Narrative    EXAM: CT HEAD W/O CONTRAST  LOCATION: [facility]  DATE: 12/13/2024    INDICATION: syncope  COMPARISON: None.  TECHNIQUE: Routine CT Head without IV contrast. Multiplanar reformats. Dose reduction techniques were used.    FINDINGS:  INTRACRANIAL CONTENTS: No intracranial hemorrhage, extraaxial collection, or mass effect.  No CT evidence of acute infarct. Normal parenchymal  attenuation. Normal ventricles and sulci.     VISUALIZED ORBITS/SINUSES/MASTOIDS: No intraorbital abnormality. No paranasal sinus mucosal disease. No middle ear or mastoid effusion.    BONES/SOFT TISSUES: No acute abnormality.      Impression    IMPRESSION:  1.  No acute intracranial process.   CT Chest Abdomen Pelvis w/o Contrast    Narrative    EXAM: CT CHEST ABDOMEN PELVIS W/O CONTRAST  LOCATION: [facility]  DATE: 12/13/2024    INDICATION: syncope, new onset renal failure, vomiting  COMPARISON: CT AP 10/30/2024 and older studies, CT chest CAP 4/16/2018  TECHNIQUE: CT scan of the chest, abdomen, and pelvis was performed without IV contrast. Multiplanar reformats were obtained. Dose reduction techniques were used.   CONTRAST: None.    FINDINGS:   LUNGS AND PLEURA: Mild basilar cylindrical bronchiectasis. No bronchitis or endobronchial secretions. No hydropneumothorax. Small sliding hiatal hernia not seen today.    MEDIASTINUM/AXILLAE: No mediastinal hematoma. Atherosclerotic calcification of the arch and branch origins. No aneurysm. No adenopathy.    CORONARY ARTERY CALCIFICATION: Mild to moderate.    HEPATOBILIARY: Normal.    PANCREAS: Normal.    SPLEEN: Normal.    ADRENAL GLANDS: Normal.    KIDNEYS/BLADDER: Right double-J ureteral stent in place. No change in the distention of the proximal right ureter post stent placement. Stent again traverses between the stent. Native common iliac artery and the iliac component of the aortobifem graft.   Renal or ureteral calculi. No perinephric stranding. Left kidney and bladder are unremarkable.    BOWEL: No inflammatory changes. Bowel is of normal caliber. Scattered colonic diverticulosis.    LYMPH NODES: Normal.    VASCULATURE: Aortobifemoral grafting with abandoned distal aortic iliac stents in native vessels. Soft tissue stranding in the groove medially between the right common iliac M and abandoned, stented native vessel (axial images 220-222)  is continuing to   decrease    PELVIC ORGANS: No free fluid or adnexal masses.    MUSCULOSKELETAL: No acute fractures. Moderate spondylitic changes in the cervical spine. Coarse calcifications in the left breast, unchanged.      Impression    IMPRESSION:  1.  No abnormalities are seen to explain symptoms.  2.  Specifically, no evidence of traumatic injury to the chest, abdomen or pelvis.  3.  No evidence of bowel obstruction or any inflammatory changes.  4.  Right double-J ureteral stent in place with no change in the distention of the proximal right ureter as noted above. No calculi or perinephric stranding.  5.  The ureteral stent again passes between the right common iliac limb of the aortobifem bypass graft and the abandoned, stented native right common iliac artery. Soft tissue stranding medial to these 2 vessels continues to decrease since the September   exam.   6.  Scattered colonic diverticulosis.  7.  Other noncritical findings as noted above.

## 2024-12-13 NOTE — PHARMACY-VANCOMYCIN DOSING SERVICE
"Pharmacy Vancomycin Initial Note  Date of Service 2024  Patient's  1965  59 year old, female    Indication: Sepsis    Current estimated CrCl = Estimated Creatinine Clearance: 15 mL/min (A) (based on SCr of 3.05 mg/dL (H)).    Creatinine for last 3 days  2024: 11:11 AM Creatinine 3.05 mg/dL    Recent Vancomycin Level(s) for last 3 days  No results found for requested labs within last 3 days.      Vancomycin IV Administrations (past 72 hours)        No vancomycin orders with administrations in past 72 hours.                    Nephrotoxins and other renal medications (From now, onward)      Start     Dose/Rate Route Frequency Ordered Stop    24 1800  piperacillin-tazobactam (ZOSYN) 3.375 g vial to attach to  mL bag        Note to Pharmacy: For [facility], [facility] and [facility]: For Zosyn-naive patients, use the \"Zosyn initial dose + extended infusion\" order panel.    3.375 g  over 240 Minutes Intravenous EVERY 8 HOURS 24 1125      24 1300  norepinephrine (LEVOPHED) 4 mg in  mL PERIPHERAL infusion         0.01-0.125 mcg/kg/min × 47.2 kg  1.8-22.1 mL/hr  Intravenous CONTINUOUS 24 1246      24 1300  vancomycin (VANCOCIN) 1,250 mg in 0.9% NaCl 250 mL intermittent infusion         25 mg/kg × 47.2 kg  over 90 Minutes Intravenous ONCE 24 1249              Contrast Orders - past 72 hours (72h ago, onward)      None                    Plan:  Start vancomycin  1250  mg IV once in ED for sepsis.      Shelley Stewart, PharmD   "

## 2024-12-14 ENCOUNTER — APPOINTMENT (OUTPATIENT)
Dept: CARDIOLOGY | Facility: CLINIC | Age: 59
End: 2024-12-14
Attending: STUDENT IN AN ORGANIZED HEALTH CARE EDUCATION/TRAINING PROGRAM
Payer: COMMERCIAL

## 2024-12-14 VITALS
TEMPERATURE: 98.2 F | OXYGEN SATURATION: 98 % | BODY MASS INDEX: 18.73 KG/M2 | SYSTOLIC BLOOD PRESSURE: 103 MMHG | DIASTOLIC BLOOD PRESSURE: 55 MMHG | HEIGHT: 64 IN | WEIGHT: 109.7 LBS | RESPIRATION RATE: 20 BRPM | HEART RATE: 64 BPM

## 2024-12-14 LAB
ANION GAP SERPL CALCULATED.3IONS-SCNC: 12 MMOL/L (ref 7–15)
ATRIAL RATE - MUSE: 61 BPM
BACTERIA UR CULT: NO GROWTH
BASOPHILS # BLD AUTO: 0.1 10E3/UL (ref 0–0.2)
BASOPHILS NFR BLD AUTO: 1 %
BI-PLANE LVEF ECHO: NORMAL
BUN SERPL-MCNC: 27.7 MG/DL (ref 6–20)
CALCIUM SERPL-MCNC: 8.2 MG/DL (ref 8.8–10.4)
CHLORIDE SERPL-SCNC: 106 MMOL/L (ref 98–107)
CREAT SERPL-MCNC: 1.48 MG/DL (ref 0.51–0.95)
DIASTOLIC BLOOD PRESSURE - MUSE: 63 MMHG
EGFRCR SERPLBLD CKD-EPI 2021: 41 ML/MIN/1.73M2
EOSINOPHIL # BLD AUTO: 0.5 10E3/UL (ref 0–0.7)
EOSINOPHIL NFR BLD AUTO: 5 %
ERYTHROCYTE [DISTWIDTH] IN BLOOD BY AUTOMATED COUNT: 14.2 % (ref 10–15)
GLUCOSE SERPL-MCNC: 89 MG/DL (ref 70–99)
HCO3 SERPL-SCNC: 18 MMOL/L (ref 22–29)
HCT VFR BLD AUTO: 30.5 % (ref 35–47)
HGB BLD-MCNC: 10.6 G/DL (ref 11.7–15.7)
IMM GRANULOCYTES # BLD: 0 10E3/UL
IMM GRANULOCYTES NFR BLD: 0 %
INTERPRETATION ECG - MUSE: NORMAL
LYMPHOCYTES # BLD AUTO: 2.6 10E3/UL (ref 0.8–5.3)
LYMPHOCYTES NFR BLD AUTO: 26 %
MAGNESIUM SERPL-MCNC: 2.4 MG/DL (ref 1.7–2.3)
MCH RBC QN AUTO: 31 PG (ref 26.5–33)
MCHC RBC AUTO-ENTMCNC: 34.8 G/DL (ref 31.5–36.5)
MCV RBC AUTO: 89 FL (ref 78–100)
MONOCYTES # BLD AUTO: 1 10E3/UL (ref 0–1.3)
MONOCYTES NFR BLD AUTO: 10 %
NEUTROPHILS # BLD AUTO: 5.7 10E3/UL (ref 1.6–8.3)
NEUTROPHILS NFR BLD AUTO: 58 %
NRBC # BLD AUTO: 0 10E3/UL
NRBC BLD AUTO-RTO: 0 /100
P AXIS - MUSE: 80 DEGREES
PHOSPHATE SERPL-MCNC: 3.2 MG/DL (ref 2.5–4.5)
PLATELET # BLD AUTO: 241 10E3/UL (ref 150–450)
POTASSIUM SERPL-SCNC: 4 MMOL/L (ref 3.4–5.3)
PR INTERVAL - MUSE: 116 MS
QRS DURATION - MUSE: 90 MS
QT - MUSE: 448 MS
QTC - MUSE: 450 MS
R AXIS - MUSE: 83 DEGREES
RBC # BLD AUTO: 3.42 10E6/UL (ref 3.8–5.2)
SODIUM SERPL-SCNC: 136 MMOL/L (ref 135–145)
SYSTOLIC BLOOD PRESSURE - MUSE: 145 MMHG
T AXIS - MUSE: -64 DEGREES
VENTRICULAR RATE- MUSE: 61 BPM
WBC # BLD AUTO: 9.9 10E3/UL (ref 4–11)

## 2024-12-14 PROCEDURE — G0378 HOSPITAL OBSERVATION PER HR: HCPCS

## 2024-12-14 PROCEDURE — 93306 TTE W/DOPPLER COMPLETE: CPT

## 2024-12-14 PROCEDURE — 258N000003 HC RX IP 258 OP 636: Performed by: STUDENT IN AN ORGANIZED HEALTH CARE EDUCATION/TRAINING PROGRAM

## 2024-12-14 PROCEDURE — 85004 AUTOMATED DIFF WBC COUNT: CPT | Performed by: STUDENT IN AN ORGANIZED HEALTH CARE EDUCATION/TRAINING PROGRAM

## 2024-12-14 PROCEDURE — 96361 HYDRATE IV INFUSION ADD-ON: CPT

## 2024-12-14 PROCEDURE — 99239 HOSP IP/OBS DSCHRG MGMT >30: CPT | Performed by: STUDENT IN AN ORGANIZED HEALTH CARE EDUCATION/TRAINING PROGRAM

## 2024-12-14 PROCEDURE — 82565 ASSAY OF CREATININE: CPT | Performed by: STUDENT IN AN ORGANIZED HEALTH CARE EDUCATION/TRAINING PROGRAM

## 2024-12-14 PROCEDURE — 93306 TTE W/DOPPLER COMPLETE: CPT | Mod: 26 | Performed by: INTERNAL MEDICINE

## 2024-12-14 PROCEDURE — 250N000013 HC RX MED GY IP 250 OP 250 PS 637: Performed by: STUDENT IN AN ORGANIZED HEALTH CARE EDUCATION/TRAINING PROGRAM

## 2024-12-14 PROCEDURE — 36415 COLL VENOUS BLD VENIPUNCTURE: CPT | Performed by: STUDENT IN AN ORGANIZED HEALTH CARE EDUCATION/TRAINING PROGRAM

## 2024-12-14 PROCEDURE — 83735 ASSAY OF MAGNESIUM: CPT | Performed by: STUDENT IN AN ORGANIZED HEALTH CARE EDUCATION/TRAINING PROGRAM

## 2024-12-14 PROCEDURE — 84100 ASSAY OF PHOSPHORUS: CPT | Performed by: STUDENT IN AN ORGANIZED HEALTH CARE EDUCATION/TRAINING PROGRAM

## 2024-12-14 PROCEDURE — 80048 BASIC METABOLIC PNL TOTAL CA: CPT | Performed by: STUDENT IN AN ORGANIZED HEALTH CARE EDUCATION/TRAINING PROGRAM

## 2024-12-14 PROCEDURE — 93005 ELECTROCARDIOGRAM TRACING: CPT | Performed by: STUDENT IN AN ORGANIZED HEALTH CARE EDUCATION/TRAINING PROGRAM

## 2024-12-14 PROCEDURE — 85049 AUTOMATED PLATELET COUNT: CPT | Performed by: STUDENT IN AN ORGANIZED HEALTH CARE EDUCATION/TRAINING PROGRAM

## 2024-12-14 PROCEDURE — 250N000011 HC RX IP 250 OP 636: Performed by: STUDENT IN AN ORGANIZED HEALTH CARE EDUCATION/TRAINING PROGRAM

## 2024-12-14 PROCEDURE — 96376 TX/PRO/DX INJ SAME DRUG ADON: CPT

## 2024-12-14 RX ORDER — VANCOMYCIN HYDROCHLORIDE 1 G/200ML
1000 INJECTION, SOLUTION INTRAVENOUS EVERY 24 HOURS
Status: DISCONTINUED | OUTPATIENT
Start: 2024-12-15 | End: 2024-12-14 | Stop reason: HOSPADM

## 2024-12-14 RX ORDER — NICOTINE 21 MG/24HR
1 PATCH, TRANSDERMAL 24 HOURS TRANSDERMAL DAILY
Qty: 14 PATCH | Refills: 0 | Status: SHIPPED | OUTPATIENT
Start: 2024-12-15 | End: 2024-12-29

## 2024-12-14 RX ORDER — LISINOPRIL 20 MG/1
20 TABLET ORAL DAILY
Qty: 90 TABLET | Refills: 3 | Status: SHIPPED | OUTPATIENT
Start: 2024-12-16

## 2024-12-14 RX ORDER — PIPERACILLIN SODIUM, TAZOBACTAM SODIUM 3; .375 G/15ML; G/15ML
3.38 INJECTION, POWDER, LYOPHILIZED, FOR SOLUTION INTRAVENOUS EVERY 8 HOURS
Status: DISCONTINUED | OUTPATIENT
Start: 2024-12-14 | End: 2024-12-14 | Stop reason: HOSPADM

## 2024-12-14 RX ADMIN — PIPERACILLIN AND TAZOBACTAM 3.38 G: 3; .375 INJECTION, POWDER, FOR SOLUTION INTRAVENOUS at 10:22

## 2024-12-14 RX ADMIN — FOLIC ACID 1000 MCG: 1 TABLET ORAL at 09:03

## 2024-12-14 RX ADMIN — MONTELUKAST SODIUM 1 G: 4 TABLET, CHEWABLE ORAL at 10:21

## 2024-12-14 RX ADMIN — SENNOSIDES AND DOCUSATE SODIUM 1 TABLET: 50; 8.6 TABLET ORAL at 09:03

## 2024-12-14 RX ADMIN — SODIUM CHLORIDE, PRESERVATIVE FREE: 5 INJECTION INTRAVENOUS at 09:06

## 2024-12-14 RX ADMIN — NICOTINE 1 PATCH: 21 PATCH, EXTENDED RELEASE TRANSDERMAL at 09:02

## 2024-12-14 RX ADMIN — PANTOPRAZOLE SODIUM 40 MG: 40 TABLET, DELAYED RELEASE ORAL at 09:03

## 2024-12-14 ASSESSMENT — ACTIVITIES OF DAILY LIVING (ADL)
ADLS_ACUITY_SCORE: 48
ADLS_ACUITY_SCORE: 49
ADLS_ACUITY_SCORE: 48
ADLS_ACUITY_SCORE: 49
ADLS_ACUITY_SCORE: 48
ADLS_ACUITY_SCORE: 49
ADLS_ACUITY_SCORE: 49
ADLS_ACUITY_SCORE: 48
ADLS_ACUITY_SCORE: 49
DEPENDENT_IADLS:: INDEPENDENT
ADLS_ACUITY_SCORE: 49
ADLS_ACUITY_SCORE: 49
ADLS_ACUITY_SCORE: 48
ADLS_ACUITY_SCORE: 49

## 2024-12-14 NOTE — PROGRESS NOTES
"PRIMARY DIAGNOSIS: \"GENERIC\" NURSING  OUTPATIENT/OBSERVATION GOALS TO BE MET BEFORE DISCHARGE:  ADLs back to baseline: Yes    Activity and level of assistance: Up with standby assistance.    Pain status: Pain free.    Return to near baseline physical activity: Yes     Discharge Planner Nurse   Safe discharge environment identified: Yes  Barriers to discharge: No       Entered by: Karen Bolton RN 12/14/2024 2:48 AM     Please review provider order for any additional goals.   Nurse to notify provider when observation goals have been met and patient is ready for discharge.  "

## 2024-12-14 NOTE — PROGRESS NOTES
"PRIMARY DIAGNOSIS: \"GENERIC\" NURSING  OUTPATIENT/OBSERVATION GOALS TO BE MET BEFORE DISCHARGE:  ADLs back to baseline: Yes    Activity and level of assistance: Up with standby assistance.    Pain status: Pain free.    Return to near baseline physical activity: Yes     Discharge Planner Nurse   Safe discharge environment identified: Yes  Barriers to discharge: No       Entered by: Karen Bolton RN 12/14/2024 2:53 AM     Please review provider order for any additional goals.   Nurse to notify provider when observation goals have been met and patient is ready for discharge.  "

## 2024-12-14 NOTE — PLAN OF CARE
Goal Outcome Evaluation:       Patient is A&O x4. VSS on RA, denies pain, nausea and vomiting. Denies feeling light-headed/dizzy. Tele reads NSR with intermittent bradycardia (in the 50s). Patient ambulates with SBA. Tolerating a regular diet and voiding. PIV in LFA infusing NS at 75 mL/hr. Patient is resting in bed, safe and call light is within reach. Karen Bolton RN    Problem: Adult Inpatient Plan of Care  Goal: Optimal Comfort and Wellbeing  Outcome: Progressing     Problem: Syncope  Goal: Absence of Syncopal Symptoms  Outcome: Progressing  Intervention: Manage Effect of Syncopal Symptoms  Recent Flowsheet Documentation  Taken 12/14/2024 0501 by Karen Bolton, RN  Safety Promotion/Fall Prevention: safety round/check completed  Taken 12/14/2024 0058 by Karen Bolton, RN  Safety Promotion/Fall Prevention: safety round/check completed  Taken 12/13/2024 2004 by Karen Bolton, RN  Safety Promotion/Fall Prevention: safety round/check completed

## 2024-12-14 NOTE — CONSULTS
Care Management Initial Consult      General Information  Assessment completed with: Patient, Spouse or significant other,    Type of CM/SW Visit: Initial Assessment  Primary Care Provider verified and updated as needed: Yes   Readmission within the last 30 days: no previous admission in last 30 days   Reason for Consult: discharge planning  Advance Care Planning: Advance Care Planning Reviewed: questions answered        Communication Assessment  Patient's communication style: spoken language (English or Bilingual)        Cognitive  Cognitive/Neuro/Behavioral: WDL                      Living Environment:   People in home: spouse     Current living Arrangements: house      Able to return to prior arrangements: yes     Family/Social Support:  Care provided by: self, spouse/significant other  Provides care for: no one  Marital Status:   Support system:   Ash       Description of Support System: Supportive, Involved    Support Assessment: Adequate family and caregiver support, Adequate social supports    Current Resources:   Patient receiving home care services: No  Community Resources: None  Equipment currently used at home: none  Supplies currently used at home: None    Employment/Financial:  Employment Status: other (see comments) (Undetermined)     Financial Concerns: none   Referral to Financial Worker: No     Does the patient's insurance plan have a 3 day qualifying hospital stay waiver?  Yes     Which insurance plan 3 day waiver is available? Alternative insurance waiver    Will the waiver be used for post-acute placement? No    Lifestyle & Psychosocial Needs:  Social Drivers of Health     Food Insecurity: Low Risk  (11/14/2024)    Food Insecurity     Within the past 12 months, did you worry that your food would run out before you got money to buy more?: No     Within the past 12 months, did the food you bought just not last and you didn t have money to get more?: No   Depression: Not at risk  (12/12/2024)    PHQ-2     PHQ-2 Score: 0   Housing Stability: Low Risk  (11/14/2024)    Housing Stability     Do you have housing? : Yes     Are you worried about losing your housing?: No   Tobacco Use: High Risk (12/12/2024)    Patient History     Smoking Tobacco Use: Every Day     Smokeless Tobacco Use: Never     Passive Exposure: Current   Financial Resource Strain: Low Risk  (11/14/2024)    Financial Resource Strain     Within the past 12 months, have you or your family members you live with been unable to get utilities (heat, electricity) when it was really needed?: No   Alcohol Use: Not on file   Transportation Needs: Low Risk  (11/14/2024)    Transportation Needs     Within the past 12 months, has lack of transportation kept you from medical appointments, getting your medicines, non-medical meetings or appointments, work, or from getting things that you need?: No   Physical Activity: Insufficiently Active (11/14/2024)    Exercise Vital Sign     Days of Exercise per Week: 1 day     Minutes of Exercise per Session: 10 min   Interpersonal Safety: Low Risk  (11/15/2024)    Interpersonal Safety     Do you feel physically and emotionally safe where you currently live?: Yes     Within the past 12 months, have you been hit, slapped, kicked or otherwise physically hurt by someone?: No     Within the past 12 months, have you been humiliated or emotionally abused in other ways by your partner or ex-partner?: No   Stress: Stress Concern Present (11/14/2024)    [employer] [place] [employer] - Occupational Stress Questionnaire     Feeling of Stress : To some extent   Social Connections: Unknown (11/14/2024)    Social Connection and Isolation Panel [NHANES]     Frequency of Communication with Friends and Family: Not on file     Frequency of Social Gatherings with Friends and Family: Once a week     Attends Hinduism Services: Not on file     Active Member of Clubs or Organizations: Not on file     Attends Club or  "Organization Meetings: Not on file     Marital Status: Not on file   Health Literacy: Not on file     Functional Status:  Prior to admission patient needed assistance:   Dependent ADLs:: Independent  Dependent IADLs:: Independent  Assessment of Functional Status: Not at  functional baseline    Mental Health Status:  Mental Health Status: No Current Concerns       Chemical Dependency Status:  Chemical Dependency Status: No Current Concerns           Values/Beliefs:  Spiritual, Cultural Beliefs, Oriental orthodox Practices, Values that affect care: no             Discussed  Partnership in Safe Discharge Planning  document with patient/family: No    Additional Information:  Writer met with pt and her  to introduce Care Management(CM), obtain an initial assessment, and offer discharge support. Pt resides in a house with her  and endorses total I/ADL independence when at baseline. No DME or in-home services usage. Pt/ expressed no concerns in regard to eventual return home.    12/14 per NADINE Mcfadden-\"59 year old female admitted on 12/13/2024.  She has a past medical history significant for lupus anticoagulant, peripheral vascular disease status post aortobifemoral bypass in 2006, right hydronephrosis status post right ureteral stent placement in October 2024, cyclic vomiting of unclear etiology with recurrent ED visit and hospitalization over the last few months. Per patient, over the last 10 days she has been experiencing nausea, recurrent vomiting.  Minimal abdominal discomfort.  She denies any fever or chills.  She denies any chest pain or shortness of breath.  She reported very limited p.o. intake over the last week. Her nausea has stopped over the last few days, however, she has been feeling very lightheaded 2 days prior to this hospitalization and she had an episode of syncope/presyncope on 12/13/2024.  Patient's has been using marijuana for over 40 years.  Given her cyclic vomiting, she discussed " "with her primary care physician and she stopped using marijuana around a week ago.  She smokes 1 pack of cigarettes per day for the last 40 years.  She denies any illicit drug use or alcohol use.\"    Next Steps:   No CM needs identified. CM signing off. Please re-consult as needed.  to transport.    Nadeem Villalobos RN      "

## 2024-12-14 NOTE — PHARMACY-VANCOMYCIN DOSING SERVICE
"Pharmacy Vancomycin Note  Date of Service 2024  Patient's  1965   59 year old, female    Indication: Sepsis and Urinary Tract Infection  Day of Therapy: 2  Current vancomycin regimen:  1000 mg IV q48h  Current vancomycin monitoring method: AUC  Current vancomycin therapeutic monitoring goal: 400-600 mg*h/L    [employer] Prediction of Current Vancomycin Regimen  Regimen: 1000 mg IV every 48 hours.  Start time: 13:22 on 12/15/2024  Exposure target: AUC24 (range)400-600 mg/L.hr   AUC24,ss: 313 mg/L.hr  Probability of AUC24 > 400: 24 %  Ctrough,ss: 7.7 mg/L  Probability of Ctrough,ss > 20: 3 %  Probability of nephrotoxicity (Lodise ROSSI ): 4 %    Current estimated CrCl = Estimated Creatinine Clearance: 32.6 mL/min (A) (based on SCr of 1.48 mg/dL (H)).    Creatinine for last 3 days  2024: 11:11 AM Creatinine 3.05 mg/dL  2024:  6:00 AM Creatinine 1.48 mg/dL    Recent Vancomycin Levels (past 3 days)  No results found for requested labs within last 3 days.    Vancomycin IV Administrations (past 72 hours)                     vancomycin (VANCOCIN) 1,250 mg in 0.9% NaCl 250 mL intermittent infusion (mg) 1,250 mg New Bag 24 1322                    Nephrotoxins and other renal medications (From now, onward)      Start     Dose/Rate Route Frequency Ordered Stop    12/15/24 1300  vancomycin (VANCOCIN) 1,000 mg in NaCl 0.9% 200 mL intermittent infusion         1,000 mg  over 60 Minutes Intravenous EVERY 48 HOURS 24 1525      24 1000  piperacillin-tazobactam (ZOSYN) 3.375 g vial to attach to  mL bag        Note to Pharmacy: For [facility], [facility] and [facility]: For Zosyn-naive patients, use the \"Zosyn initial dose + extended infusion\" order panel.    3.375 g  over 240 Minutes Intravenous EVERY 8 HOURS 24 0936                 Contrast Orders - past 72 hours (72h ago, onward)      None            Interpretation of levels and current regimen:  Vancomycin level is reflective of AUC less " than 400    Has serum creatinine changed greater than 50% in last 72 hours: Yes    Urine output:  good urine output    Renal Function: Improving    [employer] Prediction of Planned New Vancomycin Regimen  Regimen: 1000 mg IV every 24 hours.  Start time: 13:22 on 12/15/2024  Exposure target: AUC24 (range)400-600 mg/L.hr   AUC24,ss: 590 mg/L.hr  Probability of AUC24 > 400: 88 %  Ctrough,ss: 18.4 mg/L  Probability of Ctrough,ss > 20: 42 %  Probability of nephrotoxicity (Lodise ROSSI 2009): 15 %      Plan:  Increase Dose to 1000mg q 24 hours  Vancomycin monitoring method: AUC  Vancomycin therapeutic monitoring goal: 400-600 mg*h/L  Pharmacy will check vancomycin levels as appropriate in 1-3 Days.  Serum creatinine levels will be ordered daily for the first week of therapy and at least twice weekly for subsequent weeks.    Rosales Donis RP

## 2024-12-14 NOTE — PLAN OF CARE
VSS. Pt ready for discharge. All belongings sent with patient. IV removed. Discharge instructions reviewed with pt and all questions answered. Pt to transport home with spouse.

## 2024-12-14 NOTE — DISCHARGE SUMMARY
[facility]  Hospitalist Discharge Summary      Date of Admission:  12/13/2024  Date of Discharge:  12/14/2024  Discharging Provider: ANTONIO MCCORD MD  Discharge Service: Hospitalist Service    Discharge Diagnoses   Syncope/presyncope  Hypotension  Dehydration  Acute kidney injury on CKD  Hyponatremia  Cyclic vomiting syndrome  History of marijuana use disorder  Tobacco use disorder      Clinically Significant Risk Factors          Follow-ups Needed After Discharge   Follow-up Appointments       Follow-up and recommended labs and tests       Follow up with primary care provider, Priscila Lombardo, within 5 days for hospital follow- up.  The following labs/tests are recommended: CBC, BMP, magnesium, phosphorus.  Repeat TSH in 6 weeks.                Unresulted Labs Ordered in the Past 30 Days of this Admission       No orders found for last 31 day(s).            Discharge Disposition   Discharged to home  Condition at discharge: Good    Hospital Course   Nery Whitaker is a 59 year old female admitted on 12/13/2024.  She has a past medical history significant for lupus anticoagulant, peripheral vascular disease status post aortobifemoral bypass in 2006, right hydronephrosis status post right ureteral stent placement in October 2024, cyclic vomiting of unclear etiology with recurrent ED visit and hospitalization over the last few months.     Per patient, over the last 10 days she has been experiencing nausea, recurrent vomiting.  Minimal abdominal discomfort.  She denies any fever or chills.  She denies any chest pain or shortness of breath.  She reported very limited p.o. intake over the last week. Her nausea has stopped over the last few days, however, she has been feeling very lightheaded 2 days prior to this hospitalization and she had an episode of syncope/presyncope on 12/13/2024.  Patient's has been using marijuana for over 40 years.  Given her cyclic vomiting, she discussed with her  primary care physician and she stopped using marijuana around a week ago.  She smokes 1 pack of cigarettes per day for the last 40 years.  She denies any illicit drug use or alcohol use.     CT abdomen pelvis showed right ureteral stent without any significant changes.  CT head without acute abnormality.    Vitals on presentation: Afebrile, heart rate around 60, blood pressure 90/50, SpO2 100% on room air.  BMP significant for sodium of 131, creatinine 3 (baseline creatinine 1.3) anion gap 19, magnesium 2.7, troponin 40 and peaked, TSH mildly elevated at 4.76 with free T4 mildly elevated at 1.73.  Mild leukocytosis.     Patient received IV fluid with resolution of hypotension.  She was able to tolerate her diet.  No further episodes of syncope or presyncope.  GER resolving.  Hyponatremia resolved.  She is back to her baseline.  She is eager to go home.    History of cyclic vomiting  Nausea and vomiting  Cyclic vomiting over the last few months.  Evaluated by gastroenterology multiple times.  Etiology remains unclear possibly related to marijuana use disorder, other possible etiologies include gastritis and constipation.  Patient was already started on PPI and laxatives as an outpatient.  She stopped using marijuana around 1 week ago.  Nausea and vomiting stopped over the last 2 days.    Plan  Continue home PPI  Encouraged the patient to continue complete marijuana use cessation    Starvation ketoacidosis  High anion gap metabolic acidosis  Limited p.o. intake over the last week  Anion gap elevated at 19.  Improved with IV fluids and p.o. intake.  Lactic acid normal.    Plan  Follow-up with primary care physician in 3 to 5 days and repeat BMP, magnesium and phosphorus.    Presyncope/syncope  Heart murmur  Transthoracic cardiogram without major abnormalities.    Plan  No further intervention    Acute kidney injury on possible CKD stage IIIa.  Baseline creatinine around 1.2-1.3.  Creatinine presentation around 3.  GER  most likely prerenal due to dehydration.  Kidney function improved with IV fluid.    Plan  Educated about the importance of adequate hydration and p.o. intake.  Follow-up with primary care physician as an outpatient.    Hypertension  At home on lisinopril 20 mg daily.  Blood pressures currently around 140/60    Plan  Continue to hold lisinopril for the next 2 days, resume if blood pressure above 130/70.  Follow-up with primary care physician next week and repeat BMP    Hyponatremia    History of right ureteral stent  Urine tract infection considered to rule out  No symptoms suggestive of UTI, however, urinalysis on 12/12 and 12/13 suggestive of possible infection.  Initially on broad-spectrum antibiotic.  Urine culture with no growth.  No signs of UTI.    Plan  Discontinue antibiotic on discharge.    History of lupus anticoagulant  At home on Xarelto 20 mg daily    Plan  Continue home Xarelto    Abnormal thyroid hormones  TSH is mildly elevated along with mild elevation in T4.    Plan  Repeat TSH and T4 in 6 weeks.  Further evaluation as needed by primary care physician.    Cannabis use disorder with withdrawal  Patient reported that she has been using marijuana for the last 42 years, she stopped using around a week ago.  Some of her symptoms are consistent with cannabis withdrawal including increased anxiety, abdominal discomfort.  She was evaluated by her primary care physician and her gabapentin along with citalopram doses were increased to control alcohol withdrawal symptoms.  Patient has been taking her medications intermittently over the last week due to her nausea and vomiting.  She was also started on Ambien as needed, however, she did not started yet.    Plan  Continue home medications as per primary care physician instruction.  Follow-up with primary care physician next week.    Tobacco use disorder  Smokes 1 pack/day    Plan  Will send a prescription for nicotine patch  Educated about the importance of  tobacco cessation    Elevated high-sensitivity troponin  Troponin presentation around 40 and peaked.  EKG without ST segment changes.                         Consultations This Hospital Stay   VASCULAR ACCESS ADULT IP CONSULT  PHARMACY TO DOSE VANCO  CARE MANAGEMENT / SOCIAL WORK IP CONSULT  PHARMACY TO DOSE VANCO    Code Status   Full Code    Time Spent on this Encounter   I, ANTONIO MCCORD MD, personally saw the patient today and spent greater than 30 minutes discharging this patient.       ANTONIO MCCORD MD  [facility] EMERGENCY ROOM  [address] [place] [id number]  Phone: [phone]  Fax: [phone]  ______________________________________________________________________    Physical Exam   Vital Signs: Temp: 98.2  F (36.8  C) Temp src: Oral BP: (!) 145/63 Pulse: 64   Resp: 20 SpO2: 98 % O2 Device: None (Room air)    Weight: 109 lbs 11.2 oz  Physical Exam  Constitutional:       General: She is not in acute distress.     Appearance: She is not toxic-appearing.   Cardiovascular:      Rate and Rhythm: Normal rate.   Pulmonary:      Effort: Pulmonary effort is normal.   Abdominal:      General: There is no distension.      Palpations: Abdomen is soft.      Tenderness: There is no abdominal tenderness.   Skin:     General: Skin is warm and dry.   Neurological:      Mental Status: She is alert.   Psychiatric:         Mood and Affect: Mood normal.             Primary Care Physician   Priscila Lombardo    Discharge Orders      Reason for your hospital stay    Loss of consciousness. You had signs of significant dehydration on presentation.  Your kidney function was not at baseline also you had low sodium. You received IV fluid hydration with significant improvement of your symptoms.  Urinary tract infection ruled out.  You are able to tolerate her diet.     Follow-up and recommended labs and tests     Follow up with primary care provider, Priscila Lombardo, within 5 days for  hospital follow- up.  The following labs/tests are recommended: CBC, BMP, magnesium, phosphorus.  Repeat TSH in 6 weeks.     Activity    Your activity upon discharge: activity as tolerated     Diet    Follow this diet upon discharge: Current Diet:Orders Placed This Encounter      Regular Diet Adult       Significant Results and Procedures   Most Recent 3 CBC's:  Recent Labs   Lab Test 12/14/24  0600 12/13/24  1111 10/30/24  0918   WBC 9.9 12.3* 11.8*   HGB 10.6* 13.4 11.6*   MCV 89 90 87    303 267     Most Recent 3 BMP's:  Recent Labs   Lab Test 12/14/24  0600 12/13/24  1111 10/30/24  0918    131* 140   POTASSIUM 4.0 4.0 4.4   CHLORIDE 106 93* 109*   CO2 18* 19* 14*   BUN 27.7* 46.8* 27.2*   CR 1.48* 3.05* 1.38*   ANIONGAP 12 19* 17*   BEVERLY 8.2* 9.3 9.1   GLC 89 91 126*     Most Recent 3 Creatinines:  Recent Labs   Lab Test 12/14/24  0600 12/13/24  1111 10/30/24  0918   CR 1.48* 3.05* 1.38*   ,   Results for orders placed or performed during the hospital encounter of 12/13/24   POC US ECHO LIMITED    Narrative    Bladimir Clarke MD     12/13/2024  1:58 PM  POC US ECHO LIMITED    Date/Time: 12/13/2024 11:29 AM    Performed by: Bladimir Clarke MD  Authorized by: Bladimir Clarke MD    Procedure details:     Indications comment:  Hypotension  Comments:      [facility] protocol.  Technically difficult cardiac exam, EF appeared grossly   normal, IVC collapsible, no AAA, no definitive hydro on right, no free   fluid for quadrant abdomen, bilateral sliding lung signs.  Images saved   and uploaded   CT Head w/o Contrast    Narrative    EXAM: CT HEAD W/O CONTRAST  LOCATION: [facility]  DATE: 12/13/2024    INDICATION: syncope  COMPARISON: None.  TECHNIQUE: Routine CT Head without IV contrast. Multiplanar reformats. Dose reduction techniques were used.    FINDINGS:  INTRACRANIAL CONTENTS: No intracranial hemorrhage, extraaxial collection, or mass effect.  No CT evidence of acute infarct. Normal  parenchymal attenuation. Normal ventricles and sulci.     VISUALIZED ORBITS/SINUSES/MASTOIDS: No intraorbital abnormality. No paranasal sinus mucosal disease. No middle ear or mastoid effusion.    BONES/SOFT TISSUES: No acute abnormality.      Impression    IMPRESSION:  1.  No acute intracranial process.   CT Chest Abdomen Pelvis w/o Contrast    Narrative    EXAM: CT CHEST ABDOMEN PELVIS W/O CONTRAST  LOCATION: [facility]  DATE: 12/13/2024    INDICATION: syncope, new onset renal failure, vomiting  COMPARISON: CT AP 10/30/2024 and older studies, CT chest CAP 4/16/2018  TECHNIQUE: CT scan of the chest, abdomen, and pelvis was performed without IV contrast. Multiplanar reformats were obtained. Dose reduction techniques were used.   CONTRAST: None.    FINDINGS:   LUNGS AND PLEURA: Mild basilar cylindrical bronchiectasis. No bronchitis or endobronchial secretions. No hydropneumothorax. Small sliding hiatal hernia not seen today.    MEDIASTINUM/AXILLAE: No mediastinal hematoma. Atherosclerotic calcification of the arch and branch origins. No aneurysm. No adenopathy.    CORONARY ARTERY CALCIFICATION: Mild to moderate.    HEPATOBILIARY: Normal.    PANCREAS: Normal.    SPLEEN: Normal.    ADRENAL GLANDS: Normal.    KIDNEYS/BLADDER: Right double-J ureteral stent in place. No change in the distention of the proximal right ureter post stent placement. Stent again traverses between the stent. Native common iliac artery and the iliac component of the aortobifem graft.   Renal or ureteral calculi. No perinephric stranding. Left kidney and bladder are unremarkable.    BOWEL: No inflammatory changes. Bowel is of normal caliber. Scattered colonic diverticulosis.    LYMPH NODES: Normal.    VASCULATURE: Aortobifemoral grafting with abandoned distal aortic iliac stents in native vessels. Soft tissue stranding in the groove medially between the right common iliac M and abandoned, stented native vessel (axial  images 220-222) is continuing to   decrease    PELVIC ORGANS: No free fluid or adnexal masses.    MUSCULOSKELETAL: No acute fractures. Moderate spondylitic changes in the cervical spine. Coarse calcifications in the left breast, unchanged.      Impression    IMPRESSION:  1.  No abnormalities are seen to explain symptoms.  2.  Specifically, no evidence of traumatic injury to the chest, abdomen or pelvis.  3.  No evidence of bowel obstruction or any inflammatory changes.  4.  Right double-J ureteral stent in place with no change in the distention of the proximal right ureter as noted above. No calculi or perinephric stranding.  5.  The ureteral stent again passes between the right common iliac limb of the aortobifem bypass graft and the abandoned, stented native right common iliac artery. Soft tissue stranding medial to these 2 vessels continues to decrease since the September   exam.   6.  Scattered colonic diverticulosis.  7.  Other noncritical findings as noted above.   Echocardiogram Complete     Value    Biplane LVEF 65%    Narrative    [id number]  [id number]  [id number]  [id number]^FLEX^[place] [address]     Name: JENNIFER RAMIREZ  MRN: [id number]  : 1965  Study Date: 2024 08:09 AM  Age: 59 yrs  Gender: Female  Patient Location[facility]  Reason For Study: Syncope, Cardiac Murmur  Ordering Physician: ANTONIO MCCORD  Performed By: ACE     BSA: 1.5 m2  Height: 64 in  Weight: 109 lb  HR: 62  BP: 110/54 mmHg  ______________________________________________________________________________  Procedure  Complete Echo Adult. Adequate quality two-dimensional was performed and  interpreted. No hemodynamically significant valvular abnormalities on 2D or  color flow imaging. There is no comparison study available.  ______________________________________________________________________________  Interpretation Summary     The left ventricle is normal in size with normal  left ventricular wall  thickness. Biplane LVEF is 65%.  Normal right ventricle size and systolic function.  Normal left atrial size.  No hemodynamically significant valvular abnormalities on 2D or color flow  imaging.  There is no comparison study available.  ______________________________________________________________________________  Left Ventricle  The left ventricle is normal in size. There is normal left ventricular wall  thickness. Left ventricular diastolic function is normal. Biplane LVEF is 65%.  No regional wall motion abnormalities noted.     Right Ventricle  Normal right ventricle size and systolic function.     Atria  Normal left atrial size. Right atrial size is normal. There is no color  Doppler evidence of an atrial shunt.     Mitral Valve  Mitral valve leaflets appear normal. There is no evidence of mitral stenosis  or clinically significant mitral regurgitation.     Tricuspid Valve  Tricuspid valve leaflets appear normal. There is no evidence of tricuspid  stenosis or clinically significant tricuspid regurgitation. Right ventricular  systolic pressure could not be approximated due to inadequate tricuspid  regurgitation.     Aortic Valve  The aortic valve is trileaflet. Aortic valve leaflets appear normal. There is  no evidence of aortic stenosis or clinically significant aortic regurgitation.     Pulmonic Valve  The pulmonic valve is not well seen, but is grossly normal. This degree of  valvular regurgitation is within normal limits. There is trace pulmonic  valvular regurgitation.     Vessels  The aorta root is normal. IVC diameter <2.1 cm collapsing >50% with sniff  suggests a normal RA pressure of 3 mmHg.     Pericardium  There is no pericardial effusion.     Rhythm  Sinus rhythm was noted.  ______________________________________________________________________________  MMode/2D Measurements & Calculations     IVSd: 0.74 cm  LVIDd: 3.7 cm  LVIDs: 2.9 cm  LVPWd: 0.84 cm  FS: 21.6 %  LV  mass(C)d: 79.2 grams  LV mass(C)dI: 52.4 grams/m2  LA dimension: 3.2 cm  LVOT diam: 1.8 cm  LVOT area: 2.5 cm2  EF Biplane: 65.3 %  LA Volume (BP): 32.3 ml  LA Volume Index (BP): 21.4 ml/m2     LA Volume Indexed (AL/bp): 23.0 ml/m2  RV Base: 2.5 cm  RWT: 0.46  TAPSE: 1.8 cm     Time Measurements  MM HR: 59.0 BPM     Doppler Measurements & Calculations  MV E max manuel: 85.9 cm/sec  MV A max manuel: 81.2 cm/sec  MV E/A: 1.1     MV dec slope: 313.0 cm/sec2  MV dec time: 0.28 sec  Ao V2 max: 153.0 cm/sec  Ao max P.0 mmHg  Ao V2 mean: 95.4 cm/sec  Ao mean P.0 mmHg  Ao V2 VTI: 30.1 cm  SANDRA(I,D): 2.4 cm2  SANDRA(V,D): 2.5 cm2  LV V1 max P.8 mmHg  LV V1 max: 148.0 cm/sec  LV V1 VTI: 28.9 cm  SV(LVOT): 73.5 ml  SI(LVOT): 48.7 ml/m2  AV Manuel Ratio (DI): 0.97  SANDRA Index (cm2/m2): 1.6  E/E': 8.8  E/E' av.7  Lateral E/e': 8.8  Medial E/e': 10.7  Peak E' Manuel: 9.8 cm/sec  RV S Manuel: 10.9 cm/sec     ______________________________________________________________________________  Report approved by: Yoan Lake MD on 2024 11:29 AM             Discharge Medications   Current Discharge Medication List        START taking these medications    Details   nicotine (NICODERM CQ) 21 MG/24HR 24 hr patch Place 1 patch over 24 hours onto the skin daily for 14 days.  Qty: 14 patch, Refills: 0    Associated Diagnoses: Tobacco use disorder           CONTINUE these medications which have CHANGED    Details   lisinopril (ZESTRIL) 20 MG tablet Take 1 tablet (20 mg) by mouth daily.  Qty: 90 tablet, Refills: 3    Comments: Hold if blood pressure below 130/70  Associated Diagnoses: Essential hypertension           CONTINUE these medications which have NOT CHANGED    Details   buPROPion (WELLBUTRIN XL) 150 MG 24 hr tablet Take 1 tablet (150 mg) by mouth every morning.  Qty: 90 tablet, Refills: 3    Associated Diagnoses: Anxiety state; Moderate episode of recurrent major depressive disorder (H)      !! citalopram (CELEXA) 20 MG tablet Take  20 mg by mouth daily.      !! citalopram (CELEXA) 40 MG tablet Take 1 tablet (40 mg) by mouth daily.  Qty: 90 tablet, Refills: 0    Associated Diagnoses: Anxiety state      colestipol (COLESTID) 1 g tablet Take 1 g by mouth 2 times daily.      DENTA 5000 PLUS 1.1 % CREA Take by mouth daily.      folic acid (FOLVITE) 1 MG tablet Take 1 tablet by mouth once daily  Qty: 90 tablet, Refills: 2    Associated Diagnoses: Hypercoagulable state (H)      gabapentin (NEURONTIN) 100 MG capsule TAKE 2 CAPSULE BY MOUTH IN THE MORNING THEN  2  TABLET  AT  NOON  AND  3  TABLETS  AT  BEDTIME  DAILY  Qty: 630 capsule, Refills: 0      omeprazole (PRILOSEC) 40 MG DR capsule Take 40 mg by mouth daily.      ondansetron (ZOFRAN ODT) 4 MG ODT tab Take 1 tablet (4 mg) by mouth every 6 hours as needed for nausea.  Qty: 30 tablet, Refills: 0    Associated Diagnoses: Nausea and vomiting, unspecified vomiting type      prochlorperazine (COMPAZINE) 10 MG tablet Take 1 tablet (10 mg) by mouth every 6 hours as needed for nausea or vomiting.  Qty: 20 tablet, Refills: 0    Associated Diagnoses: Nausea      REPATHA SURECLICK 140 MG/ML prefilled autoinjector 140 mg every 14 days.      rivaroxaban ANTICOAGULANT (XARELTO ANTICOAGULANT) 20 MG TABS tablet Take 20 mg by mouth daily (with dinner).      senna-docusate (SENOKOT-S/PERICOLACE) 8.6-50 MG tablet Take 1-2 tablets by mouth 2 times daily.  Qty: 30 tablet, Refills: 0    Associated Diagnoses: Hydronephrosis with ureteral stricture, not elsewhere classified      zolpidem (AMBIEN) 5 MG tablet Take 1 tablet (5 mg) by mouth nightly as needed for sleep.  Qty: 30 tablet, Refills: 0    Associated Diagnoses: Insomnia due to medical condition       !! - Potential duplicate medications found. Please discuss with provider.        Allergies   Allergies   Allergen Reactions    Ezetimibe      constipation    Lovastatin Unknown     constipation    Simvastatin      Other reaction(s): Constipation  Intolerance, but  tolerated with re-challenge in Feb to April 2009

## 2024-12-16 ENCOUNTER — PATIENT OUTREACH (OUTPATIENT)
Dept: CARE COORDINATION | Facility: CLINIC | Age: 59
End: 2024-12-16
Payer: COMMERCIAL

## 2024-12-16 ENCOUNTER — MYC MEDICAL ADVICE (OUTPATIENT)
Dept: FAMILY MEDICINE | Facility: CLINIC | Age: 59
End: 2024-12-16
Payer: COMMERCIAL

## 2024-12-16 NOTE — PROGRESS NOTES
[facility]: Transitions of Care Outreach  Chief Complaint   Patient presents with    Clinic Care Coordination - Post Hospital       Most Recent Admission Date: 12/13/2024   Most Recent Admission Diagnosis:      Most Recent Discharge Date: 12/14/2024   Most Recent Discharge Diagnosis: Syncope, unspecified syncope type - R55  Transient hypotension - I95.9  Acute renal failure superimposed on chronic kidney disease, unspecified acute renal failure type, unspecified CKD stage (H) - N17.9, N18.9  Nausea and vomiting, unspecified vomiting type - R11.2  Elevated serum free T4 level - R79.89  Essential hypertension - I10  Tobacco use disorder - F17.200     Transitions of Care Assessment    Discharge Assessment  How are you doing now that you are home?: I'm doing really good  How are your symptoms? (Red Flag symptoms escalate to triage hotline per guidelines): Improved  Do you know how to contact your clinic care team if you have future questions or changes to your health status? : Yes  Does the patient have their discharge instructions? : Yes  Does the patient have questions regarding their discharge instructions? : No  Were you started on any new medications or were there changes to any of your previous medications? : Yes  Does the patient have all of their medications?: Yes  Do you have questions regarding any of your medications? : No  Do you have all of your needed medical supplies or equipment (DME)?  (i.e. oxygen tank, CPAP, cane, etc.): Yes              [facility] Explained and offered Care Coordination support to eligible patients: Yes    Patient accepted? No    Follow up Plan     Discharge Follow-Up  Discharge follow up appointment scheduled in alignment with recommended follow up timeframe or Transitions of Risk Category? (Low = within 30 days; Moderate= within 14 days; High= within 7 days): Yes  Discharge Follow Up Appointment Date: 12/17/24  Discharge Follow Up Appointment Scheduled with?:  Primary Care Provider    Future Appointments   Date Time Provider Department Center   12/17/2024  1:40 PM Priscila Lombardo APRN CNP [facility]   12/24/2024 10:00 AM [id number] GURVINDER [facility]   12/27/2024 11:40 AM MICCT1 JNCTIC [facility] IMG   1/17/2025 10:40 AM Priscila Lombardo APRN CNP [facility]   7/11/2025  9:00 AM [facility]       Outpatient Plan as outlined on AVS reviewed with patient.    For any urgent concerns, please contact our 24 hour nurse triage line: [phone] ([phone])       Jordana Vicente  Community Health Worker  [facility]  [phone]

## 2024-12-17 ENCOUNTER — OFFICE VISIT (OUTPATIENT)
Dept: FAMILY MEDICINE | Facility: CLINIC | Age: 59
End: 2024-12-17
Payer: COMMERCIAL

## 2024-12-17 VITALS
OXYGEN SATURATION: 98 % | RESPIRATION RATE: 12 BRPM | HEART RATE: 71 BPM | DIASTOLIC BLOOD PRESSURE: 63 MMHG | TEMPERATURE: 98.9 F | BODY MASS INDEX: 20 KG/M2 | HEIGHT: 64 IN | SYSTOLIC BLOOD PRESSURE: 117 MMHG | WEIGHT: 117.13 LBS

## 2024-12-17 DIAGNOSIS — I10 ESSENTIAL HYPERTENSION: ICD-10-CM

## 2024-12-17 DIAGNOSIS — R79.89 ELEVATED SERUM FREE T4 LEVEL: ICD-10-CM

## 2024-12-17 DIAGNOSIS — F41.1 ANXIETY STATE: ICD-10-CM

## 2024-12-17 DIAGNOSIS — N18.9 ACUTE RENAL FAILURE SUPERIMPOSED ON CHRONIC KIDNEY DISEASE, UNSPECIFIED ACUTE RENAL FAILURE TYPE, UNSPECIFIED CKD STAGE (H): ICD-10-CM

## 2024-12-17 DIAGNOSIS — N13.1 HYDRONEPHROSIS WITH URETERAL STRICTURE, NOT ELSEWHERE CLASSIFIED: ICD-10-CM

## 2024-12-17 DIAGNOSIS — N17.9 ACUTE RENAL FAILURE SUPERIMPOSED ON CHRONIC KIDNEY DISEASE, UNSPECIFIED ACUTE RENAL FAILURE TYPE, UNSPECIFIED CKD STAGE (H): ICD-10-CM

## 2024-12-17 DIAGNOSIS — Z09 HOSPITAL DISCHARGE FOLLOW-UP: Primary | ICD-10-CM

## 2024-12-17 DIAGNOSIS — F12.13 CANNABIS ABUSE WITH WITHDRAWAL (H): ICD-10-CM

## 2024-12-17 LAB
ERYTHROCYTE [DISTWIDTH] IN BLOOD BY AUTOMATED COUNT: 14.7 % (ref 10–15)
HCT VFR BLD AUTO: 28.6 % (ref 35–47)
HGB BLD-MCNC: 9.4 G/DL (ref 11.7–15.7)
MCH RBC QN AUTO: 31.4 PG (ref 26.5–33)
MCHC RBC AUTO-ENTMCNC: 32.9 G/DL (ref 31.5–36.5)
MCV RBC AUTO: 96 FL (ref 78–100)
PLATELET # BLD AUTO: 217 10E3/UL (ref 150–450)
RBC # BLD AUTO: 2.99 10E6/UL (ref 3.8–5.2)
WBC # BLD AUTO: 10.7 10E3/UL (ref 4–11)

## 2024-12-17 PROCEDURE — 85027 COMPLETE CBC AUTOMATED: CPT

## 2024-12-17 PROCEDURE — 84100 ASSAY OF PHOSPHORUS: CPT

## 2024-12-17 PROCEDURE — 83735 ASSAY OF MAGNESIUM: CPT

## 2024-12-17 PROCEDURE — 99214 OFFICE O/P EST MOD 30 MIN: CPT

## 2024-12-17 PROCEDURE — 80048 BASIC METABOLIC PNL TOTAL CA: CPT

## 2024-12-17 PROCEDURE — 36415 COLL VENOUS BLD VENIPUNCTURE: CPT

## 2024-12-17 NOTE — ASSESSMENT & PLAN NOTE
Patient has struggled with fairly consistent nausea and vomiting over the last 6 or so months. She has been followed by gastroenterology and most recently saw one of my partners in clinic after feeling comfortable disclosing that she has also been using cannabis products daily for many decades. She read about cannabis induced hyperemesis and notes it seems to fit her clinical picture, so she then stopped cannabis products which prompted her most recent ER visit and observation. She notes today that with her continued sobriety from cannabis products, her vomiting and nausea have significantly improved. She expresses motivation to continue to abstain from these products and I have congratulated her on these efforts thusfar. Encouraged her to follow up with any additional needs.

## 2024-12-17 NOTE — ASSESSMENT & PLAN NOTE
Patient was recently seen in the local ER with complaints of nausea, recurrent vomiting and an episode of syncope with profound hypotension. Imaging of the abdomen and head were without abnormalities. Labs were significant for GER, hyponatremia, abnormal thyroid studies and elevated troponin. She was admitted to observation, given fluids and had improvement in both her symptoms and lab work prior to discharge. Today, she feels quite well. Will update all necessary labs per orders but anticipate normalization given her ability to maintain PO intake. See plan documented below for specific.    Orders:    CBC with platelets; Future    Magnesium; Future    Phosphorus; Future    Basic metabolic panel  (Ca, Cl, CO2, Creat, Gluc, K, Na, BUN); Future    TSH with free T4 reflex; Future    CBC with platelets    Magnesium    Phosphorus    Basic metabolic panel  (Ca, Cl, CO2, Creat, Gluc, K, Na, BUN)

## 2024-12-17 NOTE — ASSESSMENT & PLAN NOTE
"Subjective:      Donta William is a 14 m.o. female here with mother. Patient brought in for Allergic Reaction      History of Present Illness:  HPI  She broke out in hives last week, she had raised welps and skin that felt "like [place] skin".  This has happening almost every day.  She was eating oatmeal everyday and since mom stopped the oatmeal she has not had this as often.  This also makes her eczema worse.  No fever.  PO intake nml.  Nml UOP.  Mom puts her eczema cream on it.  Mom has not given allergy medicine for this.      Review of Systems   Constitutional: Negative for activity change, appetite change, fever and irritability.   HENT: Negative for congestion, ear pain, rhinorrhea and sore throat.    Respiratory: Negative for cough and wheezing.    Gastrointestinal: Negative for diarrhea and vomiting.   Genitourinary: Negative for decreased urine volume.   Skin: Positive for rash.       Objective:     Physical Exam   Constitutional: She appears well-developed and well-nourished. She is active.   HENT:   Right Ear: Tympanic membrane normal. No middle ear effusion.   Left Ear: Tympanic membrane normal.  No middle ear effusion.   Nose: Nose normal. No nasal discharge.   Mouth/Throat: Mucous membranes are moist. Oropharynx is clear.   Eyes: Pupils are equal, round, and reactive to light. Conjunctivae are normal. Right eye exhibits no discharge. Left eye exhibits no discharge.   Neck: Neck supple. No neck adenopathy.   Cardiovascular: Normal rate, regular rhythm, S1 normal and S2 normal.   No murmur heard.  Pulmonary/Chest: Effort normal and breath sounds normal. No respiratory distress. She has no wheezes.   Abdominal: Soft. Bowel sounds are normal. She exhibits no distension and no mass. There is no hepatosplenomegaly. There is no tenderness.   Neurological: She is alert.   Skin: No rash noted.   Nursing note and vitals reviewed.      Assessment:   Donta was seen today for allergic reaction.    Diagnoses and all " With recent hospitalization and extreme dehydration secondary to hyperemesis, patient's creatinine increased to >3 (baseline approximately 1.1). These improved with IV fluids while she was observed. Will update these labs today to ensure they are continuing to improve.        orders for this visit:    Allergic reaction, initial encounter          Plan:   Food diary to see if can link hives to any particular food  Zyrtec or claritin prn hives    Supportive care  Call or return if symptoms persist or worsen.  [facility] on Call.

## 2024-12-17 NOTE — ASSESSMENT & PLAN NOTE
Overall stable. When she was in acute withdrawal and seen by one of my partners she had her citalopram increased however she has not started the higher dose at this time. She is not interested in continuing with the Wellbutrin which we had started back in November as she notes significant dry mouth and other symptoms. We discussed pursing the higher citalopram dose and discontinuing Wellbutrin with a plan to follow up on symptoms in the next 4 weeks.

## 2024-12-17 NOTE — ASSESSMENT & PLAN NOTE
Patient had recent visit with urology at the end of November and it appears the plan was to pursue a surgical intervention for flap/possible reconstruction of the ureter. However, since she has abstained from cannabis her symptoms that she was hoping to address with this procedure have essentially resolved and she's unsure if she wants to pursue the surgical intervention. I encouraged her to reach out to her specialty team with an update regarding her symptoms and inquire if they believe she should still have this procedure completed.

## 2024-12-17 NOTE — ASSESSMENT & PLAN NOTE
Lisinopril was held with recent hospitalization, hypotension and GER. She has not resumed at this point and her blood pressure remains acceptable. Assuming kidney function normalizes, will have her monitor BP at home over the coming weeks and we will resume if averaging >130/80.

## 2024-12-17 NOTE — ASSESSMENT & PLAN NOTE
With most recent hospitalization, TSH was drawn revealing mildly elevated TSH (4.76) as well as mildly elevated T4 (1.73). We will plan to update these labs in about 6 weeks and orders were placed today.

## 2024-12-17 NOTE — PROGRESS NOTES
Assessment & Plan   Assessment & Plan  Hospital discharge follow-up  Patient was recently seen in the local ER with complaints of nausea, recurrent vomiting and an episode of syncope with profound hypotension. Imaging of the abdomen and head were without abnormalities. Labs were significant for GER, hyponatremia, abnormal thyroid studies and elevated troponin. She was admitted to observation, given fluids and had improvement in both her symptoms and lab work prior to discharge. Today, she feels quite well. Will update all necessary labs per orders but anticipate normalization given her ability to maintain PO intake. See plan documented below for specific.    Orders:    CBC with platelets; Future    Magnesium; Future    Phosphorus; Future    Basic metabolic panel  (Ca, Cl, CO2, Creat, Gluc, K, Na, BUN); Future    TSH with free T4 reflex; Future    CBC with platelets    Magnesium    Phosphorus    Basic metabolic panel  (Ca, Cl, CO2, Creat, Gluc, K, Na, BUN)    Acute renal failure superimposed on chronic kidney disease, unspecified acute renal failure type, unspecified CKD stage (H)  With recent hospitalization and extreme dehydration secondary to hyperemesis, patient's creatinine increased to >3 (baseline approximately 1.1). These improved with IV fluids while she was observed. Will update these labs today to ensure they are continuing to improve.       Cannabis abuse with withdrawal (H)  Patient has struggled with fairly consistent nausea and vomiting over the last 6 or so months. She has been followed by gastroenterology and most recently saw one of my partners in clinic after feeling comfortable disclosing that she has also been using cannabis products daily for many decades. She read about cannabis induced hyperemesis and notes it seems to fit her clinical picture, so she then stopped cannabis products which prompted her most recent ER visit and observation. She notes today that with her continued sobriety from  cannabis products, her vomiting and nausea have significantly improved. She expresses motivation to continue to abstain from these products and I have congratulated her on these efforts thusfar. Encouraged her to follow up with any additional needs.        Elevated serum free T4 level  With most recent hospitalization, TSH was drawn revealing mildly elevated TSH (4.76) as well as mildly elevated T4 (1.73). We will plan to update these labs in about 6 weeks and orders were placed today.          Essential hypertension  Lisinopril was held with recent hospitalization, hypotension and GER. She has not resumed at this point and her blood pressure remains acceptable. Assuming kidney function normalizes, will have her monitor BP at home over the coming weeks and we will resume if averaging >130/80.         Hydronephrosis with ureteral stricture, not elsewhere classified  Patient had recent visit with urology at the end of November and it appears the plan was to pursue a surgical intervention for flap/possible reconstruction of the ureter. However, since she has abstained from cannabis her symptoms that she was hoping to address with this procedure have essentially resolved and she's unsure if she wants to pursue the surgical intervention. I encouraged her to reach out to her specialty team with an update regarding her symptoms and inquire if they believe she should still have this procedure completed.        Anxiety state  Overall stable. When she was in acute withdrawal and seen by one of my partners she had her citalopram increased however she has not started the higher dose at this time. She is not interested in continuing with the Wellbutrin which we had started back in November as she notes significant dry mouth and other symptoms. We discussed pursing the higher citalopram dose and discontinuing Wellbutrin with a plan to follow up on symptoms in the next 4 weeks.           MED REC REQUIRED  Post Medication  "Reconciliation Status:  Discharge medications reconciled and changed, see notes/orders    Subjective   Nery is a 59 year old, presenting for the following health issues:  Follow Up ([facility] admit for passing out and low BP 12/13 to [facility] 12/14/24. Feeling good.)        12/17/2024     1:21 PM   Additional Questions   Roomed by sac   Accompanied by self         12/17/2024     1:21 PM   Patient Reported Additional Medications   Patient reports taking the following new medications no     HPI        Hospital Follow-up Visit:    Hospital/Nursing Home/IP Rehab Facility: [facility]  Date of Admission: 12/13/24  Date of Discharge: 12/14/24  Reason(s) for Admission: Syncope  Was the patient in the ICU or did the patient experience delirium during hospitalization?  No  Do you have any other stressors you would like to discuss with your provider? No    Problems taking medications regularly:  Has been sick  Medication changes since discharge: None  Problems adhering to non-medication therapy:  None    Summary of hospitalization:  [facility] discharge summary reviewed  Diagnostic Tests/Treatments reviewed.  Follow up needed: Labs per orders  Other Healthcare Providers Involved in Patient s Care:         None  Update since discharge: improved. Nausea has entirely resolved. She feels better than she has in many months. She has no specific concerns today.      Plan of care communicated with patient             Objective    /63 (BP Location: Right arm, Patient Position: Sitting, Cuff Size: Adult Regular)   Pulse 71   Temp 98.9  F (37.2  C) (Oral)   Resp 12   Ht 1.626 m (5' 4\")   Wt 53.1 kg (117 lb 2 oz)   LMP  (LMP Unknown)   SpO2 98%   BMI 20.10 kg/m    Body mass index is 20.1 kg/m .    Physical Exam  Vitals and nursing note reviewed.   Constitutional:       General: She is not in acute distress.     Appearance: Normal appearance.   Cardiovascular:      Rate and Rhythm: " Normal rate and regular rhythm.   Pulmonary:      Effort: Pulmonary effort is normal. No respiratory distress.   Neurological:      Mental Status: She is alert.   Psychiatric:         Mood and Affect: Mood normal.         Behavior: Behavior normal.         Thought Content: Thought content normal.        No results found for this or any previous visit (from the past 24 hours).        Signed Electronically by: GERALD Crandall CNP

## 2024-12-18 DIAGNOSIS — N17.9 AKI (ACUTE KIDNEY INJURY) (H): Primary | ICD-10-CM

## 2024-12-18 DIAGNOSIS — D64.9 LOW HEMOGLOBIN: ICD-10-CM

## 2024-12-18 LAB
ANION GAP SERPL CALCULATED.3IONS-SCNC: 10 MMOL/L (ref 7–15)
BUN SERPL-MCNC: 10.4 MG/DL (ref 6–20)
CALCIUM SERPL-MCNC: 8.4 MG/DL (ref 8.8–10.4)
CHLORIDE SERPL-SCNC: 109 MMOL/L (ref 98–107)
CREAT SERPL-MCNC: 0.94 MG/DL (ref 0.51–0.95)
EGFRCR SERPLBLD CKD-EPI 2021: 70 ML/MIN/1.73M2
GLUCOSE SERPL-MCNC: 98 MG/DL (ref 70–99)
HCO3 SERPL-SCNC: 22 MMOL/L (ref 22–29)
MAGNESIUM SERPL-MCNC: 1.6 MG/DL (ref 1.7–2.3)
PHOSPHATE SERPL-MCNC: 2.5 MG/DL (ref 2.5–4.5)
POTASSIUM SERPL-SCNC: 4.2 MMOL/L (ref 3.4–5.3)
SODIUM SERPL-SCNC: 141 MMOL/L (ref 135–145)

## 2024-12-19 ENCOUNTER — HOSPITAL ENCOUNTER (OUTPATIENT)
Facility: CLINIC | Age: 59
End: 2024-12-19
Attending: UROLOGY | Admitting: UROLOGY
Payer: COMMERCIAL

## 2024-12-27 DIAGNOSIS — R10.13 EPIGASTRIC ABDOMINAL PAIN: Primary | ICD-10-CM

## 2024-12-28 RX ORDER — OMEPRAZOLE 40 MG/1
40 CAPSULE, DELAYED RELEASE ORAL DAILY
Qty: 90 CAPSULE | Refills: 1 | Status: SHIPPED | OUTPATIENT
Start: 2024-12-28

## 2024-12-31 ENCOUNTER — TELEPHONE (OUTPATIENT)
Dept: UROLOGY | Facility: CLINIC | Age: 59
End: 2024-12-31

## 2024-12-31 ENCOUNTER — LAB (OUTPATIENT)
Dept: LAB | Facility: CLINIC | Age: 59
End: 2024-12-31
Payer: COMMERCIAL

## 2024-12-31 DIAGNOSIS — N17.9 AKI (ACUTE KIDNEY INJURY) (H): ICD-10-CM

## 2024-12-31 DIAGNOSIS — D64.9 LOW HEMOGLOBIN: ICD-10-CM

## 2024-12-31 DIAGNOSIS — N18.9 ACUTE RENAL FAILURE SUPERIMPOSED ON CHRONIC KIDNEY DISEASE, UNSPECIFIED ACUTE RENAL FAILURE TYPE, UNSPECIFIED CKD STAGE (H): Primary | ICD-10-CM

## 2024-12-31 DIAGNOSIS — N17.9 ACUTE RENAL FAILURE SUPERIMPOSED ON CHRONIC KIDNEY DISEASE, UNSPECIFIED ACUTE RENAL FAILURE TYPE, UNSPECIFIED CKD STAGE (H): Primary | ICD-10-CM

## 2024-12-31 LAB
ANION GAP SERPL CALCULATED.3IONS-SCNC: 11 MMOL/L (ref 7–15)
BUN SERPL-MCNC: 12.4 MG/DL (ref 6–20)
CALCIUM SERPL-MCNC: 9.1 MG/DL (ref 8.8–10.4)
CHLORIDE SERPL-SCNC: 106 MMOL/L (ref 98–107)
CHOLEST SERPL-MCNC: 201 MG/DL
CREAT SERPL-MCNC: 0.85 MG/DL (ref 0.51–0.95)
CREAT UR-MCNC: 101 MG/DL
EGFRCR SERPLBLD CKD-EPI 2021: 79 ML/MIN/1.73M2
ERYTHROCYTE [DISTWIDTH] IN BLOOD BY AUTOMATED COUNT: 15.4 % (ref 10–15)
FASTING STATUS PATIENT QL REPORTED: ABNORMAL
FASTING STATUS PATIENT QL REPORTED: NORMAL
FERRITIN SERPL-MCNC: 60 NG/ML (ref 11–328)
GLUCOSE SERPL-MCNC: 81 MG/DL (ref 70–99)
HCO3 SERPL-SCNC: 22 MMOL/L (ref 22–29)
HCT VFR BLD AUTO: 30.4 % (ref 35–47)
HDLC SERPL-MCNC: 40 MG/DL
HGB BLD-MCNC: 10 G/DL (ref 11.7–15.7)
HOLD SPECIMEN: NORMAL
IRON BINDING CAPACITY (ROCHE): 275 UG/DL (ref 240–430)
IRON SATN MFR SERPL: 23 % (ref 15–46)
IRON SERPL-MCNC: 62 UG/DL (ref 37–145)
LDLC SERPL CALC-MCNC: 134 MG/DL
MAGNESIUM SERPL-MCNC: 2 MG/DL (ref 1.7–2.3)
MCH RBC QN AUTO: 31.2 PG (ref 26.5–33)
MCHC RBC AUTO-ENTMCNC: 32.9 G/DL (ref 31.5–36.5)
MCV RBC AUTO: 95 FL (ref 78–100)
MICROALBUMIN UR-MCNC: 651 MG/L
MICROALBUMIN/CREAT UR: 644.55 MG/G CR (ref 0–25)
NONHDLC SERPL-MCNC: 161 MG/DL
PLATELET # BLD AUTO: 271 10E3/UL (ref 150–450)
POTASSIUM SERPL-SCNC: 4.5 MMOL/L (ref 3.4–5.3)
RBC # BLD AUTO: 3.21 10E6/UL (ref 3.8–5.2)
SODIUM SERPL-SCNC: 139 MMOL/L (ref 135–145)
TRIGL SERPL-MCNC: 136 MG/DL
WBC # BLD AUTO: 9.9 10E3/UL (ref 4–11)

## 2024-12-31 PROCEDURE — 82728 ASSAY OF FERRITIN: CPT

## 2024-12-31 PROCEDURE — 80048 BASIC METABOLIC PNL TOTAL CA: CPT

## 2024-12-31 PROCEDURE — 83735 ASSAY OF MAGNESIUM: CPT

## 2024-12-31 PROCEDURE — 82570 ASSAY OF URINE CREATININE: CPT

## 2024-12-31 PROCEDURE — 80061 LIPID PANEL: CPT

## 2024-12-31 PROCEDURE — 85027 COMPLETE CBC AUTOMATED: CPT

## 2024-12-31 PROCEDURE — 83550 IRON BINDING TEST: CPT

## 2024-12-31 PROCEDURE — 82043 UR ALBUMIN QUANTITATIVE: CPT

## 2024-12-31 PROCEDURE — 83540 ASSAY OF IRON: CPT

## 2024-12-31 PROCEDURE — 36415 COLL VENOUS BLD VENIPUNCTURE: CPT

## 2025-01-01 DIAGNOSIS — R80.9 MICROALBUMINURIA: Primary | ICD-10-CM

## 2025-02-08 DIAGNOSIS — N13.5 STRICTURE OF RIGHT URETER: Primary | ICD-10-CM

## 2025-02-08 RX ORDER — ACETAMINOPHEN 325 MG/1
975 TABLET ORAL ONCE
OUTPATIENT
Start: 2025-02-08 | End: 2025-02-08

## 2025-02-12 DIAGNOSIS — N13.1 HYDRONEPHROSIS WITH URETERAL STRICTURE, NOT ELSEWHERE CLASSIFIED: ICD-10-CM

## 2025-02-12 DIAGNOSIS — N13.1 HYDRONEPHROSIS WITH URETERAL STRICTURE, NOT ELSEWHERE CLASSIFIED: Primary | ICD-10-CM

## 2025-02-12 NOTE — PROGRESS NOTES
Called and spoke with Nery about IR orders for stent exchange after conversation with provider. Let her know the orders were in and to expect a call soon. No further questions.     Marybel Verma  RN Urology  Phone: [phone]

## 2025-02-12 NOTE — CONSULTS
Outpatient IR Referral  02/12/25    Referring Provider: Dr. Boyle   IR Referral Request: R ureteral stent exchanges   Recommendations/Plan:  Urology placed ureteral stent. IR defers to Urology to manage.     IR recommendations also relayed Epic messaging.    IR referral converted to consult note.     Brief History:    Nery Whitaker is a 59 year old female with history of r ureteral stent. 10/22/24.     Pertinent Medications:    Current Outpatient Medications   Medication Sig Dispense Refill    citalopram (CELEXA) 40 MG tablet Take 1 tablet (40 mg) by mouth daily. 90 tablet 0    colestipol (COLESTID) 1 g tablet Take 1 g by mouth 2 times daily.      DENTA 5000 PLUS 1.1 % CREA Take by mouth daily.      folic acid (FOLVITE) 1 MG tablet Take 1 tablet by mouth once daily 90 tablet 2    gabapentin (NEURONTIN) 100 MG capsule TAKE 2 CAPSULE BY MOUTH IN THE MORNING THEN  2  TABLET  AT  NOON  AND  3  TABLETS  AT  BEDTIME  DAILY 630 capsule 0    lisinopril (ZESTRIL) 20 MG tablet Take 1 tablet (20 mg) by mouth daily. 90 tablet 3    omeprazole (PRILOSEC) 40 MG DR capsule Take 1 capsule by mouth once daily 90 capsule 1    ondansetron (ZOFRAN ODT) 4 MG ODT tab Take 1 tablet (4 mg) by mouth every 6 hours as needed for nausea. 30 tablet 0    prochlorperazine (COMPAZINE) 10 MG tablet Take 1 tablet (10 mg) by mouth every 6 hours as needed for nausea or vomiting. 20 tablet 0    REPATHA SURECLICK 140 MG/ML prefilled autoinjector 140 mg every 14 days.      rivaroxaban ANTICOAGULANT (XARELTO ANTICOAGULANT) 20 MG TABS tablet Take 20 mg by mouth daily (with dinner).      senna-docusate (SENOKOT-S/PERICOLACE) 8.6-50 MG tablet Take 1-2 tablets by mouth 2 times daily. 30 tablet 0    zolpidem (AMBIEN) 5 MG tablet Take 1 tablet (5 mg) by mouth nightly as needed for sleep. 30 tablet 0     No current facility-administered medications for this visit.       Pertinent Imaging Reviewed:  CT 12/13/24    Most Recent Labs:  Lab Results   Component  Value Date    WBC 9.9 12/31/2024     Lab Results   Component Value Date    RBC 3.21 12/31/2024     Lab Results   Component Value Date    HGB 10.0 12/31/2024     Lab Results   Component Value Date    HCT 30.4 12/31/2024     Lab Results   Component Value Date     12/31/2024    Last Comprehensive Metabolic Panel:  Lab Results   Component Value Date     12/31/2024    POTASSIUM 4.5 12/31/2024    CHLORIDE 106 12/31/2024    CO2 22 12/31/2024    ANIONGAP 11 12/31/2024    GLC 81 12/31/2024    BUN 12.4 12/31/2024    CR 0.85 12/31/2024    GFRESTIMATED 79 12/31/2024    BEVERLY 9.1 12/31/2024      INR   Date Value Ref Range Status   10/30/2024 2.21 (H) 0.85 - 1.15 Final              GERALD Delgadillo CNP  Interventional Radiology   [phone] (IR RN triage)

## 2025-02-18 ENCOUNTER — TELEPHONE (OUTPATIENT)
Dept: UROLOGY | Facility: CLINIC | Age: 60
End: 2025-02-18
Payer: COMMERCIAL

## 2025-02-18 DIAGNOSIS — N13.1 HYDRONEPHROSIS WITH URETERAL STRICTURE, NOT ELSEWHERE CLASSIFIED: Primary | ICD-10-CM

## 2025-02-18 DIAGNOSIS — R39.89 SUSPECTED UTI: ICD-10-CM

## 2025-02-18 PROBLEM — N13.5: Status: ACTIVE | Noted: 2025-02-08

## 2025-02-18 NOTE — TELEPHONE ENCOUNTER
FUTURE VISIT INFORMATION      SURGERY INFORMATION:  Date: 25  Location: [facility] OR  Surgeon:  Jayden Boyle MD   Anesthesia Type:  MAC  Procedure: CYSTOSCOPY, WITH RETROGRADE PYELOGRAM AND URETERAL STENT REPLACEMENT     RECORDS REQUESTED FROM:       Primary Care Provider: Priscila Lombardo APRN [facility]    Pertinent Medical History: Bilateral carotid artery disease, hypertension, PAD, heart murmur    Most recent EKG+ Tracin24    Most recent ECHO: 24    Most recent PFT's: 10/6/21

## 2025-02-18 NOTE — TELEPHONE ENCOUNTER
Called patient to schedule surgery with Dr. Boyle     Spoke with: Nery (patient)     Date of Surgery: 2/24/2025     Approximate surgery arrival time given:  Yes - likely around 1200pm, she will work out transportation /son as patients  has a procedure on Monday as well.     Location of surgery: [facility]     Pre-Op H&P: PAC virtual 2/19/2025 at 245pm       Discussed with patient PAC RN will provide arrival time and instructions for surgery at the time of the appointment: [[place] locations only]: Yes    Packet sent out: Yes 02/18/25  via [employer] Message    Additional Comments:  All patients questions were answered and was instructed to review surgical packet and call back with any questions or concerns.       Jayda Leyva on 2/18/2025 at 11:46 AM

## 2025-02-19 ENCOUNTER — LAB (OUTPATIENT)
Dept: LAB | Facility: CLINIC | Age: 60
End: 2025-02-19
Payer: COMMERCIAL

## 2025-02-19 ENCOUNTER — ANESTHESIA EVENT (OUTPATIENT)
Dept: SURGERY | Facility: AMBULATORY SURGERY CENTER | Age: 60
End: 2025-02-19
Payer: COMMERCIAL

## 2025-02-19 ENCOUNTER — VIRTUAL VISIT (OUTPATIENT)
Dept: SURGERY | Facility: CLINIC | Age: 60
End: 2025-02-19
Payer: COMMERCIAL

## 2025-02-19 ENCOUNTER — PRE VISIT (OUTPATIENT)
Dept: SURGERY | Facility: CLINIC | Age: 60
End: 2025-02-19

## 2025-02-19 VITALS — HEIGHT: 65 IN | BODY MASS INDEX: 19.16 KG/M2 | WEIGHT: 115 LBS

## 2025-02-19 DIAGNOSIS — R39.89 SUSPECTED UTI: ICD-10-CM

## 2025-02-19 DIAGNOSIS — N13.1 HYDRONEPHROSIS WITH URETERAL STRICTURE, NOT ELSEWHERE CLASSIFIED: ICD-10-CM

## 2025-02-19 DIAGNOSIS — Z01.818 PRE-OP EVALUATION: Primary | ICD-10-CM

## 2025-02-19 LAB
ALBUMIN UR-MCNC: >=300 MG/DL
APPEARANCE UR: ABNORMAL
BACTERIA #/AREA URNS HPF: ABNORMAL /HPF
BILIRUB UR QL STRIP: ABNORMAL
COLOR UR AUTO: YELLOW
GLUCOSE UR STRIP-MCNC: NEGATIVE MG/DL
GRAN CASTS #/AREA URNS LPF: ABNORMAL /LPF
HGB UR QL STRIP: ABNORMAL
HYALINE CASTS #/AREA URNS LPF: ABNORMAL /LPF
KETONES UR STRIP-MCNC: ABNORMAL MG/DL
LEUKOCYTE ESTERASE UR QL STRIP: ABNORMAL
MUCOUS THREADS #/AREA URNS LPF: PRESENT /LPF
NITRATE UR QL: NEGATIVE
PH UR STRIP: 6.5 [PH] (ref 5–8)
RBC #/AREA URNS AUTO: ABNORMAL /HPF
RENAL EPI CELLS #/AREA URNS HPF: ABNORMAL /HPF
SP GR UR STRIP: 1.02 (ref 1–1.03)
SQUAMOUS #/AREA URNS AUTO: ABNORMAL /LPF
UROBILINOGEN UR STRIP-ACNC: 0.2 E.U./DL
WBC #/AREA URNS AUTO: ABNORMAL /HPF

## 2025-02-19 PROCEDURE — 81001 URINALYSIS AUTO W/SCOPE: CPT

## 2025-02-19 ASSESSMENT — COPD QUESTIONNAIRES: COPD: 1

## 2025-02-19 ASSESSMENT — ENCOUNTER SYMPTOMS: SEIZURES: 0

## 2025-02-19 ASSESSMENT — LIFESTYLE VARIABLES: TOBACCO_USE: 1

## 2025-02-19 ASSESSMENT — PAIN SCALES - GENERAL: PAINLEVEL_OUTOF10: MODERATE PAIN (5)

## 2025-02-19 NOTE — H&P
Pre-Operative H & P     CC:  Preoperative exam to assess for increased cardiopulmonary risk while undergoing surgery and anesthesia.    Date of Encounter: 2/19/2025  Primary Care Physician:  Priscila Lombardo     Reason for visit:   Encounter Diagnosis   Name Primary?    Pre-op evaluation Yes       HPI  Nery Whitaker is a 59 year old female who presents for pre-operative H & P in preparation for  Procedure Information       Case: [id number] Date/Time: 02/24/25 1400    Procedure: CYSTOSCOPY, WITH RETROGRADE PYELOGRAM AND URETERAL STENT REPLACEMENT (Right: Urethra)    Anesthesia type: MAC    Diagnosis: Stricture of right ureter [N13.5]    Pre-op diagnosis: Stricture of right ureter [N13.5]    Location: [place] [address] / [facility]    Providers: Jayden Boyle MD            Patient is being evaluated for comorbid conditions of hypertension, dyslipidemia, carotid artery disease, CAD, COPD    Ms. Whitaker has a history of hydronephrosis on imaging September 2024.  She is status post cystoscopy, RPG, stent placement side.  She was evaluated by Dr. Boyle and discussed surgical intervention.  Since that time, symptoms have improved.  She is now scheduled for cystoscopy as above.     History is obtained from the patient and chart review    Hx of abnormal bleeding or anti-platelet use: Xarelto    Menstrual history: No LMP recorded (lmp unknown). Patient is postmenopausal.     Past Medical History  Past Medical History:   Diagnosis Date    Abdominal pain, epigastric 12/02/2017    Abnormal cardiovascular stress test 12/13/2017    Adenomatous colon polyp     Anemia     Antiplatelet or antithrombotic long-term use     Anxiety     Arthritis     Atypical lymphocytosis     Bilateral carotid artery disease     COPD (chronic obstructive pulmonary disease) (H)     Coronary artery disease     Dyslipidemia     Foreign body in left foot     Foreign body in left foot, subsequent encounter  10/11/2018    Heart murmur     Hydronephrosis with ureteral stricture, not elsewhere classified     Hypertension     Intermittent palpitations 12/02/2017    Migraines     Nausea and vomiting, unspecified vomiting type 12/13/2024    Newly recognized murmur 10/18/2018    Osteoporosis     PAD (peripheral artery disease)     Pain in thoracic spine     Pure hyperglyceridemia 09/02/2021    Formatting of this note might be different from the original. Has been intoleratant to several medications.    .    Stented coronary artery     Urge incontinence of urine 11/04/2018    Last Assessment & Plan:  Symptoms most consistent with urge incontinence. Ultrasound ordered to evaluate for any issues with incomplete emptying. Trial of Detrol LA 2 mg daily.  Last Assessment & Plan:  Formatting of this note might be different from the original. Detrol LA has been helpful but dries out her mouth. We will try a lower, short acting dose.    Verruca plantaris 11/01/2018       Past Surgical History  Past Surgical History:   Procedure Laterality Date    ABDOMEN SURGERY      APPENDECTOMY      ARTERIAL BYPASS SURGERY  01/01/2006    BIOPSY BREAST Left     benign    BYPASS GRAFT AORTOFEMORAL Bilateral     CHOLECYSTECTOMY      COLONOSCOPY      COMBINED CYSTOSCOPY, RETROGRADES, EXCHANGE STENT URETER(S) Right 10/22/2024    Procedure: CYSTOURETEROSCOPY, WITH RETROGRADE PYELOGRAM AND STENT INSERTION, ureteral sampling , Right;  Surgeon: Clark Garcia MD;  [facility]    ESOPHAGOSCOPY, GASTROSCOPY, DUODENOSCOPY (EGD), COMBINED N/A 05/14/2024    Procedure: ESOPHAGOGASTRODUODENOSCOPY, WITH BIOPSY;  Surgeon: Lizz Bray MD;  Location: [facility] OR    FEMORAL ARTERY STENT  01/01/2006    FOOT MASS EXCISION Left 01/01/2018    Soft tissue mass - benign    IR MISCELLANEOUS PROCEDURE  01/17/2006    IR MISCELLANEOUS PROCEDURE  01/17/2006    IR MISCELLANEOUS PROCEDURE  01/17/2006    IR MISCELLANEOUS PROCEDURE  01/17/2006    IR  MISCELLANEOUS PROCEDURE  01/18/2006    TONSILLECTOMY      TYMPANOSTOMY TUBE PLACEMENT         Prior to Admission Medications  Current Outpatient Medications   Medication Sig Dispense Refill    citalopram (CELEXA) 40 MG tablet Take 1 tablet (40 mg) by mouth daily. 90 tablet 0    folic acid (FOLVITE) 1 MG tablet Take 1 tablet by mouth once daily 90 tablet 2    lisinopril (ZESTRIL) 20 MG tablet Take 1 tablet (20 mg) by mouth daily. 90 tablet 3    omeprazole (PRILOSEC) 40 MG DR capsule Take 1 capsule by mouth once daily 90 capsule 1    ondansetron (ZOFRAN ODT) 4 MG ODT tab Take 1 tablet (4 mg) by mouth every 6 hours as needed for nausea. 30 tablet 0    prochlorperazine (COMPAZINE) 10 MG tablet Take 1 tablet (10 mg) by mouth every 6 hours as needed for nausea or vomiting. 20 tablet 0    REPATHA SURECLICK 140 MG/ML prefilled autoinjector 140 mg every 14 days.      rivaroxaban ANTICOAGULANT (XARELTO ANTICOAGULANT) 20 MG TABS tablet Take 20 mg by mouth daily (with dinner).      senna-docusate (SENOKOT-S/PERICOLACE) 8.6-50 MG tablet Take 1-2 tablets by mouth 2 times daily. 30 tablet 0    zolpidem (AMBIEN) 5 MG tablet Take 1 tablet (5 mg) by mouth nightly as needed for sleep. 30 tablet 0    colestipol (COLESTID) 1 g tablet Take 1 g by mouth 2 times daily. (Patient not taking: Reported on 2/19/2025)      DENTA 5000 PLUS 1.1 % CREA Take by mouth daily.      gabapentin (NEURONTIN) 100 MG capsule TAKE 2 CAPSULE BY MOUTH IN THE MORNING THEN  2  TABLET  AT  NOON  AND  3  TABLETS  AT  BEDTIME  DAILY (Patient not taking: Reported on 2/19/2025) 630 capsule 0       Allergies  Allergies   Allergen Reactions    Ezetimibe      constipation    Lovastatin Unknown     constipation    Simvastatin      Other reaction(s): Constipation  Intolerance, but tolerated with re-challenge in Feb to April 2009       Social History  Social History     Socioeconomic History    Marital status:      Spouse name: Timothy    Number of children: 1    Years  of education: 14    Highest education level: Associate degree: academic program   Occupational History    Occupation: [employer]   Tobacco Use    Smoking status: Every Day     Current packs/day: 0.75     Average packs/day: 0.8 packs/day for 50.5 years (42.0 ttl pk-yrs)     Types: Cigarettes     Passive exposure: Current    Smokeless tobacco: Never    Tobacco comments:     1 ppd since 17 years old   Vaping Use    Vaping status: Never Used   Substance and Sexual Activity    Alcohol use: Not Currently    Drug use: No    Sexual activity: Yes     Partners: Male     Birth control/protection: Post-menopausal, Male Surgical   Other Topics Concern    Parent/sibling w/ CABG, MI or angioplasty before 65F 55M? Yes   Social History Narrative    Not on file     Social Drivers of Health     Financial Resource Strain: Low Risk  (11/14/2024)    Financial Resource Strain     Within the past 12 months, have you or your family members you live with been unable to get utilities (heat, electricity) when it was really needed?: No   Food Insecurity: Low Risk  (11/14/2024)    Food Insecurity     Within the past 12 months, did you worry that your food would run out before you got money to buy more?: No     Within the past 12 months, did the food you bought just not last and you didn t have money to get more?: No   Transportation Needs: Low Risk  (11/14/2024)    Transportation Needs     Within the past 12 months, has lack of transportation kept you from medical appointments, getting your medicines, non-medical meetings or appointments, work, or from getting things that you need?: No   Physical Activity: Insufficiently Active (11/14/2024)    Exercise Vital Sign     Days of Exercise per Week: 1 day     Minutes of Exercise per Session: 10 min   Stress: Stress Concern Present (11/14/2024)    [employer] - Occupational Stress Questionnaire     Feeling of Stress : To some extent   Social Connections: Unknown  (2024)    Social Connection and Isolation Panel [NHANES]     Frequency of Communication with Friends and Family: Not on file     Frequency of Social Gatherings with Friends and Family: Once a week     Attends Zoroastrianism Services: Not on file     Active Member of Clubs or Organizations: Not on file     Attends Club or Organization Meetings: Not on file     Marital Status: Not on file   Interpersonal Safety: Low Risk  (11/15/2024)    Interpersonal Safety     Do you feel physically and emotionally safe where you currently live?: Yes     Within the past 12 months, have you been hit, slapped, kicked or otherwise physically hurt by someone?: No     Within the past 12 months, have you been humiliated or emotionally abused in other ways by your partner or ex-partner?: No   Housing Stability: Low Risk  (2024)    Housing Stability     Do you have housing? : Yes     Are you worried about losing your housing?: No       Family History  Family History   Problem Relation Age of Onset    Breast Cancer Mother         was pt at the [facility]  21 years ago    Peripheral Vascular Disease Father     Other - See Comments Father         vasular problems     Hypertension Father     Hyperlipidemia Father     Cerebrovascular Disease Father     Other Cancer Father         skin    Cancer Maternal Grandmother     Heart Disease Maternal Grandfather     Lung Cancer Paternal Grandmother     Kidney Cancer Paternal Grandfather     No Known Problems Brother     No Known Problems Sister     Colon Cancer No family hx of     Prostate Cancer No family hx of     Anesthesia Reaction No family hx of     Malignant Hyperthermia No family hx of     Coronary Artery Disease No family hx of        Review of Systems  The complete review of systems is negative other than noted in the HPI or here.   Anesthesia Evaluation   Pt has had prior anesthetic.         ROS/MED HX  ENT/Pulmonary:     (+)     NIYA risk factors,  hypertension,         tobacco use,  Current use,         COPD,              Neurologic:  - neg neurologic ROS  (-) no seizures and no CVA   Cardiovascular:     (+) Dyslipidemia hypertension- -  CAD -  - -                                 Previous cardiac testing   Echo: Date: 12/2024 Results:  Interpretation Summary     The left ventricle is normal in size with normal left ventricular wall  thickness. Biplane LVEF is 65%.  Normal right ventricle size and systolic function.  Normal left atrial size.  No hemodynamically significant valvular abnormalities on 2D or color flow  imaging.  There is no comparison study available.  Stress Test:  Date: Results:    ECG Reviewed:  Date: 12/2024 Results:  Sinus rhythm  ST & T wave abnormality, consider lateral ischemia  Abnormal ECG  Cath:  Date: Results:      METS/Exercise Tolerance: 3 - Able to walk 1-2 blocks without stopping Comment: Limited due to feeling overall weaker for many months. Denies CP or CHURCH. Can walk a block and can ascend a flight of stairs.    Hematologic:  - neg hematologic  ROS  (-) history of blood clots and history of blood transfusion   Musculoskeletal:  - neg musculoskeletal ROS     GI/Hepatic:  - neg GI/hepatic ROS  (-) GERD   Renal/Genitourinary: Comment: hydronephrosis      Endo:    (-) chronic steroid usage   Psychiatric/Substance Use:       Infectious Disease:  - neg infectious disease ROS     Malignancy:       Other:            Virtual visit -  No vitals were obtained    Physical Exam  Constitutional: Awake, alert, cooperative, no apparent distress, and appears stated age.  HENT: Normocephalic  Respiratory: non labored breathing   Neurologic: Awake, alert, oriented to name, place and time.   Neuropsychiatric: Calm, cooperative. Normal affect.      Prior Labs/Diagnostic Studies   All labs and imaging personally reviewed     EKG/ stress test - if available please see in ROS above   Echo result w/o MOPS: Interpretation Summary The left ventricle is normal in size with normal left  ventricular wallthickness. Biplane LVEF is 65%.Normal right ventricle size and systolic function.Normal left atrial size.No hemodynamically significant valvular abnormalities on 2D or color flowimaging.There is no comparison study available.          Latest Ref Rng & Units 10/6/2021     1:14 PM   PFT   FVC L 3.49    FEV1 L 2.71    FVC% % 101    FEV1% % 99          The patient's records and results personally reviewed by this provider.     Outside records reviewed from: Care Everywhere      Assessment    Nery Whitaker is a 59 year old female seen as a PAC referral for risk assessment and optimization for anesthesia.    Plan/Recommendations  Pt will be optimized for the proposed procedure.  See below for details on the assessment, risk, and preoperative recommendations    NEUROLOGY  - No history of TIA, CVA or seizure  -Post Op delirium risk factors:  No risk identified    ENT  - No current airway concerns.  Will need to be reassessed day of surgery.  Mallampati: Unable to assess  TM: Unable to assess    CARDIAC  - No history of Afib  -hypertension using lisinopril  -h/o PAD s/p aortofemoral bypass, dyslipidemia. Continues on statin, DOAC. Will hold anticoagulation x2 days prior to surgery.   -previous cardiac testing above.  - METS (Metabolic Equivalents)  Patient CANNOT perform 4 METS exercise without symptoms             Total Score: 1    Functional Capacity: Unable to complete 4 METS      RCRI-Very low risk: Class 1 0.4% complication rate             Total Score: 0        PULMONARY  NIYA Low Risk             Total Score: 2    NIYA: Hypertension    NIYA: Over 50 ys old      - COPD, controlled. Denies need for inhalers or home O2.   - Tobacco History    History   Smoking Status    Every Day    Types: Cigarettes   Smokeless Tobacco    Never       GI  -cannabis hyperemesis syndrome. She reports she used THC a week ago and then had multiple days of N/V and was unable to take medications for the past week. She reports  "she is now tolerating fluids and food, has not had an episode of emesis for 3 days. She will restart medications today.   PONV Medium Risk  Total Score: 2           1 AN PONV: Pt is Female    1 AN PONV: Intended Post Op Opioids        /RENAL  - h/o GER. Cr has since normalized. Will update BMP prior to surgery  -hydronephrosis s/p ureteral stent placement. Now scheduled for repeat cysto with stent as above.     ENDOCRINE    - BMI: Estimated body mass index is 19.14 kg/m  as calculated from the following:    Height as of this encounter: 1.651 m (5' 5\").    Weight as of this encounter: 52.2 kg (115 lb).  Healthy Weight (BMI 18.5-24.9)  - No history of Diabetes Mellitus    HEME  VTE Low Risk 0.26%             Total Score: 0      - Coagulopathy second to Rivaroxaban (Xarelto)    MSK  Patient is NOT Frail             Total Score: 1    Frailty: Decrease in strength        Different anesthesia methods/types have been discussed with the patient, but they are aware that the final plan will be decided by the assigned anesthesia provider on the date of service.    The patient is optimized for their procedure. AVS with information on surgery time/arrival time, meds and NPO status given by nursing staff. No further diagnostic testing indicated.    Please refer to the physical examination documented by the anesthesiologist in the anesthesia record on the day of surgery.    Video-Visit Details    Type of service:  Video Visit    Provider received verbal consent for a Video Visit from the patient? Yes     Originating Location (pt. Location): Home    Distant Location (provider location):  Off-site  Mode of Communication:  Video Conference via [employer]  On the day of service:     Prep time: 16 minutes  Visit time: 9 minutes  Documentation time: 15 minutes  ------------------------------------------  Total time: 40 minutes      Malorie Marlow PA-C  Preoperative Assessment Center  [facility]  Clinic and Surgery " [place]  Phone: [phone]  Fax: [phone]

## 2025-02-19 NOTE — PATIENT INSTRUCTIONS
Preparing for Your Surgery      Name:  Nery Whitaker   MRN:  [id number]   :  1965   Today's Date:  2025         Arriving for surgery:  Surgery date:  25  Arrival time:  12.30PM    Restrictions due to COVID 19:    Please maintain social distance.  Masking is optional.      parking is available for anyone with mobility limitations or disabilities. (Monday- Friday 7 am- 5 pm)    Please come to:    [facility]  [address] [place] [id number]    Please check in on the 5th floor at the Ambulatory Surgery Center.      What can I eat or drink?    -  You may eat and drink normally until 8 hours prior to arrival  time. (Until 4.30AM)  -  You may have clear liquids until 2 hours prior to arrival  time. (Until 10.30AM)    Examples of clear liquids:  Water  Clear broth  Juices (apple, white grape, white cranberry  and cider) without pulp  Noncarbonated, powder based beverages  (lemonade and Rohith-Aid)  Sodas (Sprite, 7-Up, ginger ale and seltzer)  Coffee or tea (without milk or cream)  Gatorade      Which medicines can I take?    Hold Aspirin for 7 days before surgery.   Hold Multivitamins for 7 days before surgery.  Hold Supplements for 7 days before surgery.  Hold Ibuprofen (Advil, Motrin) for 1 day before surgery--unless otherwise directed by surgeon.  Hold Naproxen (Aleve) for 4 days before surgery.    No alcohol or cannabis products for 24 hours prior to procedure.    Hold Xarelto for 2 days before surgery.      -  DO NOT take the following medications the day of surgery:  Folic Acid  Senna      -  PLEASE TAKE the following medications the day of surgery:   Citalopram (Celexa)  Lisinopril (Zestril)  Omeprazole (Prilosec)  Zofran as needed  Compazine as needed      How do I prepare myself?  - Please take 2 showers (one the night prior to surgery and one the morning of surgery) using Scrubcare or Hibiclens soap.    Use this soap only from the neck to your toes.  Avoid genital area      Leave the soap on your skin for one minute--then rinse thoroughly.      You may use your own shampoo and conditioner. No other hair products.   - Please remove all jewelry and body piercings.  - No lotions, deodorants or fragrance.  - No makeup or fingernail polish.   - Bring your ID and insurance card.    -If you have a Deep Brain Stimulator, a Spinal Cord Stimulator, or any implanted Neuro Device, you must bring the remote to the Surgery Center.         ALL PATIENTS ARE REQUIRED TO HAVE A RESPONSIBLE ADULT TO DRIVE AND BE IN ATTENDANCE WITH THEM FOR 24 HOURS FOLLOWING SURGERY.     Covid testing policy as of 12/06/2022  Your surgeon will notify and schedule you for a COVID test if one is needed before surgery--please direct any questions or COVID symptoms to your surgeon      Questions or Concerns:    -For questions regarding the day of surgery, please contact the Ambulatory Surgery Center at [phone].    -If you have health changes between today and your surgery, please contact your surgeon.     - For questions after surgery, please contact your surgeon's office.

## 2025-02-19 NOTE — PROGRESS NOTES
Nery is a 59 year old who is being evaluated via a billable video visit.    How would you like to obtain your AVS? MyChart  If the video visit is dropped, the invitation should be resent by: Text to cell phone: [phone]      Subjective   Nery is a 59 year old, presenting for the following health issues:  Pre-Op Exam    HPI           Physical Exam

## 2025-02-24 ENCOUNTER — ANCILLARY PROCEDURE (OUTPATIENT)
Dept: RADIOLOGY | Facility: AMBULATORY SURGERY CENTER | Age: 60
End: 2025-02-24
Attending: UROLOGY
Payer: COMMERCIAL

## 2025-02-24 ENCOUNTER — ANESTHESIA (OUTPATIENT)
Dept: SURGERY | Facility: AMBULATORY SURGERY CENTER | Age: 60
End: 2025-02-24
Payer: COMMERCIAL

## 2025-02-24 ENCOUNTER — HOSPITAL ENCOUNTER (OUTPATIENT)
Facility: AMBULATORY SURGERY CENTER | Age: 60
Discharge: HOME | End: 2025-02-24
Attending: UROLOGY
Payer: COMMERCIAL

## 2025-02-24 VITALS
WEIGHT: 115 LBS | DIASTOLIC BLOOD PRESSURE: 71 MMHG | HEIGHT: 65 IN | HEART RATE: 64 BPM | RESPIRATION RATE: 17 BRPM | SYSTOLIC BLOOD PRESSURE: 151 MMHG | OXYGEN SATURATION: 100 % | TEMPERATURE: 98 F | BODY MASS INDEX: 19.16 KG/M2

## 2025-02-24 DIAGNOSIS — N13.5 STRICTURE OF RIGHT URETER: Primary | ICD-10-CM

## 2025-02-24 DIAGNOSIS — N13.5 STRICTURE OF RIGHT URETER: ICD-10-CM

## 2025-02-24 PROCEDURE — 74420 UROGRAPHY RTRGR +-KUB: CPT | Mod: TC

## 2025-02-24 PROCEDURE — 52332 CYSTOSCOPY AND TREATMENT: CPT | Mod: RT | Performed by: UROLOGY

## 2025-02-24 PROCEDURE — 74420 UROGRAPHY RTRGR +-KUB: CPT | Mod: 26 | Performed by: UROLOGY

## 2025-02-24 PROCEDURE — 52332 CYSTOSCOPY AND TREATMENT: CPT | Mod: RT

## 2025-02-24 DEVICE — URETERAL STENT
Type: IMPLANTABLE DEVICE | Site: URETER | Status: FUNCTIONAL
Brand: PERCUFLEX™ PLUS

## 2025-02-24 RX ORDER — PROPOFOL 10 MG/ML
INJECTION, EMULSION INTRAVENOUS CONTINUOUS PRN
Status: DISCONTINUED | OUTPATIENT
Start: 2025-02-24 | End: 2025-02-24

## 2025-02-24 RX ORDER — SODIUM CHLORIDE, SODIUM LACTATE, POTASSIUM CHLORIDE, CALCIUM CHLORIDE 600; 310; 30; 20 MG/100ML; MG/100ML; MG/100ML; MG/100ML
INJECTION, SOLUTION INTRAVENOUS CONTINUOUS
Status: DISCONTINUED | OUTPATIENT
Start: 2025-02-24 | End: 2025-02-25 | Stop reason: HOSPADM

## 2025-02-24 RX ORDER — NALOXONE HYDROCHLORIDE 0.4 MG/ML
0.1 INJECTION, SOLUTION INTRAMUSCULAR; INTRAVENOUS; SUBCUTANEOUS
Status: DISCONTINUED | OUTPATIENT
Start: 2025-02-24 | End: 2025-02-25 | Stop reason: HOSPADM

## 2025-02-24 RX ORDER — LIDOCAINE 40 MG/G
CREAM TOPICAL
Status: DISCONTINUED | OUTPATIENT
Start: 2025-02-24 | End: 2025-02-25 | Stop reason: HOSPADM

## 2025-02-24 RX ORDER — IOPAMIDOL 510 MG/ML
INJECTION, SOLUTION INTRAVASCULAR PRN
Status: DISCONTINUED | OUTPATIENT
Start: 2025-02-24 | End: 2025-02-24 | Stop reason: HOSPADM

## 2025-02-24 RX ORDER — ACETAMINOPHEN 325 MG/1
975 TABLET ORAL ONCE
Status: COMPLETED | OUTPATIENT
Start: 2025-02-24 | End: 2025-02-24

## 2025-02-24 RX ORDER — AMOXICILLIN 250 MG
1 CAPSULE ORAL DAILY PRN
Qty: 20 TABLET | Refills: 1 | Status: SHIPPED | OUTPATIENT
Start: 2025-02-24 | End: 2025-04-05

## 2025-02-24 RX ORDER — CEFAZOLIN SODIUM 2 G/50ML
2 SOLUTION INTRAVENOUS
Status: COMPLETED | OUTPATIENT
Start: 2025-02-24 | End: 2025-02-24

## 2025-02-24 RX ORDER — FENTANYL CITRATE 50 UG/ML
25 INJECTION, SOLUTION INTRAMUSCULAR; INTRAVENOUS EVERY 5 MIN PRN
Status: DISCONTINUED | OUTPATIENT
Start: 2025-02-24 | End: 2025-02-25 | Stop reason: HOSPADM

## 2025-02-24 RX ORDER — GLYCOPYRROLATE 0.2 MG/ML
INJECTION, SOLUTION INTRAMUSCULAR; INTRAVENOUS PRN
Status: DISCONTINUED | OUTPATIENT
Start: 2025-02-24 | End: 2025-02-24

## 2025-02-24 RX ORDER — FENTANYL CITRATE 50 UG/ML
50 INJECTION, SOLUTION INTRAMUSCULAR; INTRAVENOUS EVERY 5 MIN PRN
Status: DISCONTINUED | OUTPATIENT
Start: 2025-02-24 | End: 2025-02-25 | Stop reason: HOSPADM

## 2025-02-24 RX ORDER — OXYCODONE HYDROCHLORIDE 5 MG/1
5 TABLET ORAL
Status: DISCONTINUED | OUTPATIENT
Start: 2025-02-24 | End: 2025-02-25 | Stop reason: HOSPADM

## 2025-02-24 RX ORDER — DEXAMETHASONE SODIUM PHOSPHATE 10 MG/ML
4 INJECTION, SOLUTION INTRAMUSCULAR; INTRAVENOUS
Status: DISCONTINUED | OUTPATIENT
Start: 2025-02-24 | End: 2025-02-25 | Stop reason: HOSPADM

## 2025-02-24 RX ORDER — HYDROMORPHONE HYDROCHLORIDE 1 MG/ML
0.2 INJECTION, SOLUTION INTRAMUSCULAR; INTRAVENOUS; SUBCUTANEOUS EVERY 5 MIN PRN
Status: DISCONTINUED | OUTPATIENT
Start: 2025-02-24 | End: 2025-02-25 | Stop reason: HOSPADM

## 2025-02-24 RX ORDER — OXYBUTYNIN CHLORIDE 5 MG/1
5 TABLET ORAL 3 TIMES DAILY PRN
Qty: 30 TABLET | Refills: 3 | Status: SHIPPED | OUTPATIENT
Start: 2025-02-24

## 2025-02-24 RX ORDER — ONDANSETRON 4 MG/1
4 TABLET, ORALLY DISINTEGRATING ORAL EVERY 30 MIN PRN
Status: DISCONTINUED | OUTPATIENT
Start: 2025-02-24 | End: 2025-02-25 | Stop reason: HOSPADM

## 2025-02-24 RX ORDER — HYDRALAZINE HYDROCHLORIDE 20 MG/ML
2.5-5 INJECTION INTRAMUSCULAR; INTRAVENOUS EVERY 10 MIN PRN
Status: DISCONTINUED | OUTPATIENT
Start: 2025-02-24 | End: 2025-02-25 | Stop reason: HOSPADM

## 2025-02-24 RX ORDER — KETAMINE HYDROCHLORIDE 10 MG/ML
INJECTION INTRAMUSCULAR; INTRAVENOUS PRN
Status: DISCONTINUED | OUTPATIENT
Start: 2025-02-24 | End: 2025-02-24

## 2025-02-24 RX ORDER — ONDANSETRON 2 MG/ML
INJECTION INTRAMUSCULAR; INTRAVENOUS PRN
Status: DISCONTINUED | OUTPATIENT
Start: 2025-02-24 | End: 2025-02-24

## 2025-02-24 RX ORDER — TAMSULOSIN HYDROCHLORIDE 0.4 MG/1
0.4 CAPSULE ORAL DAILY PRN
Qty: 30 CAPSULE | Refills: 3 | Status: SHIPPED | OUTPATIENT
Start: 2025-02-24

## 2025-02-24 RX ORDER — ONDANSETRON 2 MG/ML
4 INJECTION INTRAMUSCULAR; INTRAVENOUS EVERY 30 MIN PRN
Status: DISCONTINUED | OUTPATIENT
Start: 2025-02-24 | End: 2025-02-25 | Stop reason: HOSPADM

## 2025-02-24 RX ORDER — AMOXICILLIN 250 MG
1 CAPSULE ORAL
Status: CANCELLED | OUTPATIENT
Start: 2025-02-24

## 2025-02-24 RX ORDER — LABETALOL HYDROCHLORIDE 5 MG/ML
10 INJECTION, SOLUTION INTRAVENOUS
Status: DISCONTINUED | OUTPATIENT
Start: 2025-02-24 | End: 2025-02-25 | Stop reason: HOSPADM

## 2025-02-24 RX ORDER — PROPOFOL 10 MG/ML
INJECTION, EMULSION INTRAVENOUS PRN
Status: DISCONTINUED | OUTPATIENT
Start: 2025-02-24 | End: 2025-02-24

## 2025-02-24 RX ORDER — ACETAMINOPHEN 650 MG/1
650 SUPPOSITORY RECTAL ONCE
Status: COMPLETED | OUTPATIENT
Start: 2025-02-24 | End: 2025-02-24

## 2025-02-24 RX ORDER — ACETAMINOPHEN 325 MG/1
975 TABLET ORAL ONCE
Status: DISCONTINUED | OUTPATIENT
Start: 2025-02-24 | End: 2025-02-25 | Stop reason: HOSPADM

## 2025-02-24 RX ORDER — OXYCODONE HYDROCHLORIDE 5 MG/1
10 TABLET ORAL
Status: DISCONTINUED | OUTPATIENT
Start: 2025-02-24 | End: 2025-02-25 | Stop reason: HOSPADM

## 2025-02-24 RX ORDER — DEXAMETHASONE SODIUM PHOSPHATE 4 MG/ML
INJECTION, SOLUTION INTRA-ARTICULAR; INTRALESIONAL; INTRAMUSCULAR; INTRAVENOUS; SOFT TISSUE PRN
Status: DISCONTINUED | OUTPATIENT
Start: 2025-02-24 | End: 2025-02-24

## 2025-02-24 RX ORDER — LIDOCAINE HYDROCHLORIDE 20 MG/ML
INJECTION, SOLUTION INFILTRATION; PERINEURAL PRN
Status: DISCONTINUED | OUTPATIENT
Start: 2025-02-24 | End: 2025-02-24

## 2025-02-24 RX ORDER — HYDROMORPHONE HYDROCHLORIDE 1 MG/ML
0.4 INJECTION, SOLUTION INTRAMUSCULAR; INTRAVENOUS; SUBCUTANEOUS EVERY 5 MIN PRN
Status: DISCONTINUED | OUTPATIENT
Start: 2025-02-24 | End: 2025-02-25 | Stop reason: HOSPADM

## 2025-02-24 RX ORDER — FENTANYL CITRATE 50 UG/ML
INJECTION, SOLUTION INTRAMUSCULAR; INTRAVENOUS PRN
Status: DISCONTINUED | OUTPATIENT
Start: 2025-02-24 | End: 2025-02-24

## 2025-02-24 RX ORDER — CEFAZOLIN SODIUM 2 G/50ML
2 SOLUTION INTRAVENOUS SEE ADMIN INSTRUCTIONS
Status: DISCONTINUED | OUTPATIENT
Start: 2025-02-24 | End: 2025-02-25 | Stop reason: HOSPADM

## 2025-02-24 RX ADMIN — GLYCOPYRROLATE 0.2 MG: 0.2 INJECTION, SOLUTION INTRAMUSCULAR; INTRAVENOUS at 12:07

## 2025-02-24 RX ADMIN — CEFAZOLIN SODIUM 2 G: 2 SOLUTION INTRAVENOUS at 12:00

## 2025-02-24 RX ADMIN — DEXAMETHASONE SODIUM PHOSPHATE 4 MG: 4 INJECTION, SOLUTION INTRA-ARTICULAR; INTRALESIONAL; INTRAMUSCULAR; INTRAVENOUS; SOFT TISSUE at 12:07

## 2025-02-24 RX ADMIN — PROPOFOL 30 MG: 10 INJECTION, EMULSION INTRAVENOUS at 12:23

## 2025-02-24 RX ADMIN — PROPOFOL 15 MG: 10 INJECTION, EMULSION INTRAVENOUS at 12:08

## 2025-02-24 RX ADMIN — ACETAMINOPHEN 975 MG: 325 TABLET ORAL at 11:57

## 2025-02-24 RX ADMIN — FENTANYL CITRATE 25 MCG: 50 INJECTION, SOLUTION INTRAMUSCULAR; INTRAVENOUS at 12:24

## 2025-02-24 RX ADMIN — PROPOFOL 200 MCG/KG/MIN: 10 INJECTION, EMULSION INTRAVENOUS at 12:06

## 2025-02-24 RX ADMIN — PROPOFOL 15 MG: 10 INJECTION, EMULSION INTRAVENOUS at 12:17

## 2025-02-24 RX ADMIN — PROPOFOL 40 MG: 10 INJECTION, EMULSION INTRAVENOUS at 12:05

## 2025-02-24 RX ADMIN — SODIUM CHLORIDE, SODIUM LACTATE, POTASSIUM CHLORIDE, CALCIUM CHLORIDE: 600; 310; 30; 20 INJECTION, SOLUTION INTRAVENOUS at 12:00

## 2025-02-24 RX ADMIN — ONDANSETRON 4 MG: 2 INJECTION INTRAMUSCULAR; INTRAVENOUS at 12:07

## 2025-02-24 RX ADMIN — LIDOCAINE HYDROCHLORIDE 40 MG: 20 INJECTION, SOLUTION INFILTRATION; PERINEURAL at 12:07

## 2025-02-24 RX ADMIN — PROPOFOL 10 MG: 10 INJECTION, EMULSION INTRAVENOUS at 12:25

## 2025-02-24 RX ADMIN — KETAMINE HYDROCHLORIDE 20 MG: 10 INJECTION INTRAMUSCULAR; INTRAVENOUS at 12:09

## 2025-02-24 ASSESSMENT — COPD QUESTIONNAIRES: COPD: 1

## 2025-02-24 ASSESSMENT — LIFESTYLE VARIABLES: TOBACCO_USE: 1

## 2025-02-24 ASSESSMENT — ENCOUNTER SYMPTOMS: SEIZURES: 0

## 2025-02-24 NOTE — ANESTHESIA CARE TRANSFER NOTE
Patient: Nery Whitaker    Procedure: Procedure(s):  CYSTOSCOPY, WITH RETROGRADE PYELOGRAM AND URETERAL STENT REPLACEMENT, cystogram       Diagnosis: Stricture of right ureter [N13.5]  Diagnosis Additional Information: No value filed.    Anesthesia Type:   MAC     Note:      Level of Consciousness: awake  Oxygen Supplementation: blow-by O2    Independent Airway: airway patency satisfactory and stable    Vital Signs Stable: post-procedure vital signs reviewed and stable  Report to RN Given: handoff report given  Patient transferred to: Phase II    Handoff Report: Identifed the Patient, Identified the Reponsible Provider, Reviewed the pertinent medical history, Discussed the surgical course, Reviewed Intra-OP anesthesia mangement and issues during anesthesia, Set expectations for post-procedure period and Allowed opportunity for questions and acknowledgement of understanding      Vitals:  Vitals Value Taken Time   BP     Temp     Pulse     Resp     SpO2         Electronically Signed By: Rodney Martinez MD  February 24, 2025  12:42 PM

## 2025-02-24 NOTE — ANESTHESIA PREPROCEDURE EVALUATION
Anesthesia Pre-Procedure Evaluation    Patient: Nery Whitaker   MRN: [id number] : 1965        Procedure : Procedure(s):  CYSTOSCOPY, WITH RETROGRADE PYELOGRAM AND URETERAL STENT REPLACEMENT          Past Medical History:   Diagnosis Date    Abdominal pain, epigastric 2017    Abnormal cardiovascular stress test 2017    Adenomatous colon polyp     Anemia     Antiplatelet or antithrombotic long-term use     Anxiety     Arthritis     Atypical lymphocytosis     Bilateral carotid artery disease     COPD (chronic obstructive pulmonary disease) (H)     Coronary artery disease     Dyslipidemia     Foreign body in left foot     Foreign body in left foot, subsequent encounter 10/11/2018    Heart murmur     Hydronephrosis with ureteral stricture, not elsewhere classified     Hypertension     Intermittent palpitations 2017    Migraines     Nausea and vomiting, unspecified vomiting type 2024    Newly recognized murmur 10/18/2018    Osteoporosis     PAD (peripheral artery disease)     Pain in thoracic spine     Pure hyperglyceridemia 2021    Formatting of this note might be different from the original. Has been intoleratant to several medications.    .    Stented coronary artery     Urge incontinence of urine 2018    Last Assessment & Plan:  Symptoms most consistent with urge incontinence. Ultrasound ordered to evaluate for any issues with incomplete emptying. Trial of Detrol LA 2 mg daily.  Last Assessment & Plan:  Formatting of this note might be different from the original. Detrol LA has been helpful but dries out her mouth. We will try a lower, short acting dose.    Nora couch 2018      Past Surgical History:   Procedure Laterality Date    ABDOMEN SURGERY      APPENDECTOMY      ARTERIAL BYPASS SURGERY  2006    BIOPSY BREAST Left     benign    BYPASS GRAFT AORTOFEMORAL Bilateral     CHOLECYSTECTOMY      COLONOSCOPY      COMBINED CYSTOSCOPY, RETROGRADES, EXCHANGE  STENT URETER(S) Right 10/22/2024    Procedure: CYSTOURETEROSCOPY, WITH RETROGRADE PYELOGRAM AND STENT INSERTION, ureteral sampling , Right;  Surgeon: Clark Garcia MD;  Location: [facility]    ESOPHAGOSCOPY, GASTROSCOPY, DUODENOSCOPY (EGD), COMBINED N/A 05/14/2024    Procedure: ESOPHAGOGASTRODUODENOSCOPY, WITH BIOPSY;  Surgeon: Lizz Bray MD;  Location: [facility] OR    FEMORAL ARTERY STENT  01/01/2006    FOOT MASS EXCISION Left 01/01/2018    Soft tissue mass - benign    IR MISCELLANEOUS PROCEDURE  01/17/2006    IR MISCELLANEOUS PROCEDURE  01/17/2006    IR MISCELLANEOUS PROCEDURE  01/17/2006    IR MISCELLANEOUS PROCEDURE  01/17/2006    IR MISCELLANEOUS PROCEDURE  01/18/2006    TONSILLECTOMY      TYMPANOSTOMY TUBE PLACEMENT        Allergies   Allergen Reactions    Ezetimibe      constipation    Lovastatin Unknown     constipation    Simvastatin      Other reaction(s): Constipation  Intolerance, but tolerated with re-challenge in Feb to April 2009      Social History     Tobacco Use    Smoking status: Every Day     Current packs/day: 0.75     Average packs/day: 0.8 packs/day for 50.5 years (42.0 ttl pk-yrs)     Types: Cigarettes     Passive exposure: Current    Smokeless tobacco: Never    Tobacco comments:     1 ppd since 17 years old   Substance Use Topics    Alcohol use: Not Currently      Wt Readings from Last 1 Encounters:   02/24/25 52.2 kg (115 lb)        Anesthesia Evaluation   Pt has had prior anesthetic.         ROS/MED HX  ENT/Pulmonary:     (+)     NIYA risk factors,  hypertension,         tobacco use, Current use,         COPD,              Neurologic:  - neg neurologic ROS  (-) no seizures and no CVA   Cardiovascular:     (+) Dyslipidemia hypertension- -  CAD -  - -                                 Previous cardiac testing   Echo: Date: 12/2024 Results:  Interpretation Summary     The left ventricle is normal in size with normal left ventricular wall  thickness. Biplane LVEF is  65%.  Normal right ventricle size and systolic function.  Normal left atrial size.  No hemodynamically significant valvular abnormalities on 2D or color flow  imaging.  There is no comparison study available.  Stress Test:  Date: Results:    ECG Reviewed:  Date: 12/2024 Results:  Sinus rhythm  ST & T wave abnormality, consider lateral ischemia  Abnormal ECG  Cath:  Date: Results:      METS/Exercise Tolerance: 3 - Able to walk 1-2 blocks without stopping Comment: Limited due to feeling overall weaker for many months. Denies CP or CHURCH. Can walk a block and can ascend a flight of stairs.    Hematologic:     (+)      anemia,       (-) history of blood clots and history of blood transfusion   Musculoskeletal:  - neg musculoskeletal ROS     GI/Hepatic:  - neg GI/hepatic ROS  (-) GERD   Renal/Genitourinary: Comment: hydronephrosis      Endo:    (-) chronic steroid usage   Psychiatric/Substance Use:       Infectious Disease:  - neg infectious disease ROS     Malignancy:       Other:            Physical Exam    Airway        Mallampati: II   TM distance: > 3 FB   Neck ROM: full   Mouth opening: > 3 cm    Respiratory Devices and Support         Dental       (+) Multiple crowns, permanant bridges      Cardiovascular   cardiovascular exam normal          Pulmonary   pulmonary exam normal                OUTSIDE LABS:  CBC:   Lab Results   Component Value Date    WBC 9.9 12/31/2024    WBC 10.7 12/17/2024    HGB 11.2 (L) 02/21/2025    HGB 10.0 (L) 12/31/2024    HCT 30.4 (L) 12/31/2024    HCT 28.6 (L) 12/17/2024     12/31/2024     12/17/2024     BMP:   Lab Results   Component Value Date     02/21/2025     12/31/2024    POTASSIUM 3.8 02/21/2025    POTASSIUM 4.5 12/31/2024    CHLORIDE 105 02/21/2025    CHLORIDE 106 12/31/2024    CO2 22 02/21/2025    CO2 22 12/31/2024    BUN 13.2 02/21/2025    BUN 12.4 12/31/2024    CR 0.81 02/21/2025    CR 0.85 12/31/2024     (H) 02/21/2025    GLC 81 12/31/2024      COAGS:   Lab Results   Component Value Date    INR 2.21 (H) 10/30/2024     POC:   Lab Results   Component Value Date    HCG Negative 10/23/2018     HEPATIC:   Lab Results   Component Value Date    ALBUMIN 4.3 12/13/2024    PROTTOTAL 7.3 12/13/2024    ALT 9 12/13/2024    AST 26 12/13/2024    ALKPHOS 89 12/13/2024    BILITOTAL 0.3 12/13/2024     OTHER:   Lab Results   Component Value Date    LACT 1.4 12/13/2024    BEVERLY 9.2 02/21/2025    PHOS 2.5 12/17/2024    MAG 2.0 12/31/2024    LIPASE 28 12/13/2024    TSH 4.76 (H) 12/13/2024    T4 1.73 (H) 12/13/2024    SED 16 08/04/2023       Anesthesia Plan    ASA Status:  3    NPO Status:  NPO Appropriate    Anesthesia Type: MAC.     - Reason for MAC: immobility needed, straight local not clinically adequate   Induction: Propofol.   Maintenance: TIVA.        Consents    Anesthesia Plan(s) and associated risks, benefits, and realistic alternatives discussed. Questions answered and patient/representative(s) expressed understanding.     - Discussed:     - Discussed with:  Patient            Postoperative Care    Pain management: IV analgesics, Oral pain medications, Multi-modal analgesia.   PONV prophylaxis: Ondansetron (or other 5HT-3), Background Propofol Infusion     Comments:               Rodney Martinez MD    I have reviewed the pertinent notes and labs in the chart from the past 30 days and (re)examined the patient.  Any updates or changes from those notes are reflected in this note.    Clinically Significant Risk Factors Present on Admission                # Drug Induced Coagulation Defect: home medication list includes an anticoagulant medication    # Hypertension: Noted on problem list               # Financial/Environmental Concerns:

## 2025-02-24 NOTE — OP NOTE
OPERATIVE REPORT    PREOPERATIVE DIAGNOSIS:  Right ureteral stricture    POSTOPERATIVE DIAGNOSIS:  Same    PROCEDURES PERFORMED:   1. Cystourethroscopy with right ureter stent exchange  2. Right retrograde pyelogram with interpretation of intraoperative fluoroscopic imaging  3. Cystogram    STAFF SURGEON: Jayden Boyle MD   RESIDENT(S): Carmelina Celaya MD  ANESTHESIA: MAC    ESTIMATED BLOOD LOSS: 0 cc  DRAINS/TUBES: 6 Arabic x 26 cm double-J ureteral stent    IV FLUIDS: Please see dictated anesthesia record  COMPLICATIONS: None.   SPECIMEN: None     SIGNIFICANT FINDINGS:   4 cm stricture of mid ureter over area of prior aortobifem bypass with proximal hydronephrosis..  Distended bladder reaches pelvic brim   475 ml bladder capacity   Proximal curl of the ureteral stent seen in the renal pelvis under fluoroscopy and distal curl seen in the bladder under direct vision     BRIEF OPERATIVE INDICATIONS:  Nery Whitaker is a(n) 59 year old female with a history of aortobifemoral bypass in 2006 who subsequently was found to have incidental right hydronephrosis on CT. She subsequently had a right ureteral stent placed in 10/2024. After a discussion of all risks, benefits, and alternatives, the patient elected to proceed with the above procedure. She is considering robotic reconstruction but wants to keep doing stent changes for now.    Procedure:   Patient was anesthetized. Placed in lithotomy.  Patient prepped and draped in sterile fashion. Timeout performed.  A 22-Arabic rigid cystoscope was inserted into a well-lubricated urethra. The urethra was unremarkable without stricture. The previous indwelling ureteral stent was identified and removed with the flexible stent grasper to the level of the urethral meatus. A sensor wire was advanced up to the renal pelvis under fluoroscopic guidance and previous stent discarded.     A 5 Fr open ended catheter was advanced to the distal ureter. Retrograde pyelogram showed a 4 cm  midureteral stricture. We then advanced the open ended catheter proximal to the stricture and obtained another retrograde pyelogram that showed a tortuous ureter without other strictures. No hydronephrosis noted.     We replaced the wire to the renal pelvis. A 6Fr x 26-cm double-J stent was advanced over the Sensor wire, and a good proximal curl was seen in the renal pelvis on fluoroscopy and the distal curl was seen in the bladder.     We then placed an 18 Fr Corona catheter for the cystogram. We administered contrast solution into the bladder under about 20 cm of water pressure. Cystogram demonstrated a bladder distension reaching the pelvic brim. Bladder volume was measured to be 475 ml. We emptied her bladder and Corona was removed.     The patient tolerated the procedure well and there were no apparent complications. The patient  was transported to the postanesthesia care unit in stable condition.     POSTOP PLAN:  -Discharge to home   -Follow-up for next stent exchange to be arranged   -If pt desires surgical reconstruction, would consider her to be either ureterolysis +/- buccal grafting. Or possibly Boari Flap.    Carmelina Celaya MD  Urology Resident, PGY2    As attending surgeon, I, Jayden Boyle MD, was scrubbed and present for the entire procedure.

## 2025-02-24 NOTE — DISCHARGE INSTRUCTIONS
[facility]  Home Care Following Anesthesia  For 24 hours after surgery:  Get plenty of rest.  A responsible adult must stay with you for at least 24 hours after you leave the surgery center.  Do not drive or use heavy equipment.  If you have weakness or tingling, don't drive or use heavy equipment until this feeling goes away.   Do not drink alcohol.   Avoid strenuous or risky activities.  Ask for help when climbing stairs.  You may feel lightheaded.  IF so, sit for a few minutes before standing.  Have someone help you get up.   If you have nausea (feel sick to your stomach): Drink only clear liquids such as apple juice, ginger ale, broth or 7-Up.  Rest may also help.  Be sure to drink enough fluids.  Move to a regular diet as you feel able.   You may have a slight fever.  Call the doctor if your fever is over 100 F (37.7 C) (taken under the tongue) or lasts longer than 24 hours.  You may have a dry mouth, a sore throat, muscle aches or trouble sleeping. These should go away after 24 hours.  Do not make important or legal decisions.   It is recommended to avoid smoking.               Tips for taking pain medications  To get the best pain relief possible, remember these points:  Take pain medications as directed, before pain becomes severe.  Pain medication can upset your stomach: taking it with food may help.  Constipation is a common side effect of pain medication. Drink plenty of  fluids.  Eat foods high in fiber. Take a stool softener if recommended by your doctor or pharmacist.  Do not drink alcohol, drive or operate machinery while taking pain medications.  Ask about other ways to control pain, such as with heat, ice or relaxation.    Tylenol/Acetaminophen Consumption    If you feel your pain relief is insufficient, you may take Tylenol/Acetaminophen in addition to your narcotic pain medication.   Be careful not to exceed 4,000 mg of Tylenol/Acetaminophen in a 24 hour  period from all sources.  If you are taking extra strength Tylenol/acetaminophen (500 mg), the maximum dose is 8 tablets in 24 hours.  If you are taking regular strength acetaminophen (325 mg), the maximum dose is 12 tablets in 24 hours.  Tylenol 975 mg given at 12 pm.   Ok to take more after 6 pm.       Call a doctor for any of the following:  Signs of infection (fever, growing tenderness at the surgery site, a large amount of drainage or bleeding, severe pain, foul-smelling drainage, redness, swelling).  It has been over 8 to 10 hours since surgery and you are still not able to urinate (pass water).  Headache for over 24 hours.  Numbness, tingling or weakness the day after surgery (if you had spinal anesthesia).  Signs of Covid-19 infection (temperature over 100 degrees, shortness of breath, cough, loss of taste/smell, generalized body aches, persistent headache, chills, sore throat, nausea/vomiting/diarrhea)    Your doctor is:       Dr. Jayden Boyle, Prostate and Urology: [phone]               After hours and weekends call the hospital @ [phone] and ask for the resident on call for:  Prostate Urology  For emergency care, call the:  [facility]:  [phone] (TTY for hearing impaired: [phone])

## 2025-02-24 NOTE — ANESTHESIA CARE TRANSFER NOTE
Patient: Nery Whitaker    Procedure: Procedure(s):  CYSTOSCOPY, WITH RETROGRADE PYELOGRAM AND URETERAL STENT REPLACEMENT, cystogram       Diagnosis: Stricture of right ureter [N13.5]  Diagnosis Additional Information: No value filed.    Anesthesia Type:   MAC     Note:    Oropharynx: oropharynx clear of all foreign objects and spontaneously breathing  Level of Consciousness: awake  Oxygen Supplementation: room air    Independent Airway: airway patency satisfactory and stable  Dentition: dentition unchanged  Vital Signs Stable: post-procedure vital signs reviewed and stable  Report to RN Given: handoff report given  Patient transferred to: Phase II    Handoff Report: Identifed the Patient, Identified the Reponsible Provider, Reviewed the pertinent medical history, Discussed the surgical course, Reviewed Intra-OP anesthesia mangement and issues during anesthesia, Set expectations for post-procedure period and Allowed opportunity for questions and acknowledgement of understanding      Vitals:  Vitals Value Taken Time   /85 02/24/25 1242   Temp 36.4  C (97.5  F) 02/24/25 1242   Pulse 63 02/24/25 1242   Resp 17 02/24/25 1242   SpO2 99 % 02/24/25 1246   Vitals shown include unfiled device data.    Electronically Signed By: GERALD Hale CRNA  February 24, 2025  12:47 PM

## 2025-02-24 NOTE — ADDENDUM NOTE
Addendum  created 02/24/25 1247 by Shilpa Garcia APRN CRNA    Clinical Note Signed, Intraprocedure Event edited, Intraprocedure Staff edited

## 2025-02-24 NOTE — ANESTHESIA POSTPROCEDURE EVALUATION
Patient: Nery Whitaker    Procedure: Procedure(s):  CYSTOSCOPY, WITH RETROGRADE PYELOGRAM AND URETERAL STENT REPLACEMENT, cystogram       Anesthesia Type:  MAC    Note:  Disposition: Outpatient   Postop Pain Control: Uneventful            Sign Out: Well controlled pain   PONV: No   Neuro/Psych: Uneventful            Sign Out: Acceptable/Baseline neuro status   Airway/Respiratory: Uneventful            Sign Out: Acceptable/Baseline resp. status   CV/Hemodynamics: Uneventful            Sign Out: Acceptable CV status; No obvious hypovolemia; No obvious fluid overload   Other NRE: NONE   DID A NON-ROUTINE EVENT OCCUR? No           Last vitals:  Vitals Value Taken Time   BP     Temp     Pulse     Resp     SpO2         Electronically Signed By: Rodney Martinez MD  February 24, 2025  12:42 PM

## 2025-02-24 NOTE — BRIEF OP NOTE
[facility]    Brief Operative Note    Pre-operative diagnosis: Stricture of right ureter [N13.5]  Post-operative diagnosis Same as pre-operative diagnosis    Procedure: CYSTOSCOPY, WITH RETROGRADE PYELOGRAM AND URETERAL STENT REPLACEMENT, cystogram, Right - Urethra  Cystogram     Surgeon: Surgeons and Role:     * Jayden Boyle MD - Primary     * Carmelina Celaya MD - Resident - Assisting  Anesthesia: MAC   Estimated Blood Loss: None    Drains: None  Specimens: * No specimens in log *  Findings: 4 cm stricture of midureter; no hydronephrosis; distended bladder reaches pelvic brim; 450-475 ml bladder capacity     Complications: None.    Implants:   Implant Name Type Inv. Item Serial No.  Lot No. LRB No. Used Action   STENT, URETERAL, 6FR X 26CM, HYDROPLUS COATING, WITHOUT WIRE, PERCUFLEX PLUS - [id number] Stent STENT, URETERAL, 6FR X 26CM, HYDROPLUS COATING, WITHOUT WIRE, PERCUFLEX PLUS   [id number] Right 1 Explanted   STENT URETERAL PERCUFLEX PLUS [id number] - [id number] Stent STENT URETERAL PERCUFLEX PLUS [id number]  [employer] [id number] Right 1 Implanted

## 2025-03-07 PROBLEM — F12.90 CANNABINOID HYPEREMESIS SYNDROME: Status: ACTIVE | Noted: 2024-12-13

## 2025-03-07 PROBLEM — R11.2 CANNABINOID HYPEREMESIS SYNDROME: Status: ACTIVE | Noted: 2024-12-13

## 2025-04-15 NOTE — CONFIDENTIAL NOTE
RECORDS RECEIVED FROM: internal    DATE RECEIVED:  7.11.25    NOTES (FOR ALL VISITS) STATUS DETAILS   OFFICE NOTES from referring provider internal    Priscila Lombardo APRN CNP      MEDICATION LIST internal     IMAGING      CT (HEAD/NECK/CHEST/ABDOMEN) internal  12.13.24, 10.30.24, 9.20.24, 5.22.24, 4.12.24, 1.14.24, 8.16.23, 7.5.23    LABS     DIABETES: HBGA1C, CREATININE, FASTING LIPIDS, MICROALBUMIN URINE, POTASSIUM, TSH, T4    THYROID: TSH, T4, CBC, THYRODLONULIN, TOTAL T3, FREE T4, CALCITONIN, CEA internal / ce

## 2025-04-23 ENCOUNTER — VIRTUAL VISIT (OUTPATIENT)
Dept: UROLOGY | Facility: CLINIC | Age: 60
End: 2025-04-23
Payer: COMMERCIAL

## 2025-04-23 DIAGNOSIS — N13.5 STRICTURE OF RIGHT URETER: Primary | ICD-10-CM

## 2025-04-23 DIAGNOSIS — N13.5 URETERAL STRICTURE: Primary | ICD-10-CM

## 2025-04-23 RX ORDER — ACETAMINOPHEN 325 MG/1
975 TABLET ORAL ONCE
OUTPATIENT
Start: 2025-04-23 | End: 2025-04-23

## 2025-04-23 RX ORDER — ACETAMINOPHEN 650 MG/1
650 SUPPOSITORY RECTAL ONCE
OUTPATIENT
Start: 2025-04-23

## 2025-04-23 RX ORDER — CEFAZOLIN SODIUM 2 G/50ML
2 SOLUTION INTRAVENOUS SEE ADMIN INSTRUCTIONS
OUTPATIENT
Start: 2025-04-23

## 2025-04-23 RX ORDER — CEFAZOLIN SODIUM 2 G/50ML
2 SOLUTION INTRAVENOUS
OUTPATIENT
Start: 2025-04-23

## 2025-04-23 ASSESSMENT — PAIN SCALES - GENERAL: PAINLEVEL_OUTOF10: NO PAIN (0)

## 2025-04-23 NOTE — NURSING NOTE
Current patient location: [address] [facility] [address]    Is the patient currently in the state of [place]? YES    Visit mode: VIDEO    If the visit is dropped, the patient can be reconnected by:VIDEO VISIT: Text to cell phone:   Telephone Information:   Mobile [phone]       Will anyone else be joining the visit? NO  (If patient encounters technical issues they should call [phone]    Are changes needed to the allergy or medication list? No    Are refills needed on medications prescribed by this physician? NO    Rooming Documentation:  Questionnaire(s) not done per department protocol    Reason for visit: RECHECK Shelby Kocher VVF

## 2025-04-23 NOTE — PROGRESS NOTES
Virtual Visit Details    Type of service:  Video Visit     Originating Location (pt. Location): Home    Distant Location (provider location):  On-site  Platform used for Video Visit: Mara  [phone]    Nery Whitaker is 59 year old female.  She has history of lupus.  She has a history of vascular disease.  She had stenting in her native iliacs.  S/p aortobifemoral bypass (laparoscopic hand assist) by Dr. Escobar in 2006.  She was doing well for years. Then most recently in Sept she had a CT scan which showed significant right hydronephrosis.  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  There is a transition point where the ureter crosses the bypass graft.  She takes warfarin.  She has a stent in place.  We changed the stent in the OR in Feb 2025.  There is a clear obstruction at the site of the graft crossing.  It's unclear if extrinsic or intrinsic.     Exam:           Constitutional - No apparent distress. Patient of stated age.               Eyes - no redness or discharge.              Respiratory - no cough. no labored breathing              Musculoskeletal - full range of motion in all extremities              Skin - no visible skin discoloration or lesions              Neurological - no tremor               Psychiatric - no anxiety, alert & oriented                 I personally reviewed Cr which was 1.38 on 10/30/2024.     I personally reviewed numerous CT scans most recently from 9/2024 which shows right hydronephrosis down to level of aortobifem graft.     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  We discussed ureteral reconstruction.  We also discussed nephrectomy.  Ureteral reconstruction is likely ureterolysis. Likely UU (I worry the ureter passes completely under bypass graft).  Thus, we'll plan to explore, ureterolysis. If completely frees, then we'll ureteroscope during the case. It's possible it's ureterolysis with omental flap only.  If it  doesn't then it's likely an augmented anastomotic with buccal graft. I don't think she has an appendix.  Discussed risks of major bowel or bleeding issue.  She has some work considerations.  For now, we plan stent change.  She wants robotic repair eventually.  Thus, I discussed to see if it's more of an RPF or more of an intrinsic stricture, we should do ureteroscopy during next stent change.  My hope is it's just RPF from the graft and thus ureterolysis with omental flap would be viable option for repair.   Due to this in the fall 2025.    Jayden Boyle MD

## 2025-04-23 NOTE — LETTER
4/23/2025       RE: Nery Whitaker  [id number]  [facility] [id number]     Dear Colleague,    Thank you for referring your patient, Nery Whitaker, to the [facility] [place] at [facility]. Please see a copy of my visit note below.    Virtual Visit Details    Type of service:  Video Visit     Originating Location (pt. Location): Home    Distant Location (provider location):  On-site  Platform used for Video Visit: BookFresh  [phone]    Nery Whitaker is 59 year old female.  She has history of lupus.  She has a history of vascular disease.  She had stenting in her native iliacs.  S/p aortobifemoral bypass (laparoscopic hand assist) by Dr. Escobar in 2006.  She was doing well for years. Then most recently in Sept she had a CT scan which showed significant right hydronephrosis.  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  There is a transition point where the ureter crosses the bypass graft.  She takes warfarin.  She has a stent in place.  We changed the stent in the OR in Feb 2025.  There is a clear obstruction at the site of the graft crossing.  It's unclear if extrinsic or intrinsic.     Exam:           Constitutional - No apparent distress. Patient of stated age.               Eyes - no redness or discharge.              Respiratory - no cough. no labored breathing              Musculoskeletal - full range of motion in all extremities              Skin - no visible skin discoloration or lesions              Neurological - no tremor               Psychiatric - no anxiety, alert & oriented                 I personally reviewed Cr which was 1.38 on 10/30/2024.     I personally reviewed numerous CT scans most recently from 9/2024 which shows right hydronephrosis down to level of aortobifem graft.     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  We discussed ureteral reconstruction.  We also  discussed nephrectomy.  Ureteral reconstruction is likely ureterolysis. Likely UU (I worry the ureter passes completely under bypass graft).  Thus, we'll plan to explore, ureterolysis. If completely frees, then we'll ureteroscope during the case. It's possible it's ureterolysis with omental flap only.  If it doesn't then it's likely an augmented anastomotic with buccal graft. I don't think she has an appendix.  Discussed risks of major bowel or bleeding issue.  She has some work considerations.  For now, we plan stent change.  She wants robotic repair eventually.  Thus, I discussed to see if it's more of an RPF or more of an intrinsic stricture, we should do ureteroscopy during next stent change.  My hope is it's just RPF from the graft and thus ureterolysis with omental flap would be viable option for repair.   Due to this in the fall 2025.    Jayden Boyle MD      Again, thank you for allowing me to participate in the care of your patient.      Sincerely,    Jayden Boyle MD

## 2025-04-28 ENCOUNTER — TELEPHONE (OUTPATIENT)
Dept: UROLOGY | Facility: CLINIC | Age: 60
End: 2025-04-28
Payer: COMMERCIAL

## 2025-04-28 NOTE — TELEPHONE ENCOUNTER
Spoke with Ash (spouse) - Pt was at work. Ash took writers information down and will have patient call writer back. Provided call back number of [phone]. Klaudia Dorsey on 4/28/2025 at 10:38 AM

## 2025-05-01 NOTE — TELEPHONE ENCOUNTER
Called patient to schedule surgery with Dr Boyle    Spoke with: Nery (patient)     Date of Surgery: 9/22/25 (pt choice)    Estimated Arrival time Discussed with Patient:  Yes    Location of surgery: [facility]     Pre-Op H&P: [facility]    Labs: Not Applicable     Imaging: Not Applicable     Post-Op Appt Date: Not indicated in Case Request        Post-Op Imaging Date:  Not Applicable     Discussed with patient pre-op RN will call 2-3 days prior to surgery with arrival time and instructions:  Yes     Standard Surgery Packet Sent: Yes 05/01/25  via Mail - Standard      Additional Comments:       All patients questions were answered and was instructed to review surgical packet and call back with any questions or concerns.       Klaudia Dorsey on 5/1/2025 at 5:08 PM

## 2025-06-06 DIAGNOSIS — F41.1 ANXIETY STATE: ICD-10-CM

## 2025-06-09 RX ORDER — CITALOPRAM HYDROBROMIDE 40 MG/1
40 TABLET ORAL DAILY
Qty: 90 TABLET | Refills: 1 | Status: SHIPPED | OUTPATIENT
Start: 2025-06-09

## 2025-06-16 ENCOUNTER — TELEPHONE (OUTPATIENT)
Dept: UROLOGY | Facility: CLINIC | Age: 60
End: 2025-06-16
Payer: COMMERCIAL

## 2025-06-16 DIAGNOSIS — R39.89 SUSPECTED UTI: Primary | ICD-10-CM

## 2025-06-18 ENCOUNTER — LAB (OUTPATIENT)
Dept: LAB | Facility: CLINIC | Age: 60
End: 2025-06-18
Payer: COMMERCIAL

## 2025-06-18 DIAGNOSIS — R39.89 SUSPECTED UTI: ICD-10-CM

## 2025-06-18 LAB
ALBUMIN UR-MCNC: >=300 MG/DL
APPEARANCE UR: CLEAR
BACTERIA #/AREA URNS HPF: ABNORMAL /HPF
BILIRUB UR QL STRIP: ABNORMAL
COLOR UR AUTO: YELLOW
GLUCOSE UR STRIP-MCNC: NEGATIVE MG/DL
HGB UR QL STRIP: ABNORMAL
KETONES UR STRIP-MCNC: ABNORMAL MG/DL
LEUKOCYTE ESTERASE UR QL STRIP: ABNORMAL
MUCOUS THREADS #/AREA URNS LPF: PRESENT /LPF
NITRATE UR QL: NEGATIVE
PH UR STRIP: 6 [PH] (ref 5–8)
RBC #/AREA URNS AUTO: ABNORMAL /HPF
SP GR UR STRIP: >=1.03 (ref 1–1.03)
SQUAMOUS #/AREA URNS AUTO: ABNORMAL /LPF
TRANS CELLS #/AREA URNS HPF: ABNORMAL /HPF
UROBILINOGEN UR STRIP-ACNC: 0.2 E.U./DL
WBC #/AREA URNS AUTO: ABNORMAL /HPF

## 2025-06-18 PROCEDURE — 81001 URINALYSIS AUTO W/SCOPE: CPT

## 2025-06-18 PROCEDURE — 87086 URINE CULTURE/COLONY COUNT: CPT

## 2025-06-19 LAB — BACTERIA UR CULT: NORMAL

## 2025-07-07 ENCOUNTER — TELEPHONE (OUTPATIENT)
Dept: UROLOGY | Facility: CLINIC | Age: 60
End: 2025-07-07
Payer: COMMERCIAL

## 2025-07-07 NOTE — TELEPHONE ENCOUNTER
Called patient to reschedule surgery with Dr Boyle     Spoke with: Nery (patient)      Date of Surgery: 7/25/25     Estimated Arrival time Discussed with Patient:  Yes     Location of surgery: [facility]      Pre-Op H&P: [facility]     Labs: Not Applicable      Imaging: Not Applicable      Post-Op Appt Date: Not indicated in Case Request         Post-Op Imaging Date:  Not Applicable      Discussed with patient pre-op RN will call 2-3 days prior to surgery with arrival time and instructions:  Yes      Standard Surgery Packet Sent: Yes 7/7/25 via [employer]      Additional Comments:         All patients questions were answered and was instructed to review surgical packet and call back with any questions or concerns.     Klaudia Dorsey on 7/7/2025 at 3:57 PM

## 2025-07-07 NOTE — TELEPHONE ENCOUNTER
[facility]    Phone Message    May a detailed message be left on voicemail: yes     Reason for Call: Other: patient called in requesting to reschedule her surgery with Dr Boyle that is currently scheduled for 9/19/25. Patient is in pain and would like to get this done in July. Please review and call her back to discuss.      Action Taken: Message routed to:  [facility] uro    Travel Screening: Not Applicable     Date of Service:

## 2025-07-07 NOTE — TELEPHONE ENCOUNTER
Patient left voicemail stating she would be agreeable to 7/25 surgery date with Dr. Boyle as long as arrival time would be around 9 or 10AM as her  will be getting home at 8AM and will need to leave again at 3PM.       Thelma Velazquez on 7/7/2025 at 2:47 PM

## 2025-07-07 NOTE — TELEPHONE ENCOUNTER
Called patient, LVM asking for a call back to move surgery up into July. Writer is holding time on 7/25. Klaudia Dorsey on 7/7/2025 at 1:51 PM

## 2025-07-11 ENCOUNTER — PRE VISIT (OUTPATIENT)
Dept: ENDOCRINOLOGY | Facility: CLINIC | Age: 60
End: 2025-07-11

## 2025-07-14 ENCOUNTER — OFFICE VISIT (OUTPATIENT)
Dept: FAMILY MEDICINE | Facility: CLINIC | Age: 60
End: 2025-07-14
Payer: COMMERCIAL

## 2025-07-14 VITALS
OXYGEN SATURATION: 98 % | WEIGHT: 115.19 LBS | HEART RATE: 57 BPM | SYSTOLIC BLOOD PRESSURE: 164 MMHG | BODY MASS INDEX: 19.19 KG/M2 | DIASTOLIC BLOOD PRESSURE: 81 MMHG | HEIGHT: 65 IN | TEMPERATURE: 98.7 F | RESPIRATION RATE: 12 BRPM

## 2025-07-14 DIAGNOSIS — I10 ESSENTIAL HYPERTENSION: ICD-10-CM

## 2025-07-14 DIAGNOSIS — E78.5 DYSLIPIDEMIA: ICD-10-CM

## 2025-07-14 DIAGNOSIS — F12.13 CANNABIS ABUSE WITH WITHDRAWAL (H): ICD-10-CM

## 2025-07-14 DIAGNOSIS — R01.1 HEART MURMUR: ICD-10-CM

## 2025-07-14 DIAGNOSIS — I77.9 BILATERAL CAROTID ARTERY DISEASE, UNSPECIFIED TYPE: ICD-10-CM

## 2025-07-14 DIAGNOSIS — Z01.818 PREOP GENERAL PHYSICAL EXAM: Primary | ICD-10-CM

## 2025-07-14 DIAGNOSIS — Z79.01 LONG TERM (CURRENT) USE OF ANTICOAGULANTS: ICD-10-CM

## 2025-07-14 DIAGNOSIS — N13.5 OBSTRUCTION OF RIGHT URETER: ICD-10-CM

## 2025-07-14 PROBLEM — Z00.00 ROUTINE GENERAL MEDICAL EXAMINATION AT A HEALTH CARE FACILITY: Status: RESOLVED | Noted: 2022-02-25 | Resolved: 2025-07-14

## 2025-07-14 PROBLEM — N17.9 ACUTE RENAL FAILURE SUPERIMPOSED ON CHRONIC KIDNEY DISEASE, UNSPECIFIED ACUTE RENAL FAILURE TYPE, UNSPECIFIED CKD STAGE: Status: RESOLVED | Noted: 2024-12-13 | Resolved: 2025-07-14

## 2025-07-14 PROBLEM — I95.9 TRANSIENT HYPOTENSION: Status: RESOLVED | Noted: 2024-12-13 | Resolved: 2025-07-14

## 2025-07-14 PROBLEM — G43.909 MIGRAINE: Status: RESOLVED | Noted: 2018-04-11 | Resolved: 2025-07-14

## 2025-07-14 PROBLEM — F12.90 CANNABINOID HYPEREMESIS SYNDROME: Status: RESOLVED | Noted: 2024-12-13 | Resolved: 2025-07-14

## 2025-07-14 PROBLEM — Z09 HOSPITAL DISCHARGE FOLLOW-UP: Status: RESOLVED | Noted: 2024-12-17 | Resolved: 2025-07-14

## 2025-07-14 PROBLEM — R55 SYNCOPE, UNSPECIFIED SYNCOPE TYPE: Status: RESOLVED | Noted: 2024-12-13 | Resolved: 2025-07-14

## 2025-07-14 PROBLEM — N18.9 ACUTE RENAL FAILURE SUPERIMPOSED ON CHRONIC KIDNEY DISEASE, UNSPECIFIED ACUTE RENAL FAILURE TYPE, UNSPECIFIED CKD STAGE: Status: RESOLVED | Noted: 2024-12-13 | Resolved: 2025-07-14

## 2025-07-14 PROBLEM — H81.11 BENIGN PAROXYSMAL POSITIONAL VERTIGO OF RIGHT EAR: Status: RESOLVED | Noted: 2022-04-27 | Resolved: 2025-07-14

## 2025-07-14 PROBLEM — R11.2 CANNABINOID HYPEREMESIS SYNDROME: Status: RESOLVED | Noted: 2024-12-13 | Resolved: 2025-07-14

## 2025-07-14 LAB
ANION GAP SERPL CALCULATED.3IONS-SCNC: 12 MMOL/L (ref 7–15)
BUN SERPL-MCNC: 14.8 MG/DL (ref 8–23)
CALCIUM SERPL-MCNC: 9.4 MG/DL (ref 8.8–10.4)
CHLORIDE SERPL-SCNC: 106 MMOL/L (ref 98–107)
CREAT SERPL-MCNC: 0.96 MG/DL (ref 0.51–0.95)
EGFRCR SERPLBLD CKD-EPI 2021: 68 ML/MIN/1.73M2
GLUCOSE SERPL-MCNC: 91 MG/DL (ref 70–99)
HCO3 SERPL-SCNC: 23 MMOL/L (ref 22–29)
HGB BLD-MCNC: 11.5 G/DL (ref 11.7–15.7)
MCV RBC AUTO: 94 FL (ref 78–100)
POTASSIUM SERPL-SCNC: 3.9 MMOL/L (ref 3.4–5.3)
SODIUM SERPL-SCNC: 141 MMOL/L (ref 135–145)

## 2025-07-14 PROCEDURE — G2211 COMPLEX E/M VISIT ADD ON: HCPCS

## 2025-07-14 PROCEDURE — 99214 OFFICE O/P EST MOD 30 MIN: CPT

## 2025-07-14 PROCEDURE — 85018 HEMOGLOBIN: CPT

## 2025-07-14 PROCEDURE — 93005 ELECTROCARDIOGRAM TRACING: CPT

## 2025-07-14 PROCEDURE — 3078F DIAST BP <80 MM HG: CPT

## 2025-07-14 PROCEDURE — 3077F SYST BP >= 140 MM HG: CPT

## 2025-07-14 PROCEDURE — 36415 COLL VENOUS BLD VENIPUNCTURE: CPT

## 2025-07-14 PROCEDURE — 80048 BASIC METABOLIC PNL TOTAL CA: CPT

## 2025-07-14 ASSESSMENT — ANXIETY QUESTIONNAIRES
1. FEELING NERVOUS, ANXIOUS, OR ON EDGE: NOT AT ALL
GAD7 TOTAL SCORE: 0
8. IF YOU CHECKED OFF ANY PROBLEMS, HOW DIFFICULT HAVE THESE MADE IT FOR YOU TO DO YOUR WORK, TAKE CARE OF THINGS AT HOME, OR GET ALONG WITH OTHER PEOPLE?: NOT DIFFICULT AT ALL
GAD7 TOTAL SCORE: 0
IF YOU CHECKED OFF ANY PROBLEMS ON THIS QUESTIONNAIRE, HOW DIFFICULT HAVE THESE PROBLEMS MADE IT FOR YOU TO DO YOUR WORK, TAKE CARE OF THINGS AT HOME, OR GET ALONG WITH OTHER PEOPLE: NOT DIFFICULT AT ALL
4. TROUBLE RELAXING: NOT AT ALL
7. FEELING AFRAID AS IF SOMETHING AWFUL MIGHT HAPPEN: NOT AT ALL
7. FEELING AFRAID AS IF SOMETHING AWFUL MIGHT HAPPEN: NOT AT ALL
2. NOT BEING ABLE TO STOP OR CONTROL WORRYING: NOT AT ALL
6. BECOMING EASILY ANNOYED OR IRRITABLE: NOT AT ALL
3. WORRYING TOO MUCH ABOUT DIFFERENT THINGS: NOT AT ALL
5. BEING SO RESTLESS THAT IT IS HARD TO SIT STILL: NOT AT ALL
GAD7 TOTAL SCORE: 0

## 2025-07-14 ASSESSMENT — PATIENT HEALTH QUESTIONNAIRE - PHQ9
SUM OF ALL RESPONSES TO PHQ QUESTIONS 1-9: 2
10. IF YOU CHECKED OFF ANY PROBLEMS, HOW DIFFICULT HAVE THESE PROBLEMS MADE IT FOR YOU TO DO YOUR WORK, TAKE CARE OF THINGS AT HOME, OR GET ALONG WITH OTHER PEOPLE: NOT DIFFICULT AT ALL
SUM OF ALL RESPONSES TO PHQ QUESTIONS 1-9: 2

## 2025-07-14 NOTE — ASSESSMENT & PLAN NOTE
Blood pressure today in clinic was elevated x 2.  Last she was seen by myself in clinic, she was normotensive after recent hospitalization we had opted to hold her lisinopril.  She believes today that she has resumed taking this.  She is in quite a degree of pain secondary to her stent per her report.  I have asked that she monitor her blood pressures at home over the next week or 2 for me and respond back.  If continues to be elevated above goal, would recommend increasing lisinopril to 40 mg daily.  Updated monitoring labs today.

## 2025-07-14 NOTE — ASSESSMENT & PLAN NOTE
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  She tolerates this without side effects and reports that she is consistent in taking this medication.  With previous stent exchanges, she has been told to hold her Xarelto for 2 days prior and aspirin for 7 days and this was confirmed via chart review.  She will repeat this same hold for her upcoming procedure.

## 2025-07-14 NOTE — PROGRESS NOTES
Preoperative Evaluation  [facility]  [address]  [facility] [address]  Phone: [phone]  Fax: [phone]  Primary Provider: GERALD Crandall CNP  Pre-op Performing Provider: GERALD Crandall CNP  Jul 14, 2025 7/14/2025   Surgical Information   What procedure is being done? preop   Facility or Hospital where procedure/surgery will be performed: [facility]   Who is doing the procedure / surgery?    Date of surgery / procedure: july 25,2025   Time of surgery / procedure: ureter stent replaced   Where do you plan to recover after surgery? at home with family     Fax number for surgical facility: Note does not need to be faxed, will be available electronically in Epic.    Assessment & Plan     The proposed surgical procedure is considered INTERMEDIATE risk.    Assessment & Plan  Preop general physical exam  Patient is aware that specific preoperative instruction and all postoperative guidance will come from her surgical team.  We discussed n.p.o. recommendations, antiseptic bathing, necessary diagnostics and all medication adjustments.  All questions were answered.  Orders:    Basic metabolic panel  (Ca, Cl, CO2, Creat, Gluc, K, Na, BUN); Future    Hemoglobin; Future    EKG 12-lead, tracing only    Obstruction of right ureter  Indication for planned procedure.  Urology notes reviewed.  Patient has this longstanding history of right sided hydronephrosis and has had stents placed for this.  She has a complex picture including a previous aortofemoral bypass at the site, which urology believes is likely complicating stricture.  Plan is for stent exchange at this point, however they are planning for more extensive explorative surgery in the fall.       Bilateral carotid artery disease, unspecified type  Follows with vascular via [facility].  She is currently on Repatha for this and dyslipidemia, however she reports that she is overdue  with her cardiologist who prescribes the Repatha therefore she has not been taking this consistently.  She does have approximately 3 months worth of this medication at home and I strongly encouraged her to start this while reaching out to her vascular/cardiology folks through [facility] to inquire about additional prescriptions.        Cannabis abuse with withdrawal (H)  Patient reports continued abstinence from cannabis products and feels quite well as a result.       Dyslipidemia  Current therapies include Repatha, which as documented elsewhere she has actually been inconsistent in taking.  She is due for follow-up with her vascular/cardiology specialist teams at [facility] and encouraged her to do this today.       Essential hypertension  Blood pressure today in clinic was elevated x 2.  Last she was seen by myself in clinic, she was normotensive after recent hospitalization we had opted to hold her lisinopril.  She believes today that she has resumed taking this.  She is in quite a degree of pain secondary to her stent per her report.  I have asked that she monitor her blood pressures at home over the next week or 2 for me and respond back.  If continues to be elevated above goal, would recommend increasing lisinopril to 40 mg daily.  Updated monitoring labs today.       Heart murmur  Stable.  Was last evaluated via echocardiogram in 12/2024.  She denies any new concerning symptoms of lower extremity edema, activity intolerance, shortness of breath or orthopnea.       Long term (current) use of anticoagulants  Patient is on Xarelto and aspirin for her history of thrombosed aortofemoral bypass secondary to lupus anticoagulant/factor X.  She tolerates this without side effects and reports that she is consistent in taking this medication.  With previous stent exchanges, she has been told to hold her Xarelto for 2 days prior and aspirin for 7 days and this was confirmed via chart review.  She will repeat this same hold for  her upcoming procedure.        - No identified additional risk factors other than previously addressed    Preoperative Medication Instructions  Antiplatelet or Anticoagulation Medication Instructions   - aspirin: Discontinue aspirin 7 days prior to procedure to reduce bleeding risk. It should be resumed postoperatively.    - apixaban (Eliquis), edoxaban (Savaysa), rivaroxaban (Xarelto): Bleeding risk is moderate or high for this procedure AND CrCl  (>=) 50 mL/min. DO NOT TAKE 2 days before surgery.     Additional Medication Instructions  Take all scheduled medications on the day of surgery EXCEPT for modifications listed below:   - Herbal medications and vitamins: DO NOT TAKE 14 days prior to surgery.   - ACE/ARB/ARNI (lisinopril, enalapril, losartan, valsartan, olmesartan, sacubritril/valsartan) : DO NOT TAKE on day of surgery (minimum 11 hours for general anesthesia).    Recommendation  Approval given to proceed with proposed procedure, without further diagnostic evaluation.    The longitudinal plan of care for the diagnosis(es)/condition(s) as documented were addressed during this visit. Due to the added complexity in care, I will continue to support Nery in the subsequent management and with ongoing continuity of care.    Arsenio Gabriel is a 60 year old, presenting for the following:  Pre-Op Exam (Ureter stent)          7/14/2025     1:28 PM   Additional Questions   Roomed by sac   Accompanied by self     HPI: Patient has this longstanding history of right sided hydronephrosis secondary to ureteral stricture and has had stents placed for this. This is a planned procedure for stent exchange.           7/14/2025   Pre-Op Questionnaire   Have you ever had a heart attack or stroke? No   Have you ever had surgery on your heart or blood vessels, such as a stent placement, a coronary artery bypass, or surgery on an artery in your head, neck, heart, or legs? (!) YES -history of bypass   Do you have chest pain with  activity? No   Do you have a history of heart failure? No   Do you currently have a cold, bronchitis or symptoms of other infection? No   Do you have a cough, shortness of breath, or wheezing? No   Do you or anyone in your family have previous history of blood clots? (!) YES -history of thrombosed bypass secondary to factor X and lupus anticoagulant.   Do you or does anyone in your family have a serious bleeding problem such as prolonged bleeding following surgeries or cuts? No   Have you ever had problems with anemia or been told to take iron pills? No   Have you had any abnormal blood loss such as black, tarry or bloody stools, or abnormal vaginal bleeding? No   Have you ever had a blood transfusion? No   Are you willing to have a blood transfusion if it is medically needed before, during, or after your surgery? Yes   Have you or any of your relatives ever had problems with anesthesia? No   Do you have sleep apnea, excessive snoring or daytime drowsiness? No   Do you have any artifical heart valves or other implanted medical devices like a pacemaker, defibrillator, or continuous glucose monitor? No   Do you have artificial joints? No   Are you allergic to latex? No     Advance Care Planning    Discussed advance care planning with patient; informed AVS has link to [employer] [employer].    Preoperative Review of    reviewed - no record of controlled substances prescribed.      Status of Chronic Conditions:  See problem list for active medical problems.  Problems all longstanding and stable, except as noted/documented.  See ROS for pertinent symptoms related to these conditions.    Patient Active Problem List    Diagnosis Date Noted    Stricture of right ureter 02/08/2025     Priority: Medium    Elevated serum free T4 level 12/13/2024     Priority: Medium    Hydronephrosis with ureteral stricture, not elsewhere classified 09/25/2024     Priority: Medium    Epigastric abdominal pain 07/28/2023     Priority: Medium     Heart murmur 07/28/2023     Priority: Medium    Long term (current) use of anticoagulants 09/26/2022     Priority: Medium    Age-related osteoporosis without current pathological fracture 04/27/2022     Priority: Medium    Thoracic spine pain 02/09/2022     Priority: Medium     Added automatically from request for surgery [id number]      Atypical lymphocytosis 01/12/2019     Priority: Medium    Adenomatous colon polyp 11/04/2018     Priority: Medium    Normocytic anemia 10/22/2018     Priority: Medium     Continue to follow. History of polyps.  Colonoscopy scheduled for 2022.      Anxiety state 04/11/2018     Priority: Medium    PAD (peripheral artery disease) 04/11/2018     Priority: Medium     Overview:   Premature aortoiliac occlusive disease with claudication, s/p bilateral iliac stents 1/17/2006 complicated by acute thrombosis and ao-bifem bypass 2/15/06    Formatting of this note might be different from the original.  Premature aortoiliac occlusive disease with claudication, s/p bilateral iliac stents 1/17/2006 complicated by acute thrombosis and ao-bifem bypass 2/15/06  Formatting of this note might be different from the original.  Overview:   Premature aortoiliac occlusive disease with claudication, s/p bilateral iliac stents 1/17/2006 complicated by acute thrombosis and ao-bifem bypass 2/15/06  Overview:   Premature aortoiliac occlusive disease with claudication  S/P bilateral iliac   stents 1/17/2006 complicated by acute thrombosis resulting in rest pain    Last Assessment & Plan:   Formatting of this note might be different from the original.  Anticoagulated. Continued follow up with vascular specialist.  Formatting of this note might be different from the original.  Premature aortoiliac occlusive disease with claudication  S/P bilateral iliac   stents 1/17/2006 complicated by acute thrombosis resulting in rest pain      Tobacco use disorder 04/11/2018     Priority: Medium    Bilateral carotid artery  disease 12/02/2017     Priority: Medium    Diverticulosis of large intestine without hemorrhage 12/02/2017     Priority: Medium    Dyslipidemia 12/02/2017     Priority: Medium    Elevated lipoprotein(a) 12/02/2017     Priority: Medium    Essential hypertension 12/02/2017     Priority: Medium     Well controlled.  Check BMP.        Hypercoagulable state 12/02/2017     Priority: Medium     History of positive lupus anticoagulant twice, subsequently negative      Nocturnal enuresis 12/02/2017     Priority: Medium    Thoracic neuralgia 08/08/2017     Priority: Medium      Past Medical History:   Diagnosis Date    Abdominal pain, epigastric 12/02/2017    Abnormal cardiovascular stress test 12/13/2017    Acute renal failure superimposed on chronic kidney disease, unspecified acute renal failure type, unspecified CKD stage 12/13/2024    Adenomatous colon polyp     Anemia     Antiplatelet or antithrombotic long-term use     Anxiety     Arthritis     Atypical lymphocytosis     Bilateral carotid artery disease     Cannabinoid hyperemesis syndrome 12/13/2024    Cannabis abuse with withdrawal (H) 12/17/2024    COPD (chronic obstructive pulmonary disease) (H)     Coronary artery disease     Dyslipidemia     Foreign body in left foot     Foreign body in left foot, subsequent encounter 10/11/2018    Heart murmur     Hydronephrosis with ureteral stricture, not elsewhere classified     Hyperlipidemia     Hypertension     Intermittent palpitations 12/02/2017    Migraines     Nausea and vomiting, unspecified vomiting type 12/13/2024    Newly recognized murmur 10/18/2018    Osteoporosis     PAD (peripheral artery disease)     Pain in thoracic spine     Peripheral vascular disease     Pulmonary hypertension (H)     Pure hyperglyceridemia 09/02/2021    Formatting of this note might be different from the original. Has been intoleratant to several medications.    .    Stented coronary artery     Transient hypotension 12/13/2024    Urge  incontinence of urine 11/04/2018    Last Assessment & Plan:  Symptoms most consistent with urge incontinence. Ultrasound ordered to evaluate for any issues with incomplete emptying. Trial of Detrol LA 2 mg daily.  Last Assessment & Plan:  Formatting of this note might be different from the original. Detrol LA has been helpful but dries out her mouth. We will try a lower, short acting dose.    Nora plantaris 11/01/2018     Past Surgical History:   Procedure Laterality Date    ABDOMEN SURGERY      APPENDECTOMY      ARTERIAL BYPASS SURGERY  01/01/2006    BIOPSY BREAST Left     benign    BYPASS GRAFT AORTOFEMORAL Bilateral     CHOLECYSTECTOMY      COLONOSCOPY      COMBINED CYSTOSCOPY, RETROGRADES, EXCHANGE STENT URETER(S) Right 10/22/2024    Procedure: CYSTOURETEROSCOPY, WITH RETROGRADE PYELOGRAM AND STENT INSERTION, ureteral sampling , Right;  Surgeon: Clark Garcia MD;  Location: [facility]    COMBINED CYSTOSCOPY, RETROGRADES, EXCHANGE STENT URETER(S) Right 02/24/2025    Procedure: CYSTOSCOPY, WITH RETROGRADE PYELOGRAM AND URETERAL STENT REPLACEMENT, cystogram;  Surgeon: Jayden Boyle MD;  [place] [facility]    CORONARY ANGIOGRAPHY ADULT ORDER      CORONARY ARTERY BYPASS      EP ABLATION / EP STUDIES      ESOPHAGOSCOPY, GASTROSCOPY, DUODENOSCOPY (EGD), COMBINED N/A 05/14/2024    Procedure: ESOPHAGOGASTRODUODENOSCOPY, WITH BIOPSY;  Surgeon: Lizz Bray MD;  [place] [facility]    FEMORAL ARTERY STENT  01/01/2006    FOOT MASS EXCISION Left 01/01/2018    Soft tissue mass - benign    IR MISCELLANEOUS PROCEDURE  01/17/2006    IR MISCELLANEOUS PROCEDURE  01/17/2006    IR MISCELLANEOUS PROCEDURE  01/17/2006    IR MISCELLANEOUS PROCEDURE  01/17/2006    IR MISCELLANEOUS PROCEDURE  01/18/2006    TONSILLECTOMY      TYMPANOSTOMY TUBE PLACEMENT       Current Outpatient Medications   Medication Sig Dispense Refill    citalopram (CELEXA) 40 MG tablet Take 1 tablet by mouth once daily 90 tablet 1     "DENTA 5000 PLUS 1.1 % CREA Take by mouth daily.      folic acid (FOLVITE) 1 MG tablet Take 1 tablet by mouth once daily 90 tablet 2    lisinopril (ZESTRIL) 20 MG tablet Take 1 tablet (20 mg) by mouth daily. 90 tablet 3    omeprazole (PRILOSEC) 40 MG DR capsule Take 1 capsule by mouth once daily 90 capsule 1    ondansetron (ZOFRAN ODT) 4 MG ODT tab Take 1 tablet (4 mg) by mouth every 6 hours as needed for nausea. 30 tablet 0    prochlorperazine (COMPAZINE) 10 MG tablet Take 1 tablet (10 mg) by mouth every 6 hours as needed for nausea or vomiting. 20 tablet 0    REPATHA SURECLICK 140 MG/ML prefilled autoinjector 140 mg every 14 days.      rivaroxaban ANTICOAGULANT (XARELTO ANTICOAGULANT) 20 MG TABS tablet Take 20 mg by mouth daily (with dinner).      senna-docusate (SENOKOT-S/PERICOLACE) 8.6-50 MG tablet Take 1-2 tablets by mouth 2 times daily. 30 tablet 0       Allergies   Allergen Reactions    Ezetimibe      constipation    Lovastatin Unknown     constipation    Simvastatin      Other reaction(s): Constipation  Intolerance, but tolerated with re-challenge in Feb to April 2009        Social History     Tobacco Use    Smoking status: Every Day     Current packs/day: 0.75     Average packs/day: 0.8 packs/day for 50.5 years (42.0 ttl pk-yrs)     Types: Cigarettes     Passive exposure: Current    Smokeless tobacco: Never    Tobacco comments:     1 ppd since 17 years old   Substance Use Topics    Alcohol use: Not Currently     History   Drug Use No         Review of Systems  Constitutional, HEENT, cardiovascular, pulmonary, gi and gu systems are negative, except as otherwise noted.    Objective    BP (!) 164/81 (BP Location: Left arm, Patient Position: Sitting, Cuff Size: Adult Regular)   Pulse 57   Temp 98.7  F (37.1  C) (Oral)   Resp 12   Ht 1.651 m (5' 5\")   Wt 52.2 kg (115 lb 3 oz)   LMP  (LMP Unknown)   SpO2 98%   BMI 19.17 kg/m     Estimated body mass index is 19.17 kg/m  as calculated from the following:    " "Height as of this encounter: 1.651 m (5' 5\").    Weight as of this encounter: 52.2 kg (115 lb 3 oz).    Physical Exam  GENERAL: alert and no distress  EYES: Eyes grossly normal to inspection, PERRL and conjunctivae and sclerae normal  HENT: ear canals and TM's normal, nose and mouth without ulcers or lesions  NECK: no adenopathy, no asymmetry, masses, or scars  RESP: lungs clear to auscultation - no rales, rhonchi or wheezes  CV: regular rate and rhythm, normal S1 S2, no S3 or S4, no murmur, click or rub, no peripheral edema  ABDOMEN: soft, nontender, no hepatosplenomegaly, no masses and bowel sounds normal  MS: no gross musculoskeletal defects noted, no edema  SKIN: no suspicious lesions or rashes  NEURO: Normal strength and tone, mentation intact and speech normal  PSYCH: mentation appears normal, affect normal/bright    Recent Labs   Lab Test 02/21/25  0905 12/31/24  1325 12/17/24  1350 12/13/24  1111 10/30/24  0918 10/29/24  1128   HGB 11.2* 10.0* 9.4*   < > 11.6* 12.1   PLT  --  271 217   < > 267 292   INR  --   --   --   --  2.21* 2.20*    139 141   < > 140 141   POTASSIUM 3.8 4.5 4.2   < > 4.4 4.2   CR 0.81 0.85 0.94   < > 1.38* 2.70*    < > = values in this interval not displayed.      Diagnostics  Recent Results (from the past 24 hours)   EKG 12-lead, tracing only    Collection Time: 07/14/25  2:13 PM   Result Value Ref Range    Systolic Blood Pressure 164 mmHg    Diastolic Blood Pressure 81 mmHg    Ventricular Rate 56 BPM    Atrial Rate 56 BPM    ND Interval 126 ms    QRS Duration 88 ms     ms    QTc 449 ms    P Axis 71 degrees    R AXIS 71 degrees    T Axis 58 degrees    Interpretation ECG       Sinus bradycardia  Nonspecific ST abnormality  Abnormal ECG  When compared with ECG of 14-Dec-2024 14:18,  Nonspecific T wave abnormality no longer evident in Inferior leads  T wave inversion no longer evident in Lateral leads          Revised Cardiac Risk Index (RCRI)  The patient has the following " serious cardiovascular risks for perioperative complications:   - No serious cardiac risks = 0 points     RCRI Interpretation: 0 points: Class I (very low risk - 0.4% complication rate)         Signed Electronically by: GERALD Crandall CNP  A copy of this evaluation report is provided to the requesting physician.         Answers submitted by the patient for this visit:  Patient Health Questionnaire (Submitted on 7/14/2025)  If you checked off any problems, how difficult have these problems made it for you to do your work, take care of things at home, or get along with other people?: Not difficult at all  PHQ9 TOTAL SCORE: 2  Patient Health Questionnaire (G7) (Submitted on 7/14/2025)  JENNIFER 7 TOTAL SCORE: 0

## 2025-07-14 NOTE — ASSESSMENT & PLAN NOTE
Current therapies include Repatha, which as documented elsewhere she has actually been inconsistent in taking.  She is due for follow-up with her vascular/cardiology specialist teams at [facility] and encouraged her to do this today.

## 2025-07-14 NOTE — ASSESSMENT & PLAN NOTE
Stable.  Was last evaluated via echocardiogram in 12/2024.  She denies any new concerning symptoms of lower extremity edema, activity intolerance, shortness of breath or orthopnea.

## 2025-07-14 NOTE — PATIENT INSTRUCTIONS
How to Take Your Medication Before Surgery  Preoperative Medication Instructions   Antiplatelet or Anticoagulation Medication Instructions   - aspirin: Discontinue aspirin 7 days prior to procedure to reduce bleeding risk. It should be resumed postoperatively.    - apixaban (Eliquis), edoxaban (Savaysa), rivaroxaban (Xarelto): Bleeding risk is moderate or high for this procedure AND CrCl  (>=) 50 mL/min. DO NOT TAKE 2 days before surgery.     Additional Medication Instructions  Take all scheduled medications on the day of surgery EXCEPT for modifications listed below:   - Herbal medications and vitamins: DO NOT TAKE 14 days prior to surgery.   - ACE/ARB/ARNI (lisinopril, enalapril, losartan, valsartan, olmesartan, sacubritril/valsartan) : DO NOT TAKE on day of surgery (minimum 11 hours for general anesthesia).       Patient Education   Preparing for Your Surgery  For Adults  Getting started  In most cases, a nurse will call to review your health history and instructions. They will give you an arrival time based on your scheduled surgery time. Please be ready to share:  Your doctor's clinic name and phone number  Your medical, surgical, and anesthesia history  A list of allergies and sensitivities  A list of medicines, including herbal treatments and over-the-counter drugs  Whether the patient has a legal guardian (ask how to send us the papers in advance)  Note: You may not receive a call if you were seen at our PAC (Preoperative Assessment Center).  Please tell us if you're pregnant--or if there's any chance you might be pregnant. Some surgeries may injure a fetus (unborn baby), so they require a pregnancy test. Surgeries that are safe for a fetus don't always need a test, and you can choose whether to have one.   Preparing for surgery  Within 10 to 30 days of surgery: Have a pre-op exam (sometimes called an H&P, or History and Physical). This can be done at a clinic or pre-operative center.  If you're having a  , you may not need this exam. Talk to your care team.  At your pre-op exam, talk to your care team about all medicines you take. (This includes CBD oil and any drugs, such as THC, marijuana, and other forms of cannabis.) If you need to stop any medicine before surgery, ask when to start taking it again.  This is for your safety. Many medicines and drugs can make you bleed too much during surgery. Some change how well surgery (anesthesia) drugs work.  Call your insurance company to let them know you're having surgery. (If you don't have insurance, call [phone].)  Call your clinic if there's any change in your health. This includes a scrape or scratch near the surgery site, or any signs of a cold (sore throat, runny nose, cough, rash, fever).  Eating and drinking guidelines  For your safety: Unless your surgeon tells you otherwise, follow the guidelines below.  Eat and drink as normal until 8 hours before you arrive for surgery. After that, no food or milk. You can spit out gum when you arrive.  Drink clear liquids until 2 hours before you arrive. These are liquids you can see through, like water, Gatorade, and Propel Water. They also include plain black coffee and tea (no cream or milk).  No alcohol for 24 hours before you arrive. The night before surgery, stop any drinks that contain THC.  If your care team tells you to take medicine on the morning of surgery, it's okay to take it with a sip of water. No other medicines or drugs are allowed (including CBD oil)--follow your care team's instructions.  If you have questions the day of surgery, call your hospital or surgery center.   Preventing infection  Shower or bathe the night before and the morning of surgery. Follow the instructions your clinic gave you. (If no instructions, use regular soap.)  Don't shave or clip hair near your surgery site. We'll remove the hair if needed.  Don't smoke or vape the morning of surgery. No chewing tobacco for 6 hours  before you arrive. A nicotine patch is okay. You may spit out nicotine gum when you arrive.  For some surgeries, the surgeon will tell you to fully quit smoking and nicotine.  We will make every effort to keep you safe from infection. We will:  Clean our hands often with soap and water (or an alcohol-based hand rub).  Clean the skin at your surgery site with a special soap that kills germs.  Give you a special gown to keep you warm. (Cold raises the risk of infection.)  Wear hair covers, masks, gowns, and gloves during surgery.  Give antibiotic medicine, if prescribed. Not all surgeries need this medicine.  What to bring on the day of surgery  Photo ID and insurance card  Copy of your health care directive, if you have one  Glasses and hearing aids (bring cases)  You can't wear contacts during surgery  Inhaler and eye drops, if you use them (tell us about these when you arrive)  CPAP machine or breathing device, if you use them  A few personal items, if spending the night  If you have . . .  A pacemaker, ICD (cardiac defibrillator), or other implant: Bring the ID card.  An implanted stimulator: Bring the remote control.  A legal guardian: Bring a copy of the certified (court-stamped) guardianship papers.  Please remove any jewelry, including body piercings. Leave jewelry and other valuables at home.  If you're going home the day of surgery  You must have a support person drive you home. They should stay with you overnight, and they may need to help with your self-care.  If you don't have a support person, please tells us as soon as possible. We can help.  After surgery  If it's hard to control your pain or you need more pain medicine, please call your surgeon's office.  Questions?   If you have any questions for your care team, list them here:    ____________________________________________________________________________________________________________________________________________________________________________________________________________________________________________________________  For informational purposes only. Not to replace the advice of your health care provider. Copyright   2003, 2019 [facility]. All rights reserved. Clinically reviewed by Corey Brumfield MD. [employer] 211358 - REV 02/25.

## 2025-07-14 NOTE — ASSESSMENT & PLAN NOTE
Follows with vascular via [facility].  She is currently on Repatha for this and dyslipidemia, however she reports that she is overdue with her cardiologist who prescribes the Repatha therefore she has not been taking this consistently.  She does have approximately 3 months worth of this medication at home and I strongly encouraged her to start this while reaching out to her vascular/cardiology folks through [facility] to inquire about additional prescriptions.

## 2025-07-14 NOTE — ASSESSMENT & PLAN NOTE
Patient reports continued abstinence from cannabis products and feels quite well as a result.

## 2025-07-15 ENCOUNTER — TELEPHONE (OUTPATIENT)
Dept: FAMILY MEDICINE | Facility: CLINIC | Age: 60
End: 2025-07-15
Payer: COMMERCIAL

## 2025-07-15 LAB
ATRIAL RATE - MUSE: 56 BPM
DIASTOLIC BLOOD PRESSURE - MUSE: 81 MMHG
INTERPRETATION ECG - MUSE: NORMAL
P AXIS - MUSE: 71 DEGREES
PR INTERVAL - MUSE: 126 MS
QRS DURATION - MUSE: 88 MS
QT - MUSE: 466 MS
QTC - MUSE: 449 MS
R AXIS - MUSE: 71 DEGREES
SYSTOLIC BLOOD PRESSURE - MUSE: 164 MMHG
T AXIS - MUSE: 58 DEGREES
VENTRICULAR RATE- MUSE: 56 BPM

## 2025-07-15 PROCEDURE — 93010 ELECTROCARDIOGRAM REPORT: CPT | Performed by: STUDENT IN AN ORGANIZED HEALTH CARE EDUCATION/TRAINING PROGRAM

## 2025-07-15 NOTE — TELEPHONE ENCOUNTER
Patient Quality Outreach    Patient is due for the following:   Hypertension -  BP check Patient will check BP at home and send in results to Prisc[facility]     Action(s) Taken:   No follow up needed at this time.    Type of outreach:    Patient had appt. With Priscila yesterday and this was discussed.    Questions for provider review:    None         Margie Pierre MA  Chart routed to None.

## 2025-07-16 ENCOUNTER — TELEPHONE (OUTPATIENT)
Dept: UROLOGY | Facility: CLINIC | Age: 60
End: 2025-07-16
Payer: COMMERCIAL

## 2025-07-16 NOTE — TELEPHONE ENCOUNTER
"Pre Op Teaching Flowsheet     Relevant Diagnosis:  obstruction of R ureter  Surgical procedure:  CYSTOURETEROSCOPY, WITH RETROGRADE PYELOGRAM AND STENT EXCHANGE     Date of Surgery: 7/25/25  Provider: Dr. Boyle  Location of surgery:  [facility]: [facility], [address] [place] [id number]    Post op appt: none    H&P: 7/14 PCP  UA/UC: 6/18 no growth  Bowel Prep: NA    Medications: Discussed   Blood thinners: Xarelto Hold 2 days prior   Diabetic medications: NA   Does pt take: NA                          Phentermine                          Ozempic                          Wegovy (Semaglutide)    If yes, \"Hold for 7 days before procedure.  Please consult your prescribing provider if you have questions about holding this medication.\"  Soap: Will  from [facility]  Reviewed when to start clear liquids and when to start NPO: Yes  : Yes  24 hour observation: Yes    Patient demonstrates understanding of the following regarding infection Prevention:  Surgical procedure site care taught: Yes  Signs and symptoms of infection taught: Yes    Post-op follow-up:  Discussed how to contact the hospital, nurse, and clinic scheduling staff if necessary. Yes   Information Packet sent: Virginie    Motivation Level: High  Asks Questions: Yes  Eager to Learn:  Yes  Cooperative:  Yes  Receptive (willing/able to accept information):   Yes    Pt or family member expressed understanding: Yes    Marybel Verma RN  7/16/2025  12:58 PM       "

## 2025-07-25 ENCOUNTER — ANCILLARY PROCEDURE (OUTPATIENT)
Dept: RADIOLOGY | Facility: AMBULATORY SURGERY CENTER | Age: 60
End: 2025-07-25
Attending: UROLOGY
Payer: COMMERCIAL

## 2025-07-25 ENCOUNTER — HOSPITAL ENCOUNTER (OUTPATIENT)
Facility: AMBULATORY SURGERY CENTER | Age: 60
Discharge: HOME | End: 2025-07-25
Attending: UROLOGY
Payer: COMMERCIAL

## 2025-07-25 VITALS
WEIGHT: 115 LBS | OXYGEN SATURATION: 98 % | DIASTOLIC BLOOD PRESSURE: 57 MMHG | SYSTOLIC BLOOD PRESSURE: 109 MMHG | HEART RATE: 71 BPM | TEMPERATURE: 97.2 F | BODY MASS INDEX: 19.16 KG/M2 | RESPIRATION RATE: 14 BRPM | HEIGHT: 65 IN

## 2025-07-25 DIAGNOSIS — N13.5 URETER, STRICTURE: ICD-10-CM

## 2025-07-25 DIAGNOSIS — N13.5 STRICTURE OF RIGHT URETER: Primary | ICD-10-CM

## 2025-07-25 PROCEDURE — 74420 UROGRAPHY RTRGR +-KUB: CPT | Mod: 26 | Performed by: UROLOGY

## 2025-07-25 PROCEDURE — 74420 UROGRAPHY RTRGR +-KUB: CPT | Mod: TC

## 2025-07-25 PROCEDURE — 52332 CYSTOSCOPY AND TREATMENT: CPT | Mod: RT | Performed by: UROLOGY

## 2025-07-25 PROCEDURE — 52332 CYSTOSCOPY AND TREATMENT: CPT | Mod: RT

## 2025-07-25 DEVICE — URETERAL STENT
Type: IMPLANTABLE DEVICE | Site: URETHRA | Status: FUNCTIONAL
Brand: PERCUFLEX™ PLUS

## 2025-07-25 RX ORDER — DEXAMETHASONE SODIUM PHOSPHATE 10 MG/ML
4 INJECTION, SOLUTION INTRAMUSCULAR; INTRAVENOUS
Status: DISCONTINUED | OUTPATIENT
Start: 2025-07-25 | End: 2025-07-26 | Stop reason: HOSPADM

## 2025-07-25 RX ORDER — MAGNESIUM HYDROXIDE 1200 MG/15ML
LIQUID ORAL PRN
Status: DISCONTINUED | OUTPATIENT
Start: 2025-07-25 | End: 2025-07-25 | Stop reason: HOSPADM

## 2025-07-25 RX ORDER — OXYCODONE HYDROCHLORIDE 5 MG/1
10 TABLET ORAL
Status: DISCONTINUED | OUTPATIENT
Start: 2025-07-25 | End: 2025-07-26 | Stop reason: HOSPADM

## 2025-07-25 RX ORDER — NALOXONE HYDROCHLORIDE 0.4 MG/ML
0.1 INJECTION, SOLUTION INTRAMUSCULAR; INTRAVENOUS; SUBCUTANEOUS
Status: DISCONTINUED | OUTPATIENT
Start: 2025-07-25 | End: 2025-07-26 | Stop reason: HOSPADM

## 2025-07-25 RX ORDER — SODIUM CHLORIDE, SODIUM LACTATE, POTASSIUM CHLORIDE, CALCIUM CHLORIDE 600; 310; 30; 20 MG/100ML; MG/100ML; MG/100ML; MG/100ML
INJECTION, SOLUTION INTRAVENOUS CONTINUOUS
Status: DISCONTINUED | OUTPATIENT
Start: 2025-07-25 | End: 2025-07-25 | Stop reason: HOSPADM

## 2025-07-25 RX ORDER — ONDANSETRON 2 MG/ML
4 INJECTION INTRAMUSCULAR; INTRAVENOUS EVERY 30 MIN PRN
Status: DISCONTINUED | OUTPATIENT
Start: 2025-07-25 | End: 2025-07-26 | Stop reason: HOSPADM

## 2025-07-25 RX ORDER — ONDANSETRON 4 MG/1
4 TABLET, ORALLY DISINTEGRATING ORAL EVERY 8 HOURS PRN
Qty: 4 TABLET | Refills: 0 | Status: SHIPPED | OUTPATIENT
Start: 2025-07-25

## 2025-07-25 RX ORDER — FENTANYL CITRATE 50 UG/ML
25 INJECTION, SOLUTION INTRAMUSCULAR; INTRAVENOUS
Status: DISCONTINUED | OUTPATIENT
Start: 2025-07-25 | End: 2025-07-26 | Stop reason: HOSPADM

## 2025-07-25 RX ORDER — ACETAMINOPHEN 650 MG/1
650 SUPPOSITORY RECTAL ONCE
Status: COMPLETED | OUTPATIENT
Start: 2025-07-25 | End: 2025-07-25

## 2025-07-25 RX ORDER — ALBUTEROL SULFATE 0.83 MG/ML
2.5 SOLUTION RESPIRATORY (INHALATION) EVERY 4 HOURS PRN
Status: DISCONTINUED | OUTPATIENT
Start: 2025-07-25 | End: 2025-07-26 | Stop reason: HOSPADM

## 2025-07-25 RX ORDER — SODIUM CHLORIDE, SODIUM LACTATE, POTASSIUM CHLORIDE, CALCIUM CHLORIDE 600; 310; 30; 20 MG/100ML; MG/100ML; MG/100ML; MG/100ML
INJECTION, SOLUTION INTRAVENOUS CONTINUOUS
Status: DISCONTINUED | OUTPATIENT
Start: 2025-07-25 | End: 2025-07-26 | Stop reason: HOSPADM

## 2025-07-25 RX ORDER — LIDOCAINE 40 MG/G
CREAM TOPICAL
Status: DISCONTINUED | OUTPATIENT
Start: 2025-07-25 | End: 2025-07-25 | Stop reason: HOSPADM

## 2025-07-25 RX ORDER — ONDANSETRON 4 MG/1
4 TABLET, ORALLY DISINTEGRATING ORAL EVERY 30 MIN PRN
Status: DISCONTINUED | OUTPATIENT
Start: 2025-07-25 | End: 2025-07-26 | Stop reason: HOSPADM

## 2025-07-25 RX ORDER — HYDROMORPHONE HYDROCHLORIDE 1 MG/ML
0.2 INJECTION, SOLUTION INTRAMUSCULAR; INTRAVENOUS; SUBCUTANEOUS EVERY 5 MIN PRN
Status: DISCONTINUED | OUTPATIENT
Start: 2025-07-25 | End: 2025-07-26 | Stop reason: HOSPADM

## 2025-07-25 RX ORDER — HYDROMORPHONE HYDROCHLORIDE 1 MG/ML
0.4 INJECTION, SOLUTION INTRAMUSCULAR; INTRAVENOUS; SUBCUTANEOUS EVERY 5 MIN PRN
Status: DISCONTINUED | OUTPATIENT
Start: 2025-07-25 | End: 2025-07-26 | Stop reason: HOSPADM

## 2025-07-25 RX ORDER — OXYCODONE HYDROCHLORIDE 5 MG/1
5 TABLET ORAL
Status: COMPLETED | OUTPATIENT
Start: 2025-07-25 | End: 2025-07-25

## 2025-07-25 RX ORDER — ACETAMINOPHEN 325 MG/1
650 TABLET ORAL EVERY 4 HOURS PRN
Qty: 50 TABLET | Refills: 0 | Status: SHIPPED | OUTPATIENT
Start: 2025-07-25

## 2025-07-25 RX ORDER — TAMSULOSIN HYDROCHLORIDE 0.4 MG/1
0.4 CAPSULE ORAL DAILY
Qty: 30 CAPSULE | Refills: 0 | Status: SHIPPED | OUTPATIENT
Start: 2025-07-25

## 2025-07-25 RX ORDER — ACETAMINOPHEN 325 MG/1
975 TABLET ORAL ONCE
Status: DISCONTINUED | OUTPATIENT
Start: 2025-07-25 | End: 2025-07-25 | Stop reason: HOSPADM

## 2025-07-25 RX ORDER — FENTANYL CITRATE 50 UG/ML
50 INJECTION, SOLUTION INTRAMUSCULAR; INTRAVENOUS EVERY 5 MIN PRN
Status: DISCONTINUED | OUTPATIENT
Start: 2025-07-25 | End: 2025-07-26 | Stop reason: HOSPADM

## 2025-07-25 RX ORDER — LABETALOL HYDROCHLORIDE 5 MG/ML
10 INJECTION, SOLUTION INTRAVENOUS
Status: DISCONTINUED | OUTPATIENT
Start: 2025-07-25 | End: 2025-07-26 | Stop reason: HOSPADM

## 2025-07-25 RX ORDER — IOPAMIDOL 510 MG/ML
INJECTION, SOLUTION INTRAVASCULAR PRN
Status: DISCONTINUED | OUTPATIENT
Start: 2025-07-25 | End: 2025-07-25 | Stop reason: HOSPADM

## 2025-07-25 RX ORDER — CEFAZOLIN SODIUM 2 G/50ML
2 SOLUTION INTRAVENOUS
Status: COMPLETED | OUTPATIENT
Start: 2025-07-25 | End: 2025-07-25

## 2025-07-25 RX ORDER — MEPERIDINE HYDROCHLORIDE 25 MG/ML
12.5 INJECTION INTRAMUSCULAR; INTRAVENOUS; SUBCUTANEOUS EVERY 5 MIN PRN
Status: DISCONTINUED | OUTPATIENT
Start: 2025-07-25 | End: 2025-07-26 | Stop reason: HOSPADM

## 2025-07-25 RX ORDER — AMOXICILLIN 250 MG
1-2 CAPSULE ORAL 2 TIMES DAILY
Qty: 30 TABLET | Refills: 0 | Status: SHIPPED | OUTPATIENT
Start: 2025-07-25

## 2025-07-25 RX ORDER — HYDROXYZINE HYDROCHLORIDE 25 MG/1
25 TABLET, FILM COATED ORAL EVERY 6 HOURS PRN
Status: DISCONTINUED | OUTPATIENT
Start: 2025-07-25 | End: 2025-07-26 | Stop reason: HOSPADM

## 2025-07-25 RX ORDER — FENTANYL CITRATE 50 UG/ML
25 INJECTION, SOLUTION INTRAMUSCULAR; INTRAVENOUS EVERY 5 MIN PRN
Status: DISCONTINUED | OUTPATIENT
Start: 2025-07-25 | End: 2025-07-26 | Stop reason: HOSPADM

## 2025-07-25 RX ORDER — ACETAMINOPHEN 325 MG/1
975 TABLET ORAL ONCE
Status: COMPLETED | OUTPATIENT
Start: 2025-07-25 | End: 2025-07-25

## 2025-07-25 RX ORDER — CEFAZOLIN SODIUM 2 G/50ML
2 SOLUTION INTRAVENOUS SEE ADMIN INSTRUCTIONS
Status: DISCONTINUED | OUTPATIENT
Start: 2025-07-25 | End: 2025-07-25 | Stop reason: HOSPADM

## 2025-07-25 RX ADMIN — OXYCODONE HYDROCHLORIDE 5 MG: 5 TABLET ORAL at 14:21

## 2025-07-25 RX ADMIN — SODIUM CHLORIDE, SODIUM LACTATE, POTASSIUM CHLORIDE, CALCIUM CHLORIDE: 600; 310; 30; 20 INJECTION, SOLUTION INTRAVENOUS at 11:24

## 2025-07-25 NOTE — OP NOTE
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 Cystourethroscopy with right retrograde pyelography  2. Exchange of right ureteral stent.  3. Intraoperative interpretation of fluoroscopic imaging.   4. Right sided ureteroscopy    STAFF SURGEON: Surgeon(s):  Jayden Boyle MD    RESIDENT: Ganga Cox MD    ANESTHESIA: General    ESTIMATED BLOOD LOSS: 0 mL.     DRAINS:  6Fr x 26 cm right ureteral stent      OPERATIVE INDICATIONS:   Nery Whitaker is a 60 year old female who presented with history of aortobifemoral bypass in 2006 who subsequently was found to have incidental right hydronephrosis on CT. She subsequently had a right ureteral stent placed in 10/2024. Last exchanged in Feb 2025. The patient was counseled on the alternatives, risks, and benefits and elected to proceed with the above stated procedure.    DESCRIPTION OF PROCEDURE:    After informed consent was obtained, the patient was taken to the operating room, and moved to the operating table.  After adequate anesthesia was induced, the patient was repositioned in dorsal lithotomy position and prepped and draped in the usual sterile fashion. A timeout was taken to confirm correct patient, procedure and laterality.     A 22-[place] cystoscope was inserted into a well lubricated urethra. The urethra was unremarkable.  The bladder was free of tumors, stones or diverticuli.  The media was clear.  Bilateral ureteral orifices were orthotopic and the right ureteral stent was seen eminating from the ureteral orifice. The end of the stent was grasped with the aid of a flexible stent grasper and externalized to the meatus. It was then cannulated with the aid of a sensor guidewire, and this was advanced into the renal pelvis. A retrograde pyelogram demonstrated no hydronephrosis or filling defects.      We advanced a flexible  ureteroscope alongside the wire into the right ureter. We noted an area at the pelvic brim that appeared to have extrinsic compression, likely from a blood vessel given it was pulsatile. The ureter past this was oedamatous but otherwise relatively normal appearing. It was mildly dilated. Another retrograde pyelogram was taken showing a relatively short area of ureteral narrowing at the pelvic brim near the site of her prior vascular repair. The wire was replaced and the scope withdrawn.    Finally, a 6Fr x 26 cm stent was advanced over the guidewire under fluoroscopic guidance with a good curl in the renal pelvis and bladder.  The stent passed up easily with no appreciable resistance.    The bladder was then drained. The patient tolerated the procedure well.  There were no complications.         PLAN:   Would like definitive repair. We discussed possible ureterolysis with wrapping, UU, augmented anastomotic (no appendix) or boari flap depending on intraoperative findings.

## 2025-07-25 NOTE — DISCHARGE INSTRUCTIONS
[facility]  Home Care Following Anesthesia  For 24 hours after surgery:  Get plenty of rest.  A responsible adult must stay with you for at least 24 hours after you leave the surgery center.  Do not drive or use heavy equipment.  If you have weakness or tingling, don't drive or use heavy equipment until this feeling goes away.   Do not drink alcohol.   Avoid strenuous or risky activities.  Ask for help when climbing stairs.  You may feel lightheaded.  IF so, sit for a few minutes before standing.  Have someone help you get up.   If you have nausea (feel sick to your stomach): Drink only clear liquids such as apple juice, ginger ale, broth or 7-Up.  Rest may also help.  Be sure to drink enough fluids.  Move to a regular diet as you feel able.   You may have a slight fever.  Call the doctor if your fever is over 100 F (37.7 C) (taken under the tongue) or lasts longer than 24 hours.  You may have a dry mouth, a sore throat, muscle aches or trouble sleeping. These should go away after 24 hours.  Do not make important or legal decisions.   It is recommended to avoid smoking.               Tips for taking pain medications  To get the best pain relief possible, remember these points:  Take pain medications as directed, before pain becomes severe.  Pain medication can upset your stomach: taking it with food may help.  Constipation is a common side effect of pain medication. Drink plenty of  fluids.  Eat foods high in fiber. Take a stool softener if recommended by your doctor or pharmacist.  Do not drink alcohol, drive or operate machinery while taking pain medications.  Ask about other ways to control pain, such as with heat, ice or relaxation.    Tylenol/Acetaminophen Consumption    If you feel your pain relief is insufficient, you may take Tylenol/Acetaminophen in addition to your narcotic pain medication.   Be careful not to exceed 4,000 mg of Tylenol/Acetaminophen in a 24 hour  period from all sources.  If you are taking extra strength Tylenol/acetaminophen (500 mg), the maximum dose is 8 tablets in 24 hours.  If you are taking regular strength acetaminophen (325 mg), the maximum dose is 12 tablets in 24 hours.    Call a doctor for any of the following:  Signs of infection (fever, growing tenderness at the surgery site, a large amount of drainage or bleeding, severe pain, foul-smelling drainage, redness, swelling).  It has been over 8 to 10 hours since surgery and you are still not able to urinate (pass water).  Headache for over 24 hours.  Signs of Covid-19 infection (temperature over 100 degrees, shortness of breath, cough, loss of taste/smell, generalized body aches, persistent headache, chills, sore throat, nausea/vomiting/diarrhea)    Your doctor is:       Dr. Jayden Boyle, Prostate and Urology: [phone]               After hours and weekends call the hospital @ [phone] and ask for the resident on call for:  Prostate Urology  For emergency care, call the:  [facility]:  [phone] (TTY for hearing impaired: [phone])

## 2025-07-31 ENCOUNTER — MYC MEDICAL ADVICE (OUTPATIENT)
Dept: UROLOGY | Facility: CLINIC | Age: 60
End: 2025-07-31
Payer: COMMERCIAL

## 2025-08-25 DIAGNOSIS — R10.13 EPIGASTRIC ABDOMINAL PAIN: ICD-10-CM

## 2025-08-25 RX ORDER — OMEPRAZOLE 40 MG/1
40 CAPSULE, DELAYED RELEASE ORAL DAILY
Qty: 90 CAPSULE | Refills: 1 | Status: SHIPPED | OUTPATIENT
Start: 2025-08-25

## 2025-08-30 ENCOUNTER — OFFICE VISIT (OUTPATIENT)
Dept: URGENT CARE | Facility: URGENT CARE | Age: 60
End: 2025-08-30
Payer: COMMERCIAL

## 2025-08-30 VITALS
OXYGEN SATURATION: 100 % | TEMPERATURE: 98.2 F | WEIGHT: 115.19 LBS | HEART RATE: 75 BPM | RESPIRATION RATE: 20 BRPM | SYSTOLIC BLOOD PRESSURE: 129 MMHG | DIASTOLIC BLOOD PRESSURE: 68 MMHG | BODY MASS INDEX: 19.17 KG/M2

## 2025-08-30 DIAGNOSIS — R10.11 ABDOMINAL PAIN, RIGHT UPPER QUADRANT: Primary | ICD-10-CM

## 2025-08-30 DIAGNOSIS — Z96.0 URETERAL STENT PRESENT: ICD-10-CM

## 2025-08-30 LAB
ALBUMIN UR-MCNC: 100 MG/DL
APPEARANCE UR: CLEAR
BACTERIA #/AREA URNS HPF: ABNORMAL /HPF
BILIRUB UR QL STRIP: NEGATIVE
COLOR UR AUTO: YELLOW
GLUCOSE UR STRIP-MCNC: NEGATIVE MG/DL
HGB UR QL STRIP: ABNORMAL
KETONES UR STRIP-MCNC: NEGATIVE MG/DL
LEUKOCYTE ESTERASE UR QL STRIP: ABNORMAL
NITRATE UR QL: NEGATIVE
PH UR STRIP: 5.5 [PH] (ref 5–8)
RBC #/AREA URNS AUTO: ABNORMAL /HPF
SP GR UR STRIP: 1.02 (ref 1–1.03)
SQUAMOUS #/AREA URNS AUTO: ABNORMAL /LPF
UROBILINOGEN UR STRIP-ACNC: 0.2 E.U./DL
WBC #/AREA URNS AUTO: ABNORMAL /HPF

## 2025-08-30 PROCEDURE — 99214 OFFICE O/P EST MOD 30 MIN: CPT | Performed by: FAMILY MEDICINE

## 2025-08-30 PROCEDURE — 3074F SYST BP LT 130 MM HG: CPT | Performed by: FAMILY MEDICINE

## 2025-08-30 PROCEDURE — 81001 URINALYSIS AUTO W/SCOPE: CPT | Performed by: FAMILY MEDICINE

## 2025-08-30 PROCEDURE — 3078F DIAST BP <80 MM HG: CPT | Performed by: FAMILY MEDICINE

## 2025-08-30 PROCEDURE — 87086 URINE CULTURE/COLONY COUNT: CPT | Performed by: FAMILY MEDICINE

## 2025-08-30 RX ORDER — HYDROCODONE BITARTRATE AND ACETAMINOPHEN 5; 325 MG/1; MG/1
1 TABLET ORAL EVERY 6 HOURS PRN
Qty: 18 TABLET | Refills: 0 | Status: ON HOLD | OUTPATIENT
Start: 2025-08-30 | End: 2025-09-02

## 2025-08-30 ASSESSMENT — ENCOUNTER SYMPTOMS
DIFFICULTY URINATING: 0
FEVER: 0

## 2025-08-31 ENCOUNTER — RESULTS FOLLOW-UP (OUTPATIENT)
Dept: URGENT CARE | Facility: URGENT CARE | Age: 60
End: 2025-08-31
Payer: COMMERCIAL

## 2025-08-31 LAB — BACTERIA UR CULT: NORMAL

## 2025-09-01 ENCOUNTER — HOSPITAL ENCOUNTER (INPATIENT)
Facility: CLINIC | Age: 60
End: 2025-09-01
Attending: STUDENT IN AN ORGANIZED HEALTH CARE EDUCATION/TRAINING PROGRAM
Payer: COMMERCIAL

## 2025-09-01 DIAGNOSIS — J85.2 PULMONARY ABSCESS (H): Primary | ICD-10-CM

## 2025-09-01 LAB
ALBUMIN SERPL BCG-MCNC: 3.6 G/DL (ref 3.5–5.2)
ALBUMIN UR-MCNC: 100 MG/DL
ALBUMIN UR-MCNC: 30 MG/DL
ALP SERPL-CCNC: 196 U/L (ref 40–150)
ALT SERPL W P-5'-P-CCNC: 18 U/L (ref 0–50)
ANION GAP SERPL CALCULATED.3IONS-SCNC: 15 MMOL/L (ref 7–15)
APPEARANCE UR: ABNORMAL
APPEARANCE UR: CLEAR
AST SERPL W P-5'-P-CCNC: 16 U/L (ref 0–45)
ATRIAL RATE - MUSE: 68 BPM
BACTERIA #/AREA URNS HPF: ABNORMAL /HPF
BASOPHILS # BLD MANUAL: 0.23 10E3/UL (ref 0–0.2)
BASOPHILS NFR BLD MANUAL: 1 %
BILIRUB SERPL-MCNC: 0.2 MG/DL
BILIRUB UR QL STRIP: NEGATIVE
BILIRUB UR QL STRIP: NEGATIVE
BUN SERPL-MCNC: 13 MG/DL (ref 8–23)
CALCIUM SERPL-MCNC: 9.6 MG/DL (ref 8.8–10.4)
CHLORIDE SERPL-SCNC: 99 MMOL/L (ref 98–107)
COLOR UR AUTO: ABNORMAL
COLOR UR AUTO: YELLOW
CREAT SERPL-MCNC: 1.12 MG/DL (ref 0.51–0.95)
DIASTOLIC BLOOD PRESSURE - MUSE: NORMAL MMHG
EGFRCR SERPLBLD CKD-EPI 2021: 56 ML/MIN/1.73M2
EOSINOPHIL # BLD MANUAL: 0 10E3/UL (ref 0–0.7)
EOSINOPHIL NFR BLD MANUAL: 0 %
ERYTHROCYTE [DISTWIDTH] IN BLOOD BY AUTOMATED COUNT: 13.4 % (ref 10–15)
GLUCOSE SERPL-MCNC: 141 MG/DL (ref 70–99)
GLUCOSE UR STRIP-MCNC: NEGATIVE MG/DL
GLUCOSE UR STRIP-MCNC: NEGATIVE MG/DL
HCG UR QL: NEGATIVE
HCO3 SERPL-SCNC: 21 MMOL/L (ref 22–29)
HCT VFR BLD AUTO: 26.5 % (ref 35–47)
HGB BLD-MCNC: 8.7 G/DL (ref 11.7–15.7)
HGB UR QL STRIP: ABNORMAL
HGB UR QL STRIP: ABNORMAL
HYALINE CASTS: 56 /LPF
INTERPRETATION ECG - MUSE: NORMAL
KETONES UR STRIP-MCNC: NEGATIVE MG/DL
KETONES UR STRIP-MCNC: NEGATIVE MG/DL
LACTATE SERPL-SCNC: 1.6 MMOL/L (ref 0.7–2)
LEUKOCYTE ESTERASE UR QL STRIP: ABNORMAL
LEUKOCYTE ESTERASE UR QL STRIP: NEGATIVE
LIPASE SERPL-CCNC: 15 U/L (ref 13–60)
LYMPHOCYTES # BLD MANUAL: 1.14 10E3/UL (ref 0.8–5.3)
LYMPHOCYTES NFR BLD MANUAL: 5 %
MCH RBC QN AUTO: 29.8 PG (ref 26.5–33)
MCHC RBC AUTO-ENTMCNC: 32.8 G/DL (ref 31.5–36.5)
MCV RBC AUTO: 90.8 FL (ref 78–100)
MONOCYTES # BLD MANUAL: 2.28 10E3/UL (ref 0–1.3)
MONOCYTES NFR BLD MANUAL: 10 %
MUCOUS THREADS #/AREA URNS LPF: PRESENT /LPF
NEUTROPHILS # BLD MANUAL: 19.15 10E3/UL (ref 1.6–8.3)
NEUTROPHILS NFR BLD MANUAL: 84 %
NITRATE UR QL: NEGATIVE
NITRATE UR QL: NEGATIVE
P AXIS - MUSE: 74 DEGREES
PH UR STRIP: 6 [PH] (ref 5–7)
PH UR STRIP: 6.5 [PH] (ref 5–7)
PLAT MORPH BLD: NORMAL
PLATELET # BLD AUTO: 479 10E3/UL (ref 150–450)
POTASSIUM SERPL-SCNC: 3.4 MMOL/L (ref 3.4–5.3)
PR INTERVAL - MUSE: 118 MS
PROT SERPL-MCNC: 7.8 G/DL (ref 6.4–8.3)
QRS DURATION - MUSE: 92 MS
QT - MUSE: 392 MS
QTC - MUSE: 416 MS
R AXIS - MUSE: 72 DEGREES
RBC # BLD AUTO: 2.92 10E6/UL (ref 3.8–5.2)
RBC MORPH BLD: NORMAL
RBC URINE: 8 /HPF
RBC URINE: 98 /HPF
SODIUM SERPL-SCNC: 135 MMOL/L (ref 135–145)
SP GR UR STRIP: 1.01 (ref 1–1.03)
SP GR UR STRIP: 1.03 (ref 1–1.03)
SQUAMOUS EPITHELIAL: 11 /HPF
SQUAMOUS EPITHELIAL: <1 /HPF
SYSTOLIC BLOOD PRESSURE - MUSE: NORMAL MMHG
T AXIS - MUSE: 62 DEGREES
TROPONIN T SERPL HS-MCNC: 16 NG/L
TROPONIN T SERPL HS-MCNC: 17 NG/L
UROBILINOGEN UR STRIP-MCNC: 3 MG/DL
UROBILINOGEN UR STRIP-MCNC: <2 MG/DL
VENTRICULAR RATE- MUSE: 68 BPM
WBC # BLD AUTO: 22.76 10E3/UL (ref 4–11)
WBC URINE: 0 /HPF
WBC URINE: 23 /HPF

## 2025-09-01 PROCEDURE — 85007 BL SMEAR W/DIFF WBC COUNT: CPT | Performed by: EMERGENCY MEDICINE

## 2025-09-01 PROCEDURE — 81001 URINALYSIS AUTO W/SCOPE: CPT

## 2025-09-01 PROCEDURE — 250N000013 HC RX MED GY IP 250 OP 250 PS 637

## 2025-09-01 PROCEDURE — 258N000003 HC RX IP 258 OP 636

## 2025-09-01 PROCEDURE — 96375 TX/PRO/DX INJ NEW DRUG ADDON: CPT

## 2025-09-01 PROCEDURE — 81003 URINALYSIS AUTO W/O SCOPE: CPT

## 2025-09-01 PROCEDURE — 96361 HYDRATE IV INFUSION ADD-ON: CPT

## 2025-09-01 PROCEDURE — 84484 ASSAY OF TROPONIN QUANT: CPT

## 2025-09-01 PROCEDURE — 81025 URINE PREGNANCY TEST: CPT

## 2025-09-01 PROCEDURE — 250N000011 HC RX IP 250 OP 636: Performed by: STUDENT IN AN ORGANIZED HEALTH CARE EDUCATION/TRAINING PROGRAM

## 2025-09-01 PROCEDURE — 36415 COLL VENOUS BLD VENIPUNCTURE: CPT | Performed by: EMERGENCY MEDICINE

## 2025-09-01 PROCEDURE — 80053 COMPREHEN METABOLIC PANEL: CPT | Performed by: EMERGENCY MEDICINE

## 2025-09-01 PROCEDURE — 120N000001 HC R&B MED SURG/OB

## 2025-09-01 PROCEDURE — 85007 BL SMEAR W/DIFF WBC COUNT: CPT | Performed by: STUDENT IN AN ORGANIZED HEALTH CARE EDUCATION/TRAINING PROGRAM

## 2025-09-01 PROCEDURE — 255N000002 HC RX 255 OP 636

## 2025-09-01 PROCEDURE — 87086 URINE CULTURE/COLONY COUNT: CPT

## 2025-09-01 PROCEDURE — 96374 THER/PROPH/DIAG INJ IV PUSH: CPT | Mod: 59

## 2025-09-01 PROCEDURE — 99285 EMERGENCY DEPT VISIT HI MDM: CPT | Mod: 25 | Performed by: STUDENT IN AN ORGANIZED HEALTH CARE EDUCATION/TRAINING PROGRAM

## 2025-09-01 PROCEDURE — 93005 ELECTROCARDIOGRAM TRACING: CPT

## 2025-09-01 PROCEDURE — 250N000013 HC RX MED GY IP 250 OP 250 PS 637: Performed by: STUDENT IN AN ORGANIZED HEALTH CARE EDUCATION/TRAINING PROGRAM

## 2025-09-01 PROCEDURE — 250N000011 HC RX IP 250 OP 636: Mod: JW

## 2025-09-01 PROCEDURE — 36415 COLL VENOUS BLD VENIPUNCTURE: CPT

## 2025-09-01 PROCEDURE — 83605 ASSAY OF LACTIC ACID: CPT

## 2025-09-01 PROCEDURE — 80053 COMPREHEN METABOLIC PANEL: CPT | Performed by: STUDENT IN AN ORGANIZED HEALTH CARE EDUCATION/TRAINING PROGRAM

## 2025-09-01 PROCEDURE — 85027 COMPLETE CBC AUTOMATED: CPT | Performed by: STUDENT IN AN ORGANIZED HEALTH CARE EDUCATION/TRAINING PROGRAM

## 2025-09-01 PROCEDURE — 87040 BLOOD CULTURE FOR BACTERIA: CPT

## 2025-09-01 PROCEDURE — 85027 COMPLETE CBC AUTOMATED: CPT | Performed by: EMERGENCY MEDICINE

## 2025-09-01 PROCEDURE — 250N000011 HC RX IP 250 OP 636

## 2025-09-01 PROCEDURE — 83690 ASSAY OF LIPASE: CPT

## 2025-09-01 RX ORDER — ACETAMINOPHEN 325 MG/1
650 TABLET ORAL EVERY 4 HOURS PRN
Status: DISPENSED | OUTPATIENT
Start: 2025-09-01

## 2025-09-01 RX ORDER — TAMSULOSIN HYDROCHLORIDE 0.4 MG/1
0.4 CAPSULE ORAL DAILY
Status: DISPENSED | OUTPATIENT
Start: 2025-09-01

## 2025-09-01 RX ORDER — LIDOCAINE 40 MG/G
CREAM TOPICAL
Status: ACTIVE | OUTPATIENT
Start: 2025-09-01

## 2025-09-01 RX ORDER — ONDANSETRON 2 MG/ML
4 INJECTION INTRAMUSCULAR; INTRAVENOUS ONCE
Status: COMPLETED | OUTPATIENT
Start: 2025-09-01 | End: 2025-09-01

## 2025-09-01 RX ORDER — HYDROMORPHONE HYDROCHLORIDE 1 MG/ML
0.5 INJECTION, SOLUTION INTRAMUSCULAR; INTRAVENOUS; SUBCUTANEOUS ONCE
Refills: 0 | Status: COMPLETED | OUTPATIENT
Start: 2025-09-01 | End: 2025-09-01

## 2025-09-01 RX ORDER — VANCOMYCIN HYDROCHLORIDE 1 G/200ML
1000 INJECTION, SOLUTION INTRAVENOUS EVERY 24 HOURS
Status: DISCONTINUED | OUTPATIENT
Start: 2025-09-02 | End: 2025-09-04

## 2025-09-01 RX ORDER — ONDANSETRON 2 MG/ML
4 INJECTION INTRAMUSCULAR; INTRAVENOUS EVERY 6 HOURS PRN
Status: DISPENSED | OUTPATIENT
Start: 2025-09-01

## 2025-09-01 RX ORDER — HYDROMORPHONE HYDROCHLORIDE 1 MG/ML
0.2 INJECTION, SOLUTION INTRAMUSCULAR; INTRAVENOUS; SUBCUTANEOUS
Status: DISPENSED | OUTPATIENT
Start: 2025-09-01

## 2025-09-01 RX ORDER — NALOXONE HYDROCHLORIDE 0.4 MG/ML
0.2 INJECTION, SOLUTION INTRAMUSCULAR; INTRAVENOUS; SUBCUTANEOUS
Status: ACTIVE | OUTPATIENT
Start: 2025-09-01

## 2025-09-01 RX ORDER — AMOXICILLIN 250 MG
2 CAPSULE ORAL 2 TIMES DAILY PRN
Status: ACTIVE | OUTPATIENT
Start: 2025-09-01

## 2025-09-01 RX ORDER — FOLIC ACID 1 MG/1
1000 TABLET ORAL DAILY
Status: DISPENSED | OUTPATIENT
Start: 2025-09-01

## 2025-09-01 RX ORDER — LISINOPRIL 20 MG/1
20 TABLET ORAL DAILY
Status: DISPENSED | OUTPATIENT
Start: 2025-09-01

## 2025-09-01 RX ORDER — SODIUM CHLORIDE 9 MG/ML
INJECTION, SOLUTION INTRAVENOUS CONTINUOUS
Status: DISCONTINUED | OUTPATIENT
Start: 2025-09-01 | End: 2025-09-03

## 2025-09-01 RX ORDER — PIPERACILLIN SODIUM, TAZOBACTAM SODIUM 3; .375 G/15ML; G/15ML
3.38 INJECTION, POWDER, LYOPHILIZED, FOR SOLUTION INTRAVENOUS EVERY 6 HOURS
Status: DISPENSED | OUTPATIENT
Start: 2025-09-01

## 2025-09-01 RX ORDER — PANTOPRAZOLE SODIUM 40 MG/1
40 TABLET, DELAYED RELEASE ORAL DAILY
Status: DISPENSED | OUTPATIENT
Start: 2025-09-01

## 2025-09-01 RX ORDER — PIPERACILLIN SODIUM, TAZOBACTAM SODIUM 4; .5 G/20ML; G/20ML
4.5 INJECTION, POWDER, LYOPHILIZED, FOR SOLUTION INTRAVENOUS ONCE
Status: COMPLETED | OUTPATIENT
Start: 2025-09-01 | End: 2025-09-01

## 2025-09-01 RX ORDER — NALOXONE HYDROCHLORIDE 0.4 MG/ML
0.4 INJECTION, SOLUTION INTRAMUSCULAR; INTRAVENOUS; SUBCUTANEOUS
Status: ACTIVE | OUTPATIENT
Start: 2025-09-01

## 2025-09-01 RX ORDER — AMOXICILLIN 250 MG
1-2 CAPSULE ORAL 2 TIMES DAILY
Status: DISPENSED | OUTPATIENT
Start: 2025-09-01

## 2025-09-01 RX ORDER — AMOXICILLIN 250 MG
1 CAPSULE ORAL 2 TIMES DAILY PRN
Status: ACTIVE | OUTPATIENT
Start: 2025-09-01

## 2025-09-01 RX ORDER — CITALOPRAM HYDROBROMIDE 20 MG/1
40 TABLET ORAL DAILY
Status: DISPENSED | OUTPATIENT
Start: 2025-09-01

## 2025-09-01 RX ORDER — NICOTINE 21 MG/24HR
1 PATCH, TRANSDERMAL 24 HOURS TRANSDERMAL DAILY
Status: DISPENSED | OUTPATIENT
Start: 2025-09-01

## 2025-09-01 RX ORDER — CALCIUM CARBONATE 500 MG/1
1000 TABLET, CHEWABLE ORAL 4 TIMES DAILY PRN
Status: ACTIVE | OUTPATIENT
Start: 2025-09-01

## 2025-09-01 RX ORDER — ACETAMINOPHEN 650 MG/1
650 SUPPOSITORY RECTAL EVERY 4 HOURS PRN
Status: ACTIVE | OUTPATIENT
Start: 2025-09-01

## 2025-09-01 RX ORDER — VANCOMYCIN HYDROCHLORIDE 1 G/200ML
1000 INJECTION, SOLUTION INTRAVENOUS ONCE
Status: COMPLETED | OUTPATIENT
Start: 2025-09-01 | End: 2025-09-01

## 2025-09-01 RX ORDER — ONDANSETRON 2 MG/ML
INJECTION INTRAMUSCULAR; INTRAVENOUS
Status: COMPLETED
Start: 2025-09-01 | End: 2025-09-01

## 2025-09-01 RX ADMIN — CITALOPRAM HYDROBROMIDE 40 MG: 20 TABLET ORAL at 18:11

## 2025-09-01 RX ADMIN — FOLIC ACID 1000 MCG: 1 TABLET ORAL at 18:11

## 2025-09-01 RX ADMIN — ONDANSETRON 4 MG: 2 INJECTION INTRAMUSCULAR; INTRAVENOUS at 16:41

## 2025-09-01 RX ADMIN — HYDROMORPHONE HYDROCHLORIDE 0.5 MG: 1 INJECTION, SOLUTION INTRAMUSCULAR; INTRAVENOUS; SUBCUTANEOUS at 13:55

## 2025-09-01 RX ADMIN — SENNOSIDES AND DOCUSATE SODIUM 1 TABLET: 50; 8.6 TABLET ORAL at 21:34

## 2025-09-01 RX ADMIN — SODIUM CHLORIDE 1000 ML: 0.9 INJECTION, SOLUTION INTRAVENOUS at 14:04

## 2025-09-01 RX ADMIN — PIPERACILLIN AND TAZOBACTAM 4.5 G: 4; .5 INJECTION, POWDER, FOR SOLUTION INTRAVENOUS at 15:57

## 2025-09-01 RX ADMIN — LISINOPRIL 20 MG: 20 TABLET ORAL at 18:11

## 2025-09-01 RX ADMIN — ONDANSETRON 4 MG: 2 INJECTION INTRAMUSCULAR; INTRAVENOUS at 13:55

## 2025-09-01 RX ADMIN — VANCOMYCIN HYDROCHLORIDE 1000 MG: 1 INJECTION, SOLUTION INTRAVENOUS at 17:20

## 2025-09-01 RX ADMIN — PANTOPRAZOLE SODIUM 40 MG: 40 TABLET, DELAYED RELEASE ORAL at 18:11

## 2025-09-01 RX ADMIN — NICOTINE 1 PATCH: 21 PATCH, EXTENDED RELEASE TRANSDERMAL at 16:42

## 2025-09-01 RX ADMIN — IOHEXOL 75 ML: 350 INJECTION, SOLUTION INTRAVENOUS at 13:29

## 2025-09-01 RX ADMIN — HYDROMORPHONE HYDROCHLORIDE 0.2 MG: 1 INJECTION, SOLUTION INTRAMUSCULAR; INTRAVENOUS; SUBCUTANEOUS at 18:30

## 2025-09-01 RX ADMIN — PIPERACILLIN AND TAZOBACTAM 3.38 G: 3; .375 INJECTION, POWDER, FOR SOLUTION INTRAVENOUS at 21:34

## 2025-09-01 ASSESSMENT — COLUMBIA-SUICIDE SEVERITY RATING SCALE - C-SSRS
2. HAVE YOU ACTUALLY HAD ANY THOUGHTS OF KILLING YOURSELF IN THE PAST MONTH?: NO
1. IN THE PAST MONTH, HAVE YOU WISHED YOU WERE DEAD OR WISHED YOU COULD GO TO SLEEP AND NOT WAKE UP?: NO
6. HAVE YOU EVER DONE ANYTHING, STARTED TO DO ANYTHING, OR PREPARED TO DO ANYTHING TO END YOUR LIFE?: NO

## 2025-09-01 ASSESSMENT — ACTIVITIES OF DAILY LIVING (ADL)
ADLS_ACUITY_SCORE: 22
ADLS_ACUITY_SCORE: 48
ADLS_ACUITY_SCORE: 48
ADLS_ACUITY_SCORE: 25
ADLS_ACUITY_SCORE: 23
ADLS_ACUITY_SCORE: 48
ADLS_ACUITY_SCORE: 48
ADLS_ACUITY_SCORE: 22
ADLS_ACUITY_SCORE: 48
ADLS_ACUITY_SCORE: 22
ADLS_ACUITY_SCORE: 48

## 2025-09-02 LAB
ABO + RH BLD: NORMAL
ANION GAP SERPL CALCULATED.3IONS-SCNC: 14 MMOL/L (ref 7–15)
BACTERIA UR CULT: NORMAL
BLD GP AB SCN SERPL QL: NEGATIVE
BLD PROD TYP BPU: NORMAL
BLOOD COMPONENT TYPE: NORMAL
BUN SERPL-MCNC: 10 MG/DL (ref 8–23)
CALCIUM SERPL-MCNC: 8.1 MG/DL (ref 8.8–10.4)
CHLORIDE SERPL-SCNC: 106 MMOL/L (ref 98–107)
CODING SYSTEM: NORMAL
CREAT SERPL-MCNC: 1.04 MG/DL (ref 0.51–0.95)
CROSSMATCH: NORMAL
EGFRCR SERPLBLD CKD-EPI 2021: 62 ML/MIN/1.73M2
ERYTHROCYTE [DISTWIDTH] IN BLOOD BY AUTOMATED COUNT: 13.3 % (ref 10–15)
FERRITIN SERPL-MCNC: 396 NG/ML (ref 11–328)
GLUCOSE SERPL-MCNC: 108 MG/DL (ref 70–99)
HCO3 SERPL-SCNC: 17 MMOL/L (ref 22–29)
HCT VFR BLD AUTO: 19.9 % (ref 35–47)
HGB BLD-MCNC: 6.7 G/DL (ref 11.7–15.7)
HGB BLD-MCNC: 7.4 G/DL (ref 11.7–15.7)
ISSUE DATE AND TIME: NORMAL
LDH SERPL L TO P-CCNC: 116 U/L (ref 0–250)
MCH RBC QN AUTO: 29.8 PG (ref 26.5–33)
MCHC RBC AUTO-ENTMCNC: 33.7 G/DL (ref 31.5–36.5)
MCV RBC AUTO: 88.4 FL (ref 78–100)
MCV RBC AUTO: 90.2 FL (ref 78–100)
MRSA DNA SPEC QL NAA+PROBE: NEGATIVE
PLATELET # BLD AUTO: 360 10E3/UL (ref 150–450)
POTASSIUM SERPL-SCNC: 3.4 MMOL/L (ref 3.4–5.3)
POTASSIUM SERPL-SCNC: 4.2 MMOL/L (ref 3.4–5.3)
PROT SERPL-MCNC: 6 G/DL (ref 6.4–8.3)
RBC # BLD AUTO: 2.25 10E6/UL (ref 3.8–5.2)
SA TARGET DNA: NEGATIVE
SODIUM SERPL-SCNC: 137 MMOL/L (ref 135–145)
SPECIMEN EXP DATE BLD: NORMAL
UNIT ABO/RH: NORMAL
UNIT NUMBER: NORMAL
UNIT STATUS: NORMAL
UNIT TYPE ISBT: 6200
WBC # BLD AUTO: 16.83 10E3/UL (ref 4–11)

## 2025-09-02 PROCEDURE — 84155 ASSAY OF PROTEIN SERUM: CPT | Performed by: INTERNAL MEDICINE

## 2025-09-02 PROCEDURE — 120N000001 HC R&B MED SURG/OB

## 2025-09-02 PROCEDURE — 82728 ASSAY OF FERRITIN: CPT

## 2025-09-02 PROCEDURE — 258N000003 HC RX IP 258 OP 636

## 2025-09-02 PROCEDURE — 99255 IP/OBS CONSLTJ NEW/EST HI 80: CPT | Performed by: INTERNAL MEDICINE

## 2025-09-02 PROCEDURE — 250N000013 HC RX MED GY IP 250 OP 250 PS 637: Performed by: STUDENT IN AN ORGANIZED HEALTH CARE EDUCATION/TRAINING PROGRAM

## 2025-09-02 PROCEDURE — 86923 COMPATIBILITY TEST ELECTRIC: CPT

## 2025-09-02 PROCEDURE — 84132 ASSAY OF SERUM POTASSIUM: CPT

## 2025-09-02 PROCEDURE — 85018 HEMOGLOBIN: CPT

## 2025-09-02 PROCEDURE — 250N000009 HC RX 250: Performed by: RADIOLOGY

## 2025-09-02 PROCEDURE — 86850 RBC ANTIBODY SCREEN: CPT

## 2025-09-02 PROCEDURE — 85014 HEMATOCRIT: CPT

## 2025-09-02 PROCEDURE — 36415 COLL VENOUS BLD VENIPUNCTURE: CPT | Performed by: INTERNAL MEDICINE

## 2025-09-02 PROCEDURE — 250N000011 HC RX IP 250 OP 636: Performed by: RADIOLOGY

## 2025-09-02 PROCEDURE — P9016 RBC LEUKOCYTES REDUCED: HCPCS

## 2025-09-02 PROCEDURE — 89050 BODY FLUID CELL COUNT: CPT | Performed by: INTERNAL MEDICINE

## 2025-09-02 PROCEDURE — 36415 COLL VENOUS BLD VENIPUNCTURE: CPT

## 2025-09-02 PROCEDURE — 82947 ASSAY GLUCOSE BLOOD QUANT: CPT

## 2025-09-02 PROCEDURE — 250N000011 HC RX IP 250 OP 636: Performed by: FAMILY MEDICINE

## 2025-09-02 PROCEDURE — 250N000013 HC RX MED GY IP 250 OP 250 PS 637

## 2025-09-02 PROCEDURE — 87641 MR-STAPH DNA AMP PROBE: CPT | Performed by: INTERNAL MEDICINE

## 2025-09-02 PROCEDURE — 83615 LACTATE (LD) (LDH) ENZYME: CPT | Performed by: INTERNAL MEDICINE

## 2025-09-02 PROCEDURE — 87070 CULTURE OTHR SPECIMN AEROBIC: CPT | Performed by: INTERNAL MEDICINE

## 2025-09-02 PROCEDURE — 250N000011 HC RX IP 250 OP 636

## 2025-09-02 PROCEDURE — 87075 CULTR BACTERIA EXCEPT BLOOD: CPT | Performed by: INTERNAL MEDICINE

## 2025-09-02 RX ORDER — FENTANYL CITRATE 50 UG/ML
25-50 INJECTION, SOLUTION INTRAMUSCULAR; INTRAVENOUS EVERY 5 MIN PRN
Refills: 0 | Status: DISCONTINUED | OUTPATIENT
Start: 2025-09-02 | End: 2025-09-02 | Stop reason: HOSPADM

## 2025-09-02 RX ORDER — POTASSIUM CHLORIDE 1500 MG/1
40 TABLET, EXTENDED RELEASE ORAL ONCE
Status: COMPLETED | OUTPATIENT
Start: 2025-09-02 | End: 2025-09-02

## 2025-09-02 RX ORDER — NALOXONE HYDROCHLORIDE 0.4 MG/ML
0.4 INJECTION, SOLUTION INTRAMUSCULAR; INTRAVENOUS; SUBCUTANEOUS
Status: DISCONTINUED | OUTPATIENT
Start: 2025-09-02 | End: 2025-09-02 | Stop reason: HOSPADM

## 2025-09-02 RX ORDER — NALOXONE HYDROCHLORIDE 0.4 MG/ML
0.2 INJECTION, SOLUTION INTRAMUSCULAR; INTRAVENOUS; SUBCUTANEOUS
Status: DISCONTINUED | OUTPATIENT
Start: 2025-09-02 | End: 2025-09-02 | Stop reason: HOSPADM

## 2025-09-02 RX ORDER — KETOROLAC TROMETHAMINE 15 MG/ML
15 INJECTION, SOLUTION INTRAMUSCULAR; INTRAVENOUS EVERY 6 HOURS PRN
Status: DISPENSED | OUTPATIENT
Start: 2025-09-02 | End: 2025-09-07

## 2025-09-02 RX ORDER — FLUMAZENIL 0.1 MG/ML
0.2 INJECTION, SOLUTION INTRAVENOUS
Status: DISCONTINUED | OUTPATIENT
Start: 2025-09-02 | End: 2025-09-02 | Stop reason: HOSPADM

## 2025-09-02 RX ADMIN — FENTANYL CITRATE 50 MCG: 50 INJECTION INTRAMUSCULAR; INTRAVENOUS at 15:30

## 2025-09-02 RX ADMIN — SODIUM CHLORIDE 1000 ML: 0.9 INJECTION, SOLUTION INTRAVENOUS at 00:16

## 2025-09-02 RX ADMIN — NICOTINE 1 PATCH: 21 PATCH, EXTENDED RELEASE TRANSDERMAL at 07:57

## 2025-09-02 RX ADMIN — SODIUM CHLORIDE: 0.9 INJECTION, SOLUTION INTRAVENOUS at 20:27

## 2025-09-02 RX ADMIN — PIPERACILLIN AND TAZOBACTAM 3.38 G: 3; .375 INJECTION, POWDER, FOR SOLUTION INTRAVENOUS at 17:42

## 2025-09-02 RX ADMIN — POTASSIUM CHLORIDE EXTENDED-RELEASE 40 MEQ: 1500 TABLET ORAL at 11:23

## 2025-09-02 RX ADMIN — PIPERACILLIN AND TAZOBACTAM 3.38 G: 3; .375 INJECTION, POWDER, FOR SOLUTION INTRAVENOUS at 23:35

## 2025-09-02 RX ADMIN — ONDANSETRON 4 MG: 2 INJECTION, SOLUTION INTRAMUSCULAR; INTRAVENOUS at 07:50

## 2025-09-02 RX ADMIN — ACETAMINOPHEN 650 MG: 325 TABLET ORAL at 08:58

## 2025-09-02 RX ADMIN — HYDROMORPHONE HYDROCHLORIDE 0.2 MG: 1 INJECTION, SOLUTION INTRAMUSCULAR; INTRAVENOUS; SUBCUTANEOUS at 16:57

## 2025-09-02 RX ADMIN — ONDANSETRON 4 MG: 2 INJECTION, SOLUTION INTRAMUSCULAR; INTRAVENOUS at 00:24

## 2025-09-02 RX ADMIN — PANTOPRAZOLE SODIUM 40 MG: 40 TABLET, DELAYED RELEASE ORAL at 08:58

## 2025-09-02 RX ADMIN — HYDROMORPHONE HYDROCHLORIDE 0.2 MG: 1 INJECTION, SOLUTION INTRAMUSCULAR; INTRAVENOUS; SUBCUTANEOUS at 11:21

## 2025-09-02 RX ADMIN — LIDOCAINE HYDROCHLORIDE 20 ML: 10 INJECTION, SOLUTION INFILTRATION; PERINEURAL at 15:34

## 2025-09-02 RX ADMIN — PIPERACILLIN AND TAZOBACTAM 3.38 G: 3; .375 INJECTION, POWDER, FOR SOLUTION INTRAVENOUS at 04:51

## 2025-09-02 RX ADMIN — FENTANYL CITRATE 50 MCG: 50 INJECTION INTRAMUSCULAR; INTRAVENOUS at 15:36

## 2025-09-02 RX ADMIN — MIDAZOLAM HYDROCHLORIDE 1 MG: 1 INJECTION, SOLUTION INTRAMUSCULAR; INTRAVENOUS at 15:36

## 2025-09-02 RX ADMIN — KETOROLAC TROMETHAMINE 15 MG: 15 INJECTION, SOLUTION INTRAMUSCULAR; INTRAVENOUS at 17:39

## 2025-09-02 RX ADMIN — MIDAZOLAM HYDROCHLORIDE 1 MG: 1 INJECTION, SOLUTION INTRAMUSCULAR; INTRAVENOUS at 15:30

## 2025-09-02 RX ADMIN — HYDROMORPHONE HYDROCHLORIDE 0.2 MG: 1 INJECTION, SOLUTION INTRAMUSCULAR; INTRAVENOUS; SUBCUTANEOUS at 00:30

## 2025-09-02 RX ADMIN — HYDROMORPHONE HYDROCHLORIDE 0.2 MG: 1 INJECTION, SOLUTION INTRAMUSCULAR; INTRAVENOUS; SUBCUTANEOUS at 23:30

## 2025-09-02 RX ADMIN — SENNOSIDES AND DOCUSATE SODIUM 1 TABLET: 50; 8.6 TABLET ORAL at 20:26

## 2025-09-02 RX ADMIN — SENNOSIDES AND DOCUSATE SODIUM 1 TABLET: 50; 8.6 TABLET ORAL at 08:58

## 2025-09-02 RX ADMIN — FOLIC ACID 1000 MCG: 1 TABLET ORAL at 08:58

## 2025-09-02 RX ADMIN — HYDROMORPHONE HYDROCHLORIDE 0.2 MG: 1 INJECTION, SOLUTION INTRAMUSCULAR; INTRAVENOUS; SUBCUTANEOUS at 06:19

## 2025-09-02 RX ADMIN — PIPERACILLIN AND TAZOBACTAM 3.38 G: 3; .375 INJECTION, POWDER, FOR SOLUTION INTRAVENOUS at 11:25

## 2025-09-02 RX ADMIN — VANCOMYCIN HYDROCHLORIDE 1000 MG: 1 INJECTION, SOLUTION INTRAVENOUS at 08:59

## 2025-09-02 RX ADMIN — CITALOPRAM HYDROBROMIDE 40 MG: 20 TABLET ORAL at 08:58

## 2025-09-02 ASSESSMENT — ACTIVITIES OF DAILY LIVING (ADL)
ADLS_ACUITY_SCORE: 24
ADLS_ACUITY_SCORE: 25
ADLS_ACUITY_SCORE: 26
ADLS_ACUITY_SCORE: 25
ADLS_ACUITY_SCORE: 25
ADLS_ACUITY_SCORE: 26
ADLS_ACUITY_SCORE: 26
ADLS_ACUITY_SCORE: 24
ADLS_ACUITY_SCORE: 26
ADLS_ACUITY_SCORE: 25
ADLS_ACUITY_SCORE: 26
ADLS_ACUITY_SCORE: 25
ADLS_ACUITY_SCORE: 26

## 2025-09-03 LAB
ANION GAP SERPL CALCULATED.3IONS-SCNC: 15 MMOL/L (ref 7–15)
BUN SERPL-MCNC: 9.8 MG/DL (ref 8–23)
CALCIUM SERPL-MCNC: 8.5 MG/DL (ref 8.8–10.4)
CHLORIDE SERPL-SCNC: 109 MMOL/L (ref 98–107)
CREAT SERPL-MCNC: 1.17 MG/DL (ref 0.51–0.95)
EGFRCR SERPLBLD CKD-EPI 2021: 53 ML/MIN/1.73M2
ERYTHROCYTE [DISTWIDTH] IN BLOOD BY AUTOMATED COUNT: 14 % (ref 10–15)
GLUCOSE SERPL-MCNC: 95 MG/DL (ref 70–99)
HCO3 SERPL-SCNC: 16 MMOL/L (ref 22–29)
HCT VFR BLD AUTO: 24.8 % (ref 35–47)
HGB BLD-MCNC: 8.2 G/DL (ref 11.7–15.7)
MCH RBC QN AUTO: 29.7 PG (ref 26.5–33)
MCHC RBC AUTO-ENTMCNC: 33.1 G/DL (ref 31.5–36.5)
MCV RBC AUTO: 89.9 FL (ref 78–100)
PLATELET # BLD AUTO: 401 10E3/UL (ref 150–450)
POTASSIUM SERPL-SCNC: 3.9 MMOL/L (ref 3.4–5.3)
RBC # BLD AUTO: 2.76 10E6/UL (ref 3.8–5.2)
SODIUM SERPL-SCNC: 140 MMOL/L (ref 135–145)
WBC # BLD AUTO: 13.08 10E3/UL (ref 4–11)

## 2025-09-03 PROCEDURE — 36415 COLL VENOUS BLD VENIPUNCTURE: CPT

## 2025-09-03 PROCEDURE — 250N000013 HC RX MED GY IP 250 OP 250 PS 637: Performed by: STUDENT IN AN ORGANIZED HEALTH CARE EDUCATION/TRAINING PROGRAM

## 2025-09-03 PROCEDURE — 250N000013 HC RX MED GY IP 250 OP 250 PS 637

## 2025-09-03 PROCEDURE — 250N000011 HC RX IP 250 OP 636

## 2025-09-03 PROCEDURE — 250N000011 HC RX IP 250 OP 636: Performed by: FAMILY MEDICINE

## 2025-09-03 PROCEDURE — 85041 AUTOMATED RBC COUNT: CPT

## 2025-09-03 PROCEDURE — 120N000001 HC R&B MED SURG/OB

## 2025-09-03 PROCEDURE — 82435 ASSAY OF BLOOD CHLORIDE: CPT

## 2025-09-03 RX ORDER — POLYETHYLENE GLYCOL 3350 17 G/17G
17 POWDER, FOR SOLUTION ORAL 2 TIMES DAILY PRN
Status: DISPENSED | OUTPATIENT
Start: 2025-09-03

## 2025-09-03 RX ADMIN — FOLIC ACID 1000 MCG: 1 TABLET ORAL at 09:25

## 2025-09-03 RX ADMIN — VANCOMYCIN HYDROCHLORIDE 1000 MG: 1 INJECTION, SOLUTION INTRAVENOUS at 09:30

## 2025-09-03 RX ADMIN — KETOROLAC TROMETHAMINE 15 MG: 15 INJECTION, SOLUTION INTRAMUSCULAR; INTRAVENOUS at 06:26

## 2025-09-03 RX ADMIN — LISINOPRIL 20 MG: 20 TABLET ORAL at 09:29

## 2025-09-03 RX ADMIN — HYDROMORPHONE HYDROCHLORIDE 0.2 MG: 1 INJECTION, SOLUTION INTRAMUSCULAR; INTRAVENOUS; SUBCUTANEOUS at 04:14

## 2025-09-03 RX ADMIN — POLYETHYLENE GLYCOL 3350 17 G: 17 POWDER, FOR SOLUTION ORAL at 18:07

## 2025-09-03 RX ADMIN — SENNOSIDES AND DOCUSATE SODIUM 1 TABLET: 50; 8.6 TABLET ORAL at 09:25

## 2025-09-03 RX ADMIN — PIPERACILLIN AND TAZOBACTAM 3.38 G: 3; .375 INJECTION, POWDER, FOR SOLUTION INTRAVENOUS at 06:24

## 2025-09-03 RX ADMIN — PIPERACILLIN AND TAZOBACTAM 3.38 G: 3; .375 INJECTION, POWDER, FOR SOLUTION INTRAVENOUS at 18:04

## 2025-09-03 RX ADMIN — KETOROLAC TROMETHAMINE 15 MG: 15 INJECTION, SOLUTION INTRAMUSCULAR; INTRAVENOUS at 14:22

## 2025-09-03 RX ADMIN — PIPERACILLIN AND TAZOBACTAM 3.38 G: 3; .375 INJECTION, POWDER, FOR SOLUTION INTRAVENOUS at 12:21

## 2025-09-03 RX ADMIN — TAMSULOSIN HYDROCHLORIDE 0.4 MG: 0.4 CAPSULE ORAL at 09:26

## 2025-09-03 RX ADMIN — NICOTINE 1 PATCH: 21 PATCH, EXTENDED RELEASE TRANSDERMAL at 09:26

## 2025-09-03 RX ADMIN — PANTOPRAZOLE SODIUM 40 MG: 40 TABLET, DELAYED RELEASE ORAL at 09:24

## 2025-09-03 RX ADMIN — CITALOPRAM HYDROBROMIDE 40 MG: 20 TABLET ORAL at 09:26

## 2025-09-03 ASSESSMENT — ACTIVITIES OF DAILY LIVING (ADL)
ADLS_ACUITY_SCORE: 33
ADLS_ACUITY_SCORE: 31
ADLS_ACUITY_SCORE: 31
ADLS_ACUITY_SCORE: 26
ADLS_ACUITY_SCORE: 27
ADLS_ACUITY_SCORE: 26
ADLS_ACUITY_SCORE: 26
ADLS_ACUITY_SCORE: 33
ADLS_ACUITY_SCORE: 31
ADLS_ACUITY_SCORE: 27
ADLS_ACUITY_SCORE: 33
ADLS_ACUITY_SCORE: 33
ADLS_ACUITY_SCORE: 26
ADLS_ACUITY_SCORE: 27
ADLS_ACUITY_SCORE: 33
ADLS_ACUITY_SCORE: 27
ADLS_ACUITY_SCORE: 27
ADLS_ACUITY_SCORE: 31
ADLS_ACUITY_SCORE: 31
ADLS_ACUITY_SCORE: 27
ADLS_ACUITY_SCORE: 33
ADLS_ACUITY_SCORE: 27
ADLS_ACUITY_SCORE: 31

## 2025-09-04 VITALS
OXYGEN SATURATION: 97 % | HEIGHT: 65 IN | BODY MASS INDEX: 20.54 KG/M2 | RESPIRATION RATE: 16 BRPM | DIASTOLIC BLOOD PRESSURE: 70 MMHG | SYSTOLIC BLOOD PRESSURE: 165 MMHG | TEMPERATURE: 98.4 F | HEART RATE: 57 BPM | WEIGHT: 123.3 LBS

## 2025-09-04 LAB
ANION GAP SERPL CALCULATED.3IONS-SCNC: 13 MMOL/L (ref 7–15)
BACTERIA PLR CULT: ABNORMAL
BACTERIA SPEC CULT: NORMAL
BACTERIA SPEC CULT: NORMAL
BUN SERPL-MCNC: 10.2 MG/DL (ref 8–23)
CALCIUM SERPL-MCNC: 8.8 MG/DL (ref 8.8–10.4)
CHLORIDE SERPL-SCNC: 108 MMOL/L (ref 98–107)
CREAT SERPL-MCNC: 1.05 MG/DL (ref 0.51–0.95)
EGFRCR SERPLBLD CKD-EPI 2021: 61 ML/MIN/1.73M2
ERYTHROCYTE [DISTWIDTH] IN BLOOD BY AUTOMATED COUNT: 13.9 % (ref 10–15)
GLUCOSE SERPL-MCNC: 101 MG/DL (ref 70–99)
GRAM STAIN RESULT: ABNORMAL
HCO3 SERPL-SCNC: 17 MMOL/L (ref 22–29)
HCT VFR BLD AUTO: 24.6 % (ref 35–47)
HGB BLD-MCNC: 8.2 G/DL (ref 11.7–15.7)
MCH RBC QN AUTO: 29.3 PG (ref 26.5–33)
MCHC RBC AUTO-ENTMCNC: 33.3 G/DL (ref 31.5–36.5)
MCV RBC AUTO: 87.9 FL (ref 78–100)
PLATELET # BLD AUTO: 409 10E3/UL (ref 150–450)
POTASSIUM SERPL-SCNC: 4 MMOL/L (ref 3.4–5.3)
RBC # BLD AUTO: 2.8 10E6/UL (ref 3.8–5.2)
SODIUM SERPL-SCNC: 138 MMOL/L (ref 135–145)
VANCOMYCIN SERPL-MCNC: 11.4 UG/ML (ref ?–25)
WBC # BLD AUTO: 12.29 10E3/UL (ref 4–11)

## 2025-09-04 PROCEDURE — 80202 ASSAY OF VANCOMYCIN: CPT | Performed by: FAMILY MEDICINE

## 2025-09-04 PROCEDURE — 120N000001 HC R&B MED SURG/OB

## 2025-09-04 PROCEDURE — 250N000011 HC RX IP 250 OP 636: Performed by: FAMILY MEDICINE

## 2025-09-04 PROCEDURE — 36415 COLL VENOUS BLD VENIPUNCTURE: CPT | Performed by: FAMILY MEDICINE

## 2025-09-04 PROCEDURE — 80048 BASIC METABOLIC PNL TOTAL CA: CPT

## 2025-09-04 PROCEDURE — 250N000013 HC RX MED GY IP 250 OP 250 PS 637

## 2025-09-04 PROCEDURE — 85027 COMPLETE CBC AUTOMATED: CPT

## 2025-09-04 PROCEDURE — 250N000013 HC RX MED GY IP 250 OP 250 PS 637: Performed by: STUDENT IN AN ORGANIZED HEALTH CARE EDUCATION/TRAINING PROGRAM

## 2025-09-04 PROCEDURE — 250N000011 HC RX IP 250 OP 636

## 2025-09-04 RX ORDER — SIMETHICONE 80 MG
80 TABLET,CHEWABLE ORAL EVERY 6 HOURS PRN
Status: DISPENSED | OUTPATIENT
Start: 2025-09-04

## 2025-09-04 RX ADMIN — KETOROLAC TROMETHAMINE 15 MG: 15 INJECTION, SOLUTION INTRAMUSCULAR; INTRAVENOUS at 18:17

## 2025-09-04 RX ADMIN — SENNOSIDES AND DOCUSATE SODIUM 1 TABLET: 50; 8.6 TABLET ORAL at 20:30

## 2025-09-04 RX ADMIN — FOLIC ACID 1000 MCG: 1 TABLET ORAL at 08:42

## 2025-09-04 RX ADMIN — TAMSULOSIN HYDROCHLORIDE 0.4 MG: 0.4 CAPSULE ORAL at 08:42

## 2025-09-04 RX ADMIN — PIPERACILLIN AND TAZOBACTAM 3.38 G: 3; .375 INJECTION, POWDER, FOR SOLUTION INTRAVENOUS at 18:11

## 2025-09-04 RX ADMIN — KETOROLAC TROMETHAMINE 15 MG: 15 INJECTION, SOLUTION INTRAMUSCULAR; INTRAVENOUS at 00:38

## 2025-09-04 RX ADMIN — LISINOPRIL 20 MG: 20 TABLET ORAL at 08:42

## 2025-09-04 RX ADMIN — PIPERACILLIN AND TAZOBACTAM 3.38 G: 3; .375 INJECTION, POWDER, FOR SOLUTION INTRAVENOUS at 00:45

## 2025-09-04 RX ADMIN — PIPERACILLIN AND TAZOBACTAM 3.38 G: 3; .375 INJECTION, POWDER, FOR SOLUTION INTRAVENOUS at 11:19

## 2025-09-04 RX ADMIN — CITALOPRAM HYDROBROMIDE 40 MG: 20 TABLET ORAL at 08:42

## 2025-09-04 RX ADMIN — SENNOSIDES AND DOCUSATE SODIUM 2 TABLET: 50; 8.6 TABLET ORAL at 00:41

## 2025-09-04 RX ADMIN — PANTOPRAZOLE SODIUM 40 MG: 40 TABLET, DELAYED RELEASE ORAL at 08:42

## 2025-09-04 RX ADMIN — ONDANSETRON 4 MG: 2 INJECTION, SOLUTION INTRAMUSCULAR; INTRAVENOUS at 09:49

## 2025-09-04 RX ADMIN — SIMETHICONE 80 MG: 80 TABLET, CHEWABLE ORAL at 12:06

## 2025-09-04 RX ADMIN — SENNOSIDES AND DOCUSATE SODIUM 1 TABLET: 50; 8.6 TABLET ORAL at 08:43

## 2025-09-04 RX ADMIN — Medication 1250 MG: at 12:04

## 2025-09-04 RX ADMIN — PIPERACILLIN AND TAZOBACTAM 3.38 G: 3; .375 INJECTION, POWDER, FOR SOLUTION INTRAVENOUS at 06:22

## 2025-09-04 RX ADMIN — NICOTINE 1 PATCH: 21 PATCH, EXTENDED RELEASE TRANSDERMAL at 08:43

## 2025-09-04 ASSESSMENT — ACTIVITIES OF DAILY LIVING (ADL)
ADLS_ACUITY_SCORE: 35
ADLS_ACUITY_SCORE: 36
ADLS_ACUITY_SCORE: 33
ADLS_ACUITY_SCORE: 35
ADLS_ACUITY_SCORE: 33
ADLS_ACUITY_SCORE: 35
ADLS_ACUITY_SCORE: 33
ADLS_ACUITY_SCORE: 35
ADLS_ACUITY_SCORE: 36
ADLS_ACUITY_SCORE: 35
ADLS_ACUITY_SCORE: 33
ADLS_ACUITY_SCORE: 35
ADLS_ACUITY_SCORE: 33
ADLS_ACUITY_SCORE: 36

## 2025-11-26 ENCOUNTER — PRE VISIT (OUTPATIENT)
Dept: SURGERY | Facility: CLINIC | Age: 60
End: 2025-11-26

## (undated) DEVICE — RAD RX ISOVUE 250 (100ML)51% IOPAMIDOL 250MG/ML CHRG PER ML

## (undated) DEVICE — SOL NACL 0.9% IRRIG 3000ML BAG 2B7477

## (undated) DEVICE — ENDO FORCEP BX CAPTURA PRO SPIKE G50696

## (undated) DEVICE — SPECIMEN CONTAINER 3OZ W/FORMALIN 59901

## (undated) DEVICE — PACK CYSTO CUSTOM ASC

## (undated) DEVICE — GLOVE EXAM NITRILE LG PF LATEX FREE 5064

## (undated) DEVICE — NDL EPIDURAL TUOHY 20GA 3.5" 405028

## (undated) DEVICE — SYR 10ML LL W/O NDL 302995

## (undated) DEVICE — SYR 30ML SLIP TIP W/O NDL 302833

## (undated) DEVICE — GLOVE BIOGEL PI MICRO SZ 7.5 48575

## (undated) DEVICE — PREP CHLORAPREP W/ORANGE TINT 10.5ML 260715

## (undated) DEVICE — SUCTION MANIFOLD NEPTUNE 2 SYS 1 PORT 702-025-000

## (undated) DEVICE — PAD CHUX UNDERPAD 30X36" P3036C

## (undated) DEVICE — TUBING SUCTION MEDI-VAC 1/4"X20' N620A

## (undated) DEVICE — SYR 10ML PERFIX LL 332152

## (undated) DEVICE — LINEN TOWEL PACK X5 5464

## (undated) DEVICE — PREP POVIDONE IODINE SOLUTION 10% 4OZ BOTTLE 29906-004

## (undated) DEVICE — CATH URETERAL OPEN END 5FRX70CM M0064002010

## (undated) DEVICE — DRAPE C-ARM W/STRAPS 42X72" 07-CA104

## (undated) DEVICE — ENDO BITE BLOCK ADULT OMNI-BLOC

## (undated) DEVICE — SUCTION MANIFOLD NEPTUNE 2 SYS 4 PORT 0702-020-000

## (undated) DEVICE — SUCTION CATH AIRLIFE TRI-FLO W/CONTROL PORT 14FR  T60C

## (undated) DEVICE — GOWN IMPERVIOUS 2XL BLUE

## (undated) DEVICE — SOL WATER IRRIG 500ML BOTTLE 2F7113

## (undated) DEVICE — KIT ENDO FIRST STEP DISINFECTANT 200ML W/POUCH EP-4

## (undated) DEVICE — SOL NACL 0.9% IRRIG 500ML BOTTLE 2F7123

## (undated) DEVICE — Device

## (undated) DEVICE — SYR 50ML LL W/O NDL 309653

## (undated) DEVICE — SYR PISTON IRRIGATION 60 ML DYND20325

## (undated) DEVICE — KIT ENDO TURNOVER/PROCEDURE CARRY-ON 101822

## (undated) DEVICE — TRAY PAIN INJECTION 97A 640

## (undated) DEVICE — PREP POVIDONE-IODINE 7.5% SCRUB 4OZ BOTTLE MDS093945

## (undated) DEVICE — GLOVE PROTEXIS MICRO 6.0  2D73PM60

## (undated) DEVICE — ADAPTER SCOPE UROLOK II LF M0067301400

## (undated) RX ORDER — EPHEDRINE SULFATE 50 MG/ML
INJECTION, SOLUTION INTRAMUSCULAR; INTRAVENOUS; SUBCUTANEOUS
Status: DISPENSED
Start: 2025-07-25

## (undated) RX ORDER — ONDANSETRON 2 MG/ML
INJECTION INTRAMUSCULAR; INTRAVENOUS
Status: DISPENSED
Start: 2024-05-14

## (undated) RX ORDER — PROPOFOL 10 MG/ML
INJECTION, EMULSION INTRAVENOUS
Status: DISPENSED
Start: 2025-07-25

## (undated) RX ORDER — ONDANSETRON 2 MG/ML
INJECTION INTRAMUSCULAR; INTRAVENOUS
Status: DISPENSED
Start: 2025-02-24

## (undated) RX ORDER — GENTAMICIN 40 MG/ML
INJECTION, SOLUTION INTRAMUSCULAR; INTRAVENOUS
Status: DISPENSED
Start: 2025-07-25

## (undated) RX ORDER — ACETAMINOPHEN 325 MG/1
TABLET ORAL
Status: DISPENSED
Start: 2025-07-25

## (undated) RX ORDER — DEXAMETHASONE SODIUM PHOSPHATE 4 MG/ML
INJECTION, SOLUTION INTRA-ARTICULAR; INTRALESIONAL; INTRAMUSCULAR; INTRAVENOUS; SOFT TISSUE
Status: DISPENSED
Start: 2025-02-24

## (undated) RX ORDER — FENTANYL CITRATE-0.9 % NACL/PF 10 MCG/ML
PLASTIC BAG, INJECTION (ML) INTRAVENOUS
Status: DISPENSED
Start: 2025-07-25

## (undated) RX ORDER — ACETAMINOPHEN 325 MG/1
TABLET ORAL
Status: DISPENSED
Start: 2025-02-24

## (undated) RX ORDER — FENTANYL CITRATE 50 UG/ML
INJECTION, SOLUTION INTRAMUSCULAR; INTRAVENOUS
Status: DISPENSED
Start: 2025-02-24

## (undated) RX ORDER — PROPOFOL 10 MG/ML
INJECTION, EMULSION INTRAVENOUS
Status: DISPENSED
Start: 2025-02-24

## (undated) RX ORDER — CEFAZOLIN SODIUM 2 G/50ML
SOLUTION INTRAVENOUS
Status: DISPENSED
Start: 2025-02-24

## (undated) RX ORDER — CEFAZOLIN SODIUM 2 G/50ML
SOLUTION INTRAVENOUS
Status: DISPENSED
Start: 2025-07-25

## (undated) RX ORDER — FENTANYL CITRATE 50 UG/ML
INJECTION, SOLUTION INTRAMUSCULAR; INTRAVENOUS
Status: DISPENSED
Start: 2025-07-25

## (undated) RX ORDER — OXYCODONE HYDROCHLORIDE 5 MG/1
TABLET ORAL
Status: DISPENSED
Start: 2025-07-25

## (undated) RX ORDER — KETOROLAC TROMETHAMINE 30 MG/ML
INJECTION, SOLUTION INTRAMUSCULAR; INTRAVENOUS
Status: DISPENSED
Start: 2025-02-24

## (undated) RX ORDER — FENTANYL CITRATE 50 UG/ML
INJECTION, SOLUTION INTRAMUSCULAR; INTRAVENOUS
Status: DISPENSED
Start: 2025-09-02